# Patient Record
Sex: MALE | Race: WHITE | Employment: UNEMPLOYED | URBAN - METROPOLITAN AREA
[De-identification: names, ages, dates, MRNs, and addresses within clinical notes are randomized per-mention and may not be internally consistent; named-entity substitution may affect disease eponyms.]

---

## 2017-01-25 ENCOUNTER — APPOINTMENT (OUTPATIENT)
Dept: GENERAL RADIOLOGY | Age: 57
End: 2017-01-25
Attending: PHYSICIAN ASSISTANT
Payer: SELF-PAY

## 2017-01-25 ENCOUNTER — HOSPITAL ENCOUNTER (EMERGENCY)
Age: 57
Discharge: HOME | End: 2017-01-25
Attending: EMERGENCY MEDICINE
Payer: SELF-PAY

## 2017-01-25 VITALS
SYSTOLIC BLOOD PRESSURE: 170 MMHG | HEART RATE: 63 BPM | BODY MASS INDEX: 30.23 KG/M2 | TEMPERATURE: 98.8 F | WEIGHT: 256 LBS | HEIGHT: 77 IN | RESPIRATION RATE: 18 BRPM | DIASTOLIC BLOOD PRESSURE: 66 MMHG | OXYGEN SATURATION: 97 %

## 2017-01-25 DIAGNOSIS — R10.13 ABDOMINAL PAIN, EPIGASTRIC: ICD-10-CM

## 2017-01-25 DIAGNOSIS — K85.80 OTHER ACUTE PANCREATITIS: Primary | ICD-10-CM

## 2017-01-25 LAB
ALBUMIN SERPL BCP-MCNC: 3.6 G/DL (ref 3.5–5)
ALBUMIN/GLOB SERPL: 0.8 {RATIO} (ref 1.1–2.2)
ALP SERPL-CCNC: 62 U/L (ref 45–117)
ALT SERPL-CCNC: 18 U/L (ref 12–78)
ANION GAP BLD CALC-SCNC: 8 MMOL/L (ref 5–15)
APPEARANCE UR: CLEAR
AST SERPL W P-5'-P-CCNC: 11 U/L (ref 15–37)
ATRIAL RATE: 60 BPM
BACTERIA URNS QL MICRO: NEGATIVE /HPF
BASOPHILS # BLD AUTO: 0 K/UL (ref 0–0.1)
BASOPHILS # BLD: 0 % (ref 0–1)
BILIRUB SERPL-MCNC: 0.7 MG/DL (ref 0.2–1)
BILIRUB UR QL: NEGATIVE
BUN SERPL-MCNC: 11 MG/DL (ref 6–20)
BUN/CREAT SERPL: 11 (ref 12–20)
CALCIUM SERPL-MCNC: 9.4 MG/DL (ref 8.5–10.1)
CALCULATED P AXIS, ECG09: 42 DEGREES
CALCULATED R AXIS, ECG10: -49 DEGREES
CALCULATED T AXIS, ECG11: -6 DEGREES
CHLORIDE SERPL-SCNC: 98 MMOL/L (ref 97–108)
CO2 SERPL-SCNC: 31 MMOL/L (ref 21–32)
COLOR UR: ABNORMAL
CREAT SERPL-MCNC: 0.97 MG/DL (ref 0.7–1.3)
DIAGNOSIS, 93000: NORMAL
EOSINOPHIL # BLD: 0.1 K/UL (ref 0–0.4)
EOSINOPHIL NFR BLD: 2 % (ref 0–7)
EPITH CASTS URNS QL MICRO: ABNORMAL /LPF
ERYTHROCYTE [DISTWIDTH] IN BLOOD BY AUTOMATED COUNT: 12.8 % (ref 11.5–14.5)
GLOBULIN SER CALC-MCNC: 4.6 G/DL (ref 2–4)
GLUCOSE SERPL-MCNC: 161 MG/DL (ref 65–100)
GLUCOSE UR STRIP.AUTO-MCNC: NEGATIVE MG/DL
HCT VFR BLD AUTO: 38.7 % (ref 36.6–50.3)
HGB BLD-MCNC: 13.3 G/DL (ref 12.1–17)
HGB UR QL STRIP: NEGATIVE
HYALINE CASTS URNS QL MICRO: ABNORMAL /LPF (ref 0–5)
KETONES UR QL STRIP.AUTO: NEGATIVE MG/DL
LEUKOCYTE ESTERASE UR QL STRIP.AUTO: NEGATIVE
LIPASE SERPL-CCNC: 471 U/L (ref 73–393)
LYMPHOCYTES # BLD AUTO: 33 % (ref 12–49)
LYMPHOCYTES # BLD: 2.5 K/UL (ref 0.8–3.5)
MCH RBC QN AUTO: 29.5 PG (ref 26–34)
MCHC RBC AUTO-ENTMCNC: 34.4 G/DL (ref 30–36.5)
MCV RBC AUTO: 85.8 FL (ref 80–99)
MONOCYTES # BLD: 0.5 K/UL (ref 0–1)
MONOCYTES NFR BLD AUTO: 7 % (ref 5–13)
NEUTS SEG # BLD: 4.5 K/UL (ref 1.8–8)
NEUTS SEG NFR BLD AUTO: 58 % (ref 32–75)
NITRITE UR QL STRIP.AUTO: NEGATIVE
P-R INTERVAL, ECG05: 180 MS
PH UR STRIP: 6 [PH] (ref 5–8)
PLATELET # BLD AUTO: 256 K/UL (ref 150–400)
POTASSIUM SERPL-SCNC: 3.7 MMOL/L (ref 3.5–5.1)
PROT SERPL-MCNC: 8.2 G/DL (ref 6.4–8.2)
PROT UR STRIP-MCNC: 100 MG/DL
Q-T INTERVAL, ECG07: 424 MS
QRS DURATION, ECG06: 96 MS
QTC CALCULATION (BEZET), ECG08: 424 MS
RBC # BLD AUTO: 4.51 M/UL (ref 4.1–5.7)
RBC #/AREA URNS HPF: ABNORMAL /HPF (ref 0–5)
SODIUM SERPL-SCNC: 137 MMOL/L (ref 136–145)
SP GR UR REFRACTOMETRY: 1.01 (ref 1–1.03)
TROPONIN I SERPL-MCNC: <0.04 NG/ML
UROBILINOGEN UR QL STRIP.AUTO: 0.2 EU/DL (ref 0.2–1)
VENTRICULAR RATE, ECG03: 60 BPM
WBC # BLD AUTO: 7.7 K/UL (ref 4.1–11.1)
WBC URNS QL MICRO: ABNORMAL /HPF (ref 0–4)

## 2017-01-25 PROCEDURE — 85025 COMPLETE CBC W/AUTO DIFF WBC: CPT | Performed by: EMERGENCY MEDICINE

## 2017-01-25 PROCEDURE — 93005 ELECTROCARDIOGRAM TRACING: CPT

## 2017-01-25 PROCEDURE — 96374 THER/PROPH/DIAG INJ IV PUSH: CPT

## 2017-01-25 PROCEDURE — 96361 HYDRATE IV INFUSION ADD-ON: CPT

## 2017-01-25 PROCEDURE — 36415 COLL VENOUS BLD VENIPUNCTURE: CPT | Performed by: EMERGENCY MEDICINE

## 2017-01-25 PROCEDURE — 83690 ASSAY OF LIPASE: CPT | Performed by: EMERGENCY MEDICINE

## 2017-01-25 PROCEDURE — 99284 EMERGENCY DEPT VISIT MOD MDM: CPT

## 2017-01-25 PROCEDURE — 96375 TX/PRO/DX INJ NEW DRUG ADDON: CPT

## 2017-01-25 PROCEDURE — 84484 ASSAY OF TROPONIN QUANT: CPT | Performed by: PHYSICIAN ASSISTANT

## 2017-01-25 PROCEDURE — 74011250636 HC RX REV CODE- 250/636: Performed by: PHYSICIAN ASSISTANT

## 2017-01-25 PROCEDURE — 81001 URINALYSIS AUTO W/SCOPE: CPT | Performed by: EMERGENCY MEDICINE

## 2017-01-25 PROCEDURE — 80053 COMPREHEN METABOLIC PANEL: CPT | Performed by: EMERGENCY MEDICINE

## 2017-01-25 PROCEDURE — 74000 XR ABD (KUB): CPT

## 2017-01-25 RX ORDER — SODIUM CHLORIDE 9 MG/ML
2000 INJECTION, SOLUTION INTRAVENOUS CONTINUOUS
Status: DISCONTINUED | OUTPATIENT
Start: 2017-01-25 | End: 2017-01-25 | Stop reason: HOSPADM

## 2017-01-25 RX ORDER — LACTULOSE 10 G/15ML
10 SOLUTION ORAL; RECTAL DAILY
Qty: 480 ML | Refills: 0 | Status: SHIPPED | OUTPATIENT
Start: 2017-01-25 | End: 2017-10-03 | Stop reason: SDUPTHER

## 2017-01-25 RX ORDER — HYDROMORPHONE HYDROCHLORIDE 1 MG/ML
1 INJECTION, SOLUTION INTRAMUSCULAR; INTRAVENOUS; SUBCUTANEOUS
Status: COMPLETED | OUTPATIENT
Start: 2017-01-25 | End: 2017-01-25

## 2017-01-25 RX ADMIN — SODIUM CHLORIDE 2000 ML/HR: 900 INJECTION, SOLUTION INTRAVENOUS at 03:23

## 2017-01-25 RX ADMIN — PROMETHAZINE HYDROCHLORIDE 25 MG: 25 INJECTION INTRAMUSCULAR; INTRAVENOUS at 03:22

## 2017-01-25 RX ADMIN — HYDROMORPHONE HYDROCHLORIDE 1 MG: 1 INJECTION, SOLUTION INTRAMUSCULAR; INTRAVENOUS; SUBCUTANEOUS at 03:22

## 2017-01-25 NOTE — ED TRIAGE NOTES
TRIAGE NOTE: Pt arrives from home with complaint of abdominal pain that started two days ago. Hx pancreatitis. +urinary frequency.   Denies N/V.

## 2017-01-25 NOTE — DISCHARGE INSTRUCTIONS
We hope that we have addressed all of your medical concerns. The examination and treatment you received in the Emergency Department were for an emergent problem and were not intended as complete care. It is important that you follow up with your healthcare provider(s) for ongoing care. If your symptoms worsen or do not improve as expected, and you are unable to reach your usual health care provider(s), you should return to the Emergency Department. Today's healthcare is undergoing tremendous change, and patient satisfaction surveys are one of the many tools to assess the quality of medical care. You may receive a survey from the [employer] regarding your experience in the Emergency Department. I hope that your experience has been completely positive, particularly the medical care that I provided. As such, please participate in the survey; anything less than excellent does not meet my expectations or intentions. [facility] and [facility] participate in nationally recognized quality of care measures. If your blood pressure is greater than 120/80, as reported below, we urge that you seek medical care to address the potential of high blood pressure, commonly known as hypertension. Hypertension can be hereditary or can be caused by certain medical conditions, pain, stress, or \"white coat syndrome. \"       Please make an appointment with your health care provider(s) for follow up of your Emergency Department visit. VITALS:   Patient Vitals for the past 8 hrs:   Temp Pulse Resp BP SpO2   01/25/17 0445 - - - 170/66 97 %   01/25/17 0430 - - - 177/59 98 %   01/25/17 0415 97.9 °F (36.6 °C) (!) 58 16 196/88 98 %   01/25/17 0201 97.9 °F (36.6 °C) 87 17 (!) 191/99 97 %          Thank you for allowing us to provide you with medical care today. We realize that you have many choices for your emergency care needs.   Please choose us in the future for any continued health care needs. Regards,           April MAURO. [facility], [address] [facility].   Office: [phone]            Recent Results (from the past 24 hour(s))   CBC WITH AUTOMATED DIFF    Collection Time: 01/25/17  2:12 AM   Result Value Ref Range    WBC 7.7 4.1 - 11.1 K/uL    RBC 4.51 4.10 - 5.70 M/uL    HGB 13.3 12.1 - 17.0 g/dL    HCT 38.7 36.6 - 50.3 %    MCV 85.8 80.0 - 99.0 FL    MCH 29.5 26.0 - 34.0 PG    MCHC 34.4 30.0 - 36.5 g/dL    RDW 12.8 11.5 - 14.5 %    PLATELET 553 015 - 190 K/uL    NEUTROPHILS 58 32 - 75 %    LYMPHOCYTES 33 12 - 49 %    MONOCYTES 7 5 - 13 %    EOSINOPHILS 2 0 - 7 %    BASOPHILS 0 0 - 1 %    ABS. NEUTROPHILS 4.5 1.8 - 8.0 K/UL    ABS. LYMPHOCYTES 2.5 0.8 - 3.5 K/UL    ABS. MONOCYTES 0.5 0.0 - 1.0 K/UL    ABS. EOSINOPHILS 0.1 0.0 - 0.4 K/UL    ABS. BASOPHILS 0.0 0.0 - 0.1 K/UL   METABOLIC PANEL, COMPREHENSIVE    Collection Time: 01/25/17  2:12 AM   Result Value Ref Range    Sodium 137 136 - 145 mmol/L    Potassium 3.7 3.5 - 5.1 mmol/L    Chloride 98 97 - 108 mmol/L    CO2 31 21 - 32 mmol/L    Anion gap 8 5 - 15 mmol/L    Glucose 161 (H) 65 - 100 mg/dL    BUN 11 6 - 20 MG/DL    Creatinine 0.97 0.70 - 1.30 MG/DL    BUN/Creatinine ratio 11 (L) 12 - 20      GFR est AA >60 >60 ml/min/1.73m2    GFR est non-AA >60 >60 ml/min/1.73m2    Calcium 9.4 8.5 - 10.1 MG/DL    Bilirubin, total 0.7 0.2 - 1.0 MG/DL    ALT 18 12 - 78 U/L    AST 11 (L) 15 - 37 U/L    Alk.  phosphatase 62 45 - 117 U/L    Protein, total 8.2 6.4 - 8.2 g/dL    Albumin 3.6 3.5 - 5.0 g/dL    Globulin 4.6 (H) 2.0 - 4.0 g/dL    A-G Ratio 0.8 (L) 1.1 - 2.2     LIPASE    Collection Time: 01/25/17  2:12 AM   Result Value Ref Range    Lipase 471 (H) 73 - 393 U/L   URINALYSIS W/MICROSCOPIC    Collection Time: 01/25/17  2:12 AM   Result Value Ref Range    Color YELLOW/STRAW      Appearance CLEAR CLEAR      Specific gravity 1.012 1.003 - 1.030      pH (UA) 6.0 5.0 - 8.0      Protein 100 (A) NEG mg/dL Glucose NEGATIVE  NEG mg/dL    Ketone NEGATIVE  NEG mg/dL    Bilirubin NEGATIVE  NEG      Blood NEGATIVE  NEG      Urobilinogen 0.2 0.2 - 1.0 EU/dL    Nitrites NEGATIVE  NEG      Leukocyte Esterase NEGATIVE  NEG      WBC 0-4 0 - 4 /hpf    RBC 0-5 0 - 5 /hpf    Epithelial cells FEW FEW /lpf    Bacteria NEGATIVE  NEG /hpf    Hyaline Cast 0-2 0 - 5 /lpf   TROPONIN I    Collection Time: 01/25/17  2:12 AM   Result Value Ref Range    Troponin-I, Qt. <0.04 <0.05 ng/mL   EKG, 12 LEAD, INITIAL    Collection Time: 01/25/17  4:19 AM   Result Value Ref Range    Ventricular Rate 60 BPM    Atrial Rate 60 BPM    P-R Interval 180 ms    QRS Duration 96 ms    Q-T Interval 424 ms    QTC Calculation (Bezet) 424 ms    Calculated P Axis 42 degrees    Calculated R Axis -49 degrees    Calculated T Axis -6 degrees    Diagnosis       Normal sinus rhythm  Left anterior fascicular block  When compared with ECG of 17-FEB-2007 14:42,  Previous ECG has undetermined rhythm, needs review  QRS axis shifted left         No results found. Abdominal Pain: Care Instructions  Your Care Instructions    Abdominal pain has many possible causes. Some aren't serious and get better on their own in a few days. Others need more testing and treatment. If your pain continues or gets worse, you need to be rechecked and may need more tests to find out what is wrong. You may need surgery to correct the problem. Don't ignore new symptoms, such as fever, nausea and vomiting, urination problems, pain that gets worse, and dizziness. These may be signs of a more serious problem. Your doctor may have recommended a follow-up visit in the next 8 to 12 hours. If you are not getting better, you may need more tests or treatment. The doctor has checked you carefully, but problems can develop later. If you notice any problems or new symptoms, get medical treatment right away. Follow-up care is a key part of your treatment and safety.  Be sure to make and go to all appointments, and call your doctor if you are having problems. It's also a good idea to know your test results and keep a list of the medicines you take. How can you care for yourself at home? · Rest until you feel better. · To prevent dehydration, drink plenty of fluids, enough so that your urine is light yellow or clear like water. Choose water and other caffeine-free clear liquids until you feel better. If you have kidney, heart, or liver disease and have to limit fluids, talk with your doctor before you increase the amount of fluids you drink. · If your stomach is upset, eat mild foods, such as rice, dry toast or crackers, bananas, and applesauce. Try eating several small meals instead of two or three large ones. · Wait until 48 hours after all symptoms have gone away before you have spicy foods, alcohol, and drinks that contain caffeine. · Do not eat foods that are high in fat. · Avoid anti-inflammatory medicines such as aspirin, ibuprofen (Advil, Motrin), and naproxen (Aleve). These can cause stomach upset. Talk to your doctor if you take daily aspirin for another health problem. When should you call for help? Call 911 anytime you think you may need emergency care. For example, call if:  · You passed out (lost consciousness). · You pass maroon or very bloody stools. · You vomit blood or what looks like coffee grounds. · You have new, severe belly pain. Call your doctor now or seek immediate medical care if:  · Your pain gets worse, especially if it becomes focused in one area of your belly. · You have a new or higher fever. · Your stools are black and look like tar, or they have streaks of blood. · You have unexpected vaginal bleeding. · You have symptoms of a urinary tract infection. These may include:  ¨ Pain when you urinate. ¨ Urinating more often than usual.  ¨ Blood in your urine. · You are dizzy or lightheaded, or you feel like you may faint.   Watch closely for changes in your health, and be sure to contact your doctor if:  · You are not getting better after 1 day (24 hours). Where can you learn more? Go to http://ciara-abbe.info/. Enter Y577 in the search box to learn more about \"Abdominal Pain: Care Instructions. \"  Current as of: May 27, 2016  Content Version: 11.1  © 4703-3650 [employer] Care instructions adapted under license by [employer] (which disclaims liability or warranty for this information). If you have questions about a medical condition or this instruction, always ask your healthcare professional. [place] [address] any warranty or liability for your use of this information.

## 2017-01-25 NOTE — ED NOTES
PA reviewed discharge instructions and options with patient; patient verbalized understanding. RN reviewed discharge instructions using teachback method. Pt. Ambulated to exit without difficulty and in no signs of acute distress.

## 2017-01-25 NOTE — ED PROVIDER NOTES
HPI Comments: 57 yo  male with medical history remarkable for DM, Hep C, elevated triglycerides, opioid dependence in remission, daily methadone therapy presenting ambulatory to the ED with complaint of epigastric abdominal pain, described as \"unique pancreatitis type pain\", radiating into the back constant, positionally exacerbated, 10/10 with associated nausea without vomiting x two days. Has tried NPO diet with minimal resolution which typically helps. No ETOH. Some increased urinary frequency. No fever, headache, sore throat, cough, rhinorrhea, sneezing, SOB, chest pain, vomiting, or diarrhea. Patient is a 56 y.o. male presenting with abdominal pain. Abdominal Pain    Associated symptoms include nausea and arthralgias. Pertinent negatives include no fever, no diarrhea, no vomiting, no constipation, no dysuria, no frequency, no headaches and no chest pain. Past Medical History:   Diagnosis Date    Alcohol abuse, in remission     Constipation     Diabetes ([facility] [address]      Type II    Hepatitis C     Hypertension     Pancreatitis     Spondylitis ([facility] [address]        Past Surgical History:   Procedure Laterality Date    Hx orthopaedic       2 back surgeries    Hx appendectomy      Hx other surgical       knee  surgery         History reviewed. No pertinent family history. Social History     Social History    Marital status: SINGLE     Spouse name: N/A    Number of children: N/A    Years of education: N/A     Occupational History    Not on file. Social History Main Topics    Smoking status: Current Every Day Smoker     Packs/day: 1.00    Smokeless tobacco: Never Used    Alcohol use No    Drug use: No      Comment: Methadone    Sexual activity: Not on file     Other Topics Concern    Not on file     Social History Narrative         ALLERGIES: Sulfa (sulfonamide antibiotics)    Review of Systems   Constitutional: Negative. Negative for chills and fever.    HENT: Negative for congestion, ear pain, rhinorrhea, sore throat and voice change. Eyes: Negative. Negative for photophobia, pain and itching. Respiratory: Negative for cough, chest tightness and shortness of breath. Cardiovascular: Negative for chest pain and palpitations. Gastrointestinal: Positive for abdominal pain and nausea. Negative for abdominal distention, constipation, diarrhea and vomiting. Genitourinary: Negative for difficulty urinating, dysuria, frequency and urgency. Musculoskeletal: Positive for arthralgias and neck pain. Negative for joint swelling and neck stiffness. Skin: Positive for color change. Neurological: Negative for weakness, numbness and headaches. Psychiatric/Behavioral: Negative for confusion and decreased concentration. All other systems reviewed and are negative. Vitals:    01/25/17 0201   BP: (!) 191/99   Pulse: 87   Resp: 17   Temp: 97.9 °F (36.6 °C)   SpO2: 97%   Weight: 116.1 kg (256 lb)   Height: 6' 5\" (1.956 m)            Physical Exam   Constitutional: He is oriented to person, place, and time. He appears well-developed and well-nourished. No distress. Obese  male uncomfortable appearing   HENT:   Head: Normocephalic and atraumatic. Right Ear: External ear normal.   Left Ear: External ear normal.   Nose: Nose normal.   Mouth/Throat: Oropharynx is clear and moist. No oropharyngeal exudate. Eyes: Conjunctivae and EOM are normal. Pupils are equal, round, and reactive to light. Right eye exhibits no discharge. Left eye exhibits no discharge. Neck: Normal range of motion. Neck supple. Cardiovascular: Normal rate, regular rhythm and normal heart sounds. Pulmonary/Chest: Effort normal and breath sounds normal. He has no wheezes. He has no rales. Abdominal: Soft. Bowel sounds are normal. He exhibits no distension. There is no tenderness. There is no guarding. Obese tender in epigastrum and RUQ     Musculoskeletal: Normal range of motion. Lymphadenopathy:     He has no cervical adenopathy. Neurological: He is alert and oriented to person, place, and time. No cranial nerve deficit. Skin: Skin is warm and dry. He is not diaphoretic. Psychiatric: He has a normal mood and affect. His behavior is normal.   Nursing note and vitals reviewed. MDM  Number of Diagnoses or Management Options  Abdominal pain, epigastric:   Other acute pancreatitis:   Diagnosis management comments: 57 yo  male with hx of pancreatitis with epigastric abdominal pain described as similar. Will check labs, give IVF, analgesia and reassess. [facility]         Amount and/or Complexity of Data Reviewed  Clinical lab tests: reviewed and ordered      ED Course       Procedures      Progress note    Labs reviewed. Mildly elevated lipase. [facility]     EKG interpretation: (Preliminary)  Rhythm: sinus rhythm with left fascicular block; and regular . Rate (approx.): 60; Axis: normal; P wave: normal; QRS interval: normal [facility]    EKG seen and interpreted independently by ED attending. [facility]    Significant stool on xray. Suspect contributing to pain. Pt request refill of lactulose, was helpful in past. [facility]    Patient's results have been reviewed with them. Patient and/or family have verbally conveyed their understanding and agreement of the patient's signs, symptoms, diagnosis, treatment and prognosis and additionally agree to follow up as recommended or return to the Emergency Room should their condition change prior to follow-up. Discharge instructions have also been provided to the patient with some educational information regarding their diagnosis as well a list of reasons why they would want to return to the ER prior to their follow-up appointment should their condition change. [facility]    A/P  Abdominal pain/pancreatitis: Clear liquid diet. Lactulose.   Follow-up with gastroenterology. Return for any new or worsening.  Carmencita WADE [place], [place]

## 2017-07-12 ENCOUNTER — OFFICE VISIT (OUTPATIENT)
Dept: INTERNAL MEDICINE CLINIC | Age: 57
End: 2017-07-12

## 2017-07-12 VITALS
TEMPERATURE: 98.6 F | HEIGHT: 77 IN | RESPIRATION RATE: 19 BRPM | BODY MASS INDEX: 30.75 KG/M2 | OXYGEN SATURATION: 97 % | WEIGHT: 260.4 LBS | SYSTOLIC BLOOD PRESSURE: 150 MMHG | HEART RATE: 76 BPM | DIASTOLIC BLOOD PRESSURE: 100 MMHG

## 2017-07-12 DIAGNOSIS — E11.65 TYPE 2 DIABETES MELLITUS WITH HYPERGLYCEMIA, WITH LONG-TERM CURRENT USE OF INSULIN (HCC): Primary | ICD-10-CM

## 2017-07-12 DIAGNOSIS — K86.1 OTHER CHRONIC PANCREATITIS (HCC): ICD-10-CM

## 2017-07-12 DIAGNOSIS — G89.29 OTHER CHRONIC PAIN: ICD-10-CM

## 2017-07-12 DIAGNOSIS — Z79.4 TYPE 2 DIABETES MELLITUS WITH HYPERGLYCEMIA, WITH LONG-TERM CURRENT USE OF INSULIN (HCC): Primary | ICD-10-CM

## 2017-07-12 DIAGNOSIS — Z12.11 COLON CANCER SCREENING: ICD-10-CM

## 2017-07-12 DIAGNOSIS — I10 ESSENTIAL HYPERTENSION: ICD-10-CM

## 2017-07-12 DIAGNOSIS — F41.9 CHRONIC ANXIETY: ICD-10-CM

## 2017-07-12 DIAGNOSIS — Z86.19 HISTORY OF HEPATITIS C: ICD-10-CM

## 2017-07-12 RX ORDER — AMLODIPINE BESYLATE 10 MG/1
TABLET ORAL
COMMUNITY
Start: 2017-06-28 | End: 2017-10-17 | Stop reason: SDUPTHER

## 2017-07-12 RX ORDER — INSULIN GLARGINE 100 [IU]/ML
INJECTION, SOLUTION SUBCUTANEOUS
COMMUNITY
Start: 2017-07-11 | End: 2017-08-29 | Stop reason: ALTCHOICE

## 2017-07-12 NOTE — MR AVS SNAPSHOT
Visit Information Date & Time Provider Department Dept. Phone Encounter #  
 7/12/2017 10:30 AM [facility] [phone] [id number] Follow-up Instructions Return in about 3 months (around 10/12/2017) for Full Physical - 30 minutes appointment. Your Appointments 8/16/2017 10:45 AM  
New Patient with Verónica Guido MD  
[facility] [address]) Appt Note: NP est PCP, changing insurances/ 6/1/17bw [address] [facility] [address] [facility] [address]  
[phone]  
  
   
 [address] [facility] [address] Upcoming Health Maintenance Date Due Hepatitis C Screening 1960 Pneumococcal 19-64 Medium Risk (1 of 1 - PPSV23) 1/19/1981 DTaP/Tdap/Td series (1 - Tdap) 1/19/1983 FOBT Q 1 YEAR AGE 50-75 1/19/2012 INFLUENZA AGE 9 TO ADULT 8/1/2017 Allergies as of 7/12/2017  Review Complete On: 7/12/2017 By: Kenney Guzman MD  
  
 Severity Noted Reaction Type Reactions Sulfa (Sulfonamide Antibiotics)  11/28/2011    Unknown (comments) Makes him warner Current Immunizations  Reviewed on 11/28/2011 No immunizations on file. Not reviewed this visit You Were Diagnosed With   
  
 Codes Comments Type 2 diabetes mellitus with hyperglycemia, with long-term current use of insulin (HCC)    -  Primary ICD-10-CM: E11.65, Z79.4 ICD-9-CM: 250.00, 790.29, V58.67 Other chronic pain     ICD-10-CM: G89.29 ICD-9-CM: 338.29 Essential hypertension     ICD-10-CM: I10 
ICD-9-CM: 401.9 Other chronic pancreatitis ([facility] [address]     ICD-10-CM: K86.1 ICD-9-CM: 738.6 History of hepatitis C     ICD-10-CM: Z86.19 ICD-9-CM: V12.09 Colon cancer screening     ICD-10-CM: Z12.11 ICD-9-CM: V76.51 Chronic anxiety     ICD-10-CM: F41.9 ICD-9-CM: 300.00 Vitals BP Pulse Temp Resp Height(growth percentile) Weight(growth percentile) (!) 150/100 (BP 1 Location: Right arm, BP Patient Position: Sitting) 76 98.6 °F (37 °C) (Oral) 19 6' 5\" (1.956 m) 260 lb 6.4 oz (118.1 kg) SpO2 BMI Smoking Status 97% 30.88 kg/m2 Current Every Day Smoker Vitals History BMI and BSA Data Body Mass Index Body Surface Area  
 30.88 kg/m 2 2.53 m 2 Preferred Pharmacy Pharmacy Name Phone [facility] [address] [facility] [address] [facility] [phone] Your Updated Medication List  
  
   
This list is accurate as of: 7/12/17 11:40 AM.  Always use your most recent med list.  
  
  
  
  
 ACTOS 30 mg tablet Generic drug:  pioglitazone Take 30 mg by mouth nightly. amLODIPine 10 mg tablet Commonly known as:  NORVASC  
  
 lactulose 10 gram/15 mL solution Commonly known as:  Vertie Cough Take 15 mL by mouth daily. Indications: Constipation LANTUS SOLOSTAR 100 unit/mL (3 mL) Inpn Generic drug:  insulin glargine  
  
 lisinopril 10 mg tablet Commonly known as:  Pecola Cypher Take 20 mg by mouth daily. Reports 10 or 20mg. daily  
  
 metFORMIN 1,000 mg tablet Commonly known as:  GLUCOPHAGE Take 1,000 mg by mouth two (2) times daily (with meals). METHADONE PO Take 75 mg by mouth daily. XANAX 2 mg tablet Generic drug:  ALPRAZolam  
Take 2 mg by mouth two (2) times a day. We Performed the Following CBC WITH AUTOMATED DIFF [66335 CPT(R)] HEMOGLOBIN A1C WITH EAG [20801 CPT(R)] LIPID PANEL [71968 CPT(R)] OCCULT BLOOD, IMMUNOASSAY (FIT) L7961625 CPT(R)] REFERRAL TO PSYCHIATRY [REF91 Custom] TSH 3RD GENERATION [18943 CPT(R)] Follow-up Instructions Return in about 3 months (around 10/12/2017) for Full Physical - 30 minutes appointment. Referral Information Referral ID Referred By Referred To  
  
 [id number] Lewis Gomez, [address] [facility], [address] [place] [address], [address] [facility] Phone: [phone] Fax: [phone] Visits Status Start Date End Date 1 New Request 7/12/17 7/12/18 If your referral has a status of pending review or denied, additional information will be sent to support the outcome of this decision. Patient Instructions Chronic Pain: Care Instructions Your Care Instructions Chronic pain is pain that lasts a long time (months or even years) and may or may not have a clear cause. It is different from acute pain, which usually does have a clear causelike an injury or illnessand gets better over time. Chronic pain: 
· Lasts over time but may vary from day to day. · Does not go away despite efforts to end it. · May disrupt your sleep and lead to fatigue. · May cause depression or anxiety. · May make your muscles tense, causing more pain. · Can disrupt your work, hobbies, home life, and relationships with friends and family. Chronic pain is a very real condition. It is not just in your head. Treatment can help and usually includes several methods used together, such as medicines, physical therapy, exercise, and other treatments. Learning how to relax and changing negative thought patterns can also help you cope. Chronic pain is complex. Taking an active role in your treatment will help you better manage your pain. Tell your doctor if you have trouble dealing with your pain. You may have to try several things before you find what works best for you. Follow-up care is a key part of your treatment and safety. Be sure to make and go to all appointments, and call your doctor if you are having problems. Its also a good idea to know your test results and keep a list of the medicines you take. How can you care for yourself at home? · Pace yourself. Break up large jobs into smaller tasks. Save harder tasks for days when you have less pain, or go back and forth between hard tasks and easier ones. Take rest breaks. · Relax, and reduce stress. Relaxation techniques such as deep breathing or meditation can help. · Keep moving. Gentle, daily exercise can help reduce pain over the long run. Try low- or no-impact exercises such as walking, swimming, and stationary biking. Do stretches to stay flexible. · Try heat, cold packs, and massage. · Get enough sleep. Chronic pain can make you tired and drain your energy. Talk with your doctor if you have trouble sleeping because of pain. · Think positive. Your thoughts can affect your pain level. Do things that you enjoy to distract yourself when you have pain instead of focusing on the pain. See a movie, read a book, listen to music, or spend time with a friend. · If you think you are depressed, talk to your doctor about treatment. · Keep a daily pain diary. Record how your moods, thoughts, sleep patterns, activities, and medicine affect your pain. You may find that your pain is worse during or after certain activities or when you are feeling a certain emotion. Having a record of your pain can help you and your doctor find the best ways to treat your pain. · Take pain medicines exactly as directed. ¨ If the doctor gave you a prescription medicine for pain, take it as prescribed. ¨ If you are not taking a prescription pain medicine, ask your doctor if you can take an over-the-counter medicine. Reducing constipation caused by pain medicine · Include fruits, vegetables, beans, and whole grains in your diet each day. These foods are high in fiber. · Drink plenty of fluids, enough so that your urine is light yellow or clear like water. If you have kidney, heart, or liver disease and have to limit fluids, talk with your doctor before you increase the amount of fluids you drink. · If your doctor recommends it, get more exercise. Walking is a good choice. Bit by bit, increase the amount you walk every day. Try for at least 30 minutes on most days of the week. · Schedule time each day for a bowel movement. A daily routine may help. Take your time and do not strain when having a bowel movement. When should you call for help? Call your doctor now or seek immediate medical care if: 
· Your pain gets worse or is out of control. · You feel down or blue, or you do not enjoy things like you once did. You may be depressed, which is common in people with chronic pain. Depression can be treated. · You have vomiting or cramps for more than 2 hours. Watch closely for changes in your health, and be sure to contact your doctor if: 
· You cannot sleep because of pain. · You are very worried or anxious about your pain. · You have trouble taking your pain medicine. · You have any concerns about your pain medicine. · You have trouble with bowel movements, such as: 
¨ No bowel movement in 3 days. ¨ Blood in the anal area, in your stool, or on the toilet paper. ¨ Diarrhea for more than 24 hours. Where can you learn more? Go to http://ciara-abbe.info/. Enter N004 in the search box to learn more about \"Chronic Pain: Care Instructions. \" Current as of: October 14, 2016 Content Version: 11.3 © 8688-2384 Teacher[employer]. Care instructions adapted under license by [employer] (which disclaims liability or warranty for this information). If you have questions about a medical condition or this instruction, always ask your healthcare professional. [place] [address] any warranty or liability for your use of this information. Introducing [facility]! Elvin Sadler introduces [employer] Apps patient portal. Now you can access parts of your medical record, email your doctor's office, and request medication refills online. 1. In your internet browser, go to [email or web address] 2. Click on the First Time User? Click Here link in the Sign In box. You will see the New Member Sign Up page. 3. Enter your G-cluster Access Code exactly as it appears below. You will not need to use this code after youve completed the sign-up process. If you do not sign up before the expiration date, you must request a new code. · G-cluster Access Code: [email or web address] Expires: 10/10/2017 10:18 AM 
 
4. Enter the last four digits of your Social Security Number (xxxx) and Date of Birth (mm/dd/yyyy) as indicated and click Submit. You will be taken to the next sign-up page. 5. Create a G-cluster ID. This will be your G-cluster login ID and cannot be changed, so think of one that is secure and easy to remember. 6. Create a G-cluster password. You can change your password at any time. 7. Enter your Password Reset Question and Answer. This can be used at a later time if you forget your password. 8. Enter your e-mail address. You will receive e-mail notification when new information is available in [address] 9. Click Sign Up. You can now view and download portions of your medical record. 10. Click the Download Summary menu link to download a portable copy of your medical information. If you have questions, please visit the Frequently Asked Questions section of the G-cluster website. Remember, G-cluster is NOT to be used for urgent needs. For medical emergencies, dial 911. Now available from your iPhone and Android! Please provide this summary of care documentation to your next provider. Your primary care clinician is listed as Diane Saab. If you have any questions after today's visit, please call [phone].

## 2017-07-12 NOTE — PROGRESS NOTES
New Patient Evaluation    Beth Verde is a 56 y.o. male. He has previously seen Dr. Kwesi Trinh    They are here to establish care with the group and me as a primary care provider. The patient fractured his spine at age 15. Spent 80 days in the hospital after that. Presently on disability. Not working for the past 9 years. He has a history of hypertension-on Norvasc, Lisinopril. Pressures noramally 120/80. Long history of anxiety/depression. On Xanax--seeing [facility]. He is on Methadone--he has not seen pain management ever. History of IVDA--now sober    Sees endocrine. [facility]    Has chronic pancreatitis  CT scan in Sept. Areas of bowel thickening. Colonoscopy 6-8 years ago. Normal          Patient Active Problem List    Diagnosis Date Noted    Acute pancreatitis 11/29/2011     Current Outpatient Prescriptions   Medication Sig Dispense Refill    amLODIPine (NORVASC) 10 mg tablet       LANTUS SOLOSTAR 100 unit/mL (3 mL) inpn       METHADONE HCL (METHADONE PO) Take 75 mg by mouth daily.  ALPRAZolam (XANAX) 2 mg tablet Take 2 mg by mouth two (2) times a day.  metFORMIN (GLUCOPHAGE) 1,000 mg tablet Take 1,000 mg by mouth two (2) times daily (with meals).  lisinopril (PRINIVIL, ZESTRIL) 10 mg tablet Take 20 mg by mouth daily. Reports 10 or 20mg. daily       lactulose (CHRONULAC) 10 gram/15 mL solution Take 15 mL by mouth daily. Indications: Constipation 480 mL 0    pioglitazone (ACTOS) 30 mg tablet Take 30 mg by mouth nightly.        Allergies   Allergen Reactions    Sulfa (Sulfonamide Antibiotics) Unknown (comments)     Makes him warner     Past Medical History:   Diagnosis Date    Alcohol abuse, in remission     Constipation     Diabetes ([address]     Type II    Hepatitis C     Hypertension     Pancreatitis     Spondylitis ([address]      Past Surgical History:   Procedure Laterality Date    HX APPENDECTOMY      HX ORTHOPAEDIC      2 back surgeries    HX OTHER SURGICAL      knee  surgery     Family History   Problem Relation Age of Onset    Hypertension Mother     Parkinson's Disease Father      Social History   Substance Use Topics    Smoking status: Current Every Day Smoker     Packs/day: 1.00    Smokeless tobacco: Never Used    Alcohol use No        Health Maintenance   Topic Date Due    Hepatitis C Screening  1960    Pneumococcal 19-64 Medium Risk (1 of 1 - PPSV23) 01/19/1981    DTaP/Tdap/Td series (1 - Tdap) 01/19/1983    FOBT Q 1 YEAR AGE 50-75  01/19/2012    INFLUENZA AGE 9 TO ADULT  08/01/2017       Review of Systems   Constitutional: Negative. Respiratory: Negative. Cardiovascular: Negative. Visit Vitals    BP (!) 150/100 (BP 1 Location: Right arm, BP Patient Position: Sitting)    Pulse 76    Temp 98.6 °F (37 °C) (Oral)    Resp 19    Ht 6' 5\" (1.956 m)    Wt 260 lb 6.4 oz (118.1 kg)    SpO2 97%    BMI 30.88 kg/m2       Physical Exam   Constitutional: He is oriented to person, place, and time and well-developed, well-nourished, and in no distress. Cardiovascular: Normal rate and regular rhythm. Pulmonary/Chest: Effort normal and breath sounds normal. No respiratory distress. Neurological: He is alert and oriented to person, place, and time. ASSESSMENT/PLAN    Gaurang Agrawal was seen today for establish care and anxiety.     Diagnoses and all orders for this visit:    Type 2 diabetes mellitus with hyperglycemia, with long-term current use of insulin ([address]  -     LIPID PANEL  -     TSH 3RD GENERATION  -     HEMOGLOBIN A1C WITH EAG  -     CBC WITH AUTOMATED DIFF    Other chronic pain    Essential hypertension    Other chronic pancreatitis (HCC)    History of hepatitis C    Colon cancer screening  -     OCCULT BLOOD, IMMUNOASSAY (FIT)    Other orders  -     Cancel: HEPATITIS C AB      Follow-up Disposition:  Return in about 3 months (around 10/12/2017) for Full Physical - 30 minutes appointment.    -Discussed with the patient to continue the current plan of care. We will obtain baseline labwork and determine if any adjustments need to be done. We will also await the records of the previous PCP to ascertain further details of the patient's history. The patient agrees with and understands the plan of care. All questions have been answered.

## 2017-07-12 NOTE — PATIENT INSTRUCTIONS

## 2017-07-14 LAB
BASOPHILS # BLD AUTO: 0 X10E3/UL (ref 0–0.2)
BASOPHILS NFR BLD AUTO: 1 %
CHOLEST SERPL-MCNC: 234 MG/DL (ref 100–199)
EOSINOPHIL # BLD AUTO: 0.1 X10E3/UL (ref 0–0.4)
EOSINOPHIL NFR BLD AUTO: 1 %
ERYTHROCYTE [DISTWIDTH] IN BLOOD BY AUTOMATED COUNT: 14.3 % (ref 12.3–15.4)
EST. AVERAGE GLUCOSE BLD GHB EST-MCNC: 194 MG/DL
HBA1C MFR BLD: 8.4 % (ref 4.8–5.6)
HCT VFR BLD AUTO: 40.9 % (ref 37.5–51)
HDLC SERPL-MCNC: 40 MG/DL
HEMOCCULT STL QL IA: NORMAL
HGB BLD-MCNC: 13.4 G/DL (ref 12.6–17.7)
IMM GRANULOCYTES # BLD: 0 X10E3/UL (ref 0–0.1)
IMM GRANULOCYTES NFR BLD: 0 %
LDLC SERPL CALC-MCNC: 139 MG/DL (ref 0–99)
LYMPHOCYTES # BLD AUTO: 2.3 X10E3/UL (ref 0.7–3.1)
LYMPHOCYTES NFR BLD AUTO: 28 %
MCH RBC QN AUTO: 29.6 PG (ref 26.6–33)
MCHC RBC AUTO-ENTMCNC: 32.8 G/DL (ref 31.5–35.7)
MCV RBC AUTO: 91 FL (ref 79–97)
MONOCYTES # BLD AUTO: 0.5 X10E3/UL (ref 0.1–0.9)
MONOCYTES NFR BLD AUTO: 6 %
NEUTROPHILS # BLD AUTO: 5.4 X10E3/UL (ref 1.4–7)
NEUTROPHILS NFR BLD AUTO: 64 %
PLATELET # BLD AUTO: 313 X10E3/UL (ref 150–379)
RBC # BLD AUTO: 4.52 X10E6/UL (ref 4.14–5.8)
TRIGL SERPL-MCNC: 277 MG/DL (ref 0–149)
TSH SERPL DL<=0.005 MIU/L-ACNC: 2.34 UIU/ML (ref 0.45–4.5)
VLDLC SERPL CALC-MCNC: 55 MG/DL (ref 5–40)
WBC # BLD AUTO: 8.4 X10E3/UL (ref 3.4–10.8)

## 2017-08-24 ENCOUNTER — OFFICE VISIT (OUTPATIENT)
Dept: BEHAVIORAL/MENTAL HEALTH CLINIC | Age: 57
End: 2017-08-24

## 2017-08-24 VITALS
DIASTOLIC BLOOD PRESSURE: 89 MMHG | HEIGHT: 77 IN | WEIGHT: 260 LBS | BODY MASS INDEX: 30.7 KG/M2 | HEART RATE: 72 BPM | SYSTOLIC BLOOD PRESSURE: 177 MMHG

## 2017-08-24 DIAGNOSIS — F19.20 POLYSUBSTANCE DEPENDENCE (HCC): ICD-10-CM

## 2017-08-24 DIAGNOSIS — F19.94 SUBSTANCE INDUCED MOOD DISORDER (HCC): Primary | ICD-10-CM

## 2017-08-24 DIAGNOSIS — F13.20 BENZODIAZEPINE DEPENDENCE, CONTINUOUS (HCC): ICD-10-CM

## 2017-08-24 DIAGNOSIS — F41.9 ANXIETY: ICD-10-CM

## 2017-08-24 RX ORDER — ALPRAZOLAM 1 MG/1
1 TABLET ORAL 2 TIMES DAILY
COMMUNITY
End: 2017-12-21 | Stop reason: DRUGHIGH

## 2017-08-24 NOTE — PROGRESS NOTES
Psychiatric Initial Evaluation      Chief Complaint:  The patient is seeking a new psychiatric provider. History of Present Illness:  Marli Hannon is a 74-year-old male who is currently seeing a psychiatric provider at [facility]. The patient reports that his provider is no longer willing to prescribe Xanax and he is due to run out of his prescriptions. He reports, I was tricked into going to [facility] by a very vindictive doctor.   The patient is currently on methadone for maintenance and he has been on methadone for over twenty years. The patient was referred by his primary care physician, Dr. Peri Cabral. The patient reports that he has had anxiety since 1985. He denies depression. He states that he has had multiple medical trials with serotonergic-type medications without efficacy. He reports that some of these medications did not work and he stopped them - even after only one day on the trial of the medication. The patient reports that he has been prescribed 4 mg of Xanax. His  was reviewed and he is actually prescribed only 1 mg twice per day. When confronted with this information, the patient reported that he has been taking old prescriptions to make the dose at 4 mg. The patients  was reviewed with him in detail and it appears that he has been getting his medications refilled early. When offered alternatives to benzodiazepines, given that he is on methadone, the patient stated that nothing works except for Xanax. Past Psychiatric History:  The patient was guarded and not forthcoming with his history. The patient reports that he was seen at [facility] for approximately three months, which is consistent with his prescription monitoring report. Previous Hospitalizations: This history is unknown. Previous Suicide Attempts: This history is unknown. Current Medications:  Methadone, Xanax.       Historical Medications: The patient was not willing to discuss previous medications other than to say that he has been on a lot of them. Substance Use History:  The patient last used cocaine thirty years ago. His last IV drug use was fifteen years ago. He has been on methadone maintenance for twenty years. The patient last used alcohol five years ago. The patient smokes one-half pack of cigarettes daily. Social History:  The patient reports that he is single. He is currently on disability. Education:  The patient attended [employer] for a period of time, but he did not graduate. Family History:  The patients family history is largely unknown. Past Medical History:  The patient reports two spinal fractures. The first was at the age of [de-identified] and he had two back surgeries. The patient has hypertension, chronic pancreatitis, hypercholesterolemia and high triglycerides. He is a noninsulin-dependent diabetic and his last hemoglobin A1C done in July of 2017 was 8.4. The patient has hepatitis C and spondylitis. Allergies:  Sulfa (makes him warner). Medication List:  Methadone, Xanax, Lantus, Glucophage, Lisinopril, Actos, lactulose. Vital Signs:    Blood pressure:  177/89    Pulse:  72    Height:  65    Weight:  260 pounds  BMI:  30      Review of Systems:  The patient denies depression. The patient denies current suicidal ideation, although, he is not forthcoming about his history. The patient does report anxiety. No homicidal ideation. No hallucinations or delusions. Mental Status Exam:          General Presentation:  A casually groomed, obese male that is wearing earrings and glasses. He has tattoos. The patient initially started with a slight stutter, but this cleared up very quickly as he engaged in conversation. Gait:  Steady. Mood:  Anxious. Affect:  Mood congruent. Thought Process: Tangential.     Thought Content:  No hallucinations or delusions.   No suicidal or homicidal ideation. Cognitive Testing:  Alert and oriented X 3. Concentration is good. Short-term and long-term memory is impaired with regard to providing his history. This is likely more selective than the result of a cognitive disorder. Average intelligence. Insight is poor. Judgment is poor. Reliability is poor. Assessment:  The client, Beth Verde, is a 19-year-old male who presents with anxiety. The patient has a long history of being on methadone maintenance for opiate abuse. This likely helps with some of the chronic pain that he has from back fractures. He is insisting on being on 4 mg of Xanax as this is how he has been taking his medication, although, it is not how it has been prescribed. The patient was not forthcoming about this initially. It was only revealed during a discussion about his prescription monitoring report. The patient was advised that due to the black box warnings with methadone and benzodiazepines, this is not a combination this prescriber is comfortable prescribing and that it is actually contraindicated. The patient became angry and ultimately left the appointment when other alternatives were suggested. Diagnoses:     1. Anxiety disorder NOS                    2. Substance-induced mood disorder                 3. Polysubstance dependence                    4. Active benzodiazepine dependence      Medical:  As listed. Treatment Plan:    The patient is not willing to accept any other alternative except for benzodiazepines and he insists that he wants 4 mg every day. The patient was advised that this is not a safe practice, given that he is on methadone maintenance, and he became very angry. Other alternatives were presented to help with his anxiety, but the patient reported that he knows what works for him and he is not willing to accept any alternatives. The patient left the appointment angry, disruptively and without resolution.   The patient was invited to return if he decides to pursue management of his anxiety through means other than medications that could possibly be conflicting with his other medications which could cause him harm.              Natasha Elizabeth NP, Psychiatry  8/24/2017

## 2017-08-29 ENCOUNTER — OFFICE VISIT (OUTPATIENT)
Dept: ENDOCRINOLOGY | Age: 57
End: 2017-08-29

## 2017-08-29 VITALS
HEART RATE: 59 BPM | HEIGHT: 77 IN | WEIGHT: 262.2 LBS | OXYGEN SATURATION: 97 % | SYSTOLIC BLOOD PRESSURE: 129 MMHG | BODY MASS INDEX: 30.96 KG/M2 | RESPIRATION RATE: 16 BRPM | DIASTOLIC BLOOD PRESSURE: 75 MMHG

## 2017-08-29 DIAGNOSIS — E11.65 TYPE 2 DIABETES MELLITUS WITH HYPERGLYCEMIA, WITH LONG-TERM CURRENT USE OF INSULIN (HCC): Primary | ICD-10-CM

## 2017-08-29 DIAGNOSIS — Z79.4 TYPE 2 DIABETES MELLITUS WITH HYPERGLYCEMIA, WITH LONG-TERM CURRENT USE OF INSULIN (HCC): Primary | ICD-10-CM

## 2017-08-29 RX ORDER — LISINOPRIL 40 MG/1
TABLET ORAL
COMMUNITY
Start: 2017-08-23 | End: 2017-10-17 | Stop reason: SDUPTHER

## 2017-08-29 RX ORDER — METFORMIN HYDROCHLORIDE 500 MG/1
TABLET ORAL
COMMUNITY
Start: 2017-08-18 | End: 2017-12-13

## 2017-08-29 RX ORDER — FENOFIBRATE 160 MG/1
160 TABLET ORAL DAILY
Qty: 30 TAB | Refills: 5 | Status: SHIPPED | OUTPATIENT
Start: 2017-08-29 | End: 2018-08-03

## 2017-08-29 RX ORDER — ALPRAZOLAM 0.5 MG/1
TABLET ORAL
COMMUNITY
Start: 2017-07-18 | End: 2017-12-21 | Stop reason: DRUGHIGH

## 2017-08-29 RX ORDER — INSULIN GLARGINE 100 [IU]/ML
INJECTION, SOLUTION SUBCUTANEOUS
Qty: 10 PEN | Refills: 5 | Status: SHIPPED | OUTPATIENT
Start: 2017-08-29 | End: 2018-08-22 | Stop reason: SDUPTHER

## 2017-08-29 RX ORDER — MIRTAZAPINE 15 MG/1
TABLET, FILM COATED ORAL
COMMUNITY
Start: 2017-07-17 | End: 2018-09-04

## 2017-08-29 NOTE — PROGRESS NOTES
Endocrinology New Patient Visit    Chief Complaint: Type 2 diabetes, uncontrolled    Referring provider: Tavo Wynn MD    History of Present Illness: Helen Ferrari is a 56 y.o. male who was referred for evaluation of uncontrolled type 2 diabetes mellitus with last A1c 8.4% in July. He was diagnosed with diabetes about 9-10 years ago. He was initially treated with oral medications. He developed pancreatitis about a year after being diagnosed with diabetes. Says this is due to high triglycerides. He is prescribed pravastatin but has not started taking it yet. He has not taken fenofibrate before to his knowledge. Says he has been stressed lately due to tapering of his benzos - now on a 14d taper with gabapentin. Also takes methadone for chronic back pain. Current glycemic medication regimen is metformin 1000mg BID and Lantus 28 units daily. Home blood glucose monitoring frequency: a few times per month. Admits he does not check his blood sugars regularly now. Last check was last week, was 224 fasting in the morning. Says average is around 169. The patient has not had hypoglycemia. Reports a h/o recurrent UTIs but has not had one in quite some time. He has no known complications from diabetes. Was told he does not have retinopathy at his last eye exam. Weight is down from his max of ~300 lbs. He would like to lose more weight though. Diet at home is described as \"not always healthy\" and he does try to restrict dietary carbohydrate intake, avoids sweets but admits he does put sugar in his coffee (3-4 tsp). Eats 3-4 meals/day and does snack on fruit (apples with peanut butter) or veggies. Drinks 3-4 liters of water per day.      Review of Systems   General ROS: positive for  - sleep disturbance  Ophthalmic ROS: negative  ENT ROS: negative  Endocrine ROS: negative  Respiratory ROS: no cough, shortness of breath, or wheezing  Cardiovascular ROS: no chest pain or dyspnea on exertion  Gastrointestinal ROS: no abdominal pain, change in bowel habits, or black or bloody stools  Genito-Urinary ROS: no dysuria, trouble voiding, or hematuria  Musculoskeletal ROS: positive for - back pain  Neurological ROS: negative  Dermatological ROS: positive for - skin lesion changes     Problem List:  Patient Active Problem List   Diagnosis Code    Acute pancreatitis K85.90    Anxiety F41.9    Substance induced mood disorder ([facility] F19.94    Benzodiazepine dependence, continuous (HCC) F13.20    [facility]ub[facility] dependence [facility] F19.20       Past Medical History:  Past Medical History:   Diagnosis Date    Alcohol abuse, in remission     Constipation     Diabetes ([facility]     Type II    Hepatitis C     Hypertension     Pancreatitis     Spondylitis (HCC)        Past Surgical History:  Past Surgical History:   Procedure Laterality Date    HX APPENDECTOMY      HX ORTHOPAEDIC      2 back surgeries    HX OTHER SURGICAL      knee  surgery       Social History:  Social History     Social History    Marital status: UNKNOWN     Spouse name: N/A    Number of children: N/A    Years of education: N/A     Occupational History    Not on file. Social History Main Topics    Smoking status: Current Every Day Smoker     Packs/day: 1.00    Smokeless tobacco: Never Used    Alcohol use No    Drug use: No      Comment: Methadone    Sexual activity: No     Other Topics Concern    Not on file     Social History Narrative       Family History:  Family History   Problem Relation Age of Onset    Hypertension Mother     Parkinson's Disease Father        Medications:     Current Outpatient Prescriptions:     lisinopril (PRINIVIL, ZESTRIL) 40 mg tablet, , Disp: , Rfl:     DOCUSATE SODIUM PO, Take  by mouth., Disp: , Rfl:     amLODIPine (NORVASC) 10 mg tablet, , Disp: , Rfl:     LANTUS SOLOSTAR 100 unit/mL (3 mL) inpn, , Disp: , Rfl:     METHADONE HCL (METHADONE PO), Take 75 mg by mouth daily. , Disp: , Rfl:     metFORMIN (GLUCOPHAGE) 1,000 mg tablet, Take 1,000 mg by mouth two (2) times daily (with meals). , Disp: , Rfl:     metFORMIN (GLUCOPHAGE) 500 mg tablet, , Disp: , Rfl:     ALPRAZolam (XANAX) 0.5 mg tablet, , Disp: , Rfl:     mirtazapine (REMERON) 15 mg tablet, , Disp: , Rfl:     ALPRAZolam (XANAX) 1 mg tablet, Take 1 mg by mouth two (2) times a day., Disp: , Rfl:     lactulose (CHRONULAC) 10 gram/15 mL solution, Take 15 mL by mouth daily. Indications: Constipation, Disp: 480 mL, Rfl: 0    ALPRAZolam (XANAX) 2 mg tablet, Take 2 mg by mouth two (2) times a day., Disp: , Rfl:     lisinopril (PRINIVIL, ZESTRIL) 10 mg tablet, Take 20 mg by mouth daily. Reports 10 or 20mg. daily , Disp: , Rfl:     pioglitazone (ACTOS) 30 mg tablet, Take 30 mg by mouth nightly., Disp: , Rfl:     Allergies: Allergies   Allergen Reactions    Sulfa (Sulfonamide Antibiotics) Unknown (comments)     Makes him warner     Pt states he is not allerggic to this medication CSE 8/24/17       Physical Examination:  Blood pressure 129/75, pulse (!) 59, resp. rate 16, height 6' 5\" (1.956 m), weight 262 lb 3.2 oz (118.9 kg), SpO2 97 %. Gen: no acute distress  HEENT: mucous membranes moist  Thyroid: no enlargement or nodules noted  CAD: normal rate, regular rhythm. No murmur rubs or gallops  PULM: clear to ausculation, no wheezes, rhonchis or rales. GI: soft non tender, non distended.   EXT: no clubbing, cyanosis or edema; PT pulses 2+ BL, multiple calluses and slowly healing wounds on hands/arms  Neuro: grossly non focal, coarse tremor of outstretched hands  Psych: pleasant, good insight into medical hx  Skin: warm, dry    Clinical Data Review:  Lab Results   Component Value Date/Time    Hemoglobin A1c 8.4 07/12/2017 12:00 AM     Lab Results   Component Value Date/Time    Sodium 137 01/25/2017 02:12 AM    Potassium 3.7 01/25/2017 02:12 AM    Chloride 98 01/25/2017 02:12 AM    CO2 31 01/25/2017 02:12 AM    Anion gap 8 01/25/2017 02:12 AM    Glucose 161 01/25/2017 02:12 AM    BUN 11 01/25/2017 02:12 AM    Creatinine 0.97 01/25/2017 02:12 AM    BUN/Creatinine ratio 11 01/25/2017 02:12 AM    GFR est AA >60 01/25/2017 02:12 AM    GFR est non-AA >60 01/25/2017 02:12 AM    Calcium 9.4 01/25/2017 02:12 AM     No results found for: MCACR, MCA1, MCA2, MCA3, MCAU, MCAU2, MCALPOCT  Lab Results   Component Value Date/Time    Cholesterol, total 234 07/12/2017 12:00 AM    HDL Cholesterol 40 07/12/2017 12:00 AM    LDL, calculated 139 07/12/2017 12:00 AM    VLDL, calculated 55 07/12/2017 12:00 AM    Triglyceride 277 07/12/2017 12:00 AM    CHOL/HDL Ratio 4.5 12/01/2011 03:55 AM       Assessment and Plan:  Patient is a 56 y.o. male here for uncontrolled type 2 diabetes on metformin and basal insulin. He needs a third agent to control prandial glucose rises. Medication options are limited due to h/o chronic pancreatitis, however I believe an SGLT2i would be a good choice since this does not stimulate the pancreas and may help with weight loss. Discussed risks/benefits at length and pt is willing to try this.     Glycemic Medication Changes:  - start Invokana 150mg daily, after 1 month increase to full 300mg dose  - continue metformin 1000mg BID  - continue Lantus 28 units daily (switch to [address] when his supply runs out)    DM Health Maintenance: pertinent items updated in HM tab  A1c: update before next visit  Cv/Lipids: due to hyperTG, advised he start fenofibrate; may add statin at next visit  BP/Renal: BP at goal, on ACEi, microalbumin: update today  Vaccines: per PCP  Podiatry: no active issues, encouraged well-fitting footwear and daily inspection  Neuro: no sx of neuropathy  Ophtho: yearly eye exam recommended  Diet and exercise: discussed healthy eating and exercise recommendations, particularly reduction in dietary carbohydrates    I spent 60 minutes with the patient today and > 50% of the time was spent counseling the patient about diabetes management including medication options and dietary modification. Patient verbalized an understanding and will return to clinic in 3 months. Thank you for the opportunity to participate in this patient's care.     Fercho Hays MD  [facility]  [facility]

## 2017-08-29 NOTE — MR AVS SNAPSHOT
Visit Information Date & Time Provider Department Dept. Phone Encounter #  
 8/29/2017  2:50 PM Yazan Coleman, [address] [place] [address] Diabetes and Endocrinology  Your Appointments 10/12/2017 10:00 AM  
ROUTINE CARE with Jose E Metcalf MD  
[facility] [address]) Appt Note: 3 month f/u  
 [address] [facility] [address] [facility] [address]  
[address] [id number] [place] [address] Upcoming Health Maintenance Date Due Hepatitis C Screening 1960 Pneumococcal 19-64 Medium Risk (1 of 1 - PPSV23) 1/19/1981 DTaP/Tdap/Td series (1 - Tdap) 1/19/1983 INFLUENZA AGE 9 TO ADULT 8/1/2017 FOBT Q 1 YEAR AGE 50-75 7/12/2018 Allergies as of 8/29/2017  Review Complete On: 8/29/2017 By: Yazan Coleman MD  
  
 Severity Noted Reaction Type Reactions Sulfa (Sulfonamide Antibiotics)  11/28/2011    Unknown (comments) Makes him warner Pt states he is not allerggic to this medication CSE 8/24/17 Current Immunizations  Reviewed on 11/28/2011 No immunizations on file. Not reviewed this visit You Were Diagnosed With   
  
 Codes Comments Type 2 diabetes mellitus with hyperglycemia, with long-term current use of insulin ([facility])    -  Primary ICD-10-CM: E11.65, Z79.4 ICD-9-CM: 250.00, 790.29, V58.67 Vitals BP Pulse Resp Height(growth percentile) Weight(growth percentile) SpO2  
 129/75 (!) 59 16 6' 5\" (1.956 m) 262 lb 3.2 oz (118.9 kg) 97% BMI Smoking Status 31.09 kg/m2 Current Every Day Smoker Vitals History BMI and BSA Data Body Mass Index Body Surface Area 31.09 kg/m 2 2.54 m 2 Preferred Pharmacy Pharmacy Name Phone [facility] [address] [facility] [address] [facility] [phone] Your Updated Medication List  
  
   
This list is accurate as of: 8/29/17  3:43 PM.  Always use your most recent med list.  
  
 amLODIPine 10 mg tablet Commonly known as:  NORVASC  
  
 canagliflozin 300 mg tablet Commonly known as:  Diana Abbott Take 1 Tab by mouth Daily (before breakfast). DOCUSATE SODIUM PO Take  by mouth. fenofibrate 160 mg tablet Commonly known as:  LOFIBRA Take 1 Tab by mouth daily. insulin glargine 100 unit/mL (3 mL) Inpn Commonly known asBlease Donnelly Inject 28 units daily  
  
 lactulose 10 gram/15 mL solution Commonly known as:  Wava Lacy Take 15 mL by mouth daily. Indications: Constipation * lisinopril 40 mg tablet Commonly known as:  PRINIVIL, ZESTRIL  
  
 * lisinopril 10 mg tablet Commonly known as:  Oanh Drought Take 20 mg by mouth daily. Reports 10 or 20mg. daily * metFORMIN 500 mg tablet Commonly known as:  GLUCOPHAGE  
  
 * metFORMIN 1,000 mg tablet Commonly known as:  GLUCOPHAGE Take 1,000 mg by mouth two (2) times daily (with meals). METHADONE PO Take 75 mg by mouth daily. mirtazapine 15 mg tablet Commonly known as:  REMERON  
  
 * XANAX 1 mg tablet Generic drug:  ALPRAZolam  
Take 1 mg by mouth two (2) times a day. * ALPRAZolam 0.5 mg tablet Commonly known as:  Rosalene Marcos * XANAX 2 mg tablet Generic drug:  ALPRAZolam  
Take 2 mg by mouth two (2) times a day. * Notice: This list has 7 medication(s) that are the same as other medications prescribed for you. Read the directions carefully, and ask your doctor or other care provider to review them with you. Prescriptions Sent to Pharmacy Refills  
 canagliflozin (INVOKANA) 300 mg tablet 5 Sig: Take 1 Tab by mouth Daily (before breakfast). Class: Normal  
 Pharmacy: [address] Ph #: [phone] Route: Oral  
 fenofibrate (LOFIBRA) 160 mg tablet 5 Sig: Take 1 Tab by mouth daily.   
 Class: Normal  
 Pharmacy: [place] [address] Ph #: [phone] Route: Oral  
 insulin glargine (BASAGLAR KWIKPEN) 100 unit/mL (3 mL) inpn 5 Sig: Inject 28 units daily Class: Normal  
 Pharmacy: [address] Ph #: [phone] We Performed the Following HEMOGLOBIN A1C WITH EAG [81253 CPT(R)] MICROALBUMIN, UR, RAND W/ MICROALBUMIN/CREA RATIO [id number] CPT(R)] Patient Instructions Diabetes Instructions: 
- start Invokana: take 1/2 tablet (150mg) for one month, then increase to the full tablet 
 - make sure you are drinking plenty of water: aim for 72 ounces per day 
 - let me know if you have urinary tract infections 
- continue taking Lantus 28 units daily 
- continue metformin 1000mg twice a day Check blood sugar at least TWICE a day: every morning when you wake up and at bedtime. Keep a record of your values and bring this or your glucometer with you to your next visit. Diet instructions: 
Reduce the amount of carbohydrates in your diet. This means not just avoiding sweets, sodas, or desserts but also reducing the amount of bread, pasta, rice, potatoes, corn, and crackers that you eat. Do not have more than one serving of carbs per meal (for example: do not eat a sandwich and potato chips in the same meal). Focus on eating mostly protein (meat, poultry, fish, shellfish, eggs), healthy fats (avocados, nuts, cheese, olive or coconut oil) and non-starchy vegetables (greens, carrots, tomatoes, bell peppers, broccoli, brussels sprouts, green beans, etc). If you fill yourself up with these foods, you won't even want the carbs. Try Splenda or Stevia in your coffee instead of sugar Introducing [facility]! [address] introduces [employer] patient portal. Now you can access parts of your medical record, email your doctor's office, and request medication refills online. 1. In your internet browser, go to [email or web address] 2. Click on the First Time User? Click Here link in the Sign In box. You will see the New Member Sign Up page. 3. Enter your [employer] Access Code exactly as it appears below. You will not need to use this code after youve completed the sign-up process. If you do not sign up before the expiration date, you must request a new code. · [employer] Access Code: [email or web address] Expires: 10/10/2017 10:18 AM 
 
4. Enter the last four digits of your Social Security Number (xxxx) and Date of Birth (mm/dd/yyyy) as indicated and click Submit. You will be taken to the next sign-up page. 5. Create a [employer] ID. This will be your [employer] login ID and cannot be changed, so think of one that is secure and easy to remember. 6. Create a [employer] password. You can change your password at any time. 7. Enter your Password Reset Question and Answer. This can be used at a later time if you forget your password. 8. Enter your e-mail address. You will receive e-mail notification when new information is available in [address] 9. Click Sign Up. You can now view and download portions of your medical record. 10. Click the Download Summary menu link to download a portable copy of your medical information. If you have questions, please visit the Frequently Asked Questions section of the [employer] website. Remember, [employer] is NOT to be used for urgent needs. For medical emergencies, dial 911. Now available from your iPhone and Android! Please provide this summary of care documentation to your next provider. Your primary care clinician is listed as Viviana Franco. If you have any questions after today's visit, please call [phone].

## 2017-08-30 LAB
ALBUMIN/CREAT UR: 1012.9 MG/G CREAT (ref 0–30)
CREAT UR-MCNC: 173.2 MG/DL
MICROALBUMIN UR-MCNC: 1754.3 UG/ML

## 2017-09-27 ENCOUNTER — APPOINTMENT (OUTPATIENT)
Dept: CT IMAGING | Age: 57
End: 2017-09-27
Attending: EMERGENCY MEDICINE
Payer: COMMERCIAL

## 2017-09-27 ENCOUNTER — HOSPITAL ENCOUNTER (EMERGENCY)
Age: 57
Discharge: HOME | End: 2017-09-27
Attending: EMERGENCY MEDICINE
Payer: COMMERCIAL

## 2017-09-27 VITALS
BODY MASS INDEX: 29.31 KG/M2 | RESPIRATION RATE: 18 BRPM | HEIGHT: 77 IN | HEART RATE: 60 BPM | WEIGHT: 248.25 LBS | TEMPERATURE: 98.4 F | DIASTOLIC BLOOD PRESSURE: 85 MMHG | SYSTOLIC BLOOD PRESSURE: 155 MMHG | OXYGEN SATURATION: 92 %

## 2017-09-27 DIAGNOSIS — R80.9 PROTEINURIA, UNSPECIFIED TYPE: ICD-10-CM

## 2017-09-27 DIAGNOSIS — R10.84 ABDOMINAL PAIN, GENERALIZED: Primary | ICD-10-CM

## 2017-09-27 LAB
ALBUMIN SERPL-MCNC: 3.9 G/DL (ref 3.5–5)
ALBUMIN/GLOB SERPL: 0.8 {RATIO} (ref 1.1–2.2)
ALP SERPL-CCNC: 64 U/L (ref 45–117)
ALT SERPL-CCNC: 19 U/L (ref 12–78)
ANION GAP SERPL CALC-SCNC: 6 MMOL/L (ref 5–15)
APPEARANCE UR: CLEAR
AST SERPL-CCNC: 15 U/L (ref 15–37)
BACTERIA URNS QL MICRO: NEGATIVE /HPF
BASOPHILS # BLD: 0 K/UL (ref 0–0.1)
BASOPHILS NFR BLD: 0 % (ref 0–1)
BILIRUB SERPL-MCNC: 0.4 MG/DL (ref 0.2–1)
BILIRUB UR QL CFM: NEGATIVE
BUN SERPL-MCNC: 11 MG/DL (ref 6–20)
BUN/CREAT SERPL: 10 (ref 12–20)
CALCIUM SERPL-MCNC: 9.2 MG/DL (ref 8.5–10.1)
CHLORIDE SERPL-SCNC: 99 MMOL/L (ref 97–108)
CO2 SERPL-SCNC: 27 MMOL/L (ref 21–32)
COLOR UR: ABNORMAL
CREAT SERPL-MCNC: 1.09 MG/DL (ref 0.7–1.3)
EOSINOPHIL # BLD: 0.1 K/UL (ref 0–0.4)
EOSINOPHIL NFR BLD: 1 % (ref 0–7)
EPITH CASTS URNS QL MICRO: ABNORMAL /LPF
ERYTHROCYTE [DISTWIDTH] IN BLOOD BY AUTOMATED COUNT: 12.7 % (ref 11.5–14.5)
EST. AVERAGE GLUCOSE BLD GHB EST-MCNC: 166 MG/DL
GLOBULIN SER CALC-MCNC: 4.7 G/DL (ref 2–4)
GLUCOSE SERPL-MCNC: 141 MG/DL (ref 65–100)
GLUCOSE UR STRIP.AUTO-MCNC: NEGATIVE MG/DL
HBA1C MFR BLD: 7.4 % (ref 4.2–6.3)
HCT VFR BLD AUTO: 39.7 % (ref 36.6–50.3)
HGB BLD-MCNC: 13.6 G/DL (ref 12.1–17)
HGB UR QL STRIP: NEGATIVE
KETONES UR QL STRIP.AUTO: NEGATIVE MG/DL
LEUKOCYTE ESTERASE UR QL STRIP.AUTO: NEGATIVE
LIPASE SERPL-CCNC: 146 U/L (ref 73–393)
LYMPHOCYTES # BLD: 1.3 K/UL (ref 0.8–3.5)
LYMPHOCYTES NFR BLD: 18 % (ref 12–49)
MCH RBC QN AUTO: 29.8 PG (ref 26–34)
MCHC RBC AUTO-ENTMCNC: 34.3 G/DL (ref 30–36.5)
MCV RBC AUTO: 86.9 FL (ref 80–99)
MONOCYTES # BLD: 0.4 K/UL (ref 0–1)
MONOCYTES NFR BLD: 5 % (ref 5–13)
NEUTS SEG # BLD: 5.6 K/UL (ref 1.8–8)
NEUTS SEG NFR BLD: 76 % (ref 32–75)
NITRITE UR QL STRIP.AUTO: NEGATIVE
PH UR STRIP: 6 [PH] (ref 5–8)
PLATELET # BLD AUTO: 369 K/UL (ref 150–400)
POTASSIUM SERPL-SCNC: 4 MMOL/L (ref 3.5–5.1)
PROT SERPL-MCNC: 8.6 G/DL (ref 6.4–8.2)
PROT UR STRIP-MCNC: >300 MG/DL
RBC # BLD AUTO: 4.57 M/UL (ref 4.1–5.7)
RBC #/AREA URNS HPF: ABNORMAL /HPF (ref 0–5)
SODIUM SERPL-SCNC: 132 MMOL/L (ref 136–145)
SP GR UR REFRACTOMETRY: 1.02 (ref 1–1.03)
UR CULT HOLD, URHOLD: NORMAL
UROBILINOGEN UR QL STRIP.AUTO: 1 EU/DL (ref 0.2–1)
WBC # BLD AUTO: 7.4 K/UL (ref 4.1–11.1)
WBC URNS QL MICRO: ABNORMAL /HPF (ref 0–4)

## 2017-09-27 PROCEDURE — 36415 COLL VENOUS BLD VENIPUNCTURE: CPT | Performed by: EMERGENCY MEDICINE

## 2017-09-27 PROCEDURE — 83036 HEMOGLOBIN GLYCOSYLATED A1C: CPT | Performed by: EMERGENCY MEDICINE

## 2017-09-27 PROCEDURE — 96375 TX/PRO/DX INJ NEW DRUG ADDON: CPT

## 2017-09-27 PROCEDURE — 81001 URINALYSIS AUTO W/SCOPE: CPT | Performed by: EMERGENCY MEDICINE

## 2017-09-27 PROCEDURE — 83690 ASSAY OF LIPASE: CPT | Performed by: EMERGENCY MEDICINE

## 2017-09-27 PROCEDURE — 99284 EMERGENCY DEPT VISIT MOD MDM: CPT

## 2017-09-27 PROCEDURE — 74011636320 HC RX REV CODE- 636/320: Performed by: EMERGENCY MEDICINE

## 2017-09-27 PROCEDURE — 74177 CT ABD & PELVIS W/CONTRAST: CPT

## 2017-09-27 PROCEDURE — 74011000250 HC RX REV CODE- 250: Performed by: EMERGENCY MEDICINE

## 2017-09-27 PROCEDURE — 96361 HYDRATE IV INFUSION ADD-ON: CPT

## 2017-09-27 PROCEDURE — 96374 THER/PROPH/DIAG INJ IV PUSH: CPT

## 2017-09-27 PROCEDURE — 85025 COMPLETE CBC W/AUTO DIFF WBC: CPT | Performed by: EMERGENCY MEDICINE

## 2017-09-27 PROCEDURE — 74011250636 HC RX REV CODE- 250/636: Performed by: EMERGENCY MEDICINE

## 2017-09-27 PROCEDURE — 80053 COMPREHEN METABOLIC PANEL: CPT | Performed by: EMERGENCY MEDICINE

## 2017-09-27 PROCEDURE — 74011000258 HC RX REV CODE- 258: Performed by: EMERGENCY MEDICINE

## 2017-09-27 RX ORDER — SODIUM CHLORIDE 0.9 % (FLUSH) 0.9 %
10 SYRINGE (ML) INJECTION
Status: COMPLETED | OUTPATIENT
Start: 2017-09-27 | End: 2017-09-27

## 2017-09-27 RX ORDER — ONDANSETRON 2 MG/ML
4 INJECTION INTRAMUSCULAR; INTRAVENOUS
Status: COMPLETED | OUTPATIENT
Start: 2017-09-27 | End: 2017-09-27

## 2017-09-27 RX ADMIN — IOPAMIDOL 100 ML: 755 INJECTION, SOLUTION INTRAVENOUS at 12:43

## 2017-09-27 RX ADMIN — ONDANSETRON 4 MG: 2 INJECTION INTRAMUSCULAR; INTRAVENOUS at 10:37

## 2017-09-27 RX ADMIN — FAMOTIDINE 20 MG: 10 INJECTION, SOLUTION INTRAVENOUS at 10:37

## 2017-09-27 RX ADMIN — SODIUM CHLORIDE 100 ML: 900 INJECTION, SOLUTION INTRAVENOUS at 12:43

## 2017-09-27 RX ADMIN — SODIUM CHLORIDE 1000 ML: 900 INJECTION, SOLUTION INTRAVENOUS at 10:37

## 2017-09-27 RX ADMIN — Medication 10 ML: at 12:43

## 2017-09-27 NOTE — ED TRIAGE NOTES
Pt sent for abnormal labs, pt stated he has been sick for 2 days, upper abd pain and left flank pain, pt stated he thought he had pancreatitis, pt stated he is \"really stinking\", he could not get into the shower , pt rambling. ....... Craig Sims Also he stated he is very anxious and is tapering himself off benzo's , pt having a hard time getting into a pain management md, pt took 85mg of methadone this am .......... Craig Smis \"i have greasy stool\" , pt ate 4 raison's today

## 2017-09-27 NOTE — DISCHARGE INSTRUCTIONS
Abdominal Pain: Care Instructions  Your Care Instructions    Abdominal pain has many possible causes. Some aren't serious and get better on their own in a few days. Others need more testing and treatment. If your pain continues or gets worse, you need to be rechecked and may need more tests to find out what is wrong. You may need surgery to correct the problem. Don't ignore new symptoms, such as fever, nausea and vomiting, urination problems, pain that gets worse, and dizziness. These may be signs of a more serious problem. Your doctor may have recommended a follow-up visit in the next 8 to 12 hours. If you are not getting better, you may need more tests or treatment. The doctor has checked you carefully, but problems can develop later. If you notice any problems or new symptoms, get medical treatment right away. Follow-up care is a key part of your treatment and safety. Be sure to make and go to all appointments, and call your doctor if you are having problems. It's also a good idea to know your test results and keep a list of the medicines you take. How can you care for yourself at home? · Rest until you feel better. · To prevent dehydration, drink plenty of fluids, enough so that your urine is light yellow or clear like water. Choose water and other caffeine-free clear liquids until you feel better. If you have kidney, heart, or liver disease and have to limit fluids, talk with your doctor before you increase the amount of fluids you drink. · If your stomach is upset, eat mild foods, such as rice, dry toast or crackers, bananas, and applesauce. Try eating several small meals instead of two or three large ones. · Wait until 48 hours after all symptoms have gone away before you have spicy foods, alcohol, and drinks that contain caffeine. · Do not eat foods that are high in fat. · Avoid anti-inflammatory medicines such as aspirin, ibuprofen (Advil, Motrin), and naproxen (Aleve).  These can cause stomach upset. Talk to your doctor if you take daily aspirin for another health problem. When should you call for help? Call 911 anytime you think you may need emergency care. For example, call if:  · You passed out (lost consciousness). · You pass maroon or very bloody stools. · You vomit blood or what looks like coffee grounds. · You have new, severe belly pain. Call your doctor now or seek immediate medical care if:  · Your pain gets worse, especially if it becomes focused in one area of your belly. · You have a new or higher fever. · Your stools are black and look like tar, or they have streaks of blood. · You have unexpected vaginal bleeding. · You have symptoms of a urinary tract infection. These may include:  ¨ Pain when you urinate. ¨ Urinating more often than usual.  ¨ Blood in your urine. · You are dizzy or lightheaded, or you feel like you may faint. Watch closely for changes in your health, and be sure to contact your doctor if:  · You are not getting better after 1 day (24 hours). Where can you learn more? Go to http://ciaraFanzterabbe.info/. Enter C014 in the search box to learn more about \"Abdominal Pain: Care Instructions. \"  Current as of: March 20, 2017  Content Version: 11.3  © 6874-3901 [employer]. Care instructions adapted under license by [employer] (which disclaims liability or warranty for this information). If you have questions about a medical condition or this instruction, always ask your healthcare professional. [place] [address] any warranty or liability for your use of this information. Proteinuria: Care Instructions  Your Care Instructions  Proteinuria means that you have too much protein in your urine. This is usually caused by a kidney problem. Your body's blood passes through your kidneys. Normally, the kidneys remove waste from the blood. The waste then leaves the body in the urine.  But they don't let protein leave the body. If your kidneys are not working well, they let too much protein get in your urine. A high level of protein in your urine is a sign that something is harming your kidneys. It may be puzzling to find out that you have a problem with your kidneys, because you probably don't feel different. Your doctor may do more tests to find out what is causing the protein to get into your urine. Possible causes include an infection or a medical condition, such as diabetes or heart disease. You may need regular urine tests in the future. You may be able to reduce the protein in your urine by getting exercise, eating a healthy diet, and taking medicine. Follow-up care is a key part of your treatment and safety. Be sure to make and go to all appointments, and call your doctor if you are having problems. It's also a good idea to know your test results and keep a list of the medicines you take. How can you care for yourself at home? · Take your medicines exactly as prescribed. Call your doctor if you think you are having a problem with your medicine. You will get more details on the specific medicines your doctor prescribes. · Work with your doctor and dietitian to set up a diet that will be healthy for you. You may need to:  ¨ Eat a heart-healthy diet to keep the fat (cholesterol) in your blood under control. ¨ Eat a low-salt diet to keep your blood pressure at normal levels. ¨ Limit protein in your foods. ¨ Limit the amount of fluids you drink. · If you have diabetes, try to keep your blood sugar at normal or near-normal levels. ¨ Follow your diet. Eat a variety of foods. Spread carbohydrate throughout your meals. ¨ If your doctor recommends it, get more exercise. Walking is a good choice. Bit by bit, increase the amount you walk every day. Try for at least 30 minutes on most days of the week. ¨ Check your blood sugar as often as your doctor recommends. · Do not smoke.  Smoking raises your risk of many health problems, including kidney damage. If you need help quitting, talk to your doctor about stop-smoking programs and medicines. These can increase your chances of quitting for good. · Do not take ibuprofen, naproxen, acetaminophen (Tylenol), or similar medicines, unless your doctor tells you to. These medicines may make kidney problems worse. · Check with your doctor before you take any herbal supplements or over-the-counter medicines. When should you call for help? Call 911 anytime you think you may need emergency care. For example, call if:  · You passed out (lost consciousness). Call your doctor now or seek immediate medical care if:  · You have swelling in your hands or feet. · You are dizzy or lightheaded, or you feel like you may faint. · You have an unusual weight gain. Watch closely for changes in your health, and be sure to contact your doctor if:  · You do not get better as expected. Where can you learn more? Go to http://ciara-abbe.info/. Enter D273 in the search box to learn more about \"Proteinuria: Care Instructions. \"  Current as of: August 8, 2016  Content Version: 11.3  © 9852-4045 [employer]. Care instructions adapted under license by [employer] (which disclaims liability or warranty for this information). If you have questions about a medical condition or this instruction, always ask your healthcare professional. [address] any warranty or liability for your use of this information.

## 2017-09-27 NOTE — ED PROVIDER NOTES
Patient is a 56 y.o. male presenting with abnormal lab results, anxiety, and abdominal pain. Abnormal Lab Results   Associated symptoms include abdominal pain. Pertinent negatives include no headaches and no shortness of breath. Anxiety    Associated symptoms include abdominal pain. Pertinent negatives include no back pain, no headaches, no shortness of breath and no vomiting. Abdominal Pain    Pertinent negatives include no diarrhea, no vomiting, no dysuria, no headaches and no back pain. Pt states that he has had intermittent epigastric area pain and left flank pain for 2-3days. He states that he thinks that his pancreatitis is flaring again. He states that he recently went through a doctor supervised benzodiazepam dependence withdrawal. He has been on methadone for years and is presently dosing at 85mg daily. He has a medical history remarkable for DM, Hep C, elevated triglycerides, pancreatitis, opioid dependence in remission and daily methadone therapy. Denies fever, cold symptoms, headache, neck pain, visual changes, focal weakness, unexplained weight changes or rash. Denies any difficulty breathing, difficulty swallowing, SOB or chest pain. Reports nausea but denies any vomiting or diarrhea. Pt. Reports having coffee, 4 raisins, lisinopril, neurontin and methadone today prior to arrival without relief. He drove himself here. Old charts reviewed.     Past Medical History:   Diagnosis Date    Alcohol abuse, in remission     Constipation     Diabetes ([facility] [address]     Type II    Hepatitis C     Hypertension     Pancreatitis     Spondylitis (HCC)        Past Surgical History:   Procedure Laterality Date    HX APPENDECTOMY      HX ORTHOPAEDIC      2 back surgeries    HX OTHER SURGICAL      knee  surgery         Family History:   Problem Relation Age of Onset    Hypertension Mother     Parkinson's Disease Father        Social History     Social History    Marital status: UNKNOWN     Spouse name: N/A  Number of children: N/A    Years of education: N/A     Occupational History    Not on file. Social History Main Topics    Smoking status: Current Every Day Smoker     Packs/day: 1.00    Smokeless tobacco: Never Used    Alcohol use No    Drug use: No      Comment: Methadone    Sexual activity: No     Other Topics Concern    Not on file     Social History Narrative         ALLERGIES: Sulfa (sulfonamide antibiotics)    Review of Systems   Constitutional: Negative for activity change and appetite change. HENT: Negative for facial swelling, sore throat and trouble swallowing. Eyes: Negative. Respiratory: Negative for shortness of breath. Cardiovascular: Negative. Gastrointestinal: Positive for abdominal pain. Negative for diarrhea and vomiting. Genitourinary: Negative for dysuria. Musculoskeletal: Negative for back pain and neck pain. Skin: Negative for color change. Neurological: Negative for headaches. Psychiatric/Behavioral: Negative. Vitals:    09/27/17 1015   BP: (!) 200/91   Pulse: 88   Resp: 20   Temp: 98.5 °F (36.9 °C)   SpO2: 97%   Weight: 112.6 kg (248 lb 4 oz)   Height: 6' 5\" (1.956 m)            Physical Exam   Constitutional: He is oriented to person, place, and time. Obese white male; smoker; disabled   HENT:   Head: Normocephalic. Right Ear: External ear normal.   Left Ear: External ear normal.   Mouth/Throat: Oropharynx is clear and moist.   Teeth are in poor repair   Eyes: Pupils are equal, round, and reactive to light. Neck: Normal range of motion. Neck supple. Cardiovascular: Normal rate and regular rhythm. Pulmonary/Chest: Effort normal and breath sounds normal.   Abdominal: Soft. Bowel sounds are normal. He exhibits no distension and no mass. There is tenderness. There is no rebound and no guarding. Reports epigastric area tenderness   Musculoskeletal: Normal range of motion. Lymphadenopathy:     He has no cervical adenopathy. Neurological: He is alert and oriented to person, place, and time. Skin: Skin is warm and dry. No rash noted. Nursing note and vitals reviewed. MDM  ED Course       Procedures    Pt has been re-examined and is feeling much better. Reviewed his urine and blood work and encouraged close follow up with PCP for recheck of urine. 1:49 PM  Patient's results and plan of care have been reviewed with him. Patient has verbally conveyed his understanding and agreement of his signs, symptoms, diagnosis, treatment and prognosis and additionally agrees to follow up as recommended or return to the Emergency Room should his condition change prior to follow-up. Discharge instructions have also been provided to the patient with some educational information regarding his diagnosis as well a list of reasons why he would want to return to the ER prior to his follow-up appointment should his condition change. Steve Thomas NP  Discussed plan of care with Dr. Gabby Peres.  Steve Thomas NP

## 2017-09-29 ENCOUNTER — OFFICE VISIT (OUTPATIENT)
Dept: INTERNAL MEDICINE CLINIC | Age: 57
End: 2017-09-29

## 2017-09-29 VITALS
SYSTOLIC BLOOD PRESSURE: 142 MMHG | TEMPERATURE: 98.5 F | HEIGHT: 77 IN | BODY MASS INDEX: 29.68 KG/M2 | OXYGEN SATURATION: 95 % | DIASTOLIC BLOOD PRESSURE: 90 MMHG | RESPIRATION RATE: 19 BRPM | WEIGHT: 251.4 LBS | HEART RATE: 63 BPM

## 2017-09-29 DIAGNOSIS — J01.40 SUBACUTE PANSINUSITIS: Primary | ICD-10-CM

## 2017-09-29 DIAGNOSIS — L03.012 PARONYCHIA, LEFT: ICD-10-CM

## 2017-09-29 DIAGNOSIS — K59.09 OTHER CONSTIPATION: ICD-10-CM

## 2017-09-29 RX ORDER — AMOXICILLIN 875 MG/1
875 TABLET, FILM COATED ORAL 2 TIMES DAILY
Qty: 14 TAB | Refills: 0 | Status: SHIPPED | OUTPATIENT
Start: 2017-09-29 | End: 2017-10-06

## 2017-09-29 RX ORDER — GABAPENTIN 300 MG/1
CAPSULE ORAL
COMMUNITY
Start: 2017-08-29 | End: 2017-11-24 | Stop reason: SDUPTHER

## 2017-09-29 RX ORDER — POLYETHYLENE GLYCOL 3350 17 G/17G
17 POWDER, FOR SOLUTION ORAL DAILY
Qty: 850 G | Refills: 1 | Status: SHIPPED | OUTPATIENT
Start: 2017-09-29 | End: 2019-04-08

## 2017-09-29 NOTE — PATIENT INSTRUCTIONS
Constipation: Care Instructions  Your Care Instructions  Constipation means that you have a hard time passing stools (bowel movements). People pass stools from 3 times a day to once every 3 days. What is normal for you may be different. Constipation may occur with pain in the rectum and cramping. The pain may get worse when you try to pass stools. Sometimes there are small amounts of bright red blood on toilet paper or the surface of stools. This is because of enlarged veins near the rectum (hemorrhoids). A few changes in your diet and lifestyle may help you avoid ongoing constipation. Your doctor may also prescribe medicine to help loosen your stool. Some medicines can cause constipation. These include pain medicines and antidepressants. Tell your doctor about all the medicines you take. Your doctor may want to make a medicine change to ease your symptoms. Follow-up care is a key part of your treatment and safety. Be sure to make and go to all appointments, and call your doctor if you are having problems. It's also a good idea to know your test results and keep a list of the medicines you take. How can you care for yourself at home? · Drink plenty of fluids, enough so that your urine is light yellow or clear like water. If you have kidney, heart, or liver disease and have to limit fluids, talk with your doctor before you increase the amount of fluids you drink. · Include high-fiber foods in your diet each day. These include fruits, vegetables, beans, and whole grains. · Get at least 30 minutes of exercise on most days of the week. Walking is a good choice. You also may want to do other activities, such as running, swimming, cycling, or playing tennis or team sports. · Take a fiber supplement, such as Citrucel or Metamucil, every day. Read and follow all instructions on the label. · Schedule time each day for a bowel movement. A daily routine may help.  Take your time having your bowel movement. · Support your feet with a small step stool when you sit on the toilet. This helps flex your hips and places your pelvis in a squatting position. · Your doctor may recommend an over-the-counter laxative to relieve your constipation. Examples are Milk of Magnesia and MiraLax. Read and follow all instructions on the label. Do not use laxatives on a long-term basis. When should you call for help? Call your doctor now or seek immediate medical care if:  · You have new or worse belly pain. · You have new or worse nausea or vomiting. · You have blood in your stools. Watch closely for changes in your health, and be sure to contact your doctor if:  · Your constipation is getting worse. · You do not get better as expected. Where can you learn more? Go to http://ciara-abbe.info/. Enter 21 [phone] in the search box to learn more about \"Constipation: Care Instructions. \"  Current as of: March 20, 2017  Content Version: 11.3  © 7288-2124 Healthwise, Incorporated. Care instructions adapted under license by [employer] (which disclaims liability or warranty for this information). If you have questions about a medical condition or this instruction, always ask your healthcare professional. [place] [address] any warranty or liability for your use of this information.

## 2017-09-29 NOTE — MR AVS SNAPSHOT
Visit Information Date & Time Provider Department Dept. Phone Encounter #  
 9/29/2017 11:15 AM Samy Eid [facility] Internal Medicine [phone] [id number] Your Appointments 10/12/2017 10:00 AM  
ROUTINE CARE with Natalia Zamora MD  
[facility] Internal Medicine [facility]) Appt Note: 3 month f/u  
 [address] [facility] [address]  
[phone]  
  
   
 [address]  [address] [facility] [address] [place] [address]  
  
    
 12/13/2017 10:50 AM  
ROUTINE CARE with Denny Marie MD  
[facility] Appt Note: f/u diabetes cp0.00  
 [address] [place] [address]  
[address] [address] [place] [address] [address] [id number] Upcoming Health Maintenance Date Due Hepatitis C Screening 1960 FOOT EXAM Q1 1/19/1972 EYE EXAM RETINAL OR DILATED Q1 1/19/1972 Pneumococcal 19-64 Medium Risk (1 of 1 - PPSV23) 1/19/1981 DTaP/Tdap/Td series (1 - Tdap) 1/19/1983 INFLUENZA AGE 9 TO ADULT 8/1/2017 HEMOGLOBIN A1C Q6M 3/27/2018 LIPID PANEL Q1 7/12/2018 FOBT Q 1 YEAR AGE 50-75 7/12/2018 MICROALBUMIN Q1 8/29/2018 Allergies as of 9/29/2017  Review Complete On: 9/29/2017 By: Cody Dutta Severity Noted Reaction Type Reactions Sulfa (Sulfonamide Antibiotics)  11/28/2011    Unknown (comments) Makes him warner Pt states he is not allerggic to this medication CSE 8/24/17 Current Immunizations  Reviewed on 11/28/2011 No immunizations on file. Not reviewed this visit You Were Diagnosed With   
  
 Codes Comments Subacute pansinusitis    -  Primary ICD-10-CM: J01.40 ICD-9-CM: 461.8 Paronychia, left     ICD-10-CM: L03.012 
ICD-9-CM: 681.9 Other constipation     ICD-10-CM: K59.09 
ICD-9-CM: 564.09 Vitals BP Pulse Temp Resp Height(growth percentile) Weight(growth percentile) 142/90 (BP 1 Location: Right arm, BP Patient Position: Sitting) 63 98.5 °F (36.9 °C) (Oral) 19 6' 5\" (1.956 m) 251 lb 6.4 oz (114 kg) SpO2 BMI Smoking Status 95% 29.81 kg/m2 Current Every Day Smoker BMI and BSA Data Body Mass Index Body Surface Area  
 29.81 kg/m 2 2.49 m 2 Preferred Pharmacy Pharmacy Name Phone Jose German [address] [facility] [address] [facility] [phone] Your Updated Medication List  
  
   
This list is accurate as of: 9/29/17 12:00 PM.  Always use your most recent med list. amLODIPine 10 mg tablet Commonly known as:  NORVASC  
  
 amoxicillin 875 mg tablet Commonly known as:  AMOXIL Take 1 Tab by mouth two (2) times a day for 7 days. canagliflozin 300 mg tablet Commonly known as:  Veola Garbe Take 1 Tab by mouth Daily (before breakfast). DOCUSATE SODIUM PO Take  by mouth. fenofibrate 160 mg tablet Commonly known as:  LOFIBRA Take 1 Tab by mouth daily. gabapentin 300 mg capsule Commonly known as:  NEURONTIN  
  
 insulin glargine 100 unit/mL (3 mL) Inpn Commonly known asMilburn Chi Inject 28 units daily  
  
 lactulose 10 gram/15 mL solution Commonly known as:  Sharron Foster Take 15 mL by mouth daily. Indications: Constipation * lisinopril 40 mg tablet Commonly known as:  PRINIVIL, ZESTRIL  
  
 * lisinopril 10 mg tablet Commonly known as:  Velasquez [place] Take 20 mg by mouth daily. Reports 10 or 20mg. daily * metFORMIN 500 mg tablet Commonly known as:  GLUCOPHAGE  
  
 * metFORMIN 1,000 mg tablet Commonly known as:  GLUCOPHAGE Take 1,000 mg by mouth two (2) times daily (with meals). METHADONE PO Take 75 mg by mouth daily. mirtazapine 15 mg tablet Commonly known as:  REMERON  
  
 polyethylene glycol 17 gram/dose powder Commonly known as:  Reda Pukwana Take 17 g by mouth daily. * XANAX 1 mg tablet Generic drug:  ALPRAZolam  
Take 1 mg by mouth two (2) times a day. * ALPRAZolam 0.5 mg tablet Commonly known as:  Bailey Alcocer * XANAX 2 mg tablet Generic drug:  ALPRAZolam  
Take 2 mg by mouth two (2) times a day. * Notice: This list has 7 medication(s) that are the same as other medications prescribed for you. Read the directions carefully, and ask your doctor or other care provider to review them with you. Prescriptions Sent to Pharmacy Refills  
 amoxicillin (AMOXIL) 875 mg tablet 0 Sig: Take 1 Tab by mouth two (2) times a day for 7 days. Class: Normal  
 Pharmacy: [facility] [address] Ph #: [phone] Route: Oral  
 polyethylene glycol (MIRALAX) 17 gram/dose powder 1 Sig: Take 17 g by mouth daily. Class: Normal  
 Pharmacy: [facility] [address] Ph #: [phone] Route: Oral  
  
Patient Instructions Constipation: Care Instructions Your Care Instructions Constipation means that you have a hard time passing stools (bowel movements). People pass stools from 3 times a day to once every 3 days. What is normal for you may be different. Constipation may occur with pain in the rectum and cramping. The pain may get worse when you try to pass stools. Sometimes there are small amounts of bright red blood on toilet paper or the surface of stools. This is because of enlarged veins near the rectum (hemorrhoids). A few changes in your diet and lifestyle may help you avoid ongoing constipation. Your doctor may also prescribe medicine to help loosen your stool. Some medicines can cause constipation. These include pain medicines and antidepressants. Tell your doctor about all the medicines you take. Your doctor may want to make a medicine change to ease your symptoms. Follow-up care is a key part of your treatment and safety.  Be sure to make and go to all appointments, and call your doctor if you are having problems. It's also a good idea to know your test results and keep a list of the medicines you take. How can you care for yourself at home? · Drink plenty of fluids, enough so that your urine is light yellow or clear like water. If you have kidney, heart, or liver disease and have to limit fluids, talk with your doctor before you increase the amount of fluids you drink. · Include high-fiber foods in your diet each day. These include fruits, vegetables, beans, and whole grains. · Get at least 30 minutes of exercise on most days of the week. Walking is a good choice. You also may want to do other activities, such as running, swimming, cycling, or playing tennis or team sports. · Take a fiber supplement, such as Citrucel or Metamucil, every day. Read and follow all instructions on the label. · Schedule time each day for a bowel movement. A daily routine may help. Take your time having your bowel movement. · Support your feet with a small step stool when you sit on the toilet. This helps flex your hips and places your pelvis in a squatting position. · Your doctor may recommend an over-the-counter laxative to relieve your constipation. Examples are Milk of Magnesia and MiraLax. Read and follow all instructions on the label. Do not use laxatives on a long-term basis. When should you call for help? Call your doctor now or seek immediate medical care if: 
· You have new or worse belly pain. · You have new or worse nausea or vomiting. · You have blood in your stools. Watch closely for changes in your health, and be sure to contact your doctor if: 
· Your constipation is getting worse. · You do not get better as expected. Where can you learn more? Go to http://ciara-abbe.info/. Enter 21 [phone] in the search box to learn more about \"Constipation: Care Instructions. \" Current as of: March 20, 2017 Content Version: 11.3 © 6960-5032 HealthmyLINGO, Incorporated. Care instructions adapted under license by [employer] (which disclaims liability or warranty for this information). If you have questions about a medical condition or this instruction, always ask your healthcare professional. [address] any warranty or liability for your use of this information. Introducing [facility]! [employer] introduces Mindjet patient portal. Now you can access parts of your medical record, email your doctor's office, and request medication refills online. 1. In your internet browser, go to [email or web address] 2. Click on the First Time User? Click Here link in the Sign In box. You will see the New Member Sign Up page. 3. Enter your Mindjet Access Code exactly as it appears below. You will not need to use this code after youve completed the sign-up process. If you do not sign up before the expiration date, you must request a new code. · Mindjet Access Code: [email or web address] Expires: 10/10/2017 10:18 AM 
 
4. Enter the last four digits of your Social Security Number (xxxx) and Date of Birth (mm/dd/yyyy) as indicated and click Submit. You will be taken to the next sign-up page. 5. Create a Mindjet ID. This will be your Mindjet login ID and cannot be changed, so think of one that is secure and easy to remember. 6. Create a Mindjet password. You can change your password at any time. 7. Enter your Password Reset Question and Answer. This can be used at a later time if you forget your password. 8. Enter your e-mail address. You will receive e-mail notification when new information is available in [address] 9. Click Sign Up. You can now view and download portions of your medical record. 10. Click the Download Summary menu link to download a portable copy of your medical information.  
 
If you have questions, please visit the Frequently Asked Questions section of the [employer]. Remember, Your Survivalhart is NOT to be used for urgent needs. For medical emergencies, dial 911. Now available from your iPhone and Android! Please provide this summary of care documentation to your next provider. Your primary care clinician is listed as Bette Fowler. If you have any questions after today's visit, please call [phone].

## 2017-10-01 ENCOUNTER — HOSPITAL ENCOUNTER (EMERGENCY)
Age: 57
Discharge: HOME | End: 2017-10-01
Attending: EMERGENCY MEDICINE
Payer: COMMERCIAL

## 2017-10-01 VITALS
HEIGHT: 77 IN | HEART RATE: 77 BPM | TEMPERATURE: 97.9 F | DIASTOLIC BLOOD PRESSURE: 84 MMHG | SYSTOLIC BLOOD PRESSURE: 169 MMHG | OXYGEN SATURATION: 97 % | BODY MASS INDEX: 29.66 KG/M2 | RESPIRATION RATE: 16 BRPM | WEIGHT: 251.25 LBS

## 2017-10-01 DIAGNOSIS — R20.2 TINGLING SENSATION: Primary | ICD-10-CM

## 2017-10-01 PROCEDURE — 99284 EMERGENCY DEPT VISIT MOD MDM: CPT

## 2017-10-01 NOTE — ED TRIAGE NOTES
Pt states that he started on gabapentin 16 days ago and yesterday he noticed his hands, feet, face feel numb, sinuses feel blocked. Pt states that he is currently coming off of xanax on August 28th and started on valium for a 14 day taper and has had no benzo since that time. Pt was started on gabapentin at that time to prevent seizures.  Pt states that he was in the ED 2 days ago to rule out pancreatitis and saw his doctor on Friday for follow up and was placed on amoxicillin and miralax

## 2017-10-01 NOTE — DISCHARGE INSTRUCTIONS
Numbness and Tingling: Care Instructions  Your Care Instructions  Many things can cause numbness or tingling. Swelling may put pressure on a nerve. This could cause you to lose feeling or have a pins-and-needles sensation on part of your body. Nerves may be damaged from trauma, toxins, or diseases, such as diabetes or multiple sclerosis (MS). Sometimes, though, the cause is not clear. If there is no clear reason for your symptoms, and you are not having any other symptoms, your doctor may suggest watching and waiting for a while to see if the numbness or tingling goes away on its own. Your doctor may want you to have blood or nerve tests to find the cause of your symptoms. Follow-up care is a key part of your treatment and safety. Be sure to make and go to all appointments, and call your doctor if you are having problems. It's also a good idea to know your test results and keep a list of the medicines you take. How can you care for yourself at home? · If your doctor prescribes medicine, take it exactly as directed. Call your doctor if you think you are having a problem with your medicine. · If you have any swelling, put ice or a cold pack on the area for 10 to 20 minutes at a time. Put a thin cloth between the ice and your skin. When should you call for help? Call 911 anytime you think you may need emergency care. For example, call if:  · You have weakness, numbness, or tingling in both legs. · You lose bowel or bladder control. · You have symptoms of a stroke. These may include:  ¨ Sudden numbness, tingling, weakness, or loss of movement in your face, arm, or leg, especially on only one side of your body. ¨ Sudden vision changes. ¨ Sudden trouble speaking. ¨ Sudden confusion or trouble understanding simple statements. ¨ Sudden problems with walking or balance. ¨ A sudden, severe headache that is different from past headaches.   Watch closely for changes in your health, and be sure to contact your doctor if you have any problems, or if:  · You do not get better as expected. Where can you learn more? Go to http://ciara-abbe.info/. Enter P038 in the search box to learn more about \"Numbness and Tingling: Care Instructions. \"  Current as of: October 14, 2016  Content Version: 11.3  © 6987-6955 [employer]. Care instructions adapted under license by [employer] (which disclaims liability or warranty for this information). If you have questions about a medical condition or this instruction, always ask your healthcare professional. [place] [address] any warranty or liability for your use of this information. We hope that we have addressed all of your medical concerns. The examination and treatment you received in the Emergency Department were for an emergent problem and were not intended as complete care. It is important that you follow up with your healthcare provider(s) for ongoing care. If your symptoms worsen or do not improve as expected, and you are unable to reach your usual health care provider(s), you should return to the Emergency Department. Today's healthcare is undergoing tremendous change, and patient satisfaction surveys are one of the many tools to assess the quality of medical care. You may receive a survey from the [employer] regarding your experience in the Emergency Department. I hope that your experience has been completely positive, particularly the medical care that I provided. As such, please participate in the survey; anything less than excellent does not meet my expectations or intentions. [address] [facility] and [address] [place] [address] participate in nationally recognized quality of care measures.   If your blood pressure is greater than 120/80, as reported below, we urge that you seek medical care to address the potential of high blood pressure, commonly known as hypertension. Hypertension can be hereditary or can be caused by certain medical conditions, pain, stress, or \"white coat syndrome. \"       Please make an appointment with your health care provider(s) for follow up of your Emergency Department visit. VITALS:   Patient Vitals for the past 8 hrs:   Temp Pulse Resp BP SpO2   10/01/17 1800 - - - 170/81 96 %   10/01/17 1745 - - - 159/79 96 %   10/01/17 1647 97.9 °F (36.6 °C) 77 16 (!) 189/93 98 %          Thank you for allowing us to provide you with medical care today. We realize that you have many choices for your emergency care needs. Please choose us in the future for any continued health care needs. Liza Aguirre, [address] [facility]: [phone]            No results found for this or any previous visit (from the past 24 hour(s)). No results found.

## 2017-10-01 NOTE — ED NOTES
LESLEY Shannon gave and reviewed discharge instructions with patient. Patient verbalizes understanding of discharge instructions. Pt alert and oriented, appears in no acute distress, respirations equal and unlabored. Ambulatory upon discharge with steady gait.

## 2017-10-02 NOTE — ED PROVIDER NOTES
HPI Comments: 56 y.o. male with past medical history significant for hep C, DM, HTN, and ETOH abuse presents with complaints of bilateral arm and leg numbness and anxiety. The pt explained that he started gabapentin 2 weeks ago and \"I think I am having a bad reaction to it. \"  He explained that \"I didn't have these symptoms until my doctor started me on the gabapentin. He is requesting that he be started back on xanax. There are no other acute medical complaints at this time. PCP: MD Danny Gutierrez PA-C       Past Medical History:   Diagnosis Date    Alcohol abuse, in remission     Constipation     Diabetes ([facility] [address]     Type II    Hepatitis C     Hypertension     Pancreatitis     Spondylitis ([facility] [address]        Past Surgical History:   Procedure Laterality Date    HX APPENDECTOMY      HX ORTHOPAEDIC      2 back surgeries    HX OTHER SURGICAL      knee  surgery         Family History:   Problem Relation Age of Onset    Hypertension Mother     Parkinson's Disease Father        Social History     Social History    Marital status: UNKNOWN     Spouse name: N/A    Number of children: N/A    Years of education: N/A     Occupational History    Not on file. Social History Main Topics    Smoking status: Current Every Day Smoker     Packs/day: 1.00    Smokeless tobacco: Never Used    Alcohol use No    Drug use: No      Comment: Methadone    Sexual activity: No     Other Topics Concern    Not on file     Social History Narrative         ALLERGIES: Sulfa (sulfonamide antibiotics)    Review of Systems   Constitutional: Negative for activity change, appetite change, diaphoresis and fever. HENT: Negative for ear discharge, ear pain, facial swelling, rhinorrhea, sore throat, tinnitus, trouble swallowing and voice change. Eyes: Negative for photophobia, pain, discharge, redness and visual disturbance.    Respiratory: Negative for cough, chest tightness, shortness of breath, wheezing and stridor. Cardiovascular: Negative for chest pain and palpitations. Gastrointestinal: Negative for abdominal pain, constipation, diarrhea, nausea and vomiting. Endocrine: Negative for polydipsia and polyuria. Genitourinary: Negative for dysuria, flank pain and hematuria. Musculoskeletal: Negative for arthralgias, back pain and myalgias. Skin: Negative for color change and rash. Neurological: Negative for dizziness, syncope, speech difficulty, light-headedness and numbness. Psychiatric/Behavioral: Negative for behavioral problems. Vitals:    10/01/17 1800 10/01/17 1815 10/01/17 1830 10/01/17 1845   BP: 170/81 169/80 156/80 169/84   Pulse:       Resp:       Temp:       SpO2: 96% 98% 97% 97%   Weight:       Height:                Physical Exam   Constitutional: He is oriented to person, place, and time. He appears well-developed and well-nourished. No distress. HENT:   Head: Normocephalic and atraumatic. Eyes: Conjunctivae are normal. Pupils are equal, round, and reactive to light. Neck: Normal range of motion. Neck supple. Cardiovascular: Normal rate, regular rhythm and normal heart sounds. Pulmonary/Chest: Effort normal and breath sounds normal. No respiratory distress. He has no wheezes. Abdominal: Soft. Bowel sounds are normal. He exhibits no distension. There is no tenderness. Musculoskeletal: Normal range of motion. Neurological: He is alert and oriented to person, place, and time. Skin: Skin is warm. He is not diaphoretic. MDM  Number of Diagnoses or Management Options  Tingling sensation:   Diagnosis management comments: Pt does not appear to be having benzo withdrawal symptoms. Vitals and physical exam unremarkable. Presentation does not suggest PE, MI, PTX or any other acute life threatening conditions. Looked up pt on [place] ; long hx of benzo use. Will dc home and advise follow up with physician prescribing him the gabapentin.   Reviewed treatment plan with attending and they agree.   Kathryn Ramos PA-C    ED Course       Procedures

## 2017-10-03 ENCOUNTER — OFFICE VISIT (OUTPATIENT)
Dept: INTERNAL MEDICINE CLINIC | Age: 57
End: 2017-10-03

## 2017-10-03 VITALS
BODY MASS INDEX: 29.24 KG/M2 | HEART RATE: 76 BPM | HEIGHT: 77 IN | WEIGHT: 247.6 LBS | OXYGEN SATURATION: 97 % | RESPIRATION RATE: 16 BRPM | DIASTOLIC BLOOD PRESSURE: 90 MMHG | TEMPERATURE: 98.6 F | SYSTOLIC BLOOD PRESSURE: 154 MMHG

## 2017-10-03 DIAGNOSIS — E11.65 TYPE 2 DIABETES MELLITUS WITH HYPERGLYCEMIA, WITH LONG-TERM CURRENT USE OF INSULIN (HCC): ICD-10-CM

## 2017-10-03 DIAGNOSIS — I10 HYPERTENSION, UNSPECIFIED TYPE: Primary | ICD-10-CM

## 2017-10-03 DIAGNOSIS — K59.09 OTHER CONSTIPATION: ICD-10-CM

## 2017-10-03 DIAGNOSIS — F41.9 ANXIETY: ICD-10-CM

## 2017-10-03 DIAGNOSIS — Z79.4 TYPE 2 DIABETES MELLITUS WITH HYPERGLYCEMIA, WITH LONG-TERM CURRENT USE OF INSULIN (HCC): ICD-10-CM

## 2017-10-03 RX ORDER — PROPRANOLOL HYDROCHLORIDE 10 MG/1
10 TABLET ORAL
Qty: 60 TAB | Refills: 0 | Status: SHIPPED | OUTPATIENT
Start: 2017-10-03 | End: 2017-10-17 | Stop reason: SDUPTHER

## 2017-10-03 RX ORDER — PROPRANOLOL HYDROCHLORIDE 10 MG/1
60 TABLET ORAL
Qty: 60 TAB | Refills: 0 | Status: SHIPPED | OUTPATIENT
Start: 2017-10-03 | End: 2017-10-03 | Stop reason: CLARIF

## 2017-10-03 RX ORDER — LACTULOSE 10 G/15ML
10 SOLUTION ORAL; RECTAL
Qty: 480 ML | Refills: 0 | Status: SHIPPED | OUTPATIENT
Start: 2017-10-03 | End: 2017-10-17 | Stop reason: SDUPTHER

## 2017-10-03 NOTE — PROGRESS NOTES
HISTORY OF PRESENT ILLNESS  Greta Mariano is a 56 y.o. male. HPI  Cardiovascular Review:  The patient has diabetes, hypertension and obesity. Diet and Lifestyle: smoker, caffeine intake minimal over the past 3 days, alcohol intake none   Home BP Monitoring: labile HTN based on treatment. Pertinent ROS: taking medications as instructed, no medication side effects noted, no TIA's, no chest pain on exertion, no dyspnea on exertion, no swelling of ankles. Anxiety  Patient is seen for followup of severe anxiety complicated by polysubstance abuse and benzodiazepine dependence. He is closely followed by patient therapy and is on methadone treatment. Xanax is being weaned with use of Valium. [facility] reviewed and shows that he received 53 Valium tablets on August 29 from Dr. Rhonda Aguiar at the substance abuse treatment center. He also received gabapentin to help with his pain and wheezing. He reports that his anxiety is not well controlled he has severe symptoms as manifested by panic attacks, tremors. He has been in consultation with several mental health professionals one of which he states told him he may need to be on Xanax for the rest of his life. Currently no prescribers giving him Xanax. As the goal is to wean his benzodiazepine use. His psychiatrist to discuss possibly start him on Inderal but was concerned because he is on 2 antihypertensive agents. PHQ over the last two weeks 10/3/2017   Little interest or pleasure in doing things Not at all   Feeling down, depressed or hopeless Not at all   Total Score PHQ 2 0     Constipation  Patient complains of constipation. Stool pattern has been 1 formed stool(s) per week. Onset was 10 days ago Defecation has been difficult. Co-Morbid conditions:anxiety and methodone . Symptoms have been waxing and waning. Current Health Habits: Current OTC/RX therapy has been miralax which has been somewhat effective.       Review of Systems   Constitutional: Negative for diaphoresis, fever and weight loss. Eyes: Negative for blurred vision and pain. Respiratory: Negative for shortness of breath. Cardiovascular: Positive for palpitations. Negative for chest pain, orthopnea and leg swelling. Gastrointestinal: Positive for abdominal pain and constipation. Neurological: Negative for focal weakness and headaches. Psychiatric/Behavioral: Negative for depression and suicidal ideas. The patient is nervous/anxious and has insomnia. Patient Active Problem List    Diagnosis Date Noted    Type 2 diabetes mellitus with hyperglycemia, with long-term current use of insulin ([facility] 08/29/2017    Anxiety 08/24/2017    Substance induced mood disorder ([facility] 08/24/2017    Benzodiazepine dependence, continuous ([facility] 08/24/2017    Polysubstance dependence ([facility] 08/24/2017    Acute pancreatitis 11/29/2011       Current Outpatient Prescriptions   Medication Sig Dispense Refill    lactulose (CHRONULAC) 10 gram/15 mL solution Take 15 mL by mouth every eight (8) hours as needed. For severe constipation. Hold for loose stool  Indications: constipation 480 mL 0    propranolol (INDERAL) 10 mg tablet Take 1 Tab by mouth three (3) times daily as needed. For severe anxiety and elevated blood pressure 60 Tab 0    gabapentin (NEURONTIN) 300 mg capsule       amoxicillin (AMOXIL) 875 mg tablet Take 1 Tab by mouth two (2) times a day for 7 days. 14 Tab 0    polyethylene glycol (MIRALAX) 17 gram/dose powder Take 17 g by mouth daily. 850 g 1    lisinopril (PRINIVIL, ZESTRIL) 40 mg tablet       insulin glargine (BASAGLAR KWIKPEN) 100 unit/mL (3 mL) inpn Inject 28 units daily 10 Pen 5    DOCUSATE SODIUM PO Take  by mouth.  amLODIPine (NORVASC) 10 mg tablet       METHADONE HCL (METHADONE PO) Take 75 mg by mouth daily.  metFORMIN (GLUCOPHAGE) 1,000 mg tablet Take 1,000 mg by mouth two (2) times daily (with meals).       metFORMIN (GLUCOPHAGE) 500 mg tablet       ALPRAZolam (XANAX) 0.5 mg tablet       mirtazapine (REMERON) 15 mg tablet       canagliflozin (INVOKANA) 300 mg tablet Take 1 Tab by mouth Daily (before breakfast). 30 Tab 5    fenofibrate (LOFIBRA) 160 mg tablet Take 1 Tab by mouth daily. 30 Tab 5    ALPRAZolam (XANAX) 1 mg tablet Take 1 mg by mouth two (2) times a day.  ALPRAZolam (XANAX) 2 mg tablet Take 2 mg by mouth two (2) times a day.  lisinopril (PRINIVIL, ZESTRIL) 10 mg tablet Take 20 mg by mouth daily. Reports 10 or 20mg. daily          Allergies   Allergen Reactions    Senna Other (comments)     cramps    Sulfa (Sulfonamide Antibiotics) Unknown (comments)     Makes him warner     Pt states he is not allerggic to this medication CSE 8/24/17      Visit Vitals    /90 (BP 1 Location: Right arm, BP Patient Position: Sitting)    Pulse 76    Temp 98.6 °F (37 °C) (Oral)    Resp 16    Ht 6' 5\" (1.956 m)    Wt 247 lb 9.6 oz (112.3 kg)    SpO2 97%    BMI 29.36 kg/m2       Physical Exam   Constitutional: He is oriented to person, place, and time. No distress. HENT:   Right Ear: Tympanic membrane, external ear and ear canal normal.   Left Ear: Tympanic membrane, external ear and ear canal normal.   Nose: Mucosal edema and rhinorrhea present. Mouth/Throat: Posterior oropharyngeal erythema present. No oropharyngeal exudate. Cardiovascular: Normal rate, regular rhythm and normal heart sounds. Exam reveals no friction rub. No murmur heard. Pulmonary/Chest: Breath sounds normal. No accessory muscle usage. No tachypnea and no bradypnea. No respiratory distress. He has no decreased breath sounds. He has no wheezes. He has no rhonchi. He has no rales. Abdominal: He exhibits distension (typampanic ). He exhibits no ascites. There is generalized tenderness. There is no rigidity and no guarding. A hernia is present. Hernia confirmed positive in the ventral area (reducible ). Musculoskeletal: He exhibits no edema.    Neurological: He is alert and oriented to person, place, and time. Skin: He is not diaphoretic. Lab Results  Component Value Date/Time   WBC 7.4 09/27/2017 10:34 AM   HGB 13.6 09/27/2017 10:34 AM   HCT 39.7 09/27/2017 10:34 AM   PLATELET 598 27/80/9735 10:34 AM   MCV 86.9 09/27/2017 10:34 AM     Lab Results  Component Value Date/Time   Hemoglobin A1c 7.4 09/27/2017 10:34 AM   Hemoglobin A1c 8.4 07/12/2017 12:00 AM   Glucose 141 09/27/2017 10:34 AM   Glucose (POC) 129 05/20/2014 01:07 PM   Microalb/Creat ratio (ug/mg creat.) 1012.9 08/29/2017 04:18 PM   LDL, calculated 139 07/12/2017 12:00 AM   Creatinine 1.09 09/27/2017 10:34 AM      Lab Results  Component Value Date/Time   Cholesterol, total 234 07/12/2017 12:00 AM   HDL Cholesterol 40 07/12/2017 12:00 AM   LDL, calculated 139 07/12/2017 12:00 AM   Triglyceride 277 07/12/2017 12:00 AM   CHOL/HDL Ratio 4.5 12/01/2011 03:55 AM     Lab Results  Component Value Date/Time   ALT (SGPT) 19 09/27/2017 10:34 AM   AST (SGOT) 15 09/27/2017 10:34 AM   Alk.  phosphatase 64 09/27/2017 10:34 AM   Bilirubin, total 0.4 09/27/2017 10:34 AM   Albumin 3.9 09/27/2017 10:34 AM   Protein, total 8.6 09/27/2017 10:34 AM   PLATELET 094 46/89/8725 10:34 AM       Lab Results  Component Value Date/Time   GFR est non-AA >60 09/27/2017 10:34 AM   GFR est AA >60 09/27/2017 10:34 AM   Creatinine 1.09 09/27/2017 10:34 AM   BUN 11 09/27/2017 10:34 AM   Sodium 132 09/27/2017 10:34 AM   Potassium 4.0 09/27/2017 10:34 AM   Chloride 99 09/27/2017 10:34 AM   CO2 27 09/27/2017 10:34 AM     Lab Results  Component Value Date/Time   TSH 2.340 07/12/2017 12:00 AM      Lab Results   Component Value Date/Time    Glucose 141 09/27/2017 10:34 AM    Glucose (POC) 129 05/20/2014 01:07 PM                   Final result (Exam End: 9/27/2017 12:50 PM) Open    Study Result   EXAM:  CT ABD PELV W CONT     INDICATION: abdominal pain concern for pancreatitis , history of appendectomy in  the past.     COMPARISON: 11/28/2011     CONTRAST:  100 mL of Isovue-370.     TECHNIQUE:   Following the uneventful intravenous administration of contrast, thin axial  images were obtained through the abdomen and pelvis. Coronal and sagittal  reconstructions were generated. Oral contrast was not administered. CT dose  reduction was achieved through use of a standardized protocol tailored for this  examination and automatic exposure control for dose modulation.     FINDINGS:   LUNG BASES: Clear. INCIDENTALLY IMAGED HEART AND MEDIASTINUM: Unremarkable. LIVER: No mass or biliary dilatation. GALLBLADDER: Unremarkable. SPLEEN: No mass. PANCREAS: No mass or ductal dilatation. No peripancreatic stranding of fat to  suggest acute pancreatitis. ADRENALS: Unremarkable. KIDNEYS: No mass, calculus, or hydronephrosis. Vascular calcification  incidentally noted in the left kidney. STOMACH: Unremarkable. SMALL BOWEL: No dilatation or wall thickening. COLON: No dilatation or wall thickening. APPENDIX: Surgically absent  PERITONEUM: No ascites or pneumoperitoneum. RETROPERITONEUM: No lymphadenopathy or aortic aneurysm. REPRODUCTIVE ORGANS: Unremarkable for age. URINARY BLADDER: Nondistended with apparent wall thickening likely related to  nondistention. BONES: No destructive bone lesion. 5 mm anterolisthesis at L5-S1, status post  lumbar from L4 through S1.  ADDITIONAL COMMENTS: N/A     IMPRESSION  IMPRESSION:     1. No CT evidence for acute pancreatitis at this time. 2. Other incidental and postoperative changes as described above.        EKG: unchanged from previous tracings, no ischemic changes, QTC at goal.    ASSESSMENT and PLAN  Diagnoses and all orders for this visit:    1. Hypertension, unspecified type labile hypertension closely associated with severe anxiety and possible benzodiazepine withdrawal.  Had recent labs which included a CT abdomen which did not show any adrenal tumor  (though adrenal protocol not done).   For now we will treat symptomatically blood pressure improved significantly once he calmed down in office. Thus we will use Lopressor as needed propanolol to help with both anxiety and sympathetically mediated blood pressure. Side effects reviewed in the morning flags reviewed. He is to return in 2 weeks for follow-up of his blood pressure. -     AMB POC EKG ROUTINE W/ 12 LEADS, INTER & REP  -     propranolol (INDERAL) 10 mg tablet; Take 1 Tab by mouth three (3) times daily as needed. For severe anxiety and elevated blood pressure    2. Other constipation no signs of obstruction recent CT at goal.  Trial of lactulose which has worked in the past.  Red flags to warrant ER visit reviewed. -     lactulose (CHRONULAC) 10 gram/15 mL solution; Take 15 mL by mouth every eight (8) hours as needed. For severe constipation. Hold for loose stool  Indications: constipation    3. Anxiety. Behavior  reviewed. Advised benzodiazepine prescriptions of any are given to him from psychiatry department. Patient Education:  Reviewed concept of anxiety as biochemical imbalance of neurotransmitters and rationale for treatment. Instructed patient to contact office or 911 promptly should condition worsen or any new symptoms appear and provided on-call telephone numbers. IF THE PATIENT HAS ANY SUICIDAL OR HOMICIDAL IDEATION, CALL THE OFFICE, DISCUSS WITH A SUPPORT MEMBER OR GO TO THE ER IMMEDIATELY. Patient was agreeable with this    -     propranolol (INDERAL) 10 mg tablet; Take 1 Tab by mouth three (3) times daily as needed. For severe anxiety and elevated blood pressure    4. Type 2 diabetes mellitus with hyperglycemia, with long-term current use of insulin ([facility])  Recent A1c 7.4. Monitor for hypoglycemia while on lactulose    Follow-up Disposition:  Return in about 2 weeks (around 10/17/2017) for Follow-up Dr. Paola Be . Medication risks/benefits/costs/interactions/alternatives discussed with patient.   Shaheen Reynaga  was given an after visit summary which includes diagnoses, current medications, & vitals. he expressed understanding with the diagnosis and plan.     40 minutes of 45 minutes of care coordination was spent counseling patient on treatment plan and assessing understanding

## 2017-10-03 NOTE — PROGRESS NOTES
Chief Complaint   Patient presents with    Hypertension     1. Have you been to the ER, urgent care clinic since your last visit? Hospitalized since your last visit? Yes When: 10/01/2017 Where: ER Reason for visit: Hypertension    2. Have you seen or consulted any other health care providers outside of the [address] [place] [address] since your last visit? Include any pap smears or colon screening.  Yes When: 10/02/2017 Reason for visit: Psychiatrist

## 2017-10-03 NOTE — PATIENT INSTRUCTIONS
It was a pleasure to see you! As discussed: We started inderal for your severe anxiety symptoms associated with hypertension. Panic Attacks: Care Instructions  Your Care Instructions  During a panic attack, you may have a feeling of intense fear or terror, trouble breathing, chest pain or tightness, heartbeat changes, dizziness, sweating, and shaking. A panic attack starts suddenly and usually lasts from 5 to 20 minutes but may last even longer. You have the most anxiety about 10 minutes after the attack starts. An attack can begin with a stressful event, or it can happen without a cause. Although panic attacks can cause scary symptoms, you can learn to manage them with self-care, counseling, and medicine. Follow-up care is a key part of your treatment and safety. Be sure to make and go to all appointments, and call your doctor if you are having problems. It's also a good idea to know your test results and keep a list of the medicines you take. How can you care for yourself at home? · Take your medicine exactly as directed. Call your doctor if you think you are having a problem with your medicine. · Go to your counseling sessions and follow-up appointments. · Recognize and accept your anxiety. Then, when you are in a situation that makes you anxious, say to yourself, \"This is not an emergency. I feel uncomfortable, but I am not in danger. I can keep going even if I feel anxious. \"  · Be kind to your body:  ¨ Relieve tension with exercise or a massage. ¨ Get enough rest.  ¨ Avoid alcohol, caffeine, nicotine, and illegal drugs. They can increase your anxiety level, cause sleep problems, or trigger a panic attack. ¨ Learn and do relaxation techniques. See below for more about these techniques. · Engage your mind. Get out and do something you enjoy. Go to a funny movie, or take a walk or hike. Plan your day. Having too much or too little to do can make you anxious.   · Keep a record of your symptoms. Discuss your fears with a good friend or family member, or join a support group for people with similar problems. Talking to others sometimes relieves stress. · Get involved in social groups, or volunteer to help others. Being alone sometimes makes things seem worse than they are. · Get at least 30 minutes of exercise on most days of the week to relieve stress. Walking is a good choice. You also may want to do other activities, such as running, swimming, cycling, or playing tennis or team sports. Relaxation techniques  Do relaxation exercises for 10 to 20 minutes a day. You can play soothing, relaxing music while you do them, if you wish. · Tell others in your house that you are going to do your relaxation exercises. Ask them not to disturb you. · Find a comfortable place, away from all distractions and noise. · Lie down on your back, or sit with your back straight. · Focus on your breathing. Make it slow and steady. · Breathe in through your nose. Breathe out through either your nose or mouth. · Breathe deeply, filling up the area between your navel and your rib cage. Breathe so that your belly goes up and down. · Do not hold your breath. · Breathe like this for 5 to 10 minutes. Notice the feeling of calmness throughout your whole body. As you continue to breathe slowly and deeply, relax by doing the following for another 5 to 10 minutes:  · Tighten and relax each muscle group in your body. You can begin at your toes and work your way up to your head. · Imagine your muscle groups relaxing and becoming heavy. · Empty your mind of all thoughts. · Let yourself relax more and more deeply. · Become aware of the state of calmness that surrounds you. · When your relaxation time is over, you can bring yourself back to alertness by moving your fingers and toes and then your hands and feet and then stretching and moving your entire body.  Sometimes people fall asleep during relaxation, but they usually wake up shortly afterward. · Always give yourself time to return to full alertness before you drive a car or do anything that might cause an accident if you are not fully alert. Never play a relaxation tape while driving a car. When should you call for help? Call 911 anytime you think you may need emergency care. For example, call if:  · You feel you cannot stop from hurting yourself or someone else. Watch closely for changes in your health, and be sure to contact your doctor if:  · Your panic attacks get worse. · You have new or different anxiety. · You are not getting better as expected. Where can you learn more? Go to http://ciara-abbe.info/. Enter H601 in the search box to learn more about \"Panic Attacks: Care Instructions. \"  Current as of: July 26, 2016  Content Version: 11.3  © 7058-3512 [employer]. Care instructions adapted under license by [employer] (which disclaims liability or warranty for this information). If you have questions about a medical condition or this instruction, always ask your healthcare professional. [place] [address] any warranty or liability for your use of this information. Constipation: Care Instructions  Your Care Instructions  Constipation means that you have a hard time passing stools (bowel movements). People pass stools from 3 times a day to once every 3 days. What is normal for you may be different. Constipation may occur with pain in the rectum and cramping. The pain may get worse when you try to pass stools. Sometimes there are small amounts of bright red blood on toilet paper or the surface of stools. This is because of enlarged veins near the rectum (hemorrhoids). A few changes in your diet and lifestyle may help you avoid ongoing constipation. Your doctor may also prescribe medicine to help loosen your stool. Some medicines can cause constipation.  These include pain medicines and antidepressants. Tell your doctor about all the medicines you take. Your doctor may want to make a medicine change to ease your symptoms. Follow-up care is a key part of your treatment and safety. Be sure to make and go to all appointments, and call your doctor if you are having problems. It's also a good idea to know your test results and keep a list of the medicines you take. How can you care for yourself at home? · Drink plenty of fluids, enough so that your urine is light yellow or clear like water. If you have kidney, heart, or liver disease and have to limit fluids, talk with your doctor before you increase the amount of fluids you drink. · Include high-fiber foods in your diet each day. These include fruits, vegetables, beans, and whole grains. · Get at least 30 minutes of exercise on most days of the week. Walking is a good choice. You also may want to do other activities, such as running, swimming, cycling, or playing tennis or team sports. · Take a fiber supplement, such as Citrucel or Metamucil, every day. Read and follow all instructions on the label. · Schedule time each day for a bowel movement. A daily routine may help. Take your time having your bowel movement. · Support your feet with a small step stool when you sit on the toilet. This helps flex your hips and places your pelvis in a squatting position. · Your doctor may recommend an over-the-counter laxative to relieve your constipation. Examples are Milk of Magnesia and MiraLax. Read and follow all instructions on the label. Do not use laxatives on a long-term basis. When should you call for help? Call your doctor now or seek immediate medical care if:  · You have new or worse belly pain. · You have new or worse nausea or vomiting. · You have blood in your stools. Watch closely for changes in your health, and be sure to contact your doctor if:  · Your constipation is getting worse. · You do not get better as expected.   Where can you learn more? Go to http://ciara-abbe.info/. Enter 21  in the search box to learn more about \"Constipation: Care Instructions. \"  Current as of: March 20, 2017  Content Version: 11.3  © 0398-3806 [employer]. Care instructions adapted under license by [employer] (which disclaims liability or warranty for this information). If you have questions about a medical condition or this instruction, always ask your healthcare professional. [place] [address] any warranty or liability for your use of this information.

## 2017-10-03 NOTE — PROGRESS NOTES
Acute Care Note    Nneka Siddiqui is 56 y.o. male. he presents for evaluation of Urinary Hesitancy and Constipation    Patient presents with complaints of urinary hesitancy and constipation which he reports have been increasing in frequency since he had been weaned off of benzodiazepine by his psychiatric provider. He has been on benzodiazepine medication (Xanax) for the better portion of 20 years. This due to significant problems with anxiety and with managing his affairs. He had recently been taken off of the Xanax and had been transitioned to gabapentin. He reports that the gabapentin is not particularly good for him and does not achieve the same effects as does the Xanax. He also appears and feels as if he has having withdrawal symptoms from being without the benzodiazepine. Of note he mentions problems with being unable to initiate a urinary stream.  He also notes that he has had some trouble having a bowel movement. He denies nocturia or significant amounts of constipation. However, he is very concerned about the symptoms. While he is not wanting to go back on Xanax, he reports that these symptoms are very disturbing and uncomfortable. Prior to Admission medications    Medication Sig Start Date End Date Taking? Authorizing Provider   gabapentin (NEURONTIN) 300 mg capsule  8/29/17  Yes Historical Provider   amoxicillin (AMOXIL) 875 mg tablet Take 1 Tab by mouth two (2) times a day for 7 days. 9/29/17 10/6/17 Yes Mily Hernandez MD   polyethylene glycol [facility]) 17 gram/dose powder Take 17 g by mouth daily. 9/29/17  Yes Mily Hernandez MD   lisinopril (PRINIVIL, ZESTRIL) 40 mg tablet  8/23/17  Yes Historical Provider   insulin glargine [facility]) 100 unit/mL (3 mL) inpn Inject 28 units daily 8/29/17  Yes Luiz Mcarthur MD   DOCUSATE SODIUM PO Take  by mouth.    Yes Historical Provider   amLODIPine (NORVASC) 10 mg tablet  6/28/17  Yes Historical Provider   METHADONE HCL (METHADONE PO) Take 75 mg by mouth daily. Yes Historical Provider   metFORMIN (GLUCOPHAGE) 500 mg tablet  8/18/17   Historical Provider   ALPRAZolam [facility]) 0.5 mg tablet  7/18/17   Historical Provider   mirtazapine (REMERON) 15 mg tablet  7/17/17   Historical Provider   canagliflozin (INVOKANA) 300 mg tablet Take 1 Tab by mouth Daily (before breakfast). 8/29/17   Jason Levy MD   fenofibrate (LOFIBRA) 160 mg tablet Take 1 Tab by mouth daily. 8/29/17   Jason Levy MD   ALPRAZolam [place]) 1 mg tablet Take 1 mg by mouth two (2) times a day. Historical Provider   lactulose (CHRONULAC) 10 gram/15 mL solution Take 15 mL by mouth daily. Indications: Constipation 1/25/17 April LESLEY De Paz   ALPRAZolam [place]) 2 mg tablet Take 2 mg by mouth two (2) times a day. Historical Provider   metFORMIN (GLUCOPHAGE) 1,000 mg tablet Take 1,000 mg by mouth two (2) times daily (with meals). Historical Provider   lisinopril (PRINIVIL, ZESTRIL) 10 mg tablet Take 20 mg by mouth daily. Reports 10 or 20mg. daily     Historical Provider         Patient Active Problem List   Diagnosis Code    Acute pancreatitis K85.90    Anxiety F41.9    Substance induced mood disorder ([facility] [address] F19.94    Benzodiazepine dependence, continuous ([facility]) F13.20    Polysubstance dependence ([facility] [address] F19.20    Type 2 diabetes mellitus with hyperglycemia, with long-term current use of insulin ([facility]) E11.65, Z79.4         Review of Systems   Constitutional: Negative. Eyes: Negative. Respiratory: Negative. Cardiovascular: Negative. Gastrointestinal: Positive for constipation. Negative for abdominal pain. Genitourinary:        Difficult urination   Neurological: Negative for dizziness and headaches. Psychiatric/Behavioral: The patient is nervous/anxious.           Visit Vitals    /90 (BP 1 Location: Right arm, BP Patient Position: Sitting)    Pulse 63    Temp 98.5 °F (36.9 °C) (Oral)    Resp 19    Ht 6' 5\" (1.956 m)    Wt 251 lb 6.4 oz (114 kg)    SpO2 95%    BMI 29.81 kg/m2       Physical Exam   Constitutional: He appears well-developed and well-nourished. HENT:   Nose: Right sinus exhibits maxillary sinus tenderness and frontal sinus tenderness. Left sinus exhibits maxillary sinus tenderness and frontal sinus tenderness. Cardiovascular: Normal rate and regular rhythm. Pulmonary/Chest: Effort normal and breath sounds normal.   Skin:   Noted skin thickening on his middle finger of the left hand--also redness surrounding the nail on that hand   Psychiatric: He has a normal mood and affect. ASSESSMENT/PLAN  Diagnoses and all orders for this visit:    1. Subacute pansinusitis  -     amoxicillin (AMOXIL) 875 mg tablet; Take 1 Tab by mouth two (2) times a day for 7 days. 2. Paronychia, left    3. Other constipation  -     polyethylene glycol (MIRALAX) 17 gram/dose powder; Take 17 g by mouth daily. Advised the patient to call back or return to office if symptoms worsen/change/persist.   Discussed expected course/resolution/complications of diagnosis in detail with patient. Medication risks/benefits/costs/interactions/alternatives discussed with patient. The patient was given an after visit summary which includes diagnoses, current medications, & vitals. They expressed understanding with the diagnosis and plan.

## 2017-10-03 NOTE — MR AVS SNAPSHOT
Visit Information Date & Time Provider Department Dept. Phone Encounter #  
 10/3/2017  1:45 PM Stephanie Abdullahi MD [facility] Internal Medicine [phone] [id number] Follow-up Instructions Return in about 2 weeks (around 10/17/2017) for Follow-up Dr. Linda Mantilla . Your Appointments 12/13/2017 10:50 AM  
ROUTINE CARE with MD Domingo Penaloza Diabetes and Endocrinology [facility]) Appt Note: f/u diabetes cp0.00  
 [address] [facility] [id number]  
[address] [facility] [address]  
  
    
 12/13/2017  2:00 PM  
ROUTINE CARE with Michelle Martínez MD  
[facility] Internal Medicine [facility]) Appt Note: 3 month f/u; 3 month f/u  
 [address] [facility] [id number]  
[address] [place] [address] Upcoming Health Maintenance Date Due Hepatitis C Screening 1960 FOOT EXAM Q1 1/19/1972 EYE EXAM RETINAL OR DILATED Q1 1/19/1972 Pneumococcal 19-64 Medium Risk (1 of 1 - PPSV23) 1/19/1981 DTaP/Tdap/Td series (1 - Tdap) 1/19/1983 INFLUENZA AGE 9 TO ADULT 8/1/2017 HEMOGLOBIN A1C Q6M 3/27/2018 LIPID PANEL Q1 7/12/2018 FOBT Q 1 YEAR AGE 50-75 7/12/2018 MICROALBUMIN Q1 8/29/2018 Allergies as of 10/3/2017  Review Complete On: 10/3/2017 By: Stephanie Abdullahi MD  
  
 Severity Noted Reaction Type Reactions Senna  10/03/2017    Other (comments)  
 cramps Sulfa (Sulfonamide Antibiotics)  11/28/2011    Unknown (comments) Makes him warner Pt states he is not allerggic to this medication CSE 8/24/17 Current Immunizations  Reviewed on 11/28/2011 No immunizations on file. Not reviewed this visit You Were Diagnosed With   
  
 Codes Comments Hypertension, unspecified type    -  Primary ICD-10-CM: I10 
ICD-9-CM: 401.9  Other constipation     ICD-10-CM: K59.09 
 ICD-9-CM: 564.09 Anxiety     ICD-10-CM: F41.9 ICD-9-CM: 300.00 Type 2 diabetes mellitus with hyperglycemia, with long-term current use of insulin (HCC)     ICD-10-CM: E11.65, Z79.4 ICD-9-CM: 250.00, 790.29, V58.67 Vitals BP Pulse Temp Resp Height(growth percentile) Weight(growth percentile) 154/90 (BP 1 Location: Right arm, BP Patient Position: Sitting) 76 98.6 °F (37 °C) (Oral) 16 6' 5\" (1.956 m) 247 lb 9.6 oz (112.3 kg) SpO2 BMI Smoking Status 97% 29.36 kg/m2 Current Every Day Smoker Vitals History BMI and BSA Data Body Mass Index Body Surface Area  
 29.36 kg/m 2 2.47 m 2 Preferred Pharmacy Pharmacy Name Phone Karri Carvalho [address] [facility] [address] [facility] [phone] Your Updated Medication List  
  
   
This list is accurate as of: 10/3/17  2:42 PM.  Always use your most recent med list. amLODIPine 10 mg tablet Commonly known as:  NORVASC  
  
 amoxicillin 875 mg tablet Commonly known as:  AMOXIL Take 1 Tab by mouth two (2) times a day for 7 days. canagliflozin 300 mg tablet Commonly known as:  Luís Channel Take 1 Tab by mouth Daily (before breakfast). DOCUSATE SODIUM PO Take  by mouth. fenofibrate 160 mg tablet Commonly known as:  LOFIBRA Take 1 Tab by mouth daily. gabapentin 300 mg capsule Commonly known as:  NEURONTIN  
  
 insulin glargine 100 unit/mL (3 mL) Inpn Commonly known asWillodean Mallard Inject 28 units daily  
  
 lactulose 10 gram/15 mL solution Commonly known as:  Robyn Kitten Take 15 mL by mouth every eight (8) hours as needed. For severe constipation. Hold for loose stool  Indications: constipation * lisinopril 40 mg tablet Commonly known as:  PRINIVIL, ZESTRIL  
  
 * lisinopril 10 mg tablet Commonly known as:  Robertha Roup Take 20 mg by mouth daily. Reports 10 or 20mg. daily * metFORMIN 500 mg tablet Commonly known as:  GLUCOPHAGE  
  
 * metFORMIN 1,000 mg tablet Commonly known as:  GLUCOPHAGE Take 1,000 mg by mouth two (2) times daily (with meals). METHADONE PO Take 75 mg by mouth daily. mirtazapine 15 mg tablet Commonly known as:  REMERON  
  
 polyethylene glycol 17 gram/dose powder Commonly known as:  Michelle Cinthia Take 17 g by mouth daily. propranolol 10 mg tablet Commonly known as:  INDERAL Take 1 Tab by mouth three (3) times daily as needed. For severe anxiety and elevated blood pressure * XANAX 1 mg tablet Generic drug:  ALPRAZolam  
Take 1 mg by mouth two (2) times a day. * ALPRAZolam 0.5 mg tablet Commonly known as:  Everet Kill * XANAX 2 mg tablet Generic drug:  ALPRAZolam  
Take 2 mg by mouth two (2) times a day. * Notice: This list has 7 medication(s) that are the same as other medications prescribed for you. Read the directions carefully, and ask your doctor or other care provider to review them with you. Prescriptions Sent to Pharmacy Refills  
 lactulose (CHRONULAC) 10 gram/15 mL solution 0 Sig: Take 15 mL by mouth every eight (8) hours as needed. For severe constipation. Hold for loose stool  Indications: constipation Class: Normal  
 Pharmacy: [place] [address] Ph #: [phone] Route: Oral  
 propranolol (INDERAL) 10 mg tablet 0 Sig: Take 1 Tab by mouth three (3) times daily as needed. For severe anxiety and elevated blood pressure Class: Normal  
 Pharmacy: [place] [address] Ph #: [phone] Route: Oral  
  
We Performed the Following AMB POC EKG ROUTINE W/ 12 LEADS, INTER & REP [44080 CPT(R)] Follow-up Instructions Return in about 2 weeks (around 10/17/2017) for Follow-up Dr. Todd Medley . Patient Instructions It was a pleasure to see you! As discussed: We started inderal for your severe anxiety symptoms associated with hypertension. Panic Attacks: Care Instructions Your Care Instructions During a panic attack, you may have a feeling of intense fear or terror, trouble breathing, chest pain or tightness, heartbeat changes, dizziness, sweating, and shaking. A panic attack starts suddenly and usually lasts from 5 to 20 minutes but may last even longer. You have the most anxiety about 10 minutes after the attack starts. An attack can begin with a stressful event, or it can happen without a cause. Although panic attacks can cause scary symptoms, you can learn to manage them with self-care, counseling, and medicine. Follow-up care is a key part of your treatment and safety. Be sure to make and go to all appointments, and call your doctor if you are having problems. It's also a good idea to know your test results and keep a list of the medicines you take. How can you care for yourself at home? · Take your medicine exactly as directed. Call your doctor if you think you are having a problem with your medicine. · Go to your counseling sessions and follow-up appointments. · Recognize and accept your anxiety. Then, when you are in a situation that makes you anxious, say to yourself, \"This is not an emergency. I feel uncomfortable, but I am not in danger. I can keep going even if I feel anxious. \" · Be kind to your body: ¨ Relieve tension with exercise or a massage. ¨ Get enough rest. 
¨ Avoid alcohol, caffeine, nicotine, and illegal drugs. They can increase your anxiety level, cause sleep problems, or trigger a panic attack. ¨ Learn and do relaxation techniques. See below for more about these techniques. · Engage your mind. Get out and do something you enjoy. Go to a funny movie, or take a walk or hike. Plan your day. Having too much or too little to do can make you anxious. · Keep a record of your symptoms.  Discuss your fears with a good friend or family member, or join a support group for people with similar problems. Talking to others sometimes relieves stress. · Get involved in social groups, or volunteer to help others. Being alone sometimes makes things seem worse than they are. · Get at least 30 minutes of exercise on most days of the week to relieve stress. Walking is a good choice. You also may want to do other activities, such as running, swimming, cycling, or playing tennis or team sports. Relaxation techniques Do relaxation exercises for 10 to 20 minutes a day. You can play soothing, relaxing music while you do them, if you wish. · Tell others in your house that you are going to do your relaxation exercises. Ask them not to disturb you. · Find a comfortable place, away from all distractions and noise. · Lie down on your back, or sit with your back straight. · Focus on your breathing. Make it slow and steady. · Breathe in through your nose. Breathe out through either your nose or mouth. · Breathe deeply, filling up the area between your navel and your rib cage. Breathe so that your belly goes up and down. · Do not hold your breath. · Breathe like this for 5 to 10 minutes. Notice the feeling of calmness throughout your whole body. As you continue to breathe slowly and deeply, relax by doing the following for another 5 to 10 minutes: · Tighten and relax each muscle group in your body. You can begin at your toes and work your way up to your head. · Imagine your muscle groups relaxing and becoming heavy. · Empty your mind of all thoughts. · Let yourself relax more and more deeply. · Become aware of the state of calmness that surrounds you. · When your relaxation time is over, you can bring yourself back to alertness by moving your fingers and toes and then your hands and feet and then stretching and moving your entire body. Sometimes people fall asleep during relaxation, but they usually wake up shortly afterward. · Always give yourself time to return to full alertness before you drive a car or do anything that might cause an accident if you are not fully alert. Never play a relaxation tape while driving a car. When should you call for help? Call 911 anytime you think you may need emergency care. For example, call if: 
· You feel you cannot stop from hurting yourself or someone else. Watch closely for changes in your health, and be sure to contact your doctor if: 
· Your panic attacks get worse. · You have new or different anxiety. · You are not getting better as expected. Where can you learn more? Go to http://ciara-abbe.info/. Enter H601 in the search box to learn more about \"Panic Attacks: Care Instructions. \" Current as of: July 26, 2016 Content Version: 11.3 © 6086-3721 [employer]OR[employer]. Care instructions adapted under license by [employer]Remember (which disclaims liability or warranty for this information). If you have questions about a medical condition or this instruction, always ask your healthcare professional. [place] [address] any warranty or liability for your use of this information. Constipation: Care Instructions Your Care Instructions Constipation means that you have a hard time passing stools (bowel movements). People pass stools from 3 times a day to once every 3 days. What is normal for you may be different. Constipation may occur with pain in the rectum and cramping. The pain may get worse when you try to pass stools. Sometimes there are small amounts of bright red blood on toilet paper or the surface of stools. This is because of enlarged veins near the rectum (hemorrhoids). A few changes in your diet and lifestyle may help you avoid ongoing constipation. Your doctor may also prescribe medicine to help loosen your stool. Some medicines can cause constipation.  These include pain medicines and antidepressants. Tell your doctor about all the medicines you take. Your doctor may want to make a medicine change to ease your symptoms. Follow-up care is a key part of your treatment and safety. Be sure to make and go to all appointments, and call your doctor if you are having problems. It's also a good idea to know your test results and keep a list of the medicines you take. How can you care for yourself at home? · Drink plenty of fluids, enough so that your urine is light yellow or clear like water. If you have kidney, heart, or liver disease and have to limit fluids, talk with your doctor before you increase the amount of fluids you drink. · Include high-fiber foods in your diet each day. These include fruits, vegetables, beans, and whole grains. · Get at least 30 minutes of exercise on most days of the week. Walking is a good choice. You also may want to do other activities, such as running, swimming, cycling, or playing tennis or team sports. · Take a fiber supplement, such as Citrucel or Metamucil, every day. Read and follow all instructions on the label. · Schedule time each day for a bowel movement. A daily routine may help. Take your time having your bowel movement. · Support your feet with a small step stool when you sit on the toilet. This helps flex your hips and places your pelvis in a squatting position. · Your doctor may recommend an over-the-counter laxative to relieve your constipation. Examples are Milk of Magnesia and MiraLax. Read and follow all instructions on the label. Do not use laxatives on a long-term basis. When should you call for help? Call your doctor now or seek immediate medical care if: 
· You have new or worse belly pain. · You have new or worse nausea or vomiting. · You have blood in your stools. Watch closely for changes in your health, and be sure to contact your doctor if: 
· Your constipation is getting worse. · You do not get better as expected. Where can you learn more? Go to http://ciara-abbe.info/. Enter 21  in the search box to learn more about \"Constipation: Care Instructions. \" Current as of: March 20, 2017 Content Version: 11.3 © 0874-2850 [employer]. Care instructions adapted under license by [employer] (which disclaims liability or warranty for this information). If you have questions about a medical condition or this instruction, always ask your healthcare professional. [address] any warranty or liability for your use of this information. Introducing [facility]! Wyatt Gonsteven introduces Favery patient portal. Now you can access parts of your medical record, email your doctor's office, and request medication refills online. 1. In your internet browser, go to [email or web address] 2. Click on the First Time User? Click Here link in the Sign In box. You will see the New Member Sign Up page. 3. Enter your Favery Access Code exactly as it appears below. You will not need to use this code after youve completed the sign-up process. If you do not sign up before the expiration date, you must request a new code. · Favery Access Code: [email or web address] Expires: 10/10/2017 10:18 AM 
 
4. Enter the last four digits of your Social Security Number (xxxx) and Date of Birth (mm/dd/yyyy) as indicated and click Submit. You will be taken to the next sign-up page. 5. Create a Favery ID. This will be your Favery login ID and cannot be changed, so think of one that is secure and easy to remember. 6. Create a Favery password. You can change your password at any time. 7. Enter your Password Reset Question and Answer. This can be used at a later time if you forget your password. 8. Enter your e-mail address. You will receive e-mail notification when new information is available in [address] 9. Click Sign Up. You can now view and download portions of your medical record. 10. Click the Download Summary menu link to download a portable copy of your medical information. If you have questions, please visit the Frequently Asked Questions section of the [facility] website. Remember, [facility] is NOT to be used for urgent needs. For medical emergencies, dial 911. Now available from your iPhone and Android! Please provide this summary of care documentation to your next provider. Your primary care clinician is listed as Minna Dacosta. If you have any questions after today's visit, please call [phone].

## 2017-10-17 ENCOUNTER — OFFICE VISIT (OUTPATIENT)
Dept: INTERNAL MEDICINE CLINIC | Age: 57
End: 2017-10-17

## 2017-10-17 VITALS
BODY MASS INDEX: 29.12 KG/M2 | DIASTOLIC BLOOD PRESSURE: 81 MMHG | SYSTOLIC BLOOD PRESSURE: 130 MMHG | HEIGHT: 77 IN | HEART RATE: 64 BPM | RESPIRATION RATE: 20 BRPM | WEIGHT: 246.6 LBS | OXYGEN SATURATION: 98 % | TEMPERATURE: 98.2 F

## 2017-10-17 DIAGNOSIS — F41.9 ANXIETY: ICD-10-CM

## 2017-10-17 DIAGNOSIS — K59.09 OTHER CONSTIPATION: Primary | ICD-10-CM

## 2017-10-17 DIAGNOSIS — I10 HYPERTENSION, UNSPECIFIED TYPE: ICD-10-CM

## 2017-10-17 DIAGNOSIS — Z11.59 NEED FOR HEPATITIS C SCREENING TEST: ICD-10-CM

## 2017-10-17 RX ORDER — LISINOPRIL 40 MG/1
40 TABLET ORAL DAILY
Qty: 30 TAB | Refills: 1 | Status: SHIPPED | OUTPATIENT
Start: 2017-10-17 | End: 2017-12-21 | Stop reason: SDUPTHER

## 2017-10-17 RX ORDER — AMLODIPINE BESYLATE 10 MG/1
10 TABLET ORAL DAILY
Qty: 30 TAB | Refills: 1 | Status: SHIPPED | OUTPATIENT
Start: 2017-10-17 | End: 2017-12-21 | Stop reason: SDUPTHER

## 2017-10-17 RX ORDER — LACTULOSE 10 G/15ML
10 SOLUTION ORAL; RECTAL
Qty: 480 ML | Refills: 0 | Status: SHIPPED | OUTPATIENT
Start: 2017-10-17 | End: 2018-08-03

## 2017-10-17 RX ORDER — FACIAL-BODY WIPES
10 EACH TOPICAL DAILY
Qty: 30 SUPPOSITORY | Refills: 0 | Status: SHIPPED | OUTPATIENT
Start: 2017-10-17 | End: 2018-08-03

## 2017-10-17 RX ORDER — PROPRANOLOL HYDROCHLORIDE 10 MG/1
10 TABLET ORAL 3 TIMES DAILY
Qty: 90 TAB | Refills: 1 | Status: SHIPPED | OUTPATIENT
Start: 2017-10-17 | End: 2017-12-21 | Stop reason: SDUPTHER

## 2017-10-17 NOTE — MR AVS SNAPSHOT
Visit Information Date & Time Provider Department Dept. Phone Encounter #  
 10/17/2017  2:45 PM Samy Eid [facility] Internal Medicine [phone] Follow-up Instructions Return if symptoms worsen or fail to improve. Your Appointments 12/13/2017 10:50 AM  
ROUTINE CARE with MD Domingo Aquino Diabetes and Endocrinology [facility]) Appt Note: f/u diabetes cp0.00  
 [address] [place] [address] [facility] [id number]  
[address] [facility] [address]  
  
    
 12/13/2017  2:00 PM  
ROUTINE CARE with George Rodriguez MD  
[address] [place] [address] Internal Medicine [facility]) Appt Note: 3 month f/u; 3 month f/u  
 [address] [place] [address] [facility] [address]  
[address] [address] [place] [address] Upcoming Health Maintenance Date Due Hepatitis C Screening 1960 FOOT EXAM Q1 1/19/1972 EYE EXAM RETINAL OR DILATED Q1 1/19/1972 Pneumococcal 19-64 Medium Risk (1 of 1 - PPSV23) 1/19/1981 DTaP/Tdap/Td series (1 - Tdap) 1/19/1983 HEMOGLOBIN A1C Q6M 3/27/2018 LIPID PANEL Q1 7/12/2018 FOBT Q 1 YEAR AGE 50-75 7/12/2018 MICROALBUMIN Q1 8/29/2018 Allergies as of 10/17/2017  Review Complete On: 10/17/2017 By: George Rodriguez MD  
  
 Severity Noted Reaction Type Reactions Senna  10/03/2017    Other (comments)  
 cramps Sulfa (Sulfonamide Antibiotics)  11/28/2011    Unknown (comments) Makes him warner Pt states he is not allerggic to this medication CSE 8/24/17 Current Immunizations  Reviewed on 11/28/2011 No immunizations on file. Not reviewed this visit You Were Diagnosed With   
  
 Codes Comments Other constipation    -  Primary ICD-10-CM: K59.09 
ICD-9-CM: 564.09 Hypertension, unspecified type     ICD-10-CM: I10 
ICD-9-CM: 401.9 Anxiety     ICD-10-CM: F41.9 ICD-9-CM: 300.00 Need for hepatitis C screening test     ICD-10-CM: Z11.59 
ICD-9-CM: V73.89 Vitals BP Pulse Temp Resp Height(growth percentile) Weight(growth percentile) 130/81 (BP 1 Location: Right arm, BP Patient Position: Sitting) 64 98.2 °F (36.8 °C) (Oral) 20 6' 5\" (1.956 m) 246 lb 9.6 oz (111.9 kg) SpO2 BMI Smoking Status 98% 29.24 kg/m2 Current Every Day Smoker Vitals History BMI and BSA Data Body Mass Index Body Surface Area  
 29.24 kg/m 2 2.47 m 2 Preferred Pharmacy Pharmacy Name Phone Jose German [address] [place] [address] [address] [facility] [phone] Your Updated Medication List  
  
   
This list is accurate as of: 10/17/17  3:27 PM.  Always use your most recent med list. amLODIPine 10 mg tablet Commonly known as:  Tonia Fraction Take 1 Tab by mouth daily. bisacodyl 10 mg suppository Commonly known as:  DULCOLAX Insert 10 mg into rectum daily. canagliflozin 300 mg tablet Commonly known as:  Veola Garbe Take 1 Tab by mouth Daily (before breakfast). DOCUSATE SODIUM PO Take  by mouth. fenofibrate 160 mg tablet Commonly known as:  LOFIBRA Take 1 Tab by mouth daily. gabapentin 300 mg capsule Commonly known as:  NEURONTIN  
  
 insulin glargine 100 unit/mL (3 mL) Inpn Commonly known asMilburn Chi Inject 28 units daily  
  
 lactulose 10 gram/15 mL solution Commonly known as:  Sharron Foster Take 15 mL by mouth every eight (8) hours as needed. For severe constipation. Hold for loose stool  Indications: constipation * lisinopril 40 mg tablet Commonly known as:  Velasquez [place] Take 1 Tab by mouth daily. * lisinopril 10 mg tablet Commonly known as:  Velasquez [place] Take 20 mg by mouth daily. Reports 10 or 20mg. daily * metFORMIN 500 mg tablet Commonly known as:  GLUCOPHAGE  
  
 * metFORMIN 1,000 mg tablet Commonly known as:  GLUCOPHAGE  
 Take 1,000 mg by mouth two (2) times daily (with meals). METHADONE PO Take 75 mg by mouth daily. mirtazapine 15 mg tablet Commonly known as:  REMERON  
  
 polyethylene glycol 17 gram/dose powder Commonly known as:  Nico Chele Take 17 g by mouth daily. propranolol 10 mg tablet Commonly known as:  INDERAL Take 1 Tab by mouth three (3) times daily for 30 days. For severe anxiety and elevated blood pressure * XANAX 1 mg tablet Generic drug:  ALPRAZolam  
Take 1 mg by mouth two (2) times a day. * ALPRAZolam 0.5 mg tablet Commonly known as:  Pixie Oz * XANAX 2 mg tablet Generic drug:  ALPRAZolam  
Take 2 mg by mouth two (2) times a day. * Notice: This list has 7 medication(s) that are the same as other medications prescribed for you. Read the directions carefully, and ask your doctor or other care provider to review them with you. Prescriptions Sent to Pharmacy Refills  
 lisinopril (PRINIVIL, ZESTRIL) 40 mg tablet 1 Sig: Take 1 Tab by mouth daily. Class: Normal  
 Pharmacy: [address] Ph #: [phone] Route: Oral  
 amLODIPine (NORVASC) 10 mg tablet 1 Sig: Take 1 Tab by mouth daily. Class: Normal  
 Pharmacy: [address] Ph #: [phone] Route: Oral  
 lactulose (CHRONULAC) 10 gram/15 mL solution 0 Sig: Take 15 mL by mouth every eight (8) hours as needed. For severe constipation. Hold for loose stool  Indications: constipation Class: Normal  
 Pharmacy: [place] [address] [address] Ph #: [phone] Route: Oral  
 propranolol (INDERAL) 10 mg tablet 1 Sig: Take 1 Tab by mouth three (3) times daily for 30 days. For severe anxiety and elevated blood pressure Class: Normal  
 Pharmacy: [place] [address] [address] Ph #: [phone]  Route: Oral  
 bisacodyl (DULCOLAX) 10 mg suppository 0 Sig: Insert 10 mg into rectum daily. Class: Normal  
 Pharmacy: [address] [facility] Ph #: [phone] Route: Rectal  
  
We Performed the Following HEPATITIS C AB [57452 CPT(R)] Follow-up Instructions Return if symptoms worsen or fail to improve. Patient Instructions Constipation: Care Instructions Your Care Instructions Constipation means that you have a hard time passing stools (bowel movements). People pass stools from 3 times a day to once every 3 days. What is normal for you may be different. Constipation may occur with pain in the rectum and cramping. The pain may get worse when you try to pass stools. Sometimes there are small amounts of bright red blood on toilet paper or the surface of stools. This is because of enlarged veins near the rectum (hemorrhoids). A few changes in your diet and lifestyle may help you avoid ongoing constipation. Your doctor may also prescribe medicine to help loosen your stool. Some medicines can cause constipation. These include pain medicines and antidepressants. Tell your doctor about all the medicines you take. Your doctor may want to make a medicine change to ease your symptoms. Follow-up care is a key part of your treatment and safety. Be sure to make and go to all appointments, and call your doctor if you are having problems. It's also a good idea to know your test results and keep a list of the medicines you take. How can you care for yourself at home? · Drink plenty of fluids, enough so that your urine is light yellow or clear like water. If you have kidney, heart, or liver disease and have to limit fluids, talk with your doctor before you increase the amount of fluids you drink. · Include high-fiber foods in your diet each day. These include fruits, vegetables, beans, and whole grains. · Get at least 30 minutes of exercise on most days of the week. Walking is a good choice. You also may want to do other activities, such as running, swimming, cycling, or playing tennis or team sports. · Take a fiber supplement, such as Citrucel or Metamucil, every day. Read and follow all instructions on the label. · Schedule time each day for a bowel movement. A daily routine may help. Take your time having your bowel movement. · Support your feet with a small step stool when you sit on the toilet. This helps flex your hips and places your pelvis in a squatting position. · Your doctor may recommend an over-the-counter laxative to relieve your constipation. Examples are Milk of Magnesia and MiraLax. Read and follow all instructions on the label. Do not use laxatives on a long-term basis. When should you call for help? Call your doctor now or seek immediate medical care if: 
· You have new or worse belly pain. · You have new or worse nausea or vomiting. · You have blood in your stools. Watch closely for changes in your health, and be sure to contact your doctor if: 
· Your constipation is getting worse. · You do not get better as expected. Where can you learn more? Go to http://ciara-abbe.info/. Enter [phone] in the search box to learn more about \"Constipation: Care Instructions. \" Current as of: March 20, 2017 Content Version: 11.3 © 1394-0273 [employer]IC. Care instructions adapted under license by [employer] (which disclaims liability or warranty for this information). If you have questions about a medical condition or this instruction, always ask your healthcare professional. [place] [address] any warranty or liability for your use of this information. Introducing [facility]!    
 [employer] introduces [employer] patient portal. Now you can access parts of your medical record, email your doctor's office, and request medication refills online. 1. In your internet browser, go to [email or web address] 2. Click on the First Time User? Click Here link in the Sign In box. You will see the New Member Sign Up page. 3. Enter your Truckily Access Code exactly as it appears below. You will not need to use this code after youve completed the sign-up process. If you do not sign up before the expiration date, you must request a new code. · Truckily Access Code: [email or web address] Expires: 1/15/2018  1:16 PM 
 
4. Enter the last four digits of your Social Security Number (xxxx) and Date of Birth (mm/dd/yyyy) as indicated and click Submit. You will be taken to the next sign-up page. 5. Create a Truckily ID. This will be your Truckily login ID and cannot be changed, so think of one that is secure and easy to remember. 6. Create a Truckily password. You can change your password at any time. 7. Enter your Password Reset Question and Answer. This can be used at a later time if you forget your password. 8. Enter your e-mail address. You will receive e-mail notification when new information is available in [address] 9. Click Sign Up. You can now view and download portions of your medical record. 10. Click the Download Summary menu link to download a portable copy of your medical information. If you have questions, please visit the Frequently Asked Questions section of the Truckily website. Remember, Truckily is NOT to be used for urgent needs. For medical emergencies, dial 911. Now available from your iPhone and Android! Please provide this summary of care documentation to your next provider. Your primary care clinician is listed as Amy Rapp. If you have any questions after today's visit, please call [phone].

## 2017-10-17 NOTE — PATIENT INSTRUCTIONS
Constipation: Care Instructions  Your Care Instructions  Constipation means that you have a hard time passing stools (bowel movements). People pass stools from 3 times a day to once every 3 days. What is normal for you may be different. Constipation may occur with pain in the rectum and cramping. The pain may get worse when you try to pass stools. Sometimes there are small amounts of bright red blood on toilet paper or the surface of stools. This is because of enlarged veins near the rectum (hemorrhoids). A few changes in your diet and lifestyle may help you avoid ongoing constipation. Your doctor may also prescribe medicine to help loosen your stool. Some medicines can cause constipation. These include pain medicines and antidepressants. Tell your doctor about all the medicines you take. Your doctor may want to make a medicine change to ease your symptoms. Follow-up care is a key part of your treatment and safety. Be sure to make and go to all appointments, and call your doctor if you are having problems. It's also a good idea to know your test results and keep a list of the medicines you take. How can you care for yourself at home? · Drink plenty of fluids, enough so that your urine is light yellow or clear like water. If you have kidney, heart, or liver disease and have to limit fluids, talk with your doctor before you increase the amount of fluids you drink. · Include high-fiber foods in your diet each day. These include fruits, vegetables, beans, and whole grains. · Get at least 30 minutes of exercise on most days of the week. Walking is a good choice. You also may want to do other activities, such as running, swimming, cycling, or playing tennis or team sports. · Take a fiber supplement, such as Citrucel or Metamucil, every day. Read and follow all instructions on the label. · Schedule time each day for a bowel movement. A daily routine may help.  Take your time having your bowel movement. · Support your feet with a small step stool when you sit on the toilet. This helps flex your hips and places your pelvis in a squatting position. · Your doctor may recommend an over-the-counter laxative to relieve your constipation. Examples are Milk of Magnesia and MiraLax. Read and follow all instructions on the label. Do not use laxatives on a long-term basis. When should you call for help? Call your doctor now or seek immediate medical care if:  · You have new or worse belly pain. · You have new or worse nausea or vomiting. · You have blood in your stools. Watch closely for changes in your health, and be sure to contact your doctor if:  · Your constipation is getting worse. · You do not get better as expected. Where can you learn more? Go to http://ciara-abbe.info/. Enter [phone] in the search box to learn more about \"Constipation: Care Instructions. \"  Current as of: March 20, 2017  Content Version: 11.3  © 0123-3288 Healthwise[employer] Incorporated. Care instructions adapted under license by [employer] (which disclaims liability or warranty for this information). If you have questions about a medical condition or this instruction, always ask your healthcare professional. [place] [address] any warranty or liability for your use of this information.

## 2017-10-27 ENCOUNTER — TELEPHONE (OUTPATIENT)
Dept: INTERNAL MEDICINE CLINIC | Age: 57
End: 2017-10-27

## 2017-10-27 NOTE — TELEPHONE ENCOUNTER
Still constipated due to anxiety and requests to use Xanax 1 mg in am and 1 mg in pm to be able to relax his muscles for regular bowel movements, has 7 / 2 mg pills. says has used Dulcolax suppositories 17-20 Oct and feels cannot go until he uses one. Used to see Harshad Hollingsworth as previous pcp. He goes to [facility] and does not have a psychiatrist currently. Says [facility] did a 14 day Valium taper and states has not had any Benzo's since Sept 11.  Please advise

## 2017-10-31 NOTE — PROGRESS NOTES
Follow Up Visit    John Medel is a 56 y.o. male. he presents for GI Problem and Agitation    The patient continues to have issues with constipation. He has seen my partner about 12 days ago and was asked to take lactulose which he feels has done \"something\" but not as much as he would like. He has tried to eat more fiber and drink more water but he notes that this has not entirely improved his symptoms either. He has not tried any other constipation therapy other than miralax. Also, his agitation and blood pressure both have improved on propranolol. He is asking for this medication to be refilled. He continues to have anxiety but not as bad. He has previously weaned from benzodiazepines and will continue to abstain from their use. Patient Active Problem List   Diagnosis Code    Acute pancreatitis K85.90    Anxiety F41.9    Substance induced mood disorder ([facility] [facility] F19.94    Benzodiazepine dependence, continuous ([facility]) F13.20    Polysubstance dependence ([facility] F19.20    Type 2 diabetes mellitus with hyperglycemia, with long-term current use of insulin ([facility] E11.65, Z79.4         Prior to Admission medications    Medication Sig Start Date End Date Taking? Authorizing Provider   lisinopril (PRINIVIL, ZESTRIL) 40 mg tablet Take 1 Tab by mouth daily. 10/17/17  Yes Michelle Martínez MD   amLODIPine (NORVASC) 10 mg tablet Take 1 Tab by mouth daily. 10/17/17  Yes Michelle Martínez MD   lactulose (CHRONULAC) 10 gram/15 mL solution Take 15 mL by mouth every eight (8) hours as needed. For severe constipation. Hold for loose stool  Indications: constipation 10/17/17  Yes Michelle Martínez MD   propranolol (INDERAL) 10 mg tablet Take 1 Tab by mouth three (3) times daily for 30 days. For severe anxiety and elevated blood pressure 10/17/17 11/16/17 Yes Michelle Martínez MD   bisacodyl (DULCOLAX) 10 mg suppository Insert 10 mg into rectum daily.  10/17/17  Yes Michelle Martínez MD   polyethylene glycol (MIRALAX) 17 gram/dose powder Take 17 g by mouth daily. 9/29/17  Yes Lety Mejía MD   insulin glargine [facility]) 100 unit/mL (3 mL) inpn Inject 28 units daily 8/29/17  Yes Umang Coleman MD   DOCUSATE SODIUM PO Take  by mouth. Yes Historical Provider   METHADONE HCL (METHADONE PO) Take 75 mg by mouth daily. Yes Historical Provider   metFORMIN (GLUCOPHAGE) 1,000 mg tablet Take 1,000 mg by mouth two (2) times daily (with meals). Yes Historical Provider   gabapentin (NEURONTIN) 300 mg capsule  8/29/17   Historical Provider   metFORMIN (GLUCOPHAGE) 500 mg tablet  8/18/17   Historical Provider   ALPRAZolam Kathy Kanner) 0.5 mg tablet  7/18/17   Historical Provider   mirtazapine (REMERON) 15 mg tablet  7/17/17   Historical Provider   canagliflozin (INVOKANA) 300 mg tablet Take 1 Tab by mouth Daily (before breakfast). 8/29/17   Umang Coleman MD   fenofibrate (LOFIBRA) 160 mg tablet Take 1 Tab by mouth daily. 8/29/17   Umang Coleman MD ALPRAZolam Kathy Kanner) 1 mg tablet Take 1 mg by mouth two (2) times a day. Historical Provider   ALPRAZolam Kathy Kanner) 2 mg tablet Take 2 mg by mouth two (2) times a day. Historical Provider   lisinopril (PRINIVIL, ZESTRIL) 10 mg tablet Take 20 mg by mouth daily. Reports 10 or 20mg. daily     Historical Provider         Health Maintenance   Topic Date Due    Hepatitis C Screening  1960    FOOT EXAM Q1  01/19/1972    EYE EXAM RETINAL OR DILATED Q1  01/19/1972    Pneumococcal 19-64 Medium Risk (1 of 1 - PPSV23) 01/19/1981    DTaP/Tdap/Td series (1 - Tdap) 01/19/1983    HEMOGLOBIN A1C Q6M  03/27/2018    LIPID PANEL Q1  07/12/2018    FOBT Q 1 YEAR AGE 50-75  07/12/2018    MICROALBUMIN Q1  08/29/2018    INFLUENZA AGE 9 TO ADULT  Addressed       Review of Systems   Constitutional: Negative. Respiratory: Negative. Gastrointestinal: Positive for constipation. Psychiatric/Behavioral: The patient is nervous/anxious.             Visit Vitals    /81 (BP 1 Location: Right arm, BP Patient Position: Sitting)    Pulse 64    Temp 98.2 °F (36.8 °C) (Oral)    Resp 20    Ht 6' 5\" (1.956 m)    Wt 246 lb 9.6 oz (111.9 kg)    SpO2 98%    BMI 29.24 kg/m2       Physical Exam   Constitutional: He is oriented to person, place, and time. He appears well-developed and well-nourished. Cardiovascular: Normal rate and regular rhythm. No murmur heard. Neurological: He is alert and oriented to person, place, and time. ASSESSMENT/PLAN    Diagnoses and all orders for this visit:    1. Other constipation- Advised the continued use of lactulose as well as the addition of a dulcolax suppository. He will monitor his output. If this continues, we will consider GI consultation.   -     lactulose (CHRONULAC) 10 gram/15 mL solution; Take 15 mL by mouth every eight (8) hours as needed. For severe constipation. Hold for loose stool  Indications: constipation  -     bisacodyl (DULCOLAX) 10 mg suppository; Insert 10 mg into rectum daily. 2. Hypertension, unspecified type- Continue meds, propranolol ordered  -     lisinopril (PRINIVIL, ZESTRIL) 40 mg tablet; Take 1 Tab by mouth daily. -     amLODIPine (NORVASC) 10 mg tablet; Take 1 Tab by mouth daily. -     propranolol (INDERAL) 10 mg tablet; Take 1 Tab by mouth three (3) times daily for 30 days. For severe anxiety and elevated blood pressure    3. Anxiety- Present but stabilizing. Continue to follow up with mental health providers. 4. Need for hepatitis C screening test  -     HEPATITIS C AB        Follow-up Disposition:  Return if symptoms worsen or fail to improve.

## 2017-11-23 RX ORDER — GABAPENTIN 300 MG/1
CAPSULE ORAL
OUTPATIENT
Start: 2017-11-23

## 2017-11-24 RX ORDER — GABAPENTIN 300 MG/1
300 CAPSULE ORAL
OUTPATIENT
Start: 2017-11-24

## 2017-11-24 RX ORDER — GABAPENTIN 300 MG/1
CAPSULE ORAL
Qty: 60 CAP | Refills: 0 | Status: SHIPPED | OUTPATIENT
Start: 2017-11-24 | End: 2017-12-21 | Stop reason: SDUPTHER

## 2017-12-04 ENCOUNTER — DOCUMENTATION ONLY (OUTPATIENT)
Dept: INTERNAL MEDICINE CLINIC | Age: 57
End: 2017-12-04

## 2017-12-04 NOTE — TELEPHONE ENCOUNTER
Left message with [facility] for a return call for additional information regarding Gabapentin prescriptions

## 2017-12-04 NOTE — PROGRESS NOTES
Spoke with Janki Holliday at [facility] regarding pt's financial responsibility for any services there , states any Medicaid type of insurance , including [place] allows patients to completely use any of their services with full coverage. Pt has not been seen there in a couple of months and may , should continue care with them if needed.   forwarded

## 2017-12-13 ENCOUNTER — OFFICE VISIT (OUTPATIENT)
Dept: ENDOCRINOLOGY | Age: 57
End: 2017-12-13

## 2017-12-13 VITALS
HEIGHT: 77 IN | RESPIRATION RATE: 16 BRPM | OXYGEN SATURATION: 97 % | SYSTOLIC BLOOD PRESSURE: 169 MMHG | HEART RATE: 64 BPM | DIASTOLIC BLOOD PRESSURE: 91 MMHG

## 2017-12-13 DIAGNOSIS — Z79.4 TYPE 2 DIABETES MELLITUS WITH COMPLICATION, WITH LONG-TERM CURRENT USE OF INSULIN (HCC): Primary | ICD-10-CM

## 2017-12-13 DIAGNOSIS — E11.8 TYPE 2 DIABETES MELLITUS WITH COMPLICATION, WITH LONG-TERM CURRENT USE OF INSULIN (HCC): Primary | ICD-10-CM

## 2017-12-13 RX ORDER — INSULIN GLARGINE 100 [IU]/ML
INJECTION, SOLUTION SUBCUTANEOUS
COMMUNITY
End: 2018-08-03

## 2017-12-13 RX ORDER — PROPRANOLOL HYDROCHLORIDE 10 MG/1
TABLET ORAL
COMMUNITY
Start: 2017-11-22 | End: 2017-12-21 | Stop reason: SDUPTHER

## 2017-12-13 NOTE — MR AVS SNAPSHOT
Visit Information Date & Time Provider Department Dept. Phone Encounter #  
 12/13/2017  9:30 AM [facility] [phone] [id number] Your Appointments 12/18/2017  9:15 AM  
ROUTINE CARE with Braydon Laura MD  
[facility] Appt Note: 3 month f/u; 3 month f/u; .  
 [address] [facility] [address]  
[id number] [address] Upcoming Health Maintenance Date Due Hepatitis C Screening 1960 FOOT EXAM Q1 1/19/1972 EYE EXAM RETINAL OR DILATED Q1 1/19/1972 Pneumococcal 19-64 Medium Risk (1 of 1 - PPSV23) 1/19/1981 DTaP/Tdap/Td series (1 - Tdap) 1/19/1983 HEMOGLOBIN A1C Q6M 3/27/2018 LIPID PANEL Q1 7/12/2018 FOBT Q 1 YEAR AGE 50-75 7/12/2018 MICROALBUMIN Q1 8/29/2018 Allergies as of 12/13/2017  Review Complete On: 12/13/2017 By: Ruben Lopez Severity Noted Reaction Type Reactions Senna  10/03/2017    Other (comments)  
 cramps Sulfa (Sulfonamide Antibiotics)  11/28/2011    Unknown (comments) Makes him warner Pt states he is not allerggic to this medication CSE 8/24/17 Current Immunizations  Reviewed on 11/28/2011 No immunizations on file. Not reviewed this visit You Were Diagnosed With   
  
 Codes Comments Type 2 diabetes mellitus with complication, with long-term current use of insulin (HCC)    -  Primary ICD-10-CM: E11.8, Z79.4 ICD-9-CM: 250.90, V58.67 Vitals BP Pulse Resp Height(growth percentile) SpO2 Smoking Status (!) 169/91 64 16 6' 5\" (1.956 m) 97% Current Every Day Smoker Vitals History Preferred Pharmacy Pharmacy Name Phone Venkata Vargas [phone] [facility] [address] [facility] [phone] Your Updated Medication List  
  
   
This list is accurate as of: 12/13/17 10:00 AM.  Always use your most recent med list.  
 amLODIPine 10 mg tablet Commonly known as:  Tonia Fraction Take 1 Tab by mouth daily. bisacodyl 10 mg suppository Commonly known as:  DULCOLAX Insert 10 mg into rectum daily. canagliflozin 300 mg tablet Commonly known as:  Veola Garbe Take 1 Tab by mouth Daily (before breakfast). DOCUSATE SODIUM PO Take  by mouth. fenofibrate 160 mg tablet Commonly known as:  LOFIBRA Take 1 Tab by mouth daily. gabapentin 300 mg capsule Commonly known as:  NEURONTIN  
1 capsule by mouth every 8 hrs * insulin glargine 100 unit/mL (3 mL) Inpn Commonly known as:  LANTUS,BASAGLAR  
by SubCUTAneous route. * insulin glargine 100 unit/mL (3 mL) Inpn Commonly known asMilburn Chi Inject 28 units daily  
  
 lactulose 10 gram/15 mL solution Commonly known as:  Sharron Foster Take 15 mL by mouth every eight (8) hours as needed. For severe constipation. Hold for loose stool  Indications: constipation * lisinopril 40 mg tablet Commonly known as:  Velasquez Paradise Take 1 Tab by mouth daily. * lisinopril 10 mg tablet Commonly known as:  Velasquez Edison Take 20 mg by mouth daily. Reports 10 or 20mg. daily  
  
 metFORMIN 1,000 mg tablet Commonly known as:  GLUCOPHAGE Take 1,000 mg by mouth two (2) times daily (with meals). METHADONE PO Take 75 mg by mouth daily. mirtazapine 15 mg tablet Commonly known as:  REMERON  
  
 polyethylene glycol 17 gram/dose powder Commonly known as:  Reda Henrry Take 17 g by mouth daily. propranolol 10 mg tablet Commonly known as:  INDERAL * XANAX 1 mg tablet Generic drug:  ALPRAZolam  
Take 1 mg by mouth two (2) times a day. * ALPRAZolam 0.5 mg tablet Commonly known as:  Ashyon Kida * XANAX 2 mg tablet Generic drug:  ALPRAZolam  
Take 2 mg by mouth two (2) times a day. * Notice:   This list has 7 medication(s) that are the same as other medications prescribed for you. Read the directions carefully, and ask your doctor or other care provider to review them with you. We Performed the Following HEMOGLOBIN A1C WITH EAG [35649 CPT(R)] Patient Instructions Diabetes Instructions: 
- start Invokana: take 1/2 tablet (150mg) for one month, then increase to the full tablet 
 - make sure you are drinking plenty of water: aim for 72 ounces per day 
 - let me know if you have urinary tract infections - switch to Basaglar insulin and decrease your dose to 24 units daily 
- continue metformin 1000mg twice a day Check blood sugar at least TWICE a day: every morning when you wake up and at bedtime. Keep a record of your values and bring this or your glucometer with you to your next visit. Diet instructions: 
Reduce the amount of carbohydrates in your diet. This means not just avoiding sweets, sodas, or desserts but also reducing the amount of bread, pasta, rice, potatoes, corn, and crackers that you eat. Do not have more than one serving of carbs per meal (for example: do not eat a sandwich and potato chips in the same meal). Focus on eating mostly protein (meat, poultry, fish, shellfish, eggs), healthy fats (avocados, nuts, cheese, olive or coconut oil) and non-starchy vegetables (greens, carrots, tomatoes, bell peppers, broccoli, brussels sprouts, green beans, etc). If you fill yourself up with these foods, you won't even want the carbs. Try Splenda or Stevia in your coffee instead of sugar Introducing [facility]! Sydnee Huerta introduces [employer] patient portal. Now you can access parts of your medical record, email your doctor's office, and request medication refills online. 1. In your internet browser, go to [email or web address] 2. Click on the First Time User? Click Here link in the Sign In box. You will see the New Member Sign Up page. 3. Enter your [employer] Access Code exactly as it appears below. You will not need to use this code after youve completed the sign-up process. If you do not sign up before the expiration date, you must request a new code. · [employer] Access Code: [email or web address] Expires: 1/15/2018 12:16 PM 
 
4. Enter the last four digits of your Social Security Number (xxxx) and Date of Birth (mm/dd/yyyy) as indicated and click Submit. You will be taken to the next sign-up page. 5. Create a [employer] ID. This will be your [employer] login ID and cannot be changed, so think of one that is secure and easy to remember. 6. Create a [employer] password. You can change your password at any time. 7. Enter your Password Reset Question and Answer. This can be used at a later time if you forget your password. 8. Enter your e-mail address. You will receive e-mail notification when new information is available in [address]. 9. Click Sign Up. You can now view and download portions of your medical record. 10. Click the Download Summary menu link to download a portable copy of your medical information. If you have questions, please visit the Frequently Asked Questions section of the [employer] website. Remember, [employer] is NOT to be used for urgent needs. For medical emergencies, dial 911. Now available from your iPhone and Android! Please provide this summary of care documentation to your next provider. Your primary care clinician is listed as Rodriguez Govea. If you have any questions after today's visit, please call [phone].

## 2017-12-13 NOTE — PROGRESS NOTES
Due to a malfunction with our scale, I am unable to check pt's weight today.   Lab Results   Component Value Date/Time    Hemoglobin A1c 7.4 09/27/2017 10:34 AM    Hemoglobin A1c 8.4 07/12/2017 12:00 AM

## 2017-12-13 NOTE — PROGRESS NOTES
Endocrinology Visit    Chief Complaint: Type 2 diabetes    History of Present Illness: Nicole Rivers is a 56 y.o. male who returns for type 2 diabetes mellitus. He was diagnosed with diabetes about 10 years ago and developed pancreatitis about a year after being diagnosed with diabetes- reportedly hyperTG induced. His A1c 8.4% in July, improved to 7.4% in Sept. I saw him in initial consultation in August at which time I added Invokana to his regimen of basal insulin and metformin. In the interim, he has been very stressed due to being rapidly tapered off his Xanax. Reports he has been debilitated for the past few months. He did not start the Invokana due to the stress, also says he was hesitant to make too many medidcation changes at once (also started on inderal and gabapentin to ease withdrawal sx). Weight is stable. Current glycemic medication regimen is metformin 1000mg BID and Lantus 28 units Qam.   Home blood glucose monitoring frequency: has not checked in the past month. They were well-controlled two months ago, most values in the low 100s (below 135) but has had some in the 200s. The patient has had hypoglycemia - one blood sugar of 56. Reports a h/o recurrent UTIs but has not had one in quite some time. Says urine is darker now but denies dysuria. He has no known complications from diabetes. Was told he does not have retinopathy at his last eye exam. He was prescribed pravastatin but has not started taking it yet. I also prescribed fenofibrate but he did not start taking this either. As above, he has been stressed lately due to tapering of his benzos, having more panic attacks and doesn't feel like himself. Also takes methadone for chronic back pain. Reports BP is up today due to stress and pain despite taking his meds. Weight is down from his max of ~300 lbs. He would like to lose more weight though.  Diet at home is described as \"not always healthy\" and he does try to restrict dietary carbohydrate intake, avoids sweets but admits he does put sugar in his coffee (3-4 tsp). Eats 3-4 meals/day and does snack on fruit (apples with peanut butter) or veggies. Drinks 3-4 liters of water per day. Review of Systems as above, otherwise a 7 pt review is negative    Problem List:  Patient Active Problem List   Diagnosis Code    Acute pancreatitis K85.90    Anxiety F41.9    Substance induced mood disorder ([facility] F19.94    Benzodiazepine dependence, continuous (HCC) F13.20    Polysubstance dependence ([facility] F19.20    Type 2 diabetes mellitus with hyperglycemia, with long-term current use of insulin ([facility] E11.65, Z79.4       Past Medical History:    Past Medical History:   Diagnosis Date    Alcohol abuse, in remission     Constipation     Diabetes ([facility]     Type II    Hepatitis C     Hypertension     Pancreatitis     Spondylitis ([facility]        Past Surgical History:  Past Surgical History:   Procedure Laterality Date    HX APPENDECTOMY      HX ORTHOPAEDIC      2 back surgeries    HX OTHER SURGICAL      knee  surgery       Social History:  Social History     Social History    Marital status: UNKNOWN     Spouse name: N/A    Number of children: N/A    Years of education: N/A     Occupational History    Not on file.      Social History Main Topics    Smoking status: Current Every Day Smoker     Packs/day: 1.00    Smokeless tobacco: Never Used    Alcohol use No    Drug use: No      Comment: Methadone    Sexual activity: No     Other Topics Concern    Not on file     Social History Narrative       Family History:  Family History   Problem Relation Age of Onset    Hypertension Mother     Parkinson's Disease Father        Medications:     Current Outpatient Prescriptions:     propranolol (INDERAL) 10 mg tablet, , Disp: , Rfl:     gabapentin (NEURONTIN) 300 mg capsule, 1 capsule by mouth every 8 hrs, Disp: 60 Cap, Rfl: 0    lisinopril (PRINIVIL, ZESTRIL) 40 mg tablet, Take 1 Tab by mouth daily., Disp: 30 Tab, Rfl: 1    amLODIPine (NORVASC) 10 mg tablet, Take 1 Tab by mouth daily. , Disp: 30 Tab, Rfl: 1    lactulose (CHRONULAC) 10 gram/15 mL solution, Take 15 mL by mouth every eight (8) hours as needed. For severe constipation. Hold for loose stool  Indications: constipation, Disp: 480 mL, Rfl: 0    bisacodyl (DULCOLAX) 10 mg suppository, Insert 10 mg into rectum daily. , Disp: 30 Suppository, Rfl: 0    polyethylene glycol (MIRALAX) 17 gram/dose powder, Take 17 g by mouth daily. , Disp: 850 g, Rfl: 1    metFORMIN (GLUCOPHAGE) 500 mg tablet, , Disp: , Rfl:     ALPRAZolam (XANAX) 0.5 mg tablet, , Disp: , Rfl:     mirtazapine (REMERON) 15 mg tablet, , Disp: , Rfl:     canagliflozin (INVOKANA) 300 mg tablet, Take 1 Tab by mouth Daily (before breakfast). , Disp: 30 Tab, Rfl: 5    fenofibrate (LOFIBRA) 160 mg tablet, Take 1 Tab by mouth daily. , Disp: 30 Tab, Rfl: 5    insulin glargine (BASAGLAR KWIKPEN) 100 unit/mL (3 mL) inpn, Inject 28 units daily, Disp: 10 Pen, Rfl: 5    DOCUSATE SODIUM PO, Take  by mouth., Disp: , Rfl:     ALPRAZolam (XANAX) 1 mg tablet, Take 1 mg by mouth two (2) times a day., Disp: , Rfl:     METHADONE HCL (METHADONE PO), Take 75 mg by mouth daily. , Disp: , Rfl:     ALPRAZolam (XANAX) 2 mg tablet, Take 2 mg by mouth two (2) times a day., Disp: , Rfl:     metFORMIN (GLUCOPHAGE) 1,000 mg tablet, Take 1,000 mg by mouth two (2) times daily (with meals). , Disp: , Rfl:     lisinopril (PRINIVIL, ZESTRIL) 10 mg tablet, Take 20 mg by mouth daily. Reports 10 or 20mg. daily , Disp: , Rfl:     Allergies: Allergies   Allergen Reactions    Senna Other (comments)     cramps    Sulfa (Sulfonamide Antibiotics) Unknown (comments)     Makes him warner     Pt states he is not allerggic to this medication CSE 8/24/17       Physical Examination:  Resp. rate 16, height 6' 5\" (1.956 m).     Gen: no acute distress  HEENT: mucous membranes moist  Thyroid: no enlargement or nodules noted  CAD: normal rate, regular rhythm. No murmur rubs or gallops  PULM: clear to ausculation, no wheezes, rhonchis or rales. GI: soft non tender, non distended. EXT: no clubbing, cyanosis or edema; PT pulses 2+ BL, multiple calluses and slowly healing wounds on hands/arms  Neuro: grossly non focal, coarse tremor of outstretched hands  Psych: pleasant, good insight into medical hx  Skin: warm, dry    Clinical Data Review:    Lab Results   Component Value Date/Time    Hemoglobin A1c 7.4 09/27/2017 10:34 AM    Hemoglobin A1c 8.4 07/12/2017 12:00 AM     Lab Results   Component Value Date/Time    Sodium 132 09/27/2017 10:34 AM    Potassium 4.0 09/27/2017 10:34 AM    Chloride 99 09/27/2017 10:34 AM    CO2 27 09/27/2017 10:34 AM    Anion gap 6 09/27/2017 10:34 AM    Glucose 141 09/27/2017 10:34 AM    BUN 11 09/27/2017 10:34 AM    Creatinine 1.09 09/27/2017 10:34 AM    BUN/Creatinine ratio 10 09/27/2017 10:34 AM    GFR est AA >60 09/27/2017 10:34 AM    GFR est non-AA >60 09/27/2017 10:34 AM    Calcium 9.2 09/27/2017 10:34 AM     Lab Results   Component Value Date/Time    Microalb/Creat ratio (ug/mg creat.) 1012.9 08/29/2017 04:18 PM     Lab Results   Component Value Date/Time    Cholesterol, total 234 07/12/2017 12:00 AM    HDL Cholesterol 40 07/12/2017 12:00 AM    LDL, calculated 139 07/12/2017 12:00 AM    VLDL, calculated 55 07/12/2017 12:00 AM    Triglyceride 277 07/12/2017 12:00 AM    CHOL/HDL Ratio 4.5 12/01/2011 03:55 AM       Assessment and Plan:  Patient is a 56 y.o. male here for uncontrolled type 2 diabetes on metformin and basal insulin. He needs a third agent to control prandial glucose rises. Medication options are limited due to h/o chronic pancreatitis, however I believe an SGLT2i would be a good choice since this does not stimulate the pancreas and may help with weight loss. Discussed risks/benefits at length and pt is willing to try this. Advised ample hydration.  Check baseline UA/Ucx today since he notes a change to his urine. Due to fasting hypoglycemia, will reduce basal insulin dose slightly when he starts Invokana. Glycemic Medication Changes:  - start Invokana 150mg daily, after 1 month increase to full 300mg dose  - continue metformin 1000mg BID  - reduce Lantus 24 units daily (switch to [address] when his supply runs out)    DM Health Maintenance: pertinent items updated in HM tab  A1c: update today  Cv/Lipids: due to hyperTG, advised he start fenofibrate at least; may add statin at next visit  BP/Renal: BP not at goal, on ACEi, microalbumin UTD and elevated (Invokana should help reduce BP and microalb)  Vaccines: per PCP  Podiatry: no active issues, encouraged well-fitting footwear and daily inspection  Neuro: no sx of neuropathy  Ophtho: yearly eye exam recommended - needs to be updated  Diet and exercise: discussed healthy eating and exercise recommendations, particularly reduction in dietary carbohydrates    I spent 40 minutes with the patient today and > 50% of the time was spent counseling the patient about diabetes management including medication options and dietary modification. Patient verbalized an understanding and will return to clinic in 3 months. Thank you for the opportunity to participate in this patient's care.     Lizette Bourne MD  [facility]  [address] [place] [address]

## 2017-12-13 NOTE — PATIENT INSTRUCTIONS
Diabetes Instructions:  - start Invokana: take 1/2 tablet (150mg) for one month, then increase to the full tablet   - make sure you are drinking plenty of water: aim for 72 ounces per day   - let me know if you have urinary tract infections  - switch to Basaglar insulin and decrease your dose to 24 units daily  - continue metformin 1000mg twice a day    Check blood sugar at least TWICE a day: every morning when you wake up and at bedtime. Keep a record of your values and bring this or your glucometer with you to your next visit. Diet instructions:  Reduce the amount of carbohydrates in your diet. This means not just avoiding sweets, sodas, or desserts but also reducing the amount of bread, pasta, rice, potatoes, corn, and crackers that you eat. Do not have more than one serving of carbs per meal (for example: do not eat a sandwich and potato chips in the same meal). Focus on eating mostly protein (meat, poultry, fish, shellfish, eggs), healthy fats (avocados, nuts, cheese, olive or coconut oil) and non-starchy vegetables (greens, carrots, tomatoes, bell peppers, broccoli, brussels sprouts, green beans, etc). If you fill yourself up with these foods, you won't even want the carbs.     Try Splenda or Stevia in your coffee instead of sugar

## 2017-12-21 ENCOUNTER — OFFICE VISIT (OUTPATIENT)
Dept: INTERNAL MEDICINE CLINIC | Age: 57
End: 2017-12-21

## 2017-12-21 ENCOUNTER — TELEPHONE (OUTPATIENT)
Dept: INTERNAL MEDICINE CLINIC | Age: 57
End: 2017-12-21

## 2017-12-21 VITALS
WEIGHT: 258.2 LBS | HEIGHT: 77 IN | RESPIRATION RATE: 20 BRPM | OXYGEN SATURATION: 98 % | HEART RATE: 73 BPM | SYSTOLIC BLOOD PRESSURE: 170 MMHG | TEMPERATURE: 98.6 F | DIASTOLIC BLOOD PRESSURE: 90 MMHG | BODY MASS INDEX: 30.49 KG/M2

## 2017-12-21 DIAGNOSIS — F19.20 POLYSUBSTANCE DEPENDENCE (HCC): ICD-10-CM

## 2017-12-21 DIAGNOSIS — F41.9 ANXIETY: Primary | ICD-10-CM

## 2017-12-21 DIAGNOSIS — I10 HYPERTENSION, UNSPECIFIED TYPE: ICD-10-CM

## 2017-12-21 DIAGNOSIS — F19.94 SUBSTANCE INDUCED MOOD DISORDER (HCC): ICD-10-CM

## 2017-12-21 DIAGNOSIS — G62.9 NEUROPATHY: ICD-10-CM

## 2017-12-21 RX ORDER — AMLODIPINE BESYLATE 10 MG/1
10 TABLET ORAL DAILY
Qty: 30 TAB | Refills: 1 | Status: SHIPPED | OUTPATIENT
Start: 2017-12-21 | End: 2017-12-27 | Stop reason: SDUPTHER

## 2017-12-21 RX ORDER — ALPRAZOLAM 1 MG/1
1 TABLET ORAL DAILY
Qty: 20 TAB | Refills: 0 | Status: SHIPPED | OUTPATIENT
Start: 2017-12-21 | End: 2018-02-21 | Stop reason: SDUPTHER

## 2017-12-21 RX ORDER — GABAPENTIN 300 MG/1
300 CAPSULE ORAL 2 TIMES DAILY
Qty: 60 CAP | Refills: 0 | Status: SHIPPED | OUTPATIENT
Start: 2017-12-21 | End: 2018-01-22 | Stop reason: SDUPTHER

## 2017-12-21 RX ORDER — PROPRANOLOL HYDROCHLORIDE 10 MG/1
10 TABLET ORAL 3 TIMES DAILY
Qty: 90 TAB | Refills: 1 | Status: SHIPPED | OUTPATIENT
Start: 2017-12-21 | End: 2017-12-27 | Stop reason: SDUPTHER

## 2017-12-21 RX ORDER — METFORMIN HYDROCHLORIDE 1000 MG/1
1000 TABLET ORAL 2 TIMES DAILY WITH MEALS
Qty: 60 TAB | Refills: 3 | Status: SHIPPED | OUTPATIENT
Start: 2017-12-21 | End: 2018-05-09 | Stop reason: SDUPTHER

## 2017-12-21 RX ORDER — LISINOPRIL 40 MG/1
40 TABLET ORAL DAILY
Qty: 30 TAB | Refills: 1 | Status: SHIPPED | OUTPATIENT
Start: 2017-12-21 | End: 2018-05-25 | Stop reason: SDUPTHER

## 2017-12-21 NOTE — MR AVS SNAPSHOT
Visit Information Date & Time Provider Department Dept. Phone Encounter #  
 12/21/2017  9:00 AM José Luis Pena MD [address] [address] Internal Medicine [phone] [id number] Your Appointments 3/15/2018 11:30 AM  
ROUTINE CARE with MD Domingo Degroot Diabetes and Endocrinology [facility]) Appt Note: f/u diabetes cp0.00  
 [address] [place] [address]  
[address] [id number] [place] [id number] Upcoming Health Maintenance Date Due Hepatitis C Screening 1960 FOOT EXAM Q1 1/19/1972 EYE EXAM RETINAL OR DILATED Q1 1/19/1972 Pneumococcal 19-64 Medium Risk (1 of 1 - PPSV23) 1/19/1981 DTaP/Tdap/Td series (1 - Tdap) 1/19/1983 HEMOGLOBIN A1C Q6M 3/27/2018 LIPID PANEL Q1 7/12/2018 FOBT Q 1 YEAR AGE 50-75 7/12/2018 MICROALBUMIN Q1 8/29/2018 Allergies as of 12/21/2017  Review Complete On: 12/21/2017 By: José Luis Pena MD  
  
 Severity Noted Reaction Type Reactions Senna  10/03/2017    Other (comments)  
 cramps Sulfa (Sulfonamide Antibiotics)  11/28/2011    Unknown (comments) Makes him warner Pt states he is not allerggic to this medication CSE 8/24/17 Current Immunizations  Reviewed on 11/28/2011 No immunizations on file. Not reviewed this visit You Were Diagnosed With   
  
 Codes Comments Substance induced mood disorder ([facility] [address]    -  Primary ICD-10-CM: F19.94 ICD-9-CM: 292.84 Hypertension, unspecified type     ICD-10-CM: I10 
ICD-9-CM: 401.9 Polysubstance dependence ([facility] [address]     ICD-10-CM: Q67.62 ICD-9-CM: 304.80 Neuropathy     ICD-10-CM: G62.9 ICD-9-CM: 355.9 Anxiety     ICD-10-CM: F41.9 ICD-9-CM: 300.00 Vitals BP Pulse Temp Resp Height(growth percentile) Weight(growth percentile) 170/90 (BP 1 Location: Right arm, BP Patient Position: Sitting) 73 98.6 °F (37 °C) (Oral) 20 6' 5\" (1.956 m) 258 lb 3.2 oz (117.1 kg) SpO2 BMI Smoking Status 98% 30.62 kg/m2 Current Every Day Smoker Vitals History BMI and BSA Data Body Mass Index Body Surface Area  
 30.62 kg/m 2 2.52 m 2 Preferred Pharmacy Pharmacy Name Phone Rashida Lyman [address] [facility] [address] [facility] [phone] Your Updated Medication List  
  
   
This list is accurate as of: 12/21/17  9:44 AM.  Always use your most recent med list.  
  
  
  
  
 ALPRAZolam 1 mg tablet Commonly known as:  Papo Elizabeth Take 1 Tab by mouth daily. Max Daily Amount: 1 mg. amLODIPine 10 mg tablet Commonly known as:  Yadav Fandavid Take 1 Tab by mouth daily. bisacodyl 10 mg suppository Commonly known as:  DULCOLAX Insert 10 mg into rectum daily. canagliflozin 300 mg tablet Commonly known as:  Andrew Ro Take 1 Tab by mouth Daily (before breakfast). DOCUSATE SODIUM PO Take  by mouth. fenofibrate 160 mg tablet Commonly known as:  LOFIBRA Take 1 Tab by mouth daily. gabapentin 300 mg capsule Commonly known as:  NEURONTIN Take 1 Cap by mouth two (2) times a day. 1 capsule by mouth every 8 hrs * insulin glargine 100 unit/mL (3 mL) Inpn Commonly known as:  LANTUS,BASAGLAR  
by SubCUTAneous route. * insulin glargine 100 unit/mL (3 mL) Inpn Commonly known asJule Manges Inject 28 units daily  
  
 lactulose 10 gram/15 mL solution Commonly known as:  Ron Barrio Take 15 mL by mouth every eight (8) hours as needed. For severe constipation. Hold for loose stool  Indications: constipation  
  
 lisinopril 40 mg tablet Commonly known as:  Alice Pendleton Take 1 Tab by mouth daily. metFORMIN 1,000 mg tablet Commonly known as:  GLUCOPHAGE Take 1,000 mg by mouth two (2) times daily (with meals). METHADONE PO Take 75 mg by mouth daily. mirtazapine 15 mg tablet Commonly known as:  REMERON  
  
 polyethylene glycol 17 gram/dose powder Commonly known as:  Matthias Boer Take 17 g by mouth daily. propranolol 10 mg tablet Commonly known as:  INDERAL Take 1 Tab by mouth three (3) times daily. * Notice: This list has 2 medication(s) that are the same as other medications prescribed for you. Read the directions carefully, and ask your doctor or other care provider to review them with you. Prescriptions Printed Refills ALPRAZolam (XANAX) 1 mg tablet 0 Sig: Take 1 Tab by mouth daily. Max Daily Amount: 1 mg. Class: Print Route: Oral  
  
Prescriptions Sent to Pharmacy Refills  
 gabapentin (NEURONTIN) 300 mg capsule 0 Sig: Take 1 Cap by mouth two (2) times a day. 1 capsule by mouth every 8 hrs Class: Normal  
 Pharmacy: [address] [place] [address] Dr. Kendra Coopervd Ph #: [phone] Route: Oral  
 lisinopril (PRINIVIL, ZESTRIL) 40 mg tablet 1 Sig: Take 1 Tab by mouth daily. Class: Normal  
 Pharmacy: [address] [place] [address] W Dr. Kendra Coopervd Ph #: [phone] Route: Oral  
 amLODIPine (NORVASC) 10 mg tablet 1 Sig: Take 1 Tab by mouth daily. Class: Normal  
 Pharmacy: [address] [place] [address] W Dr. Kendra Coopervd Ph #: [phone] Route: Oral  
 propranolol (INDERAL) 10 mg tablet 1 Sig: Take 1 Tab by mouth three (3) times daily. Class: Normal  
 Pharmacy: [address] [place] [address] W Dr. Kendra Verdugo Ph #: [phone] Route: Oral  
  
Patient Instructions You need to be seen by a psychiatrist for these issues. I cannot provide any further Xanax. Introducing [facility]! Little Jauregui introduces [employer] patient portal. Now you can access parts of your medical record, email your doctor's office, and request medication refills online. 1. In your internet browser, go to [email or web address] 2. Click on the First Time User? Click Here link in the Sign In box.  You will see the New Member Sign Up page. 3. Enter your ANTERIOS Access Code exactly as it appears below. You will not need to use this code after youve completed the sign-up process. If you do not sign up before the expiration date, you must request a new code. · ANTERIOS Access Code: [email or web address] Expires: 1/15/2018 12:16 PM 
 
4. Enter the last four digits of your Social Security Number (xxxx) and Date of Birth (mm/dd/yyyy) as indicated and click Submit. You will be taken to the next sign-up page. 5. Create a Goodmail Systemst ID. This will be your ANTERIOS login ID and cannot be changed, so think of one that is secure and easy to remember. 6. Create a ANTERIOS password. You can change your password at any time. 7. Enter your Password Reset Question and Answer. This can be used at a later time if you forget your password. 8. Enter your e-mail address. You will receive e-mail notification when new information is available in [address]. 9. Click Sign Up. You can now view and download portions of your medical record. 10. Click the Download Summary menu link to download a portable copy of your medical information. If you have questions, please visit the Frequently Asked Questions section of the ANTERIOS website. Remember, ANTERIOS is NOT to be used for urgent needs. For medical emergencies, dial 911. Now available from your iPhone and Android! Please provide this summary of care documentation to your next provider. Your primary care clinician is listed as Rodriguez Govea. If you have any questions after today's visit, please call [phone].

## 2017-12-21 NOTE — PROGRESS NOTES
Follow Up Visit    Jose Garcia is a 56 y.o. male. he presents for Personal Problem    Patient comes to discuss his continued desire to not wean from benzodiazepine. We have had this discussion several times previously. The patient has also been following up with the [facility] for addiction medicine ([facility]) and has seen a psychiatrist at that facility. He has not been seen in several months. He had previously been prescribed a benzodiazepine taper. However, he felt as if he needed more medication. Since then, he has been using his cousins supply of Xanax sporadically. He reports taking 1 pill on 2 separate occasions when he felt as if he was unable to function. It has been brought to the patient's attention on several occasions previously that he is currently on methadone for opiate abuse which is an issue in regards to continued his benzodiazepine use. He is also on gabapentin which has been prescribed by psychiatry as a bridge to removing benzodiazepine from his regimen. He has again not followed up with psychiatry in order to have this adjusted. We had a long discussion regarding his use of these medications and what would be most appropriate medically. Patient Active Problem List   Diagnosis Code    Acute pancreatitis K85.90    Anxiety F41.9    Substance induced mood disorder ([facility] [address] F19.94    Benzodiazepine dependence, continuous ([facility]) F13.20    Polysubstance dependence ([facility] [address] F19.20    Type 2 diabetes mellitus with hyperglycemia, with long-term current use of insulin ([facility] [address] E11.65, Z79.4         Prior to Admission medications    Medication Sig Start Date End Date Taking? Authorizing Provider   gabapentin (NEURONTIN) 300 mg capsule Take 1 Cap by mouth two (2) times a day. 1 capsule by mouth every 8 hrs 12/21/17  Yes Frida Colon MD   lisinopril (PRINIVIL, ZESTRIL) 40 mg tablet Take 1 Tab by mouth daily.  12/21/17  Yes Frida Colon MD   amLODIPine (NORVASC) 10 mg tablet Take 1 Tab by mouth daily. 12/21/17  Yes Mirtha Gayle MD   ALPRAZolam Carey Abo) 1 mg tablet Take 1 Tab by mouth daily. Max Daily Amount: 1 mg. 12/21/17  Yes Mirtha Gayle MD   propranolol (INDERAL) 10 mg tablet Take 1 Tab by mouth three (3) times daily. 12/21/17  Yes Mirtha Gayle MD   insulin glargine (LANTUS,BASAGLAR) 100 unit/mL (3 mL) inpn by SubCUTAneous route. Yes Historical Provider   lactulose (CHRONULAC) 10 gram/15 mL solution Take 15 mL by mouth every eight (8) hours as needed. For severe constipation. Hold for loose stool  Indications: constipation 10/17/17  Yes Mirtha Gayle MD   bisacodyl (DULCOLAX) 10 mg suppository Insert 10 mg into rectum daily. 10/17/17  Yes Mirtha Gayle MD   polyethylene glycol (MIRALAX) 17 gram/dose powder Take 17 g by mouth daily. 9/29/17  Yes Mirtha Gayel MD   canagliflozin [facility]) 300 mg tablet Take 1 Tab by mouth Daily (before breakfast). 8/29/17  Yes José Miguel Leiva MD   fenofibrate (LOFIBRA) 160 mg tablet Take 1 Tab by mouth daily. 8/29/17  Yes José Miguel Leiva MD   insulin glargine [facility]) 100 unit/mL (3 mL) inpn Inject 28 units daily 8/29/17  Yes José Miguel Leiva MD   DOCUSATE SODIUM PO Take  by mouth. Yes Historical Provider   METHADONE HCL (METHADONE PO) Take 75 mg by mouth daily. Yes Historical Provider   metFORMIN (GLUCOPHAGE) 1,000 mg tablet Take 1 Tab by mouth two (2) times daily (with meals).  12/21/17   José Miguel Leiva MD   mirtazapine (REMERON) 15 mg tablet  7/17/17   Historical Provider         Health Maintenance   Topic Date Due    Hepatitis C Screening  1960    FOOT EXAM Q1  01/19/1972    EYE EXAM RETINAL OR DILATED Q1  01/19/1972    Pneumococcal 19-64 Medium Risk (1 of 1 - PPSV23) 01/19/1981    DTaP/Tdap/Td series (1 - Tdap) 01/19/1983    HEMOGLOBIN A1C Q6M  03/27/2018    LIPID PANEL Q1  07/12/2018    FOBT Q 1 YEAR AGE 50-75  07/12/2018    MICROALBUMIN Q1  08/29/2018    Influenza Age 5 to Adult  Addressed       Review of Systems   Constitutional: Negative. Respiratory: Negative. Cardiovascular: Negative. Genitourinary: Negative. Psychiatric/Behavioral: The patient is nervous/anxious. Visit Vitals    /90 (BP 1 Location: Right arm, BP Patient Position: Sitting)    Pulse 73    Temp 98.6 °F (37 °C) (Oral)    Resp 20    Ht 6' 5\" (1.956 m)    Wt 258 lb 3.2 oz (117.1 kg)    SpO2 98%    BMI 30.62 kg/m2       Physical Exam   Constitutional: No distress. Cardiovascular: Normal rate and regular rhythm. Pulmonary/Chest: Effort normal and breath sounds normal.   Psychiatric: He is agitated. ASSESSMENT/PLAN    Diagnoses and all orders for this visit:    1. Substance induced mood disorder ([facility] [address]    2. Hypertension, unspecified type  -     lisinopril (PRINIVIL, ZESTRIL) 40 mg tablet; Take 1 Tab by mouth daily. -     amLODIPine (NORVASC) 10 mg tablet; Take 1 Tab by mouth daily. 3. Polysubstance dependence ([facility] [address]    4. Neuropathy  -     gabapentin (NEURONTIN) 300 mg capsule; Take 1 Cap by mouth two (2) times a day. 1 capsule by mouth every 8 hrs    5. Anxiety  -     ALPRAZolam (XANAX) 1 mg tablet; Take 1 Tab by mouth daily. Max Daily Amount: 1 mg.  -     propranolol (INDERAL) 10 mg tablet; Take 1 Tab by mouth three (3) times daily. Long discussion with patient regarding his is a that has been used. I have discussed with him that I will need to have a discussion with his previous psychiatry provider regarding these medications. I provided him 20 capsules of alprazolam today. I informed the patient that I will be providing no alprazolam beyond this prescription. He endorsed understanding. In the interim, the patient will attempt to find psychiatric provider. He has a list provided by his insurance and he will call each of those providers for an appointment.

## 2017-12-22 NOTE — TELEPHONE ENCOUNTER
Yuki Ward with [facility] was given clarification on Gabapentin 300 mg 1 BID per drs instructions

## 2017-12-29 ENCOUNTER — DOCUMENTATION ONLY (OUTPATIENT)
Dept: INTERNAL MEDICINE CLINIC | Age: 57
End: 2017-12-29

## 2017-12-29 NOTE — PROGRESS NOTES
Left message with [facility] [phone] for Dr Eunice Matt , Psychiatrist to give a call back regarding patient

## 2018-02-21 ENCOUNTER — OFFICE VISIT (OUTPATIENT)
Dept: INTERNAL MEDICINE CLINIC | Age: 58
End: 2018-02-21

## 2018-02-21 VITALS
TEMPERATURE: 98.3 F | HEIGHT: 77 IN | HEART RATE: 76 BPM | DIASTOLIC BLOOD PRESSURE: 80 MMHG | RESPIRATION RATE: 19 BRPM | WEIGHT: 263.6 LBS | SYSTOLIC BLOOD PRESSURE: 160 MMHG | BODY MASS INDEX: 31.13 KG/M2 | OXYGEN SATURATION: 98 %

## 2018-02-21 DIAGNOSIS — F41.9 ANXIETY: ICD-10-CM

## 2018-02-21 PROBLEM — E11.21 TYPE 2 DIABETES WITH NEPHROPATHY (HCC): Status: ACTIVE | Noted: 2018-02-21

## 2018-02-21 RX ORDER — ALPRAZOLAM 1 MG/1
1 TABLET ORAL DAILY
Qty: 6 TAB | Refills: 0 | Status: SHIPPED | OUTPATIENT
Start: 2018-02-21 | End: 2018-08-03

## 2018-02-21 NOTE — PROGRESS NOTES
Follow Up Visit    Nisa Robb is a 66 y.o. male. he presents for Shoulder Pain and Medication Refill        The patient made an appointment for shoulder pain. However, he admitted to me that he only made this appointment because he thought he could get in to discuss his need for benzodiazepine medication. He tells me that he has attempted to go to the [facility] for addiction medicine however has been told that he needs to be weaned off of benzodiazepine rather rapidly. The patient does not agree with this and as such left that facility. He has been using his cousin's medication in order to stave off withdrawal.  He is asking me to order more of the medication for him and questions why I am unable to do so on a long-term basis. I informed the patient that I did not provide chronic benzodiazepine to patients and especially those who are currently receiving treatment with methadone. Patient Active Problem List   Diagnosis Code    Acute pancreatitis K85.90    Anxiety F41.9    Substance induced mood disorder ([facility] [address] F19.94    Benzodiazepine dependence, continuous ([facility]) F13.20    [facility] F19.20    Type 2 diabetes mellitus with hyperglycemia, with long-term current use of insulin ([facility]) E11.65, Z79.4    Type 2 diabetes with nephropathy ([facility] E11.21         Prior to Admission medications    Medication Sig Start Date End Date Taking? Authorizing Provider   gabapentin (NEURONTIN) 300 mg capsule TAKE ONE CAPSULE BY MOUTH TWICE A DAY 1/22/18  Yes Hodan Gomez MD   propranolol (INDERAL) 10 mg tablet TAKE ONE TABLET BY MOUTH THREE TIMES A DAY FOR SEVERE ANXIETY AND ELEVATED BLOOD PRESSURE 12/27/17  Yes Hodan Gomez MD   amLODIPine (NORVASC) 10 mg tablet TAKE ONE TABLET BY MOUTH DAILY 12/27/17  Yes Hodan Gomez MD   lisinopril (PRINIVIL, ZESTRIL) 40 mg tablet Take 1 Tab by mouth daily.  12/21/17  Yes Hodan Gomez MD   ALPRAZolam Jan Bryant) 1 mg tablet Take 1 Tab by mouth daily. Max Daily Amount: 1 mg. 12/21/17  Yes Emry Sicard, MD   metFORMIN (GLUCOPHAGE) 1,000 mg tablet Take 1 Tab by mouth two (2) times daily (with meals). 12/21/17  Yes Barbara Leigh MD   lactulose (CHRONULAC) 10 gram/15 mL solution Take 15 mL by mouth every eight (8) hours as needed. For severe constipation. Hold for loose stool  Indications: constipation 10/17/17  Yes Emry Sicard, MD   bisacodyl (DULCOLAX) 10 mg suppository Insert 10 mg into rectum daily. 10/17/17  Yes Emry Sicard, MD   insulin glargine [facility]) 100 unit/mL (3 mL) inpn Inject 28 units daily 8/29/17  Yes Barbara Leigh MD   DOCUSATE SODIUM PO Take  by mouth. Yes Historical Provider   METHADONE HCL (METHADONE PO) Take 75 mg by mouth daily. Yes Historical Provider   insulin glargine (LANTUS,BASAGLAR) 100 unit/mL (3 mL) inpn by SubCUTAneous route. Historical Provider   polyethylene glycol (MIRALAX) 17 gram/dose powder Take 17 g by mouth daily. 9/29/17   Emry Sicard, MD   mirtazapine (REMERON) 15 mg tablet  7/17/17   Historical Provider   canagliflozin (INVOKANA) 300 mg tablet Take 1 Tab by mouth Daily (before breakfast). 8/29/17   Barbara Leigh MD   fenofibrate (LOFIBRA) 160 mg tablet Take 1 Tab by mouth daily. 8/29/17   Barbara Leigh MD         Health Maintenance   Topic Date Due    Hepatitis C Screening  1960    FOOT EXAM Q1  01/19/1972    EYE EXAM RETINAL OR DILATED Q1  01/19/1972    Pneumococcal 19-64 Medium Risk (1 of 1 - PPSV23) 01/19/1981    DTaP/Tdap/Td series (1 - Tdap) 01/19/1983    HEMOGLOBIN A1C Q6M  03/27/2018    LIPID PANEL Q1  07/12/2018    FOBT Q 1 YEAR AGE 50-75  07/12/2018    MICROALBUMIN Q1  08/29/2018    Influenza Age 9 to Adult  Addressed       Review of Systems   Constitutional: Negative. Respiratory: Negative. Cardiovascular: Negative.             Visit Vitals    /80 (BP 1 Location: Right arm, BP Patient Position: Sitting)    Pulse 76    Temp 98.3 °F (36.8 °C) (Oral)    Resp 19    Ht 6' 5\" (1.956 m)    Wt 263 lb 9.6 oz (119.6 kg)    SpO2 98%    BMI 31.26 kg/m2       Physical Exam   Constitutional: No distress. Cardiovascular: Normal rate and regular rhythm. Pulmonary/Chest: Effort normal and breath sounds normal.   Psychiatric: His mood appears anxious. He is agitated. ASSESSMENT/PLAN    Diagnoses and all orders for this visit:    1. Anxiety - The patient tells me that he has an appointment with psychiatry in the coming 6 days, I will provide enough Xanax for him to take 1 tablet daily until that appointment. -     ALPRAZolam (XANAX) 1 mg tablet; Take 1 Tab by mouth daily. Max Daily Amount: 1 mg. Follow-up Disposition:  Return if symptoms worsen or fail to improve.

## 2018-03-08 ENCOUNTER — TELEPHONE (OUTPATIENT)
Dept: INTERNAL MEDICINE CLINIC | Age: 58
End: 2018-03-08

## 2018-03-08 DIAGNOSIS — Z11.59 ENCOUNTER FOR HEPATITIS C SCREENING TEST FOR LOW RISK PATIENT: ICD-10-CM

## 2018-03-08 DIAGNOSIS — Z79.4 TYPE 2 DIABETES MELLITUS WITH HYPERGLYCEMIA, WITH LONG-TERM CURRENT USE OF INSULIN (HCC): Primary | ICD-10-CM

## 2018-03-08 DIAGNOSIS — E11.65 TYPE 2 DIABETES MELLITUS WITH HYPERGLYCEMIA, WITH LONG-TERM CURRENT USE OF INSULIN (HCC): Primary | ICD-10-CM

## 2018-03-08 DIAGNOSIS — I10 HYPERTENSION, ESSENTIAL: ICD-10-CM

## 2018-03-08 NOTE — TELEPHONE ENCOUNTER
Patient informed per drs recommendations, A1c orders printed from endocrine.  And lipid and hep c screening added

## 2018-03-15 ENCOUNTER — OFFICE VISIT (OUTPATIENT)
Dept: ENDOCRINOLOGY | Age: 58
End: 2018-03-15

## 2018-03-15 VITALS
OXYGEN SATURATION: 98 % | BODY MASS INDEX: 31.53 KG/M2 | HEIGHT: 77 IN | SYSTOLIC BLOOD PRESSURE: 162 MMHG | HEART RATE: 66 BPM | RESPIRATION RATE: 16 BRPM | WEIGHT: 267 LBS | DIASTOLIC BLOOD PRESSURE: 98 MMHG

## 2018-03-15 DIAGNOSIS — I10 ESSENTIAL HYPERTENSION: ICD-10-CM

## 2018-03-15 DIAGNOSIS — Z79.4 TYPE 2 DIABETES MELLITUS WITH HYPERGLYCEMIA, WITH LONG-TERM CURRENT USE OF INSULIN (HCC): Primary | ICD-10-CM

## 2018-03-15 DIAGNOSIS — E11.65 TYPE 2 DIABETES MELLITUS WITH HYPERGLYCEMIA, WITH LONG-TERM CURRENT USE OF INSULIN (HCC): Primary | ICD-10-CM

## 2018-03-15 DIAGNOSIS — F41.9 ANXIETY: ICD-10-CM

## 2018-03-15 LAB
APPEARANCE UR: CLEAR
BACTERIA #/AREA URNS HPF: ABNORMAL /[HPF]
BILIRUB UR QL STRIP: NEGATIVE
CASTS URNS MICRO: ABNORMAL
CASTS URNS QL MICRO: PRESENT /LPF
COLOR UR: YELLOW
EPI CELLS #/AREA URNS HPF: ABNORMAL /HPF
EST. AVERAGE GLUCOSE BLD GHB EST-MCNC: 194 MG/DL
GLUCOSE POC: 210 MG/DL
GLUCOSE UR QL: ABNORMAL
HBA1C MFR BLD: 8.4 % (ref 4.8–5.6)
HGB UR QL STRIP: NEGATIVE
KETONES UR QL STRIP: NEGATIVE
LEUKOCYTE ESTERASE UR QL STRIP: NEGATIVE
MICRO URNS: ABNORMAL
MUCOUS THREADS URNS QL MICRO: PRESENT
NITRITE UR QL STRIP: NEGATIVE
PH UR STRIP: 5.5 [PH] (ref 5–7.5)
PROT UR QL STRIP: ABNORMAL
RBC #/AREA URNS HPF: ABNORMAL /HPF
SP GR UR: 1.02 (ref 1–1.03)
UROBILINOGEN UR STRIP-MCNC: 0.2 MG/DL (ref 0.2–1)
WBC #/AREA URNS HPF: ABNORMAL /HPF

## 2018-03-15 RX ORDER — METFORMIN HYDROCHLORIDE 500 MG/1
TABLET ORAL
COMMUNITY
Start: 2018-02-02 | End: 2018-05-09 | Stop reason: SDUPTHER

## 2018-03-15 NOTE — Clinical Note
Please follow-up with gastroenterology. Adhere to a liquid diet for next 48 hrs. Return for any new or worsening.  Carmencita Fontenot[place]Healthhanny [place] 
 Romero Alvarado 
 
 
 [address] [facility] [address] [address] 
[phone] Patient: Rebecca Mantilla MRN: [id number] :1967 Visit Information Date & Time Provider Department Dept. Phone Encounter #  
 3/15/2018  3:15 PM [facility] [phone] [id number] Follow-up Instructions Return in about 2 weeks (around 3/29/2018). Your Appointments 3/29/2018  2:00 PM  
[facility] with [facility] Appt Note: 2 WK RESULTS REVIEW  
 [address] [place] [address] [address] [address]  
[phone]  
  
   
 640 [facility] [address] [address] Upcoming Health Maintenance Date Due  
 EYE EXAM RETINAL OR DILATED Q1 1979 DTaP/Tdap/Td series (1 - Tdap) 1990 PAP AKA CERVICAL CYTOLOGY 1990 HEMOGLOBIN A1C Q6M 2017 Influenza Age 5 to Adult 2017 FOOT EXAM Q1 2018 MICROALBUMIN Q1 2018 LIPID PANEL Q1 2018 Allergies as of 3/15/2018  Review Complete On: 3/15/2018 By: Luz Barnett MD  
  
 Severity Noted Reaction Type Reactions Latex High 2010    Hives, Itching Other Food  07/15/2016    Rash Almonds Amoxicillin High 2013    Swelling Other reaction(s): mild rash/itching Aspirin High 2013    Not Reported This Time, Swelling Mouth swells Fentanyl High 2013   Systemic Anaphylaxis, Swelling Patches cause mouth to swell Swelling in mouth from fentanyl patch Levaquin [Levofloxacin] High 2013    Not Reported This Time, Swelling Other reaction(s): mild rash/itching Chlorhexidine Medium 2014   Topical Rash Norco [Hydrocodone-acetaminophen] Medium 2015    Nausea and Vomiting Other reaction(s): gi distress Acetaminophen  2016    Other (comments) Deale  2016    Rash, Itching Codeine  11/26/2013    Other (comments) Glycopyrrolate  07/04/2014    Swelling Pt  States  faciAL  SWELLING   POST  ROBINUL  IV Pt  States  faciAL  SWELLING   POST  ROBINUL  IV Morphine  05/31/2016    Nausea and Vomiting Pt states she is not allergic Pcn [Penicillins]  11/26/2013    Swelling Percocet [Oxycodone-acetaminophen]  01/30/2015    Nausea and Vomiting Other reaction(s): gi distress 
nausea Other reaction(s): anaphylaxis/angioedema Per pt. She is allergic Tape [Adhesive]  05/31/2016    Rash Tramadol  12/05/2013    Swelling Current Immunizations  Reviewed on 4/24/2017 Name Date Influenza Vaccine 11/7/2016, 2/24/2016, 3/23/2015, 12/1/2014, 10/9/2013, 10/20/2012 Pneumococcal Polysaccharide (PPSV-23) 3/22/2015, 10/22/2012 Pneumococcal Vaccine (Unspecified Type) 3/4/2016, 1/2/2013 Not reviewed this visit You Were Diagnosed With   
  
 Codes Comments Malignant neoplasm of upper-outer quadrant of right breast in female, estrogen receptor negative ([facility] [address]    -  Primary ICD-10-CM: C50.411, Z17.1 ICD-9-CM: 174.4, V86.1 Musculoskeletal pain     ICD-10-CM: M79.1 ICD-9-CM: 729.1 Chronic leukopenia     ICD-10-CM: D72.819 ICD-9-CM: 288.50 Vitals LMP OB Status Smoking Status 08/20/2013 Hysterectomy Never Smoker Preferred Pharmacy Pharmacy Name Phone Pramod Tate [phone] Your Updated Medication List  
  
   
This list is accurate as of 3/15/18  3:31 PM.  Always use your most recent med list.  
  
  
  
  
 ALBUTEROL IN Take  by inhalation. EPINEPHrine 0.3 mg/0.3 mL injection Commonly known as:  EPIPEN  
0.3 mL by IntraMUSCular route once as needed for up to 1 dose. ibuprofen 800 mg tablet Commonly known as:  MOTRIN Take 1 Tab by mouth every eight (8) hours as needed for Pain. Take every 8 hours with food insulin nph-regular human rec 100 unit/mL (70-30) Inpn Commonly known as:  HUMULIN 70-30 Give 25 units in the QAM and 30 units at bedtime  
  
 lisinopril-hydroCHLOROthiazide 20-12.5 mg per tablet Commonly known as:  Tingtanya Lam Take 1 Tab by mouth daily. LORazepam 1 mg tablet Commonly known as:  ATIVAN  
1 mg.  
  
 ondansetron 4 mg disintegrating tablet Commonly known as:  ZOFRAN ODT Take 1 Tab by mouth every eight (8) hours as needed for Nausea. polyethylene glycol 17 gram packet Commonly known as:  Odella Cadet Take 1 Packet by mouth daily. traZODone 300 mg tablet Commonly known as:  Stevphen Hacker Take 1 Tab by mouth nightly. Prescriptions Sent to Pharmacy Refills  
 ibuprofen (MOTRIN) 800 mg tablet 3 Sig: Take 1 Tab by mouth every eight (8) hours as needed for Pain. Take every 8 hours with food Class: Normal  
 [facility] [address] [place] [address] #: [phone] Route: Oral  
  
We Performed the Following COMPLETE CBC & AUTO DIFF WBC [01379 CPT(R)] Follow-up Instructions Return in about 2 weeks (around 3/29/2018). To-Do List   
 03/15/2018 Lab:  CBC WITH 3 PART DIFF   
  
 04/02/2018 9:00 AM  
  Appointment with [address] at [facility] ([phone]) 05/07/2018 9:00 AM  
  Appointment with [address] [place] [address] Road [address] at [facility] ([phone]) Introducing [employer]! Norberto Carballo introduces [employer] patient portal. Now you can access parts of your medical record, email your doctor's office, and request medication refills online. 1. In your internet browser, go to [email or web address]. [employer] 2. Click on the First Time User? Click Here link in the Sign In box. You will see the New Member Sign Up page. 3. Enter your [employer] Access Code exactly as it appears below.  You will not need to use this code after youve completed the sign-up process. If you do not sign up before the expiration date, you must request a new code. · [employer] Access Code: [email or web address] Expires: 6/5/2018 10:29 AM 
 
4. Enter the last four digits of your Social Security Number (xxxx) and Date of Birth (mm/dd/yyyy) as indicated and click Submit. You will be taken to the next sign-up page. 5. Create a [employer] ID. This will be your [employer] login ID and cannot be changed, so think of one that is secure and easy to remember. 6. Create a [employer] password. You can change your password at any time. 7. Enter your Password Reset Question and Answer. This can be used at a later time if you forget your password. 8. Enter your e-mail address. You will receive e-mail notification when new information is available in [address] 9. Click Sign Up. You can now view and download portions of your medical record. 10. Click the Download Summary menu link to download a portable copy of your medical information. If you have questions, please visit the Frequently Asked Questions section of the [employer] website. Remember, [employer] is NOT to be used for urgent needs. For medical emergencies, dial 911. Now available from your iPhone and Android! Please provide this summary of care documentation to your next provider. Your primary care clinician is listed as Diana Choi. If you have any questions after today's visit, please call [phone].

## 2018-03-15 NOTE — PROGRESS NOTES
Endocrinology Visit    Chief Complaint: Type 2 diabetes    History of Present Illness: Patricio Lambert is a 66 y.o. male who returns for type 2 diabetes mellitus. He was diagnosed with diabetes about 10 years ago and developed pancreatitis about a year after being diagnosed with diabetes- reportedly hyperTG induced. His A1c was 8.4% in July, improved to 7.4% in Sept, but most recently is back up to 8.4% again. I saw him in initial consultation in August at which time I added Invokana to his regimen of basal insulin and metformin. In the interim, he reports taking the medication for a few days but had nausea and constipation so stopped taking it. He thinks the GI symptoms could have been from something else though. He stopped taking the medication but did not notify our office. Current glycemic medication regimen is metformin 1000mg BID and Basaglar 28 units Qam.   Home blood glucose monitoring frequency: has not checked much in the past month. Reports most values in the low 100s (below 135) but has had some in the 200s. BG in clinic today is 210 after eating a bowl of cereal for breakfast. Lowest BG was 56 about a month ago - says he felt very bad and this improved when he ate something. Reports a h/o recurrent UTIs but has not had one in quite some time. Says urine is darker now but denies dysuria. He has no known complications from diabetes but has had numbness/tingling in his feet since starting gabapentin (prescribed for benzo w/d). Was told he does not have retinopathy at his last eye exam. He was prescribed pravastatin but has not started taking it yet. I also prescribed fenofibrate but he did not start taking this either. As above, he has been stressed lately due to tapering of his benzos, having more panic attacks and doesn't feel like himself. Also takes methadone for chronic back pain. Reports BP is up today due to stress and pain despite taking his meds.  Says he just had a panic attack in the waiting room.    Weight is down from his max of ~300 lbs. He would like to lose more weight though. Diet at home is described as \"not always healthy\" and he does try to restrict dietary carbohydrate intake, avoids sweets but admits he does put sugar in his coffee (3-4 tsp). Eats 3-4 meals/day and does snack on fruit (apples with peanut butter) or veggies. Drinks 3-4 liters of water per day. Review of Systems as above, otherwise a 7 pt review is negative    Problem List:  Patient Active Problem List   Diagnosis Code    Acute pancreatitis K85.90    Anxiety F41.9    Substance induced mood disorder ([facility] [address] F19.94    Benzodiazepine dependence, continuous ([facility]) F13.20    Polysubstance dependence ([facility] [address] F19.20    Type 2 diabetes mellitus with hyperglycemia, with long-term current use of insulin ([facility]) E11.65, Z79.4    Type 2 diabetes with nephropathy ([facility] [address] E11.21       Past Medical History:    Past Medical History:   Diagnosis Date    Alcohol abuse, in remission     Constipation     Diabetes ([facility] [address]     Type II    Hepatitis C     Hypertension     Pancreatitis     Spondylitis ([facility] [address]        Past Surgical History:  Past Surgical History:   Procedure Laterality Date    HX APPENDECTOMY      HX ORTHOPAEDIC      2 back surgeries    HX OTHER SURGICAL      knee  surgery       Social History:  Social History     Social History    Marital status: UNKNOWN     Spouse name: N/A    Number of children: N/A    Years of education: N/A     Occupational History    Not on file.      Social History Main Topics    Smoking status: Current Every Day Smoker     Packs/day: 1.00    Smokeless tobacco: Never Used    Alcohol use No    Drug use: No      Comment: Methadone    Sexual activity: No     Other Topics Concern    Not on file     Social History Narrative       Family History:  Family History   Problem Relation Age of Onset    Hypertension Mother     Parkinson's Disease Father        Medications:     Current Outpatient Prescriptions:     metFORMIN (GLUCOPHAGE) 500 mg tablet, , Disp: , Rfl:     ALPRAZolam (XANAX) 1 mg tablet, Take 1 Tab by mouth daily. Max Daily Amount: 1 mg., Disp: 6 Tab, Rfl: 0    gabapentin (NEURONTIN) 300 mg capsule, TAKE ONE CAPSULE BY MOUTH TWICE A DAY, Disp: 60 Cap, Rfl: 3    propranolol (INDERAL) 10 mg tablet, TAKE ONE TABLET BY MOUTH THREE TIMES A DAY FOR SEVERE ANXIETY AND ELEVATED BLOOD PRESSURE, Disp: 90 Tab, Rfl: 1    amLODIPine (NORVASC) 10 mg tablet, TAKE ONE TABLET BY MOUTH DAILY, Disp: 90 Tab, Rfl: 1    lisinopril (PRINIVIL, ZESTRIL) 40 mg tablet, Take 1 Tab by mouth daily. , Disp: 30 Tab, Rfl: 1    metFORMIN (GLUCOPHAGE) 1,000 mg tablet, Take 1 Tab by mouth two (2) times daily (with meals). , Disp: 60 Tab, Rfl: 3    insulin glargine (LANTUS,BASAGLAR) 100 unit/mL (3 mL) inpn, by SubCUTAneous route., Disp: , Rfl:     lactulose (CHRONULAC) 10 gram/15 mL solution, Take 15 mL by mouth every eight (8) hours as needed. For severe constipation. Hold for loose stool  Indications: constipation, Disp: 480 mL, Rfl: 0    bisacodyl (DULCOLAX) 10 mg suppository, Insert 10 mg into rectum daily. , Disp: 30 Suppository, Rfl: 0    polyethylene glycol (MIRALAX) 17 gram/dose powder, Take 17 g by mouth daily. , Disp: 850 g, Rfl: 1    mirtazapine (REMERON) 15 mg tablet, , Disp: , Rfl:     canagliflozin (INVOKANA) 300 mg tablet, Take 1 Tab by mouth Daily (before breakfast). , Disp: 30 Tab, Rfl: 5    fenofibrate (LOFIBRA) 160 mg tablet, Take 1 Tab by mouth daily. , Disp: 30 Tab, Rfl: 5    insulin glargine (BASAGLAR KWIKPEN) 100 unit/mL (3 mL) inpn, Inject 28 units daily, Disp: 10 Pen, Rfl: 5    DOCUSATE SODIUM PO, Take  by mouth., Disp: , Rfl:     METHADONE HCL (METHADONE PO), Take 75 mg by mouth daily. , Disp: , Rfl:     Allergies:   Allergies   Allergen Reactions    Senna Other (comments)     cramps    Sulfa (Sulfonamide Antibiotics) Unknown (comments)     Makes him warner     Pt states he is not allerggic to this medication CSE 8/24/17       Physical Examination:  Visit Vitals    BP (!) 162/98    Pulse 66    Resp 16    Ht 6' 5\" (1.956 m)    Wt 267 lb (121.1 kg)    SpO2 98%    BMI 31.66 kg/m2      Gen: no acute distress  HEENT: mucous membranes moist  Thyroid: no enlargement or nodules noted  CAD: normal rate, regular rhythm. No murmur rubs or gallops  PULM: clear to ausculation, no wheezes, rhonchis or rales. EXT: no clubbing, cyanosis or edema; PT pulses 2+ BL, multiple calluses and slowly healing wounds on hands/arms  Neuro: grossly non focal, coarse tremor of outstretched hands  Psych: pleasant, good insight into medical hx  Skin: warm, dry    Clinical Data Review:    Lab Results   Component Value Date/Time    Hemoglobin A1c 8.4 (H) 03/14/2018 04:10 PM    Hemoglobin A1c 7.4 (H) 09/27/2017 10:34 AM    Hemoglobin A1c 8.4 (H) 07/12/2017 12:00 AM     Lab Results   Component Value Date/Time    Sodium 132 (L) 09/27/2017 10:34 AM    Potassium 4.0 09/27/2017 10:34 AM    Chloride 99 09/27/2017 10:34 AM    CO2 27 09/27/2017 10:34 AM    Anion gap 6 09/27/2017 10:34 AM    Glucose 141 (H) 09/27/2017 10:34 AM    BUN 11 09/27/2017 10:34 AM    Creatinine 1.09 09/27/2017 10:34 AM    BUN/Creatinine ratio 10 (L) 09/27/2017 10:34 AM    GFR est AA >60 09/27/2017 10:34 AM    GFR est non-AA >60 09/27/2017 10:34 AM    Calcium 9.2 09/27/2017 10:34 AM     Lab Results   Component Value Date/Time    Microalb/Creat ratio (ug/mg creat.) 1012.9 (H) 08/29/2017 04:18 PM     Lab Results   Component Value Date/Time    Cholesterol, total 234 (H) 07/12/2017 12:00 AM    HDL Cholesterol 40 07/12/2017 12:00 AM    LDL, calculated 139 (H) 07/12/2017 12:00 AM    VLDL, calculated 55 (H) 07/12/2017 12:00 AM    Triglyceride 277 (H) 07/12/2017 12:00 AM    CHOL/HDL Ratio 4.5 12/01/2011 03:55 AM       Assessment and Plan:  Patient is a 66 y.o. male here for uncontrolled type 2 diabetes on metformin and basal insulin.  He needs a third agent to control prandial glucose rises. Medication options are limited due to h/o chronic pancreatitis, however I believe an SGLT2i would be a good choice since this does not stimulate the pancreas and may help with weight loss. He tried Invokana for a few days but this was when he was having other GI issues - reassured him that this class should not have significant GI s/e. Discussed risks/benefits at length and pt is willing to re-try this. Advised ample hydration. Due to fasting hypoglycemia, will reduce basal insulin dose slightly when he starts Invokana. He will notify me if s/e and we will try another agent in its class. Glycemic Medication Changes:  - start Invokana 150mg daily, after 1 month increase to full 300mg dose  - continue metformin 1000mg BID  - reduce Basaglar to 24 units daily  - check BG 1-2 times/day and bring meter to next visit    DM Health Maintenance: pertinent items updated in HM tab  A1c: update at next visit  Cv/Lipids: due to hyperTG, advised he start fenofibrate at least; may add statin at next visit  BP/Renal: BP not at goal (pt attributes to a recent stressor), on ACEi, microalbumin UTD and elevated (Invokana should help reduce BP and microalb) - repeat in August 2018  Vaccines: per PCP  Podiatry: no active issues, encouraged well-fitting footwear and daily inspection  Neuro: on gabapentin - update foot exam at next visit  Ophtho: yearly eye exam recommended - needs to be updated  Diet and exercise: discussed healthy eating and exercise recommendations, particularly reduction in dietary carbohydrates    I spent 25 minutes with the patient today and > 50% of the time was spent counseling the patient about diabetes management including medication options and dietary modification. Patient verbalized an understanding and will return to clinic in 3 months. Thank you for the opportunity to participate in this patient's care.     Starr Mcginnis MD  [address] [place] [address] Endocrinology  [facility]

## 2018-03-15 NOTE — PATIENT INSTRUCTIONS
Diabetes Instructions:  - start Invokana: take 1/2 tablet (150mg) for one month, then increase to the full tablet   - make sure you are drinking plenty of water: aim for 72 ounces per day   - let me know if you have side effects, we can try Henrene Fairly or Jardiance instead  - continue Basaglar 28 units daily  - continue metformin 1000mg twice a day    Check blood sugar at least TWICE a day: every morning when you wake up and at bedtime. Keep a record of your values and bring this or your glucometer with you to your next visit. Goal blood sugars: 80 to 180. Notify me if you have blood sugars less than 80. Diet instructions:  Reduce the amount of carbohydrates in your diet. This means not just avoiding sweets, sodas, or desserts but also reducing the amount of bread, pasta, rice, potatoes, corn, and crackers that you eat. Do not have more than one serving of carbs per meal (for example: do not eat a sandwich and potato chips in the same meal). Focus on eating mostly protein (meat, poultry, fish, shellfish, eggs), healthy fats (avocados, nuts, cheese, olive or coconut oil) and non-starchy vegetables (greens, carrots, tomatoes, bell peppers, broccoli, brussels sprouts, green beans, etc). If you fill yourself up with these foods, you won't even want the carbs.     Try Splenda or Stevia in your coffee instead of sugar

## 2018-03-15 NOTE — MR AVS SNAPSHOT
[address] 
[phone] Patient: Radha Bush MRN: [id number] CBU:[id number]/[id number] Visit Information Date & Time Provider Department Dept. Phone Encounter #  
 3/15/2018 11:30 AM Reji Clark MD [facility] [phone] [id number] Follow-up Instructions Return in about 3 months (around 6/15/2018). Upcoming Health Maintenance Date Due Hepatitis C Screening 1960 FOOT EXAM Q1 1/19/1972 EYE EXAM RETINAL OR DILATED Q1 1/19/1972 Pneumococcal 19-64 Medium Risk (1 of 1 - PPSV23) 1/19/1981 DTaP/Tdap/Td series (1 - Tdap) 1/19/1983 HEMOGLOBIN A1C Q6M 3/27/2018 LIPID PANEL Q1 7/12/2018 FOBT Q 1 YEAR AGE 50-75 7/12/2018 MICROALBUMIN Q1 8/29/2018 Allergies as of 3/15/2018  Review Complete On: 3/15/2018 By: Sydney Torrez Severity Noted Reaction Type Reactions Senna  10/03/2017    Other (comments)  
 cramps Sulfa (Sulfonamide Antibiotics)  11/28/2011    Unknown (comments) Makes him warner Pt states he is not allerggic to this medication CSE 8/24/17 Current Immunizations  Reviewed on 11/28/2011 No immunizations on file. Not reviewed this visit You Were Diagnosed With   
  
 Codes Comments Type 2 diabetes mellitus with hyperglycemia, with long-term current use of insulin (HCC)    -  Primary ICD-10-CM: E11.65, Z79.4 ICD-9-CM: 250.00, 790.29, V58.67 Vitals BP Pulse Resp Height(growth percentile) Weight(growth percentile) SpO2  
 (!) 162/98 66 16 6' 5\" (1.956 m) 267 lb (121.1 kg) 98% BMI Smoking Status 31.66 kg/m2 Current Every Day Smoker Vitals History BMI and BSA Data Body Mass Index Body Surface Area  
 31.66 kg/m 2 2.56 m 2 Preferred Pharmacy Pharmacy Name Phone [facility] [address] [facility] [address] [facility] [phone] Your Updated Medication List  
  
   
This list is accurate as of 3/15/18 12:12 PM.  Always use your most recent med list.  
  
  
  
  
 ALPRAZolam 1 mg tablet Commonly known as:  Jeff Dirk Take 1 Tab by mouth daily. Max Daily Amount: 1 mg. amLODIPine 10 mg tablet Commonly known as:  Jeremy Dillardast TAKE ONE TABLET BY MOUTH DAILY  
  
 bisacodyl 10 mg suppository Commonly known as:  DULCOLAX Insert 10 mg into rectum daily. canagliflozin 300 mg tablet Commonly known as:  Josephoffreece Boer Take 1 Tab by mouth Daily (before breakfast). DOCUSATE SODIUM PO Take  by mouth. fenofibrate 160 mg tablet Commonly known as:  LOFIBRA Take 1 Tab by mouth daily. gabapentin 300 mg capsule Commonly known as:  NEURONTIN  
TAKE ONE CAPSULE BY MOUTH TWICE A DAY  
  
 * insulin glargine 100 unit/mL (3 mL) Inpn Commonly known as:  LANTUS,BASAGLAR  
by SubCUTAneous route. * insulin glargine 100 unit/mL (3 mL) Inpn Commonly known as:  BASAGLAR KWIKPEN U-100 INSULIN Inject 28 units daily  
  
 lactulose 10 gram/15 mL solution Commonly known as:  Maxbass Flurry Take 15 mL by mouth every eight (8) hours as needed. For severe constipation. Hold for loose stool  Indications: constipation  
  
 lisinopril 40 mg tablet Commonly known as:  Kiron Prospect Take 1 Tab by mouth daily. * metFORMIN 1,000 mg tablet Commonly known as:  GLUCOPHAGE Take 1 Tab by mouth two (2) times daily (with meals). * metFORMIN 500 mg tablet Commonly known as:  GLUCOPHAGE METHADONE PO Take 75 mg by mouth daily. mirtazapine 15 mg tablet Commonly known as:  REMERON  
  
 polyethylene glycol 17 gram/dose powder Commonly known as:  Polina Farrier Take 17 g by mouth daily. propranolol 10 mg tablet Commonly known as:  INDERAL  
TAKE ONE TABLET BY MOUTH THREE TIMES A DAY FOR SEVERE ANXIETY AND ELEVATED BLOOD PRESSURE  
  
 * Notice:   This list has 4 medication(s) that are the same as other medications prescribed for you. Read the directions carefully, and ask your doctor or other care provider to review them with you. We Performed the Following AMB POC GLUCOSE, QUANTITATIVE, BLOOD [75146 CPT(R)] Follow-up Instructions Return in about 3 months (around 6/15/2018). Patient Instructions Diabetes Instructions: 
- start Invokana: take 1/2 tablet (150mg) for one month, then increase to the full tablet 
 - make sure you are drinking plenty of water: aim for 72 ounces per day 
 - let me know if you have side effects, we can try [place] or Jardiance instead 
- continue Basaglar 28 units daily 
- continue metformin 1000mg twice a day Check blood sugar at least TWICE a day: every morning when you wake up and at bedtime. Keep a record of your values and bring this or your glucometer with you to your next visit. Goal blood sugars: 80 to 180. Notify me if you have blood sugars less than 80. Diet instructions: 
Reduce the amount of carbohydrates in your diet. This means not just avoiding sweets, sodas, or desserts but also reducing the amount of bread, pasta, rice, potatoes, corn, and crackers that you eat. Do not have more than one serving of carbs per meal (for example: do not eat a sandwich and potato chips in the same meal). Focus on eating mostly protein (meat, poultry, fish, shellfish, eggs), healthy fats (avocados, nuts, cheese, olive or coconut oil) and non-starchy vegetables (greens, carrots, tomatoes, bell peppers, broccoli, brussels sprouts, green beans, etc). If you fill yourself up with these foods, you won't even want the carbs. Try Splenda or Stevia in your coffee instead of sugar Introducing [facility] & HEALTH SERVICES! Kriss Robles introduces Yoopay patient portal. Now you can access parts of your medical record, email your doctor's office, and request medication refills online. 1. In your internet browser, go to [email or web address] 2. Click on the First Time User? Click Here link in the Sign In box. You will see the New Member Sign Up page. 3. Enter your Handy Access Code exactly as it appears below. You will not need to use this code after youve completed the sign-up process. If you do not sign up before the expiration date, you must request a new code. · Handy Access Code: [email or web address] Expires: 5/7/2018  1:00 AM 
 
4. Enter the last four digits of your Social Security Number (xxxx) and Date of Birth (mm/dd/yyyy) as indicated and click Submit. You will be taken to the next sign-up page. 5. Create a Handy ID. This will be your Handy login ID and cannot be changed, so think of one that is secure and easy to remember. 6. Create a Handy password. You can change your password at any time. 7. Enter your Password Reset Question and Answer. This can be used at a later time if you forget your password. 8. Enter your e-mail address. You will receive e-mail notification when new information is available in [address] 9. Click Sign Up. You can now view and download portions of your medical record. 10. Click the Download Summary menu link to download a portable copy of your medical information. If you have questions, please visit the Frequently Asked Questions section of the Handy website. Remember, Handy is NOT to be used for urgent needs. For medical emergencies, dial 911. Now available from your iPhone and Android! Please provide this summary of care documentation to your next provider. Your primary care clinician is listed as Nohemi Zavala. If you have any questions after today's visit, please call [phone].

## 2018-03-15 NOTE — PROGRESS NOTES
Lab Results   Component Value Date/Time    Hemoglobin A1c 8.4 (H) 03/14/2018 04:10 PM    Hemoglobin A1c 7.4 (H) 09/27/2017 10:34 AM    Hemoglobin A1c 8.4 (H) 07/12/2017 12:00 AM

## 2018-03-16 LAB
BACTERIA UR CULT: NO GROWTH
CHOLEST SERPL-MCNC: 237 MG/DL (ref 100–199)
HCV AB S/CO SERPL IA: >11 S/CO RATIO (ref 0–0.9)
HDLC SERPL-MCNC: 40 MG/DL
LDLC SERPL CALC-MCNC: 120 MG/DL (ref 0–99)
TRIGL SERPL-MCNC: 387 MG/DL (ref 0–149)
VLDLC SERPL CALC-MCNC: 77 MG/DL (ref 5–40)

## 2018-04-25 PROBLEM — R30.0 DYSURIA: Status: ACTIVE | Noted: 2018-04-25

## 2018-05-09 RX ORDER — METFORMIN HYDROCHLORIDE 500 MG/1
TABLET ORAL
Qty: 120 TAB | Refills: 2 | Status: SHIPPED | OUTPATIENT
Start: 2018-05-09 | End: 2018-08-03

## 2018-05-16 ENCOUNTER — OFFICE VISIT (OUTPATIENT)
Dept: INTERNAL MEDICINE CLINIC | Age: 58
End: 2018-05-16

## 2018-05-16 VITALS
BODY MASS INDEX: 30.79 KG/M2 | HEART RATE: 72 BPM | DIASTOLIC BLOOD PRESSURE: 78 MMHG | TEMPERATURE: 98.9 F | OXYGEN SATURATION: 95 % | HEIGHT: 77 IN | WEIGHT: 260.8 LBS | SYSTOLIC BLOOD PRESSURE: 150 MMHG | RESPIRATION RATE: 19 BRPM

## 2018-05-16 DIAGNOSIS — M54.42 LEFT-SIDED LOW BACK PAIN WITH LEFT-SIDED SCIATICA, UNSPECIFIED CHRONICITY: ICD-10-CM

## 2018-05-16 DIAGNOSIS — L03.115 CELLULITIS OF LEG, RIGHT: Primary | ICD-10-CM

## 2018-05-16 DIAGNOSIS — L30.9 ECZEMA, UNSPECIFIED TYPE: ICD-10-CM

## 2018-05-16 RX ORDER — CLOBETASOL PROPIONATE 0.5 MG/G
OINTMENT TOPICAL 2 TIMES DAILY
Qty: 15 G | Refills: 0 | Status: SHIPPED | OUTPATIENT
Start: 2018-05-16 | End: 2018-08-03

## 2018-05-16 RX ORDER — METHYLPREDNISOLONE 4 MG/1
4 TABLET ORAL
Qty: 1 DOSE PACK | Refills: 0 | Status: SHIPPED | OUTPATIENT
Start: 2018-05-16 | End: 2018-08-03

## 2018-05-16 RX ORDER — CEPHALEXIN 500 MG/1
500 CAPSULE ORAL 2 TIMES DAILY
Qty: 14 CAP | Refills: 0 | Status: SHIPPED | OUTPATIENT
Start: 2018-05-16 | End: 2018-05-23

## 2018-05-16 NOTE — MR AVS SNAPSHOT
[address] [facility] [address] [facility] 
[phone] Patient: Juana Stephens MRN: [id number]:[id number] Visit Information Date & Time Provider Department Dept. Phone Encounter #  
 5/16/2018  9:15 AM Patricia Peck MD [facility] Internal Medicine [phone] [id number] Your Appointments 6/28/2018  1:50 PM  
ROUTINE CARE with Jessica Collado MD  
[facility] App[facility] Note: f/u from 3/15/18  
 [address] [facility] [address]  
[facility] 63 [address] [place] [id number] Upcoming Health Maintenance Date Due  
 FOOT EXAM Q1 1/19/1972 Pneumococcal 19-64 Medium Risk (1 of 1 - PPSV23) 1/19/1981 DTaP/Tdap/Td series (1 - Tdap) 1/19/1983 EYE EXAM RETINAL OR DILATED Q1 4/20/2018 FOBT Q 1 YEAR AGE 50-75 7/12/2018 Influenza Age 5 to Adult 8/1/2018 MICROALBUMIN Q1 8/29/2018 HEMOGLOBIN A1C Q6M 9/14/2018 LIPID PANEL Q1 3/14/2019 Allergies as of 5/16/2018  Review Complete On: 5/16/2018 By: Patricia Peck MD  
  
 Severity Noted Reaction Type Reactions Senna  10/03/2017    Other (comments)  
 cramps Sulfa (Sulfonamide Antibiotics)  11/28/2011    Unknown (comments) Makes him warner Pt states he is not allerggic to this medication CSE 8/24/17 Current Immunizations  Reviewed on 11/28/2011 No immunizations on file. Not reviewed this visit You Were Diagnosed With   
  
 Codes Comments Cellulitis of leg, right    -  Primary ICD-10-CM: C57.478 ICD-9-CM: 682.6 Eczema, unspecified type     ICD-10-CM: L30.9 ICD-9-CM: 692.9 Left-sided low back pain with left-sided sciatica, unspecified chronicity     ICD-10-CM: M54.42 
ICD-9-CM: 724.3 Vitals BP Pulse Temp Resp Height(growth percentile) Weight(growth percentile) 150/78 (BP 1 Location: Right arm, BP Patient Position: Sitting) 72 98.9 °F (37.2 °C) (Oral) 19 6' 5\" (1.956 m) 260 lb 12.8 oz (118.3 kg) SpO2 BMI Smoking Status 95% 30.93 kg/m2 Current Every Day Smoker Vitals History BMI and BSA Data Body Mass Index Body Surface Area 30.93 kg/m 2 2.54 m 2 Preferred Pharmacy Pharmacy Name Phone Christine Otero [address] [address] [facility] [address] [facility] [phone] Your Updated Medication List  
  
   
This list is accurate as of 5/16/18  9:50 AM.  Always use your most recent med list.  
  
  
  
  
 ALPRAZolam 1 mg tablet Commonly known as:  Don  Take 1 Tab by mouth daily. Max Daily Amount: 1 mg. amLODIPine 10 mg tablet Commonly known as:  Patricia Cramer TAKE ONE TABLET BY MOUTH DAILY  
  
 bisacodyl 10 mg suppository Commonly known as:  DULCOLAX Insert 10 mg into rectum daily. canagliflozin 300 mg tablet Commonly known as:  Sherral Vuong Take 1 Tab by mouth Daily (before breakfast). cephALEXin 500 mg capsule Commonly known as:  Lyndia Rad Take 1 Cap by mouth two (2) times a day for 7 days. clobetasol 0.05 % ointment Commonly known as:  Osmani Charlie Apply  to affected area two (2) times a day. DOCUSATE SODIUM PO Take  by mouth. fenofibrate 160 mg tablet Commonly known as:  LOFIBRA Take 1 Tab by mouth daily. gabapentin 300 mg capsule Commonly known as:  NEURONTIN  
TAKE ONE CAPSULE BY MOUTH TWICE A DAY  
  
 * insulin glargine 100 unit/mL (3 mL) Inpn Commonly known as:  LANTUS,BASAGLAR  
by SubCUTAneous route. * insulin glargine 100 unit/mL (3 mL) Inpn Commonly known as:  BASAGLAR KWIKPEN U-100 INSULIN Inject 28 units daily  
  
 lactulose 10 gram/15 mL solution Commonly known as:  [place] Take 15 mL by mouth every eight (8) hours as needed. For severe constipation. Hold for loose stool  Indications: constipation  
  
 lisinopril 40 mg tablet Commonly known as:  Dorathy Massed Take 1 Tab by mouth daily. metFORMIN 500 mg tablet Commonly known as:  GLUCOPHAGE  
TAKE TWO TABLETS BY MOUTH TWICE A DAY WITH MEALS METHADONE PO Take 75 mg by mouth daily. methylPREDNISolone 4 mg tablet Commonly known as:  Mavis Derick Take 1 Tab by mouth Specific Days and Specific Times. mirtazapine 15 mg tablet Commonly known as:  REMERON  
  
 polyethylene glycol 17 gram/dose powder Commonly known as:  Marlane Blinks Take 17 g by mouth daily. propranolol 10 mg tablet Commonly known as:  INDERAL  
TAKE ONE TABLET BY MOUTH THREE TIMES A DAY FOR SEVERE ANXIETY AND ELEVATED BLOOD PRESSURE  
  
 * Notice: This list has 2 medication(s) that are the same as other medications prescribed for you. Read the directions carefully, and ask your doctor or other care provider to review them with you. Prescriptions Sent to Pharmacy Refills  
 cephALEXin (KEFLEX) 500 mg capsule 0 Sig: Take 1 Cap by mouth two (2) times a day for 7 days. Class: Normal  
 Pharmacy: [facility] [address] [place] [address] Dr. Kendra Cooper Ph #: [phone] Route: Oral  
 clobetasol (TEMOVATE) 0.05 % ointment 0 Sig: Apply  to affected area two (2) times a day. Class: Normal  
 Pharmacy: [facility] [address] [place] [address] W Dr. Kendra Cooper Ph #: [phone] Route: Topical  
 methylPREDNISolone (MEDROL DOSEPACK) 4 mg tablet 0 Sig: Take 1 Tab by mouth Specific Days and Specific Times. Class: Normal  
 Pharmacy: [facility] [address] [place] [address] W Dr. Kendra Cooper Ph #: [phone] Route: Oral  
  
We Performed the Following REFERRAL TO ORTHOPEDICS [YYV321 Custom] To-Do List   
 05/25/2018 Imaging:  XR SPINE LUMB 2 OR 3 V Referral Information Referral ID Referred By Referred To  
  
 [id number] [address] [place] [address], [address] [facility] [address] [facility], [address] Phone: [phone] Fax: [phone] Visits Status Start Date End Date 1 New Request 5/16/18 5/16/19 If your referral has a status of pending review or denied, additional information will be sent to support the outcome of this decision. Patient Instructions Cellulitis: Care Instructions Your Care Instructions Cellulitis is a skin infection. It often occurs after a break in the skin from a scrape, cut, bite, or puncture, or after a rash. The doctor has checked you carefully, but problems can develop later. If you notice any problems or new symptoms, get medical treatment right away. Follow-up care is a key part of your treatment and safety. Be sure to make and go to all appointments, and call your doctor if you are having problems. It's also a good idea to know your test results and keep a list of the medicines you take. How can you care for yourself at home? · Take your antibiotics as directed. Do not stop taking them just because you feel better. You need to take the full course of antibiotics. · Prop up the infected area on pillows to reduce pain and swelling. Try to keep the area above the level of your heart as often as you can. · If your doctor told you how to care for your wound, follow your doctor's instructions. If you did not get instructions, follow this general advice: ¨ Wash the wound with clean water 2 times a day. Don't use hydrogen peroxide or alcohol, which can slow healing. ¨ You may cover the wound with a thin layer of petroleum jelly, such as Vaseline, and a nonstick bandage. ¨ Apply more petroleum jelly and replace the bandage as needed. · Be safe with medicines. Take pain medicines exactly as directed. ¨ If the doctor gave you a prescription medicine for pain, take it as prescribed. ¨ If you are not taking a prescription pain medicine, ask your doctor if you can take an over-the-counter medicine. To prevent cellulitis in the future · Try to prevent cuts, scrapes, or other injuries to your skin. Cellulitis most often occurs where there is a break in the skin. · If you get a scrape, cut, mild burn, or bite, wash the wound with clean water as soon as you can to help avoid infection. Don't use hydrogen peroxide or alcohol, which can slow healing. · If you have swelling in your legs (edema), support stockings and good skin care may help prevent leg sores and cellulitis. · Take care of your feet, especially if you have diabetes or other conditions that increase the risk of infection. Wear shoes and socks. Do not go barefoot. If you have athlete's foot or other skin problems on your feet, talk to your doctor about how to treat them. When should you call for help? Call your doctor now or seek immediate medical care if: 
? · You have signs that your infection is getting worse, such as: 
¨ Increased pain, swelling, warmth, or redness. ¨ Red streaks leading from the area. ¨ Pus draining from the area. ¨ A fever. ? · You get a rash. ? Watch closely for changes in your health, and be sure to contact your doctor if: 
? · You are not getting better after 1 day (24 hours). ? · You do not get better as expected. Where can you learn more? Go to http://ciara-abbe.info/. Lucho Theodore in the search box to learn more about \"Cellulitis: Care Instructions. \" Current as of: October 13, 2016 Content Version: 11.4 © 1298-5948 Healthwise, Incorporated. Care instructions adapted under license by [employer] (which disclaims liability or warranty for this information). If you have questions about a medical condition or this instruction, always ask your healthcare professional. [address] any warranty or liability for your use of this information. Introducing [facility] & HEALTH SERVICES!    
 [facility] introduces DormNoise patient portal. Now you can access parts of your medical record, email your doctor's office, and request medication refills online. 1. In your internet browser, go to [email or web address] 2. Click on the First Time User? Click Here link in the Sign In box. You will see the New Member Sign Up page. 3. Enter your Boxfish Access Code exactly as it appears below. You will not need to use this code after youve completed the sign-up process. If you do not sign up before the expiration date, you must request a new code. · Boxfish Access Code: [email or web address] Expires: 8/14/2018  9:04 AM 
 
4. Enter the last four digits of your Social Security Number (xxxx) and Date of Birth (mm/dd/yyyy) as indicated and click Submit. You will be taken to the next sign-up page. 5. Create a Boxfish ID. This will be your Boxfish login ID and cannot be changed, so think of one that is secure and easy to remember. 6. Create a Boxfish password. You can change your password at any time. 7. Enter your Password Reset Question and Answer. This can be used at a later time if you forget your password. 8. Enter your e-mail address. You will receive e-mail notification when new information is available in [address] 9. Click Sign Up. You can now view and download portions of your medical record. 10. Click the Download Summary menu link to download a portable copy of your medical information. If you have questions, please visit the Frequently Asked Questions section of the Boxfish website. Remember, Boxfish is NOT to be used for urgent needs. For medical emergencies, dial 911. Now available from your iPhone and Android! Please provide this summary of care documentation to your next provider. Your primary care clinician is listed as Alexis Clemons. If you have any questions after today's visit, please call [phone].

## 2018-05-16 NOTE — PATIENT INSTRUCTIONS
Cellulitis: Care Instructions  Your Care Instructions    Cellulitis is a skin infection. It often occurs after a break in the skin from a scrape, cut, bite, or puncture, or after a rash. The doctor has checked you carefully, but problems can develop later. If you notice any problems or new symptoms, get medical treatment right away. Follow-up care is a key part of your treatment and safety. Be sure to make and go to all appointments, and call your doctor if you are having problems. It's also a good idea to know your test results and keep a list of the medicines you take. How can you care for yourself at home? · Take your antibiotics as directed. Do not stop taking them just because you feel better. You need to take the full course of antibiotics. · Prop up the infected area on pillows to reduce pain and swelling. Try to keep the area above the level of your heart as often as you can. · If your doctor told you how to care for your wound, follow your doctor's instructions. If you did not get instructions, follow this general advice:  ¨ Wash the wound with clean water 2 times a day. Don't use hydrogen peroxide or alcohol, which can slow healing. ¨ You may cover the wound with a thin layer of petroleum jelly, such as Vaseline, and a nonstick bandage. ¨ Apply more petroleum jelly and replace the bandage as needed. · Be safe with medicines. Take pain medicines exactly as directed. ¨ If the doctor gave you a prescription medicine for pain, take it as prescribed. ¨ If you are not taking a prescription pain medicine, ask your doctor if you can take an over-the-counter medicine. To prevent cellulitis in the future  · Try to prevent cuts, scrapes, or other injuries to your skin. Cellulitis most often occurs where there is a break in the skin. · If you get a scrape, cut, mild burn, or bite, wash the wound with clean water as soon as you can to help avoid infection.  Don't use hydrogen peroxide or alcohol, which can slow healing. · If you have swelling in your legs (edema), support stockings and good skin care may help prevent leg sores and cellulitis. · Take care of your feet, especially if you have diabetes or other conditions that increase the risk of infection. Wear shoes and socks. Do not go barefoot. If you have athlete's foot or other skin problems on your feet, talk to your doctor about how to treat them. When should you call for help? Call your doctor now or seek immediate medical care if:  ? · You have signs that your infection is getting worse, such as:  ¨ Increased pain, swelling, warmth, or redness. ¨ Red streaks leading from the area. ¨ Pus draining from the area. ¨ A fever. ? · You get a rash. ? Watch closely for changes in your health, and be sure to contact your doctor if:  ? · You are not getting better after 1 day (24 hours). ? · You do not get better as expected. Where can you learn more? Go to http://ciara-abbe.info/. Alistair Perez in the search box to learn more about \"Cellulitis: Care Instructions. \"  Current as of: October 13, 2016  Content Version: 11.4  © 0865-6742 Dagne Dover. Care instructions adapted under license by GOOD (which disclaims liability or warranty for this information). If you have questions about a medical condition or this instruction, always ask your healthcare professional. [place] [address] any warranty or liability for your use of this information.

## 2018-05-24 DIAGNOSIS — F41.9 ANXIETY: ICD-10-CM

## 2018-05-24 DIAGNOSIS — G62.9 NEUROPATHY: ICD-10-CM

## 2018-05-24 DIAGNOSIS — I10 HYPERTENSION, UNSPECIFIED TYPE: ICD-10-CM

## 2018-05-24 RX ORDER — GABAPENTIN 300 MG/1
CAPSULE ORAL
Qty: 60 CAP | Refills: 2 | Status: SHIPPED | OUTPATIENT
Start: 2018-05-24 | End: 2018-08-20 | Stop reason: SDUPTHER

## 2018-05-24 RX ORDER — PROPRANOLOL HYDROCHLORIDE 10 MG/1
TABLET ORAL
Qty: 90 TAB | Refills: 0 | Status: SHIPPED | OUTPATIENT
Start: 2018-05-24 | End: 2018-07-05 | Stop reason: SDUPTHER

## 2018-05-25 ENCOUNTER — TELEPHONE (OUTPATIENT)
Dept: INTERNAL MEDICINE CLINIC | Age: 58
End: 2018-05-25

## 2018-05-30 RX ORDER — LISINOPRIL 40 MG/1
40 TABLET ORAL DAILY
Qty: 30 TAB | Refills: 1 | Status: SHIPPED | OUTPATIENT
Start: 2018-05-30 | End: 2018-11-26 | Stop reason: SDUPTHER

## 2018-06-05 ENCOUNTER — TELEPHONE (OUTPATIENT)
Dept: INTERNAL MEDICINE CLINIC | Age: 58
End: 2018-06-05

## 2018-06-05 NOTE — TELEPHONE ENCOUNTER
Patient attempted to move patient appt with Dr Chelsea Romero from July to a sooner appt. Patient reports cellulitis is 99 % better and has had viral bronchitis for a week with white sputum.

## 2018-06-06 NOTE — PROGRESS NOTES
Acute Care Note    Emiliana Moyer is 66 y.o. male. he presents for evaluation of Skin Infection and Back Pain    The patient presents for evaluation of a probable skin infection located on the left shin. Onset of symptoms was gradual, with gradually worsening since that time. Symptoms include erythema, tenderness and pain. Patient denies  fever and chills. There is not a history trauma to the area. Treatment to date has included none with no relief      Back Pain  Patient presents for presents evaluation of low back problems. The problem is longstanding. Initial inciting event: none. Symptoms are worst: at all times. Alleviating factors identifiable by patient are sitting, recumbency. Exacerbating factors identifiable by patient are standing, walking. Treatments so far initiated by patient: some OTC medications Previous lower back problems: none. Previous workup: none. Previous treatments: none. Prior to Admission medications    Medication Sig Start Date End Date Taking? Authorizing Provider   clobetasol (TEMOVATE) 0.05 % ointment Apply  to affected area two (2) times a day. 5/16/18  Yes Roselia Gomez MD   methylPREDNISolone (MEDROL DOSEPACK) 4 mg tablet Take 1 Tab by mouth Specific Days and Specific Times. 5/16/18  Yes Roselia Gomez MD   metFORMIN (GLUCOPHAGE) 500 mg tablet TAKE TWO TABLETS BY MOUTH TWICE A DAY WITH MEALS 5/9/18  Yes Neeta Perez MD   ALPRAZolam [facility]) 1 mg tablet Take 1 Tab by mouth daily. Max Daily Amount: 1 mg. 2/21/18  Yes Roselia Gomez MD   amLODIPine (NORVASC) 10 mg tablet TAKE ONE TABLET BY MOUTH DAILY 12/27/17  Yes Roselia Gomez MD   insulin glargine [facility] AUTHORITY [facility]) 100 unit/mL (3 mL) inpn Inject 28 units daily 8/29/17  Yes Neeta Perez MD   METHADONE HCL (METHADONE PO) Take 75 mg by mouth daily. Yes Historical Provider   lisinopril (PRINIVIL, ZESTRIL) 40 mg tablet Take 1 Tab by mouth daily.  5/30/18   Roselia Gomez MD   gabapentin (NEURONTIN) 300 mg capsule TAKE ONE CAPSULE BY MOUTH TWICE A DAY 5/24/18   Sinai Lopez MD   propranolol (INDERAL) 10 mg tablet TAKE ONE TABLET BY MOUTH THREE TIMES A DAY 5/24/18   Sinai Lopez MD   insulin glargine (LANTUS,BASAGLAR) 100 unit/mL (3 mL) inpn by SubCUTAneous route. Historical Provider   lactulose (CHRONULAC) 10 gram/15 mL solution Take 15 mL by mouth every eight (8) hours as needed. For severe constipation. Hold for loose stool  Indications: constipation 10/17/17   Sinai Lopez MD   bisacodyl (DULCOLAX) 10 mg suppository Insert 10 mg into rectum daily. 10/17/17   Sinai Lopez MD   polyethylene glycol (MIRALAX) 17 gram/dose powder Take 17 g by mouth daily. 9/29/17   Sinai Lopez MD   mirtazapine (REMERON) 15 mg tablet  7/17/17   Historical Provider   canagliflozin (INVOKANA) 300 mg tablet Take 1 Tab by mouth Daily (before breakfast). 8/29/17   Cliff Abreu MD   fenofibrate (LOFIBRA) 160 mg tablet Take 1 Tab by mouth daily. 8/29/17   Cliff Abreu MD   DOCUSATE SODIUM PO Take  by mouth. Historical Provider         Patient Active Problem List   Diagnosis Code    Acute pancreatitis K85.90    Anxiety F41.9    Substance induced mood disorder ([address] F19.94    Benzodiazepine dependence, continuous ([facility]) F13.20    Polysubstance dependence ([address] F19.20    Type 2 diabetes mellitus with hyperglycemia, with long-term current use of insulin ([facility]) E11.65, Z79.4    Type 2 diabetes with nephropathy ([address] E11.21    Essential hypertension I10    Dysuria R30.0         Review of Systems   Constitutional: Negative. Gastrointestinal: Negative. Musculoskeletal: Positive for back pain. Visit Vitals    /78 (BP 1 Location: Right arm, BP Patient Position: Sitting)    Pulse 72    Temp 98.9 °F (37.2 °C) (Oral)    Resp 19    Ht 6' 5\" (1.956 m)    Wt 260 lb 12.8 oz (118.3 kg)    SpO2 95%    BMI 30.93 kg/m2       Physical Exam   Constitutional: No distress.    Cardiovascular: Normal rate and regular rhythm. Pulmonary/Chest: Effort normal.   Musculoskeletal: He exhibits tenderness (mild at the lower back). Skin:        Indurated area at lower left leg           ASSESSMENT/PLAN  Diagnoses and all orders for this visit:    1. Cellulitis of leg, right  -     cephALEXin (KEFLEX) 500 mg capsule; Take 1 Cap by mouth two (2) times a day for 7 days. 2. Eczema, unspecified type  -     clobetasol (TEMOVATE) 0.05 % ointment; Apply  to affected area two (2) times a day. 3. Left-sided low back pain with left-sided sciatica, unspecified chronicity  -     methylPREDNISolone (MEDROL DOSEPACK) 4 mg tablet; Take 1 Tab by mouth Specific Days and Specific Times. -     XR SPINE LUMB 2 OR 3 V; Future  -     REFERRAL TO ORTHOPEDICS         Advised the patient to call back or return to office if symptoms worsen/change/persist.   Discussed expected course/resolution/complications of diagnosis in detail with patient. Medication risks/benefits/costs/interactions/alternatives discussed with patient. The patient was given an after visit summary which includes diagnoses, current medications, & vitals. They expressed understanding with the diagnosis and plan.

## 2018-07-05 DIAGNOSIS — F41.9 ANXIETY: ICD-10-CM

## 2018-07-06 ENCOUNTER — TELEPHONE (OUTPATIENT)
Dept: INTERNAL MEDICINE CLINIC | Age: 58
End: 2018-07-06

## 2018-07-06 RX ORDER — PROPRANOLOL HYDROCHLORIDE 10 MG/1
TABLET ORAL
Qty: 90 TAB | Refills: 0 | Status: SHIPPED | OUTPATIENT
Start: 2018-07-06 | End: 2018-08-13 | Stop reason: SDUPTHER

## 2018-07-31 ENCOUNTER — HOSPITAL ENCOUNTER (OUTPATIENT)
Dept: GENERAL RADIOLOGY | Age: 58
Discharge: HOME | End: 2018-07-31
Payer: COMMERCIAL

## 2018-07-31 ENCOUNTER — OFFICE VISIT (OUTPATIENT)
Dept: INTERNAL MEDICINE CLINIC | Age: 58
End: 2018-07-31

## 2018-07-31 VITALS
RESPIRATION RATE: 20 BRPM | OXYGEN SATURATION: 97 % | HEIGHT: 77 IN | DIASTOLIC BLOOD PRESSURE: 88 MMHG | BODY MASS INDEX: 30.58 KG/M2 | TEMPERATURE: 99.5 F | WEIGHT: 259 LBS | SYSTOLIC BLOOD PRESSURE: 150 MMHG | HEART RATE: 64 BPM

## 2018-07-31 DIAGNOSIS — R05.9 COUGH: ICD-10-CM

## 2018-07-31 DIAGNOSIS — E11.21 TYPE 2 DIABETES WITH NEPHROPATHY (HCC): ICD-10-CM

## 2018-07-31 DIAGNOSIS — R50.9 LOW GRADE FEVER: ICD-10-CM

## 2018-07-31 DIAGNOSIS — R05.9 COUGH: Primary | ICD-10-CM

## 2018-07-31 PROCEDURE — 71046 X-RAY EXAM CHEST 2 VIEWS: CPT

## 2018-07-31 RX ORDER — DOXYCYCLINE 100 MG/1
100 TABLET ORAL 2 TIMES DAILY
Qty: 14 TAB | Refills: 0 | Status: SHIPPED | OUTPATIENT
Start: 2018-07-31 | End: 2018-08-07

## 2018-07-31 RX ORDER — BENZONATATE 100 MG/1
100 CAPSULE ORAL
Qty: 30 CAP | Refills: 0 | Status: SHIPPED | OUTPATIENT
Start: 2018-07-31 | End: 2018-08-07

## 2018-07-31 RX ORDER — METHYLPREDNISOLONE 4 MG/1
4 TABLET ORAL
Qty: 1 DOSE PACK | Refills: 0 | Status: CANCELLED | OUTPATIENT
Start: 2018-07-31

## 2018-07-31 RX ORDER — ALPRAZOLAM 2 MG/1
1 TABLET ORAL
COMMUNITY
Start: 2018-07-30 | End: 2020-07-10 | Stop reason: SDUPTHER

## 2018-07-31 NOTE — ACP (ADVANCE CARE PLANNING)
Discussed Advanced Care Directives. Patient does no have paper. Asked them to bring a copy in for their medical record.

## 2018-07-31 NOTE — PROGRESS NOTES
Eri Satnton is a 66 y.o. male Chief Complaint Patient presents with  Cough  
  states for 3 months Visit Vitals  /88 (BP 1 Location: Right arm, BP Patient Position: Sitting)  Pulse 64  Temp 99.5 °F (37.5 °C) (Oral)  Resp 20  
 Ht 6' 5\" (1.956 m)  Wt 259 lb (117.5 kg)  SpO2 97%  BMI 30.71 kg/m2 1. Have you been to the ER, urgent care clinic since your last visit? Hospitalized since your last visit? No 
 
2. Have you seen or consulted any other health care providers outside of the [facility] since your last visit? Include any pap smears or colon screening.  No  
 
Tanmay Trevizo LPN

## 2018-07-31 NOTE — PATIENT INSTRUCTIONS
Chronic Cough: Care Instructions Your Care Instructions A cough is your body's response to something that bothers your throat or airways. Many things can cause a cough. You might cough because of a cold or the flu, bronchitis, or asthma. Smoking, postnasal drip, allergies, and stomach acid that backs up into your throat also can cause a cough. A cough can be short-term (acute) or long-term (chronic). A chronic cough lasts more than 3 weeks. A chronic cough is often caused by a long-term problem, such as asthma. Another cause might be a medicine, such as an ACE inhibitor. A cough is a symptom, not a disease. To treat a chronic cough, you may need to treat the problem that causes it. You can take a few steps at home to cough less and feel better. Some people cough or clear their throat out of habit for no clear reason. Follow-up care is a key part of your treatment and safety. Be sure to make and go to all appointments, and call your doctor if you are having problems. It's also a good idea to know your test results and keep a list of the medicines you take. How can you care for yourself at home? · Drink plenty of water and other fluids. This may help soothe a dry or sore throat. Honey or lemon juice in hot water or tea may ease a dry cough. · Prop up your head on pillows to help you breathe and ease a cough. · Do not smoke or allow others to smoke around you. Smoke can make a cough worse. If you need help quitting, talk to your doctor about stop-smoking programs and medicines. These can increase your chances of quitting for good. · Avoid exposure to smoke, dust, or other pollutants, or wear a face mask. Check with your doctor or pharmacist to find out which type of face mask will give you the most benefit. · Take cough medicine as directed by your doctor. · Try cough drops to soothe a dry or sore throat. Cough drops don't stop a cough.  Medicine-flavored cough drops are no better than candy-flavored drops or hard candy. Throat clearing When you have a chronic cough or a disease that may cause this type of cough, you may often feel like you want to clear your throat. This helps bring up mucus. But throat clearing does not always have a cause. Throat clearing can become a habit. The more you do it, the more you feel like you need to do it. But frequent throat clearing can be hard on your vocal cords. It's like slamming them together. To help lessen throat clearing, you can try: · Taking small sips of water. · Not clearing your throat when you feel you need to. · Swallowing hard when you want to clear your throat. You may want to ask your doctor if a medicine that thins mucus would help. When should you call for help? Call 911 anytime you think you may need emergency care. For example, call if: 
  · You have severe trouble breathing.  
 Call your doctor now or seek immediate medical care if: 
  · You cough up blood.  
  · You have new or worse trouble breathing.  
  · You have a new or higher fever.  
 Watch closely for changes in your health, and be sure to contact your doctor if: 
  · You cough more deeply or more often, especially if you notice more mucus or a change in the color of your mucus.  
  · You do not get better as expected. Where can you learn more? Go to http://ciara-abbe.info/. Enter N783 in the search box to learn more about \"Chronic Cough: Care Instructions. \" Current as of: December 6, 2017 Content Version: 11.7 © 8704-0331 [employer], [email or web address]. Care instructions adapted under license by [employer] (which disclaims liability or warranty for this information). If you have questions about a medical condition or this instruction, always ask your healthcare professional. [address] any warranty or liability for your use of this information.

## 2018-07-31 NOTE — PROGRESS NOTES
Acute Care Note Iain Byrne is 66 y.o. male. he presents for evaluation of Cough (states for 3 months) The patient reports having had cough for the past 3 months. He noted development of this after having finished his antibiotic and steroid for leg cellulitis given in the spring of this year. He has had a productive cough with some whitish sputum. Low grade temp is noted today. He denies known sick contacts. However, he does attend the methadone clinic where he reports there are often people there who are coughing and appeared quite ill. He also informs me that he will be undergoing evaluations for a back surgery. This is to Take Pl. in August.  He has a history of diabetes and has not followed up with endocrinology as had been requested. He does not have a recent hemoglobin A1c. This will be ordered today. Prior to Admission medications Medication Sig Start Date End Date Taking? Authorizing Provider ALPRAZolam (XANAX) 2 mg tablet  7/30/18  Yes Historical Provider  
propranolol (INDERAL) 10 mg tablet TAKE ONE TABLET BY MOUTH THREE TIMES A DAY 7/6/18  Yes Ines Mckeon MD  
lisinopril (PRINIVIL, ZESTRIL) 40 mg tablet Take 1 Tab by mouth daily. 5/30/18  Yes Ines Mckeon MD  
gabapentin (NEURONTIN) 300 mg capsule TAKE ONE CAPSULE BY MOUTH TWICE A DAY 5/24/18  Yes Ines Mckeon MD  
clobetasol (TEMOVATE) 0.05 % ointment Apply  to affected area two (2) times a day. 5/16/18  Yes Ines Mckeon MD  
methylPREDNISolone (MEDROL DOSEPACK) 4 mg tablet Take 1 Tab by mouth Specific Days and Specific Times. 5/16/18  Yes Ines Mckeon MD  
metFORMIN (GLUCOPHAGE) 500 mg tablet TAKE TWO TABLETS BY MOUTH TWICE A DAY WITH MEALS 5/9/18  Yes Alice Lenz MD  
ALPRAZolam [facility]) 1 mg tablet Take 1 Tab by mouth daily.  Max Daily Amount: 1 mg. 2/21/18  Yes Ines Mckeon MD  
amLODIPine (NORVASC) 10 mg tablet TAKE ONE TABLET BY MOUTH DAILY 12/27/17  Yes Ines Mckeon MD  
insulin glargine (LANTUS,BASAGLAR) 100 unit/mL (3 mL) inpn by SubCUTAneous route. Yes Historical Provider  
lactulose (CHRONULAC) 10 gram/15 mL solution Take 15 mL by mouth every eight (8) hours as needed. For severe constipation. Hold for loose stool  Indications: constipation 10/17/17  Yes Judie Power MD  
bisacodyl (DULCOLAX) 10 mg suppository Insert 10 mg into rectum daily. 10/17/17  Yes Judie Power MD  
polyethylene glycol (MIRALAX) 17 gram/dose powder Take 17 g by mouth daily. 9/29/17  Yes Judie Power MD  
insulin glargine [facility] AUTHORITY [facility]) 100 unit/mL (3 mL) inpn Inject 28 units daily 8/29/17  Yes Rufina Ambrosio MD  
DOCUSATE SODIUM PO Take  by mouth. Yes Historical Provider METHADONE HCL (METHADONE PO) Take 75 mg by mouth daily. Yes Historical Provider  
mirtazapine (REMERON) 15 mg tablet  7/17/17   Historical Provider  
canagliflozin (INVOKANA) 300 mg tablet Take 1 Tab by mouth Daily (before breakfast). 8/29/17   Rufina Ambrosio MD  
fenofibrate (LOFIBRA) 160 mg tablet Take 1 Tab by mouth daily. 8/29/17   Rufina Ambrosio MD  
 
 
 
Patient Active Problem List  
Diagnosis Code  Acute pancreatitis K85.90  Anxiety F41.9  Substance induced mood disorder ([facility]) F19.94  Benzodiazepine dependence, continuous ([facility]) F13.20  Polysubstance dependence ([facility]) F19.20  Type 2 diabetes mellitus with hyperglycemia, with long-term current use of insulin ([facility]) E11.65, Z79.4  Type 2 diabetes with nephropathy ([facility]) E11.21  
 Essential hypertension I10  Dysuria R30.0 Review of Systems Constitutional: Negative. Respiratory: Negative. Cardiovascular: Negative. Gastrointestinal: Negative. Visit Vitals  /88 (BP 1 Location: Right arm, BP Patient Position: Sitting)  Pulse 64  Temp 99.5 °F (37.5 °C) (Oral)  Resp 20  
 Ht 6' 5\" (1.956 m)  Wt 259 lb (117.5 kg)  SpO2 97%  BMI 30.71 kg/m2 Physical Exam  
Constitutional: No distress. Cardiovascular: Normal rate and regular rhythm. No murmur heard. Pulmonary/Chest: Effort normal. He has wheezes. He has rales. Present at the right upper lung zone ASSESSMENT/PLAN Diagnoses and all orders for this visit: 1. Cough -     XR CHEST PA LAT; Future 
-     benzonatate (TESSALON) 100 mg capsule; Take 1 Cap by mouth three (3) times daily as needed for Cough for up to 7 days. -     QUANTIFERON TB GOLD 
-     CBC WITH AUTOMATED DIFF 
-     METABOLIC PANEL, COMPREHENSIVE 2. Type 2 diabetes with nephropathy (HCC) 
-     HEMOGLOBIN A1C WITH EAG 3. Low grade fever 
-     doxycycline (ADOXA) 100 mg tablet; Take 1 Tab by mouth two (2) times a day for 7 days. Advised the patient to call back or return to office if symptoms worsen/change/persist.  
Discussed expected course/resolution/complications of diagnosis in detail with patient. Medication risks/benefits/costs/interactions/alternatives discussed with patient. The patient was given an after visit summary which includes diagnoses, current medications, & vitals. They expressed understanding with the diagnosis and plan.

## 2018-08-01 ENCOUNTER — TELEPHONE (OUTPATIENT)
Dept: INTERNAL MEDICINE CLINIC | Age: 58
End: 2018-08-01

## 2018-08-01 LAB
ALBUMIN SERPL-MCNC: 4.1 G/DL (ref 3.5–5.5)
ALBUMIN/GLOB SERPL: 1.2 {RATIO} (ref 1.2–2.2)
ALP SERPL-CCNC: 63 IU/L (ref 39–117)
ALT SERPL-CCNC: 10 IU/L (ref 0–44)
AST SERPL-CCNC: 10 IU/L (ref 0–40)
BASOPHILS # BLD AUTO: 0 X10E3/UL (ref 0–0.2)
BASOPHILS NFR BLD AUTO: 0 %
BILIRUB SERPL-MCNC: 0.3 MG/DL (ref 0–1.2)
BUN SERPL-MCNC: 12 MG/DL (ref 6–24)
BUN/CREAT SERPL: 15 (ref 9–20)
CALCIUM SERPL-MCNC: 9.6 MG/DL (ref 8.7–10.2)
CHLORIDE SERPL-SCNC: 94 MMOL/L (ref 96–106)
CO2 SERPL-SCNC: 27 MMOL/L (ref 20–29)
CREAT SERPL-MCNC: 0.82 MG/DL (ref 0.76–1.27)
EOSINOPHIL # BLD AUTO: 0.1 X10E3/UL (ref 0–0.4)
EOSINOPHIL NFR BLD AUTO: 2 %
ERYTHROCYTE [DISTWIDTH] IN BLOOD BY AUTOMATED COUNT: 14.1 % (ref 12.3–15.4)
EST. AVERAGE GLUCOSE BLD GHB EST-MCNC: 203 MG/DL
GLOBULIN SER CALC-MCNC: 3.3 G/DL (ref 1.5–4.5)
GLUCOSE SERPL-MCNC: 133 MG/DL (ref 65–99)
HBA1C MFR BLD: 8.7 % (ref 4.8–5.6)
HCT VFR BLD AUTO: 38.8 % (ref 37.5–51)
HGB BLD-MCNC: 13.3 G/DL (ref 13–17.7)
IMM GRANULOCYTES # BLD: 0 X10E3/UL (ref 0–0.1)
IMM GRANULOCYTES NFR BLD: 0 %
LYMPHOCYTES # BLD AUTO: 2.2 X10E3/UL (ref 0.7–3.1)
LYMPHOCYTES NFR BLD AUTO: 33 %
MCH RBC QN AUTO: 29.3 PG (ref 26.6–33)
MCHC RBC AUTO-ENTMCNC: 34.3 G/DL (ref 31.5–35.7)
MCV RBC AUTO: 86 FL (ref 79–97)
MONOCYTES # BLD AUTO: 0.4 X10E3/UL (ref 0.1–0.9)
MONOCYTES NFR BLD AUTO: 6 %
NEUTROPHILS # BLD AUTO: 3.9 X10E3/UL (ref 1.4–7)
NEUTROPHILS NFR BLD AUTO: 59 %
PLATELET # BLD AUTO: 285 X10E3/UL (ref 150–379)
POTASSIUM SERPL-SCNC: 4.6 MMOL/L (ref 3.5–5.2)
PROT SERPL-MCNC: 7.4 G/DL (ref 6–8.5)
RBC # BLD AUTO: 4.54 X10E6/UL (ref 4.14–5.8)
SODIUM SERPL-SCNC: 140 MMOL/L (ref 134–144)
WBC # BLD AUTO: 6.6 X10E3/UL (ref 3.4–10.8)

## 2018-08-01 NOTE — TELEPHONE ENCOUNTER
Patient has been informed per drs result notes and recommendations. Patient says will be picking up Abx and tessalon caps today along with some Ibuprofen for the fever. He will call if not better after completion of treatment .

## 2018-08-03 ENCOUNTER — HOSPITAL ENCOUNTER (OUTPATIENT)
Dept: PREADMISSION TESTING | Age: 58
Discharge: HOME | End: 2018-08-03
Payer: COMMERCIAL

## 2018-08-03 VITALS
SYSTOLIC BLOOD PRESSURE: 130 MMHG | WEIGHT: 257 LBS | TEMPERATURE: 98.5 F | BODY MASS INDEX: 29.73 KG/M2 | HEART RATE: 64 BPM | DIASTOLIC BLOOD PRESSURE: 85 MMHG | HEIGHT: 78 IN

## 2018-08-03 LAB
ABO + RH BLD: NORMAL
APPEARANCE UR: CLEAR
ATRIAL RATE: 58 BPM
BACTERIA URNS QL MICRO: NEGATIVE /HPF
BILIRUB UR QL CFM: NEGATIVE
BLOOD GROUP ANTIBODIES SERPL: NORMAL
CALCULATED P AXIS, ECG09: 22 DEGREES
CALCULATED R AXIS, ECG10: -47 DEGREES
CALCULATED T AXIS, ECG11: 7 DEGREES
COLOR UR: ABNORMAL
DIAGNOSIS, 93000: NORMAL
EPITH CASTS URNS QL MICRO: ABNORMAL /LPF
GLUCOSE UR STRIP.AUTO-MCNC: NEGATIVE MG/DL
HGB UR QL STRIP: NEGATIVE
INR PPP: 1 (ref 0.9–1.1)
KETONES UR QL STRIP.AUTO: ABNORMAL MG/DL
LEUKOCYTE ESTERASE UR QL STRIP.AUTO: ABNORMAL
MUCOUS THREADS URNS QL MICRO: ABNORMAL /LPF
NITRITE UR QL STRIP.AUTO: NEGATIVE
P-R INTERVAL, ECG05: 168 MS
PH UR STRIP: 5 [PH] (ref 5–8)
PROT UR STRIP-MCNC: 300 MG/DL
PROTHROMBIN TIME: 10 SEC (ref 9–11.1)
Q-T INTERVAL, ECG07: 438 MS
QRS DURATION, ECG06: 102 MS
QTC CALCULATION (BEZET), ECG08: 429 MS
RBC #/AREA URNS HPF: ABNORMAL /HPF (ref 0–5)
SP GR UR REFRACTOMETRY: >1.03 (ref 1–1.03)
SPECIMEN EXP DATE BLD: NORMAL
UA: UC IF INDICATED,UAUC: ABNORMAL
UROBILINOGEN UR QL STRIP.AUTO: 1 EU/DL (ref 0.2–1)
VENTRICULAR RATE, ECG03: 58 BPM
WBC URNS QL MICRO: ABNORMAL /HPF (ref 0–4)

## 2018-08-03 PROCEDURE — 81001 URINALYSIS AUTO W/SCOPE: CPT | Performed by: ORTHOPAEDIC SURGERY

## 2018-08-03 PROCEDURE — 85610 PROTHROMBIN TIME: CPT | Performed by: ORTHOPAEDIC SURGERY

## 2018-08-03 PROCEDURE — 93005 ELECTROCARDIOGRAM TRACING: CPT

## 2018-08-03 PROCEDURE — 86900 BLOOD TYPING SEROLOGIC ABO: CPT | Performed by: ORTHOPAEDIC SURGERY

## 2018-08-03 RX ORDER — METFORMIN HYDROCHLORIDE 1000 MG/1
1000 TABLET ORAL 2 TIMES DAILY WITH MEALS
COMMUNITY
End: 2018-08-13 | Stop reason: SDUPTHER

## 2018-08-03 NOTE — PERIOP NOTES
PREOPERATIVE INSTRUCTIONS REVIEWED WITH PATIENT. PATIENT GIVEN SIX PACK OF CHG WIPES. INSTRUCTIONS [REVIEWED/ ] ON USE OF CHG WIPES. PATIENT GIVEN SSI INFECTION FAQ SHEET, INFORMATION SHEET ON DIABETIC TREATMENT CENTER AS WELL AS HAND WASHING TIPS SHEETS. MRSA/MSSA TREATMENT INSTRUCTION SHEET GIVEN WITH AN EXPLANATION TO PATIENT THAT THEY WILL BE NOTIFIED IF TREATMENT INSTRUCTIONS NEED TO BE INITIATED. PATIENT WAS GIVEN THE OPPORTUNITY TO ASK QUESTIONS ON THE INFORMATION PROVIDED.

## 2018-08-04 LAB
BACTERIA SPEC CULT: NORMAL
BACTERIA SPEC CULT: NORMAL
SERVICE CMNT-IMP: NORMAL

## 2018-08-05 LAB
ANNOTATION COMMENT IMP: NORMAL
GAMMA INTERFERON BACKGROUND BLD IA-ACNC: 0.12 IU/ML
M TB IFN-G BLD-IMP: NEGATIVE
M TB IFN-G CD4+ BCKGRND COR BLD-ACNC: 0.2 IU/ML
M TB IFN-G CD4+ T-CELLS BLD-ACNC: 0.32 IU/ML
MITOGEN IGNF BLD-ACNC: >10 IU/ML
QUANTIFERON INCUBATION: NORMAL
SERVICE CMNT-IMP: NORMAL

## 2018-08-13 DIAGNOSIS — F41.9 ANXIETY: ICD-10-CM

## 2018-08-13 RX ORDER — METFORMIN HYDROCHLORIDE 500 MG/1
TABLET ORAL
Qty: 120 TAB | Refills: 1 | Status: SHIPPED | OUTPATIENT
Start: 2018-08-13 | End: 2018-10-23 | Stop reason: SDUPTHER

## 2018-08-13 RX ORDER — PROPRANOLOL HYDROCHLORIDE 10 MG/1
TABLET ORAL
Qty: 90 TAB | Refills: 0 | Status: SHIPPED | OUTPATIENT
Start: 2018-08-13 | End: 2018-09-28 | Stop reason: SDUPTHER

## 2018-08-15 ENCOUNTER — OFFICE VISIT (OUTPATIENT)
Dept: INTERNAL MEDICINE CLINIC | Age: 58
End: 2018-08-15

## 2018-08-15 VITALS
DIASTOLIC BLOOD PRESSURE: 78 MMHG | SYSTOLIC BLOOD PRESSURE: 120 MMHG | TEMPERATURE: 98.8 F | WEIGHT: 259.6 LBS | BODY MASS INDEX: 30.04 KG/M2 | HEART RATE: 66 BPM | OXYGEN SATURATION: 96 % | RESPIRATION RATE: 19 BRPM | HEIGHT: 78 IN

## 2018-08-15 DIAGNOSIS — Z01.818 PREOP EXAM FOR INTERNAL MEDICINE: Primary | ICD-10-CM

## 2018-08-15 DIAGNOSIS — F17.200 SMOKING ADDICTION: ICD-10-CM

## 2018-08-15 RX ORDER — IBUPROFEN 200 MG
1 TABLET ORAL EVERY 24 HOURS
Qty: 30 PATCH | Refills: 1 | Status: SHIPPED | OUTPATIENT
Start: 2018-08-15 | End: 2018-09-14

## 2018-08-15 NOTE — PROGRESS NOTES
Preoperative Asessment    Cleve Dong is 66 y.o. male (1960) who presents for preoperative evaluation. Procedure/Surgery: Lumbar thoracic fusion   Date of Procedure/Surgery: 8/29/18   Surgeon: Dr. Leisa Burr: Author Lovely   Primary Physician: Shaquille Burnham. MD    He reports feeling some ongoing back pain, awaiting his fusion procedure. He tells me that overall he has felt \"OK\". He continues to be treated for his anxiety and benzodiazepine dependence. He has been working with psychiatry to wean this. He also continues on Methadone for his history of heroin abuse. Now clean for several years. Diabetes management continues. His last A1c was 8.7 at the end of July. Continues on Metformin/Invokana. He tries to eat well. He has a history of Hep C. Treated with Qing [facility]mark for cure in 2015     Patient Active Problem List   Diagnosis Code    Acute pancreatitis K85.90    Anxiety F41.9    Substance induced mood disorder ([facility] F19.94    Benzodiazepine dependence, continuous ([facility]) F13.20    Polysubstance dependence ([facility] F19.20    Type 2 diabetes mellitus with hyperglycemia, with long-term current use of insulin ([facility]) E11.65, Z79.4    Type 2 diabetes with nephropathy ([facility] E11.21    Essential hypertension I10    Dysuria R30.0       Prior to Admission medications    Medication Sig Start Date End Date Taking? Authorizing Provider   metFORMIN (GLUCOPHAGE) 500 mg tablet TAKE TWO TABLETS BY MOUTH TWICE A DAY WITH MEALS 8/13/18  Yes Jeri Webster MD   propranolol (INDERAL) 10 mg tablet TAKE ONE TABLET BY MOUTH THREE TIMES A DAY 8/13/18  Yes Catherine Mejia MD   ALPRAZolam [facility]) 2 mg tablet 2 mg three (3) times daily. 7/30/18  Yes Historical Provider   lisinopril (PRINIVIL, ZESTRIL) 40 mg tablet Take 1 Tab by mouth daily.  5/30/18  Yes Catherine Mejia MD   gabapentin (NEURONTIN) 300 mg capsule TAKE ONE CAPSULE BY MOUTH TWICE A DAY 5/24/18  Yes Catherine Mejia MD amLODIPine (NORVASC) 10 mg tablet TAKE ONE TABLET BY MOUTH DAILY 12/27/17  Yes Kit Adler MD   polyethylene glycol (MIRALAX) 17 gram/dose powder Take 17 g by mouth daily. Patient taking differently: Take 17 g by mouth daily as needed. 9/29/17  Yes Kit Adler MD   insulin glargine [facility] AUTHORITY [facility]) 100 unit/mL (3 mL) inpn Inject 28 units daily 8/29/17  Yes Demetra Hall MD   DOCUSATE SODIUM PO Take  by mouth as needed. Yes Historical Provider   METHADONE HCL (METHADONE PO) Take 95 mg by mouth daily. Yes Historical Provider   mirtazapine (REMERON) 15 mg tablet  7/17/17   Historical Provider   canagliflozin (INVOKANA) 300 mg tablet Take 1 Tab by mouth Daily (before breakfast). 8/29/17   Demetra Hall MD       Review of Systems   Constitutional: Negative. Respiratory: Negative. Cardiovascular: Negative. Latex Allergy: none  Recent use of: No recent use of aspirin (ASA), NSAIDS or steroids  Tetanus up to date: tetanus status unknown to the patient  Anesthesia Complications: None  History of abnormal bleeding : None  History of Blood Transfusions: no  Health Care Directive or Living Will: no    Visit Vitals    /78 (BP 1 Location: Right arm, BP Patient Position: Sitting)    Pulse 66    Temp 98.8 °F (37.1 °C) (Oral)    Resp 19    Ht 6' 6\" (1.981 m)    Wt 259 lb 9.6 oz (117.8 kg)    SpO2 96%    BMI 30 kg/m2         Physical Exam   Constitutional: He is oriented to person, place, and time and well-developed, well-nourished, and in no distress. Cardiovascular: Normal rate and regular rhythm. No murmur heard. Pulmonary/Chest: Effort normal and breath sounds normal.   Neurological: He is alert and oriented to person, place, and time. EKG: Reviewed, stable.     Labs:   Lab Results  Component Value Date/Time   WBC 6.6 07/31/2018 03:33 PM   HGB 13.3 07/31/2018 03:33 PM   HCT 38.8 07/31/2018 03:33 PM   PLATELET 040 67/93/1946 03:33 PM   MCV 86 07/31/2018 03:33 PM Lab Results  Component Value Date/Time   Cholesterol, total 237 (H) 03/14/2018 04:08 PM   HDL Cholesterol 40 03/14/2018 04:08 PM   LDL, calculated 120 (H) 03/14/2018 04:08 PM   Triglyceride 387 (H) 03/14/2018 04:08 PM   CHOL/HDL Ratio 4.5 12/01/2011 03:55 AM     Lab Results  Component Value Date/Time   ALT (SGPT) 10 07/31/2018 03:33 PM   AST (SGOT) 10 07/31/2018 03:33 PM   Alk. phosphatase 63 07/31/2018 03:33 PM   Bilirubin, total 0.3 07/31/2018 03:33 PM   Albumin 4.1 07/31/2018 03:33 PM   Protein, total 7.4 07/31/2018 03:33 PM   INR 1.0 08/03/2018 12:15 PM   Prothrombin time 10.0 08/03/2018 12:15 PM   PLATELET 691 49/43/1166 03:33 PM       Lab Results  Component Value Date/Time   GFR est non-AA 99 07/31/2018 03:33 PM   GFR est  07/31/2018 03:33 PM   Creatinine 0.82 07/31/2018 03:33 PM   BUN 12 07/31/2018 03:33 PM   Sodium 140 07/31/2018 03:33 PM   Potassium 4.6 07/31/2018 03:33 PM   Chloride 94 (L) 07/31/2018 03:33 PM   CO2 27 07/31/2018 03:33 PM     Lab Results   Component Value Date/Time    Hemoglobin A1c 8.7 (H) 07/31/2018 03:33 PM          Diagnoses and all orders for this visit:    1. Preop exam for internal medicine    2. Smoking addiction- He notes that he needs to stop smoking prior to his surgery. Will order nicoderm to aid with this. -     nicotine (NICODERM CQ) 14 mg/24 hr patch; 1 Patch by TransDERmal route every twenty-four (24) hours for 30 days. After reviewing the patient's history & exam he is low risk for cardiac complications.   No contraindications to planned surgery

## 2018-08-20 DIAGNOSIS — G62.9 NEUROPATHY: ICD-10-CM

## 2018-08-20 RX ORDER — GABAPENTIN 300 MG/1
CAPSULE ORAL
Qty: 60 CAP | Refills: 0 | Status: SHIPPED | OUTPATIENT
Start: 2018-08-20 | End: 2018-09-28 | Stop reason: SDUPTHER

## 2018-08-20 NOTE — TELEPHONE ENCOUNTER
Caryl Higgins (Self) [phone]     Refill on gabapentin to [facility]. Pt says that he is having a procedure done tomorrow morning and then will be flat on his back for two days. So if rx could be sent to pharm today, then his mother can pick it up for him tomorrow on their way home from the procedure.

## 2018-08-21 ENCOUNTER — HOSPITAL ENCOUNTER (OUTPATIENT)
Dept: CT IMAGING | Age: 58
Discharge: HOME | End: 2018-08-21
Attending: ORTHOPAEDIC SURGERY
Payer: MEDICAID

## 2018-08-21 ENCOUNTER — HOSPITAL ENCOUNTER (OUTPATIENT)
Dept: GENERAL RADIOLOGY | Age: 58
Discharge: HOME | End: 2018-08-21
Attending: ORTHOPAEDIC SURGERY
Payer: MEDICAID

## 2018-08-21 VITALS
WEIGHT: 259 LBS | TEMPERATURE: 97.8 F | BODY MASS INDEX: 29.97 KG/M2 | DIASTOLIC BLOOD PRESSURE: 82 MMHG | RESPIRATION RATE: 15 BRPM | HEART RATE: 57 BPM | HEIGHT: 78 IN | SYSTOLIC BLOOD PRESSURE: 147 MMHG | OXYGEN SATURATION: 98 %

## 2018-08-21 DIAGNOSIS — M48.061 STENOSIS, SPINAL, LUMBAR: ICD-10-CM

## 2018-08-21 DIAGNOSIS — M43.16 SPONDYLOLISTHESIS OF LUMBAR REGION: ICD-10-CM

## 2018-08-21 PROCEDURE — 62304 MYELOGRAPHY LUMBAR INJECTION: CPT

## 2018-08-21 PROCEDURE — 74011250636 HC RX REV CODE- 250/636: Performed by: RADIOLOGY

## 2018-08-21 PROCEDURE — 72132 CT LUMBAR SPINE W/DYE: CPT

## 2018-08-21 PROCEDURE — 74011636320 HC RX REV CODE- 636/320

## 2018-08-21 RX ORDER — LIDOCAINE HYDROCHLORIDE 10 MG/ML
10 INJECTION INFILTRATION; PERINEURAL
Status: COMPLETED | OUTPATIENT
Start: 2018-08-21 | End: 2018-08-21

## 2018-08-21 RX ADMIN — LIDOCAINE HYDROCHLORIDE 10 ML: 10 INJECTION, SOLUTION INFILTRATION; PERINEURAL at 10:00

## 2018-08-21 RX ADMIN — IOHEXOL 20 ML: 180 INJECTION INTRAVENOUS at 10:00

## 2018-08-21 NOTE — DISCHARGE INSTRUCTIONS
Side effects of  medications used today have been reviewed. Notify us of nausea, itching, hives, dizziness, or anything else out of the ordinary. Should you experience any of these significant changes, please call [phone] between the hours of 7:30 am and 10 pm or [phone] after hours. After hours, ask the  to page the X-ray Technologist, and describe the problem to the technologist.     POST MYELOGRAM DISCHARGE INSTRUCTIONS    Myelogram:     A Myelogram involves a lumbar puncture, and instead of removing fluid, contrast will be injected into the sac surrounding the spinal column. It is done to visualize the spinal column, nerve roots, spinal canal, vertebral discs and disc space. It is usually done to diagnose back pain with unknown cause or in preparation for surgery. After the injection, a CT scan will be done, usually within two hours of the injection. Rest  today. You should spend the day in a reclining position - this can be in a recliner on the sofa or in bed. Your head should be elevated on 3-4 pillows if in bed or on the sofa. You lie down in bed at 10 pm with one pillow under your head. You should be up only to use the restroom. Call If:     You should call your Physician and/or the Radiology Nurse if you develop a headache that is not relieved by Tylenol, and worsens when you stand and eases when you lie down, you need to call. You may have developed what is referred to as a spinal headache. Our physician's will probably advise you to be on strict bed rest for 24 hours, to drink lots of fluids and caffeine. If this does not help the head pain, call again the next day. You should call if you have bleeding other than a small spot on your bandage. You should call if you have any numbness, tingling, weakness, fever, chills, urinary retention, severe itching, rash, welts, swelling, or confusion.      Follow-Up Instructions: See the doctor who ordered your procedure as he/she has instructed. If you had a Lumbar Puncture or Myelogram, your results should be available to your ordering doctor in 3-5 business days. You can remove your dressing in 24 hours and shower regularly. Do not bathe or swim for 72 hours. To Reach Us: Should you experience any significant changes, please call [phone] between the hours of 7:30 am and 10 pm or [phone] after hours.  After hours, ask the  to page the [address] Technologist, and describe the problem to the technologist.           Patient Signature:  Date: 8/21/2018  Discharging Nurse: Sally Maldonado RN

## 2018-08-21 NOTE — PROGRESS NOTES
Rcvd. In angio following myelogram.  Denies any discomfort. VSS. Discharge instructions reviewed with patient. Verbalizes understanding of same.

## 2018-08-29 ENCOUNTER — ANESTHESIA (OUTPATIENT)
Dept: SURGERY | Age: 58
DRG: 304 | End: 2018-08-29
Payer: MEDICAID

## 2018-08-29 ENCOUNTER — APPOINTMENT (OUTPATIENT)
Dept: GENERAL RADIOLOGY | Age: 58
DRG: 304 | End: 2018-08-29
Attending: ORTHOPAEDIC SURGERY
Payer: MEDICAID

## 2018-08-29 ENCOUNTER — HOSPITAL ENCOUNTER (INPATIENT)
Age: 58
LOS: 5 days | Discharge: HOME HEALTH | DRG: 304 | End: 2018-09-03
Attending: ORTHOPAEDIC SURGERY | Admitting: ORTHOPAEDIC SURGERY
Payer: MEDICAID

## 2018-08-29 ENCOUNTER — ANESTHESIA EVENT (OUTPATIENT)
Dept: SURGERY | Age: 58
DRG: 304 | End: 2018-08-29
Payer: MEDICAID

## 2018-08-29 DIAGNOSIS — M48.061 SPINAL STENOSIS OF LUMBAR REGION, UNSPECIFIED WHETHER NEUROGENIC CLAUDICATION PRESENT: Primary | ICD-10-CM

## 2018-08-29 LAB
ABO + RH BLD: NORMAL
BLOOD GROUP ANTIBODIES SERPL: NORMAL
GLUCOSE BLD STRIP.AUTO-MCNC: 124 MG/DL (ref 65–100)
SERVICE CMNT-IMP: ABNORMAL
SPECIMEN EXP DATE BLD: NORMAL

## 2018-08-29 PROCEDURE — 85018 HEMOGLOBIN: CPT

## 2018-08-29 PROCEDURE — P9045 ALBUMIN (HUMAN), 5%, 250 ML: HCPCS

## 2018-08-29 PROCEDURE — 65270000029 HC RM PRIVATE

## 2018-08-29 PROCEDURE — 72020 X-RAY EXAM OF SPINE 1 VIEW: CPT

## 2018-08-29 PROCEDURE — 77030020782 HC GWN BAIR PAWS FLX 3M -B

## 2018-08-29 PROCEDURE — 77030031139 HC SUT VCRL2 J&J -A: Performed by: ORTHOPAEDIC SURGERY

## 2018-08-29 PROCEDURE — 77030034850: Performed by: ORTHOPAEDIC SURGERY

## 2018-08-29 PROCEDURE — 77030019908 HC STETH ESOPH SIMS -A: Performed by: ANESTHESIOLOGY

## 2018-08-29 PROCEDURE — 3E0U0GB INTRODUCTION OF RECOMBINANT BONE MORPHOGENETIC PROTEIN INTO JOINTS, OPEN APPROACH: ICD-10-PCS | Performed by: ORTHOPAEDIC SURGERY

## 2018-08-29 PROCEDURE — 82962 GLUCOSE BLOOD TEST: CPT

## 2018-08-29 PROCEDURE — 77030008684 HC TU ET CUF COVD -B: Performed by: ANESTHESIOLOGY

## 2018-08-29 PROCEDURE — 77030012406 HC DRN WND PENRS BARD -A: Performed by: ORTHOPAEDIC SURGERY

## 2018-08-29 PROCEDURE — 0SG10K1 FUSION OF 2 OR MORE LUMBAR VERTEBRAL JOINTS WITH NONAUTOLOGOUS TISSUE SUBSTITUTE, POSTERIOR APPROACH, POSTERIOR COLUMN, OPEN APPROACH: ICD-10-PCS | Performed by: ORTHOPAEDIC SURGERY

## 2018-08-29 PROCEDURE — 86900 BLOOD TYPING SEROLOGIC ABO: CPT | Performed by: ORTHOPAEDIC SURGERY

## 2018-08-29 PROCEDURE — 76060000039 HC ANESTHESIA 4 TO 4.5 HR: Performed by: ORTHOPAEDIC SURGERY

## 2018-08-29 PROCEDURE — 77030018836 HC SOL IRR NACL ICUM -A: Performed by: ORTHOPAEDIC SURGERY

## 2018-08-29 PROCEDURE — 77030037728 HC GRFT BN FBR CORT 3DEMIN 30CC BACT -I: Performed by: ORTHOPAEDIC SURGERY

## 2018-08-29 PROCEDURE — 76210000017 HC OR PH I REC 1.5 TO 2 HR: Performed by: ORTHOPAEDIC SURGERY

## 2018-08-29 PROCEDURE — 77030004391 HC BUR FLUT MEDT -C: Performed by: ORTHOPAEDIC SURGERY

## 2018-08-29 PROCEDURE — 77030014007 HC SPNG HEMSTAT J&J -B: Performed by: ORTHOPAEDIC SURGERY

## 2018-08-29 PROCEDURE — 74011250636 HC RX REV CODE- 250/636: Performed by: PHYSICIAN ASSISTANT

## 2018-08-29 PROCEDURE — C1713 ANCHOR/SCREW BN/BN,TIS/BN: HCPCS | Performed by: ORTHOPAEDIC SURGERY

## 2018-08-29 PROCEDURE — 77030013079 HC BLNKT BAIR HGGR 3M -A: Performed by: ANESTHESIOLOGY

## 2018-08-29 PROCEDURE — 36415 COLL VENOUS BLD VENIPUNCTURE: CPT | Performed by: ORTHOPAEDIC SURGERY

## 2018-08-29 PROCEDURE — 77030026438 HC STYL ET INTUB CARD -A: Performed by: ANESTHESIOLOGY

## 2018-08-29 PROCEDURE — 77030029099 HC BN WAX SSPC -A: Performed by: ORTHOPAEDIC SURGERY

## 2018-08-29 PROCEDURE — 74011250636 HC RX REV CODE- 250/636

## 2018-08-29 PROCEDURE — 74011250636 HC RX REV CODE- 250/636: Performed by: ORTHOPAEDIC SURGERY

## 2018-08-29 PROCEDURE — 74011000250 HC RX REV CODE- 250

## 2018-08-29 PROCEDURE — 77030039267 HC ADH SKN EXOFIN S2SG -B: Performed by: ORTHOPAEDIC SURGERY

## 2018-08-29 PROCEDURE — 77030038600 HC TU BPLR IRR DISP STRY -B: Performed by: ORTHOPAEDIC SURGERY

## 2018-08-29 PROCEDURE — 76010000175 HC OR TIME 4 TO 4.5 HR INTENSV-TIER 1: Performed by: ORTHOPAEDIC SURGERY

## 2018-08-29 PROCEDURE — 74011250636 HC RX REV CODE- 250/636: Performed by: ANESTHESIOLOGY

## 2018-08-29 PROCEDURE — 77030012893

## 2018-08-29 PROCEDURE — 77030003195 HC GRFT RECOMB BN MEDT -J: Performed by: ORTHOPAEDIC SURGERY

## 2018-08-29 PROCEDURE — 77030038020 HC MANFLD NEPTUNE STRY -B: Performed by: ORTHOPAEDIC SURGERY

## 2018-08-29 PROCEDURE — 0SP004Z REMOVAL OF INTERNAL FIXATION DEVICE FROM LUMBAR VERTEBRAL JOINT, OPEN APPROACH: ICD-10-PCS | Performed by: ORTHOPAEDIC SURGERY

## 2018-08-29 PROCEDURE — 74011250636 HC RX REV CODE- 250/636: Performed by: NURSE ANESTHETIST, CERTIFIED REGISTERED

## 2018-08-29 PROCEDURE — 77030032490 HC SLV COMPR SCD KNE COVD -B: Performed by: ORTHOPAEDIC SURGERY

## 2018-08-29 DEVICE — 5.5MM CURVED ROD, TITANIUM ALLOY, 85MM LENGTH
Type: IMPLANTABLE DEVICE | Site: SPINE LUMBAR | Status: FUNCTIONAL
Brand: CREO

## 2018-08-29 DEVICE — GRAFT BNE 3D 30 CC CORTICAL FIBER: Type: IMPLANTABLE DEVICE | Site: SPINE LUMBAR | Status: FUNCTIONAL

## 2018-08-29 DEVICE — 5.5 LOCKING CAP, CREO
Type: IMPLANTABLE DEVICE | Site: SPINE LUMBAR | Status: FUNCTIONAL
Brand: CREO

## 2018-08-29 DEVICE — BONE GRAFT KIT 7510400 INFUSE MEDIUM
Type: IMPLANTABLE DEVICE | Site: SPINE LUMBAR | Status: FUNCTIONAL
Brand: INFUSE® BONE GRAFT

## 2018-08-29 DEVICE — CREO&REG; 5.5, 7.5 X 45MM POLYAXIAL SCREW
Type: IMPLANTABLE DEVICE | Site: SPINE LUMBAR | Status: FUNCTIONAL
Brand: CREO

## 2018-08-29 RX ORDER — ONDANSETRON 2 MG/ML
4 INJECTION INTRAMUSCULAR; INTRAVENOUS AS NEEDED
Status: DISCONTINUED | OUTPATIENT
Start: 2018-08-29 | End: 2018-08-29 | Stop reason: HOSPADM

## 2018-08-29 RX ORDER — SUCCINYLCHOLINE CHLORIDE 20 MG/ML
INJECTION INTRAMUSCULAR; INTRAVENOUS AS NEEDED
Status: DISCONTINUED | OUTPATIENT
Start: 2018-08-29 | End: 2018-08-29 | Stop reason: HOSPADM

## 2018-08-29 RX ORDER — OXYCODONE HYDROCHLORIDE 5 MG/1
10 TABLET ORAL
Status: DISCONTINUED | OUTPATIENT
Start: 2018-08-29 | End: 2018-08-31

## 2018-08-29 RX ORDER — SODIUM CHLORIDE, SODIUM LACTATE, POTASSIUM CHLORIDE, CALCIUM CHLORIDE 600; 310; 30; 20 MG/100ML; MG/100ML; MG/100ML; MG/100ML
75 INJECTION, SOLUTION INTRAVENOUS CONTINUOUS
Status: DISCONTINUED | OUTPATIENT
Start: 2018-08-29 | End: 2018-08-29 | Stop reason: HOSPADM

## 2018-08-29 RX ORDER — PROPRANOLOL HYDROCHLORIDE 20 MG/1
10 TABLET ORAL 3 TIMES DAILY
Status: DISCONTINUED | OUTPATIENT
Start: 2018-08-30 | End: 2018-09-03 | Stop reason: HOSPADM

## 2018-08-29 RX ORDER — SODIUM CHLORIDE 0.9 % (FLUSH) 0.9 %
5-10 SYRINGE (ML) INJECTION EVERY 8 HOURS
Status: DISCONTINUED | OUTPATIENT
Start: 2018-08-29 | End: 2018-08-29 | Stop reason: HOSPADM

## 2018-08-29 RX ORDER — OXYCODONE HYDROCHLORIDE 5 MG/1
5 TABLET ORAL
Status: DISCONTINUED | OUTPATIENT
Start: 2018-08-29 | End: 2018-08-31

## 2018-08-29 RX ORDER — MIDAZOLAM HYDROCHLORIDE 1 MG/ML
0.5 INJECTION, SOLUTION INTRAMUSCULAR; INTRAVENOUS
Status: DISCONTINUED | OUTPATIENT
Start: 2018-08-29 | End: 2018-08-29 | Stop reason: HOSPADM

## 2018-08-29 RX ORDER — FENTANYL CITRATE 50 UG/ML
50 INJECTION, SOLUTION INTRAMUSCULAR; INTRAVENOUS AS NEEDED
Status: DISCONTINUED | OUTPATIENT
Start: 2018-08-29 | End: 2018-08-29 | Stop reason: HOSPADM

## 2018-08-29 RX ORDER — SODIUM CHLORIDE 0.9 % (FLUSH) 0.9 %
5-10 SYRINGE (ML) INJECTION AS NEEDED
Status: DISCONTINUED | OUTPATIENT
Start: 2018-08-29 | End: 2018-09-03 | Stop reason: HOSPADM

## 2018-08-29 RX ORDER — SODIUM CHLORIDE 9 MG/ML
INJECTION, SOLUTION INTRAVENOUS
Status: DISCONTINUED | OUTPATIENT
Start: 2018-08-29 | End: 2018-08-29 | Stop reason: HOSPADM

## 2018-08-29 RX ORDER — SODIUM CHLORIDE, SODIUM LACTATE, POTASSIUM CHLORIDE, CALCIUM CHLORIDE 600; 310; 30; 20 MG/100ML; MG/100ML; MG/100ML; MG/100ML
125 INJECTION, SOLUTION INTRAVENOUS CONTINUOUS
Status: DISCONTINUED | OUTPATIENT
Start: 2018-08-29 | End: 2018-08-29 | Stop reason: HOSPADM

## 2018-08-29 RX ORDER — NALOXONE HYDROCHLORIDE 0.4 MG/ML
0.4 INJECTION, SOLUTION INTRAMUSCULAR; INTRAVENOUS; SUBCUTANEOUS AS NEEDED
Status: DISCONTINUED | OUTPATIENT
Start: 2018-08-29 | End: 2018-09-03 | Stop reason: HOSPADM

## 2018-08-29 RX ORDER — SODIUM CHLORIDE 9 MG/ML
50 INJECTION, SOLUTION INTRAVENOUS CONTINUOUS
Status: DISCONTINUED | OUTPATIENT
Start: 2018-08-29 | End: 2018-08-29 | Stop reason: HOSPADM

## 2018-08-29 RX ORDER — SODIUM CHLORIDE 0.9 % (FLUSH) 0.9 %
5-10 SYRINGE (ML) INJECTION AS NEEDED
Status: DISCONTINUED | OUTPATIENT
Start: 2018-08-29 | End: 2018-08-29 | Stop reason: HOSPADM

## 2018-08-29 RX ORDER — DIPHENHYDRAMINE HYDROCHLORIDE 50 MG/ML
12.5 INJECTION, SOLUTION INTRAMUSCULAR; INTRAVENOUS AS NEEDED
Status: DISCONTINUED | OUTPATIENT
Start: 2018-08-29 | End: 2018-08-29 | Stop reason: HOSPADM

## 2018-08-29 RX ORDER — FACIAL-BODY WIPES
10 EACH TOPICAL DAILY PRN
Status: DISCONTINUED | OUTPATIENT
Start: 2018-08-31 | End: 2018-09-03 | Stop reason: HOSPADM

## 2018-08-29 RX ORDER — SODIUM CHLORIDE 0.9 % (FLUSH) 0.9 %
5-10 SYRINGE (ML) INJECTION EVERY 8 HOURS
Status: DISCONTINUED | OUTPATIENT
Start: 2018-08-30 | End: 2018-09-03 | Stop reason: HOSPADM

## 2018-08-29 RX ORDER — DEXMEDETOMIDINE HYDROCHLORIDE 4 UG/ML
INJECTION, SOLUTION INTRAVENOUS AS NEEDED
Status: DISCONTINUED | OUTPATIENT
Start: 2018-08-29 | End: 2018-08-29 | Stop reason: HOSPADM

## 2018-08-29 RX ORDER — MAGNESIUM SULFATE 100 %
4 CRYSTALS MISCELLANEOUS AS NEEDED
Status: DISCONTINUED | OUTPATIENT
Start: 2018-08-29 | End: 2018-09-03 | Stop reason: HOSPADM

## 2018-08-29 RX ORDER — LIDOCAINE HYDROCHLORIDE 10 MG/ML
0.1 INJECTION, SOLUTION EPIDURAL; INFILTRATION; INTRACAUDAL; PERINEURAL AS NEEDED
Status: DISCONTINUED | OUTPATIENT
Start: 2018-08-29 | End: 2018-08-29 | Stop reason: HOSPADM

## 2018-08-29 RX ORDER — HYDROMORPHONE HCL/0.9% NACL/PF 0.5 MG/ML
PLASTIC BAG, INJECTION (ML) INTRAVENOUS
Status: DISCONTINUED | OUTPATIENT
Start: 2018-08-29 | End: 2018-08-30

## 2018-08-29 RX ORDER — HYDROCODONE BITARTRATE AND ACETAMINOPHEN 5; 325 MG/1; MG/1
1 TABLET ORAL AS NEEDED
Status: DISCONTINUED | OUTPATIENT
Start: 2018-08-29 | End: 2018-08-29 | Stop reason: HOSPADM

## 2018-08-29 RX ORDER — KETAMINE HYDROCHLORIDE 10 MG/ML
INJECTION, SOLUTION INTRAMUSCULAR; INTRAVENOUS AS NEEDED
Status: DISCONTINUED | OUTPATIENT
Start: 2018-08-29 | End: 2018-08-29 | Stop reason: HOSPADM

## 2018-08-29 RX ORDER — FENTANYL CITRATE 50 UG/ML
25 INJECTION, SOLUTION INTRAMUSCULAR; INTRAVENOUS
Status: DISCONTINUED | OUTPATIENT
Start: 2018-08-29 | End: 2018-08-29 | Stop reason: HOSPADM

## 2018-08-29 RX ORDER — SODIUM CHLORIDE, SODIUM LACTATE, POTASSIUM CHLORIDE, CALCIUM CHLORIDE 600; 310; 30; 20 MG/100ML; MG/100ML; MG/100ML; MG/100ML
INJECTION, SOLUTION INTRAVENOUS
Status: DISCONTINUED | OUTPATIENT
Start: 2018-08-29 | End: 2018-08-29 | Stop reason: HOSPADM

## 2018-08-29 RX ORDER — CEFAZOLIN SODIUM/WATER 2 G/20 ML
2 SYRINGE (ML) INTRAVENOUS ONCE
Status: COMPLETED | OUTPATIENT
Start: 2018-08-29 | End: 2018-08-29

## 2018-08-29 RX ORDER — HYDROMORPHONE HYDROCHLORIDE 2 MG/ML
INJECTION, SOLUTION INTRAMUSCULAR; INTRAVENOUS; SUBCUTANEOUS AS NEEDED
Status: DISCONTINUED | OUTPATIENT
Start: 2018-08-29 | End: 2018-08-29 | Stop reason: HOSPADM

## 2018-08-29 RX ORDER — PROPOFOL 10 MG/ML
INJECTION, EMULSION INTRAVENOUS AS NEEDED
Status: DISCONTINUED | OUTPATIENT
Start: 2018-08-29 | End: 2018-08-29 | Stop reason: HOSPADM

## 2018-08-29 RX ORDER — LIDOCAINE HYDROCHLORIDE 20 MG/ML
INJECTION, SOLUTION EPIDURAL; INFILTRATION; INTRACAUDAL; PERINEURAL AS NEEDED
Status: DISCONTINUED | OUTPATIENT
Start: 2018-08-29 | End: 2018-08-29 | Stop reason: HOSPADM

## 2018-08-29 RX ORDER — MIDAZOLAM HYDROCHLORIDE 1 MG/ML
1 INJECTION, SOLUTION INTRAMUSCULAR; INTRAVENOUS AS NEEDED
Status: DISCONTINUED | OUTPATIENT
Start: 2018-08-29 | End: 2018-08-29 | Stop reason: HOSPADM

## 2018-08-29 RX ORDER — METHADONE HYDROCHLORIDE 5 MG/5ML
95 SOLUTION ORAL DAILY
Status: DISCONTINUED | OUTPATIENT
Start: 2018-08-30 | End: 2018-09-03 | Stop reason: HOSPADM

## 2018-08-29 RX ORDER — GLYCOPYRROLATE 0.2 MG/ML
INJECTION INTRAMUSCULAR; INTRAVENOUS AS NEEDED
Status: DISCONTINUED | OUTPATIENT
Start: 2018-08-29 | End: 2018-08-29 | Stop reason: HOSPADM

## 2018-08-29 RX ORDER — ACETAMINOPHEN 325 MG/1
650 TABLET ORAL EVERY 6 HOURS
Status: DISCONTINUED | OUTPATIENT
Start: 2018-08-30 | End: 2018-09-03 | Stop reason: HOSPADM

## 2018-08-29 RX ORDER — FENTANYL CITRATE 50 UG/ML
INJECTION, SOLUTION INTRAMUSCULAR; INTRAVENOUS AS NEEDED
Status: DISCONTINUED | OUTPATIENT
Start: 2018-08-29 | End: 2018-08-29 | Stop reason: HOSPADM

## 2018-08-29 RX ORDER — AMLODIPINE BESYLATE 5 MG/1
10 TABLET ORAL DAILY
Status: DISCONTINUED | OUTPATIENT
Start: 2018-08-30 | End: 2018-09-03 | Stop reason: HOSPADM

## 2018-08-29 RX ORDER — DEXTROSE 50 % IN WATER (D50W) INTRAVENOUS SYRINGE
12.5-25 AS NEEDED
Status: DISCONTINUED | OUTPATIENT
Start: 2018-08-29 | End: 2018-09-03 | Stop reason: HOSPADM

## 2018-08-29 RX ORDER — PHENYLEPHRINE HCL IN 0.9% NACL 0.4MG/10ML
SYRINGE (ML) INTRAVENOUS AS NEEDED
Status: DISCONTINUED | OUTPATIENT
Start: 2018-08-29 | End: 2018-08-29 | Stop reason: HOSPADM

## 2018-08-29 RX ORDER — ONDANSETRON 2 MG/ML
INJECTION INTRAMUSCULAR; INTRAVENOUS AS NEEDED
Status: DISCONTINUED | OUTPATIENT
Start: 2018-08-29 | End: 2018-08-29 | Stop reason: HOSPADM

## 2018-08-29 RX ORDER — ROCURONIUM BROMIDE 10 MG/ML
INJECTION, SOLUTION INTRAVENOUS AS NEEDED
Status: DISCONTINUED | OUTPATIENT
Start: 2018-08-29 | End: 2018-08-29 | Stop reason: HOSPADM

## 2018-08-29 RX ORDER — ALBUMIN HUMAN 50 G/1000ML
SOLUTION INTRAVENOUS AS NEEDED
Status: DISCONTINUED | OUTPATIENT
Start: 2018-08-29 | End: 2018-08-29 | Stop reason: HOSPADM

## 2018-08-29 RX ORDER — VANCOMYCIN 2 GRAM/500 ML IN 0.9 % SODIUM CHLORIDE INTRAVENOUS
2000 ONCE
Status: COMPLETED | OUTPATIENT
Start: 2018-08-29 | End: 2018-08-29

## 2018-08-29 RX ORDER — ACETAMINOPHEN 10 MG/ML
INJECTION, SOLUTION INTRAVENOUS AS NEEDED
Status: DISCONTINUED | OUTPATIENT
Start: 2018-08-29 | End: 2018-08-29 | Stop reason: HOSPADM

## 2018-08-29 RX ORDER — DIAZEPAM 5 MG/1
5 TABLET ORAL
Status: DISCONTINUED | OUTPATIENT
Start: 2018-08-29 | End: 2018-08-30

## 2018-08-29 RX ORDER — DIPHENHYDRAMINE HYDROCHLORIDE 50 MG/ML
12.5 INJECTION, SOLUTION INTRAMUSCULAR; INTRAVENOUS
Status: ACTIVE | OUTPATIENT
Start: 2018-08-29 | End: 2018-09-02

## 2018-08-29 RX ORDER — MORPHINE SULFATE 10 MG/ML
2 INJECTION, SOLUTION INTRAMUSCULAR; INTRAVENOUS
Status: DISCONTINUED | OUTPATIENT
Start: 2018-08-29 | End: 2018-08-29 | Stop reason: HOSPADM

## 2018-08-29 RX ORDER — AMOXICILLIN 250 MG
1 CAPSULE ORAL 2 TIMES DAILY
Status: DISCONTINUED | OUTPATIENT
Start: 2018-08-30 | End: 2018-09-03 | Stop reason: HOSPADM

## 2018-08-29 RX ORDER — SODIUM CHLORIDE 9 MG/ML
25 INJECTION, SOLUTION INTRAVENOUS CONTINUOUS
Status: DISCONTINUED | OUTPATIENT
Start: 2018-08-29 | End: 2018-08-29 | Stop reason: HOSPADM

## 2018-08-29 RX ORDER — GABAPENTIN 300 MG/1
300 CAPSULE ORAL 2 TIMES DAILY
Status: DISCONTINUED | OUTPATIENT
Start: 2018-08-30 | End: 2018-09-03 | Stop reason: HOSPADM

## 2018-08-29 RX ORDER — SODIUM CHLORIDE 9 MG/ML
125 INJECTION, SOLUTION INTRAVENOUS CONTINUOUS
Status: DISCONTINUED | OUTPATIENT
Start: 2018-08-30 | End: 2018-09-01

## 2018-08-29 RX ORDER — IBUPROFEN 200 MG
1 TABLET ORAL EVERY 24 HOURS
Status: DISCONTINUED | OUTPATIENT
Start: 2018-08-30 | End: 2018-09-03 | Stop reason: HOSPADM

## 2018-08-29 RX ORDER — ONDANSETRON 2 MG/ML
4 INJECTION INTRAMUSCULAR; INTRAVENOUS
Status: DISPENSED | OUTPATIENT
Start: 2018-08-29 | End: 2018-09-02

## 2018-08-29 RX ORDER — ALPRAZOLAM 1 MG/1
2 TABLET ORAL 3 TIMES DAILY
Status: DISCONTINUED | OUTPATIENT
Start: 2018-08-30 | End: 2018-09-03 | Stop reason: HOSPADM

## 2018-08-29 RX ORDER — LIDOCAINE HYDROCHLORIDE ANHYDROUS AND DEXTROSE MONOHYDRATE .8; 5 G/100ML; G/100ML
1 INJECTION, SOLUTION INTRAVENOUS CONTINUOUS
Status: DISCONTINUED | OUTPATIENT
Start: 2018-08-29 | End: 2018-08-31

## 2018-08-29 RX ORDER — INSULIN LISPRO 100 [IU]/ML
INJECTION, SOLUTION INTRAVENOUS; SUBCUTANEOUS
Status: DISCONTINUED | OUTPATIENT
Start: 2018-08-30 | End: 2018-09-03 | Stop reason: HOSPADM

## 2018-08-29 RX ORDER — MIRTAZAPINE 15 MG/1
15 TABLET, FILM COATED ORAL
Status: DISCONTINUED | OUTPATIENT
Start: 2018-08-30 | End: 2018-09-03 | Stop reason: HOSPADM

## 2018-08-29 RX ORDER — MIDAZOLAM HYDROCHLORIDE 1 MG/ML
INJECTION, SOLUTION INTRAMUSCULAR; INTRAVENOUS AS NEEDED
Status: DISCONTINUED | OUTPATIENT
Start: 2018-08-29 | End: 2018-08-29 | Stop reason: HOSPADM

## 2018-08-29 RX ORDER — GLYCOPYRROLATE 0.2 MG/ML
INJECTION INTRAMUSCULAR; INTRAVENOUS AS NEEDED
Status: DISCONTINUED | OUTPATIENT
Start: 2018-08-29 | End: 2018-08-29

## 2018-08-29 RX ORDER — CEFAZOLIN SODIUM/WATER 2 G/20 ML
2 SYRINGE (ML) INTRAVENOUS EVERY 8 HOURS
Status: CANCELLED | OUTPATIENT
Start: 2018-08-29 | End: 2018-08-30

## 2018-08-29 RX ADMIN — PROPOFOL 200 MG: 10 INJECTION, EMULSION INTRAVENOUS at 17:49

## 2018-08-29 RX ADMIN — ONDANSETRON 4 MG: 2 INJECTION INTRAMUSCULAR; INTRAVENOUS at 21:04

## 2018-08-29 RX ADMIN — MIDAZOLAM HYDROCHLORIDE 2 MG: 1 INJECTION, SOLUTION INTRAMUSCULAR; INTRAVENOUS at 17:49

## 2018-08-29 RX ADMIN — DEXMEDETOMIDINE HYDROCHLORIDE 4 MCG: 4 INJECTION, SOLUTION INTRAVENOUS at 18:48

## 2018-08-29 RX ADMIN — HYDROMORPHONE HYDROCHLORIDE 2 MG: 2 INJECTION, SOLUTION INTRAMUSCULAR; INTRAVENOUS; SUBCUTANEOUS at 21:51

## 2018-08-29 RX ADMIN — MIDAZOLAM 0.5 MG: 1 INJECTION INTRAMUSCULAR; INTRAVENOUS at 22:40

## 2018-08-29 RX ADMIN — MIDAZOLAM 0.5 MG: 1 INJECTION INTRAMUSCULAR; INTRAVENOUS at 22:30

## 2018-08-29 RX ADMIN — KETAMINE HYDROCHLORIDE 10 MG: 10 INJECTION, SOLUTION INTRAMUSCULAR; INTRAVENOUS at 18:02

## 2018-08-29 RX ADMIN — LIDOCAINE HYDROCHLORIDE 100 MG: 20 INJECTION, SOLUTION EPIDURAL; INFILTRATION; INTRACAUDAL; PERINEURAL at 17:49

## 2018-08-29 RX ADMIN — ROCURONIUM BROMIDE 10 MG: 10 INJECTION, SOLUTION INTRAVENOUS at 18:40

## 2018-08-29 RX ADMIN — ALBUMIN HUMAN 250 ML: 50 SOLUTION INTRAVENOUS at 18:30

## 2018-08-29 RX ADMIN — HYDROMORPHONE HYDROCHLORIDE 1 MG: 2 INJECTION, SOLUTION INTRAMUSCULAR; INTRAVENOUS; SUBCUTANEOUS at 19:23

## 2018-08-29 RX ADMIN — SUCCINYLCHOLINE CHLORIDE 160 MG: 20 INJECTION INTRAMUSCULAR; INTRAVENOUS at 17:49

## 2018-08-29 RX ADMIN — MIDAZOLAM HYDROCHLORIDE 3 MG: 1 INJECTION, SOLUTION INTRAMUSCULAR; INTRAVENOUS at 17:40

## 2018-08-29 RX ADMIN — DEXMEDETOMIDINE HYDROCHLORIDE 8 MCG: 4 INJECTION, SOLUTION INTRAVENOUS at 18:09

## 2018-08-29 RX ADMIN — SODIUM CHLORIDE, SODIUM LACTATE, POTASSIUM CHLORIDE, CALCIUM CHLORIDE: 600; 310; 30; 20 INJECTION, SOLUTION INTRAVENOUS at 17:44

## 2018-08-29 RX ADMIN — VANCOMYCIN HYDROCHLORIDE 2000 MG: 10 INJECTION, POWDER, LYOPHILIZED, FOR SOLUTION INTRAVENOUS at 17:34

## 2018-08-29 RX ADMIN — SODIUM CHLORIDE: 9 INJECTION, SOLUTION INTRAVENOUS at 17:54

## 2018-08-29 RX ADMIN — MIDAZOLAM 0.5 MG: 1 INJECTION INTRAMUSCULAR; INTRAVENOUS at 22:45

## 2018-08-29 RX ADMIN — ROCURONIUM BROMIDE 40 MG: 10 INJECTION, SOLUTION INTRAVENOUS at 17:53

## 2018-08-29 RX ADMIN — HYDROMORPHONE HYDROCHLORIDE 1 MG: 2 INJECTION, SOLUTION INTRAMUSCULAR; INTRAVENOUS; SUBCUTANEOUS at 19:46

## 2018-08-29 RX ADMIN — Medication 120 MCG: at 18:46

## 2018-08-29 RX ADMIN — FENTANYL CITRATE 50 MCG: 50 INJECTION, SOLUTION INTRAMUSCULAR; INTRAVENOUS at 17:40

## 2018-08-29 RX ADMIN — DEXMEDETOMIDINE HYDROCHLORIDE 4 MCG: 4 INJECTION, SOLUTION INTRAVENOUS at 18:30

## 2018-08-29 RX ADMIN — DEXMEDETOMIDINE HYDROCHLORIDE 4 MCG: 4 INJECTION, SOLUTION INTRAVENOUS at 19:08

## 2018-08-29 RX ADMIN — LIDOCAINE HYDROCHLORIDE 1 MG/MIN: 8 INJECTION, SOLUTION INTRAVENOUS at 18:43

## 2018-08-29 RX ADMIN — SODIUM CHLORIDE, SODIUM LACTATE, POTASSIUM CHLORIDE, AND CALCIUM CHLORIDE 125 ML/HR: 600; 310; 30; 20 INJECTION, SOLUTION INTRAVENOUS at 17:28

## 2018-08-29 RX ADMIN — ROCURONIUM BROMIDE 30 MG: 10 INJECTION, SOLUTION INTRAVENOUS at 19:11

## 2018-08-29 RX ADMIN — Medication 2 G: at 17:51

## 2018-08-29 RX ADMIN — Medication: at 22:46

## 2018-08-29 RX ADMIN — FENTANYL CITRATE 100 MCG: 50 INJECTION, SOLUTION INTRAMUSCULAR; INTRAVENOUS at 18:08

## 2018-08-29 RX ADMIN — GLYCOPYRROLATE 0.2 MG: 0.2 INJECTION INTRAMUSCULAR; INTRAVENOUS at 18:46

## 2018-08-29 RX ADMIN — GLYCOPYRROLATE 0.4 MG: 0.2 INJECTION INTRAMUSCULAR; INTRAVENOUS at 18:32

## 2018-08-29 RX ADMIN — MIDAZOLAM 0.5 MG: 1 INJECTION INTRAMUSCULAR; INTRAVENOUS at 22:35

## 2018-08-29 RX ADMIN — FENTANYL CITRATE 100 MCG: 50 INJECTION, SOLUTION INTRAMUSCULAR; INTRAVENOUS at 17:49

## 2018-08-29 RX ADMIN — KETAMINE HYDROCHLORIDE 10 MG: 10 INJECTION, SOLUTION INTRAMUSCULAR; INTRAVENOUS at 19:19

## 2018-08-29 RX ADMIN — KETAMINE HYDROCHLORIDE 20 MG: 10 INJECTION, SOLUTION INTRAMUSCULAR; INTRAVENOUS at 17:54

## 2018-08-29 RX ADMIN — DEXMEDETOMIDINE HYDROCHLORIDE 10 MCG: 4 INJECTION, SOLUTION INTRAVENOUS at 21:30

## 2018-08-29 RX ADMIN — Medication 80 MCG: at 18:30

## 2018-08-29 RX ADMIN — ACETAMINOPHEN 1000 MG: 10 INJECTION, SOLUTION INTRAVENOUS at 19:46

## 2018-08-29 RX ADMIN — KETAMINE HYDROCHLORIDE 10 MG: 10 INJECTION, SOLUTION INTRAMUSCULAR; INTRAVENOUS at 18:36

## 2018-08-29 NOTE — ANESTHESIA PREPROCEDURE EVALUATION
Anesthetic History No history of anesthetic complications Review of Systems / Medical History Patient summary reviewed, nursing notes reviewed and pertinent labs reviewed Pulmonary Within defined limits COPD Neuro/Psych Within defined limits Cardiovascular Within defined limits Hypertension GI/Hepatic/Renal 
Within defined limits Hepatitis: type C Endo/Other Within defined limits Diabetes Arthritis Other Findings Physical Exam 
 
Airway Mallampati: II 
TM Distance: > 6 cm Neck ROM: normal range of motion Mouth opening: Normal 
 
 Cardiovascular Regular rate and rhythm,  S1 and S2 normal,  no murmur, click, rub, or gallop Dental 
No notable dental hx Pulmonary Breath sounds clear to auscultation Abdominal 
GI exam deferred Other Findings Anesthetic Plan ASA: 3 Anesthesia type: general 
 
 
 
 
Induction: Intravenous Anesthetic plan and risks discussed with: Patient

## 2018-08-29 NOTE — IP AVS SNAPSHOT
[address] [facility] [address] [place] [address] 
[phone] Patient: Mavis Kocher MRN: [id number] KSY:[id number] About your hospitalization You were admitted on:  August 29, 2018 You last received care in the:  [address] [place] [address] You were discharged on:  September 3, 2018 Why you were hospitalized Your primary diagnosis was:  Not on File Your diagnoses also included:  Lumbar Spinal Stenosis Follow-up Information Follow up With Details Comments Contact Info [address] [place] [address]   [address] 
1st Floor [facility] [address] 
[phone] Domitila Lamb MD Call As needed, For non-orthopedic medical concerns, Follow up after Hospitalization [address] [facility] [address] [facility] Internal Medicine [facility] 57 
[phone] Celestina Nixon MD Schedule an appointment as soon as possible for a visit in 2 weeks For post-operative follow up [facility] [address] [facility] 7 [address] 
[phone] Discharge Orders None A check soy indicates which time of day the medication should be taken. My Medications START taking these medications Instructions Each Dose to Equal  
 Morning Noon Evening Bedtime  
 cyclobenzaprine 10 mg tablet Commonly known as:  FLEXERIL Take 1 Tab by mouth three (3) times daily as needed for Muscle Spasm(s). 10 mg  
    
   
   
   
  
 naloxone 4 mg/actuation nasal spray Commonly known as:  [employer] Use 1 spray intranasally, then discard. Repeat with new spray every 2 min as needed for opioid overdose symptoms, alternating nostrils. Indications: OPIATE-INDUCED RESPIRATORY DEPRESSION  
     
   
   
   
  
 oxyCODONE IR 10 mg Tab immediate release tablet Commonly known as:  Raegan Barraza Take 1 Tab by mouth every three (3) hours as needed.  Max Daily Amount: 80 mg.  
 10 mg  
    
15 mg @ 10:00 am  
 Given 15 mg at 1:15 pm  
   
   
  
  
CHANGE how you take these medications Instructions Each Dose to Equal  
 Morning Noon Evening Bedtime  
 amLODIPine 10 mg tablet Commonly known as:  Malick Rios What changed:  Another medication with the same name was removed. Continue taking this medication, and follow the directions you see here. TAKE ONE TABLET BY MOUTH DAILY  
     
  
   
   
   
  
 insulin glargine 100 unit/mL (3 mL) Inpn Commonly known as:  BASAGLAR KWIKPEN U-100 INSULIN What changed:   
- when to take this 
- additional instructions Inject 24 units daily  
     
  
   
   
   
  
 polyethylene glycol 17 gram/dose powder Commonly known as:  Neville Sport What changed:   
- when to take this 
- reasons to take this Take 17 g by mouth daily. 17 g CONTINUE taking these medications Instructions Each Dose to Equal  
 Morning Noon Evening Bedtime ALPRAZolam 2 mg tablet Commonly known as:  XANAX  
   
 2 mg three (3) times daily. 2 mg  
    
  
   
   
   
  
 canagliflozin 300 mg tablet Commonly known as:  Wyvobrittany Beech Take 1 Tab by mouth Daily (before breakfast). 300 mg DOCUSATE SODIUM PO Take  by mouth as needed. gabapentin 300 mg capsule Commonly known as:  NEURONTIN  
   
 TAKE ONE CAPSULE BY MOUTH TWICE A DAY  
     
  
   
   
   
  
 lisinopril 40 mg tablet Commonly known as:  Donnald Code Take 1 Tab by mouth daily. 40 mg  
    
   
   
   
  
 metFORMIN 500 mg tablet Commonly known as:  GLUCOPHAGE  
   
 TAKE TWO TABLETS BY MOUTH TWICE A DAY WITH MEALS METHADONE PO Take 95 mg by mouth daily. 95 mg  
    
  
   
   
   
  
 mirtazapine 15 mg tablet Commonly known as:  REMERON  
   
      
   
   
   
  
 nicotine 14 mg/24 hr patch Commonly known as:  Linda Mona  
   
 1 Patch by TransDERmal route every twenty-four (24) hours for 30 days. 1 Patch  
    
   
   
   
  
 propranolol 10 mg tablet Commonly known as:  INDERAL  
   
 TAKE ONE TABLET BY MOUTH THREE TIMES A DAY Where to Get Your Medications Information on where to get these meds will be given to you by the nurse or doctor. ! Ask your nurse or doctor about these medications  
  cyclobenzaprine 10 mg tablet  
 naloxone 4 mg/actuation nasal spray  
 oxyCODONE IR 10 mg Tab immediate release tablet Opioid Education Prescription Opioids: What You Need to Know: 
 
  
  --------------------Check soy indicates time when medication WAS GIVEN ------------------------ After Hospital Care Plan:  Discharge Instructions Lumbar Fusion Surgery Dr. Delores Dakins Johnathon Chisum, PA-C Johnetta Oaks, PA-C Patient Name Lobo Roman) Date of procedure: 8/29/2018 Date of discharge:  9/3/2018 Procedure (Procedure(s): REVISION LUMBAR LAMINECTOMY FUSION L3-S1, POSSIBLE L2) Surgeon: Conrad Coyle MD    
 
PCP:  Ricardo Hauser MD 
 
 
Follow up appointments 
-follow up with Dr. Delores Dakins in 2 weeks. Call [phone], ext [phone] to make an appointment as soon as you get home from the hospital. 
 
[address] [place], [place] [address]y: _________________________   phone: _______________________ The agency will contact you to arrange dates/times for visits. Please call them if you do not hear from them within 24 hours after you are discharged When to call your Orthopaedic Surgeon: 
-Signs of infection-if your incision is red; continues to have drainage; drainage has a foul odor or if you have a persistent fever over 101 degrees for 24 hours 
-nausea or vomiting, severe headache -loss of bowel or bladder function, inability to urinate 
-changes in sensation in your arms or legs (numbness, tingling, loss of color) 
-increased weakness-greater than before your surgery 
-severe pain or pain not relieved by medications 
-Signs of a blood clot in your leg-calf pain, tenderness, redness, swelling of lower leg When to call your Primary Care Physician: 
-Concerns about medical conditions such as diabetes, high blood pressure, asthma, congestive heart failure 
-Call if blood sugars are elevated, persistent headache or dizziness, coughing or congestion, constipation or diarrhea, burning with urination, abnormal heart rate When to call 911and go to the nearest emergency room 
-acute onset of chest pain, shortness of breath, difficulty breathing Activity - Your only exercise should be walking. Start with short frequent walks and increase your walking distance each day. 
-Limit the amount of time you sit to 20-30 minute intervals. Sitting for prolonged periods of time will be uncomfortable for you following surgery. 
-Do NOT lift anything over 5 pounds 
-Do NOT do any straining, twisting or bending 
-When you are in bed, you may lay on your back or on either side. Do NOT lie on your stomach Brace  if ordered by surgeon  
-If you have a back brace, you should wear your brace at all times when you are out of bed. Do not wear the brace while in bed or showering. 
-Remember to always wear a cotton t-shirt underneath your brace. 
-Do not bend or twist when your brace is off Driving 
-You may not drive or return to work until instructed by your physician. However, you may ride in the car for short periods of time. Incision Care Your incision has been closed with absorbable sutures and the Dermabond Prineo skin closure system. This is a combination of a mesh and a liquid adhesive that will assist with healing. The mesh is to remain on your incision for 2 weeks.  A dry dressing (ABD and tape) will be placed over it and should be changed daily. Please make sure the person performing your dressing changes washes their hands before touching the dressing. You may take brief showers but do not run the water directly onto the wound. After your shower, remove your dressing and blot your incision dry with a clean, soft towel and replace the dry dressing. Do not allow the tape to come in contact with the mesh. Do not rub or apply any lotions or ointments to your incision site. Do not soak or scrub your wound. The mesh dressing will be removed during your two week follow-up appointment. If you experience drainage leaking from underneath the mesh or if it peels off before 2 weeks, please contact your orthopedic surgeons office. Showering 
-You may shower in approximately 4 days after your surgery.   
 
-Leave the dressing on during your shower without allowing the water to run over it. 
 
-Reminder- your brace can be removed while showering. Remember to not bend or twist while your brace is off.   
 
-Do not take a tub bath. Preventing blood clots 
-You have been given T.E.D. stockings to wear. Continue to wear these for 7 days after your discharge. Put them on in the morning and take them off at night.   
 
-They are used to increase your circulation and prevent blood clots from forming in your legs Pain management 
-take pain medication as prescribed; decrease the amount you use as your pain lessens 
-avoid alcoholic beverages while taking pain medication 
-avoid NSAIDS (Aleve, Ibuprofen, Aspirin) until your surgeon approves it 
-Please be aware that many medications contain Tylenol. We do not want you to over medicate so please read the information below as a guide. Do not take more than 4 Grams of Tylenol in a 24 hour period. (There are 1000 milligrams in one Gram) Percocet contains 325 mg of Tylenol per tablet (do not take more than 12 tablets in 24 hours) Lortab contains 500 mg of Tylenol per tablet (do not take more than 8 tablets in 24 hours) Norco contains 325 mg of Tylenol per tablet (do not take more than 12 tablets in 24 hours). Diet 
-resume usual diet; drink plenty of fluids; eat foods high in fiber 
-It is important to have regular bowel movements. Pain medications may cause constipation. You may want to take a stool softener (such as Senokot-S or Colace) to prevent constipation. If constipation occurs, take a laxative (such as Dulcolax tablets, Milk of Magnesia, or a suppository). Laxatives should only be used if the above preventable measures have failed and you still have not had a bowel movement after three days [employer] Announcement We are excited to announce that we are making your provider's discharge notes available to you in [employer]. You will see these notes when they are completed and signed by the physician that discharged you from your recent hospital stay. If you have any questions or concerns about any information you see in [employer], please call the Health Information Department where you were seen or reach out to your Primary Care Provider for more information about your plan of care. Introducing \[facility]\"" & HEALTH SERVICES! Robert Barboza introduces [employer] patient portal. Now you can access parts of your medical record, email your doctor's office, and request medication refills online. 1. In your internet browser, go to [email or web address] 2. Click on the First Time User? Click Here link in the Sign In box. You will see the New Member Sign Up page. 3. Enter your [employer] Access Code exactly as it appears below. You will not need to use this code after youve completed the sign-up process. If you do not sign up before the expiration date, you must request a new code. · [employer] Access Code: [email or web address] Expires: 11/13/2018 10:02 AM 
 
 4. Enter the last four digits of your Social Security Number (xxxx) and Date of Birth (mm/dd/yyyy) as indicated and click Submit. You will be taken to the next sign-up page. 5. Create a [employer] ID. This will be your [employer] login ID and cannot be changed, so think of one that is secure and easy to remember. 6. Create a [employer] password. You can change your password at any time. 7. Enter your Password Reset Question and Answer. This can be used at a later time if you forget your password. 8. Enter your e-mail address. You will receive e-mail notification when new information is available in [address] 9. Click Sign Up. You can now view and download portions of your medical record. 10. Click the Download Summary menu link to download a portable copy of your medical information. If you have questions, please visit the Frequently Asked Questions section of the [employer] website. Remember, [employer] is NOT to be used for urgent needs. For medical emergencies, dial 911. Now available from your iPhone and Android! Introducing Sawyer [facility] As a [facility] patient, I wanted to make you aware of our electronic visit tool called Sawyer RedjeremieNMRKT. [address] 24/7 allows you to connect within minutes with a medical provider 24 hours a day, seven days a week via a mobile device or tablet or logging into a secure website from your computer. You can access Sawyer Valdez from anywhere in the [place]. A virtual visit might be right for you when you have a simple condition and feel like you just dont want to get out of bed, or cant get away from work for an appointment, when your regular [address] provider is not available (evenings, weekends or holidays), or when youre out of town and need minor care.   Electronic visits cost only $49 and if the Sawyer Valdez provider determines a prescription is needed to treat your condition, one can be electronically transmitted to a nearby pharmacy*. Please take a moment to enroll today if you have not already done so. The enrollment process is free and takes just a few minutes. To enroll, please download the [email or web address] 24/7 inocencio to your tablet or phone, or visit [email or web address] to enroll on your computer. And, as an [address] [place] [address] patient with a [facility] account, the results of your visits will be scanned into your electronic medical record and your primary care provider will be able to view the scanned results. We urge you to continue to see your regular [email or web address] provider for your ongoing medical care. And while your primary care provider may not be the one available when you seek a FTRANS virtual visit, the peace of mind you get from getting a real diagnosis real time can be priceless. For more information on FTRANS, view our Frequently Asked Questions (FAQs) at [email or web address]. Sincerely, 
 
Amanda Wallace MD 
Chief Medical Officer [address] [place] [address] *:  certain medications cannot be prescribed via FTRANS Providers Seen During Your Hospitalization Provider Specialty Primary office phone Alcira Spencer MD Orthopedic Surgery [phone] Your Primary Care Physician (PCP) Primary Care Physician Office Phone Office Fax Enid Kidney [phone] You are allergic to the following Allergen Reactions Senna Other (comments)  
 cramps Recent Documentation Height Weight BMI Smoking Status 1.981 m 116.6 kg 29.7 kg/m2 Current Every Day Smoker Emergency Contacts Name Discharge Info Relation Home Work Mobile [place] DISCHARGE CAREGIVER [3] Parent [1] [phone] Patient Belongings The following personal items are in your possession at time of discharge: Dental Appliances: None  Visual Aid: Glasses, With patient   Hearing Aids/Status: Does not own  Home Medications: None   Jewelry: None  Clothing: At bedside    Other Valuables: Cell Phone, BEVERLY Discharge Instructions Attachments/References MEFS - OXYCODONE, RAPID RELEASE (ETH-OXYDOSE, OXY IR, ROXICODONE) - (BY MOUTH) (ENGLISH) MEFS - CYCLOBENZAPRINE (FLEXERIL, AMRIX, FEXMID, FUSEPAQ TABRADOL) - (BY MOUTH) (ENGLISH) MEFS - NALOXONE (NARCAN) - (INTO THE NOSE) (ENGLISH) Patient Handouts Oxycodone, Rapid Release (ETH-Oxydose, Oxy IR, Roxicodone) - (By mouth) Why this medicine is used:  
Treats moderate to severe pain. This medicine is a narcotic pain reliever. Contact a nurse or doctor right away if you have: 
· Fast or slow heart beat, shallow breathing, blue lips, skin or fingernails · Anxiety, restlessness, fever, sweating, muscle spasms, twitching, seeing or hearing things that are not there · Extreme weakness, shallow breathing, slow heartbeat · Severe confusion, lightheadedness, dizziness, fainting · Sweating or cold, clammy skin, seizures · Severe constipation, stomach pain, nausea, vomiting Common side effects: · Mild constipation · Sleepiness, tiredness © 2017 [address] Information is for End User's use only and may not be sold, redistributed or otherwise used for commercial purposes. Cyclobenzaprine (Flexeril, Amrix, Fexmid, FusePaq Tabradol) - (By mouth) Why this medicine is used:  
Treats pain and stiffness caused by muscle spasms. Contact a nurse or doctor right away if you have: · Anxiety, restlessness, twitching · Seeing or hearing things that are not there · Fast, pounding, or uneven heartbeat · Fever, sweating, nausea, vomiting, diarrhea Common side effects: · Dry mouth · Dizziness, drowsiness © 2017 [address] Information is for End User's use only and may not be sold, redistributed or otherwise used for commercial purposes. Naloxone (Narcan) - (Into the nose) Why this medicine is used:  
Treats opioid overdose in an emergency situation. Contact a nurse or doctor right away if you have: 
· Fast, pounding, or uneven heartbeat, trouble breathing · Seizures, tremors, feeling restless, nervous, or irritable · Diarrhea, nausea, vomiting, stomach cramps · Crying more than the usual (in babies) · Fever, runny nose, sneezing, sweating, yawning © 2017 [facility] Information is for End User's use only and may not be sold, redistributed or otherwise used for commercial purposes. Please provide this summary of care documentation to your next provider. Signatures-by signing, you are acknowledging that this After Visit Summary has been reviewed with you and you have received a copy. Patient Signature:  ____________________________________________________________ Date:  ____________________________________________________________  
  
Shadi Dunlap Provider Signature:  ____________________________________________________________ Date:  ____________________________________________________________

## 2018-08-30 LAB
ANION GAP SERPL CALC-SCNC: 11 MMOL/L (ref 5–15)
BUN SERPL-MCNC: 13 MG/DL (ref 6–20)
BUN/CREAT SERPL: 12 (ref 12–20)
CALCIUM SERPL-MCNC: 8.1 MG/DL (ref 8.5–10.1)
CHLORIDE SERPL-SCNC: 99 MMOL/L (ref 97–108)
CO2 SERPL-SCNC: 27 MMOL/L (ref 21–32)
CREAT SERPL-MCNC: 1.08 MG/DL (ref 0.7–1.3)
GLUCOSE BLD STRIP.AUTO-MCNC: 183 MG/DL (ref 65–100)
GLUCOSE BLD STRIP.AUTO-MCNC: 191 MG/DL (ref 65–100)
GLUCOSE BLD STRIP.AUTO-MCNC: 203 MG/DL (ref 65–100)
GLUCOSE BLD STRIP.AUTO-MCNC: 208 MG/DL (ref 65–100)
GLUCOSE BLD STRIP.AUTO-MCNC: 219 MG/DL (ref 65–100)
GLUCOSE SERPL-MCNC: 200 MG/DL (ref 65–100)
HGB BLD-MCNC: 10.4 G/DL (ref 12.1–17)
HGB BLD-MCNC: 11.1 G/DL (ref 12.1–17)
POTASSIUM SERPL-SCNC: 4.1 MMOL/L (ref 3.5–5.1)
SERVICE CMNT-IMP: ABNORMAL
SODIUM SERPL-SCNC: 137 MMOL/L (ref 136–145)

## 2018-08-30 PROCEDURE — 77010033678 HC OXYGEN DAILY

## 2018-08-30 PROCEDURE — 97161 PT EVAL LOW COMPLEX 20 MIN: CPT | Performed by: PHYSICAL THERAPIST

## 2018-08-30 PROCEDURE — 85018 HEMOGLOBIN: CPT | Performed by: PHYSICIAN ASSISTANT

## 2018-08-30 PROCEDURE — 74011636637 HC RX REV CODE- 636/637: Performed by: NURSE PRACTITIONER

## 2018-08-30 PROCEDURE — 74011250637 HC RX REV CODE- 250/637: Performed by: PHYSICIAN ASSISTANT

## 2018-08-30 PROCEDURE — 97165 OT EVAL LOW COMPLEX 30 MIN: CPT

## 2018-08-30 PROCEDURE — 80048 BASIC METABOLIC PNL TOTAL CA: CPT | Performed by: PHYSICIAN ASSISTANT

## 2018-08-30 PROCEDURE — 74011250636 HC RX REV CODE- 250/636: Performed by: PHYSICIAN ASSISTANT

## 2018-08-30 PROCEDURE — 94760 N-INVAS EAR/PLS OXIMETRY 1: CPT

## 2018-08-30 PROCEDURE — 36415 COLL VENOUS BLD VENIPUNCTURE: CPT | Performed by: PHYSICIAN ASSISTANT

## 2018-08-30 PROCEDURE — 65270000029 HC RM PRIVATE

## 2018-08-30 PROCEDURE — 74011250636 HC RX REV CODE- 250/636: Performed by: ORTHOPAEDIC SURGERY

## 2018-08-30 PROCEDURE — 82962 GLUCOSE BLOOD TEST: CPT

## 2018-08-30 PROCEDURE — 74011636637 HC RX REV CODE- 636/637: Performed by: PHYSICIAN ASSISTANT

## 2018-08-30 PROCEDURE — 97530 THERAPEUTIC ACTIVITIES: CPT

## 2018-08-30 PROCEDURE — 97530 THERAPEUTIC ACTIVITIES: CPT | Performed by: PHYSICAL THERAPIST

## 2018-08-30 RX ORDER — METHOCARBAMOL 500 MG/1
500 TABLET, FILM COATED ORAL
Status: DISCONTINUED | OUTPATIENT
Start: 2018-08-30 | End: 2018-09-03 | Stop reason: HOSPADM

## 2018-08-30 RX ORDER — CLONIDINE HYDROCHLORIDE 0.1 MG/1
0.1 TABLET ORAL
Status: DISCONTINUED | OUTPATIENT
Start: 2018-08-30 | End: 2018-09-03 | Stop reason: HOSPADM

## 2018-08-30 RX ORDER — CEFAZOLIN SODIUM/WATER 2 G/20 ML
2 SYRINGE (ML) INTRAVENOUS EVERY 8 HOURS
Status: COMPLETED | OUTPATIENT
Start: 2018-08-30 | End: 2018-08-30

## 2018-08-30 RX ORDER — INSULIN GLARGINE 100 [IU]/ML
28 INJECTION, SOLUTION SUBCUTANEOUS DAILY
Status: DISCONTINUED | OUTPATIENT
Start: 2018-08-30 | End: 2018-09-03 | Stop reason: HOSPADM

## 2018-08-30 RX ORDER — INSULIN GLARGINE 100 [IU]/ML
24 INJECTION, SOLUTION SUBCUTANEOUS
Status: DISCONTINUED | OUTPATIENT
Start: 2018-08-30 | End: 2018-08-30

## 2018-08-30 RX ADMIN — Medication 10 ML: at 06:04

## 2018-08-30 RX ADMIN — ALPRAZOLAM 2 MG: 1 TABLET ORAL at 16:20

## 2018-08-30 RX ADMIN — INSULIN GLARGINE 28 UNITS: 100 INJECTION, SOLUTION SUBCUTANEOUS at 12:33

## 2018-08-30 RX ADMIN — Medication 2 G: at 02:34

## 2018-08-30 RX ADMIN — ALPRAZOLAM 2 MG: 1 TABLET ORAL at 21:41

## 2018-08-30 RX ADMIN — INSULIN LISPRO 3 UNITS: 100 INJECTION, SOLUTION INTRAVENOUS; SUBCUTANEOUS at 12:32

## 2018-08-30 RX ADMIN — ACETAMINOPHEN 650 MG: 325 TABLET ORAL at 02:33

## 2018-08-30 RX ADMIN — Medication 2 G: at 08:56

## 2018-08-30 RX ADMIN — OXYCODONE HYDROCHLORIDE 10 MG: 5 TABLET ORAL at 22:37

## 2018-08-30 RX ADMIN — PROPRANOLOL HYDROCHLORIDE 10 MG: 20 TABLET ORAL at 21:41

## 2018-08-30 RX ADMIN — ALPRAZOLAM 2 MG: 1 TABLET ORAL at 01:00

## 2018-08-30 RX ADMIN — OXYCODONE HYDROCHLORIDE 10 MG: 5 TABLET ORAL at 16:20

## 2018-08-30 RX ADMIN — ALPRAZOLAM 2 MG: 1 TABLET ORAL at 07:41

## 2018-08-30 RX ADMIN — ACETAMINOPHEN 650 MG: 325 TABLET ORAL at 07:32

## 2018-08-30 RX ADMIN — METHADONE HYDROCHLORIDE 95 MG: 5 SOLUTION ORAL at 08:56

## 2018-08-30 RX ADMIN — Medication 1 TABLET: at 08:56

## 2018-08-30 RX ADMIN — PROPRANOLOL HYDROCHLORIDE 10 MG: 20 TABLET ORAL at 01:01

## 2018-08-30 RX ADMIN — INSULIN LISPRO 3 UNITS: 100 INJECTION, SOLUTION INTRAVENOUS; SUBCUTANEOUS at 07:32

## 2018-08-30 RX ADMIN — OXYCODONE HYDROCHLORIDE 10 MG: 5 TABLET ORAL at 19:43

## 2018-08-30 RX ADMIN — SODIUM CHLORIDE 125 ML/HR: 900 INJECTION, SOLUTION INTRAVENOUS at 00:59

## 2018-08-30 RX ADMIN — PROPRANOLOL HYDROCHLORIDE 10 MG: 20 TABLET ORAL at 08:56

## 2018-08-30 RX ADMIN — INSULIN LISPRO 2 UNITS: 100 INJECTION, SOLUTION INTRAVENOUS; SUBCUTANEOUS at 17:41

## 2018-08-30 RX ADMIN — ONDANSETRON 4 MG: 2 INJECTION INTRAMUSCULAR; INTRAVENOUS at 16:19

## 2018-08-30 RX ADMIN — Medication 10 ML: at 01:03

## 2018-08-30 RX ADMIN — OXYCODONE HYDROCHLORIDE 10 MG: 5 TABLET ORAL at 13:16

## 2018-08-30 RX ADMIN — GABAPENTIN 300 MG: 300 CAPSULE ORAL at 08:56

## 2018-08-30 RX ADMIN — PROPRANOLOL HYDROCHLORIDE 10 MG: 20 TABLET ORAL at 16:19

## 2018-08-30 RX ADMIN — ACETAMINOPHEN 650 MG: 325 TABLET ORAL at 19:43

## 2018-08-30 RX ADMIN — AMLODIPINE BESYLATE 10 MG: 5 TABLET ORAL at 08:55

## 2018-08-30 RX ADMIN — OXYCODONE HYDROCHLORIDE 10 MG: 5 TABLET ORAL at 10:24

## 2018-08-30 RX ADMIN — GABAPENTIN 300 MG: 300 CAPSULE ORAL at 17:41

## 2018-08-30 RX ADMIN — ACETAMINOPHEN 650 MG: 325 TABLET ORAL at 16:20

## 2018-08-30 RX ADMIN — ONDANSETRON 4 MG: 2 INJECTION INTRAMUSCULAR; INTRAVENOUS at 10:24

## 2018-08-30 NOTE — PROGRESS NOTES
ORTHOPAEDIC LUMBAR FUSION PROGRESS NOTE 
 
NAME: Clarita Taylor :  1960 MRN:  [id number] DATE:  2018 POD: 1 Day Post-Op S/P: Procedure(s): REVISION LUMBAR LAMINECTOMY FUSION L3-S1, POSSIBLE L2 
 
SUBJECTIVE:   
Patient awake and oriented -c/o pain along surgical incision, but denies leg pain/numbness. Afebrile/VSS. Denies nausea/vomiting, chest pain, headache or shortness of breath. Patient is concerned about overall pain management in that he takes 95 mg methadone per day -methadone has been continued as an inpatient in addition to pain medication. Will work with PT/OT today, OOB. D/c planning pending evaluation/recomendations by PT/OT. Recent Labs 18 
 [phone] HGB  10.4* NA  137  
K  4.1 CL  99  
CO2  27 BUN  13  
CREA  1.08  
GLU  200* Patient Vitals for the past 12 hrs: 
 BP Temp Pulse Resp SpO2  
18 0916 134/83 - 68 - 92 % 18 0803 163/78 98.8 °F (37.1 °C) 70 16 97 % 18 0734 163/80 98 °F (36.7 °C) 68 17 95 % 18 0320 175/83 98.9 °F (37.2 °C) 67 16 97 % 18 0200 171/85 98.3 °F (36.8 °C) 65 16 98 % 18 0056 (!) 172/94 - 64 16 98 % 18 2359 171/90 98.8 °F (37.1 °C) 65 14 98 % Drain: 320 ml EXAM: 
Positive strength/ROM bilat lower ext. Neuro intact Dressings clean, dry and intact PLAN: 
D/C PCA, change to PO pain medications PT/OT, OOB Advance regular diet Ice/gel packs to back for  PRN pain Pain Control D/c planning pending recommendations evaluation by PT/OT Deangelo Zambrano PA-C

## 2018-08-30 NOTE — ANESTHESIA POSTPROCEDURE EVALUATION
Post-Anesthesia Evaluation and Assessment Patient: Vidya Schmidt MRN: [id number]  SSN: [id number] YOB: 1960  Age: 66 y.o. Sex: male Cardiovascular Function/Vital Signs Visit Vitals  /67  Pulse (!) 55  Temp 36.2 °C (97.1 °F)  Resp 12  Ht 6' 6\" (1.981 m)  Wt 116.6 kg (257 lb)  SpO2 98%  BMI 29.7 kg/m2 Patient is status post general anesthesia for Procedure(s): REVISION LUMBAR LAMINECTOMY FUSION L3-S1, POSSIBLE L2. Nausea/Vomiting: None Postoperative hydration reviewed and adequate. Pain: 
Pain Scale 1: (P) Numeric (0 - 10) (08/29/18 2200) Pain Intensity 1: (P) 10 (08/29/18 2200) Managed Neurological Status:  
Neuro (WDL): (P) Exceptions to WDL (drowsy) (08/29/18 2200) Neuro LUE Motor Response: (P) Purposeful (08/29/18 2200) LLE Motor Response: (P) Purposeful (08/29/18 2200) RUE Motor Response: (P) Purposeful (08/29/18 2200) RLE Motor Response: (P) Purposeful (08/29/18 2200) At baseline Mental Status and Level of Consciousness: Arousable Pulmonary Status:  
O2 Device: (P) Nasal cannula (08/29/18 2200) Adequate oxygenation and airway patent Complications related to anesthesia: None Post-anesthesia assessment completed. No concerns Signed By: Jeffy Franklin MD   
 August 29, 2018

## 2018-08-30 NOTE — PROGRESS NOTES
Physical Therapy Patient declines to participate with PT at this time. States he is having a lot of pain at this time and is finally having better controlled pain and would prefer to wait until tmrw. Attempted to encourage but he continues to politely decline.  
Gerhard Mina, PT, DPT

## 2018-08-30 NOTE — PROGRESS NOTES
Problem: Falls - Risk of 
Goal: *Absence of Falls Document Rosalina Reyna Fall Risk and appropriate interventions in the flowsheet. Outcome: Progressing Towards Goal 
Fall Risk Interventions: 
  
 
  
 
Medication Interventions: Patient to call before getting OOB, Teach patient to arise slowly, Evaluate medications/consider consulting pharmacy

## 2018-08-30 NOTE — PROGRESS NOTES
Problem: Self Care Deficits Care Plan (Adult) Goal: *Acute Goals and Plan of Care (Insert Text) Occupational Therapy Goals Initiated 8/30/2018 1. Patient will perform lower body dressing with minimal assistance/contact guard assist using AE PRN within 7 days. 2.  Patient will perform toilet transfer with minimal assistance/contact guard assist using most appropriate DME within 7 days. 3.  Patient will grooming at the sink x3 min with supervision/set-up within 7 days. 4.  Patient will don/doff back brace at minimal assistance/contact guard assist within 7 days. 5.  Patient will verbalize/demonstrate 3/3 back precautions during ADL tasks without cues within 7 days. Occupational Therapy EVALUATION Patient: Iván Freedman (55 y.o. male) Date: 8/30/2018 Primary Diagnosis: SPINAL STENOSIS L3-4, STATUS POST POSTERIOR SPINAL FUSION L4-S1 Lumbar spinal stenosis Procedure(s) (LRB): 
REVISION LUMBAR LAMINECTOMY FUSION L3-S1, POSSIBLE L2 (N/A) 1 Day Post-Op Precautions: Back, Fall, Other (comment) (brace when up) ASSESSMENT : 
Based on the objective data described below, the patient presents with overall setup-Max A for upper body ADLs (increased assist for TLSO), Max-Total A for lower body ADLs, and Min-Mod A x2 for functional mobility s/p revision lumbar laminectomy POD #1. PTA, patient IND-Mod I, plans to stay with mother in a 1 story house. Patient received EOB with PT, educated on TLSO donning & wearing requiring Max A to don. Completing sit<>stand and transfer to the chair with Min A x2, patient reporting high pain levels and brief dizziness spells however BPs WNL. Unable to complete tailor sit in the chair, educated on hip ER stretch, will trial lower body AE next session. Anticipate pain will be a limiting factor, recommend HHOT upon d/c at this time. Patient will benefit from skilled intervention to address the above impairments. Patients rehabilitation potential is considered to be Good Factors which may influence rehabilitation potential include:  
[]             None noted []             Mental ability/status []             Medical condition []             Home/family situation and support systems []             Safety awareness []             Pain tolerance/management 
[]             Other: PLAN : 
Recommendations and Planned Interventions: 
[x]               Self Care Training                  [x]        Therapeutic Activities [x]               Functional Mobility Training    []        Cognitive Retraining 
[x]               Therapeutic Exercises           [x]        Endurance Activities [x]               Balance Training                   []        Neuromuscular Re-Education []               Visual/Perceptual Training     [x]   Home Safety Training 
[x]               Patient Education                 [x]        Family Training/Education []               Other (comment): Frequency/Duration: Patient will be followed by occupational therapy 5 times a week to address goals. Discharge Recommendations: Home Health Further Equipment Recommendations for Discharge: TBD (may benefit from lower body AE) SUBJECTIVE:  
Patient stated I feel like sh*$. 
 
OBJECTIVE DATA SUMMARY:  
HISTORY:  
Past Medical History:  
Diagnosis Date  Alcohol abuse, in remission  Chronic kidney disease PLAGUE L RENAL ARTERY  Chronic obstructive [facility] disease ([facility]  Chronic pain  Constipation  Diabetes ([facility] [address] Type II  
 GERD (gastroesophageal reflux disease)  Hepatitis C   
 Hypertension  Liver disease HEP C TOOK [employer]  Pancreatitis  Psychiatric disorder PANIC ATTACKS  Smoker  Spondylitis ([facility] [address] Past Surgical History:  
Procedure Laterality Date  HX APPENDECTOMY  HX ORTHOPAEDIC    
 2 back surgeries  HX ORTHOPAEDIC    
 KNEE SURGERY  
 HX TONSILLECTOMY Prior Level of Function/Environment/Context: IND-Mod I, living alone. Plans to stay with mother in a 1 story house Home Situation Home Environment: Private residence # Steps to Enter: 4 One/Two Story Residence: One story Living Alone: No 
Support Systems: Parent, Family member(s), Friends \ neighbors Patient Expects to be Discharged to[de-identified] Private residence Current DME Used/Available at Home: Brace/Splint Tub or Shower Type: Tub/Shower combination Hand dominance: Right EXAMINATION OF PERFORMANCE DEFICITS: 
Cognitive/Behavioral Status: 
Neurologic State: Alert Orientation Level: Oriented X4 Cognition: Appropriate for age attention/concentration; Appropriate decision making; Follows commands Perception: Appears intact Perseveration: No perseveration noted Safety/Judgement: Awareness of environment; Fall prevention Skin: Appears intact, noted hemovac & PIVs 
 
Edema: None noted in BUEs Hearing: Auditory Auditory Impairment: Hard of hearing, right side Hearing Aids/Status: Does not own Vision/Perceptual:   
Tracking: Able to track stimulus in all quadrants w/o difficulty Acuity: Impaired near vision Corrective Lenses: Reading glasses Range of Motion: [place] [address] AROM: Generally decreased, functional 
  
  
  
  
  
  
  
 
Strength: In BUE Strength: Generally decreased, functional 
  
  
  
  
 
Coordination: 
  
Fine Motor Skills-Upper: Left Intact; Right Intact Gross Motor Skills-Upper: Left Intact; Right Intact Tone & Sensation: In [id number] Balance: 
Sitting: Intact Standing: Impaired Standing - Static: Constant support;Good Standing - Dynamic : Fair Functional Mobility and Transfers for ADLs: 
Bed Mobility: 
Supine to Sit: Moderate assistance;Assist x1 Sit to Supine: Moderate assistance;Assist x2 Scooting: Supervision Transfers: 
Sit to Stand: Minimum assistance;Assist x2 Stand to Sit: Minimum assistance;Assist x2 
 Bed to Chair: Minimum assistance;Assist x2; Additional time; Adaptive equipment (RW) Toilet Transfer : Minimum assistance;Assist x2; Additional time; Adaptive equipment (inferred to [facility]) ADL Assessment: 
Feeding: Independent Oral Facial Hygiene/Grooming: Supervision (seated) Bathing: Moderate assistance (for distal BLEs & perineal bathing) Upper Body Dressing: Maximum assistance (including TLSO) Lower Body Dressing: Moderate assistance (for distal reach to BLEs and standnig balance) Toileting: Moderate assistance (for bowel hygiene) ADL Intervention and task modifications: 
  
 
  
 
  
 
  
 
Upper Body Dressing Assistance Orthotics(Brace): Maximum assistance; Compensatory technique training (TLSO) Lower Body Dressing Assistance Dressing Assistance: Total assistance(dependent) Pants With Elastic Waist: Maximum assistance; Compensatory technique training Socks: Total assistance (dependent) Leg Crossed Method Used: No (edcuated on hip ER stretch) Position Performed: Seated in chair Cues: Verbal cues provided;Visual cues provided Cognitive Retraining Safety/Judgement: Awareness of environment; Fall prevention Therapeutic Exercise: - Transfer to the chair with Min A x2 Functional Measure: 
Barthel Index: 
 
Bathin Bladder: 10 Bowels: 10 
Groomin Dressin Feeding: 10 Mobility: 10 Stairs: 0 Toilet Use: 5 Transfer (Bed to Chair and Back): 5 Total: 60 Barthel and G-code impairment scale: 
Percentage of impairment CH 
0% CI 
1-19% CJ 
20-39% CK 
40-59% CL 
60-79% CM 
80-99% CN 
100% Barthel Score 0-100 100 99-80 79-60 59-40 20-39 1-19 
 0 Barthel Score 0-20 20 17-19 13-16 9-12 5-8 1-4 0 The Barthel ADL Index: Guidelines 1. The index should be used as a record of what a patient does, not as a record of what a patient could do.  
2. The main aim is to establish degree of independence from any help, physical or verbal, however minor and for whatever reason. 3. The need for supervision renders the patient not independent. 4. A patient's performance should be established using the best available evidence. Asking the patient, friends/relatives and nurses are the usual sources, but direct observation and common sense are also important. However direct testing is not needed. 5. Usually the patient's performance over the preceding 24-48 hours is important, but occasionally longer periods will be relevant. 6. Middle categories imply that the patient supplies over 50 per cent of the effort. 7. Use of aids to be independent is allowed. Daniel Marr., Barthel, DMaggieW. (7301). Functional evaluation: the Barthel Index. [address] [facility] (14)2. Pato Perez carine JARED Case, Kirstie Abrams., Elliot Rand., [place], [address] [place] [address] (1999). Measuring the change indisability after inpatient rehabilitation; comparison of the responsiveness of the Barthel Index and Functional [place] Measure. Journal of Neurology, Neurosurgery, and Psychiatry, 66(4), 719-792. Ariana Posadas, N.EH.A, HERNANDEZ Casiano, & Ivan Cutler, M.A. (2004.) Assessment of post-stroke quality of life in cost-effectiveness studies: The usefulness of the Barthel Index and the EuroQoL-5D. [facility], 13, 115-76 G codes: In compliance with CMSs Claims Based Outcome Reporting, the following G-code set was chosen for this patient based on their primary functional limitation being treated: The outcome measure chosen to determine the severity of the functional limitation was the Barthel Index with a score of 60/100 which was correlated with the impairment scale. ? Self Care:  
  - CURRENT STATUS: CJ - 20%-39% impaired, limited or restricted  - GOAL STATUS: CI - 1%-19% impaired, limited or restricted  - D/C STATUS:  ---------------To be determined--------------- Occupational Therapy Evaluation Charge Determination History Examination Decision-Making LOW Complexity : Brief history review  LOW Complexity : 1-3 performance deficits relating to physical, cognitive , or psychosocial skils that result in activity limitations and / or participation restrictions  LOW Complexity : No comorbidities that affect functional and no verbal or physical assistance needed to complete eval tasks Based on the above components, the patient evaluation is determined to be of the following complexity level: LOW Pain: 
Pain Scale 1: Numeric (0 - 10) Pain Intensity 1: 8 Pain Location 1: Back Pain Orientation 1: Lower Pain Description 1: Aching Pain Intervention(s) 1: Medication (see MAR) Activity Tolerance:  
Fair, limited by pain Please refer to the flowsheet for vital signs taken during this treatment. After treatment:  
[x] Patient left in no apparent distress sitting up in chair 
[] Patient left in no apparent distress in bed 
[x] Call bell left within reach [x] Nursing notified 
[] Caregiver present 
[] Bed alarm activated COMMUNICATION/EDUCATION:  
The patients plan of care was discussed with: Physical Therapist and Registered Nurse. [x] Home safety education was provided and the patient/caregiver indicated understanding. [x] Patient/family have participated as able in goal setting and plan of care. [x] Patient/family agree to work toward stated goals and plan of care. [] Patient understands intent and goals of therapy, but is neutral about his/her participation. [] Patient is unable to participate in goal setting and plan of care. This patients plan of care is appropriate for delegation to [facility]. Thank you for this referral. 
Todd Mendoza OT Time Calculation: 19 mins

## 2018-08-30 NOTE — PROGRESS NOTES
Problem: Mobility Impaired (Adult and Pediatric) Goal: *Acute Goals and Plan of Care (Insert Text) Physical Therapy Goals Initiated 8/30/2018 1. Patient will move from supine to sit and sit to supine  in bed with modified independence within 4 days. 2. Patient will perform sit to stand with modified independence within 4 days. 3. Patient will ambulate with modified independence for 200 feet with the least restrictive device within 4 days. 4. Patient will ascend/descend 5 stairs with 1 handrail(s) with modified independence within 4 days. 5. Patient will verbalize and demonstrate understanding of spinal precautions (No bending, lifting greater than 5 lbs, or twisting; log-roll technique; frequent repositioning as instructed) within 4 days. physical Therapy EVALUATION Patient: Lyubov Cruz (64 y.o. male) Date: 8/30/2018 Primary Diagnosis: SPINAL STENOSIS L3-4, STATUS POST POSTERIOR SPINAL FUSION L4-S1 Lumbar spinal stenosis Procedure(s) (LRB): 
REVISION LUMBAR LAMINECTOMY FUSION L3-S1, POSSIBLE L2 (N/A) 1 Day Post-Op Precautions:   Back, Fall, Other (comment) (brace when up) ASSESSMENT : 
Based on the objective data described below, the patient presents with decreased functional mobility from baseline level of function. Prior to admit patient was independent with mobility. States he will be staying with mother at [place] in a 1 level home with approx 4 DEBBIE with rail. Patient currently needing modA x 1 for rolling and modA x 2 for supine to sit. Good sitting balance. Dons brace with maxA. Instructed in back precautions and use of brace. Sit to stand with Bharat x 2 and use of RW. Patient able to take approx 4 steps from bed to chair with RW and CGA x 2. Patient with high pain levels but no overt LOB. Patient left up in chair with needs in reach and RN aware. Patient aware of sitting restriction. Will continue to follow for mobility progression.  Anticipate pain management will be barrier to DC. Recommend HH PT at this time. Patient will benefit from skilled intervention to address the above impairments. Patients rehabilitation potential is considered to be Good Factors which may influence rehabilitation potential include:  
[]         None noted 
[]         Mental ability/status []         Medical condition 
[]         Home/family situation and support systems 
[]         Safety awareness [x]         Pain tolerance/management 
[]         Other: PLAN : 
Recommendations and Planned Interventions: 
[x]           Bed Mobility Training             [x]    Neuromuscular Re-Education 
[x]           Transfer Training                   []    Orthotic/Prosthetic Training 
[x]           Gait Training                         []    Modalities [x]           Therapeutic Exercises           []    Edema Management/Control 
[x]           Therapeutic Activities            [x]    Patient and Family Training/Education 
[]           Other (comment): Frequency/Duration: Patient will be followed by physical therapy  twice daily to address goals. Discharge Recommendations: Home Health Further Equipment Recommendations for Discharge: TBD SUBJECTIVE:  
Patient stated I'm in so much pain. I asked for my methadone but they haven't brought it yet.  OBJECTIVE DATA SUMMARY:  
HISTORY:   
Past Medical History:  
Diagnosis Date  Alcohol abuse, in remission  Chronic kidney disease PLAGUE L RENAL ARTERY  Chronic obstructive pulmonary disease ([facility] [address]  Chronic pain  Constipation  Diabetes ([facility] [address] Type II  
 GERD (gastroesophageal reflux disease)  Hepatitis C   
 Hypertension  Liver disease HEP C TOOK Shannan Priyank  Pancreatitis  Psychiatric disorder PANIC ATTACKS  Smoker  Spondylitis ([facility] [address] Past Surgical History:  
Procedure Laterality Date  HX APPENDECTOMY  HX ORTHOPAEDIC    
 2 back surgeries  HX ORTHOPAEDIC    
 KNEE SURGERY  
 HX TONSILLECTOMY Prior Level of Function/Home Situation: Independent with  Mobility at baseline Personal factors and/or comorbidities impacting plan of care:  
 
Home Situation Home Environment: Private residence # Steps to Enter: 4 One/Two Story Residence: One story Living Alone: No 
Support Systems: Parent, Family member(s), Friends \ neighbors Patient Expects to be Discharged to[de-identified] Private residence Current DME Used/Available at Home: Brace/Splint EXAMINATION/PRESENTATION/DECISION MAKING:  
Critical Behavior: 
Neurologic State: Alert Orientation Level: Oriented X4 Hearing: Auditory Auditory Impairment: Hard of hearing, right side Hearing Aids/Status: Does not own Range Of Motion: 
AROM: Generally decreased, functional 
  
  
  
  
  
  
  
Strength:   
Strength: Generally decreased, functional 
  
  
Functional Mobility: 
Bed Mobility: 
  
Supine to Sit: Moderate assistance;Assist x1 Sit to Supine: Moderate assistance;Assist x2 Scooting: Supervision Transfers: 
Sit to Stand: Minimum assistance;Assist x2 Stand to Sit: Minimum assistance;Assist x2 Balance:  
Sitting: Intact Standing: Impaired Standing - Static: Constant support;Good Standing - Dynamic : Fair Ambulation/Gait Training: 
Distance (ft): 4 Feet (ft) Assistive Device: Brace/Splint;Gait belt;Walker, rolling Ambulation - Level of Assistance: Contact guard assistance;Assist x2 Gait Description (WDL): Exceptions to [facility] Gait Abnormalities: Antalgic;Decreased step clearance Base of Support: Widened Speed/Farnaz: Pace decreased (<100 feet/min); Slow Step Length: Left shortened;Right shortened Functional Measure: 
Barthel Index: 
 
Bathin Bladder: 10 Bowels: 10 
Groomin Dressin Feeding: 10 Mobility: 10 Stairs: 0 Toilet Use: 5 Transfer (Bed to Chair and Back): 5 Total: 60 Barthel and G-code impairment scale: Percentage of impairment CH 
0% CI 
1-19% CJ 
20-39% CK 
40-59% CL 
60-79% CM 
80-99% CN 
100% Barthel Score 0-100 100 99-80 79-60 59-40 20-39 1-19 
 0 Barthel Score 0-20 20 17-19 13-16 9-12 5-8 1-4 0 The Barthel ADL Index: Guidelines 1. The index should be used as a record of what a patient does, not as a record of what a patient could do. 2. The main aim is to establish degree of independence from any help, physical or verbal, however minor and for whatever reason. 3. The need for supervision renders the patient not independent. 4. A patient's performance should be established using the best available evidence. Asking the patient, friends/relatives and nurses are the usual sources, but direct observation and common sense are also important. However direct testing is not needed. 5. Usually the patient's performance over the preceding 24-48 hours is important, but occasionally longer periods will be relevant. 6. Middle categories imply that the patient supplies over 50 per cent of the effort. 7. Use of aids to be independent is allowed. Clark Robertson., Barthel, D.W. (980). Functional evaluation: the Barthel Index. [address] [facility] (14)2. JARED Pyle, Mary Guerrero., [facility] [place], [address] [place] [address] (1999). Measuring the change indisability after inpatient rehabilitation; comparison of the responsiveness of the Barthel Index and Functional [place] Measure. Journal of Neurology, Neurosurgery, and Psychiatry, 66(4), 892-425. Kareen Nixon, N.EH.A, HERNANDEZ Casiano, & Arsh Diez, MMaggieA. (2004.) Assessment of post-stroke quality of life in cost-effectiveness studies: The usefulness of the Barthel Index and the EuroQoL-5D. [facility], 13, 448-22 G codes: In compliance with CMSs Claims Based Outcome Reporting, the following G-code set was chosen for this patient based on their primary functional limitation being treated: The outcome measure chosen to determine the severity of the functional limitation was the Barthel with a score of 60/100 which was correlated with the impairment scale. ? Mobility - Walking and Moving Around:  
  - CURRENT STATUS: CJ - 20%-39% impaired, limited or restricted  - GOAL STATUS: CI - 1%-19% impaired, limited or restricted  - D/C STATUS:  ---------------To be determined---------------  
 
 
Pain: 
Pain Scale 1: Numeric (0 - 10) Pain Intensity 1: 8 Pain Location 1: Back Pain Orientation 1: Lower Pain Description 1: Aching Pain Intervention(s) 1: Medication (see MAR) Activity Tolerance: VSS Please refer to the flowsheet for vital signs taken during this treatment. After treatment:  
[x]         Patient left in no apparent distress sitting up in chair 
[]         Patient left in no apparent distress in bed 
[x]         Call bell left within reach [x]         Nursing notified 
[x]         Caregiver present 
[]         Bed alarm activated COMMUNICATION/EDUCATION:  
The patients plan of care was discussed with: Physical Therapist, Occupational Therapist and Registered Nurse. [x]         Fall prevention education was provided and the patient/caregiver indicated understanding. [x]         Patient/family have participated as able in goal setting and plan of care. [x]         Patient/family agree to work toward stated goals and plan of care. []         Patient understands intent and goals of therapy, but is neutral about his/her participation. []         Patient is unable to participate in goal setting and plan of care. Thank you for this referral. 
Lakshmi Donaldson, PT, DPT Time Calculation: 33 mins

## 2018-08-30 NOTE — PROGRESS NOTES
TRANSFER - OUT REPORT: 
 
Verbal report given to Ruchi peres(name) on Brennon Dsouza  being transferred to [facility](unit) for routine progression of care Report consisted of patients Situation, Background, Assessment and  
Recommendations(SBAR). Time Pre op antibiotic given:1745 Anesthesia Stop time: 2202 Mathews Present on Transfer to floor:y Order for Mathews on Chart:y 
Discharge Prescriptions with Chart: 
 
Information from the following report(s) SBAR, Kardex, Procedure Summary and MAR was reviewed with the receiving nurse. Opportunity for questions and clarification was provided. Is the patient on 02? YES 
     L/Min 2 Other Is the patient on a monitor? YES Is the nurse transporting with the patient? YES Surgical Waiting Area notified of patient's transfer from PACU? YES The following personal items collected during your admission accompanied patient upon transfer:  
Dental Appliance: Dental Appliances: None Vision: Visual Aid: Glasses, With patient Hearing Aid:   
Jewelry: Jewelry: None Clothing: Clothing:  (belongings to PACU) Other Valuables: Other Valuables: Brace (sent to PACU) Valuables sent to safe:

## 2018-08-30 NOTE — PROGRESS NOTES
Bedside shift change report given to MEGHAN Chua (oncoming nurse) by Leslie Martínez RN (offgoing nurse). Report included the following information SBAR, Kardex, ED Summary, OR Summary, Intake/Output, MAR and Recent Results. Primary Nurse MEGHAN Grove performed a dual skin assessment on this patient. Impairment noted - lower back incision with dressing clean, dry, and intact; tattoos on right upper arm and upper back. Trey score is 20. 
 
1:30 AM: Patient requested that his labs be drawn after 5 AM; I agreed that we could accommodate that so he can get some rest. Also advised that we will need to complete his admission database at that time.

## 2018-08-30 NOTE — PROGRESS NOTES
Orthopedic Spine Progress Note Chrystal Townsend, AGACNP-BC Work Cell: [phone] Post Op Day: 1 Day Post-Op August 30, 2018 2:27 PM  
 
Leni Bates HPI:  
  
Leni Bates is a 66 y.o. male with PMH notable for chronic pain, DMII, CKD, COPD, hypertension, Hepatitis C, and chronic lumbar stenosis. He has been on methadone for some years now with unsuccessful attempts at weaning. He has history of a lumbar fusion by Dr. Julia Robbins in [place] [facility]. He has recently developed neurologically based complaints of pain in to his left leg. He reports constant stabbing pain exacerbated with laying flat, sitting, or standing. He had no clinical manifestations of myelopathy to speak of. Imaging revealed fairly severe central canal narrowing at the L3-4 level, and some lateral recess stenosis at L2-3. After a discussion of the risks, benefits, and alternatives, Leni Bates consented to undergo a  Procedure(s): REVISION LUMBAR LAMINECTOMY FUSION L3-S1, POSSIBLE L2 Subjective  
  
Patient with complaints of mild nausea this am. Pain reportedly poorly managed. He is back on his established methadone dose of 95 mg daily. Custom TLSO at bedside. He denies headache, dizziness, cp, cough, sob, abd pain, f/c, or dysuria. Objective:  
 
Physical Exam: 
General:  Alert and oriented. No acute distress. Respiratory:  Breathing unlabored. Abdomen:  
Extremities: Soft, non-tender, non-distended No evidence of cyanosis. Pulses palpable in both upper and lower extremities. Neurologic: 
 
Musculoskeletal:  SALCEDO, FC. No new motor deficits. Neurovascular exam within normal limits. Sensation stable. Motor: unchanged C5-T1 and L2-S1. Calves soft, nontender upon palpation and with passive stretch. Moves both upper and lower extremities. Incision: clean, dry, and intact. No significant erythema or swelling. No active drainage noted. Vital Signs:   
Patient Vitals for the past 8 hrs: 
 BP Temp Pulse Resp SpO2 18 1401 159/77 99.1 °F (37.3 °C) (!) 59 16 93 % 18 0916 134/83 - 68 - 92 % 18 0803 163/78 98.8 °F (37.1 °C) 70 16 97 % 18 0734 163/80 98 °F (36.7 °C) 68 17 95 % Temp (24hrs), Av.4 °F (36.9 °C), Min:97.1 °F (36.2 °C), Max:99.1 °F (37.3 °C) Intake/Output: 
 07 -  190 In: -  
Out: 1440 [Urine:1350; Drains:90] 
1901 -  0700 In: 4363 [I.V.:1550] Out: 1370 [Urine:800; Drains:320] Pain Control:  
Pain Assessment Pain Scale 1: Numeric (0 - 10) Pain Intensity 1: 8 Pain Location 1: Back Pain Orientation 1: Lower Pain Description 1: Aching Pain Intervention(s) 1: Medication (see MAR) LAB:   
Recent Labs 18 
 994 41 661 HGB  10.4* Lab Results Component Value Date/Time Sodium 137 2018 05:34 AM  
 Potassium 4.1 2018 05:34 AM  
 Chloride 99 2018 05:34 AM  
 CO2 27 2018 05:34 AM  
 Glucose 200 (H) 2018 05:34 AM  
 BUN 13 2018 05:34 AM  
 Creatinine 1.08 2018 05:34 AM  
 Calcium 8.1 (L) 2018 05:34 AM  
 
 
PT/OT:  
Gait:  Gait Base of Support: Widened Speed/Farnaz: Pace decreased (<100 feet/min), Slow Step Length: Left shortened, Right shortened Gait Abnormalities: Antalgic, Decreased step clearance Ambulation - Level of Assistance: Contact guard assistance, Assist x2 Distance (ft): 4 Feet (ft) Assistive Device: Brace/Splint, Gait belt, Walker, rolling Assessment/Plan  
  
1. 1 Day Post-Op Procedure(s): REVISION LUMBAR LAMINECTOMY FUSION L3-S1, POSSIBLE L2 
 -Continue PT/OT. TLSO when OOB 
 -Pain control- PRN oxycodone, scheduled APAP, PRN robaxin 
 -Voiding 
 -monitor hvac output (90 cc today) -Incentive Spirometer 
 -Tolerating diet 
 -VTE Prophylaxes - TEDS & SCDs 2. Chronic pain with history of polysubstance abuse 
 -Methadone 95 mg daily 
 -multimodal pain control as above.   
 
3.  DMII 
 -Patient takes metformin, invokana 300 mg and 28 units, Basaglar before breakfast 
 -A1c 8.7 
 -fbg between 203 and 219 postoperatively 
 -continue accuchecks, diabetic diet, SSI. start 28 units Glargine today. 4. Hypertension, chronic 
 -BP suboptimal postoperatively  
 -continue home amlodipine, lisinopril, propranolol 
 -PRN clonidine 5. Anxiety disorder, chronic 
 -2mg alprazolam TID 
 -monitor closely for sedation 6. Acute postoperative blood loss anemia -hgb 10.4 postop.  
 -anticipated postoperative blood loss anemia. Monitor drain output and recheck hgb in am 
 
Plan above discussed with Dr. Keerthi Miranda team 
 
Discharge To:  Pending PT/OT evals Signed By: Riana Bob NP

## 2018-08-30 NOTE — BRIEF OP NOTE
BRIEF OPERATIVE NOTE Date of Procedure: 8/29/2018 Preoperative Diagnosis: SPINAL STENOSIS L3-4, STATUS POST POSTERIOR SPINAL FUSION L4-S1 Postoperative Diagnosis: SPINAL STENOSIS L3-4, STATUS POST POSTERIOR SPINAL FUSION L4-S1 Procedure(s): REVISION LUMBAR LAMINECTOMY FUSION L3-S1, POSSIBLE L2 
Surgeon(s) and Role: * Armando Baer MD - Primary Physician Assistant: Ingrid Zambrano PA-C Surgical Staff: 
Circ-1: Vargas Mcarthur RN 
Circ-Relief: Cirilo Kelley RN Scrub RN-1: Susie Haas Event Time In Incision Start 1815 Incision Close Anesthesia: General  
Estimated Blood Loss: 200 ml Specimens: * No specimens in log * Findings: Spinal stenosis Complications: None Implants:  
Implant Name Type Inv. Item Serial No.  Lot No. LRB No. Used Action 3 DEMIN CORTICAL FIBERS 30CC   [id number]   N/A 1 Implanted 3 DEMIN CORTICAL FIBERS 30CC   [id number]   N/A 1 Implanted GRAFT BNE RECOMB HUM INFUS MED --  - SN/A  GRAFT BNE RECOMB HUM INFUS MED --  N/A [employer]ANE[employer] [id number] N/A 1 Implanted SCR SPNE PRE-ASSBL 7.5X45MM -- CREO 5.5MM - SN/A  SCR SPNE PRE-ASSBL 7.5X45MM -- CREO 5.5MM N/A [employer] N/A N/A 6 Implanted EPHRAIM SPNE CRV TI 5.5X55MM -- CREO - SN/A  EPHRAIM SPNE CRV TI 5.5X55MM -- CREO N/A [employer] N/A N/A 2 Implanted 1 Implanted

## 2018-08-30 NOTE — OP NOTES
[address]   OPERATIVE REPORT    Ida Cohen  MR#: [id number]  : 1960  ACCOUNT #: [de-identified]   DATE OF SERVICE: 2018    PREOPERATIVE DIAGNOSES:  1. Lumbar spinal stenosis L2-3, L3-4.  2.  Status post lumbar decompression and spinal fusion, L4-S1. POSTOPERATIVE DIAGNOSES:  1. Lumbar spinal stenosis L2-3, L3-4.  2.  Status post lumbar decompression and spinal fusion, L4-S1. PROCEDURES PERFORMED:    1. Partial removal of segmental instrumentation lumbar spine. 2.  Revision bilateral laminectomy L3-L4, partial entry of L2, bilateral decompression of the L3 and 4 nerve roots. 3.  Revision bilateral lateral fusion L2-L4 using Globus spinal instrumentation, supplemental cancellous allograft and bone morphogenic protein (Infuse). SURGEON:  Marcy Sanders MD    ASSISTANT:  Deangelo Zambrano PA-C    ANESTHESIA:  General.    COMPLICATIONS:  None    ESTIMATED BLOOD LOSS:  200 ml    SPECIMENS REMOVED:  None    IMPLANTS:    Implant Name Type Inv. Item Serial No.  Lot No. LRB No. Used Action   3 DEMIN CORTICAL FIBERS 30CC     [id number]     N/A 1 Implanted   3 DEMIN CORTICAL FIBERS 30CC     [id number]     N/A 1 Implanted   GRAFT BNE RECOMB HUM INFUS MED --  - SN/A   GRAFT BNE RECOMB HUM INFUS MED --  N/A [employer] [id number] N/A 1 Implanted   SCR SPNE PRE-ASSBL 7.5X45MM -- CREO 5.5MM - SN/A   SCR SPNE PRE-ASSBL 7.5X45MM -- CREO 5.5MM N/A [employer] N/A N/A 6 Implanted   EPHRAIM SPNE CRV TI 5.5X55MM -- CREO - SN/A   EPHRAIM SPNE CRV TI 5.5X55MM -- CREO N/A [employer] N/A N/A 2 Implanted                       1 Implanted          INDICATION:  A 16-year-old gentleman who had a surgery to include a lumbar fusion L4-S1 many, many years ago by my partner, Dr. Delia Kruse. The patient, I think, had done well to a large extent. His fusion appears to be solid.   He has gone on to develop symptoms consistent with lumbar stenosis and a myelogram CAT scan demonstrating a fairly severe central canal narrowing at the L3-4 level, particular some narrowing in the lateral recess zone L2-3 and some extension into the neural foraminal zone. Given degree of symptomatology refractory course to conservative measures, a revision decompression and stabilization procedure offered. Potential benefits and complications reviewed. PROCEDURE:  The patient was brought to the operating room in the supine position. General anesthetic administered. The patient was placed in the prone position on the operating room table and posterior spinal region was then prepped and draped in normal fashion. Preoperative antibiotics administered. Thigh high CRISTIANE hose and sequential pumps applied to the patient's lower extremities. Incision made extending from about L1 down to the sacrum. Subcutaneous tissue then divided in line with the incision down to the level of the fascia. The fascia incised in the midline and  off the dissection, continued over the spinous process and laminar surfaces proximally. Distally cut the dissection to allow the midline exposing a very large fusion mass that really overgrew the instrumentation. I exposed what I thought was probably the L4-5 and part of the sacral regions. Deep retractors placed. The instrumentation was covered in bone. I did use a 1/4 inch osteotome as well as the Midas Darnell bur to remove a large portion of this bone, eventually exposing portions of the instrumentation. I actually had to drill a circumferential hole around the proximal screw on the left side at L4. I then used a metal cutting bur to amputate the piter on the left side and was able to drill out as well the locking device from the piter to the screw head itself. I was then able to fortunately remove the screw without too much difficulty at that point.   In a similar fashion, a portion of the piter and the connector removed on the right side using the metal cutting bur.  I again then cut out the locking mechanism to the head of the screw on the right side as well backed out the screw. The holes were plugged with some Gelfoam.  I then finished the exposure exposing the transverse processes of L2-3 as well as the proximal portion of the old fusion mass. I then completed the laminectomy. This involved the revision decompression at the L3-4 level. The bone mass itself was quite thick. I had to use a Midas Darnell bur to thin it down a great deal.  I then used a series of 3 and 4 mm Kerrisons to complete a central opening of the spinal canal exposing the pedicle of L3 as well as the L3 nerve root. I then completed foraminotomy over that root bilaterally. The more difficult portion of the decompression was exposing the L4 nerve root in the lateral recess zone. The bone was quite thick in this region related to some overgrowth of the fusion mass. I eventually extended to the point that I was able to use a 3 and 4 mm Kerrisons to get down to the level of the pedicle of L4 and then followed the nerve root into the neural foramina zone. There were a great number of adhesions from the nerve root to the undersurface of the facet joint, but fortunately did not encounter any spinal fluid leaks. Related to the thickness of the bone, the decompression alone certainly took me quite a bit longer than it normally would. At that point, I felt comfortable with the nerve root decompression. I then placed drill holes into the pedicle of L2 and L3. Pedicles probed in routine fashion. Markers were placed and x-ray taken to verify position and direction. 60 mL of cancellous allograft utilized and finger packed across the transverse processes, but also the residual portion of the lamina and the facet joint, both at L2-3 and L3-4. I then placed screws each measuring 7.5 mm in diameter, 45 mm in length at all levels with good purchase.   These were then connected with a piter that was appropriately contoured, secured using the locking cap and final tightening device. The wound had been irrigated. A piece of Gelfoam used to obtain some hemostasis over the site of the laminectomy. Prior to doing so, I did palpate the pedicle to ensure no neural impingement or pedicle breakthrough. Drain placed. Fascia closed using #1 Vicryl for subcutaneous tissues and skin closed using 2-0 and 3-0 Vicryl. The patient initially awoken from the anesthetic, extubated, and taken to   recovery in stable condition good.       MD Leodan Kim / [place]  D: 08/29/2018 21:07     T: 08/29/2018 22:12  JOB #: [id number]

## 2018-08-31 ENCOUNTER — HOME HEALTH ADMISSION (OUTPATIENT)
Dept: HOME HEALTH SERVICES | Facility: HOME HEALTH | Age: 58
End: 2018-08-31
Payer: MEDICAID

## 2018-08-31 LAB
GLUCOSE BLD STRIP.AUTO-MCNC: 176 MG/DL (ref 65–100)
GLUCOSE BLD STRIP.AUTO-MCNC: 186 MG/DL (ref 65–100)
GLUCOSE BLD STRIP.AUTO-MCNC: 204 MG/DL (ref 65–100)
GLUCOSE BLD STRIP.AUTO-MCNC: 245 MG/DL (ref 65–100)
HGB BLD-MCNC: 9.9 G/DL (ref 12.1–17)
SERVICE CMNT-IMP: ABNORMAL

## 2018-08-31 PROCEDURE — 65270000029 HC RM PRIVATE

## 2018-08-31 PROCEDURE — 77010033678 HC OXYGEN DAILY

## 2018-08-31 PROCEDURE — 85018 HEMOGLOBIN: CPT | Performed by: PHYSICIAN ASSISTANT

## 2018-08-31 PROCEDURE — 82962 GLUCOSE BLOOD TEST: CPT

## 2018-08-31 PROCEDURE — 97530 THERAPEUTIC ACTIVITIES: CPT | Performed by: PHYSICAL THERAPIST

## 2018-08-31 PROCEDURE — 74011250636 HC RX REV CODE- 250/636: Performed by: PHYSICIAN ASSISTANT

## 2018-08-31 PROCEDURE — 74011000250 HC RX REV CODE- 250: Performed by: NURSE PRACTITIONER

## 2018-08-31 PROCEDURE — 74011250637 HC RX REV CODE- 250/637: Performed by: NURSE PRACTITIONER

## 2018-08-31 PROCEDURE — 97535 SELF CARE MNGMENT TRAINING: CPT

## 2018-08-31 PROCEDURE — 74011250637 HC RX REV CODE- 250/637: Performed by: PHYSICIAN ASSISTANT

## 2018-08-31 PROCEDURE — 94760 N-INVAS EAR/PLS OXIMETRY 1: CPT

## 2018-08-31 PROCEDURE — 74011636637 HC RX REV CODE- 636/637: Performed by: PHYSICIAN ASSISTANT

## 2018-08-31 PROCEDURE — 36415 COLL VENOUS BLD VENIPUNCTURE: CPT | Performed by: PHYSICIAN ASSISTANT

## 2018-08-31 PROCEDURE — 97530 THERAPEUTIC ACTIVITIES: CPT

## 2018-08-31 PROCEDURE — 97116 GAIT TRAINING THERAPY: CPT | Performed by: PHYSICAL THERAPIST

## 2018-08-31 PROCEDURE — 74011636637 HC RX REV CODE- 636/637: Performed by: NURSE PRACTITIONER

## 2018-08-31 RX ORDER — CYCLOBENZAPRINE HCL 10 MG
10 TABLET ORAL
Qty: 30 TAB | Refills: 0 | Status: SHIPPED | OUTPATIENT
Start: 2018-08-31 | End: 2019-01-09 | Stop reason: ALTCHOICE

## 2018-08-31 RX ORDER — OXYCODONE HYDROCHLORIDE 5 MG/1
15 TABLET ORAL
Status: DISCONTINUED | OUTPATIENT
Start: 2018-08-31 | End: 2018-09-03 | Stop reason: HOSPADM

## 2018-08-31 RX ORDER — CYCLOBENZAPRINE HCL 10 MG
10 TABLET ORAL
Status: DISCONTINUED | OUTPATIENT
Start: 2018-08-31 | End: 2018-09-03 | Stop reason: HOSPADM

## 2018-08-31 RX ORDER — CYCLOBENZAPRINE HCL 10 MG
10 TABLET ORAL
Qty: 30 TAB | Refills: 0 | Status: SHIPPED | OUTPATIENT
Start: 2018-08-31 | End: 2018-08-31

## 2018-08-31 RX ORDER — OXYCODONE HYDROCHLORIDE 10 MG/1
10 TABLET ORAL
Qty: 60 TAB | Refills: 0 | Status: SHIPPED | OUTPATIENT
Start: 2018-08-31 | End: 2019-04-08 | Stop reason: ALTCHOICE

## 2018-08-31 RX ORDER — OXYCODONE HYDROCHLORIDE 5 MG/1
10 TABLET ORAL
Status: DISCONTINUED | OUTPATIENT
Start: 2018-08-31 | End: 2018-09-03 | Stop reason: HOSPADM

## 2018-08-31 RX ORDER — NALOXONE HYDROCHLORIDE 4 MG/.1ML
SPRAY NASAL
Qty: 1 EACH | Refills: 0 | Status: SHIPPED | OUTPATIENT
Start: 2018-08-31 | End: 2019-04-08

## 2018-08-31 RX ORDER — NALOXONE HYDROCHLORIDE 4 MG/.1ML
SPRAY NASAL
Qty: 1 EACH | Refills: 0 | Status: SHIPPED | OUTPATIENT
Start: 2018-08-31 | End: 2018-08-31

## 2018-08-31 RX ORDER — LIDOCAINE 50 MG/G
2 PATCH TOPICAL EVERY 24 HOURS
Status: DISCONTINUED | OUTPATIENT
Start: 2018-08-31 | End: 2018-09-03 | Stop reason: HOSPADM

## 2018-08-31 RX ADMIN — INSULIN LISPRO 2 UNITS: 100 INJECTION, SOLUTION INTRAVENOUS; SUBCUTANEOUS at 06:49

## 2018-08-31 RX ADMIN — PROPRANOLOL HYDROCHLORIDE 10 MG: 20 TABLET ORAL at 09:09

## 2018-08-31 RX ADMIN — OXYCODONE HYDROCHLORIDE 15 MG: 5 TABLET ORAL at 11:11

## 2018-08-31 RX ADMIN — OXYCODONE HYDROCHLORIDE 15 MG: 5 TABLET ORAL at 17:34

## 2018-08-31 RX ADMIN — OXYCODONE HYDROCHLORIDE 15 MG: 5 TABLET ORAL at 20:35

## 2018-08-31 RX ADMIN — ACETAMINOPHEN 650 MG: 325 TABLET ORAL at 20:34

## 2018-08-31 RX ADMIN — ONDANSETRON 4 MG: 2 INJECTION INTRAMUSCULAR; INTRAVENOUS at 06:49

## 2018-08-31 RX ADMIN — OXYCODONE HYDROCHLORIDE 10 MG: 5 TABLET ORAL at 04:56

## 2018-08-31 RX ADMIN — GABAPENTIN 300 MG: 300 CAPSULE ORAL at 09:09

## 2018-08-31 RX ADMIN — GABAPENTIN 300 MG: 300 CAPSULE ORAL at 18:00

## 2018-08-31 RX ADMIN — INSULIN GLARGINE 28 UNITS: 100 INJECTION, SOLUTION SUBCUTANEOUS at 09:14

## 2018-08-31 RX ADMIN — ALPRAZOLAM 2 MG: 1 TABLET ORAL at 22:25

## 2018-08-31 RX ADMIN — Medication 1 TABLET: at 09:09

## 2018-08-31 RX ADMIN — ACETAMINOPHEN 650 MG: 325 TABLET ORAL at 01:55

## 2018-08-31 RX ADMIN — Medication 1 TABLET: at 18:00

## 2018-08-31 RX ADMIN — PROPRANOLOL HYDROCHLORIDE 10 MG: 20 TABLET ORAL at 22:26

## 2018-08-31 RX ADMIN — ACETAMINOPHEN 650 MG: 325 TABLET ORAL at 14:27

## 2018-08-31 RX ADMIN — AMLODIPINE BESYLATE 10 MG: 5 TABLET ORAL at 09:09

## 2018-08-31 RX ADMIN — OXYCODONE HYDROCHLORIDE 10 MG: 5 TABLET ORAL at 01:55

## 2018-08-31 RX ADMIN — OXYCODONE HYDROCHLORIDE 15 MG: 5 TABLET ORAL at 14:33

## 2018-08-31 RX ADMIN — ALPRAZOLAM 2 MG: 1 TABLET ORAL at 16:56

## 2018-08-31 RX ADMIN — ALPRAZOLAM 2 MG: 1 TABLET ORAL at 09:09

## 2018-08-31 RX ADMIN — OXYCODONE HYDROCHLORIDE 10 MG: 5 TABLET ORAL at 07:40

## 2018-08-31 RX ADMIN — METHOCARBAMOL 500 MG: 500 TABLET ORAL at 05:14

## 2018-08-31 RX ADMIN — INSULIN LISPRO 2 UNITS: 100 INJECTION, SOLUTION INTRAVENOUS; SUBCUTANEOUS at 22:32

## 2018-08-31 RX ADMIN — ACETAMINOPHEN 650 MG: 325 TABLET ORAL at 08:33

## 2018-08-31 RX ADMIN — PROPRANOLOL HYDROCHLORIDE 10 MG: 20 TABLET ORAL at 16:57

## 2018-08-31 RX ADMIN — INSULIN LISPRO 3 UNITS: 100 INJECTION, SOLUTION INTRAVENOUS; SUBCUTANEOUS at 12:47

## 2018-08-31 RX ADMIN — INSULIN LISPRO 2 UNITS: 100 INJECTION, SOLUTION INTRAVENOUS; SUBCUTANEOUS at 16:57

## 2018-08-31 RX ADMIN — METHADONE HYDROCHLORIDE 95 MG: 5 SOLUTION ORAL at 08:33

## 2018-08-31 RX ADMIN — Medication 10 ML: at 22:27

## 2018-08-31 NOTE — DIABETES MGMT
DTC Progress Note Recommendations/ Comments: Chart reviewed for hyperglycemia. Pt is s/p ortho surgery, POD 2. BG consistently above 180 mg/dl since admission. If appropriate, please consider increasing Lantus from 28 to 30 units daily at this time and resuming home dose of Metformin (1000 mg BID) when able. Current hospital DM medication: Lantus, 28 units Lispro correction, normal sensitivity Chart reviewed on Alan Lozano. Patient is a 66 y.o. male with Type II DM on Lantus, 24 units, Metformin, 1000 mg BID and Invokana, 300 mg once daily at home. A1c:  
Lab Results Component Value Date/Time Hemoglobin A1c 8.7 (H) 07/31/2018 03:33 PM  
 Hemoglobin A1c 8.4 (H) 03/14/2018 04:10 PM  
 
 
Recent Glucose Results:  
Lab Results Component Value Date/Time GLUCPOC 245 (H) 08/31/2018 12:23 PM  
 GLUCPOC 186 (H) 08/31/2018 05:54 AM  
 GLUCPOC 183 (H) 08/30/2018 09:01 PM  
  
 
Lab Results Component Value Date/Time Creatinine 1.08 08/30/2018 05:34 AM  
 
Estimated Creatinine Clearance: 109.6 mL/min (based on Cr of 1.08). Active Orders Diet DIET DIABETIC CONSISTENT CARB Regular PO intake: No data found. Will continue to follow as needed. Thank you, Chas Reyes [facility]

## 2018-08-31 NOTE — DISCHARGE SUMMARY
[address] [facility]   [address] [facility] [address]    DISCHARGE SUMMARY     Patient: Bethanne Favre                             Medical Record Number: [id number]                : 1960  Age: 66 y.o. Admit Date: 2018  Discharge Date:   Admission Diagnosis: SPINAL STENOSIS L3-4, STATUS POST POSTERIOR SPINAL FUSION L4-S1  Lumbar spinal stenosis  Discharge Diagnosis: SPINAL STENOSIS L3-4, STATUS POST POSTERIOR SPINAL FUSION L4-S1  Procedures: Procedure(s):  REVISION LUMBAR LAMINECTOMY FUSION L3-S1, POSSIBLE L2  Surgeon: MARY White MD  Assistants:  Ofelia Jon. STEVE Zambrano  Anesthesia: general  Complications: None     History of Present Illness:  Bethanne Favre is a 51-year-old gentleman who had a surgery to include a lumbar fusion L4-S1 many, many years ago by my partner, Dr. Jordy Richter. The patient, I think, had done well to a large extent. His fusion appears to be solid. He has gone on to develop symptoms consistent with lumbar stenosis and a myelogram CAT scan demonstrating a fairly severe central canal narrowing at the L3-4 level, particular some narrowing in the lateral recess zone L2-3 and some extension into the neural foraminal zone. Given degree of symptomatology refractory course to conservative measures, a revision decompression and stabilization procedure offered. Potential benefits and complications reviewed.     Hospital Course:  Bethanne Favre tolerated the procedure well. He was transferred to the Spinal Unit from the recovery room in stable condition. On postoperative day 1, the dressing was noted to be clean and dry, he was neurovascularly intact and afebrile, and his vital signs were stable. Pain management attempted to be managed with PCA and reordering the patients methadone, as prescribed by his pain management physician.   Hemoglobin was noted to be   Lab Results   Component Value Date/Time    HGB 9.9 (L) 2018 04:22 AM     On postoperative day 2-5,  Pravin Leroy Myah Argueta made excellent progress with physical therapy and was discharged to Home in stable condition on postoperative day 5. He was given routine postoperative instructions and advised to follow up in the office in 2 weeks following discharge home. He was given the following prescriptions for pain medications. Discharge Medications:     Current Discharge Medication List      START taking these medications    Details   oxyCODONE IR (ROXICODONE) 10 mg tab immediate release tablet Take 1 Tab by mouth every three (3) hours as needed. Max Daily Amount: 80 mg.  Qty: 60 Tab, Refills: 0    Associated Diagnoses: Spinal stenosis of lumbar region, unspecified whether neurogenic claudication present      naloxone (NARCAN) 4 mg/actuation nasal spray Use 1 spray intranasally, then discard. Repeat with new spray every 2 min as needed for opioid overdose symptoms, alternating nostrils. Indications: OPIATE-INDUCED RESPIRATORY DEPRESSION  Qty: 1 Each, Refills: 0      cyclobenzaprine (FLEXERIL) 10 mg tablet Take 1 Tab by mouth three (3) times daily as needed for Muscle Spasm(s). Qty: 30 Tab, Refills: 0         CONTINUE these medications which have NOT CHANGED    Details   insulin glargine (BASAGLAR KWIKPEN U-100 INSULIN) 100 unit/mL (3 mL) inpn Inject 24 units daily  Qty: 10 Pen, Refills: 0    Comments: Needs to r/s appointment      gabapentin (NEURONTIN) 300 mg capsule TAKE ONE CAPSULE BY MOUTH TWICE A DAY  Qty: 60 Cap, Refills: 0    Associated Diagnoses: Neuropathy      metFORMIN (GLUCOPHAGE) 500 mg tablet TAKE TWO TABLETS BY MOUTH TWICE A DAY WITH MEALS  Qty: 120 Tab, Refills: 1      propranolol (INDERAL) 10 mg tablet TAKE ONE TABLET BY MOUTH THREE TIMES A DAY  Qty: 90 Tab, Refills: 0    Associated Diagnoses: Anxiety      ALPRAZolam (XANAX) 2 mg tablet 2 mg three (3) times daily. lisinopril (PRINIVIL, ZESTRIL) 40 mg tablet Take 1 Tab by mouth daily.   Qty: 30 Tab, Refills: 1    Associated Diagnoses: Hypertension, unspecified type      amLODIPine (NORVASC) 10 mg tablet TAKE ONE TABLET BY MOUTH DAILY  Qty: 90 Tab, Refills: 1    Associated Diagnoses: Hypertension, unspecified type      DOCUSATE SODIUM PO Take  by mouth as needed. METHADONE HCL (METHADONE PO) Take 95 mg by mouth daily. nicotine (NICODERM CQ) 14 mg/24 hr patch 1 Patch by TransDERmal route every twenty-four (24) hours for 30 days. Qty: 30 Patch, Refills: 1    Associated Diagnoses: Smoking addiction      polyethylene glycol (MIRALAX) 17 gram/dose powder Take 17 g by mouth daily. Qty: 850 g, Refills: 1    Associated Diagnoses: Other constipation      mirtazapine (REMERON) 15 mg tablet       canagliflozin (INVOKANA) 300 mg tablet Take 1 Tab by mouth Daily (before breakfast).   Qty: 30 Tab, Refills: 5                 Signed by: Becca Zambrano PA-C  8/31/2018

## 2018-08-31 NOTE — PROGRESS NOTES
Problem: Self Care Deficits Care Plan (Adult) Goal: *Acute Goals and Plan of Care (Insert Text) Occupational Therapy Goals Initiated 8/30/2018 1. Patient will perform lower body dressing with minimal assistance/contact guard assist using AE PRN within 7 days. 2.  Patient will perform toilet transfer with minimal assistance/contact guard assist using most appropriate DME within 7 days. 3.  Patient will grooming at the sink x3 min with supervision/set-up within 7 days. 4.  Patient will don/doff back brace at minimal assistance/contact guard assist within 7 days. 5.  Patient will verbalize/demonstrate 3/3 back precautions during ADL tasks without cues within 7 days. Occupational Therapy TREATMENT Patient: Liang Allred (31 y.o. male) Date: 8/31/2018 Diagnosis: SPINAL STENOSIS L3-4, STATUS POST POSTERIOR SPINAL FUSION L4-S1 Lumbar spinal stenosis <principal problem not specified> Procedure(s) (LRB): 
REVISION LUMBAR LAMINECTOMY FUSION L3-S1, POSSIBLE L2 (N/A) 2 Days Post-Op Precautions: Back, Fall, Other (comment) (brace when up) Chart, occupational therapy assessment, plan of care, and goals were reviewed. ASSESSMENT: 
Patient received in supine with family present, agreeable to therapy with encouragement. Improved bed mobility with 1 assist, CGA x2 & increased time to stand with RW. Completed ambulation in room & hallway with CGA & RW, returned to room declining ambulation into bathroom d/t fatigue. Setup for grooming tasks in the chair, educated on compensatory strategies to maintain spinal precautions. Despite difficulty with pain control, patient continues to progress, anticipate d/c with HHOT. Next session: Lower body dressing with AE training & continued education Progression toward goals: 
[]       Improving appropriately and progressing toward goals 
[]       Improving slowly and progressing toward goals 
[]       Not making progress toward goals and plan of care will be adjusted PLAN: 
Patient continues to benefit from skilled intervention to address the above impairments. Continue treatment per established plan of care. Discharge Recommendations:  Home Health Further Equipment Recommendations for Discharge:  TBD (anticipate lower body AE equipment) SUBJECTIVE:  
Patient stated I don't want to say that I'm suffering more than the average person, but I am. They just don't understand.  OBJECTIVE DATA SUMMARY:  
Cognitive/Behavioral Status: 
Neurologic State: Alert Orientation Level: Oriented X4 Cognition: Appropriate for age attention/concentration; Follows commands Perception: Appears intact Perseveration: No perseveration noted Safety/Judgement: Awareness of environment; Fall prevention Functional Mobility and Transfers for ADLs: 
Bed Mobility: 
Supine to Sit: Minimum assistance;Assist x1 Scooting: Supervision Transfers: 
Sit to Stand: Contact guard assistance;Assist x2 Balance: 
Sitting: Intact Standing: Impaired Standing - Static: Constant support;Good Standing - Dynamic : Fair ADL Intervention: 
  
 
Grooming Grooming Assistance: Supervision/set up Washing Face: Supervision/set-up Brushing Teeth: Supervision/set-up; Compensatory technique training Brushing/Combing Hair: Supervision/set-up Cues: Verbal cues provided;Visual cues provided Upper Body Dressing Assistance Dressing Assistance: Maximum assistance Hospital Gown: Maximum assistance Toileting Toileting Assistance:  (declined to practice d/t faitgue post amb, encouraged BR,) Cognitive Retraining Safety/Judgement: Awareness of environment; Fall prevention Therapeutic Exercises:  
Ambulation with RW in room & hallway Pain: 
Pain Scale 1: Numeric (0 - 10) Pain Intensity 1: 9 Activity Tolerance:  
Good-Fair, limited by pain and decreased activity tolerance this session Please refer to the flowsheet for vital signs taken during this treatment. After treatment:  
[x] Patient left in no apparent distress sitting up in chair 
[] Patient left in no apparent distress in bed 
[x] Call bell left within reach [x] Nursing notified 
[] Caregiver present 
[] Bed alarm activated COMMUNICATION/COLLABORATION:  
The patients plan of care was discussed with: Physical Therapist and Registered Nurse Jl Garcia OT Time Calculation: 29 mins

## 2018-08-31 NOTE — PROGRESS NOTES
Problem: Mobility Impaired (Adult and Pediatric) Goal: *Acute Goals and Plan of Care (Insert Text) Physical Therapy Goals Initiated 8/30/2018 1. Patient will move from supine to sit and sit to supine  in bed with modified independence within 4 days. 2. Patient will perform sit to stand with modified independence within 4 days. 3. Patient will ambulate with modified independence for 200 feet with the least restrictive device within 4 days. 4. Patient will ascend/descend 5 stairs with 1 handrail(s) with modified independence within 4 days. 5. Patient will verbalize and demonstrate understanding of spinal precautions (No bending, lifting greater than 5 lbs, or twisting; log-roll technique; frequent repositioning as instructed) within 4 days. physical Therapy TREATMENT Patient: Nicole Rivers (83 y.o. male) Date: 8/31/2018 Diagnosis: SPINAL STENOSIS L3-4, STATUS POST POSTERIOR SPINAL FUSION L4-S1 Lumbar spinal stenosis <principal problem not specified> Procedure(s) (LRB): 
REVISION LUMBAR LAMINECTOMY FUSION L3-S1, POSSIBLE L2 (N/A) 2 Days Post-Op Precautions: Back, Fall, Other (comment) (brace when up) Chart, physical therapy assessment, plan of care and goals were reviewed. ASSESSMENT: 
Patient making steady progress toward goals. Patient continues to be limited by pain and requires encouragement to participate. Supine to sit with Bharat x 1 using log roll technique. Sit to stand with CGA x 2 and amb approx 110 feet with RW and CGA with slow concepción but no overt LOB. Reports some generalized weakness in B LE with increased distance ambulated but no noted buckling or instability. Positioned up in chair with needs in reach. Continue to recommend [facility] and will order RW as needed.   
Progression toward goals: 
[x]      Improving appropriately and progressing toward goals 
[]      Improving slowly and progressing toward goals 
[]      Not making progress toward goals and plan of care will be adjusted PLAN: 
Patient continues to benefit from skilled intervention to address the above impairments. Continue treatment per established plan of care. Discharge Recommendations:  Home Health Further Equipment Recommendations for Discharge:  rolling walker SUBJECTIVE:  
Patient stated My family just got here. Don't make me do it now.  The patient stated 2/3 back precautions. Reviewed all 3 with patient. OBJECTIVE DATA SUMMARY:  
Critical Behavior: 
Neurologic State: Alert Orientation Level: Oriented X4 Cognition: Appropriate decision making, Follows commands Safety/Judgement: Awareness of environment, Fall prevention Functional Mobility Training: 
Bed Mobility: Log roll with Bharat x 1 Supine to Sit: Minimum assistance;Assist x1 Scooting: Supervision Brace donned with  maximal assistance Transfers: 
Sit to Stand: Contact guard assistance;Assist x2 Stand to Sit: Contact guard assistance;Assist x2 Balance: 
Sitting: Intact Standing: Impaired Standing - Static: Constant support;Good Standing - Dynamic : Fair Ambulation/Gait Training: 
Distance (ft): 110 Feet (ft) Assistive Device: Brace/Splint;Gait belt;Walker, rolling Ambulation - Level of Assistance: Contact guard assistance Gait Abnormalities: Antalgic;Decreased step clearance;Shuffling gait Base of Support: Widened Speed/Farnaz: Pace decreased (<100 feet/min); Shuffled; Slow Step Length: Left shortened;Right shortened Pain: 
Pain Scale 1: Numeric (0 - 10) Pain Intensity 1: 9 Activity Tolerance: VSS Please refer to the flowsheet for vital signs taken during this treatment. After treatment:  
[x]  Patient left in no apparent distress sitting up in chair 
[]  Patient left in no apparent distress in bed 
[x]  Call bell left within reach [x]  Nursing notified 
[]  Caregiver present 
[]  Bed alarm activated COMMUNICATION/COLLABORATION:  
 The patients plan of care was discussed with: Physical Therapist, Occupational Therapist and Registered Nurse Daisy Chou PT, DPT Time Calculation: 24 mins

## 2018-08-31 NOTE — PROGRESS NOTES
Care Management Interventions PCP Verified by CM: Yes 
Palliative Care Criteria Met (RRAT>21 & CHF Dx)?: No 
Transition of Care Consult (CM Consult): Home Health [address] [place] [address]: Yes Mary[place]art Signup: No 
Discharge Durable Medical Equipment: No 
Physical Therapy Consult: Yes Occupational Therapy Consult: Yes Speech Therapy Consult: No 
Current Support Network: Own Home, Other (Lives with mother) Confirm Follow Up Transport: Family Plan discussed with Pt/Family/Caregiver: Yes Freedom of Choice Offered: Yes [employer] Information Provided?: No 
Discharge Location Discharge Placement: Home with home health Reason for Admission:   REVISION LUMBAR LAMINECTOMY FUSION L3-S1, POSSIBLE L2 
               
RRAT Score:     17 Do you (patient/family) have any concerns for transition/discharge? Wants to have a 'lift' on his bed at home. Cm informed him he would actually have to pay out of pocket for that. Plan for utilizing home health: [address] [place] [address] pending Likelihood of readmission?   low Transition of Care Plan:    Chart reviewed, met with patient and his uncle in the room to explain role and offer support. Patient stated that he is currently living with his mother but soon will be moving into another home (his  father's home) which is on the same street. Patient currently resides at [facility] and will be moving into [address] [place] [address] discussed home health and patient stated he was not sure if he was going to be able to go home, that he may need to go to rehab; apparently his mother had already suggested [facility]. Cm informed him we would see how he did with therapy today and go from there. For now, a referral has been sent to [address] [place] [address]; will follow. [employer] BSW, [place] 
   
 
11:00am: [address] [place] [address] has accepted patient and the attending is okay with OT not starting until the end of next week. [employer] , [place]

## 2018-08-31 NOTE — PROGRESS NOTES
Problem: Mobility Impaired (Adult and Pediatric) Goal: *Acute Goals and Plan of Care (Insert Text) Physical Therapy Goals Initiated 8/30/2018 1. Patient will move from supine to sit and sit to supine  in bed with modified independence within 4 days. 2. Patient will perform sit to stand with modified independence within 4 days. 3. Patient will ambulate with modified independence for 200 feet with the least restrictive device within 4 days. 4. Patient will ascend/descend 5 stairs with 1 handrail(s) with modified independence within 4 days. 5. Patient will verbalize and demonstrate understanding of spinal precautions (No bending, lifting greater than 5 lbs, or twisting; log-roll technique; frequent repositioning as instructed) within 4 days. physical Therapy TREATMENT Patient: John Medel (31 y.o. male) Date: 8/31/2018 Diagnosis: SPINAL STENOSIS L3-4, STATUS POST POSTERIOR SPINAL FUSION L4-S1 Lumbar spinal stenosis <principal problem not specified> Procedure(s) (LRB): 
REVISION LUMBAR LAMINECTOMY FUSION L3-S1, POSSIBLE L2 (N/A) 2 Days Post-Op Precautions: Back, Fall, Other (comment) (brace when up) Chart, physical therapy assessment, plan of care and goals were reviewed. ASSESSMENT: 
Patient making steady progress toward goals. Patient needing Bharat for rolling and for supine to sit. Sit to stand with CGA x 1 with RW. Amb approx 125 feet with RW and CGA with slow concepción and decreased step clearance. Patient moving much better this afternoon but continues to report high pain levels. Positioned up in chair at end of session with needs in reach. RW ordered for home and [facility]. Will need to address stairs tmrw in prep to [place] home. Progression toward goals: 
[x]      Improving appropriately and progressing toward goals 
[]      Improving slowly and progressing toward goals 
[]      Not making progress toward goals and plan of care will be adjusted PLAN: 
 Patient continues to benefit from skilled intervention to address the above impairments. Continue treatment per established plan of care. Discharge Recommendations:  Home Health Further Equipment Recommendations for Discharge:  rolling walker order placed 8/31 SUBJECTIVE:  
Patient stated I knew you were going to come back in here.  The patient stated 3/3 back precautions. Reviewed all 3 with patient. OBJECTIVE DATA SUMMARY:  
Critical Behavior: 
Neurologic State: Alert Orientation Level: Oriented X4 Cognition: Appropriate for age attention/concentration, Follows commands Safety/Judgement: Awareness of environment, Fall prevention Functional Mobility Training: 
Bed Mobility: 
Log Rolling: Minimum assistance Supine to Sit: Minimum assistance;Assist x1 Scooting: Supervision; Additional time Brace donned with  moderate assistance Transfers: 
Sit to Stand: Contact guard assistance; Additional time;Assist x1 Stand to Sit: Contact guard assistance; Additional time;Assist x1 Balance: 
Sitting: Intact Standing: Impaired Standing - Static: Constant support;Good Standing - Dynamic : Fair Ambulation/Gait Training: 
Distance (ft): 120 Feet (ft) Assistive Device: Brace/Splint;Gait belt;Walker, rolling Ambulation - Level of Assistance: Contact guard assistance Gait Abnormalities: Antalgic;Decreased step clearance;Shuffling gait Base of Support: Widened Speed/Farnaz: Pace decreased (<100 feet/min); Slow Step Length: Left shortened;Right shortened Pain: 
Pain Scale 1: Numeric (0 - 10) Pain Intensity 1: 9 Activity Tolerance: VSS Please refer to the flowsheet for vital signs taken during this treatment. After treatment:  
[x]  Patient left in no apparent distress sitting up in chair 
[]  Patient left in no apparent distress in bed 
[x]  Call bell left within reach [x]  Nursing notified 
[x]  Caregiver present []  Bed alarm activated COMMUNICATION/COLLABORATION:  
The patients plan of care was discussed with: Physical Therapist, Occupational Therapist and Registered Nurse Rudy Lim PT, DPT Time Calculation: 18 mins

## 2018-08-31 NOTE — PROGRESS NOTES
Orthopedic Spine Progress Note Karina Mcrae, AGACNP-BC Work Cell: [phone] Post Op Day: 2 Days Post-Op August 31, 2018 2:27 PM  
 
Eri Stanton HPI:  
  
Eri Stanton is a 66 y.o. male with PMH notable for chronic pain, DMII, CKD, COPD, hypertension, Hepatitis C, and chronic lumbar stenosis. He has been on methadone for some years now with unsuccessful attempts at weaning. He has history of a lumbar fusion by Dr. Ino Contreras [place] [facility] [facility]. He has recently developed neurologically based complaints of pain in to his left leg. He reports constant stabbing pain exacerbated with laying flat, sitting, or standing. He had no clinical manifestations of myelopathy to speak of. Imaging revealed fairly severe central canal narrowing at the L3-4 level, and some lateral recess stenosis at L2-3. After a discussion of the risks, benefits, and alternatives, Eri Stanton consented to undergo a  Procedure(s): REVISION LUMBAR LAMINECTOMY FUSION L3-S1, POSSIBLE L2 Subjective  
  
Patient c/o intractable surgical pain. States he feels like a hammer and chisel are banging away at his low back. Anh Clem He is back on his established methadone dose of 95 mg daily. Custom TLSO at bedside. He denies headache, dizziness, cp, cough, sob, abd pain, f/c, or dysuria. Objective:  
 
Physical Exam: 
General:  Alert and oriented. No acute distress. Respiratory:  Breathing unlabored. Abdomen:  
Extremities: Soft, non-tender, non-distended No evidence of cyanosis. Pulses palpable in both upper and lower extremities. Neurologic: 
 
 
Musculoskeletal:  SALCEDO, FC. No new motor deficits. Neurovascular exam within normal limits. Sensation stable. Motor: unchanged  L2-S1. Calves soft, nontender upon palpation and with passive stretch. Moves both upper and lower extremities. Incision: clean, dry, and intact. No significant erythema or swelling. No active drainage noted. Vital Signs:   
Patient Vitals for the past 8 hrs: BP Temp Pulse Resp SpO2  
18 0908 174/90 98.6 °F (37 °C) 61 16 95 % Temp (24hrs), Av.7 °F (37.1 °C), Min:98.3 °F (36.8 °C), Max:99.1 °F (37.3 °C) Intake/Output: 
 0701 -  190 In: -  
Out: 667 [USHAA:218] 1901 -  0700 In: 2279 [I.V.:1550] Out: 0211 [CBYZW:7216; IAUEEP:542] Pain Control:  
Pain Assessment Pain Scale 1: Numeric (0 - 10) Pain Intensity 1: 9 Pain Location 1: Back Pain Orientation 1: Lower Pain Description 1: Aching Pain Intervention(s) 1: Medication (see MAR) LAB:   
Recent Labs 18 
 0422 HGB  9.9* Lab Results Component Value Date/Time Sodium 137 2018 05:34 AM  
 Potassium 4.1 2018 05:34 AM  
 Chloride 99 2018 05:34 AM  
 CO2 27 2018 05:34 AM  
 Glucose 200 (H) 2018 05:34 AM  
 BUN 13 2018 05:34 AM  
 Creatinine 1.08 2018 05:34 AM  
 Calcium 8.1 (L) 2018 05:34 AM  
 
 
PT/OT:  
Gait:  Gait Base of Support: Widened Speed/Farnaz: Pace decreased (<100 feet/min), Slow Step Length: Left shortened, Right shortened Gait Abnormalities: Antalgic, Decreased step clearance Ambulation - Level of Assistance: Contact guard assistance, Assist x2 Distance (ft): 4 Feet (ft) Assistive Device: Brace/Splint, Gait belt, Walker, rolling Assessment/Plan  
  
1. 2 Day Post-Op Procedure(s): REVISION LUMBAR LAMINECTOMY FUSION L3-S1, POSSIBLE L2 
 -Continue PT/OT. TLSO when OOB 
 -Pain management has been difficult. May consider increasing oxycodone to 15 mg Q3H PRN temporarily until better pain control is achieved. Add lidocaine patches. Continue ice to back, distraction techniques, scheduled APAP, PRN flexeril. 
 -Voiding 
 -monitor hvac output, pull this afternoon if output <80cc 
 -Incentive Spirometer 
 -Tolerating diet. +flatus -VTE Prophylaxes - TEDS & SCDs 2. Chronic pain with history of polysubstance abuse 
 -Methadone 95 mg daily -multimodal pain control as above. 3.  DMII 
 -Home meds include metformin, invokana 300 mg and 28 units Basaglar before breakfast 
 -A1c 8.7 
 -fbg between 183 and 219 in last 24 hours 
 -continue accuchecks, diabetic diet, SSI.  
 -continue 28 units glargine 4. Hypertension, chronic 
 -BP suboptimal postoperatively  
 -continue home amlodipine, lisinopril, propranolol 
 -PRN clonidine 5. Anxiety disorder, chronic 
 -2mg alprazolam TID 
 -monitor closely for sedation 6. Acute postoperative blood loss anemia -hgb 9.9.  
 -anticipated postoperative blood loss anemia. Patient tolerating. Continue to monitor drain output Plan above discussed with Dr. Martin Mcneil team 
 
Discharge To:  Possible Saturday or Sunday pending progress with PT/OT Signed By: Michelle Jacobo NP

## 2018-09-01 LAB
GLUCOSE BLD STRIP.AUTO-MCNC: 176 MG/DL (ref 65–100)
GLUCOSE BLD STRIP.AUTO-MCNC: 213 MG/DL (ref 65–100)
GLUCOSE BLD STRIP.AUTO-MCNC: 241 MG/DL (ref 65–100)
GLUCOSE BLD STRIP.AUTO-MCNC: 318 MG/DL (ref 65–100)
SERVICE CMNT-IMP: ABNORMAL

## 2018-09-01 PROCEDURE — 74011636637 HC RX REV CODE- 636/637: Performed by: NURSE PRACTITIONER

## 2018-09-01 PROCEDURE — 74011636637 HC RX REV CODE- 636/637: Performed by: PHYSICIAN ASSISTANT

## 2018-09-01 PROCEDURE — 97535 SELF CARE MNGMENT TRAINING: CPT

## 2018-09-01 PROCEDURE — 65270000029 HC RM PRIVATE

## 2018-09-01 PROCEDURE — 94760 N-INVAS EAR/PLS OXIMETRY 1: CPT

## 2018-09-01 PROCEDURE — 74011250637 HC RX REV CODE- 250/637: Performed by: PHYSICIAN ASSISTANT

## 2018-09-01 PROCEDURE — 82962 GLUCOSE BLOOD TEST: CPT

## 2018-09-01 PROCEDURE — 74011000250 HC RX REV CODE- 250: Performed by: NURSE PRACTITIONER

## 2018-09-01 PROCEDURE — 74011250637 HC RX REV CODE- 250/637: Performed by: NURSE PRACTITIONER

## 2018-09-01 PROCEDURE — 97116 GAIT TRAINING THERAPY: CPT

## 2018-09-01 RX ADMIN — GABAPENTIN 300 MG: 300 CAPSULE ORAL at 17:47

## 2018-09-01 RX ADMIN — INSULIN LISPRO 7 UNITS: 100 INJECTION, SOLUTION INTRAVENOUS; SUBCUTANEOUS at 11:30

## 2018-09-01 RX ADMIN — Medication 10 ML: at 21:08

## 2018-09-01 RX ADMIN — OXYCODONE HYDROCHLORIDE 15 MG: 5 TABLET ORAL at 03:40

## 2018-09-01 RX ADMIN — ALPRAZOLAM 2 MG: 1 TABLET ORAL at 08:28

## 2018-09-01 RX ADMIN — INSULIN LISPRO 2 UNITS: 100 INJECTION, SOLUTION INTRAVENOUS; SUBCUTANEOUS at 06:40

## 2018-09-01 RX ADMIN — OXYCODONE HYDROCHLORIDE 15 MG: 5 TABLET ORAL at 21:05

## 2018-09-01 RX ADMIN — INSULIN GLARGINE 28 UNITS: 100 INJECTION, SOLUTION SUBCUTANEOUS at 08:27

## 2018-09-01 RX ADMIN — Medication 1 TABLET: at 17:47

## 2018-09-01 RX ADMIN — PROPRANOLOL HYDROCHLORIDE 10 MG: 20 TABLET ORAL at 15:29

## 2018-09-01 RX ADMIN — OXYCODONE HYDROCHLORIDE 15 MG: 5 TABLET ORAL at 14:28

## 2018-09-01 RX ADMIN — AMLODIPINE BESYLATE 10 MG: 5 TABLET ORAL at 08:33

## 2018-09-01 RX ADMIN — METHADONE HYDROCHLORIDE 95 MG: 5 SOLUTION ORAL at 08:27

## 2018-09-01 RX ADMIN — GABAPENTIN 300 MG: 300 CAPSULE ORAL at 08:28

## 2018-09-01 RX ADMIN — OXYCODONE HYDROCHLORIDE 15 MG: 5 TABLET ORAL at 00:38

## 2018-09-01 RX ADMIN — INSULIN LISPRO 3 UNITS: 100 INJECTION, SOLUTION INTRAVENOUS; SUBCUTANEOUS at 17:47

## 2018-09-01 RX ADMIN — ALPRAZOLAM 2 MG: 1 TABLET ORAL at 15:29

## 2018-09-01 RX ADMIN — ACETAMINOPHEN 650 MG: 325 TABLET ORAL at 10:54

## 2018-09-01 RX ADMIN — PROPRANOLOL HYDROCHLORIDE 10 MG: 20 TABLET ORAL at 08:28

## 2018-09-01 RX ADMIN — OXYCODONE HYDROCHLORIDE 15 MG: 5 TABLET ORAL at 10:55

## 2018-09-01 RX ADMIN — ACETAMINOPHEN 650 MG: 325 TABLET ORAL at 18:53

## 2018-09-01 RX ADMIN — Medication 10 ML: at 14:30

## 2018-09-01 RX ADMIN — ALPRAZOLAM 2 MG: 1 TABLET ORAL at 21:03

## 2018-09-01 RX ADMIN — PROPRANOLOL HYDROCHLORIDE 10 MG: 20 TABLET ORAL at 21:04

## 2018-09-01 RX ADMIN — ACETAMINOPHEN 650 MG: 325 TABLET ORAL at 03:40

## 2018-09-01 RX ADMIN — OXYCODONE HYDROCHLORIDE 15 MG: 5 TABLET ORAL at 06:41

## 2018-09-01 RX ADMIN — INSULIN LISPRO 2 UNITS: 100 INJECTION, SOLUTION INTRAVENOUS; SUBCUTANEOUS at 21:13

## 2018-09-01 RX ADMIN — OXYCODONE HYDROCHLORIDE 15 MG: 5 TABLET ORAL at 17:47

## 2018-09-01 NOTE — PROGRESS NOTES
Orthopaedics Daily Progress Note Date of Surgery:  8/29/2018 Patient: Nam Hannon YOB: 1960  Age: 66 y.o. SUBJECTIVE:  
3 Days Post-Op following REVISION LUMBAR LAMINECTOMY FUSION L3-S1, POSSIBLE L2. Patient c/o intractable surgical pain. States \"there's no way they can get rid of me today, I can't do anything for myself at home\". No CP/SOB. No N/V. The patient's mobility will be evaluated today during PT sessions. OBJECTIVE:  
 
Vital Signs:   
 
Visit Vitals  /87  Pulse (!) 58  Temp 99.2 °F (37.3 °C)  Resp 16  
 Ht 6' 6\" (1.981 m)  Wt 116.6 kg (257 lb)  SpO2 93%  BMI 29.7 kg/m2 Physical Exam: 
General: A&Ox3. The patient is cooperative, and in no acute distress. Supine in bed Respiratory: Respirations are unlabored. Surgical site(s): dressing clean, dry Musculoskeletal: Calves are soft, supple, and non-tender upon palpation. Motor 5/5. Moves upper extremities as well Neurological:  Neurovascularly intact with good dorsi and plantar flexion. Pulses symmetrical. 
 
Laboratory Values:            
Recent Results (from the past 12 hour(s)) GLUCOSE, POC Collection Time: 09/01/18  6:03 AM  
Result Value Ref Range Glucose (POC) 176 (H) 65 - 100 mg/dL Performed by Talya Jalloh PLAN:  
 
S/P REVISION LUMBAR LAMINECTOMY FUSION L3-S1, POSSIBLE L2 -Continue WBAT. -Mobilize and continue with PT/OT until discharged Hemodynamics Acute blood loss anemia as expected. Patient asymptomatic. Continue to monitor. Wound Monitor postop dressing; no postop dressing changes necessary. Reinforce PRN. Post Operative Pain Pain Control: ongoing significant pain in which is stable and controlled by current meds. DVT Prophylaxis Continue with SCD'S, Ankle Pump Exercises.    
 
Discharge Disposition Discharge plan: Home with home health pending PT clearance and pain control on PO meds only; plan for d/c home on methadone due to difficult pain control. Signed By: Zahira Back PA-C September 1, 2018 9:04 AM

## 2018-09-01 NOTE — PROGRESS NOTES
Spiritual Care Partner Volunteer visited patient in [address] on 9.1. 18. Documented by Petrona Almanza, Staff , on 9. 1.18 Please call Annette-PRARANJEET ([phone]) to page  if needed

## 2018-09-01 NOTE — PROGRESS NOTES
Cm met with patient at length to discuss his discharge plan, as orders have been written for today. Patient was extremely surprised to hear that he was being discharged, stating Marilyn informed him he would be in the hospital for 4-5 days. Patient and his mother have revolved the entire discharge plan around that 4-5 day soy. Cm later called patients' mother Christelle Shah, [phone]) to discuss further. Ms. Jeffery Mercedes was hoping patient would qualify for inpatient rehab at [address] [place] [address], and because of this, she did not make arrangements for him at home (patient lives in his mothers' home). Ms. Jeffery Mercedes has a family member who is going to stay with him (while she is at work) and will not be able to start until Tuesday. She also has to purchase a new bed for him and will work on that today. Additionally, due to the holiday, home health will not be able to come until Monday or Tuesday. She does not feel it is a safe environment for him to go home until Monday. Marquis informed her and the patient that I cannot dictate when he is going to be released, I can only suggest that he remain here until a safe discharge plan is completed. Patients' mother stated that if the doctor wanted to call her, he was welcome to. Additionally, patient goes to [facility] ([phone] option #1) for his methadone. He receives the medication in 2-week increments, called 'take homes'. They, too, were under the impression he would not be leaving until Monday or Tuesday, so they do not have his medication ready today. They will be open tomorrow from 7 am-9 am only. Marquis called the after-hours emergency line and left a message for Courtney Tineo, Charge Nurse, to call me to discuss further.  
[employer] , [place]

## 2018-09-01 NOTE — PROGRESS NOTES
Bedside and Verbal shift change report given to Matty Roberson (oncoming nurse) by [place] (offgoing nurse). Report included the following information SBAR, Kardex, Intake/Output, MAR and Recent Results. Pt with pain to back. PRN Oxycodone for pain management. Pt encouraged to turn side to side to prevent skin breakdown. Continue to monitor pt for pain.

## 2018-09-01 NOTE — PROGRESS NOTES
Problem: Mobility Impaired (Adult and Pediatric) Goal: *Acute Goals and Plan of Care (Insert Text) Physical Therapy Goals Initiated 8/30/2018 1. Patient will move from supine to sit and sit to supine  in bed with modified independence within 4 days. 2. Patient will perform sit to stand with modified independence within 4 days. 3. Patient will ambulate with modified independence for 200 feet with the least restrictive device within 4 days. 4. Patient will ascend/descend 5 stairs with 1 handrail(s) with modified independence within 4 days. 5. Patient will verbalize and demonstrate understanding of spinal precautions (No bending, lifting greater than 5 lbs, or twisting; log-roll technique; frequent repositioning as instructed) within 4 days. physical Therapy TREATMENT Patient: Clarita Taylor (75 y.o. male) Date: 9/1/2018 Diagnosis: SPINAL STENOSIS L3-4, STATUS POST POSTERIOR SPINAL FUSION L4-S1 Lumbar spinal stenosis <principal problem not specified> Procedure(s) (LRB): 
REVISION LUMBAR LAMINECTOMY FUSION L3-S1, POSSIBLE L2 (N/A) 3 Days Post-Op Precautions: Back, Fall, Other (comment) (brace when up) Chart, physical therapy assessment, plan of care and goals were reviewed. ASSESSMENT: 
Patient continues to progress toward goals. Very concerned about discharging without methadone set up, case management aware. With encouragement agreeable to participate. Supervision for bed mobility using log roll technique. Patient overall SBA for all observed transfers. Patient ambulated 150 feet with RW and SBA, heavy reliance on RW for balance and pain relief. Patient navigated 7 stairs with B ralings and SBA, step to pattern. Patient reports pain mostly with bed mobility and transfers during session. No LOB throughout mobility. He is cleared for discharge from PT standpoint, RN aware. Recommending HHPT at discharge. Patient is cleared for discharge from PT standpoint:  YES [x]     NO [] Progression toward goals: 
[x]      Improving appropriately and progressing toward goals 
[]      Improving slowly and progressing toward goals 
[]      Not making progress toward goals and plan of care will be adjusted PLAN: 
Patient continues to benefit from skilled intervention to address the above impairments. Continue treatment per established plan of care. Discharge Recommendations:  Home Health Further Equipment Recommendations for Discharge:  RW delivered SUBJECTIVE:  
Patient stated I have to talk to the , I can't leave here today.  The patient stated 3/3 back precautions. Reviewed all 3 with patient. OBJECTIVE DATA SUMMARY:  
Critical Behavior: 
Neurologic State: Alert Orientation Level: Oriented X4 Cognition: Appropriate for age attention/concentration Safety/Judgement: Good awareness of safety precautions Functional Mobility Training: 
Bed Mobility: 
Log Rolling: Supervision Supine to Sit: Stand-by assistance Sit to Supine: Stand-by assistance Brace donned with  minimal assistance/contact guard assist  
Transfers: 
Sit to Stand: Supervision Stand to Sit: Supervision Stand Pivot Transfers: Supervision Balance: 
Sitting: Intact; Without support Standing: Intact; With support Standing - Static: Good Standing - Dynamic : Good Ambulation/Gait Training: 
Distance (ft): 150 Feet (ft) Assistive Device: Gait belt;Brace/Splint; Walker, rolling Ambulation - Level of Assistance: Stand-by assistance Gait Abnormalities: Decreased step clearance; Antalgic Base of Support: Widened Speed/Farnaz: Pace decreased (<100 feet/min) Step Length: Right shortened;Left shortened Stairs: 
Number of Stairs Trained: 8 Stairs - Level of Assistance: Stand-by assistance Rail Use: Both Therapeutic Exercises:  
 
Pain: 
Pain Scale 1: Numeric (0 - 10) Pain Intensity 1: 5 Pain Location 1: Back Pain Orientation 1: Lower Pain Description 1: Aching Pain Intervention(s) 1: Relaxation technique Activity Tolerance:  
Good Please refer to the flowsheet for vital signs taken during this treatment. After treatment:  
[]  Patient left in no apparent distress sitting up in chair 
[x]  Patient left in no apparent distress in bed 
[x]  Call bell left within reach [x]  Nursing notified 
[]  Caregiver present 
[]  Bed alarm activated COMMUNICATION/COLLABORATION:  
The patients plan of care was discussed with: Registered Nurse Juana Diaz, PT Time Calculation: 25 mins

## 2018-09-01 NOTE — PROGRESS NOTES
Problem: Self Care Deficits Care Plan (Adult) Goal: *Acute Goals and Plan of Care (Insert Text) Occupational Therapy Goals Initiated 8/30/2018 1. Patient will perform lower body dressing with minimal assistance/contact guard assist using AE PRN within 7 days. 2.  Patient will perform toilet transfer with minimal assistance/contact guard assist using most appropriate DME within 7 days. 3.  Patient will grooming at the sink x3 min with supervision/set-up within 7 days. 4.  Patient will don/doff back brace at minimal assistance/contact guard assist within 7 days. 5.  Patient will verbalize/demonstrate 3/3 back precautions during ADL tasks without cues within 7 days. Occupational Therapy TREATMENT Patient: Tabby Hernández (57 y.o. male) Date: 9/1/2018 Diagnosis: SPINAL STENOSIS L3-4, STATUS POST POSTERIOR SPINAL FUSION L4-S1 Lumbar spinal stenosis <principal problem not specified> Procedure(s) (LRB): 
REVISION LUMBAR LAMINECTOMY FUSION L3-S1, POSSIBLE L2 (N/A) 3 Days Post-Op Precautions: Back, Fall, Other (comment) (brace when up) Chart, occupational therapy assessment, plan of care, and goals were reviewed. ASSESSMENT: 
Patient received supine in bed, agreeable to therapy today. Completed log roll with supervision, min A for bed mobility to sit EOB, significant pain with transitions today. Once EOB, patient able to sit unsupported for lower body dressing task/training. Educated on use of reacher/dressing stick for donning pants but patient demonstrating ability to tailor sit for donning pants with min A (only for slight RLE assist, anticipate as pain decreases will not need AE). Patient donned TLSO with mod A today, instructed on strategies  to complete independently. Patient completed sit to stand transfer with supervision and RW for support, transitioned to chair with supervision.  Able to recall 3/3 back precautions, reviewed all with patient. At this time, limited by pain and decreased endurance. Patient will benefit from [facility] upon discharge. Recommend with nursing patient to complete as able in order to maintain strength, endurance and independence: ADLs with supervision/setup, OOB to chair 3x/day and mobilizing to the bathroom for toileting with 1 assist. Thank you for your assistance. Progression toward goals: 
[x]       Improving appropriately and progressing toward goals 
[]       Improving slowly and progressing toward goals 
[]       Not making progress toward goals and plan of care will be adjusted PLAN: 
Patient continues to benefit from skilled intervention to address the above impairments. Continue treatment per established plan of care. Discharge Recommendations:  Home Health Further Equipment Recommendations for Discharge:  Reacher, instructed on where to buy SUBJECTIVE:  
Patient stated I know it's the BLT I can't do.  OBJECTIVE DATA SUMMARY:  
Cognitive/Behavioral Status: 
Neurologic State: Alert Orientation Level: Oriented X4 Safety/Judgement: Good awareness of safety precautions Functional Mobility and Transfers for ADLs: 
Bed Mobility: 
Rolling: Supervision Supine to Sit: Minimum assistance Transfers: 
Sit to Stand: Supervision (RW for support) Balance: 
Sitting: Intact; Without support Standing: Intact; With support Standing - Static: Good Standing - Dynamic : Good ADL Intervention: 
  
 
  
 
  
 
  
 
Upper Body Dressing Assistance Orthotics(Brace): Moderate assistance Lower Body Dressing Assistance Dressing Assistance: Minimum assistance (RLE) Cognitive Retraining Safety/Judgement: Good awareness of safety precautions Neuro Re-Education: 
  
  
  
Therapeutic Exercises:  
 
Pain: 
Pain Scale 1: Numeric (0 - 10) Pain Intensity 1: 9 Pain Location 1: Back Pain Orientation 1: Lower Pain Description 1: Aching Pain Intervention(s) 1: Medication (see MAR) Activity Tolerance: VSS Please refer to the flowsheet for vital signs taken during this treatment. After treatment:  
[x] Patient left in no apparent distress sitting up in chair 
[] Patient left in no apparent distress in bed 
[x] Call bell left within reach 
[] Nursing notified 
[x] Caregiver present 
[] Bed alarm activated COMMUNICATION/COLLABORATION:  
The patients plan of care was discussed with: Registered Nurse Glenn Ozuna Time Calculation: 20 mins

## 2018-09-02 ENCOUNTER — HOME CARE VISIT (OUTPATIENT)
Dept: HOME HEALTH SERVICES | Facility: HOME HEALTH | Age: 58
End: 2018-09-02

## 2018-09-02 LAB
GLUCOSE BLD STRIP.AUTO-MCNC: 187 MG/DL (ref 65–100)
GLUCOSE BLD STRIP.AUTO-MCNC: 196 MG/DL (ref 65–100)
GLUCOSE BLD STRIP.AUTO-MCNC: 197 MG/DL (ref 65–100)
GLUCOSE BLD STRIP.AUTO-MCNC: 280 MG/DL (ref 65–100)
SERVICE CMNT-IMP: ABNORMAL

## 2018-09-02 PROCEDURE — 97116 GAIT TRAINING THERAPY: CPT | Performed by: PHYSICAL THERAPIST

## 2018-09-02 PROCEDURE — 74011636637 HC RX REV CODE- 636/637: Performed by: PHYSICIAN ASSISTANT

## 2018-09-02 PROCEDURE — 65270000029 HC RM PRIVATE

## 2018-09-02 PROCEDURE — 74011000250 HC RX REV CODE- 250: Performed by: NURSE PRACTITIONER

## 2018-09-02 PROCEDURE — 74011636637 HC RX REV CODE- 636/637: Performed by: NURSE PRACTITIONER

## 2018-09-02 PROCEDURE — 74011250637 HC RX REV CODE- 250/637: Performed by: NURSE PRACTITIONER

## 2018-09-02 PROCEDURE — 74011250637 HC RX REV CODE- 250/637: Performed by: PHYSICIAN ASSISTANT

## 2018-09-02 PROCEDURE — 74011250636 HC RX REV CODE- 250/636: Performed by: PHYSICIAN ASSISTANT

## 2018-09-02 PROCEDURE — 94760 N-INVAS EAR/PLS OXIMETRY 1: CPT

## 2018-09-02 PROCEDURE — 82962 GLUCOSE BLOOD TEST: CPT

## 2018-09-02 RX ORDER — METFORMIN HYDROCHLORIDE 500 MG/1
1000 TABLET ORAL 2 TIMES DAILY WITH MEALS
Status: DISCONTINUED | OUTPATIENT
Start: 2018-09-02 | End: 2018-09-03 | Stop reason: HOSPADM

## 2018-09-02 RX ADMIN — ONDANSETRON 4 MG: 2 INJECTION INTRAMUSCULAR; INTRAVENOUS at 20:00

## 2018-09-02 RX ADMIN — Medication 10 ML: at 07:22

## 2018-09-02 RX ADMIN — METFORMIN HYDROCHLORIDE 1000 MG: 500 TABLET ORAL at 10:48

## 2018-09-02 RX ADMIN — Medication 10 ML: at 22:09

## 2018-09-02 RX ADMIN — ALPRAZOLAM 2 MG: 1 TABLET ORAL at 22:07

## 2018-09-02 RX ADMIN — PROPRANOLOL HYDROCHLORIDE 10 MG: 20 TABLET ORAL at 22:07

## 2018-09-02 RX ADMIN — PROPRANOLOL HYDROCHLORIDE 10 MG: 20 TABLET ORAL at 16:43

## 2018-09-02 RX ADMIN — Medication 1 TABLET: at 08:45

## 2018-09-02 RX ADMIN — PROPRANOLOL HYDROCHLORIDE 10 MG: 20 TABLET ORAL at 08:46

## 2018-09-02 RX ADMIN — OXYCODONE HYDROCHLORIDE 15 MG: 5 TABLET ORAL at 13:37

## 2018-09-02 RX ADMIN — METHADONE HYDROCHLORIDE 95 MG: 5 SOLUTION ORAL at 08:03

## 2018-09-02 RX ADMIN — INSULIN LISPRO 2 UNITS: 100 INJECTION, SOLUTION INTRAVENOUS; SUBCUTANEOUS at 07:21

## 2018-09-02 RX ADMIN — OXYCODONE HYDROCHLORIDE 15 MG: 5 TABLET ORAL at 16:44

## 2018-09-02 RX ADMIN — Medication 10 ML: at 20:03

## 2018-09-02 RX ADMIN — GABAPENTIN 300 MG: 300 CAPSULE ORAL at 08:45

## 2018-09-02 RX ADMIN — ACETAMINOPHEN 650 MG: 325 TABLET ORAL at 19:03

## 2018-09-02 RX ADMIN — ACETAMINOPHEN 650 MG: 325 TABLET ORAL at 12:00

## 2018-09-02 RX ADMIN — Medication 10 ML: at 15:25

## 2018-09-02 RX ADMIN — ACETAMINOPHEN 650 MG: 325 TABLET ORAL at 00:40

## 2018-09-02 RX ADMIN — INSULIN GLARGINE 28 UNITS: 100 INJECTION, SOLUTION SUBCUTANEOUS at 10:47

## 2018-09-02 RX ADMIN — OXYCODONE HYDROCHLORIDE 15 MG: 5 TABLET ORAL at 19:55

## 2018-09-02 RX ADMIN — AMLODIPINE BESYLATE 10 MG: 5 TABLET ORAL at 08:44

## 2018-09-02 RX ADMIN — INSULIN LISPRO 5 UNITS: 100 INJECTION, SOLUTION INTRAVENOUS; SUBCUTANEOUS at 11:59

## 2018-09-02 RX ADMIN — ALPRAZOLAM 2 MG: 1 TABLET ORAL at 15:25

## 2018-09-02 RX ADMIN — OXYCODONE HYDROCHLORIDE 15 MG: 5 TABLET ORAL at 04:16

## 2018-09-02 RX ADMIN — INSULIN LISPRO 2 UNITS: 100 INJECTION, SOLUTION INTRAVENOUS; SUBCUTANEOUS at 16:46

## 2018-09-02 RX ADMIN — Medication 1 TABLET: at 19:04

## 2018-09-02 RX ADMIN — OXYCODONE HYDROCHLORIDE 15 MG: 5 TABLET ORAL at 00:41

## 2018-09-02 RX ADMIN — OXYCODONE HYDROCHLORIDE 15 MG: 5 TABLET ORAL at 10:38

## 2018-09-02 RX ADMIN — OXYCODONE HYDROCHLORIDE 15 MG: 5 TABLET ORAL at 07:22

## 2018-09-02 RX ADMIN — Medication 10 ML: at 20:00

## 2018-09-02 RX ADMIN — ALPRAZOLAM 2 MG: 1 TABLET ORAL at 08:45

## 2018-09-02 RX ADMIN — ACETAMINOPHEN 650 MG: 325 TABLET ORAL at 07:22

## 2018-09-02 RX ADMIN — METFORMIN HYDROCHLORIDE 1000 MG: 500 TABLET ORAL at 16:43

## 2018-09-02 RX ADMIN — Medication 10 ML: at 15:26

## 2018-09-02 RX ADMIN — GABAPENTIN 300 MG: 300 CAPSULE ORAL at 19:03

## 2018-09-02 NOTE — PROGRESS NOTES
Problem: Mobility Impaired (Adult and Pediatric) Goal: *Acute Goals and Plan of Care (Insert Text) Physical Therapy Goals Initiated 8/30/2018 1. Patient will move from supine to sit and sit to supine  in bed with modified independence within 4 days. 2. Patient will perform sit to stand with modified independence within 4 days. 3. Patient will ambulate with modified independence for 200 feet with the least restrictive device within 4 days. 4. Patient will ascend/descend 5 stairs with 1 handrail(s) with modified independence within 4 days. 5. Patient will verbalize and demonstrate understanding of spinal precautions (No bending, lifting greater than 5 lbs, or twisting; log-roll technique; frequent repositioning as instructed) within 4 days. physical Therapy TREATMENT Patient: Daisy Dale (16 y.o. male) Date: 9/2/2018 Diagnosis: SPINAL STENOSIS L3-4, STATUS POST POSTERIOR SPINAL FUSION L4-S1 Lumbar spinal stenosis <principal problem not specified> Procedure(s) (LRB): 
REVISION LUMBAR LAMINECTOMY FUSION L3-S1, POSSIBLE L2 (N/A) 4 Days Post-Op Precautions: Back, Fall, Other (comment) (brace when up) Chart, physical therapy assessment, plan of care and goals were reviewed. ASSESSMENT: 
Patient making steady progress toward goals. Continues to be focused on being DC tmrw vs today. He is anxious regarding obtaining his methadone if he is DC today. Currently needing supervision for bed mobility and SBA for transfers. Amb approx 250 feet with RW and CGA-SBA without any overt LOB. He does rely heavily on RW. Patient cleared stairs yesterday and from a mobility standpoint is cleared to DC. Appears that social issues are the main barrier to DC at this time. Progression toward goals: 
[x]      Improving appropriately and progressing toward goals 
[]      Improving slowly and progressing toward goals 
[]      Not making progress toward goals and plan of care will be adjusted PLAN: 
 Patient continues to benefit from skilled intervention to address the above impairments. Continue treatment per established plan of care. Discharge Recommendations:  Home Health Further Equipment Recommendations for Discharge:  RW already delivered for home SUBJECTIVE:  
Patient stated I can't go today because I can't get my methadone and my room isn't ready.  The patient stated 3/3 back precautions. Reviewed all 3 with patient. OBJECTIVE DATA SUMMARY:  
Critical Behavior: 
Neurologic State: Alert Orientation Level: Oriented X4 Cognition: Appropriate for age attention/concentration Safety/Judgement: Good awareness of safety precautions Functional Mobility Training: 
Bed Mobility: 
Log Rolling: Supervision Supine to Sit: Contact guard assistance Scooting: Supervision Brace donned with  minimal assistance Transfers: 
Sit to Stand: Supervision Stand to Sit: Supervision Balance: 
Sitting: Intact Standing: Intact; With support Standing - Static: Good Standing - Dynamic : Good Ambulation/Gait Training: 
Distance (ft): 250 Feet (ft) Assistive Device: Brace/Splint;Gait belt Ambulation - Level of Assistance: Stand-by assistance Base of Support: Widened Speed/Farnaz: Pace decreased (<100 feet/min); Shuffled; Slow Step Length: Left shortened;Right shortened Stairs: 
  
Stairs - Level of Assistance: Stand-by assistance Pain: 
Pain Scale 1: Numeric (0 - 10) Pain Intensity 1: 4 Pain Location 1: Back Pain Orientation 1: Lower Pain Description 1: Aching Pain Intervention(s) 1: Medication (see MAR) Activity Tolerance: VSS Please refer to the flowsheet for vital signs taken during this treatment. After treatment:  
[x]  Patient left in no apparent distress sitting up in chair 
[]  Patient left in no apparent distress in bed 
[x]  Call bell left within reach [x]  Nursing notified 
[]  Caregiver present 
[]  Bed alarm activated COMMUNICATION/COLLABORATION:  
The patients plan of care was discussed with: Physical Therapist, Occupational Therapist and Registered Nurse Gerald Hall, PT, DPT Time Calculation: 16 mins

## 2018-09-02 NOTE — PROGRESS NOTES
Orthopaedics Daily Progress Note Date of Surgery:  8/29/2018 Patient: Dmitry Worley YOB: 1960  Age: 66 y.o. SUBJECTIVE:  
4 Days Post-Op following REVISION LUMBAR LAMINECTOMY FUSION L3-S1, POSSIBLE L2. The patient's post operative pain is better controlled today. No CP/SOB. No N/V. The patient's mobility will be evaluated today during PT sessions. Already OOB to chair this morning. States he got the pain down to 4/10 yesterday but overnight, was tossing and turning, pain increased at 6am.  Patient states he is scared to go home because there is no \"skilled care\" there other than his mom to help. He understands that he has already cleared PT / OT with recommendation of home d/c with home health. Patient concerned that there is only a 2 hour window tomorrow to obtain his methadone at the clinic due to the holiday. He is upset and states that someone \"really dropped the ball\" on the discharge planning because he was previously assured that he would get to spend 4-5 nights in the hospital.  
 
OBJECTIVE:  
 
Vital Signs:   
 
Visit Vitals  /79 (BP 1 Location: Left arm, BP Patient Position: At rest)  Pulse 61  Temp 98.9 °F (37.2 °C)  Resp 17  Ht 6' 6\" (1.981 m)  Wt 116.6 kg (257 lb)  SpO2 97%  BMI 29.7 kg/m2 Physical Exam: 
General: A&Ox3. The patient is cooperative, and in no acute distress. Moderately irritated at the idea of going home today. OOB to chair. Respiratory: Respirations are unlabored. Surgical site(s): dressing clean, dry. Musculoskeletal: Motor 5/5. Back brace in place. Neurological:  Neurovascularly intact with good dorsi and plantar flexion. Laboratory Values:            
Recent Results (from the past 12 hour(s)) GLUCOSE, POC Collection Time: 09/02/18  6:31 AM  
Result Value Ref Range Glucose (POC) 196 (H) 65 - 100 mg/dL Performed by Tim Ellsworth PLAN:  
 
 S/P REVISION LUMBAR LAMINECTOMY FUSION L3-S1, POSSIBLE L2 -Continue WBAT. -Mobilize and continue with PT/OT until discharged Hemodynamics Acute blood loss anemia as expected. Patient asymptomatic. Continue to monitor. Wound Monitor postop dressing; no postop dressing changes necessary. Reinforce PRN. Post Operative Pain Pain Control: ongoing significant pain in which is stable and controlled by current meds. DVT Prophylaxis Continue with SCD'S, Ankle Pump Exercises. Discharge Disposition Discharge plan: Home with home health today. Patient has cleared all PT and OT requirements and is medically stable for discharge. Signed By: Roberth Matute PA-C September 2, 2018 10:01 AM

## 2018-09-02 NOTE — PROGRESS NOTES
Discussed with patient he has been cleared for discharge by PT/OT and surgeon. He became extremely agitated and states he is not ready for discharge because no one can provide 24 ho care for him and he needs to move into his father's home at discharge. Discussed that this was a scheduled surgery and plans for discharge should have been established prior to surgery. At this point he states he is not leaving until tomorrow. Coverng md also discussed with him that he is medically cleared for discharge. 18 Mother (admissions director for [facility]) came to desk to discuss discharge. She was tearful saying that she was not ready to provide care for him at home and will be ready for him after his morning medications tomorrow. Later on patient and I discussed plans for discharge tomorrow and to have ride available before 10 am.  He states his ride will be here \"between 12-1\". I anticipate more barriers tomorrow.

## 2018-09-03 VITALS
RESPIRATION RATE: 16 BRPM | BODY MASS INDEX: 29.73 KG/M2 | HEIGHT: 78 IN | WEIGHT: 257 LBS | HEART RATE: 62 BPM | TEMPERATURE: 98.3 F | SYSTOLIC BLOOD PRESSURE: 164 MMHG | OXYGEN SATURATION: 95 % | DIASTOLIC BLOOD PRESSURE: 78 MMHG

## 2018-09-03 LAB
GLUCOSE BLD STRIP.AUTO-MCNC: 177 MG/DL (ref 65–100)
GLUCOSE BLD STRIP.AUTO-MCNC: 285 MG/DL (ref 65–100)
SERVICE CMNT-IMP: ABNORMAL
SERVICE CMNT-IMP: ABNORMAL

## 2018-09-03 PROCEDURE — 97535 SELF CARE MNGMENT TRAINING: CPT

## 2018-09-03 PROCEDURE — 97116 GAIT TRAINING THERAPY: CPT

## 2018-09-03 PROCEDURE — 74011250637 HC RX REV CODE- 250/637: Performed by: PHYSICIAN ASSISTANT

## 2018-09-03 PROCEDURE — 74011636637 HC RX REV CODE- 636/637: Performed by: NURSE PRACTITIONER

## 2018-09-03 PROCEDURE — 74011636637 HC RX REV CODE- 636/637: Performed by: PHYSICIAN ASSISTANT

## 2018-09-03 PROCEDURE — 74011250637 HC RX REV CODE- 250/637: Performed by: NURSE PRACTITIONER

## 2018-09-03 PROCEDURE — 82962 GLUCOSE BLOOD TEST: CPT

## 2018-09-03 RX ADMIN — OXYCODONE HYDROCHLORIDE 15 MG: 5 TABLET ORAL at 06:57

## 2018-09-03 RX ADMIN — INSULIN LISPRO 5 UNITS: 100 INJECTION, SOLUTION INTRAVENOUS; SUBCUTANEOUS at 11:49

## 2018-09-03 RX ADMIN — OXYCODONE HYDROCHLORIDE 15 MG: 5 TABLET ORAL at 13:16

## 2018-09-03 RX ADMIN — OXYCODONE HYDROCHLORIDE 15 MG: 5 TABLET ORAL at 00:28

## 2018-09-03 RX ADMIN — ACETAMINOPHEN 650 MG: 325 TABLET ORAL at 13:17

## 2018-09-03 RX ADMIN — ALPRAZOLAM 2 MG: 1 TABLET ORAL at 10:02

## 2018-09-03 RX ADMIN — PROPRANOLOL HYDROCHLORIDE 10 MG: 20 TABLET ORAL at 10:01

## 2018-09-03 RX ADMIN — OXYCODONE HYDROCHLORIDE 15 MG: 5 TABLET ORAL at 10:15

## 2018-09-03 RX ADMIN — METFORMIN HYDROCHLORIDE 1000 MG: 500 TABLET ORAL at 10:01

## 2018-09-03 RX ADMIN — Medication 1 TABLET: at 10:02

## 2018-09-03 RX ADMIN — Medication 10 ML: at 06:00

## 2018-09-03 RX ADMIN — OXYCODONE HYDROCHLORIDE 15 MG: 5 TABLET ORAL at 03:47

## 2018-09-03 RX ADMIN — INSULIN GLARGINE 28 UNITS: 100 INJECTION, SOLUTION SUBCUTANEOUS at 10:03

## 2018-09-03 RX ADMIN — ACETAMINOPHEN 650 MG: 325 TABLET ORAL at 00:28

## 2018-09-03 RX ADMIN — GABAPENTIN 300 MG: 300 CAPSULE ORAL at 10:01

## 2018-09-03 RX ADMIN — METHADONE HYDROCHLORIDE 95 MG: 5 SOLUTION ORAL at 08:23

## 2018-09-03 RX ADMIN — AMLODIPINE BESYLATE 10 MG: 5 TABLET ORAL at 10:01

## 2018-09-03 RX ADMIN — ACETAMINOPHEN 650 MG: 325 TABLET ORAL at 06:56

## 2018-09-03 RX ADMIN — INSULIN LISPRO 2 UNITS: 100 INJECTION, SOLUTION INTRAVENOUS; SUBCUTANEOUS at 06:56

## 2018-09-03 NOTE — PROGRESS NOTES
Bedside and Verbal shift change report given to Ankit Key (oncoming nurse) by Phong Enriquez RN (offgoing nurse). Report included the following information SBAR, Kardex, MAR and Recent Results.

## 2018-09-03 NOTE — PROGRESS NOTES
Dressing change prior to discharge: Lower portion prineo gauze loose; incision well approximated; steri strips applied across lower incision and over prineo gauze; instructed patient to call surgeons office in a.m. [facility] arranged with [facility] Reviewed discharge instructions with patient;  
gave dressing supplies;  
gave Rx for oxycodone, narcan and flexeril; Also given copy of MAR at his request with signature from MD on Methadone administrations (Etta Rosado)  
patient expressed understanding and denied questions. 09/03/18 1403 Wound Back Date First Assessed/Time First Assessed: 08/29/18 2110   Location: Back DRESSING STATUS Changed per order DRESSING TYPE Dry dressing; Topical skin adhesive/glue Incision site well approximated? Yes  
Drainage Amount  Small Drainage Color Serous Dressing Type Applied Adhesive wound closure strips (Steri-Strips);ABD pad Procedure Tolerated Well

## 2018-09-03 NOTE — PROGRESS NOTES
Problem: Mobility Impaired (Adult and Pediatric) Goal: *Acute Goals and Plan of Care (Insert Text) Physical Therapy Goals Initiated 8/30/2018 1. Patient will move from supine to sit and sit to supine  in bed with modified independence within 4 days. 2. Patient will perform sit to stand with modified independence within 4 days. 3. Patient will ambulate with modified independence for 200 feet with the least restrictive device within 4 days. 4. Patient will ascend/descend 5 stairs with 1 handrail(s) with modified independence within 4 days. 5. Patient will verbalize and demonstrate understanding of spinal precautions (No bending, lifting greater than 5 lbs, or twisting; log-roll technique; frequent repositioning as instructed) within 4 days. physical Therapy TREATMENT Patient: Nicole Rivers (82 y.o. male) Date: 9/3/2018 Diagnosis: SPINAL STENOSIS L3-4, STATUS POST POSTERIOR SPINAL FUSION L4-S1 Lumbar spinal stenosis <principal problem not specified> Procedure(s) (LRB): 
REVISION LUMBAR LAMINECTOMY FUSION L3-S1, POSSIBLE L2 (N/A) 5 Days Post-Op Precautions: Back, Fall, Other (comment) (brace when up) Chart, physical therapy assessment, plan of care and goals were reviewed. ASSESSMENT: 
Patient upset that he has not seen his surgeon since his surgery Wednesday but otherwise without complaints. He was able to mobilize with the walker with brace in place without loss of balance or need for hands on assistance. At times, he leans forward into the walker, but easily corrects with verbal cues. He managed stairs with railing and step to pattern. Reviewed activity restrictions and recommendations and use of brace. He is clear for D/C home from a mobility standpoint. Progression toward goals: 
[x]      Improving appropriately and progressing toward goals 
[]      Improving slowly and progressing toward goals 
[]      Not making progress toward goals and plan of care will be adjusted PLAN: 
 Patient continues to benefit from skilled intervention to address the above impairments. Continue treatment per established plan of care. Discharge Recommendations:  Home Further Equipment Recommendations for Discharge:  RW has been delivered SUBJECTIVE:  
Patient stated I haven't seen my surgeon at all since I have been here.  The patient stated 3/3 back precautions. Reviewed all 3 with patient. OBJECTIVE DATA SUMMARY:  
Critical Behavior: 
Neurologic State: Alert Orientation Level: Oriented X4 Cognition: Appropriate decision making, Follows commands Safety/Judgement: Good awareness of safety precautions Functional Mobility Training: 
Bed Mobility: 
Log Rolling: Modified independent; Additional time Supine to Sit: Modified independent; Additional time (log roll) Brace donned with  modified independence TLSO, brace was handed to him and he managed straps without assistance Transfers: 
Sit to Stand: Modified independent; Adaptive equipment; Additional time Stand to Sit: Modified independent; Adaptive equipment; Additional time Bed to Chair: Modified independent; Adaptive equipment; Additional time Balance: 
Sitting: Intact; Without support Standing: Intact; With support Standing - Static: Good Standing - Dynamic : Good Ambulation/Gait Training: 
Distance (ft): 250 Feet (ft) Assistive Device: Gait belt;Brace/Splint; Walker, rolling (glides RW along for balance, no need to lean on the RW) Ambulation - Level of Assistance: Modified independent; Adaptive equipment; Additional time Gait Abnormalities: Antalgic;Decreased step clearance (tends to lean forward, able to correct with cues) Base of Support: Widened (heels narrow with external rotation of feet) Speed/Farnaz: Pace decreased (<100 feet/min) Step Length: Left shortened;Right shortened Interventions: Safety awareness training; Tactile cues; Verbal cues; Visual/Demos Stairs: Number of Stairs Trained: 10 Stairs - Level of Assistance: Modified independent; Additional time Rail Use: Right Therapeutic Exercises:  
 
Pain: 
Pain Scale 1: Numeric (0 - 10) Pain Intensity 1: 6 Pain Location 1: Back; Incisional;Spine, lumbar 
Pain Orientation 1: Lower; Posterior Pain Description 1: Aching Pain Intervention(s) 1: Medication (see MAR) Activity Tolerance:  
Good Please refer to the flowsheet for vital signs taken during this treatment. After treatment:  
[x]  Patient left in no apparent distress sitting up in chair 
[]  Patient left in no apparent distress in bed 
[x]  Call bell left within reach [x]  Nursing notified 
[]  Caregiver present 
[]  Bed alarm activated COMMUNICATION/COLLABORATION:  
The patients plan of care was discussed with: Registered Nurse Maximo Dan, PT Time Calculation: 25 mins

## 2018-09-03 NOTE — PROGRESS NOTES
Bedside and Verbal shift change report given to Hardy Vazquez RN (oncoming nurse) by Rosalba Dolan (offgoing nurse). Report included the following information SBAR, Kardex, OR Summary, MAR and Recent Results.

## 2018-09-03 NOTE — PROGRESS NOTES
Orthopaedics Daily Progress Note Date of Surgery:  8/29/2018 Patient: Lyubov Cruz YOB: 1960  Age: 66 y.o. SUBJECTIVE:  
4 Days Post-Op following REVISION LUMBAR LAMINECTOMY FUSION L3-S1, POSSIBLE L2. The patient's post operative pain is better controlled today. No CP/SOB. No N/V. Wants to make sure he receives his methadone tomorrow OBJECTIVE:  
 
Vital Signs:   
 
Visit Vitals  /72 (BP 1 Location: Left arm, BP Patient Position: Sitting)  Pulse 61  Temp 98.6 °F (37 °C)  Resp 16  
 Ht 6' 6\" (1.981 m)  Wt 116.6 kg (257 lb)  SpO2 94%  BMI 29.7 kg/m2 Physical Exam: 
General: A&Ox3. The patient is cooperative, and in no acute distress. Moderately irritated at the idea of going home today. OOB to chair. Respiratory: Respirations are unlabored. Surgical site(s): dressing clean, dry. Musculoskeletal: Motor 5/5. Back brace in place. Neurological:  Neurovascularly intact with good dorsi and plantar flexion. Laboratory Values:            
Recent Results (from the past 12 hour(s)) GLUCOSE, POC Collection Time: 09/03/18  5:35 AM  
Result Value Ref Range Glucose (POC) 177 (H) 65 - 100 mg/dL Performed by [facility] GLUCOSE, POC Collection Time: 09/03/18 11:27 AM  
Result Value Ref Range Glucose (POC) 285 (H) 65 - 100 mg/dL Performed by [facility] PLAN:  
 
S/P REVISION LUMBAR LAMINECTOMY FUSION L3-S1, POSSIBLE L2 -Continue WBAT. -Mobilize and continue with PT/OT until discharged Hemodynamics Acute blood loss anemia as expected. Patient asymptomatic. Continue to monitor. Wound Monitor postop dressing; no postop dressing changes necessary. Reinforce PRN. Post Operative Pain Pain Control: ongoing significant pain in which is stable and controlled by current meds. DVT Prophylaxis Continue with SCD'S, Ankle Pump Exercises. Discharge Disposition Discharge plan: Home with home health today. Patient has cleared all PT and OT requirements and is medically stable for discharge.     
 
Signed By: Felicity Ríos MD 
September 3, 2018 10:01 AM

## 2018-09-03 NOTE — PROGRESS NOTES
Problem: Self Care Deficits Care Plan (Adult) Goal: *Acute Goals and Plan of Care (Insert Text) Occupational Therapy Goals Initiated 8/30/2018 1. Patient will perform lower body dressing with minimal assistance/contact guard assist using AE PRN within 7 days. 2.  Patient will perform toilet transfer with minimal assistance/contact guard assist using most appropriate DME within 7 days. 3.  Patient will grooming at the sink x3 min with supervision/set-up within 7 days. 4.  Patient will don/doff back brace at minimal assistance/contact guard assist within 7 days. 5.  Patient will verbalize/demonstrate 3/3 back precautions during ADL tasks without cues within 7 days. Occupational Therapy TREATMENT Patient: Corey Mccormicksins (89 y.o. male) Date: 9/3/2018 Diagnosis: SPINAL STENOSIS L3-4, STATUS POST POSTERIOR SPINAL FUSION L4-S1 Lumbar spinal stenosis <principal problem not specified> Procedure(s) (LRB): 
REVISION LUMBAR LAMINECTOMY FUSION L3-S1, POSSIBLE L2 (N/A) 5 Days Post-Op Precautions: Back, Fall, Other (comment) (brace when up) No bending, no lifting greater than 5 lbs, no twisting, log-roll technique, repositioning every 20-30 min except when sleeping, TLSO brace when OOB Chart, occupational therapy assessment, plan of care, and goals were reviewed. ASSESSMENT: 
Patient received in supine, agreeable to therapy, able to demo tailor sit with SPV in supine for lower body dressing, noted increased time required for RLE d/t knee pain. Patient reporting he is d/c'ing home today with home health services, educated on home safety, ADL modifications, and energy conservation with handouts provided & patient acknowledgement of understanding. Per patient report, he will have a family friend that will assist PRN. Safe to d/c home with [address] from OT standpoint. Progression toward goals: 
[x]          Improving appropriately and progressing toward goals []          Improving slowly and progressing toward goals 
[]          Not making progress toward goals and plan of care will be adjusted PLAN: 
Patient continues to benefit from skilled intervention to address the above impairments. Continue treatment per established plan of care. Discharge Recommendations:  Home Health Further Equipment Recommendations for Discharge:  None needed SUBJECTIVE:  
Patient stated I'm just talking to my daughter that I found on [email or web address].  The patient stated 3/3 back precautions. Reviewed all 3 with patient. OBJECTIVE DATA SUMMARY:  
Cognitive/Behavioral Status: 
Neurologic State: Alert Orientation Level: Oriented X4 Cognition: Appropriate for age attention/concentration, Appropriate decision making, Follows commands Safety/Judgement: Awareness of environment, Fall prevention Functional Mobility and Transfers for ADLs: 
Bed Mobility: 
Rolling: Modified independent Supine to Sit: Modified independent; Additional time (log roll) Transfers: 
Sit to Stand: Modified independent; Adaptive equipment; Additional time Bed to Chair: Modified independent; Adaptive equipment; Additional time Balance: 
Sitting: Intact; Without support Standing: Intact; With support Standing - Static: Good Standing - Dynamic : Good ADL Intervention and Instruction: 
  
 
Grooming Brushing Teeth: Compensatory technique training Lower Body Bathing Perineal  : Compensatory technique training Lower Body : Compensatory technique training Upper Body Dressing Assistance Orthotics(Brace): Compensatory technique training (able to don IND, educated on keeping w/in reach to decr BLT) Lower Body Dressing Assistance Underpants: Compensatory technique training Pants With Elastic Waist: Compensatory technique training Socks: Supervision/set-up; Compensatory technique training Leg Crossed Method Used: Yes (with increased time for RLE, in supine) Position Performed: Supine Cues: Verbal cues provided;Visual cues provided Toileting Bowel Hygiene: Compensatory technique training Cognitive Retraining Safety/Judgement: Awareness of environment; Fall prevention Bathing: Patient instructed and indicated understanding when bathing to not submerge wound in water, stand to shower or sponge bathe, cover wound with plastic and tape to ensure no water reaches bandage/wound without cues. Dressing brace: Patient instructed and demonstrated to don/doff velcro on brace using dominant side, keeping non-dominant side intact. Patient instructed and demonstrated in meantime of being able to stand with back against wall to don/doff brace, to don/doff seated using lap and bed/chair surface to support brace while manipulating. Dressing lower body: Patient instructed to don brace first and on the benefits to remain seated to don all clothing to increase independence with precautions and pain management. Toileting: Patient instructed on the benefits of using flushable wet wipes and toilet tongs if decreased reach or pain for pavel care. Also, the benefits of a reacher to aid in clothing management. Home safety: Patient instructed and indicated understanding on home modifications and safety (raise height of ADL objects, appropriate height of chair surfaces, recliner safety, change of floor surfaces, clear pathways) to increase independence and fall prevention with SPV. Standing: Patient instructed and indicated understanding to walk up to sink/counter top/surfaces, step into walker, square off while using objects, slide objects along surfaces, to increase adherence to back precautions and fall prevention. Patient instructed to increase amount of time standing in order to increase independence and tolerance with ADLs. During prolonged standing, can open cabinet door or place foot on stool to decrease spinal pressure/increase pain. Patient instructed and indicated understanding the benefits of maintaining activity tolerance, functional mobility, and independence with self care tasks during acute stay  to ensure safe return home and to baseline. Encouraged patient to increase frequency and duration OOB, not sitting longer than 30 mins without marching/walking with staff, be out of bed for all meals, perform daily ADLs (as approved by RN/MD regarding bathing etc), and performing functional mobility to/from bathroom. Patient instruction and indicated understanding on body mechanics, ergonomics and gravitational force on the spine during different body positions to plan activities in prep for return home to complete instrumental ADLs and back to work safely. Patient instructed and demonstrated while supine hip ER stretch and hold 10 seconds to increase ROM in prep for lower body ADLs daily with IND. Pain: 
Pain Scale 1: Numeric (0 - 10) Pain Intensity 1: 6 Pain Location 1: Back; Incisional;Spine, lumbar 
Pain Orientation 1: Lower; Posterior Pain Description 1: Aching Pain Intervention(s) 1: Medication (see MAR) Activity Tolerance:  
Good Please refer to the flowsheet for vital signs taken during this treatment. After treatment:  
[] Patient left in no apparent distress sitting up in chair 
[x] Patient left in no apparent distress in bed 
[x] Call bell left within reach [x] Nursing notified 
[] Caregiver present 
[] Bed alarm activated COMMUNICATION/COLLABORATION:  
The patients plan of care was discussed with: Physical Therapist and Registered Nurse Arron Smith OT Time Calculation: 25 mins

## 2018-09-03 NOTE — DISCHARGE INSTRUCTIONS
--------------------Check soy indicates time when medication WAS GIVEN ------------------------    After [address]:  Discharge Instructions Lumbar Fusion Surgery   STEVE Conte PA-C    Patient Name Bienvenido Saravia    Date of procedure: 8/29/2018      Date of discharge:  9/3/2018    Procedure (Procedure(s):  REVISION LUMBAR LAMINECTOMY FUSION L3-S1, POSSIBLE L2)    Surgeon: Ludivina López MD       PCP:  Jarett Saab MD      Follow up appointments  -follow up with Dr. Aliya Cardona in 2 weeks. Call [phone], ext [phone] to make an appointment as soon as you get home from the hospital.    [address] [place] [address] Hwy: _________________________   phone: _______________________  The agency will contact you to arrange dates/times for visits.   Please call them if you do not hear from them within 24 hours after you are discharged      When to call your Orthopaedic Surgeon:  -Signs of infection-if your incision is red; continues to have drainage; drainage has a foul odor or if you have a persistent fever over 101 degrees for 24 hours  -nausea or vomiting, severe headache  -loss of bowel or bladder function, inability to urinate  -changes in sensation in your arms or legs (numbness, tingling, loss of color)  -increased weakness-greater than before your surgery  -severe pain or pain not relieved by medications  -Signs of a blood clot in your leg-calf pain, tenderness, redness, swelling of lower leg  When to call your Primary Care Physician:  -Concerns about medical conditions such as diabetes, high blood pressure, asthma, congestive heart failure  -Call if blood sugars are elevated, persistent headache or dizziness, coughing or congestion, constipation or diarrhea, burning with urination, abnormal heart rate  When to call 911and go to the nearest emergency room  -acute onset of chest pain, shortness of breath, difficulty breathing    Activity  - Your only exercise should be walking. Start with short frequent walks and increase your walking distance each day.  -Limit the amount of time you sit to 20-30 minute intervals. Sitting for prolonged periods of time will be uncomfortable for you following surgery.  -Do NOT lift anything over 5 pounds  -Do NOT do any straining, twisting or bending  -When you are in bed, you may lay on your back or on either side. Do NOT lie on your stomach    Brace - if ordered by surgeon   -If you have a back brace, you should wear your brace at all times when you are out of bed. Do not wear the brace while in bed or showering.  -Remember to always wear a cotton t-shirt underneath your brace.  -Do not bend or twist when your brace is off      Driving  -You may not drive or return to work until instructed by your physician. However, you may ride in the car for short periods of time. Incision Care  Your incision has been closed with absorbable sutures and the Dermabond Prineo skin closure system. This is a combination of a mesh and a liquid adhesive that will assist with healing. The mesh is to remain on your incision for 2 weeks. A dry dressing (ABD and tape) will be placed over it and should be changed daily. Please make sure the person performing your dressing changes washes their hands before touching the dressing. You may take brief showers but do not run the water directly onto the wound. After your shower, remove your dressing and blot your incision dry with a clean, soft towel and replace the dry dressing. Do not allow the tape to come in contact with the mesh. Do not rub or apply any lotions or ointments to your incision site. Do not soak or scrub your wound. The mesh dressing will be removed during your two week follow-up appointment. If you experience drainage leaking from underneath the mesh or if it peels off before 2 weeks, please contact your orthopedic surgeons office.     Showering  -You may shower in approximately 4 days after your surgery.      -Leave the dressing on during your shower without allowing the water to run over it.    -Reminder- your brace can be removed while showering. Remember to not bend or twist while your brace is off.      -Do not take a tub bath. Preventing blood clots  -You have been given T.E.D. stockings to wear. Continue to wear these for 7 days after your discharge. Put them on in the morning and take them off at night.      -They are used to increase your circulation and prevent blood clots from forming in your legs    Pain management  -take pain medication as prescribed; decrease the amount you use as your pain lessens  -avoid alcoholic beverages while taking pain medication  -avoid NSAIDS (Aleve, Ibuprofen, Aspirin) until your surgeon approves it  -Please be aware that many medications contain Tylenol. We do not want you to over medicate so please read the information below as a guide. Do not take more than 4 Grams of Tylenol in a 24 hour period. (There are 1000 milligrams in one Gram)  Percocet contains 325 mg of Tylenol per tablet (do not take more than 12 tablets in 24 hours)  Lortab contains 500 mg of Tylenol per tablet (do not take more than 8 tablets in 24 hours)  Norco contains 325 mg of Tylenol per tablet (do not take more than 12 tablets in 24 hours). Diet  -resume usual diet; drink plenty of fluids; eat foods high in fiber  -It is important to have regular bowel movements. Pain medications may cause constipation. You may want to take a stool softener (such as Senokot-S or Colace) to prevent constipation. If constipation occurs, take a laxative (such as Dulcolax tablets, Milk of Magnesia, or a suppository).   Laxatives should only be used if the above preventable measures have failed and you still have not had a bowel movement after three days

## 2018-09-04 ENCOUNTER — HOME CARE VISIT (OUTPATIENT)
Dept: SCHEDULING | Facility: HOME HEALTH | Age: 58
End: 2018-09-04
Payer: MEDICAID

## 2018-09-04 VITALS
OXYGEN SATURATION: 96 % | TEMPERATURE: 99.6 F | HEART RATE: 58 BPM | SYSTOLIC BLOOD PRESSURE: 146 MMHG | RESPIRATION RATE: 16 BRPM | DIASTOLIC BLOOD PRESSURE: 78 MMHG

## 2018-09-04 PROCEDURE — 400013 HH SOC

## 2018-09-04 PROCEDURE — G0151 HHCP-SERV OF PT,EA 15 MIN: HCPCS

## 2018-09-05 ENCOUNTER — HOME CARE VISIT (OUTPATIENT)
Dept: SCHEDULING | Facility: HOME HEALTH | Age: 58
End: 2018-09-05
Payer: MEDICAID

## 2018-09-05 VITALS
DIASTOLIC BLOOD PRESSURE: 65 MMHG | OXYGEN SATURATION: 99 % | HEART RATE: 67 BPM | RESPIRATION RATE: 17 BRPM | SYSTOLIC BLOOD PRESSURE: 122 MMHG | TEMPERATURE: 98.2 F

## 2018-09-05 PROCEDURE — G0152 HHCP-SERV OF OT,EA 15 MIN: HCPCS

## 2018-09-06 ENCOUNTER — HOME CARE VISIT (OUTPATIENT)
Dept: SCHEDULING | Facility: HOME HEALTH | Age: 58
End: 2018-09-06
Payer: MEDICAID

## 2018-09-06 VITALS
SYSTOLIC BLOOD PRESSURE: 142 MMHG | DIASTOLIC BLOOD PRESSURE: 82 MMHG | OXYGEN SATURATION: 95 % | RESPIRATION RATE: 16 BRPM | TEMPERATURE: 99.4 F | HEART RATE: 62 BPM

## 2018-09-06 PROCEDURE — G0151 HHCP-SERV OF PT,EA 15 MIN: HCPCS

## 2018-09-11 ENCOUNTER — HOME CARE VISIT (OUTPATIENT)
Dept: SCHEDULING | Facility: HOME HEALTH | Age: 58
End: 2018-09-11
Payer: MEDICAID

## 2018-09-11 ENCOUNTER — HOME CARE VISIT (OUTPATIENT)
Dept: HOME HEALTH SERVICES | Facility: HOME HEALTH | Age: 58
End: 2018-09-11
Payer: MEDICAID

## 2018-09-11 VITALS
SYSTOLIC BLOOD PRESSURE: 160 MMHG | HEART RATE: 67 BPM | DIASTOLIC BLOOD PRESSURE: 84 MMHG | OXYGEN SATURATION: 92 % | TEMPERATURE: 98.2 F | RESPIRATION RATE: 16 BRPM

## 2018-09-11 PROCEDURE — G0151 HHCP-SERV OF PT,EA 15 MIN: HCPCS

## 2018-09-12 ENCOUNTER — HOME CARE VISIT (OUTPATIENT)
Dept: SCHEDULING | Facility: HOME HEALTH | Age: 58
End: 2018-09-12
Payer: MEDICAID

## 2018-09-12 VITALS
HEART RATE: 49 BPM | RESPIRATION RATE: 16 BRPM | OXYGEN SATURATION: 97 % | DIASTOLIC BLOOD PRESSURE: 82 MMHG | SYSTOLIC BLOOD PRESSURE: 146 MMHG | TEMPERATURE: 98 F

## 2018-09-12 PROCEDURE — G0151 HHCP-SERV OF PT,EA 15 MIN: HCPCS

## 2018-09-17 ENCOUNTER — HOME CARE VISIT (OUTPATIENT)
Dept: SCHEDULING | Facility: HOME HEALTH | Age: 58
End: 2018-09-17
Payer: MEDICAID

## 2018-09-17 VITALS
DIASTOLIC BLOOD PRESSURE: 80 MMHG | OXYGEN SATURATION: 96 % | HEART RATE: 72 BPM | SYSTOLIC BLOOD PRESSURE: 138 MMHG | RESPIRATION RATE: 16 BRPM | TEMPERATURE: 98.6 F

## 2018-09-17 PROCEDURE — G0151 HHCP-SERV OF PT,EA 15 MIN: HCPCS

## 2018-09-20 ENCOUNTER — HOME CARE VISIT (OUTPATIENT)
Dept: SCHEDULING | Facility: HOME HEALTH | Age: 58
End: 2018-09-20
Payer: MEDICAID

## 2018-09-20 VITALS
DIASTOLIC BLOOD PRESSURE: 82 MMHG | RESPIRATION RATE: 16 BRPM | HEART RATE: 71 BPM | TEMPERATURE: 98.4 F | OXYGEN SATURATION: 98 % | SYSTOLIC BLOOD PRESSURE: 138 MMHG

## 2018-09-20 PROCEDURE — G0151 HHCP-SERV OF PT,EA 15 MIN: HCPCS

## 2018-10-23 ENCOUNTER — TELEPHONE (OUTPATIENT)
Dept: ENDOCRINOLOGY | Age: 58
End: 2018-10-23

## 2018-10-23 RX ORDER — METFORMIN HYDROCHLORIDE 500 MG/1
TABLET ORAL
Qty: 120 TAB | Refills: 5 | Status: SHIPPED | OUTPATIENT
Start: 2018-10-23 | End: 2019-04-15 | Stop reason: SDUPTHER

## 2018-10-23 NOTE — TELEPHONE ENCOUNTER
Please let him know that Dr. Ezequiel Victor is out on maternity leave and as long as he is doing okay, he can come back and see her when she returns in January. I recommend he schedule an appointment with her for her next available and I will give him refills to last until the spring.

## 2018-10-23 NOTE — TELEPHONE ENCOUNTER
10/23/2018  11:10 AM      MrMaggie Sagastume would like a refill for metFORMIN (GLUCOPHAGE) 500 mg tablet    Also stated that he had to cancel his appointment because of back surgery, willing to see another provider until Dr. Yuriy Quiroz returns.       Thanks

## 2018-12-26 DIAGNOSIS — I10 HYPERTENSION, UNSPECIFIED TYPE: ICD-10-CM

## 2018-12-28 ENCOUNTER — OFFICE VISIT (OUTPATIENT)
Dept: FAMILY MEDICINE CLINIC | Age: 58
End: 2018-12-28

## 2018-12-28 VITALS
OXYGEN SATURATION: 95 % | HEART RATE: 65 BPM | DIASTOLIC BLOOD PRESSURE: 85 MMHG | HEIGHT: 78 IN | WEIGHT: 263 LBS | TEMPERATURE: 97.6 F | RESPIRATION RATE: 18 BRPM | SYSTOLIC BLOOD PRESSURE: 158 MMHG | BODY MASS INDEX: 30.43 KG/M2

## 2018-12-28 DIAGNOSIS — L03.116 CELLULITIS OF LEFT LOWER EXTREMITY: Primary | ICD-10-CM

## 2018-12-28 RX ORDER — AMLODIPINE BESYLATE 10 MG/1
TABLET ORAL
Qty: 30 TAB | Refills: 2 | Status: SHIPPED | OUTPATIENT
Start: 2018-12-28 | End: 2019-03-04 | Stop reason: SDUPTHER

## 2018-12-28 RX ORDER — CEPHALEXIN 500 MG/1
500 CAPSULE ORAL 4 TIMES DAILY
Qty: 40 CAP | Refills: 0 | Status: SHIPPED | OUTPATIENT
Start: 2018-12-28 | End: 2019-01-07

## 2018-12-28 NOTE — PROGRESS NOTES
Jennifer Lenz is a 66 y.o. male    Chief Complaint   Patient presents with    Rash     left leg and leg foot notice over 14 days ago, but worsten last 2 days      Left foot painful with  activity  1. Have you been to the ER, urgent care clinic since your last visit? Hospitalized since your last visit? No  M  2. Have you seen or consulted any other health care providers outside of the [address] [place] [address] since your last visit? Include any pap smears or colon screening. No      Visit Vitals  /85 (BP 1 Location: Right arm, BP Patient Position: Sitting)   Pulse 65   Temp 97.6 °F (36.4 °C) (Oral)   Resp 18   Ht 6' 6\" (1.981 m)   Wt 263 lb (119.3 kg)   SpO2 95%   BMI 30.39 kg/m²           Health Maintenance Due   Topic Date Due    FOOT EXAM Q1  01/19/1972    Pneumococcal 19-64 Medium Risk (1 of 1 - PPSV23) 01/19/1981    DTaP/Tdap/Td series (1 - Tdap) 01/19/1983    Shingrix Vaccine Age 50> (1 of 2) 01/19/2012    FOBT Q 1 YEAR AGE 50-75  07/12/2018    Influenza Age 9 to Adult  08/01/2018    MICROALBUMIN Q1  08/29/2018         Medication Reconciliation completed, changes noted.   Please  Update medication list.

## 2018-12-28 NOTE — PATIENT INSTRUCTIONS

## 2018-12-31 NOTE — PROGRESS NOTES
Subjective:      Jyoti Sosa is an 66 y.o. male who presents for evaluation of a probable skin infection located on the right lower leg and foot. Onset of symptoms was gradual, with gradually worsening since that time. Symptoms include erythema, tenderness and pain. Patient denies  drainage, fever, chills, nausea and vomiting. There is not a history trauma to the area. Treatment to date has included none with no relief. Patient Active Problem List   Diagnosis Code    Acute pancreatitis K85.90    Anxiety F41.9    Substance induced mood disorder ([facility] F19.94    Benzodiazepine dependence, continuous ([facility]) F13.20    Polysubstance dependence ([facility] F19.20    Type 2 diabetes mellitus with hyperglycemia, with long-term current use of insulin ([facility]) E11.65, Z79.4    Type 2 diabetes with nephropathy ([facility]) E11.21    Essential hypertension I10    Dysuria R30.0    Lumbar spinal stenosis M48.061     Patient Active Problem List    Diagnosis Date Noted    Lumbar spinal stenosis 08/29/2018    Dysuria 04/25/2018    Essential hypertension 03/15/2018    Type 2 diabetes with nephropathy ([facility] 02/21/2018    Type 2 diabetes mellitus with hyperglycemia, with long-term current use of insulin ([facility] 08/29/2017    Anxiety 08/24/2017    Substance induced mood disorder ([facility] 08/24/2017    Benzodiazepine dependence, continuous ([facility] 08/24/2017    Polysubstance dependence ([facility] 08/24/2017    Acute pancreatitis 11/29/2011     Current Outpatient Medications   Medication Sig Dispense Refill    amLODIPine (NORVASC) 10 mg tablet TAKE ONE TABLET BY MOUTH DAILY 30 Tab 2    cephALEXin (KEFLEX) 500 mg capsule Take 1 Cap by mouth four (4) times daily for 10 days. 40 Cap 0    lisinopril (PRINIVIL, ZESTRIL) 40 mg tablet TAKE ONE TABLET BY MOUTH DAILY 30 Tab 3    insulin glargine (BASAGLAR KWIKPEN U-100 INSULIN) 100 unit/mL (3 mL) inpn INJECT 24 UNITS UNDER THE SKIN DAILY AS DIRECTED.  Needs Appt for future refills 10 Pen 0  metFORMIN (GLUCOPHAGE) 500 mg tablet TAKE TWO TABLETS BY MOUTH TWICE A DAY WITH MEALS 120 Tab 5    gabapentin (NEURONTIN) 300 mg capsule TAKE ONE CAPSULE BY MOUTH TWICE A DAY 60 Cap 3    propranolol (INDERAL) 10 mg tablet TAKE ONE TABLET BY MOUTH THREE TIMES A DAY 90 Tab 3    docusate sodium (COLACE) 100 mg capsule Take 100 mg by mouth two (2) times daily as needed for Constipation.  polyethylene glycol (MIRALAX) 17 gram packet Take 17 g by mouth daily. take daily as needed for constipation      oxyCODONE IR (ROXICODONE) 10 mg tab immediate release tablet Take 10 mg by mouth every three (3) hours as needed (pain). Take 1 Tab by mouth every three (3) hours as needed for pain. Max Daily Amount: 80 mg      oxyCODONE IR (ROXICODONE) 10 mg tab immediate release tablet Take 1 Tab by mouth every three (3) hours as needed. Max Daily Amount: 80 mg. 60 Tab 0    naloxone (NARCAN) 4 mg/actuation nasal spray Use 1 spray intranasally, then discard. Repeat with new spray every 2 min as needed for opioid overdose symptoms, alternating nostrils. Indications: OPIATE-INDUCED RESPIRATORY DEPRESSION 1 Each 0    cyclobenzaprine (FLEXERIL) 10 mg tablet Take 1 Tab by mouth three (3) times daily as needed for Muscle Spasm(s). 30 Tab 0    ALPRAZolam (XANAX) 2 mg tablet 2 mg three (3) times daily.  polyethylene glycol (MIRALAX) 17 gram/dose powder Take 17 g by mouth daily. 850 g 1    canagliflozin (INVOKANA) 300 mg tablet Take 1 Tab by mouth Daily (before breakfast). 30 Tab 5    METHADONE HCL (METHADONE PO) Take 95 mg by mouth daily.        Allergies   Allergen Reactions    Senna Other (comments)     cramps     Past Medical History:   Diagnosis Date    Alcohol abuse, in remission     Chronic kidney disease     PLAGUE L RENAL ARTERY    Chronic obstructive pulmonary disease (HCC)     Chronic pain     Constipation     Diabetes (HCC)     Type II    GERD (gastroesophageal reflux disease)     Hepatitis C     Hypertension     Liver disease     HEP C TOOK HARVONI    Pancreatitis     Psychiatric disorder     PANIC ATTACKS    Smoker     Spondylitis ([address]      Past Surgical History:   Procedure Laterality Date    HX APPENDECTOMY      HX ORTHOPAEDIC      2 back surgeries    HX ORTHOPAEDIC      KNEE SURGERY    HX TONSILLECTOMY       Family History   Problem Relation Age of Onset    Hypertension Mother     COPD Mother     Other Father         PALSY     Social History     Tobacco Use    Smoking status: Current Every Day Smoker     Packs/day: 1.00    Smokeless tobacco: Never Used   Substance Use Topics    Alcohol use: No        Objective:     Visit Vitals  /85 (BP 1 Location: Right arm, BP Patient Position: Sitting)   Pulse 65   Temp 97.6 °F (36.4 °C) (Oral)   Resp 18   Ht 6' 6\" (1.981 m)   Wt 263 lb (119.3 kg)   SpO2 95%   BMI 30.39 kg/m²     General appearance: alert, well appearing, and in no distress. Skin exam: cellulitis, erythema and warmth noted on the right lower leg and foot. There is 2-3 mildly red patches over foot and lower right leg, he has had cellulitis in that same location in the past.    Assessment/Plan:     cellulitis as described  I do not believe this to be a high risk lesion for MRSA. Thus I am treating the patient with Keflex. Antibiotics given. Educational material distributed. He is to follow up with his pcp within 1 week for evaluation. ICD-10-CM ICD-9-CM    1. Cellulitis of left lower extremity L03.116 682.6 cephALEXin (KEFLEX) 500 mg capsule   I have also discussed elevated blood pressure and need to follow up with pcp.

## 2019-01-09 ENCOUNTER — OFFICE VISIT (OUTPATIENT)
Dept: INTERNAL MEDICINE CLINIC | Age: 59
End: 2019-01-09

## 2019-01-09 VITALS
TEMPERATURE: 98.6 F | OXYGEN SATURATION: 96 % | WEIGHT: 261.2 LBS | RESPIRATION RATE: 18 BRPM | HEART RATE: 66 BPM | DIASTOLIC BLOOD PRESSURE: 90 MMHG | HEIGHT: 78 IN | BODY MASS INDEX: 30.22 KG/M2 | SYSTOLIC BLOOD PRESSURE: 140 MMHG

## 2019-01-09 DIAGNOSIS — L03.115 CELLULITIS OF RIGHT LOWER EXTREMITY: Primary | ICD-10-CM

## 2019-01-09 RX ORDER — CEPHALEXIN 500 MG/1
500 CAPSULE ORAL 4 TIMES DAILY
Qty: 20 CAP | Refills: 0 | Status: SHIPPED | OUTPATIENT
Start: 2019-01-09 | End: 2019-01-14

## 2019-01-09 NOTE — PATIENT INSTRUCTIONS
Please use Eucerin Cream on your dry skin after a shower. Cellulitis: Care Instructions  Your Care Instructions    Cellulitis is a skin infection caused by bacteria, most often strep or staph. It often occurs after a break in the skin from a scrape, cut, bite, or puncture, or after a rash. Cellulitis may be treated without doing tests to find out what caused it. But your doctor may do tests, if needed, to look for a specific bacteria, like methicillin-resistant Staphylococcus aureus (MRSA). The doctor has checked you carefully, but problems can develop later. If you notice any problems or new symptoms, get medical treatment right away. Follow-up care is a key part of your treatment and safety. Be sure to make and go to all appointments, and call your doctor if you are having problems. It's also a good idea to know your test results and keep a list of the medicines you take. How can you care for yourself at home? · Take your antibiotics as directed. Do not stop taking them just because you feel better. You need to take the full course of antibiotics. · Prop up the infected area on pillows to reduce pain and swelling. Try to keep the area above the level of your heart as often as you can. · If your doctor told you how to care for your wound, follow your doctor's instructions. If you did not get instructions, follow this general advice:  ? Wash the wound with clean water 2 times a day. Don't use hydrogen peroxide or alcohol, which can slow healing. ? You may cover the wound with a thin layer of petroleum jelly, such as Vaseline, and a nonstick bandage. ? Apply more petroleum jelly and replace the bandage as needed. · Be safe with medicines. Take pain medicines exactly as directed. ? If the doctor gave you a prescription medicine for pain, take it as prescribed. ? If you are not taking a prescription pain medicine, ask your doctor if you can take an over-the-counter medicine.   To prevent cellulitis in the future  · Try to prevent cuts, scrapes, or other injuries to your skin. Cellulitis most often occurs where there is a break in the skin. · If you get a scrape, cut, mild burn, or bite, wash the wound with clean water as soon as you can to help avoid infection. Don't use hydrogen peroxide or alcohol, which can slow healing. · If you have swelling in your legs (edema), support stockings and good skin care may help prevent leg sores and cellulitis. · Take care of your feet, especially if you have diabetes or other conditions that increase the risk of infection. Wear shoes and socks. Do not go barefoot. If you have athlete's foot or other skin problems on your feet, talk to your doctor about how to treat them. When should you call for help? Call your doctor now or seek immediate medical care if:    · You have signs that your infection is getting worse, such as:  ? Increased pain, swelling, warmth, or redness. ? Red streaks leading from the area. ? Pus draining from the area. ? A fever.     · You get a rash.    Watch closely for changes in your health, and be sure to contact your doctor if:    · You do not get better as expected. Where can you learn more? Go to http://ciara-abbe.info/. Pippa Leija in the search box to learn more about \"Cellulitis: Care Instructions. \"  Current as of: April 18, 2018  Content Version: 11.8  © 4464-4887 [employer]. Care instructions adapted under license by [employer] (which disclaims liability or warranty for this information). If you have questions about a medical condition or this instruction, always ask your healthcare professional. [place] [address] any warranty or liability for your use of this information.

## 2019-01-09 NOTE — PROGRESS NOTES
Acute Care Note    Krzysztof Davis is 66 y.o. male. he presents for evaluation of Skin Infection    The patient presents for evaluation of a probable skin infection located on the right lower extremity. Onset of symptoms was gradual, with gradually worsening since that time. Symptoms include erythema, tenderness and pain. Patient denies  fever and chills. There is not a history trauma to the area. Treatment to date has included elevation of the area with minimal relief      Prior to Admission medications    Medication Sig Start Date End Date Taking? Authorizing Provider   insulin glargine (BASAGLAR KWIKPEN U-100 INSULIN) 100 unit/mL (3 mL) inpn INJECT 24 UNITS DAILY: No More Refills Without Clinic Visit with Dr. Miguel Metz 1/2/19  Yes Dalia Bourgeois MD   amLODIPine (NORVASC) 10 mg tablet TAKE ONE TABLET BY MOUTH DAILY 12/28/18  Yes Milady Urena MD   lisinopril (PRINIVIL, ZESTRIL) 40 mg tablet TAKE ONE TABLET BY MOUTH DAILY 11/27/18  Yes Milady Urena MD   metFORMIN (GLUCOPHAGE) 500 mg tablet TAKE TWO TABLETS BY MOUTH TWICE A DAY WITH MEALS 10/23/18  Yes Han Cartagena MD   gabapentin (NEURONTIN) 300 mg capsule TAKE ONE CAPSULE BY MOUTH TWICE A DAY 9/28/18  Yes Milady Urena MD   propranolol (INDERAL) 10 mg tablet TAKE ONE TABLET BY MOUTH THREE TIMES A DAY 9/28/18  Yes Milady Urena MD   docusate sodium (COLACE) 100 mg capsule Take 100 mg by mouth two (2) times daily as needed for Constipation. Yes Provider, Historical   ALPRAZolam (XANAX) 2 mg tablet 2 mg three (3) times daily. 7/30/18  Yes Provider, Historical   polyethylene glycol (MIRALAX) 17 gram/dose powder Take 17 g by mouth daily. 9/29/17  Yes Milady Urena MD   METHADONE HCL (METHADONE PO) Take 95 mg by mouth daily. Yes Provider, Historical   polyethylene glycol (MIRALAX) 17 gram packet Take 17 g by mouth daily.  take daily as needed for constipation    Provider, Historical   oxyCODONE IR (ROXICODONE) 10 mg tab immediate release tablet Take 10 mg by mouth every three (3) hours as needed (pain). Take 1 Tab by mouth every three (3) hours as needed for pain. Max Daily Amount: 80 mg    Provider, Historical   oxyCODONE IR (ROXICODONE) 10 mg tab immediate release tablet Take 1 Tab by mouth every three (3) hours as needed. Max Daily Amount: 80 mg. 8/31/18   Mariposa Crabtree NP   naloxone [facility]) 4 mg/actuation nasal spray Use 1 spray intranasally, then discard. Repeat with new spray every 2 min as needed for opioid overdose symptoms, alternating nostrils. Indications: OPIATE-INDUCED RESPIRATORY DEPRESSION 8/31/18   Mariposa Crabtree NP   canagliflozin [facility]) 300 mg tablet Take 1 Tab by mouth Daily (before breakfast). 8/29/17   Ralph Arreguin MD         Patient Active Problem List   Diagnosis Code    Acute pancreatitis K85.90    Anxiety F41.9    Substance induced mood disorder ([address] F19.94    Benzodiazepine dependence, continuous ([address] F13.20    Polysubstance dependence ([address] F19.20    Type 2 diabetes mellitus with hyperglycemia, with long-term current use of insulin (HCC) E11.65, Z79.4    Type 2 diabetes with nephropathy (HCC) E11.21    Essential hypertension I10    Dysuria R30.0    Lumbar spinal stenosis M48.061         Review of Systems   Constitutional: Negative. Respiratory: Negative. Cardiovascular: Negative. Skin:        Erythema and pain at the site of potential infection. Visit Vitals  /90 (BP 1 Location: Right arm, BP Patient Position: Sitting)   Pulse 66   Temp 98.6 °F (37 °C) (Oral)   Resp 18   Ht 6' 6\" (1.981 m)   Wt 261 lb 3.2 oz (118.5 kg)   SpO2 96%   BMI 30.18 kg/m²       Physical Exam   Skin: There is erythema (With tenderness at that location). ASSESSMENT/PLAN  Diagnoses and all orders for this visit:    1. Cellulitis of right lower extremity  -     cephALEXin (KEFLEX) 500 mg capsule; Take 1 Cap by mouth four (4) times daily for 5 days.          Advised the patient to call back or return to office if symptoms worsen/change/persist.   Discussed expected course/resolution/complications of diagnosis in detail with patient. Medication risks/benefits/costs/interactions/alternatives discussed with patient. The patient was given an after visit summary which includes diagnoses, current medications, & vitals. They expressed understanding with the diagnosis and plan.

## 2019-02-28 RX ORDER — INSULIN GLARGINE 100 [IU]/ML
INJECTION, SOLUTION SUBCUTANEOUS
Qty: 5 PEN | Refills: 0 | Status: CANCELLED | OUTPATIENT
Start: 2019-02-28

## 2019-02-28 NOTE — TELEPHONE ENCOUNTER
Lizette Mckeon (Self) [phone] (H)     Pt says he's [facility] out of insulin and was told when he called today that Dr. Charmayne Mull is leaving the practice and their office wouldn't even schedule me. \"  Pt requesting referral for new endocrinologist and asking if Dr. Grupo Noble will fill his insulin

## 2019-02-28 NOTE — TELEPHONE ENCOUNTER
2/28/2019  3:33 PM    Patient called in to schedule appt with Dr. Diego Guevara. He has not been seen by Dr. Diego Guevara since March last year. He stated that he had a major back surgery and is just beginning to be able to get around. He also stated that he his afraid he will run out of insulin soon. Patient would like a call back.       Thanks

## 2019-03-01 RX ORDER — INSULIN GLARGINE 100 [IU]/ML
INJECTION, SOLUTION SUBCUTANEOUS
Qty: 5 PEN | Refills: 0 | Status: SHIPPED | OUTPATIENT
Start: 2019-03-01 | End: 2019-04-08 | Stop reason: SDUPTHER

## 2019-04-08 ENCOUNTER — OFFICE VISIT (OUTPATIENT)
Dept: INTERNAL MEDICINE CLINIC | Age: 59
End: 2019-04-08

## 2019-04-08 VITALS
HEART RATE: 62 BPM | RESPIRATION RATE: 19 BRPM | DIASTOLIC BLOOD PRESSURE: 80 MMHG | HEIGHT: 78 IN | WEIGHT: 259.6 LBS | TEMPERATURE: 98.6 F | OXYGEN SATURATION: 96 % | SYSTOLIC BLOOD PRESSURE: 109 MMHG | BODY MASS INDEX: 30.04 KG/M2

## 2019-04-08 DIAGNOSIS — L73.9 FOLLICULITIS: ICD-10-CM

## 2019-04-08 DIAGNOSIS — I10 HYPERTENSION, UNSPECIFIED TYPE: Primary | ICD-10-CM

## 2019-04-08 DIAGNOSIS — F41.9 ANXIETY: ICD-10-CM

## 2019-04-08 DIAGNOSIS — E11.21 TYPE 2 DIABETES WITH NEPHROPATHY (HCC): ICD-10-CM

## 2019-04-08 DIAGNOSIS — Z12.5 SCREENING PSA (PROSTATE SPECIFIC ANTIGEN): ICD-10-CM

## 2019-04-08 DIAGNOSIS — F17.200 SMOKING ADDICTION: ICD-10-CM

## 2019-04-08 DIAGNOSIS — R05.9 COUGH: ICD-10-CM

## 2019-04-08 DIAGNOSIS — Z12.11 COLON CANCER SCREENING: ICD-10-CM

## 2019-04-08 DIAGNOSIS — Z23 ENCOUNTER FOR IMMUNIZATION: ICD-10-CM

## 2019-04-08 PROBLEM — E11.40 TYPE 2 DIABETES MELLITUS WITH DIABETIC NEUROPATHY (HCC): Status: ACTIVE | Noted: 2019-04-08

## 2019-04-08 RX ORDER — INSULIN GLARGINE 100 [IU]/ML
INJECTION, SOLUTION SUBCUTANEOUS
Qty: 5 PEN | Refills: 1 | Status: SHIPPED | OUTPATIENT
Start: 2019-04-08 | End: 2019-07-31 | Stop reason: SDUPTHER

## 2019-04-08 RX ORDER — CHLORHEXIDINE GLUCONATE 4 G/100ML
5 SOLUTION TOPICAL
Qty: 5 ML | Refills: 0 | Status: SHIPPED | OUTPATIENT
Start: 2019-04-08 | End: 2019-07-08 | Stop reason: ALTCHOICE

## 2019-04-08 NOTE — PROGRESS NOTES
Medications Discontinued During This Encounter Medication Reason  naloxone (NARCAN) 4 mg/actuation nasal spray Not A Current Medication  oxyCODONE IR (ROXICODONE) 10 mg tab immediate release tablet Therapy Completed  polyethylene glycol (MIRALAX) 17 gram/dose powder Not A Current Medication  oxyCODONE IR (ROXICODONE) 10 mg tab immediate release tablet Therapy Completed  polyethylene glycol (MIRALAX) 17 gram packet Therapy Completed Verbal order read back Dr. Deyanira elliott to remove  medication from list.

## 2019-04-08 NOTE — PROGRESS NOTES
Follow Up Visit Corey Churchill is a 64 y.o. male. he presents for Tremors; Annual Wellness Visit; and Dizziness Vertigo Patient complains of dizziness. The symptoms are chronic. He had been seen by neurology, had an MRI but did not follow up. He later went to [facility] and was seen by ENT. He was told that he was losing his hearing. He then went to a second ENT and was told that he had some hearing damage. He has not followed up with neurology. 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 He otherwise has complaints about missing his medications. He has not had refills. Asking for a refill today. He also has ongoing lower back pain. He will follow up with orthopedics. Patient Active Problem List  
Diagnosis Code  Acute pancreatitis K85.90  Anxiety F41.9  Substance induced mood disorder ([facility]) F19.94  Benzodiazepine dependence, continuous ([facility]) F13.20  Polysubstance dependence ([facility]) F19.20  Type 2 diabetes mellitus with hyperglycemia, with long-term current use of insulin ([facility]) E11.65, Z79.4  Type 2 diabetes with nephropathy ([facility]) E11.21  
 Essential hypertension I10  Dysuria R30.0  Lumbar spinal stenosis M48.061  Type 2 diabetes mellitus with diabetic neuropathy ([facility]) E11.40 Prior to Admission medications Medication Sig Start Date End Date Taking?  Authorizing Provider  
propranolol (INDERAL) 10 mg tablet TAKE ONE TABLET BY MOUTH THREE TIMES A DAY 3/29/19  Yes Guy Rebolledo MD  
lisinopril (PRINIVIL, ZESTRIL) 40 mg tablet TAKE ONE TABLET BY MOUTH DAILY 3/5/19  Yes Guy Rebolledo MD  
amLODIPine (NORVASC) 10 mg tablet TAKE ONE TABLET BY MOUTH DAILY 3/5/19  Yes Guy Rebolledo MD  
gabapentin (NEURONTIN) 300 mg capsule TAKE ONE CAPSULE BY MOUTH TWICE A DAY 3/5/19  Yes Guy Rebolledo MD  
insulin glargine (BASAGLAR KWIKPEN U-100 INSULIN) 100 unit/mL (3 mL) inpn INJECT 24 UNITS DAILY: NO REFILLS UNTIL UNTIL SEEN BY PHYSICIAN 3/1/19  Yes John Benjamin MD  
metFORMIN (GLUCOPHAGE) 500 mg tablet TAKE TWO TABLETS BY MOUTH TWICE A DAY WITH MEALS 10/23/18  Yes Emi Merida MD  
docusate sodium (COLACE) 100 mg capsule Take 100 mg by mouth two (2) times daily as needed for Constipation. Yes Provider, Historical  
ALPRAZolam (XANAX) 2 mg tablet 2 mg three (3) times daily. 7/30/18  Yes Provider, Historical  
canagliflozin (INVOKANA) 300 mg tablet Take 1 Tab by mouth Daily (before breakfast). 8/29/17  Yes John Benjamni MD  
METHADONE HCL (METHADONE PO) Take 95 mg by mouth daily. Yes Provider, Historical  
 
 
 
Health Maintenance Topic Date Due  Pneumococcal 0-64 years (1 of 1 - PPSV23) 01/19/1968  
 FOOT EXAM Q1  01/19/1972  
 DTaP/Tdap/Td series (1 - Tdap) 01/19/1983  Shingrix Vaccine Age 50> (1 of 2) 01/19/2012  FOBT Q 1 YEAR AGE 50-75  07/12/2018  MICROALBUMIN Q1  08/29/2018  HEMOGLOBIN A1C Q6M  01/31/2019  LIPID PANEL Q1  03/14/2019  
 EYE EXAM RETINAL OR DILATED  04/20/2019  Influenza Age 5 to Adult  08/01/2019  Hepatitis C Screening  Completed Review of Systems Constitutional: Negative. Respiratory: Negative. Cardiovascular: Negative. Gastrointestinal: Negative. Visit Vitals /80 (BP 1 Location: Right arm, BP Patient Position: Sitting) Pulse 62 Temp 98.6 °F (37 °C) (Oral) Resp 19 Ht 6' 6\" (1.981 m) Wt 259 lb 9.6 oz (117.8 kg) SpO2 96% BMI 30.00 kg/m² Physical Exam  
Constitutional: No distress. Cardiovascular: Normal rate and regular rhythm. Pulmonary/Chest: Effort normal and breath sounds normal.  
Musculoskeletal: Normal range of motion. ASSESSMENT/PLAN Diagnoses and all orders for this visit: 
 
1. Hypertension, unspecified type -     LIPID PANEL 
-     TSH 3RD GENERATION 2. Anxiety 3. Smoking addiction 4. Cough 5. Type 2 diabetes with nephropathy ([facility] -     MICROALBUMIN, UR, RAND W/ MICROALB/CREAT RATIO 
-     HEMOGLOBIN A1C WITH EAG 
-     REFERRAL TO OPHTHALMOLOGY 
-     insulin glargine (BASAGLAR KWIKPEN U-100 INSULIN) 100 unit/mL (3 mL) inpn; INJECT 28 UNITS DAILY: NO REFILLS UNTIL UNTIL SEEN BY PHYSICIAN  Indications: type 2 diabetes mellitus 
-     REFERRAL TO PODIATRY 6. Colon cancer screening 
-     CBC WITH AUTOMATED DIFF 
-     METABOLIC PANEL, COMPREHENSIVE 
-     OCCULT BLOOD IMMUNOASSAY,DIAGNOSTIC 7. Screening PSA (prostate specific antigen) -     PSA, DIAGNOSTIC (PROSTATE SPECIFIC AG) 8. Encounter for immunization 
-     diphtheria-pertussis, acellular,-tetanus (BOOSTRIX TDAP) 2.5-8-5 Lf-mcg-Lf/0.5mL susp susp; 0.5 mL by IntraMUSCular route once for 1 dose. 
-     varicella-zoster recombinant, PF, (SHINGRIX, PF,) 50 mcg/0.5 mL susr injection; 0.5 mL by IntraMUSCular route once for 1 dose. 9. Folliculitis -     chlorhexidine (HIBICLENS) 4 % liquid; Apply 5 mL to affected area daily as needed (folliculitis). Follow-up and Dispositions · Return in about 3 months (around 7/8/2019) for Follow up Diabetes.

## 2019-04-15 ENCOUNTER — TELEPHONE (OUTPATIENT)
Dept: INTERNAL MEDICINE CLINIC | Age: 59
End: 2019-04-15

## 2019-04-15 DIAGNOSIS — E11.65 TYPE 2 DIABETES MELLITUS WITH HYPERGLYCEMIA, WITH LONG-TERM CURRENT USE OF INSULIN (HCC): Primary | ICD-10-CM

## 2019-04-15 DIAGNOSIS — Z79.4 TYPE 2 DIABETES MELLITUS WITH HYPERGLYCEMIA, WITH LONG-TERM CURRENT USE OF INSULIN (HCC): Primary | ICD-10-CM

## 2019-04-15 RX ORDER — METFORMIN HYDROCHLORIDE 500 MG/1
TABLET ORAL
Qty: 120 TAB | Refills: 5 | Status: SHIPPED | OUTPATIENT
Start: 2019-04-15 | End: 2019-10-23 | Stop reason: SDUPTHER

## 2019-04-15 NOTE — TELEPHONE ENCOUNTER
Pt needs to talk with Dr/Nurse about the scripts he was given. Looks like one for a shingles shot that was not discussed. Last visit 4/8.   Advise - [phone]

## 2019-04-15 NOTE — TELEPHONE ENCOUNTER
Establishing with new endocrinologist. Need refill on diabetic medication. Discussed cream for skin issues on arms. Medication sent on 04/08/2019. HM due, gave script at last appointment to complete Tdap and Shingrix at [address] [place] [address] confirmed received both prescriptions. Hibiclens not covered by insurance, can purchase OTC.

## 2019-04-30 ENCOUNTER — HOSPITAL ENCOUNTER (EMERGENCY)
Age: 59
Discharge: HOME | End: 2019-04-30
Attending: STUDENT IN AN ORGANIZED HEALTH CARE EDUCATION/TRAINING PROGRAM | Admitting: STUDENT IN AN ORGANIZED HEALTH CARE EDUCATION/TRAINING PROGRAM
Payer: MEDICAID

## 2019-04-30 VITALS
TEMPERATURE: 98.1 F | DIASTOLIC BLOOD PRESSURE: 94 MMHG | HEART RATE: 70 BPM | OXYGEN SATURATION: 96 % | SYSTOLIC BLOOD PRESSURE: 178 MMHG | RESPIRATION RATE: 18 BRPM

## 2019-04-30 DIAGNOSIS — H91.91 HEARING LOSS OF RIGHT EAR, UNSPECIFIED HEARING LOSS TYPE: Primary | ICD-10-CM

## 2019-04-30 PROCEDURE — 99282 EMERGENCY DEPT VISIT SF MDM: CPT

## 2019-04-30 RX ORDER — FLUTICASONE PROPIONATE 50 MCG
2 SPRAY, SUSPENSION (ML) NASAL DAILY
Qty: 1 BOTTLE | Refills: 0 | Status: SHIPPED | OUTPATIENT
Start: 2019-04-30 | End: 2019-07-08 | Stop reason: SDUPTHER

## 2019-04-30 NOTE — ED PROVIDER NOTES
Padilla Wu is a 64 y.o. male with past medical history notable for COPD, diabetes, hypertension, panic attacks presenting with intermittent hearing loss for the pain initially right ear more affected laternating to L ear. No known trigger. Associated with occipital headache States that this particular episode occurred a 2 weeks ago and has beenwaxing and waning. Today he had severight-sided hearing loss, earlier today had an occipital headache on the right, resolved. No otorrhea. No fever. Denies vertigo at present. Intermittent vertigo with the syndrome. Has been seen by ENT and diagnosed with testicular migraine. Also was seen by another ENT specialist and thought to have Ménière's disease. Has not followed up again recently with ENT. Denies new head trauma, bloody otorrhea. Denies any neurological syndrome such as dysphasia, apraxia, focal weakness or numbness in the extremities or trunk. Has chronic left arm tremor and lower extremity weakness after a back surgery approximately 6 months ago. Kenny Steele Past Medical History:  
Diagnosis Date  Alcohol abuse, in remission  Chronic kidney disease PLAGUE L RENAL ARTERY  Chronic obstructive pulmonary disease ([address]  Chronic pain  Constipation  Diabetes ([address] Type II  
 GERD (gastroesophageal reflux disease)  Hepatitis C   
 Hypertension  Liver disease HEP C TOOK Janit Stands  Pancreatitis  Psychiatric disorder PANIC ATTACKS  Smoker  Spondylitis ([address] Past Surgical History:  
Procedure Laterality Date  HX APPENDECTOMY  HX ORTHOPAEDIC    
 2 back surgeries  HX ORTHOPAEDIC    
 KNEE SURGERY  
 HX TONSILLECTOMY Family History:  
Problem Relation Age of Onset  Hypertension Mother  COPD Mother  Other Father PALSY Social History Socioeconomic History  Marital status:  Spouse name: Not on file  Number of children: Not on file  Years of education: Not on file  Highest education level: Not on file Occupational History  Not on file Social Needs  Financial resource strain: Not on file  Food insecurity:  
  Worry: Not on file Inability: Not on file  Transportation needs:  
  Medical: Not on file Non-medical: Not on file Tobacco Use  Smoking status: Current Every Day Smoker Packs/day: 1.00  Smokeless tobacco: Never Used Substance and Sexual Activity  Alcohol use: No  
 Drug use: Yes Types: Marijuana, Cocaine, Heroin Comment: LAST USED 15YRS. AGO- TAKES METHADONE  
 Sexual activity: Never Lifestyle  Physical activity:  
  Days per week: Not on file Minutes per session: Not on file  Stress: Not on file Relationships  Social connections:  
  Talks on phone: Not on file Gets together: Not on file Attends Church service: Not on file Active member of club or organization: Not on file Attends meetings of clubs or organizations: Not on file Relationship status: Not on file  Intimate partner violence:  
  Fear of current or ex partner: Not on file Emotionally abused: Not on file Physically abused: Not on file Forced sexual activity: Not on file Other Topics Concern  Not on file Social History Narrative  Not on file ALLERGIES: Senna Review of Systems Constitutional: Negative for chills, fatigue and fever. HENT: Positive for rhinorrhea and tinnitus. Negative for ear pain, hearing loss, postnasal drip, sore throat and trouble swallowing. Eyes: Negative for photophobia, pain, discharge, redness, itching and visual disturbance. Respiratory: Negative for cough and shortness of breath. Cardiovascular: Negative for chest pain and leg swelling. Gastrointestinal: Negative for abdominal pain, nausea and vomiting. Genitourinary: Negative for dysuria. Musculoskeletal: Negative for back pain. Skin: Negative for rash. Neurological: Positive for dizziness. Negative for tremors, speech difficulty, light-headedness, numbness and headaches. Psychiatric/Behavioral: Negative for confusion. All other systems reviewed and are negative. Vitals:  
 04/30/19 1345 Pulse: 69 SpO2: 98% Physical Exam  
Constitutional: He appears well-developed. HENT:  
Head: Normocephalic and atraumatic. Bilateral TMs visible, Normal appearance No effusion Oropharynx clear and moist 
No maxillary or frontal sinus tenderness Eyes: EOM are normal.  
Neck: No neck rigidity. Cardiovascular: Intact distal pulses. Pulmonary/Chest: Effort normal.  
Abdominal: He exhibits no distension. There is no tenderness. Musculoskeletal: He exhibits no edema. Neurological: He is alert. He has normal strength. He displays no atrophy. No cranial nerve deficit or sensory deficit. He exhibits normal muscle tone. He displays a negative Romberg sign. Gait normal.  
Skin: Skin is warm. He is not diaphoretic. Psychiatric: He has a normal mood and affect. Nursing note and vitals reviewed. MDM Number of Diagnoses or Management Options Hearing loss of right ear, unspecified hearing loss type:  
Diagnosis management comments: Brennon Dsouza is a 64 y.o. male presenting with intermittent hearing loss. Differential includes Eustachian tube dysfunction, sinusitis, Ménière's disease, noise related hearing loss. Course: stable. Dispo: trial of Flonase, follow up with ENT as soon as possible please follow up with primary care for care coordination. Procedures

## 2019-04-30 NOTE — ED TRIAGE NOTES
Pt c/o left ear has decreased hearing and ringing to left ear. Pt states 4 years ago hearing in right ear decreased and c/o ringing. Pt states on Monday he had total hearing loss for 3 hours.

## 2019-05-13 ENCOUNTER — TELEPHONE (OUTPATIENT)
Dept: INTERNAL MEDICINE CLINIC | Age: 59
End: 2019-05-13

## 2019-05-13 NOTE — TELEPHONE ENCOUNTER
Martha Meyer (Self) [phone] (H)     Pt says that his  has c-dif and he wonders what he should do to be sure that he does not get it.

## 2019-05-13 NOTE — TELEPHONE ENCOUNTER
Patient states  admitted to hospital, dx with c-diff and colitis. Advised patient contact precaution. Patient will have labs completed this week. Patient was not feeling well last couple of weeks, he states went deaf and could not hear anything. Went to ER and was recommended he see ENT.

## 2019-05-28 DIAGNOSIS — I10 HYPERTENSION, UNSPECIFIED TYPE: ICD-10-CM

## 2019-05-29 RX ORDER — LISINOPRIL 40 MG/1
TABLET ORAL
Qty: 90 TAB | Refills: 2 | Status: SHIPPED | OUTPATIENT
Start: 2019-05-29 | End: 2020-02-21

## 2019-05-29 RX ORDER — AMLODIPINE BESYLATE 10 MG/1
TABLET ORAL
Qty: 90 TAB | Refills: 2 | Status: SHIPPED | OUTPATIENT
Start: 2019-05-29 | End: 2020-03-02

## 2019-06-14 LAB
ALBUMIN SERPL-MCNC: 4.3 G/DL (ref 3.5–5.5)
ALBUMIN/CREAT UR: 1276.7 MG/G CREAT (ref 0–30)
ALBUMIN/GLOB SERPL: 1.4 {RATIO} (ref 1.2–2.2)
ALP SERPL-CCNC: 69 IU/L (ref 39–117)
ALT SERPL-CCNC: 8 IU/L (ref 0–44)
AST SERPL-CCNC: 9 IU/L (ref 0–40)
BASOPHILS # BLD AUTO: 0 X10E3/UL (ref 0–0.2)
BASOPHILS NFR BLD AUTO: 1 %
BILIRUB SERPL-MCNC: 0.3 MG/DL (ref 0–1.2)
BUN SERPL-MCNC: 11 MG/DL (ref 6–24)
BUN/CREAT SERPL: 13 (ref 9–20)
CALCIUM SERPL-MCNC: 9.4 MG/DL (ref 8.7–10.2)
CHLORIDE SERPL-SCNC: 94 MMOL/L (ref 96–106)
CHOLEST SERPL-MCNC: 218 MG/DL (ref 100–199)
CO2 SERPL-SCNC: 27 MMOL/L (ref 20–29)
CREAT SERPL-MCNC: 0.87 MG/DL (ref 0.76–1.27)
CREAT UR-MCNC: 117.2 MG/DL
EOSINOPHIL # BLD AUTO: 0.1 X10E3/UL (ref 0–0.4)
EOSINOPHIL NFR BLD AUTO: 2 %
ERYTHROCYTE [DISTWIDTH] IN BLOOD BY AUTOMATED COUNT: 15.2 % (ref 12.3–15.4)
EST. AVERAGE GLUCOSE BLD GHB EST-MCNC: 223 MG/DL
GLOBULIN SER CALC-MCNC: 3 G/DL (ref 1.5–4.5)
GLUCOSE SERPL-MCNC: 193 MG/DL (ref 65–99)
HBA1C MFR BLD: 9.4 % (ref 4.8–5.6)
HCT VFR BLD AUTO: 36.1 % (ref 37.5–51)
HDLC SERPL-MCNC: 42 MG/DL
HGB BLD-MCNC: 12.4 G/DL (ref 13–17.7)
IMM GRANULOCYTES # BLD AUTO: 0 X10E3/UL (ref 0–0.1)
IMM GRANULOCYTES NFR BLD AUTO: 0 %
LDLC SERPL CALC-MCNC: 133 MG/DL (ref 0–99)
LYMPHOCYTES # BLD AUTO: 1.5 X10E3/UL (ref 0.7–3.1)
LYMPHOCYTES NFR BLD AUTO: 23 %
MCH RBC QN AUTO: 28.4 PG (ref 26.6–33)
MCHC RBC AUTO-ENTMCNC: 34.3 G/DL (ref 31.5–35.7)
MCV RBC AUTO: 83 FL (ref 79–97)
MICROALBUMIN UR-MCNC: 1496.3 UG/ML
MONOCYTES # BLD AUTO: 0.4 X10E3/UL (ref 0.1–0.9)
MONOCYTES NFR BLD AUTO: 6 %
NEUTROPHILS # BLD AUTO: 4.6 X10E3/UL (ref 1.4–7)
NEUTROPHILS NFR BLD AUTO: 68 %
PLATELET # BLD AUTO: 268 X10E3/UL (ref 150–450)
POTASSIUM SERPL-SCNC: 4.9 MMOL/L (ref 3.5–5.2)
PROT SERPL-MCNC: 7.3 G/DL (ref 6–8.5)
PSA SERPL-MCNC: 0.3 NG/ML (ref 0–4)
RBC # BLD AUTO: 4.36 X10E6/UL (ref 4.14–5.8)
SODIUM SERPL-SCNC: 136 MMOL/L (ref 134–144)
TRIGL SERPL-MCNC: 213 MG/DL (ref 0–149)
TSH SERPL DL<=0.005 MIU/L-ACNC: 1.88 UIU/ML (ref 0.45–4.5)
VLDLC SERPL CALC-MCNC: 43 MG/DL (ref 5–40)
WBC # BLD AUTO: 6.7 X10E3/UL (ref 3.4–10.8)

## 2019-06-26 ENCOUNTER — TELEPHONE (OUTPATIENT)
Dept: INTERNAL MEDICINE CLINIC | Age: 59
End: 2019-06-26

## 2019-07-08 ENCOUNTER — OFFICE VISIT (OUTPATIENT)
Dept: INTERNAL MEDICINE CLINIC | Age: 59
End: 2019-07-08

## 2019-07-08 VITALS
SYSTOLIC BLOOD PRESSURE: 140 MMHG | TEMPERATURE: 99.3 F | BODY MASS INDEX: 30 KG/M2 | HEIGHT: 78 IN | OXYGEN SATURATION: 98 % | DIASTOLIC BLOOD PRESSURE: 82 MMHG | HEART RATE: 62 BPM | RESPIRATION RATE: 20 BRPM

## 2019-07-08 DIAGNOSIS — L30.9 ECZEMA, UNSPECIFIED TYPE: ICD-10-CM

## 2019-07-08 DIAGNOSIS — Z79.4 TYPE 2 DIABETES MELLITUS WITH DIABETIC NEUROPATHY, WITH LONG-TERM CURRENT USE OF INSULIN (HCC): Primary | ICD-10-CM

## 2019-07-08 DIAGNOSIS — E11.40 TYPE 2 DIABETES MELLITUS WITH DIABETIC NEUROPATHY, WITH LONG-TERM CURRENT USE OF INSULIN (HCC): Primary | ICD-10-CM

## 2019-07-08 DIAGNOSIS — F41.9 ANXIETY: ICD-10-CM

## 2019-07-08 DIAGNOSIS — J30.9 ALLERGIC RHINITIS, UNSPECIFIED SEASONALITY, UNSPECIFIED TRIGGER: ICD-10-CM

## 2019-07-08 DIAGNOSIS — I10 ESSENTIAL HYPERTENSION: ICD-10-CM

## 2019-07-08 RX ORDER — CLOBETASOL PROPIONATE 0.5 MG/G
CREAM TOPICAL 2 TIMES DAILY
Qty: 30 G | Refills: 0 | Status: SHIPPED | OUTPATIENT
Start: 2019-07-08 | End: 2022-05-03

## 2019-07-08 RX ORDER — FLUTICASONE PROPIONATE 50 MCG
2 SPRAY, SUSPENSION (ML) NASAL DAILY
Qty: 1 BOTTLE | Refills: 3 | Status: SHIPPED | OUTPATIENT
Start: 2019-07-08 | End: 2020-11-12 | Stop reason: SDUPTHER

## 2019-07-08 NOTE — PROGRESS NOTES
Follow Up Visit    Darcie Rothman is a 64 y.o. male. he presents for GI Problem and Mass  . 1 month ago had diarrhea and then had no stool for a week. He then had a large volume stool. He later had an episode of uncontrolled stool. He has since had normal stools. He drinks 3-5 bottles of water a day. He also has dry mouth. He has Type 2 diabetes. Most recent A1c is 9.6. He has not seen endocrinology in over a year. He reports eating well. His carb intake is less per the patient. He has been trying to cut down on carbs. He remains on Basalglar and Metformin. He is not checking his sugars at home. He has no testing supplies at this time. He is decreasing his Xanax. He reports doing his taper with psychiatry. Doing well. Reviewed labs, elevated cholesterol (LDL), triglycerides    PSA was normal.          Patient Active Problem List   Diagnosis Code    Acute pancreatitis K85.90    Anxiety F41.9    Substance induced mood disorder ([facility] F19.94    Benzodiazepine dependence, continuous ([facility]) F13.20    Polysubstance dependence ([facility] F19.20    Type 2 diabetes mellitus with hyperglycemia, with long-term current use of insulin ([facility]) E11.65, Z79.4    Type 2 diabetes with nephropathy ([facility]) E11.21    Essential hypertension I10    Dysuria R30.0    Lumbar spinal stenosis M48.061    Type 2 diabetes mellitus with diabetic neuropathy ([facility] E11.40         Prior to Admission medications    Medication Sig Start Date End Date Taking? Authorizing Provider   lisinopril (PRINIVIL, ZESTRIL) 40 mg tablet TAKE ONE TABLET BY MOUTH DAILY 5/29/19  Yes Kirby Swann MD   amLODIPine (NORVASC) 10 mg tablet TAKE ONE TABLET BY MOUTH DAILY 5/29/19  Yes Kirby Swann MD   fluticasone propionate (FLONASE) 50 mcg/actuation nasal spray 2 Sprays by Both Nostrils route daily.  4/30/19  Yes Lynsey Lopez MD   metFORMIN (GLUCOPHAGE) 500 mg tablet TAKE TWO TABLETS BY MOUTH TWICE A DAY WITH MEALS 4/15/19  Yes Nicolas Phan MD   insulin glargine (BASAGLAR KWIKPEN U-100 INSULIN) 100 unit/mL (3 mL) inpn INJECT 28 UNITS DAILY: NO REFILLS UNTIL UNTIL SEEN BY PHYSICIAN  Indications: type 2 diabetes mellitus 4/8/19  Yes Nicolas Phan MD   propranolol (INDERAL) 10 mg tablet TAKE ONE TABLET BY MOUTH THREE TIMES A DAY 3/29/19  Yes Nicolas Phan MD   gabapentin (NEURONTIN) 300 mg capsule TAKE ONE CAPSULE BY MOUTH TWICE A DAY 3/5/19  Yes Nicolas Phan MD   docusate sodium (COLACE) 100 mg capsule Take 100 mg by mouth two (2) times daily as needed for Constipation. Yes Provider, Historical   ALPRAZolam (XANAX) 2 mg tablet 2 mg three (3) times daily. 7/30/18  Yes Provider, Historical   METHADONE HCL (METHADONE PO) Take 95 mg by mouth daily. Yes Provider, Historical   canagliflozin (INVOKANA) 300 mg tablet Take 1 Tab by mouth Daily (before breakfast). 8/29/17   Fifi Ferreira MD         Health Maintenance   Topic Date Due    Pneumococcal 0-64 years (1 of 1 - PPSV23) 01/19/1968    FOOT EXAM Q1  01/19/1972    DTaP/Tdap/Td series (1 - Tdap) 01/19/1983    Shingrix Vaccine Age 50> (1 of 2) 01/19/2012    FOBT Q 1 YEAR AGE 50-75  07/12/2018    EYE EXAM RETINAL OR DILATED  04/20/2019    Influenza Age 9 to Adult  08/01/2019    HEMOGLOBIN A1C Q6M  12/13/2019    MICROALBUMIN Q1  06/13/2020    LIPID PANEL Q1  06/13/2020    Hepatitis C Screening  Completed       Review of Systems   Constitutional: Negative. Respiratory: Negative. Cardiovascular: Negative. Gastrointestinal: Negative. Visit Vitals  /82 (BP 1 Location: Left arm, BP Patient Position: Sitting)   Pulse 62   Temp 99.3 °F (37.4 °C) (Oral)   Resp 20   Ht 6' 6\" (1.981 m)   SpO2 98%   BMI 30.00 kg/m²       Physical Exam   Constitutional: He appears well-developed and well-nourished. Cardiovascular: Normal rate and regular rhythm.    Pulmonary/Chest: Effort normal and breath sounds normal.         ASSESSMENT/PLAN    Diagnoses and all orders for this visit:    1. Type 2 diabetes mellitus with diabetic neuropathy, with long-term current use of insulin (HCC)  -     REFERRAL TO ENDOCRINOLOGY    2. Essential hypertension    3. Anxiety    4. Allergic rhinitis, unspecified seasonality, unspecified trigger  -     fluticasone propionate (FLONASE) 50 mcg/actuation nasal spray; 2 Sprays by Both Nostrils route daily. Follow-up and Dispositions    · Return in about 3 months (around 10/8/2019) for Follow up.

## 2019-07-09 ENCOUNTER — TELEPHONE (OUTPATIENT)
Dept: INTERNAL MEDICINE CLINIC | Age: 59
End: 2019-07-09

## 2019-07-09 DIAGNOSIS — E11.65 TYPE 2 DIABETES MELLITUS WITH HYPERGLYCEMIA, WITH LONG-TERM CURRENT USE OF INSULIN (HCC): Primary | ICD-10-CM

## 2019-07-09 DIAGNOSIS — Z79.4 TYPE 2 DIABETES MELLITUS WITH HYPERGLYCEMIA, WITH LONG-TERM CURRENT USE OF INSULIN (HCC): Primary | ICD-10-CM

## 2019-07-09 RX ORDER — PEN NEEDLE, DIABETIC 31 GX3/16"
NEEDLE, DISPOSABLE MISCELLANEOUS
Qty: 100 PEN NEEDLE | Refills: 3 | Status: SHIPPED | OUTPATIENT
Start: 2019-07-09 | End: 2020-07-17

## 2019-07-09 NOTE — TELEPHONE ENCOUNTER
Patient states Dr. Ele Mcknight must have forgotten to order his pen needles yesterday when he saw him. Please re-order -- I did not see them on his med list.  Pt only has 4 needles left.     Nabiloger Pen Pella 32G #100 -- 4mm in length    [facility], [facility]

## 2019-07-17 ENCOUNTER — HOSPITAL ENCOUNTER (EMERGENCY)
Age: 59
Discharge: HOME | End: 2019-07-17
Attending: STUDENT IN AN ORGANIZED HEALTH CARE EDUCATION/TRAINING PROGRAM
Payer: MEDICAID

## 2019-07-17 ENCOUNTER — APPOINTMENT (OUTPATIENT)
Dept: CT IMAGING | Age: 59
End: 2019-07-17
Attending: EMERGENCY MEDICINE
Payer: MEDICAID

## 2019-07-17 ENCOUNTER — APPOINTMENT (OUTPATIENT)
Dept: GENERAL RADIOLOGY | Age: 59
End: 2019-07-17
Attending: EMERGENCY MEDICINE
Payer: MEDICAID

## 2019-07-17 VITALS
DIASTOLIC BLOOD PRESSURE: 82 MMHG | SYSTOLIC BLOOD PRESSURE: 126 MMHG | RESPIRATION RATE: 16 BRPM | HEART RATE: 63 BPM | TEMPERATURE: 98.3 F | OXYGEN SATURATION: 94 %

## 2019-07-17 DIAGNOSIS — S16.1XXA STRAIN OF NECK MUSCLE, INITIAL ENCOUNTER: ICD-10-CM
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
## 2019-07-17 DIAGNOSIS — S00.83XA FOREHEAD CONTUSION, INITIAL ENCOUNTER: ICD-10-CM

## 2019-07-17 PROCEDURE — 96372 THER/PROPH/DIAG INJ SC/IM: CPT

## 2019-07-17 PROCEDURE — 72125 CT NECK SPINE W/O DYE: CPT

## 2019-07-17 PROCEDURE — 73030 X-RAY EXAM OF SHOULDER: CPT

## 2019-07-17 PROCEDURE — 99282 EMERGENCY DEPT VISIT SF MDM: CPT

## 2019-07-17 PROCEDURE — 74011250636 HC RX REV CODE- 250/636: Performed by: EMERGENCY MEDICINE

## 2019-07-17 PROCEDURE — 70450 CT HEAD/BRAIN W/O DYE: CPT

## 2019-07-17 RX ORDER — KETOROLAC TROMETHAMINE 30 MG/ML
60 INJECTION, SOLUTION INTRAMUSCULAR; INTRAVENOUS
Status: COMPLETED | OUTPATIENT
Start: 2019-07-17 | End: 2019-07-17

## 2019-07-17 RX ORDER — KETOROLAC TROMETHAMINE 30 MG/ML
60 INJECTION, SOLUTION INTRAMUSCULAR; INTRAVENOUS
Status: DISCONTINUED | OUTPATIENT
Start: 2019-07-17 | End: 2019-07-17

## 2019-07-17 RX ADMIN — KETOROLAC TROMETHAMINE 60 MG: 30 INJECTION, SOLUTION INTRAMUSCULAR at 12:06

## 2019-07-17 NOTE — ED TRIAGE NOTES
Triage Note: Patient is coming in with neck pain and right shoulder pain secondary to fall on Monday. Patient is also having back pain and has had recent back surgery.

## 2019-07-17 NOTE — ED PROVIDER NOTES
ALPHONSO Amaya is a 64 y.o. male with past medical history notable for COPD, diabetes, hypertension, chronic back pain, panic attacks presenting with intermittent headache and neck pain from a fall on Monday 7/15/19. He states that he fell asleep while sitting on the toilet and fell forward hitting his head on tile floor. He drove himself here today. He also reports right shoulder pain. Skin integrity is intact. There is no obvious bony deformity, bruising or erythema. Good neurovascular sensation. No apparent tendon or nerve injury. He has taken methadone prior to arrival with some relief.    Old charts reviewed    Past Medical History:   Diagnosis Date    Alcohol abuse, in remission     Chronic kidney disease     PLAGUE L RENAL ARTERY    Chronic obstructive pulmonary disease (HCC)     Chronic pain     Constipation     Diabetes (HCC)     Type II    GERD (gastroesophageal reflux disease)     Hepatitis C     Hypertension     Liver disease     HEP C TOOK HARVONI    Pancreatitis     Psychiatric disorder     PANIC ATTACKS    Smoker     Spondylitis (HCC)        Past Surgical History:   Procedure Laterality Date    HX APPENDECTOMY      HX ORTHOPAEDIC      2 back surgeries    HX ORTHOPAEDIC      KNEE SURGERY    HX TONSILLECTOMY           Family History:   Problem Relation Age of Onset    Hypertension Mother     COPD Mother     Other Father         PALSY       Social History     Socioeconomic History    Marital status:      Spouse name: Not on file    Number of children: Not on file    Years of education: Not on file    Highest education level: Not on file   Occupational History    Not on file   Social Needs    Financial resource strain: Not on file    Food insecurity:     Worry: Not on file     Inability: Not on file    Transportation needs:     Medical: Not on file     Non-medical: Not on file   Tobacco Use    Smoking status: Current Every Day Smoker     Packs/day: 1.00    Smokeless tobacco: Never Used   Substance and Sexual Activity    Alcohol use: No    Drug use: Yes     Types: Marijuana, Cocaine, Heroin     Comment: LAST USED 15YRS. AGO- TAKES METHADONE    Sexual activity: Never   Lifestyle    Physical activity:     Days per week: Not on file     Minutes per session: Not on file    Stress: Not on file   Relationships    Social connections:     Talks on phone: Not on file     Gets together: Not on file     Attends Bahai service: Not on file     Active member of club or organization: Not on file     Attends meetings of clubs or organizations: Not on file     Relationship status: Not on file    Intimate partner violence:     Fear of current or ex partner: Not on file     Emotionally abused: Not on file     Physically abused: Not on file     Forced sexual activity: Not on file   Other Topics Concern    Not on file   Social History Narrative    Not on file         ALLERGIES: Senna    Review of Systems   Constitutional: Negative for activity change, appetite change, fever and unexpected weight change. HENT: Negative for congestion, dental problem and trouble swallowing. Eyes: Negative for visual disturbance. Respiratory: Negative for cough and shortness of breath. Cardiovascular: Negative for chest pain, palpitations and leg swelling. Gastrointestinal: Negative for abdominal pain. Genitourinary: Negative for dysuria. Musculoskeletal: Positive for arthralgias and neck pain. Negative for gait problem. Skin: Negative for rash. Neurological: Positive for headaches. All other systems reviewed and are negative. Vitals:    07/17/19 1103   Pulse: 96   SpO2: 99%            Physical Exam   Constitutional: He is oriented to person, place, and time.    Chronically ill appearing white male; former smoker; on a pain management program   HENT:   Right Ear: External ear normal.   Left Ear: External ear normal.   Mouth/Throat: Oropharynx is clear and moist.   Neck: Normal range of motion. Neck supple. Cardiovascular: Normal rate and regular rhythm. Pulmonary/Chest: Effort normal and breath sounds normal.   Abdominal: Soft. Bowel sounds are normal.   Musculoskeletal: Normal range of motion. Lymphadenopathy:     He has no cervical adenopathy. Neurological: He is alert and oriented to person, place, and time. Skin: Skin is warm and dry. Psychiatric: He has a normal mood and affect. Nursing note and vitals reviewed. MDM       Procedures      Pt has been re-examined and is feeling slightly better. 2:19 PM  Patient's results and plan of care have been reviewed with him. Patient has verbally conveyed his understanding and agreement of his signs, symptoms, diagnosis, treatment and prognosis and additionally agrees to follow up as recommended or return to the Emergency Room should his condition change prior to follow-up. Discharge instructions have also been provided to the patient with some educational information regarding his diagnosis as well a list of reasons why he would want to return to the ER prior to his follow-up appointment should his condition change. Maxine Humphrey NP

## 2019-07-17 NOTE — ED NOTES
Patient given discharge instructions and verbalized understanding.  The patient ambulated out of ER with steady gait

## 2019-07-17 NOTE — DISCHARGE INSTRUCTIONS
Patient Education        Neck Strain: Care Instructions  Your Care Instructions    You have strained the muscles and ligaments in your neck. A sudden, awkward movement can strain the neck. This often occurs with falls or car accidents or during certain sports. Everyday activities like working on a computer or sleeping can also cause neck strain if they force you to hold your neck in an awkward position for a long time. It is common for neck pain to get worse for a day or two after an injury, but it should start to feel better after that. You may have more pain and stiffness for several days before it gets better. This is expected. It may take a few weeks or longer for it to heal completely. Good home treatment can help you get better faster and avoid future neck problems. Follow-up care is a key part of your treatment and safety. Be sure to make and go to all appointments, and call your doctor if you are having problems. It's also a good idea to know your test results and keep a list of the medicines you take. How can you care for yourself at home? · If you were given a neck brace (cervical collar) to limit neck motion, wear it as instructed for as many days as your doctor tells you to. Do not wear it longer than you were told to. Wearing a brace for too long can make neck stiffness worse and weaken the neck muscles. · You can try using heat or ice to see if it helps. ? Try using a heating pad on a low or medium setting for 15 to 20 minutes every 2 to 3 hours. Try a warm shower in place of one session with the heating pad. You can also buy single-use heat wraps that last up to 8 hours. ? You can also try an ice pack for 10 to 15 minutes every 2 to 3 hours. · Take pain medicines exactly as directed. ? If the doctor gave you a prescription medicine for pain, take it as prescribed. ? If you are not taking a prescription pain medicine, ask your doctor if you can take an over-the-counter medicine.   · Gently rub the area to relieve pain and help with blood flow. Do not massage the area if it hurts to do so. · Do not do anything that makes the pain worse. Take it easy for a couple of days. You can do your usual activities if they do not hurt your neck or put it at risk for more stress or injury. · Try sleeping on a special neck pillow. Place it under your neck, not under your head. Placing a tightly rolled-up towel under your neck while you sleep will also work. If you use a neck pillow or rolled towel, do not use your regular pillow at the same time. · To prevent future neck pain, do exercises to stretch and strengthen your neck and back. Learn how to use good posture, safe lifting techniques, and proper body mechanics. When should you call for help? Call 911 anytime you think you may need emergency care. For example, call if:    · You are unable to move an arm or a leg at all.   [facility] your doctor now or seek immediate medical care if:    · You have new or worse symptoms in your arms, legs, chest, belly, or buttocks. Symptoms may include:  ? Numbness or tingling. ? Weakness. ? Pain.     · You lose bladder or bowel control.    Watch closely for changes in your health, and be sure to contact your doctor if:    · You are not getting better as expected. Where can you learn more? Go to http://ciara-abbe.info/. Enter M253 in the search box to learn more about \"Neck Strain: Care Instructions. \"  Current as of: September 20, 2018  Content Version: 11.9  © 0068-4009 [employer]. Care instructions adapted under license by [employer] (which disclaims liability or warranty for this information). If you have questions about a medical condition or this instruction, always ask your healthcare professional. [place] [address] any warranty or liability for your use of this information.          Patient Education        Preventing Falls: Care Instructions  Your Care Instructions    Getting around your home safely can be a challenge if you have injuries or health problems that make it easy for you to fall. Loose rugs and furniture in walkways are among the dangers for many older people who have problems walking or who have poor eyesight. People who have conditions such as arthritis, osteoporosis, or dementia also have to be careful not to fall. You can make your home safer with a few simple measures. Follow-up care is a key part of your treatment and safety. Be sure to make and go to all appointments, and call your doctor if you are having problems. It's also a good idea to know your test results and keep a list of the medicines you take. How can you care for yourself at home? Taking care of yourself  · You may get dizzy if you do not drink enough water. To prevent dehydration, drink plenty of fluids, enough so that your urine is light yellow or clear like water. Choose water and other caffeine-free clear liquids. If you have kidney, heart, or liver disease and have to limit fluids, talk with your doctor before you increase the amount of fluids you drink. · Exercise regularly to improve your strength, muscle tone, and balance. Walk if you can. Swimming may be a good choice if you cannot walk easily. · Have your vision and hearing checked each year or any time you notice a change. If you have trouble seeing and hearing, you might not be able to avoid objects and could lose your balance. · Know the side effects of the medicines you take. Ask your doctor or pharmacist whether the medicines you take can affect your balance. Sleeping pills or sedatives can affect your balance. · Limit the amount of alcohol you drink. Alcohol can impair your balance and other senses. · Ask your doctor whether calluses or corns on your feet need to be removed. If you wear loose-fitting shoes because of calluses or corns, you can lose your balance and fall.   · Talk to your doctor if you have numbness in your feet. Preventing falls at home  · Remove raised doorway thresholds, throw rugs, and clutter. Repair loose carpet or raised areas in the floor. · Move furniture and electrical cords to keep them out of walking paths. · Use nonskid floor wax, and wipe up spills right away, especially on ceramic tile floors. · If you use a walker or cane, put rubber tips on it. If you use crutches, clean the bottoms of them regularly with an abrasive pad, such as steel wool. · Keep your house well lit, especially [place], and outside walkways. Use night-lights in areas such as hallways and bathrooms. Add extra light switches or use remote switches (such as switches that go on or off when you clap your hands) to make it easier to turn lights on if you have to get up during the night. · Install sturdy handrails on stairways. · Move items in your cabinets so that the things you use a lot are on the lower shelves (about waist level). · Keep a cordless phone and a flashlight with new batteries by your bed. If possible, put a phone in each of the main rooms of your house, or carry a cell phone in case you fall and cannot reach a phone. Or, you can wear a device around your neck or wrist. You push a button that sends a signal for help. · Wear low-heeled shoes that fit well and give your feet good support. Use footwear with nonskid soles. Check the heels and soles of your shoes for wear. Repair or replace worn heels or soles. · Do not wear socks without shoes on wood floors. · Walk on the grass when the sidewalks are slippery. If you live in an area that gets snow and ice in the winter, sprinkle salt on slippery steps and sidewalks. Preventing falls in the bath  · Install grab bars and nonskid mats inside and outside your shower or tub and near the toilet and sinks. · Use shower chairs and bath benches. · Use a hand-held shower head that will allow you to sit while showering.   · Get into a tub or shower by putting the weaker leg in first. Get out of a tub or shower with your strong side first.  · Repair loose toilet seats and consider installing a raised toilet seat to make getting on and off the toilet easier. · Keep your bathroom door unlocked while you are in the shower. Where can you learn more? Go to http://ciara-abbe.info/. Enter 0476 [id number] 69 [id number] in the search box to learn more about \"Preventing Falls: Care Instructions. \"  Current as of: March 15, 2018  Content Version: 11.9  © 0926-3123 [employer]. Care instructions adapted under license by [employer] (which disclaims liability or warranty for this information). If you have questions about a medical condition or this instruction, always ask your healthcare professional. [address] any warranty or liability for your use of this information. Patient Education        Head Injury: Care Instructions  Your Care Instructions    Most injuries to the head are minor. Bumps, cuts, and scrapes on the head and face usually heal well and can be treated the same as injuries to other parts of the body. Although it's rare, once in a while a more serious problem shows up after you are home. So it's good to be on the lookout for symptoms for a day or two. Follow-up care is a key part of your treatment and safety. Be sure to make and go to all appointments, and call your doctor if you are having problems. It's also a good idea to know your test results and keep a list of the medicines you take. How can you care for yourself at home? · Follow your doctor's instructions. He or she will tell you if you need someone to watch you closely for the next 24 hours or longer. · Take it easy for the next few days or more if you are not feeling well. · Ask your doctor when it's okay for you to go back to activities like driving a car, riding a bike, or operating machinery.   When should you call for help?  Call 911 anytime you think you may need emergency care. For example, call if:    · You have a seizure.     · You passed out (lost consciousness).     · You are confused or can't stay awake.    Call your doctor now or seek immediate medical care if:    · You have new or worse vomiting.     · You feel less alert.     · You have new weakness or numbness in any part of your body.    Watch closely for changes in your health, and be sure to contact your doctor if:    · You do not get better as expected.     · You have new symptoms, such as headaches, trouble concentrating, or changes in mood. Where can you learn more? Go to http://ciara-abbe.info/. Enter I978 in the search box to learn more about \"Head Injury: Care Instructions. \"  Current as of: Melissa 3, 2018  Content Version: 11.9  © 9634-1536 [employer]. Care instructions adapted under license by [employer] (which disclaims liability or warranty for this information). If you have questions about a medical condition or this instruction, always ask your healthcare professional. [address] any warranty or liability for your use of this information.

## 2019-09-06 DIAGNOSIS — E11.21 TYPE 2 DIABETES WITH NEPHROPATHY (HCC): ICD-10-CM

## 2019-09-07 DIAGNOSIS — G62.9 NEUROPATHY: ICD-10-CM

## 2019-09-09 RX ORDER — INSULIN GLARGINE 100 [IU]/ML
INJECTION, SOLUTION SUBCUTANEOUS
Refills: 0 | OUTPATIENT
Start: 2019-09-09

## 2019-09-12 RX ORDER — GABAPENTIN 300 MG/1
CAPSULE ORAL
Qty: 60 CAP | Refills: 1 | OUTPATIENT
Start: 2019-09-12 | End: 2019-10-14

## 2019-09-13 NOTE — TELEPHONE ENCOUNTER
Received VRBO from Dr Maverick Mendes to phone in Gabapentin 300 mg,  60 cap/1 refill. LM at local pharmacy.

## 2019-09-16 ENCOUNTER — TELEPHONE (OUTPATIENT)
Dept: INTERNAL MEDICINE CLINIC | Age: 59
End: 2019-09-16

## 2019-09-16 DIAGNOSIS — E11.21 TYPE 2 DIABETES WITH NEPHROPATHY (HCC): ICD-10-CM

## 2019-09-16 RX ORDER — INSULIN GLARGINE 100 [IU]/ML
32 INJECTION, SOLUTION SUBCUTANEOUS DAILY
Qty: 15 ML | Refills: 3 | Status: SHIPPED | OUTPATIENT
Start: 2019-09-16 | End: 2020-03-09

## 2019-09-16 NOTE — TELEPHONE ENCOUNTER
Last couple of months dealing with constipation. Last BM 8 days ago. Patient states abdomen tenderness. no urge to go, stomach bloated. Normal appetite.

## 2019-09-17 ENCOUNTER — TELEPHONE (OUTPATIENT)
Dept: INTERNAL MEDICINE CLINIC | Age: 59
End: 2019-09-17

## 2019-09-17 NOTE — TELEPHONE ENCOUNTER
Informed patient need to be seen by GI for constipation, can schedule with Dr Stefani Pereira for imaging. Patient scheduled 09/18/2019 @ 1145.

## 2019-09-17 NOTE — TELEPHONE ENCOUNTER
Patient is following up on his call from yesterday. It has been 9 days of constipation. Please call him today.

## 2019-09-18 ENCOUNTER — DOCUMENTATION ONLY (OUTPATIENT)
Dept: INTERNAL MEDICINE CLINIC | Age: 59
End: 2019-09-18

## 2019-09-18 ENCOUNTER — HOSPITAL ENCOUNTER (OUTPATIENT)
Dept: GENERAL RADIOLOGY | Age: 59
Discharge: HOME | End: 2019-09-18
Attending: INTERNAL MEDICINE
Payer: MEDICAID

## 2019-09-18 ENCOUNTER — OFFICE VISIT (OUTPATIENT)
Dept: INTERNAL MEDICINE CLINIC | Age: 59
End: 2019-09-18

## 2019-09-18 VITALS
OXYGEN SATURATION: 98 % | RESPIRATION RATE: 19 BRPM | BODY MASS INDEX: 30.04 KG/M2 | DIASTOLIC BLOOD PRESSURE: 80 MMHG | SYSTOLIC BLOOD PRESSURE: 120 MMHG | HEIGHT: 78 IN | TEMPERATURE: 99.1 F | HEART RATE: 74 BPM | WEIGHT: 259.6 LBS

## 2019-09-18 DIAGNOSIS — R10.84 GENERALIZED ABDOMINAL PAIN: Primary | ICD-10-CM

## 2019-09-18 DIAGNOSIS — K59.00 CONSTIPATION, UNSPECIFIED CONSTIPATION TYPE: ICD-10-CM

## 2019-09-18 DIAGNOSIS — K59.00 CONSTIPATION, UNSPECIFIED CONSTIPATION TYPE: Primary | ICD-10-CM

## 2019-09-18 DIAGNOSIS — R10.84 GENERALIZED ABDOMINAL PAIN: ICD-10-CM

## 2019-09-18 PROCEDURE — 74018 RADEX ABDOMEN 1 VIEW: CPT

## 2019-09-18 NOTE — PROGRESS NOTES
Acute Care Note    Ivet Talley is 64 y.o. male. he presents for evaluation of Constipation      Constipation  Patient complains of constipation. Stool pattern has been 1 firm, pellet like stool(s) per week. Onset was 10 days ago Defecation has been difficult. Co-Morbid conditions:endocrine disease. Symptoms have been symptoms have progressed to a point and plateaued. . Current Health Habits: Eating fiber? yes, amt he reports difficult to quantify Exercise?no Water intake? yes, amt 6-8 glasses a day. Current OTC/RX therapy has been Osmotic (Miralax) which has been somewhat effective. Prior to Admission medications    Medication Sig Start Date End Date Taking? Authorizing Provider   insulin glargine (BASAGLAR KWIKPEN U-100 INSULIN) 100 unit/mL (3 mL) inpn 32 Units by SubCUTAneous route daily. 9/16/19  Yes Mark King MD   gabapentin (NEURONTIN) 300 mg capsule TAKE ONE CAPSULE BY MOUTH TWICE A DAY 9/12/19  Yes Mark King MD   propranolol (INDERAL) 10 mg tablet TAKE ONE TABLET BY MOUTH THREE TIMES A DAY 8/13/19  Yes Mark King MD   Insulin Needles, Disposable, (PEN NEEDLE) 32 gauge x 5/32\" ndle Use to inject insulin daily 7/9/19  Yes Mark King MD   fluticasone propionate (FLONASE) 50 mcg/actuation nasal spray 2 Sprays by Both Nostrils route daily. 7/8/19  Yes Mark King MD   lisinopril (PRINIVIL, ZESTRIL) 40 mg tablet TAKE ONE TABLET BY MOUTH DAILY 5/29/19  Yes Mark King MD   amLODIPine (NORVASC) 10 mg tablet TAKE ONE TABLET BY MOUTH DAILY 5/29/19  Yes Mark King MD   metFORMIN (GLUCOPHAGE) 500 mg tablet TAKE TWO TABLETS BY MOUTH TWICE A DAY WITH MEALS 4/15/19  Yes Mark King MD   docusate sodium (COLACE) 100 mg capsule Take 100 mg by mouth two (2) times daily as needed for Constipation. Yes Provider, Historical   ALPRAZolam (XANAX) 2 mg tablet 2 mg three (3) times daily.  7/30/18  Yes Provider, Historical   METHADONE HCL (METHADONE PO) Take 95 mg by mouth daily. Yes Provider, Historical   clobetasol (TEMOVATE) 0.05 % topical cream Apply  to affected area two (2) times a day. 7/8/19   Vivek Clemens MD   canagliflozin [facility]) 300 mg tablet Take 1 Tab by mouth Daily (before breakfast). 8/29/17   Caryle Gentile, MD         Patient Active Problem List   Diagnosis Code    Acute pancreatitis K85.90    Anxiety F41.9    Substance induced mood disorder ([address] F19.94    Benzodiazepine dependence, continuous ([address] F13.20    Polysubstance dependence ([address] F19.20    Type 2 diabetes mellitus with hyperglycemia, with long-term current use of insulin ([facility]) E11.65, Z79.4    Type 2 diabetes with nephropathy ([facility]) E11.21    Essential hypertension I10    Dysuria R30.0    Lumbar spinal stenosis M48.061    Type 2 diabetes mellitus with diabetic neuropathy ([facility]) E11.40         Review of Systems   Constitutional: Negative. Negative for fever and malaise/fatigue. Eyes: Negative. Gastrointestinal: Positive for constipation. Negative for nausea and vomiting. Visit Vitals  /80 (BP 1 Location: Right arm, BP Patient Position: Sitting)   Pulse 74   Temp 99.1 °F (37.3 °C) (Oral)   Resp 19   Ht 6' 6\" (1.981 m)   Wt 259 lb 9.6 oz (117.8 kg)   SpO2 98%   BMI 30.00 kg/m²       Physical Exam   Constitutional: No distress. Neck: No tracheal deviation present. Cardiovascular: Normal rate. Pulmonary/Chest: Effort normal and breath sounds normal.   Abdominal: Soft. Bowel sounds are normal. He exhibits distension. He exhibits no mass. There is tenderness. There is no rebound and no guarding. Neurological: Coordination normal.           ASSESSMENT/PLAN  Diagnoses and all orders for this visit:    1. Generalized abdominal pain  -     XR ABD (KUB); Future    2.  Constipation, unspecified constipation type         Advised the patient to call back or return to office if symptoms worsen/change/persist.   Discussed expected course/resolution/complications of diagnosis in detail with patient. Medication risks/benefits/costs/interactions/alternatives discussed with patient. The patient was given an after visit summary which includes diagnoses, current medications, & vitals. They expressed understanding with the diagnosis and plan.

## 2019-09-19 ENCOUNTER — TELEPHONE (OUTPATIENT)
Dept: INTERNAL MEDICINE CLINIC | Age: 59
End: 2019-09-19

## 2019-09-19 NOTE — TELEPHONE ENCOUNTER
Informed patient per provider result note. Informed gastro appointment tomorrow 09/20/2019 @ 11am with Dre Sterling NP.  Pt verbalized understanding

## 2019-09-20 DIAGNOSIS — K59.09 OTHER CONSTIPATION: ICD-10-CM

## 2019-09-20 RX ORDER — POLYETHYLENE GLYCOL 3350 17 G/17G
17 POWDER, FOR SOLUTION ORAL
Qty: 507 G | Refills: 0 | Status: SHIPPED | OUTPATIENT
Start: 2019-09-20 | End: 2019-10-13 | Stop reason: SDUPTHER

## 2019-10-13 DIAGNOSIS — K59.09 OTHER CONSTIPATION: ICD-10-CM

## 2019-10-14 ENCOUNTER — APPOINTMENT (OUTPATIENT)
Dept: CT IMAGING | Age: 59
End: 2019-10-14
Attending: EMERGENCY MEDICINE
Payer: MEDICAID

## 2019-10-14 ENCOUNTER — APPOINTMENT (OUTPATIENT)
Dept: GENERAL RADIOLOGY | Age: 59
End: 2019-10-14
Attending: EMERGENCY MEDICINE
Payer: MEDICAID

## 2019-10-14 ENCOUNTER — HOSPITAL ENCOUNTER (EMERGENCY)
Age: 59
Discharge: HOME | End: 2019-10-14
Attending: EMERGENCY MEDICINE | Admitting: EMERGENCY MEDICINE
Payer: MEDICAID

## 2019-10-14 VITALS
RESPIRATION RATE: 16 BRPM | SYSTOLIC BLOOD PRESSURE: 167 MMHG | HEART RATE: 85 BPM | DIASTOLIC BLOOD PRESSURE: 78 MMHG | TEMPERATURE: 98.1 F | OXYGEN SATURATION: 99 %
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
## 2019-10-14 DIAGNOSIS — S32.009A LUMBAR TRANSVERSE PROCESS FRACTURE, CLOSED, INITIAL ENCOUNTER (HCC): ICD-10-CM

## 2019-10-14 LAB
APPEARANCE UR: CLEAR
BACTERIA URNS QL MICRO: NEGATIVE /HPF
BILIRUB UR QL CFM: NEGATIVE
COLOR UR: ABNORMAL
EPITH CASTS URNS QL MICRO: ABNORMAL /LPF
GLUCOSE UR STRIP.AUTO-MCNC: >1000 MG/DL
HGB UR QL STRIP: NEGATIVE
HYALINE CASTS URNS QL MICRO: ABNORMAL /LPF (ref 0–5)
KETONES UR QL STRIP.AUTO: ABNORMAL MG/DL
LEUKOCYTE ESTERASE UR QL STRIP.AUTO: NEGATIVE
NITRITE UR QL STRIP.AUTO: NEGATIVE
PH UR STRIP: 5.5 [PH] (ref 5–8)
PROT UR STRIP-MCNC: 300 MG/DL
RBC #/AREA URNS HPF: ABNORMAL /HPF (ref 0–5)
SP GR UR REFRACTOMETRY: 1.02 (ref 1–1.03)
UR CULT HOLD, URHOLD: NORMAL
UROBILINOGEN UR QL STRIP.AUTO: 0.2 EU/DL (ref 0.2–1)
WBC URNS QL MICRO: ABNORMAL /HPF (ref 0–4)

## 2019-10-14 PROCEDURE — 81001 URINALYSIS AUTO W/SCOPE: CPT

## 2019-10-14 PROCEDURE — 96374 THER/PROPH/DIAG INJ IV PUSH: CPT

## 2019-10-14 PROCEDURE — 74019 RADEX ABDOMEN 2 VIEWS: CPT

## 2019-10-14 PROCEDURE — 72131 CT LUMBAR SPINE W/O DYE: CPT

## 2019-10-14 PROCEDURE — 99283 EMERGENCY DEPT VISIT LOW MDM: CPT

## 2019-10-14 PROCEDURE — 74011250636 HC RX REV CODE- 250/636: Performed by: EMERGENCY MEDICINE

## 2019-10-14 RX ORDER — POLYETHYLENE GLYCOL 3350 17 G/17G
POWDER, FOR SOLUTION ORAL
Qty: 510 G | Refills: 0 | Status: SHIPPED | OUTPATIENT
Start: 2019-10-14 | End: 2022-05-03

## 2019-10-14 RX ORDER — KETOROLAC TROMETHAMINE 30 MG/ML
60 INJECTION, SOLUTION INTRAMUSCULAR; INTRAVENOUS
Status: COMPLETED | OUTPATIENT
Start: 2019-10-14 | End: 2019-10-14

## 2019-10-14 RX ORDER — GABAPENTIN 300 MG/1
300 CAPSULE ORAL 3 TIMES DAILY
Qty: 20 CAP | Refills: 0 | Status: SHIPPED | OUTPATIENT
Start: 2019-10-14 | End: 2019-10-16 | Stop reason: SDUPTHER

## 2019-10-14 RX ADMIN — KETOROLAC TROMETHAMINE 60 MG: 30 INJECTION, SOLUTION INTRAMUSCULAR at 12:30

## 2019-10-14 NOTE — ED PROVIDER NOTES
HPI   Patient is a 61-year-old white male with chief complaint of a fall earlier today outside his home while walking down some slippery steps. He landed on his lower back and buttock area. He denies having his head. He drove himself here. He has a history of chronic back pain and has had multiple lumbar area surgeries. Past medical history notable for COPD, diabetes, hypertension, chronic back pain, panic attacks, GERD, pancreatitis and liver disease. Denies any blunt trauma or injury. Denies fever, abdominal pain or urinary symptoms. Pain increases with bending, lifting and position changes.  Gait is slow but steady; reflexes are normal.  Pt had his daily methadone prior to arrival prior to arrival.  Past Medical History:   Diagnosis Date    Alcohol abuse, in remission     Chronic kidney disease     PLAGUE L RENAL ARTERY    Chronic obstructive pulmonary disease (HCC)     Chronic pain     Constipation     Diabetes (HCC)     Type II    GERD (gastroesophageal reflux disease)     Hepatitis C     Hypertension     Liver disease     HEP C TOOK HARVONI    Pancreatitis     Psychiatric disorder     PANIC ATTACKS    Smoker     Spondylitis (HCC)        Past Surgical History:   Procedure Laterality Date    HX APPENDECTOMY      HX ORTHOPAEDIC      2 back surgeries    HX ORTHOPAEDIC      KNEE SURGERY    HX TONSILLECTOMY           Family History:   Problem Relation Age of Onset    Hypertension Mother     COPD Mother     Other Father         PALSY       Social History     Socioeconomic History    Marital status:      Spouse name: Not on file    Number of children: Not on file    Years of education: Not on file    Highest education level: Not on file   Occupational History    Not on file   Social Needs    Financial resource strain: Not on file    Food insecurity:     Worry: Not on file     Inability: Not on file    Transportation needs:     Medical: Not on file     Non-medical: Not on file Tobacco Use    Smoking status: Current Every Day Smoker     Packs/day: 1.00    Smokeless tobacco: Never Used   Substance and Sexual Activity    Alcohol use: No    Drug use: Yes     Types: Marijuana, Cocaine, Heroin     Comment: LAST USED 15YRS. AGO- TAKES METHADONE    Sexual activity: Never   Lifestyle    Physical activity:     Days per week: Not on file     Minutes per session: Not on file    Stress: Not on file   Relationships    Social connections:     Talks on phone: Not on file     Gets together: Not on file     Attends [employer] service: Not on file     Active member of club or organization: Not on file     Attends meetings of clubs or organizations: Not on file     Relationship status: Not on file    Intimate partner violence:     Fear of current or ex partner: Not on file     Emotionally abused: Not on file     Physically abused: Not on file     Forced sexual activity: Not on file   Other Topics Concern    Not on file   Social History Narrative    Not on file         ALLERGIES: Senna    Review of Systems   Constitutional: Negative for activity change, appetite change, fever and unexpected weight change. HENT: Negative for congestion and trouble swallowing. Respiratory: Negative for cough and shortness of breath. Cardiovascular: Negative for chest pain, palpitations and leg swelling. Gastrointestinal: Negative for abdominal pain, diarrhea, nausea and vomiting. Genitourinary: Negative for dysuria. Musculoskeletal: Positive for back pain and myalgias. Negative for gait problem. All other systems reviewed and are negative. Vitals:    10/14/19 1054 10/14/19 1204   BP:  167/78   Pulse: 89 85   Resp:  16   Temp:  98.1 °F (36.7 °C)   SpO2: 98% 99%            Physical Exam   Constitutional:   White male; smoker   Pulmonary/Chest: Effort normal and breath sounds normal.   Abdominal: Soft. Bowel sounds are normal.   Musculoskeletal: He exhibits tenderness. He exhibits no deformity. Reports low back pain; There is no obvious deformity; there is localized bruising and a skin abrasion. Good neurovascular sensation. No apparent tendon or nerve injury. Denies any saddle anesthesia or changes in bowel or bladder habits. Gait is steady; reflexes normal     Nursing note and vitals reviewed. MDM       Procedures        Consulted MELANY Means PA-C and Dr. Jackie Damico (ortho); recommend discharge home with close Ortho follow-up. Discussed plan of care with Dr. Neelam Robb. 3:50 PM  Patient's results and plan of care have been reviewed with him. Patient has verbally conveyed his understanding and agreement of his signs, symptoms, diagnosis, treatment and prognosis and additionally agrees to follow up as recommended or return to the Emergency Room should his condition change prior to follow-up. Discharge instructions have also been provided to the patient with some educational information regarding his diagnosis as well a list of reasons why he would want to return to the ER prior to his follow-up appointment should his condition change. Sharon Alexander NP

## 2019-10-14 NOTE — ED TRIAGE NOTES
Pt presents to ED after having GLF. Pt slipped on his step this morning and landed on his left hip/buttock. PT reports a surgery on his lower back after a GLF in July. Pt states he landed on the scar site is a worried he may have hurt his surgically repaired area. Denies LOC.  Pt further states, \"I fell so hard I shit myself\"

## 2019-10-14 NOTE — LETTER
NOTIFICATION RETURN TO WORK / SCHOOL 
 
10/14/2019 2:51 PM 
 
Mr. Isabell Dyer [phone] To Whom It May Concern: 
 
Saad Skinner is currently under the care of [address] He will return to work/school on: 10/18/19 If there are questions or concerns please have the patient contact our office. Sincerely, Milly Farris NP

## 2019-10-14 NOTE — DISCHARGE INSTRUCTIONS
Patient Education        Preventing Falls: Care Instructions  Your Care Instructions    Getting around your home safely can be a challenge if you have injuries or health problems that make it easy for you to fall. Loose rugs and furniture in walkways are among the dangers for many older people who have problems walking or who have poor eyesight. People who have conditions such as arthritis, osteoporosis, or dementia also have to be careful not to fall. You can make your home safer with a few simple measures. Follow-up care is a key part of your treatment and safety. Be sure to make and go to all appointments, and call your doctor if you are having problems. It's also a good idea to know your test results and keep a list of the medicines you take. How can you care for yourself at home? Taking care of yourself  · You may get dizzy if you do not drink enough water. To prevent dehydration, drink plenty of fluids, enough so that your urine is light yellow or clear like water. Choose water and other caffeine-free clear liquids. If you have kidney, heart, or liver disease and have to limit fluids, talk with your doctor before you increase the amount of fluids you drink. · Exercise regularly to improve your strength, muscle tone, and balance. Walk if you can. Swimming may be a good choice if you cannot walk easily. · Have your vision and hearing checked each year or any time you notice a change. If you have trouble seeing and hearing, you might not be able to avoid objects and could lose your balance. · Know the side effects of the medicines you take. Ask your doctor or pharmacist whether the medicines you take can affect your balance. Sleeping pills or sedatives can affect your balance. · Limit the amount of alcohol you drink. Alcohol can impair your balance and other senses. · Ask your doctor whether calluses or corns on your feet need to be removed.  If you wear loose-fitting shoes because of calluses or corns, you can lose your balance and fall. · Talk to your doctor if you have numbness in your feet. Preventing falls at home  · Remove raised doorway thresholds, throw rugs, and clutter. Repair loose carpet or raised areas in the floor. · Move furniture and electrical cords to keep them out of walking paths. · Use nonskid floor wax, and wipe up spills right away, especially on ceramic tile floors. · If you use a walker or cane, put rubber tips on it. If you use crutches, clean the bottoms of them regularly with an abrasive pad, such as steel wool. · Keep your house well lit, especially [place], and outside walkways. Use night-lights in areas such as hallways and bathrooms. Add extra light switches or use remote switches (such as switches that go on or off when you clap your hands) to make it easier to turn lights on if you have to get up during the night. · Install sturdy handrails on stairways. · Move items in your cabinets so that the things you use a lot are on the lower shelves (about waist level). · Keep a cordless phone and a flashlight with new batteries by your bed. If possible, put a phone in each of the main rooms of your house, or carry a cell phone in case you fall and cannot reach a phone. Or, you can wear a device around your neck or wrist. You push a button that sends a signal for help. · Wear low-heeled shoes that fit well and give your feet good support. Use footwear with nonskid soles. Check the heels and soles of your shoes for wear. Repair or replace worn heels or soles. · Do not wear socks without shoes on wood floors. · Walk on the grass when the sidewalks are slippery. If you live in an area that gets snow and ice in the winter, sprinkle salt on slippery steps and sidewalks. Preventing falls in the bath  · Install grab bars and nonskid mats inside and outside your shower or tub and near the toilet and sinks. · Use shower chairs and bath benches.   · Use a hand-held shower head that will allow you to sit while showering. · Get into a tub or shower by putting the weaker leg in first. Get out of a tub or shower with your strong side first.  · Repair loose toilet seats and consider installing a raised toilet seat to make getting on and off the toilet easier. · Keep your bathroom door unlocked while you are in the shower. Where can you learn more? Go to http://ciara-abbe.info/. Enter [id number] [id number] [phone] in the search box to learn more about \"Preventing Falls: Care Instructions. \"  Current as of: March 16, 2018  Content Version: 11.8  © 0644-7767 [employer]. Care instructions adapted under license by [employer] (which disclaims liability or warranty for this information). If you have questions about a medical condition or this instruction, always ask your healthcare professional. [place] [address] any warranty or liability for your use of this information. Patient Education        Compression Fracture of the Spine: Care Instructions  Your Care Instructions    A compression fracture happens when the front part of a spinal bone breaks and collapses. A fall or other accident can cause it. A minor injury or moving the wrong way can cause a break if you have thin or brittle bones (osteoporosis). These types of breaks will heal in 8 to 10 weeks. You will need rest and pain medicines. Your doctor may recommend physical therapy. Some doctors recommend that certain people with compression fractures wear braces. Your doctor also may treat thin or brittle bones. You may need surgery if you have lasting pain or if the bone presses on the spinal cord or nerves. You heal best when you take good care of yourself. Eat a variety of healthy foods, and don't smoke. Follow-up care is a key part of your treatment and safety. Be sure to make and go to all appointments, and call your doctor if you are having problems.  It's also a good idea to know your test results and keep a list of the medicines you take. How can you care for yourself at home? · Be safe with medicines. Read and follow all instructions on the label. ? If the doctor gave you a prescription medicine for pain, take it as prescribed. ? If you are not taking a prescription pain medicine, ask your doctor if you can take an over-the-counter medicine. · Talk to your doctor about how to make your bones stronger. You may need medicines or a change in what you eat. · Be active only as directed by your doctor. When should you call for help? Call 911 anytime you think you may need emergency care. For example, call if:    · You are unable to move an arm or a leg at all.   [facility] your doctor now or seek immediate medical care if:    · You have new or worse symptoms in your arms, legs, belly, or buttocks. Symptoms may include:  ? Numbness or tingling. ? Weakness. ? Pain.     · You lose bladder or bowel control.     · You have belly pain, bloating, vomiting, or nausea.    Watch closely for changes in your health, and be sure to contact your doctor if:    · You do not get better as expected. Where can you learn more? Go to http://ciara-abbe.info/. Enter P445 in the search box to learn more about \"Compression Fracture of the Spine: Care Instructions. \"  Current as of: June 26, 2019  Content Version: 12.2  © 6326-7682 [employer]. Care instructions adapted under license by [employer] (which disclaims liability or warranty for this information). If you have questions about a medical condition or this instruction, always ask your healthcare professional. [address] any warranty or liability for your use of this information. Patient Education        Spine Fracture: Care Instructions  Your Care Instructions    A spine fracture is a break in one of the bones (vertebrae) in your spine.  The body of the vertebra, which bears the weight, can break. It has smaller bones that branch off to form a protective ring around the spinal cord. These bones, which can also break, include:  · The spinous process, which sticks out behind the vertebra. · The transverse processes, which stick out from the sides. · The pedicles, which connect the processes to the vertebral body. · The lamina, which connects the processes to each other. A spine fracture can damage the spinal cord or a nerve root. The nerve root is the part of a nerve that leads from the spinal cord to the rest of the body. Your doctor will give you specific information about your type of break. You may be sent home with a brace to help your back heal. You can help your spine heal with care at home. You heal best when you take good care of yourself. Eat a variety of healthy foods, and don't smoke. Follow-up care is a key part of your treatment and safety. Be sure to make and go to all appointments, and call your doctor if you are having problems. It's also a good idea to know your test results and keep a list of the medicines you take. How can you care for yourself at home? · Follow your doctor's instructions for bed rest and activity. If you have a brace, wear it as directed. Do not stop wearing it until your doctor tells you to stop. · Do any exercises that you are given to keep your muscles strong and reduce stiffness. · Be safe with medicines. Take pain medicines exactly as directed. ? If the doctor gave you a prescription medicine for pain, take it as prescribed. ? If you are not taking a prescription pain medicine, ask your doctor if you can take an over-the-counter medicine. · Put ice or a cold pack on the painful area for 10 to 20 minutes at a time. Try to do this every 1 to 2 hours for the next 3 days (when you are awake). Put a thin cloth between the ice and your skin or brace. · Make sure that paths in your home are clear so that you do not fall.  Also, make sure that lighting is good and that carpets are tacked down to prevent tripping. · Do not drive unless your doctor says that it is okay. If you are allowed to drive, always wear a seat belt. · Talk to your doctor about medicines or changes in your diet that can help make your bones stronger. When should you call for help? Call 911 anytime you think you may need emergency care. For example, call if:    · You cannot move an arm or a leg at all.   [facility] your doctor now or seek immediate medical care if:    · You have new or worse symptoms in your arms, legs, chest, belly, or buttocks. Symptoms may include:  ? Numbness or tingling. ? Weakness. ? Pain.     · You lose bladder or bowel control.     · You have belly pain, bloating, vomiting, or nausea.    Watch closely for changes in your health, and be sure to contact your doctor if:    · You have a fever, lose weight, or don't feel well.     · You are not getting better as expected. Where can you learn more? Go to http://ciara-abbe.info/. Enter O616 in the search box to learn more about \"Spine Fracture: Care Instructions. \"  Current as of: June 26, 2019  Content Version: 12.2  © 7664-9714 [employer]. Care instructions adapted under license by [employer] (which disclaims liability or warranty for this information). If you have questions about a medical condition or this instruction, always ask your healthcare professional. [place] [address] any warranty or liability for your use of this information.

## 2019-10-16 ENCOUNTER — TELEPHONE (OUTPATIENT)
Dept: INTERNAL MEDICINE CLINIC | Age: 59
End: 2019-10-16
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
## 2019-10-16 RX ORDER — GABAPENTIN 300 MG/1
300 CAPSULE ORAL 3 TIMES DAILY
Qty: 90 CAP | Refills: 0 | OUTPATIENT
Start: 2019-10-16 | End: 2019-10-23 | Stop reason: SDUPTHER

## 2019-10-16 NOTE — TELEPHONE ENCOUNTER
Claudia Mao (Self) [phone] (H)     Pt is requesting a call back regarding increasing his gabapentin meds to 3 a day. Pt says he was at [facility] er for a fall he had and they told him to increase this med to 3 a day instead of 2 a day.

## 2019-10-16 NOTE — TELEPHONE ENCOUNTER
Verified patient identity with two identifiers. Spoke with patient by phone. ER increased Gabapentin  Pt states he needs a 90 day rx for Gabapentin tid. He was only given an rx from ER for #20 of these pills. Will send to [facility] for approval and get back to pt.

## 2019-10-16 NOTE — TELEPHONE ENCOUNTER
Verified patient identity with two identifiers. Spoke with patient by phone. Informed per Dr. Alea Trivedi one month of Gabapentin sent in . Patient verbalized understanding.

## 2019-10-23 DIAGNOSIS — Z79.4 TYPE 2 DIABETES MELLITUS WITH HYPERGLYCEMIA, WITH LONG-TERM CURRENT USE OF INSULIN (HCC): ICD-10-CM
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
## 2019-10-23 DIAGNOSIS — E11.65 TYPE 2 DIABETES MELLITUS WITH HYPERGLYCEMIA, WITH LONG-TERM CURRENT USE OF INSULIN (HCC): ICD-10-CM

## 2019-10-28 RX ORDER — GABAPENTIN 300 MG/1
CAPSULE ORAL
Qty: 90 CAP | Refills: 1 | OUTPATIENT
Start: 2019-10-28 | End: 2020-02-05

## 2019-10-28 RX ORDER — METFORMIN HYDROCHLORIDE 500 MG/1
TABLET ORAL
Qty: 120 TAB | Refills: 3 | Status: SHIPPED | OUTPATIENT
Start: 2019-10-28 | End: 2020-02-21

## 2019-10-28 NOTE — TELEPHONE ENCOUNTER
Received VRBO from Dr Catalino Barreto to phone in gabapentin 300 mg,  90 tab/1 refill. LM at local pharmacy.

## 2019-12-19 ENCOUNTER — ANESTHESIA EVENT (OUTPATIENT)
Dept: ENDOSCOPY | Age: 59
End: 2019-12-19
Payer: MEDICAID

## 2019-12-19 ENCOUNTER — ANESTHESIA (OUTPATIENT)
Dept: ENDOSCOPY | Age: 59
End: 2019-12-19
Payer: MEDICAID

## 2019-12-19 ENCOUNTER — HOSPITAL ENCOUNTER (OUTPATIENT)
Age: 59
Setting detail: OUTPATIENT SURGERY
Discharge: HOME | End: 2019-12-19
Attending: INTERNAL MEDICINE | Admitting: INTERNAL MEDICINE
Payer: MEDICAID

## 2019-12-19 VITALS
SYSTOLIC BLOOD PRESSURE: 135 MMHG | RESPIRATION RATE: 9 BRPM | WEIGHT: 260 LBS | HEIGHT: 78 IN | DIASTOLIC BLOOD PRESSURE: 77 MMHG | OXYGEN SATURATION: 95 % | BODY MASS INDEX: 30.08 KG/M2 | TEMPERATURE: 99 F | HEART RATE: 54 BPM

## 2019-12-19 LAB
GLUCOSE BLD STRIP.AUTO-MCNC: 186 MG/DL (ref 65–100)
GLUCOSE BLD STRIP.AUTO-MCNC: 194 MG/DL (ref 65–100)
SERVICE CMNT-IMP: ABNORMAL
SERVICE CMNT-IMP: ABNORMAL

## 2019-12-19 PROCEDURE — 74011000250 HC RX REV CODE- 250: Performed by: NURSE ANESTHETIST, CERTIFIED REGISTERED

## 2019-12-19 PROCEDURE — 74011250636 HC RX REV CODE- 250/636: Performed by: INTERNAL MEDICINE

## 2019-12-19 PROCEDURE — 76040000019: Performed by: INTERNAL MEDICINE

## 2019-12-19 PROCEDURE — 82962 GLUCOSE BLOOD TEST: CPT

## 2019-12-19 PROCEDURE — 88305 TISSUE EXAM BY PATHOLOGIST: CPT

## 2019-12-19 PROCEDURE — 74011250636 HC RX REV CODE- 250/636: Performed by: NURSE ANESTHETIST, CERTIFIED REGISTERED

## 2019-12-19 PROCEDURE — 76060000031 HC ANESTHESIA FIRST 0.5 HR: Performed by: INTERNAL MEDICINE

## 2019-12-19 RX ORDER — EPINEPHRINE 0.1 MG/ML
1 INJECTION INTRACARDIAC; INTRAVENOUS
Status: DISCONTINUED | OUTPATIENT
Start: 2019-12-19 | End: 2019-12-19 | Stop reason: HOSPADM

## 2019-12-19 RX ORDER — FLUMAZENIL 0.1 MG/ML
0.2 INJECTION INTRAVENOUS
Status: DISCONTINUED | OUTPATIENT
Start: 2019-12-19 | End: 2019-12-19 | Stop reason: HOSPADM

## 2019-12-19 RX ORDER — DEXTROMETHORPHAN/PSEUDOEPHED 2.5-7.5/.8
1.2 DROPS ORAL
Status: DISCONTINUED | OUTPATIENT
Start: 2019-12-19 | End: 2019-12-19 | Stop reason: HOSPADM

## 2019-12-19 RX ORDER — NALOXONE HYDROCHLORIDE 0.4 MG/ML
0.4 INJECTION, SOLUTION INTRAMUSCULAR; INTRAVENOUS; SUBCUTANEOUS
Status: DISCONTINUED | OUTPATIENT
Start: 2019-12-19 | End: 2019-12-19 | Stop reason: HOSPADM

## 2019-12-19 RX ORDER — SODIUM CHLORIDE 0.9 % (FLUSH) 0.9 %
5-40 SYRINGE (ML) INJECTION AS NEEDED
Status: DISCONTINUED | OUTPATIENT
Start: 2019-12-19 | End: 2019-12-19 | Stop reason: HOSPADM

## 2019-12-19 RX ORDER — MIDAZOLAM HYDROCHLORIDE 1 MG/ML
.25-5 INJECTION, SOLUTION INTRAMUSCULAR; INTRAVENOUS
Status: DISCONTINUED | OUTPATIENT
Start: 2019-12-19 | End: 2019-12-19 | Stop reason: HOSPADM

## 2019-12-19 RX ORDER — SODIUM CHLORIDE 0.9 % (FLUSH) 0.9 %
5-40 SYRINGE (ML) INJECTION EVERY 8 HOURS
Status: DISCONTINUED | OUTPATIENT
Start: 2019-12-19 | End: 2019-12-19 | Stop reason: HOSPADM

## 2019-12-19 RX ORDER — ATROPINE SULFATE 0.1 MG/ML
0.5 INJECTION INTRAVENOUS
Status: DISCONTINUED | OUTPATIENT
Start: 2019-12-19 | End: 2019-12-19 | Stop reason: HOSPADM

## 2019-12-19 RX ORDER — SODIUM CHLORIDE 9 MG/ML
INJECTION, SOLUTION INTRAVENOUS
Status: DISCONTINUED | OUTPATIENT
Start: 2019-12-19 | End: 2019-12-19 | Stop reason: HOSPADM

## 2019-12-19 RX ORDER — LIDOCAINE HYDROCHLORIDE 20 MG/ML
INJECTION, SOLUTION EPIDURAL; INFILTRATION; INTRACAUDAL; PERINEURAL AS NEEDED
Status: DISCONTINUED | OUTPATIENT
Start: 2019-12-19 | End: 2019-12-19 | Stop reason: HOSPADM

## 2019-12-19 RX ORDER — PROPOFOL 10 MG/ML
INJECTION, EMULSION INTRAVENOUS AS NEEDED
Status: DISCONTINUED | OUTPATIENT
Start: 2019-12-19 | End: 2019-12-19 | Stop reason: HOSPADM

## 2019-12-19 RX ORDER — FENTANYL CITRATE 50 UG/ML
25-200 INJECTION, SOLUTION INTRAMUSCULAR; INTRAVENOUS
Status: DISCONTINUED | OUTPATIENT
Start: 2019-12-19 | End: 2019-12-19 | Stop reason: HOSPADM

## 2019-12-19 RX ORDER — SODIUM CHLORIDE 9 MG/ML
50 INJECTION, SOLUTION INTRAVENOUS CONTINUOUS
Status: DISCONTINUED | OUTPATIENT
Start: 2019-12-19 | End: 2019-12-19 | Stop reason: HOSPADM

## 2019-12-19 RX ADMIN — SODIUM CHLORIDE: 900 INJECTION, SOLUTION INTRAVENOUS at 08:01

## 2019-12-19 RX ADMIN — PROPOFOL 20 MG: 10 INJECTION, EMULSION INTRAVENOUS at 08:46

## 2019-12-19 RX ADMIN — SODIUM CHLORIDE 50 ML/HR: 900 INJECTION, SOLUTION INTRAVENOUS at 08:06

## 2019-12-19 RX ADMIN — LIDOCAINE HYDROCHLORIDE 20 MG: 20 INJECTION, SOLUTION EPIDURAL; INFILTRATION; INTRACAUDAL; PERINEURAL at 08:35

## 2019-12-19 RX ADMIN — PROPOFOL 20 MG: 10 INJECTION, EMULSION INTRAVENOUS at 08:34

## 2019-12-19 RX ADMIN — PROPOFOL 80 MG: 10 INJECTION, EMULSION INTRAVENOUS at 08:32

## 2019-12-19 RX ADMIN — LIDOCAINE HYDROCHLORIDE 20 MG: 20 INJECTION, SOLUTION EPIDURAL; INFILTRATION; INTRACAUDAL; PERINEURAL at 08:34

## 2019-12-19 RX ADMIN — PROPOFOL 20 MG: 10 INJECTION, EMULSION INTRAVENOUS at 08:44

## 2019-12-19 RX ADMIN — PROPOFOL 20 MG: 10 INJECTION, EMULSION INTRAVENOUS at 08:49

## 2019-12-19 RX ADMIN — PROPOFOL 20 MG: 10 INJECTION, EMULSION INTRAVENOUS at 08:36

## 2019-12-19 RX ADMIN — PROPOFOL 20 MG: 10 INJECTION, EMULSION INTRAVENOUS at 08:38

## 2019-12-19 RX ADMIN — PROPOFOL 20 MG: 10 INJECTION, EMULSION INTRAVENOUS at 08:41

## 2019-12-19 NOTE — PERIOP NOTES
Report from Lynda, CRNA, see anesthesia record. ABD remains soft and non-tender post procedure. Pt has no complaints at this time and tolerated the procedure well. Endoscope was pre-cleaned at bedside immediately following procedure by [facility].

## 2019-12-19 NOTE — PROCEDURES
[address]  174 [facility], [address] [place] [address]        Colonoscopy Operative Report    Freya Vicnete  [id number]  1960      Procedure Type:   Colonoscopy with polypectomy (snare cautery), polypectomy (cold biopsy)     Indications:    Constipation         Pre-operative Diagnosis: see indication above    Post-operative Diagnosis:  See findings below    :  Elie Jewell MD      Referring Provider: Paola Bailey MD      Sedation:  MAC anesthesia Propofol      Procedure Details:  After informed consent was obtained with all risks and benefits of procedure explained and preoperative exam completed, the patient was taken to the endoscopy suite and placed in the left lateral decubitus position. Upon sequential sedation as per above, a digital rectal exam was performed demonstrating internal hemorrhoids. The Olympus pediatric videocolonoscope  was inserted in the rectum and carefully advanced to the terminal ileum. The cecum was identified by the ileocecal valve and appendiceal orifice. The quality of preparation was good. The colonoscope was slowly withdrawn with careful evaluation between folds. Retroflexion in the rectum was completed . Findings:   Rectum: normal  Sigmoid: normal  Descending Colon: single 8-9 mm polyp removed with hot snare  Transverse Colon: normal  Ascending Colon: normal  Cecum: single sessile 3 mm polyp removed with cold biopsy forceps  Terminal Ileum: normal      Specimen Removed:  1. Cecal polyp, 2. Descending colon polyp    Complications: None. EBL:  None. Impression:     1. Colon polyp - removed  2. Small internal hemorrhoids    Recommendations:  1. Follow up surgical pathology  2. Repeat colonoscopy in 5 years  3. High fiber diet education  4. Office follow up in 3-4 weeks to discuss constipation management    Signed By: Elie Jewell MD     12/19/2019  9:01 AM

## 2019-12-19 NOTE — H&P
[address]  174 [facility], [address] [place] [address]      History and Physical       NAME:  Hair Smith   :   1960   MRN:   [id number]             History of Present Illness:  Patient is a 64 y.o. who is seen for constipation. PMH:  Past Medical History:   Diagnosis Date    Alcohol abuse, in remission     Chronic kidney disease     PLAGUE L RENAL ARTERY    Chronic obstructive pulmonary disease (HCC)     Chronic pain     Constipation     Diabetes (HCC)     Type II    GERD (gastroesophageal reflux disease)     Hepatitis C     Hypertension     Liver disease     HEP C TOOK HARVONI    Pancreatitis     Psychiatric disorder     PANIC ATTACKS    Smoker     Spondylitis (HCC)        PSH:  Past Surgical History:   Procedure Laterality Date    HX APPENDECTOMY      HX ORTHOPAEDIC      3 back surgeries    HX ORTHOPAEDIC      KNEE SURGERY    HX TONSILLECTOMY         Allergies: Allergies   Allergen Reactions    Senna Other (comments)     cramps       Home Medications:  Prior to Admission Medications   Prescriptions Last Dose Informant Patient Reported? Taking? ALPRAZolam (XANAX) 2 mg tablet 2019 at Unknown time  Yes Yes   Si mg three (3) times daily. Insulin Needles, Disposable, (PEN NEEDLE) 32 gauge x 5/32\" ndle 2019 at Unknown time  No Yes   Sig: Use to inject insulin daily   METHADONE HCL (METHADONE PO) 2019 at Unknown time  Yes Yes   Sig: Take 95 mg by mouth daily. amLODIPine (NORVASC) 10 mg tablet 2019 at Unknown time  No Yes   Sig: TAKE ONE TABLET BY MOUTH DAILY   clobetasol (TEMOVATE) 0.05 % topical cream 2019 at Unknown time  No Yes   Sig: Apply  to affected area two (2) times a day. docusate sodium (COLACE) 100 mg capsule 2019 at Unknown time  Yes Yes   Sig: Take 100 mg by mouth two (2) times daily as needed for Constipation.    fluticasone propionate (FLONASE) 50 mcg/actuation nasal spray 2019 at Unknown time  No Yes   Si Sprays by Both Nostrils route daily. gabapentin (NEURONTIN) 300 mg capsule 2019 at Unknown time  No Yes   Sig: TAKE ONE CAPSULE BY MOUTH THREE TIMES A DAY   insulin glargine (BASAGLAR KWIKPEN U-100 INSULIN) 100 unit/mL (3 mL) inpn 2019 at Unknown time  No Yes   Si Units by SubCUTAneous route daily.    lisinopril (PRINIVIL, ZESTRIL) 40 mg tablet 2019 at Unknown time  No Yes   Sig: TAKE ONE TABLET BY MOUTH DAILY   metFORMIN (GLUCOPHAGE) 500 mg tablet 2019  No No   Sig: TAKE TWO TABLETS BY MOUTH TWICE A DAY WITH MEALS   polyethylene glycol (MIRALAX) 17 gram/dose powder 2019 at Unknown time  No Yes   Sig: MIX 17 GRAMS IN 4 TO 8 OUNCES OF LIQUID AND DRINK ONCE DAILY AS NEEDED FOR CONSTIPATION   propranolol (INDERAL) 10 mg tablet 2019 at Unknown time  No Yes   Sig: TAKE ONE TABLET BY MOUTH THREE TIMES A DAY      Facility-Administered Medications: None       Hospital Medications:  Current Facility-Administered Medications   Medication Dose Route Frequency    0.9% sodium chloride infusion  50 mL/hr IntraVENous CONTINUOUS    sodium chloride (NS) flush 5-40 mL  5-40 mL IntraVENous Q8H    sodium chloride (NS) flush 5-40 mL  5-40 mL IntraVENous PRN    midazolam (VERSED) injection 0.25-5 mg  0.25-5 mg IntraVENous Multiple    fentaNYL citrate (PF) injection  mcg   mcg IntraVENous Multiple    naloxone (NARCAN) injection 0.4 mg  0.4 mg IntraVENous Multiple    flumazenil (ROMAZICON) 0.1 mg/mL injection 0.2 mg  0.2 mg IntraVENous Multiple    simethicone (MYLICON) 86JC/7.8UP oral drops 80 mg  1.2 mL Oral Multiple    atropine injection 0.5 mg  0.5 mg IntraVENous ONCE PRN    EPINEPHrine (ADRENALIN) 0.1 mg/mL syringe 1 mg  1 mg Endoscopically ONCE PRN     Facility-Administered Medications Ordered in Other Encounters   Medication Dose Route Frequency    0.9% sodium chloride infusion   IntraVENous CONTINUOUS       Social History:  Social History     Tobacco Use  Smoking status: Current Every Day Smoker     Packs/day: 1.00    Smokeless tobacco: Never Used   Substance Use Topics    Alcohol use: Not Currently       Family History:  Family History   Problem Relation Age of Onset    Hypertension Mother     COPD Mother     Other Father         PALSY             Review of Systems:      Constitutional: negative fever, negative chills, negative weight loss  Eyes:   negative visual changes  ENT:   negative sore throat, tongue or lip swelling  Respiratory:  negative cough, negative dyspnea  Cards:  negative for chest pain, palpitations, lower extremity edema  GI:   See HPI  :  negative for frequency, dysuria  Integument:  negative for rash and pruritus  Heme:  negative for easy bruising and gum/nose bleeding  Musculoskel: negative for myalgias,  back pain and muscle weakness  Neuro: negative for headaches, dizziness, vertigo  Psych:  negative for feelings of anxiety, depression       Objective:     Patient Vitals for the past 8 hrs:   BP Temp Pulse Resp SpO2 Height Weight   12/19/19 0755 147/82 99 °F (37.2 °C) (!) 59 12 94 % 6' 6\" (1.981 m) 117.9 kg (260 lb)     No intake/output data recorded. No intake/output data recorded. EXAM:     NEURO-a&o   HEENT-wnl   LUNGS-clear    COR-regular rate and rhythym     ABD-soft , no tenderness, no rebound, good bs     EXT-no edema     Data Review     No results for input(s): WBC, HGB, HCT, PLT, HGBEXT, HCTEXT, PLTEXT in the last 72 hours. No results for input(s): NA, K, CL, CO2, BUN, CREA, GLU, PHOS, CA in the last 72 hours. No results for input(s): SGOT, GPT, AP, TBIL, TP, ALB, GLOB, GGT, AML, LPSE in the last 72 hours. No lab exists for component: AMYP, HLPSE  No results for input(s): INR, PTP, APTT, INREXT in the last 72 hours.        Assessment:   · constipation     Patient Active Problem List   Diagnosis Code    Acute pancreatitis K85.90    Anxiety F41.9    Substance induced mood disorder ([facility] F19.94    Benzodiazepine dependence, continuous ([facility]) F13.20    Polysubstance dependence ([address] F19.20    Type 2 diabetes mellitus with hyperglycemia, with long-term current use of insulin ([facility]) E11.65, Z79.4    Type 2 diabetes with nephropathy ([address] E11.21    Essential hypertension I10    Dysuria R30.0    Lumbar spinal stenosis M48.061    Type 2 diabetes mellitus with diabetic neuropathy ([address] E11.40     Plan:   · Endoscopic procedure with MAC     Signed By: Cher Hernandez.  Agnieszka Hassan MD     12/19/2019  8:25 AM

## 2019-12-19 NOTE — ANESTHESIA POSTPROCEDURE EVALUATION
Post-Anesthesia Evaluation and Assessment    Patient: Freya Vicente MRN: [id number]  SSN: [id number]    YOB: 1960  Age: 64 y.o. Sex: male      I have evaluated the patient and they are stable and ready for discharge from the PACU. Cardiovascular Function/Vital Signs  Visit Vitals  /70   Pulse (!) 56   Temp 37.2 °C (99 °F)   Resp 9   Ht 6' 6\" (1.981 m)   Wt 117.9 kg (260 lb)   SpO2 95%   BMI 30.05 kg/m²       Patient is status post MAC anesthesia for Procedure(s):  COLONOSCOPY  ENDOSCOPIC POLYPECTOMY. Nausea/Vomiting: None    Postoperative hydration reviewed and adequate. Pain:  Pain Scale 1: Numeric (0 - 10) (12/19/19 0913)  Pain Intensity 1: 2 (12/19/19 0913)   Managed    Neurological Status: At baseline    Mental Status, Level of Consciousness: Alert and  oriented to person, place, and time    Pulmonary Status:   O2 Device: Room air (12/19/19 0923)   Adequate oxygenation and airway patent    Complications related to anesthesia: None    Post-anesthesia assessment completed. No concerns    Signed By: Tomas Oliva MD     December 19, 2019              Procedure(s):  COLONOSCOPY  ENDOSCOPIC POLYPECTOMY. MAC    <BSHSIANPOST>    Vitals Value Taken Time   /77 12/19/2019  9:28 AM   Temp     Pulse 53 12/19/2019  9:29 AM   Resp 12 12/19/2019  9:29 AM   SpO2 96 % 12/19/2019  9:29 AM   Vitals shown include unvalidated device data.

## 2019-12-19 NOTE — ANESTHESIA PREPROCEDURE EVALUATION
Relevant Problems   No relevant active problems       Anesthetic History   No history of anesthetic complications            Review of Systems / Medical History  Patient summary reviewed, nursing notes reviewed and pertinent labs reviewed    Pulmonary    COPD               Neuro/Psych         Psychiatric history     Cardiovascular    Hypertension              Exercise tolerance: >4 METS     GI/Hepatic/Renal     GERD      Liver disease     Endo/Other    Diabetes         Other Findings              Physical Exam    Airway  Mallampati: II  TM Distance: > 6 cm  Neck ROM: normal range of motion   Mouth opening: Normal     Cardiovascular    Rhythm: regular  Rate: normal         Dental    Dentition: Poor dentition     Pulmonary  Breath sounds clear to auscultation               Abdominal         Other Findings            Anesthetic Plan    ASA: 2  Anesthesia type: MAC          Induction: Intravenous  Anesthetic plan and risks discussed with: Patient

## 2019-12-19 NOTE — ROUTINE PROCESS
[facility] 1960 
[id number] Situation: 
Verbal report received from: Amelia Dickens RN Procedure: Procedure(s): 
COLONOSCOPY 
ENDOSCOPIC POLYPECTOMY Background: 
 
Preoperative diagnosis: CHRONIC IDIOPATHIC CONSTIPATION Postoperative diagnosis: Cecal polyp Desceneding Colon Polyp Internal Hemorrhoids :  Dr. Dani Lugo Assistant(s): Endoscopy Technician-1: Tristan Fleming Endoscopy RN-1: Janeth Loyd RN Specimens:  
ID Type Source Tests Collected by Time Destination 1 : Cecal polyp Preservative   Brynn Blevins MD 12/19/2019 2369 Pathology 2 : Descending Colon Polyp Preservative   Brynn Blevins MD 12/19/2019 0845 Pathology H. Pylori  no Assessment: 
Intra-procedure medications Anesthesia gave intra-procedure sedation and medications, see anesthesia flow sheet yes Intravenous fluids: NS@ [facility] Vital signs stable Abdominal assessment: round and soft Recommendation: 
Discharge patient per MD order. Family or Friend Permission to share finding with family or friend yes

## 2019-12-19 NOTE — DISCHARGE INSTRUCTIONS
[address], [address] [place] [address]    COLON DISCHARGE INSTRUCTIONS    Kenneth Washington  [id number]  1960    Discomfort:  Redness at IV site- apply warm compress to area; if redness or soreness persist- contact your physician  There may be a slight amount of blood passed from the rectum  Gaseous discomfort- walking, belching will help relieve any discomfort  You may not operate a vehicle for 12 hours  You may not engage in an occupation involving machinery or appliances for rest of today  You may not drink alcoholic beverages for at least 12 hours  Avoid making any critical decisions for at least 24 hour  DIET:  You may resume your regular diet - however -  remember your colon is empty and a heavy meal will produce gas. Avoid these foods:  vegetables, fried / greasy foods, carbonated drinks     ACTIVITY:  You may  resume your normal daily activities it is recommended that you spend the remainder of the day resting -  avoid any strenuous activity. CALL M.D. ANY SIGN OF:   Increasing pain, nausea, vomiting  Abdominal distension (swelling)  New increased bleeding (oral or rectal)  Fever (chills)  Pain in chest area  Bloody discharge from nose or mouth  Shortness of breath      Follow-up Instructions:   Call Dr. Chris Limon for any questions or problems at [address]:   Your colonoscopy showed two small polyps, which were removed. We will contact you about the pathology results and when your next colonoscopy will be due. Please follow up with LESLEY Sarah to discuss your constipation management. Telephone # [phone]      Signed By: Dario Mcgill.  José Antonio Mejia MD     12/19/2019  8:59 AM       DISCHARGE SUMMARY from Nurse    The following personal items collected during your admission are returned to you:   Dental Appliance: Dental Appliances: None  Vision: Visual Aid: Glasses(with mom)  Hearing Aid:    Jewelry:    Clothing:    Other Valuables: Valuables sent to safe:

## 2020-01-01 DIAGNOSIS — F41.9 ANXIETY: ICD-10-CM

## 2020-01-02 RX ORDER — PROPRANOLOL HYDROCHLORIDE 10 MG/1
TABLET ORAL
Qty: 90 TAB | Refills: 0 | Status: SHIPPED | OUTPATIENT
Start: 2020-01-02 | End: 2020-02-03
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
## 2020-02-03 DIAGNOSIS — F41.9 ANXIETY: ICD-10-CM

## 2020-02-03 RX ORDER — PROPRANOLOL HYDROCHLORIDE 10 MG/1
TABLET ORAL
Qty: 90 TAB | Refills: 0 | Status: SHIPPED | OUTPATIENT
Start: 2020-02-03 | End: 2020-03-02

## 2020-02-04 ENCOUNTER — TELEPHONE (OUTPATIENT)
Dept: INTERNAL MEDICINE CLINIC | Age: 60
End: 2020-02-04

## 2020-02-05 NOTE — TELEPHONE ENCOUNTER
Requested Prescriptions     Pending Prescriptions Disp Refills    gabapentin (NEURONTIN) 300 mg capsule [Pharmacy Med Name: GABAPENTIN 300 MG CAPSULE] 90 Cap 0     Sig: TAKE ONE CAPSULE BY MOUTH THREE TIMES A DAY     Signed Prescriptions Disp Refills    propranolol (INDERAL) 10 mg tablet 90 Tab 0     Sig: TAKE ONE TABLET BY MOUTH THREE TIMES A DAY     Authorizing Provider: Vanessa Alonso     Ordering User: Chelsie Blanco     Pt is out the gabapentin please refill    [address] [place], [place] - MASHA Camacho [facility]  [phone]

## 2020-02-06 RX ORDER — GABAPENTIN 300 MG/1
CAPSULE ORAL
Qty: 90 CAP | Refills: 1 | Status: SHIPPED | OUTPATIENT
Start: 2020-02-06 | End: 2020-04-10

## 2020-02-19 ENCOUNTER — OFFICE VISIT (OUTPATIENT)
Dept: INTERNAL MEDICINE CLINIC | Age: 60
End: 2020-02-19

## 2020-02-19 VITALS
RESPIRATION RATE: 16 BRPM | SYSTOLIC BLOOD PRESSURE: 142 MMHG | OXYGEN SATURATION: 97 % | WEIGHT: 268.9 LBS | TEMPERATURE: 98.4 F | HEART RATE: 60 BPM | DIASTOLIC BLOOD PRESSURE: 80 MMHG | HEIGHT: 78 IN | BODY MASS INDEX: 31.11 KG/M2

## 2020-02-19 DIAGNOSIS — Z79.4 TYPE 2 DIABETES MELLITUS WITH HYPERGLYCEMIA, WITH LONG-TERM CURRENT USE OF INSULIN (HCC): Primary | ICD-10-CM

## 2020-02-19 DIAGNOSIS — F41.9 ANXIETY: ICD-10-CM

## 2020-02-19 DIAGNOSIS — Z79.4 TYPE 2 DIABETES MELLITUS WITH DIABETIC NEUROPATHY, WITH LONG-TERM CURRENT USE OF INSULIN (HCC): ICD-10-CM

## 2020-02-19 DIAGNOSIS — E11.40 TYPE 2 DIABETES MELLITUS WITH DIABETIC NEUROPATHY, WITH LONG-TERM CURRENT USE OF INSULIN (HCC): ICD-10-CM

## 2020-02-19 DIAGNOSIS — I10 ESSENTIAL HYPERTENSION: ICD-10-CM

## 2020-02-19 DIAGNOSIS — E11.65 TYPE 2 DIABETES MELLITUS WITH HYPERGLYCEMIA, WITH LONG-TERM CURRENT USE OF INSULIN (HCC): Primary | ICD-10-CM

## 2020-02-19 NOTE — PROGRESS NOTES
Follow Up Visit    Binh Thao is a 64 y.o. male. he presents for Diabetes and Hypertension    Endocrine Review  He is seen for diabetes. Since last visit: he has not been very active since he was having problems with his constipation. Home glucose monitoring: is performed sporadically. He reports medication compliance: compliant all of the time. He reports the following medication side effects: none. Diabetic diet compliance: compliant all of the time. Further diabetic ROS: no hypoglycemia. Lab review: orders written for new lab studies as appropriate; see orders. Eye exam: . He is smoking 1/2 pack or less a day. Working on weaning the cigarettes      Cardiovascular Review  The patient has hypertension and hyperlipidemia. He reports taking medications as instructed, no medication side effects noted. Diet and Lifestyle: generally follows a low fat low cholesterol diet, generally follows a low sodium diet. Lab review: labs reviewed and discussed with patient. He has had some issues with constipation. He is taking stool softeners. Eating yogurt and fiber. Saw GI yesterday. No changes. On Miralax for now. Back pain ongoing. He will follow up with orthopedics. Had revision of spinal fusion in 2018. He had a series of falls last summer. He will return to Dr. Ami Bell. Anxiety is better.      Patient Active Problem List   Diagnosis Code    Acute pancreatitis K85.90    Anxiety F41.9    Substance induced mood disorder ([address] F19.94    Benzodiazepine dependence, continuous ([facility]) F13.20    Polysubstance dependence ([address] F19.20    Type 2 diabetes mellitus with hyperglycemia, with long-term current use of insulin ([facility]) E11.65, Z79.4    Type 2 diabetes with nephropathy ([facility]) E11.21    Essential hypertension I10    Dysuria R30.0    Lumbar spinal stenosis M48.061    Type 2 diabetes mellitus with diabetic neuropathy ([address] E11.40         Prior to Admission medications    Medication Sig Start Date End Date Taking? Authorizing Provider   gabapentin (NEURONTIN) 300 mg capsule TAKE ONE CAPSULE BY MOUTH THREE TIMES A DAY 2/6/20  Yes Keenan Berman MD   propranolol (INDERAL) 10 mg tablet TAKE ONE TABLET BY MOUTH THREE TIMES A DAY 2/3/20  Yes Keenan Berman MD   metFORMIN (GLUCOPHAGE) 500 mg tablet TAKE TWO TABLETS BY MOUTH TWICE A DAY WITH MEALS 10/28/19  Yes Keenan Berman MD   polyethylene glycol (MIRALAX) 17 gram/dose powder MIX 17 GRAMS IN 4 TO 8 OUNCES OF LIQUID AND DRINK ONCE DAILY AS NEEDED FOR CONSTIPATION 10/14/19  Yes Keenan Berman MD   insulin glargine (BASAGLAR KWIKPEN U-100 INSULIN) 100 unit/mL (3 mL) inpn 32 Units by SubCUTAneous route daily. 9/16/19  Yes Keenan Berman MD   fluticasone propionate (FLONASE) 50 mcg/actuation nasal spray 2 Sprays by Both Nostrils route daily. 7/8/19  Yes Keenan Berman MD   clobetasol (TEMOVATE) 0.05 % topical cream Apply  to affected area two (2) times a day. 7/8/19  Yes Keenan Berman MD   lisinopril (PRINIVIL, ZESTRIL) 40 mg tablet TAKE ONE TABLET BY MOUTH DAILY 5/29/19  Yes Keenan Berman MD   amLODIPine (NORVASC) 10 mg tablet TAKE ONE TABLET BY MOUTH DAILY 5/29/19  Yes Keenan Berman MD   docusate sodium (COLACE) 100 mg capsule Take 100 mg by mouth two (2) times daily as needed for Constipation. Yes Provider, Historical   ALPRAZolam (XANAX) 2 mg tablet Take 2 mg by mouth two (2) times a day. 7/30/18  Yes Provider, Historical   METHADONE HCL (METHADONE PO) Take 95 mg by mouth daily.    Yes Provider, Historical   Insulin Needles, Disposable, (PEN NEEDLE) 32 gauge x 5/32\" ndle Use to inject insulin daily 7/9/19   Keenan Berman MD         Health Maintenance   Topic Date Due    Pneumococcal 0-64 years (1 of 1 - PPSV23) 01/19/1968    Foot Exam Q1  01/19/1972    DTaP/Tdap/Td series (1 - Tdap) 01/19/1973    Shingrix Vaccine Age 50> (1 of 2) 01/19/2012    FOBT Q1Y Age 50-75  07/12/2018    Eye Exam Retinal or Dilated 04/20/2019    Influenza Age 9 to Adult  08/01/2019    A1C test (Diabetic or Prediabetic)  09/13/2019    MICROALBUMIN Q1  06/13/2020    Lipid Screen  06/13/2020    Hepatitis C Screening  Completed       Review of Systems   Constitutional: Negative. Respiratory: Negative. Cardiovascular: Negative. Gastrointestinal: Positive for constipation. Visit Vitals  /80 (BP 1 Location: Right arm, BP Patient Position: Sitting)   Pulse 60   Temp 98.4 °F (36.9 °C) (Oral)   Resp 16   Ht 6' 6\" (1.981 m)   Wt 268 lb 14.4 oz (122 kg)   SpO2 97%   BMI 31.07 kg/m²       Physical Exam  Constitutional:       Appearance: He is well-developed. Cardiovascular:      Rate and Rhythm: Normal rate and regular rhythm. Pulmonary:      Effort: Pulmonary effort is normal.      Breath sounds: Normal breath sounds. ASSESSMENT/PLAN    Diagnoses and all orders for this visit:    1. Type 2 diabetes mellitus with hyperglycemia, with long-term current use of insulin ([address]    2. Essential hypertension  -     CBC WITH AUTOMATED DIFF  -     METABOLIC PANEL, COMPREHENSIVE  -     LIPID PANEL    3. Type 2 diabetes mellitus with diabetic neuropathy, with long-term current use of insulin (HCC)  -     UA WITH REFLEX MICRO AND CULTURE  -     HEMOGLOBIN A1C WITH EAG    4. Anxiety           Follow-up and Dispositions    · Return in about 3 months (around 5/19/2020) for Full Physical - 30 minutes appointment.

## 2020-02-20 DIAGNOSIS — Z79.4 TYPE 2 DIABETES MELLITUS WITH HYPERGLYCEMIA, WITH LONG-TERM CURRENT USE OF INSULIN (HCC): ICD-10-CM

## 2020-02-20 DIAGNOSIS — E11.65 TYPE 2 DIABETES MELLITUS WITH HYPERGLYCEMIA, WITH LONG-TERM CURRENT USE OF INSULIN (HCC): ICD-10-CM

## 2020-02-20 DIAGNOSIS — I10 HYPERTENSION, UNSPECIFIED TYPE: ICD-10-CM

## 2020-02-20 LAB
ALBUMIN SERPL-MCNC: 4.6 G/DL (ref 3.8–4.9)
ALBUMIN/GLOB SERPL: 1.6 {RATIO} (ref 1.2–2.2)
ALP SERPL-CCNC: 72 IU/L (ref 39–117)
ALT SERPL-CCNC: 10 IU/L (ref 0–44)
APPEARANCE UR: CLEAR
AST SERPL-CCNC: 12 IU/L (ref 0–40)
BACTERIA #/AREA URNS HPF: ABNORMAL /[HPF]
BASOPHILS # BLD AUTO: 0.1 X10E3/UL (ref 0–0.2)
BASOPHILS NFR BLD AUTO: 1 %
BILIRUB SERPL-MCNC: 0.2 MG/DL (ref 0–1.2)
BILIRUB UR QL STRIP: NEGATIVE
BUN SERPL-MCNC: 16 MG/DL (ref 6–24)
BUN/CREAT SERPL: 16 (ref 9–20)
CALCIUM SERPL-MCNC: 9.5 MG/DL (ref 8.7–10.2)
CASTS URNS MICRO: ABNORMAL
CASTS URNS QL MICRO: PRESENT /LPF
CHLORIDE SERPL-SCNC: 93 MMOL/L (ref 96–106)
CHOLEST SERPL-MCNC: 227 MG/DL (ref 100–199)
CO2 SERPL-SCNC: 23 MMOL/L (ref 20–29)
COLOR UR: YELLOW
CREAT SERPL-MCNC: 1.02 MG/DL (ref 0.76–1.27)
EOSINOPHIL # BLD AUTO: 0.1 X10E3/UL (ref 0–0.4)
EOSINOPHIL NFR BLD AUTO: 1 %
EPI CELLS #/AREA URNS HPF: ABNORMAL /HPF (ref 0–10)
ERYTHROCYTE [DISTWIDTH] IN BLOOD BY AUTOMATED COUNT: 13.2 % (ref 11.6–15.4)
EST. AVERAGE GLUCOSE BLD GHB EST-MCNC: 240 MG/DL
GLOBULIN SER CALC-MCNC: 2.9 G/DL (ref 1.5–4.5)
GLUCOSE SERPL-MCNC: 247 MG/DL (ref 65–99)
GLUCOSE UR QL: ABNORMAL
HBA1C MFR BLD: 10 % (ref 4.8–5.6)
HCT VFR BLD AUTO: 38.2 % (ref 37.5–51)
HDLC SERPL-MCNC: 47 MG/DL
HGB BLD-MCNC: 13.1 G/DL (ref 13–17.7)
HGB UR QL STRIP: NEGATIVE
IMM GRANULOCYTES # BLD AUTO: 0 X10E3/UL (ref 0–0.1)
IMM GRANULOCYTES NFR BLD AUTO: 0 %
KETONES UR QL STRIP: NEGATIVE
LDLC SERPL CALC-MCNC: 125 MG/DL (ref 0–99)
LEUKOCYTE ESTERASE UR QL STRIP: NEGATIVE
LYMPHOCYTES # BLD AUTO: 1.9 X10E3/UL (ref 0.7–3.1)
LYMPHOCYTES NFR BLD AUTO: 28 %
MCH RBC QN AUTO: 29.2 PG (ref 26.6–33)
MCHC RBC AUTO-ENTMCNC: 34.3 G/DL (ref 31.5–35.7)
MCV RBC AUTO: 85 FL (ref 79–97)
MICRO URNS: ABNORMAL
MONOCYTES # BLD AUTO: 0.3 X10E3/UL (ref 0.1–0.9)
MONOCYTES NFR BLD AUTO: 5 %
MUCOUS THREADS URNS QL MICRO: PRESENT
NEUTROPHILS # BLD AUTO: 4.4 X10E3/UL (ref 1.4–7)
NEUTROPHILS NFR BLD AUTO: 65 %
NITRITE UR QL STRIP: NEGATIVE
PH UR STRIP: 5 [PH] (ref 5–7.5)
PLATELET # BLD AUTO: 280 X10E3/UL (ref 150–450)
POTASSIUM SERPL-SCNC: 5.3 MMOL/L (ref 3.5–5.2)
PROT SERPL-MCNC: 7.5 G/DL (ref 6–8.5)
PROT UR QL STRIP: ABNORMAL
RBC # BLD AUTO: 4.49 X10E6/UL (ref 4.14–5.8)
RBC #/AREA URNS HPF: ABNORMAL /HPF (ref 0–2)
SODIUM SERPL-SCNC: 135 MMOL/L (ref 134–144)
SP GR UR: 1.02 (ref 1–1.03)
TRIGL SERPL-MCNC: 276 MG/DL (ref 0–149)
URINALYSIS REFLEX, 377202: ABNORMAL
UROBILINOGEN UR STRIP-MCNC: 0.2 MG/DL (ref 0.2–1)
VLDLC SERPL CALC-MCNC: 55 MG/DL (ref 5–40)
WBC # BLD AUTO: 6.8 X10E3/UL (ref 3.4–10.8)
WBC #/AREA URNS HPF: ABNORMAL /HPF (ref 0–5)

## 2020-02-21 RX ORDER — METFORMIN HYDROCHLORIDE 500 MG/1
TABLET ORAL
Qty: 120 TAB | Refills: 2 | Status: SHIPPED | OUTPATIENT
Start: 2020-02-21 | End: 2020-03-24 | Stop reason: SDUPTHER

## 2020-02-21 RX ORDER — LISINOPRIL 40 MG/1
TABLET ORAL
Qty: 90 TAB | Refills: 2 | Status: SHIPPED | OUTPATIENT
Start: 2020-02-21 | End: 2020-03-23

## 2020-02-28 ENCOUNTER — TELEPHONE (OUTPATIENT)
Dept: INTERNAL MEDICINE CLINIC | Age: 60
End: 2020-02-28

## 2020-03-02 ENCOUNTER — OFFICE VISIT (OUTPATIENT)
Dept: INTERNAL MEDICINE CLINIC | Age: 60
End: 2020-03-02

## 2020-03-02 VITALS
TEMPERATURE: 100.1 F | HEIGHT: 78 IN | DIASTOLIC BLOOD PRESSURE: 80 MMHG | SYSTOLIC BLOOD PRESSURE: 130 MMHG | WEIGHT: 262.2 LBS | OXYGEN SATURATION: 96 % | BODY MASS INDEX: 30.34 KG/M2 | RESPIRATION RATE: 16 BRPM | HEART RATE: 74 BPM

## 2020-03-02 DIAGNOSIS — J10.1 INFLUENZA A: Primary | ICD-10-CM

## 2020-03-02 DIAGNOSIS — R68.89 FLU-LIKE SYMPTOMS: ICD-10-CM

## 2020-03-02 LAB
QUICKVUE INFLUENZA TEST: POSITIVE
VALID INTERNAL CONTROL?: YES

## 2020-03-02 RX ORDER — OSELTAMIVIR PHOSPHATE 75 MG/1
75 CAPSULE ORAL 2 TIMES DAILY
Qty: 10 CAP | Refills: 0 | Status: SHIPPED | OUTPATIENT
Start: 2020-03-02 | End: 2020-03-07

## 2020-03-02 NOTE — PROGRESS NOTES
Chief Complaint   Patient presents with    Cough     nonproductive    Fever     Reviewed record in preparation for visit and have obtained necessary documentation. Identified pt with two pt identifiers(name and ). Health Maintenance Due   Topic    Pneumococcal 0-64 years (1 of 1 - PPSV23)    Foot Exam Q1     DTaP/Tdap/Td series (1 - Tdap)    Shingrix Vaccine Age 50> (1 of 2)    FOBT Q1Y Age 54-65     Eye Exam Retinal or Dilated     Influenza Age 5 to Adult          Chief Complaint   Patient presents with    Cough     nonproductive    Fever        Wt Readings from Last 3 Encounters:   20 262 lb 3.2 oz (118.9 kg)   20 268 lb 14.4 oz (122 kg)   19 260 lb (117.9 kg)     Temp Readings from Last 3 Encounters:   20 100.1 °F (37.8 °C) (Oral)   20 98.4 °F (36.9 °C) (Oral)   19 99 °F (37.2 °C)     BP Readings from Last 3 Encounters:   20 130/80   20 142/80   19 135/77     Pulse Readings from Last 3 Encounters:   20 74   20 60   19 (!) 54           Learning Assessment:  :     Learning Assessment 2017   PRIMARY LEARNER Patient   HIGHEST LEVEL OF EDUCATION - PRIMARY LEARNER  2 YEARS OF COLLEGE   BARRIERS PRIMARY LEARNER NONE   PRIMARY LANGUAGE ENGLISH   LEARNER PREFERENCE PRIMARY LISTENING   ANSWERED BY patient    RELATIONSHIP SELF       Depression Screening:  :     3 most recent PHQ Screens 2020   Little interest or pleasure in doing things Not at all   Feeling down, depressed, irritable, or hopeless Not at all   Total Score PHQ 2 0       Fall Risk Assessment:  :     Fall Risk Assessment, last 12 mths 2018   Able to walk? Yes   Fall in past 12 months? Yes   Fall with injury? No   Number of falls in past 12 months 1   Fall Risk Score 1       Abuse Screening:  :     Abuse Screening Questionnaire 2018   Do you ever feel afraid of your partner?  N   Are you in a relationship with someone who physically or mentally threatens you? N   Is it safe for you to go home? Y       Coordination of Care Questionnaire:  :     1) Have you been to an emergency room, urgent care clinic since your last visit? no   Hospitalized since your last visit? no             2) Have you seen or consulted any other health care providers outside of [address] [place] [address] since your last visit? no  (Include any pap smears or colon screenings in this section.)    3) Do you have an Advance Directive on file? no    4) Are you interested in receiving information on Advance Directives? NO      Patient is accompanied by self I have received verbal consent from Mavis Kocher to discuss any/all medical information while they are present in the room. Reviewed record  In preparation for visit and have obtained necessary documentation.

## 2020-03-02 NOTE — PROGRESS NOTES
Subjective:      Nicole Rivers is a 64 y.o. male who presents today for cough. He sees Dr. Sachi Cruz. Cough: symptoms started Saturday, they include fevers, chills, dry cough, nausea. He denies SOB, wheezing. He has tried tylenol. He notes symptoms are worsening. Sick contacts: none. He has not traveled. Patient Active Problem List    Diagnosis Date Noted    Acute pancreatitis 11/29/2011     Priority: 1 - One    Type 2 diabetes mellitus with diabetic neuropathy ([facility] 04/08/2019    Lumbar spinal stenosis 08/29/2018    Dysuria 04/25/2018    Essential hypertension 03/15/2018    Type 2 diabetes with nephropathy ([facility] 02/21/2018    Type 2 diabetes mellitus with hyperglycemia, with long-term current use of insulin ([facility] 08/29/2017    Anxiety 08/24/2017    Substance induced mood disorder ([facility] 08/24/2017    Benzodiazepine dependence, continuous ([facility] 08/24/2017    Polysubstance dependence ([facility] 08/24/2017     Current Outpatient Medications   Medication Sig Dispense Refill    metFORMIN (GLUCOPHAGE) 500 mg tablet TAKE TWO TABLETS BY MOUTH TWICE A DAY WITH MEALS 120 Tab 2    lisinopril (PRINIVIL, ZESTRIL) 40 mg tablet TAKE ONE TABLET BY MOUTH DAILY 90 Tab 2    gabapentin (NEURONTIN) 300 mg capsule TAKE ONE CAPSULE BY MOUTH THREE TIMES A DAY 90 Cap 1    propranolol (INDERAL) 10 mg tablet TAKE ONE TABLET BY MOUTH THREE TIMES A DAY 90 Tab 0    polyethylene glycol (MIRALAX) 17 gram/dose powder MIX 17 GRAMS IN 4 TO 8 OUNCES OF LIQUID AND DRINK ONCE DAILY AS NEEDED FOR CONSTIPATION 510 g 0    insulin glargine (BASAGLAR KWIKPEN U-100 INSULIN) 100 unit/mL (3 mL) inpn 32 Units by SubCUTAneous route daily. 15 mL 3    Insulin Needles, Disposable, (PEN NEEDLE) 32 gauge x 5/32\" ndle Use to inject insulin daily 100 Pen Needle 3    fluticasone propionate (FLONASE) 50 mcg/actuation nasal spray 2 Sprays by Both Nostrils route daily.  1 Bottle 3    clobetasol (TEMOVATE) 0.05 % topical cream Apply  to affected area two (2) times a day. 30 g 0    amLODIPine (NORVASC) 10 mg tablet TAKE ONE TABLET BY MOUTH DAILY 90 Tab 2    docusate sodium (COLACE) 100 mg capsule Take 100 mg by mouth two (2) times daily as needed for Constipation.  ALPRAZolam (XANAX) 2 mg tablet Take 2 mg by mouth two (2) times a day.  METHADONE HCL (METHADONE PO) Take 95 mg by mouth daily. Allergies   Allergen Reactions    Senna Other (comments)     cramps     Past Medical History:   Diagnosis Date    Alcohol abuse, in remission     Chronic kidney disease     PLAGUE L RENAL ARTERY    Chronic obstructive pulmonary disease (HCC)     Chronic pain     Constipation     Diabetes (HCC)     Type II    GERD (gastroesophageal reflux disease)     Hepatitis C     Hypertension     Liver disease     HEP C TOOK HARVONI    Pancreatitis     Psychiatric disorder     PANIC ATTACKS    Smoker     Spondylitis ([address]      Past Surgical History:   Procedure Laterality Date    COLONOSCOPY N/A 12/19/2019    COLONOSCOPY performed by Amelia Donaldson MD at [facility] ENDOSCOPY    HX APPENDECTOMY      HX ORTHOPAEDIC      3 back surgeries    HX ORTHOPAEDIC      KNEE SURGERY    HX TONSILLECTOMY          Review of Systems    A comprehensive review of systems was negative except for that written in the HPI. Objective:     Visit Vitals  /80 (BP 1 Location: Left arm, BP Patient Position: Sitting)   Pulse 74   Temp 100.1 °F (37.8 °C) (Oral)   Resp 16   Ht 6' 6\" (1.981 m)   Wt 262 lb 3.2 oz (118.9 kg)   SpO2 96%   BMI 30.30 kg/m²     General appearance: alert, cooperative, no distress, appears stated age  Head: Normocephalic, without obvious abnormality, atraumatic  Eyes: negative  Ears: normal TM's and external ear canals AU  Nose: Nares normal. Septum midline. Mucosa normal. No drainage or sinus tenderness.   Throat: Lips, mucosa, and tongue normal. Teeth and gums normal  Neck: supple, symmetrical, trachea midline and no adenopathy  Lungs: inspiratory wheeze noted throughout, no rales or ronchi  Chest wall: no tenderness  Heart: regular rate and rhythm, S1, S2 normal, no murmur, click, rub or gallop  Neurologic: Grossly normal  Nursing note and vitals reviewed  Assessment/Plan:   1. Influenza A  - he has an inhaler at home, instructed him to use this every 4-6 hrs as needed for wheeze. If no improvement he will notify the office   - oseltamivir (TAMIFLU) 75 mg capsule; Take 1 Cap by mouth two (2) times a day for 5 days. Dispense: 10 Cap; Refill: 0    2. Flu-like symptoms  - flu A positive  - AMB POC RAPID INFLUENZA TEST           Follow-up and Dispositions    · Return if symptoms worsen or fail to improve. Advised him to call back or return to office if symptoms worsen/change/persist.  Discussed expected course/resolution/complications of diagnosis in detail with patient. Medication risks/benefits/costs/interactions/alternatives discussed with patient. He was given an after visit summary which includes diagnoses, current medications, & vitals. He expressed understanding with the diagnosis and plan.

## 2020-03-02 NOTE — Clinical Note
He is requesting his labs. I reviewed them with him but he would like someone to touch base with him about next steps.

## 2020-03-02 NOTE — PATIENT INSTRUCTIONS
Influenza (Flu): Care Instructions Your Care Instructions Influenza (flu) is an infection in the lungs and breathing passages. It is caused by the influenza virus. There are different strains, or types, of the flu virus from year to year. Unlike the common cold, the flu comes on suddenly and the symptoms, such as a cough, congestion, fever, chills, fatigue, aches, and pains, are more severe. These symptoms may last up to 10 days. Although the flu can make you feel very sick, it usually doesn't cause serious health problems. Home treatment is usually all you need for flu symptoms. But your doctor may prescribe antiviral medicine to prevent other health problems, such as pneumonia, from developing. Older people and those who have a long-term health condition, such as lung disease, are most at risk for having pneumonia or other health problems. Follow-up care is a key part of your treatment and safety. Be sure to make and go to all appointments, and call your doctor if you are having problems. It's also a good idea to know your test results and keep a list of the medicines you take. How can you care for yourself at home? · Get plenty of rest. 
· Drink plenty of fluids, enough so that your urine is light yellow or clear like water. If you have kidney, heart, or liver disease and have to limit fluids, talk with your doctor before you increase the amount of fluids you drink. · Take an over-the-counter pain medicine if needed, such as acetaminophen (Tylenol), ibuprofen (Advil, Motrin), or naproxen (Aleve), to relieve fever, headache, and muscle aches. Read and follow all instructions on the label. No one younger than 20 should take aspirin. It has been linked to Reye syndrome, a serious illness. · Do not smoke. Smoking can make the flu worse. If you need help quitting, talk to your doctor about stop-smoking programs and medicines. These can increase your chances of quitting for good. · Breathe moist air from a hot shower or from a sink filled with hot water to help clear a stuffy nose. · Before you use cough and cold medicines, check the label. These medicines may not be safe for young children or for people with certain health problems. · If the skin around your nose and lips becomes sore, put some petroleum jelly on the area. · To ease coughing: ? Drink fluids to soothe a scratchy throat. ? Suck on cough drops or plain hard candy. ? Take an over-the-counter cough medicine that contains dextromethorphan to help you get some sleep. Read and follow all instructions on the label. ? Raise your head at night with an extra pillow. This may help you rest if coughing keeps you awake. · Take any prescribed medicine exactly as directed. Call your doctor if you think you are having a problem with your medicine. To avoid spreading the flu · Wash your hands regularly, and keep your hands away from your face. · Stay home from school, work, and other public places until you are feeling better and your fever has been gone for at least 24 hours. The fever needs to have gone away on its own without the help of medicine. · Ask people living with you to talk to their doctors about preventing the flu. They may get antiviral medicine to keep from getting the flu from you. · To prevent the flu in the future, get a flu vaccine every fall. Encourage people living with you to get the vaccine. · Cover your mouth when you cough or sneeze. When should you call for help? Call 911 anytime you think you may need emergency care. For example, call if: 
  · You have severe trouble breathing.  
 Call your doctor now or seek immediate medical care if: 
  · You have new or worse trouble breathing.  
  · You seem to be getting much sicker.  
  · You feel very sleepy or confused.  
  · You have a new or higher fever.  
  · You get a new rash.  Watch closely for changes in your health, and be sure to contact your doctor if: 
  · You begin to get better and then get worse.  
  · You are not getting better after 1 week. Where can you learn more? Go to http://ciara-abbe.info/. Enter U448 in the search box to learn more about \"Influenza (Flu): Care Instructions. \" Current as of: June 9, 2019 Content Version: 12.2 © 8738-9908 [employer]. Care instructions adapted under license by [employer] (which disclaims liability or warranty for this information). If you have questions about a medical condition or this instruction, always ask your healthcare professional. [address] any warranty or liability for your use of this information.

## 2020-03-23 DIAGNOSIS — I10 HYPERTENSION, UNSPECIFIED TYPE: ICD-10-CM

## 2020-03-23 RX ORDER — LISINOPRIL 40 MG/1
TABLET ORAL
Qty: 90 TAB | Refills: 1 | Status: SHIPPED | OUTPATIENT
Start: 2020-03-23 | End: 2021-03-25

## 2020-03-24 DIAGNOSIS — E11.65 TYPE 2 DIABETES MELLITUS WITH HYPERGLYCEMIA, WITH LONG-TERM CURRENT USE OF INSULIN (HCC): ICD-10-CM

## 2020-03-24 DIAGNOSIS — Z79.4 TYPE 2 DIABETES MELLITUS WITH HYPERGLYCEMIA, WITH LONG-TERM CURRENT USE OF INSULIN (HCC): ICD-10-CM

## 2020-03-24 RX ORDER — METFORMIN HYDROCHLORIDE 500 MG/1
1000 TABLET ORAL 2 TIMES DAILY WITH MEALS
Qty: 360 TAB | Refills: 1 | Status: SHIPPED | OUTPATIENT
Start: 2020-03-24 | End: 2021-02-22

## 2020-03-24 NOTE — TELEPHONE ENCOUNTER
Pt wants to get a 90 day supply    Requested Prescriptions     Pending Prescriptions Disp Refills    metFORMIN (GLUCOPHAGE) 500 mg tablet 120 Tab 2     [address]  [phone]    NOV 5/19/20

## 2020-04-09 DIAGNOSIS — E11.21 TYPE 2 DIABETES WITH NEPHROPATHY (HCC): ICD-10-CM
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
## 2020-04-10 RX ORDER — INSULIN GLARGINE 100 [IU]/ML
INJECTION, SOLUTION SUBCUTANEOUS
Qty: 9 ADJUSTABLE DOSE PRE-FILLED PEN SYRINGE | Refills: 2 | Status: SHIPPED | OUTPATIENT
Start: 2020-04-10 | End: 2020-07-17

## 2020-04-10 RX ORDER — GABAPENTIN 300 MG/1
CAPSULE ORAL
Qty: 90 CAP | Refills: 0 | Status: SHIPPED | OUTPATIENT
Start: 2020-04-10 | End: 2020-05-19

## 2020-04-13 ENCOUNTER — PATIENT MESSAGE (OUTPATIENT)
Dept: INTERNAL MEDICINE CLINIC | Age: 60
End: 2020-04-13

## 2020-04-13 ENCOUNTER — E-VISIT (OUTPATIENT)
Dept: INTERNAL MEDICINE CLINIC | Age: 60
End: 2020-04-13

## 2020-04-14 ENCOUNTER — VIRTUAL VISIT (OUTPATIENT)
Dept: INTERNAL MEDICINE CLINIC | Age: 60
End: 2020-04-14

## 2020-04-14 DIAGNOSIS — B85.0 HEAD LICE: ICD-10-CM

## 2020-04-14 DIAGNOSIS — B85.1 BODY LICE INFESTATION: Primary | ICD-10-CM

## 2020-04-14 NOTE — PROGRESS NOTES
Luis Miguel Hirsch is a 64 y.o. male who was seen by synchronous (real-time) audio-video technology on 4/14/2020. He confirmed that, for purposes of billing, this is a virtual visit with his provider for which we will submit a claim for reimbursement to his insurance company. He is aware that he will be responsible for any copays, coinsurance amounts or other amounts not covered by his insurance company. Do you accept - YES    This visit was completed was completed virtually using [employer]. Me        Subjective:   Luis Miguel Hirsch was seen for Skin Problem    The patient reports 1 week of itching skin with small reddish sores at his forearms, neck chest and legs. He reports having touched a birds nest about 10 days ago. He has also noted whitish organisms in his hair and on his body. He is concerned he may have parasites. He has not attempted to treat these. Prior to Admission medications    Medication Sig Start Date End Date Taking? Authorizing Provider   gabapentin (NEURONTIN) 300 mg capsule TAKE ONE CAPSULE BY MOUTH THREE TIMES A DAY 4/10/20  Yes MD Angela Uribe U-100 Insulin 100 unit/mL (3 mL) inpn INJECT 28 UNITS UNDER THE SKIN ONCE DAILY 4/10/20  Yes Salomon Vargas MD   metFORMIN (GLUCOPHAGE) 500 mg tablet Take 2 Tabs by mouth two (2) times daily (with meals). 3/24/20  Yes Salomon Vargas MD   lisinopriL (PRINIVIL, ZESTRIL) 40 mg tablet TAKE ONE TABLET BY MOUTH DAILY 3/23/20  Yes Salomon Vargas MD   amLODIPine (NORVASC) 10 mg tablet TAKE ONE TABLET BY MOUTH DAILY 3/2/20  Yes Salomon Vargas MD   propranolol (INDERAL) 10 mg tablet Take 1 Tab by mouth three (3) times daily for 92 days. 3/2/20 6/2/20 Yes Salomon Vargas MD   Insulin Needles, Disposable, (PEN NEEDLE) 32 gauge x 5/32\" ndle Use to inject insulin daily 7/9/19  Yes Salomon Vargas MD   fluticasone propionate (FLONASE) 50 mcg/actuation nasal spray 2 Sprays by Both Nostrils route daily.  7/8/19  Yes Salomon Vargas MD   clobetasol (TEMOVATE) 0.05 % topical cream Apply  to affected area two (2) times a day. 7/8/19  Yes May Flowers MD   docusate sodium (COLACE) 100 mg capsule Take 100 mg by mouth two (2) times daily as needed for Constipation. Yes Provider, Historical   ALPRAZolam (XANAX) 2 mg tablet Take 2 mg by mouth two (2) times a day. 7/30/18  Yes Provider, Historical   METHADONE HCL (METHADONE PO) Take 95 mg by mouth daily.    Yes Provider, Historical   polyethylene glycol (MIRALAX) 17 gram/dose powder MIX 17 GRAMS IN 4 TO 8 OUNCES OF LIQUID AND DRINK ONCE DAILY AS NEEDED FOR CONSTIPATION 10/14/19   May Flowers MD       Allergies   Allergen Reactions    Senna Other (comments)     cramps       Patient Active Problem List    Diagnosis Date Noted    Acute pancreatitis 11/29/2011     Priority: 1 - One    Type 2 diabetes mellitus with diabetic neuropathy ([address] 04/08/2019    Lumbar spinal stenosis 08/29/2018    Dysuria 04/25/2018    Essential hypertension 03/15/2018    Type 2 diabetes with nephropathy ([address] 02/21/2018    Type 2 diabetes mellitus with hyperglycemia, with long-term current use of insulin ([address] 08/29/2017    Anxiety 08/24/2017    Substance induced mood disorder ([address] 08/24/2017    Benzodiazepine dependence, continuous ([address] 08/24/2017    Polysubstance dependence ([address] 08/24/2017     Past Medical History:   Diagnosis Date    Alcohol abuse, in remission     Chronic kidney disease     PLAGUE L RENAL ARTERY    Chronic obstructive pulmonary disease (HCC)     Chronic pain     Constipation     Diabetes (HCC)     Type II    GERD (gastroesophageal reflux disease)     Hepatitis C     Hypertension     Liver disease     HEP C TOOK HARVONI    Pancreatitis     Psychiatric disorder     PANIC ATTACKS    Smoker     Spondylitis ([address]      Family History   Problem Relation Age of Onset    Hypertension Mother     COPD Mother     Other Father         PALSY          ROS - per HPI      Objective: General: alert, no distress   Mental  status: pe mental status_general use: normal mood, behavior, speech, dress, motor activity, and thought processes   Eyes: EOM intact   Mouth: mucous membranes moist   Neck: no visualized mass   Resp: PULM - obs findings: normal effort and no respiratory distress   Skin: skin exam: areas of reddish excoriation noted on his forehead. Assessment & Plan:   Diagnoses and all orders for this visit:    1. Body lice infestation    2. Head lice      Discussed with the patient to begin lice removal shampoo and to wash all of his clothing as well as his bedsheets. He will treat his bed with a lice removal spray. Due to this being a TeleHealth evaluation, many elements of the physical examination are unable to be assessed. Pursuant to the emergency declaration under the [address] [place] [address] waiver authority and the [employer] Act, this Virtual  Visit was conducted, with patient's consent, to reduce the patient's risk of exposure to COVID-19 and provide continuity of care for an established patient. Services were provided through a video synchronous discussion virtually to substitute for in-person clinic visit. We discussed the expected course, resolution and complications of the diagnosis(es) in detail. Medication risks, benefits, costs, interactions, and alternatives were discussed as indicated. I advised him to contact the office if his condition worsens, changes or fails to improve as anticipated. He expressed understanding with the diagnosis(es) and plan.      Jim Pacheco MD

## 2020-04-22 ENCOUNTER — TELEPHONE (OUTPATIENT)
Dept: INTERNAL MEDICINE CLINIC | Age: 60
End: 2020-04-22

## 2020-04-22 NOTE — TELEPHONE ENCOUNTER
Informed patient per provider note. Patient can not afford [facility]. Had similar situation several years ago. Was prescribed cream for parasites. Patient on disability, barely making through the month.

## 2020-04-24 DIAGNOSIS — B85.1 BODY LICE: Primary | ICD-10-CM

## 2020-04-24 RX ORDER — PERMETHRIN 50 MG/G
CREAM TOPICAL
Qty: 60 G | Refills: 0 | Status: SHIPPED | OUTPATIENT
Start: 2020-04-24 | End: 2020-04-24

## 2020-04-24 NOTE — TELEPHONE ENCOUNTER
Patient suspects could have been bitten by deer fly. Advised per provider note. Understanding verbalized.     I am ordering permethrin cream.  If insurance doesn't cover, it is called Nix in its OTC form.     He should use this and see if it helps.

## 2020-05-27 ENCOUNTER — PATIENT MESSAGE (OUTPATIENT)
Dept: INTERNAL MEDICINE CLINIC | Age: 60
End: 2020-05-27

## 2020-05-27 DIAGNOSIS — B85.1 BODY LICE INFESTATION: Primary | ICD-10-CM

## 2020-05-27 RX ORDER — PERMETHRIN 50 MG/G
CREAM TOPICAL
Qty: 60 G | Refills: 0 | Status: SHIPPED | OUTPATIENT
Start: 2020-05-27 | End: 2020-10-08 | Stop reason: ALTCHOICE

## 2020-05-27 RX ORDER — PERMETHRIN 50 MG/G
CREAM TOPICAL
Qty: 60 G | Refills: 0 | Status: SHIPPED | OUTPATIENT
Start: 2020-05-27 | End: 2020-05-27 | Stop reason: SDUPTHER

## 2020-05-27 NOTE — TELEPHONE ENCOUNTER
Patient called back, he tried to call dermatology but closed. He is asking for a refill on medication. It is difficult for him to function and sleep at night.

## 2020-05-27 NOTE — TELEPHONE ENCOUNTER
Cassia Gutierrez (Self) [phone] (H)     Pt says that the mites dr Susanna Cruz was treating him for has returned and he is requesting a refill on permethrine 5% to be sent to the pharm for him.

## 2020-05-27 NOTE — TELEPHONE ENCOUNTER
Notified patient per provider referral to dermatology for further evaluation. He will call back with appointment date.

## 2020-05-29 ENCOUNTER — PATIENT MESSAGE (OUTPATIENT)
Dept: INTERNAL MEDICINE CLINIC | Age: 60
End: 2020-05-29

## 2020-05-29 DIAGNOSIS — R39.89 URINE DISCOLORATION: ICD-10-CM

## 2020-05-29 DIAGNOSIS — R35.0 FREQUENT URINATION: Primary | ICD-10-CM

## 2020-05-30 LAB
ALBUMIN SERPL-MCNC: 4.4 G/DL (ref 3.8–4.9)
ALBUMIN/GLOB SERPL: 1.6 {RATIO} (ref 1.2–2.2)
ALP SERPL-CCNC: 67 IU/L (ref 39–117)
ALT SERPL-CCNC: 10 IU/L (ref 0–44)
APPEARANCE UR: CLEAR
AST SERPL-CCNC: 12 IU/L (ref 0–40)
BACTERIA #/AREA URNS HPF: ABNORMAL /[HPF]
BASOPHILS # BLD AUTO: 0.1 X10E3/UL (ref 0–0.2)
BASOPHILS NFR BLD AUTO: 1 %
BILIRUB DIRECT SERPL-MCNC: 0.1 MG/DL (ref 0–0.4)
BILIRUB SERPL-MCNC: 0.3 MG/DL (ref 0–1.2)
BILIRUB UR QL STRIP: NEGATIVE
BUN SERPL-MCNC: 18 MG/DL (ref 6–24)
BUN/CREAT SERPL: 15 (ref 9–20)
CALCIUM SERPL-MCNC: 9.1 MG/DL (ref 8.7–10.2)
CASTS URNS MICRO: ABNORMAL
CASTS URNS QL MICRO: PRESENT /LPF
CHLORIDE SERPL-SCNC: 91 MMOL/L (ref 96–106)
CO2 SERPL-SCNC: 21 MMOL/L (ref 20–29)
COLOR UR: YELLOW
CREAT SERPL-MCNC: 1.19 MG/DL (ref 0.76–1.27)
EOSINOPHIL # BLD AUTO: 0.2 X10E3/UL (ref 0–0.4)
EOSINOPHIL NFR BLD AUTO: 2 %
EPI CELLS #/AREA URNS HPF: ABNORMAL /HPF (ref 0–10)
ERYTHROCYTE [DISTWIDTH] IN BLOOD BY AUTOMATED COUNT: 13.1 % (ref 11.6–15.4)
GLOBULIN SER CALC-MCNC: 2.7 G/DL (ref 1.5–4.5)
GLUCOSE SERPL-MCNC: 176 MG/DL (ref 65–99)
GLUCOSE UR QL: NEGATIVE
HCT VFR BLD AUTO: 38.8 % (ref 37.5–51)
HGB BLD-MCNC: 13 G/DL (ref 13–17.7)
HGB UR QL STRIP: NEGATIVE
IMM GRANULOCYTES # BLD AUTO: 0 X10E3/UL (ref 0–0.1)
IMM GRANULOCYTES NFR BLD AUTO: 0 %
KETONES UR QL STRIP: ABNORMAL
LEUKOCYTE ESTERASE UR QL STRIP: NEGATIVE
LYMPHOCYTES # BLD AUTO: 2.4 X10E3/UL (ref 0.7–3.1)
LYMPHOCYTES NFR BLD AUTO: 28 %
MCH RBC QN AUTO: 28.8 PG (ref 26.6–33)
MCHC RBC AUTO-ENTMCNC: 33.5 G/DL (ref 31.5–35.7)
MCV RBC AUTO: 86 FL (ref 79–97)
MICRO URNS: ABNORMAL
MONOCYTES # BLD AUTO: 0.5 X10E3/UL (ref 0.1–0.9)
MONOCYTES NFR BLD AUTO: 6 %
MUCOUS THREADS URNS QL MICRO: PRESENT
NEUTROPHILS # BLD AUTO: 5.5 X10E3/UL (ref 1.4–7)
NEUTROPHILS NFR BLD AUTO: 63 %
NITRITE UR QL STRIP: NEGATIVE
PH UR STRIP: 5 [PH] (ref 5–7.5)
PLATELET # BLD AUTO: 347 X10E3/UL (ref 150–450)
POTASSIUM SERPL-SCNC: 4.3 MMOL/L (ref 3.5–5.2)
PROT SERPL-MCNC: 7.1 G/DL (ref 6–8.5)
PROT UR QL STRIP: ABNORMAL
RBC # BLD AUTO: 4.51 X10E6/UL (ref 4.14–5.8)
RBC #/AREA URNS HPF: ABNORMAL /HPF (ref 0–2)
SODIUM SERPL-SCNC: 130 MMOL/L (ref 134–144)
SP GR UR: >=1.03 (ref 1–1.03)
UROBILINOGEN UR STRIP-MCNC: 0.2 MG/DL (ref 0.2–1)
WBC # BLD AUTO: 8.6 X10E3/UL (ref 3.4–10.8)
WBC #/AREA URNS HPF: ABNORMAL /HPF (ref 0–5)

## 2020-06-01 ENCOUNTER — VIRTUAL VISIT (OUTPATIENT)
Dept: INTERNAL MEDICINE CLINIC | Age: 60
End: 2020-06-01

## 2020-06-01 DIAGNOSIS — E86.0 DEHYDRATION: Primary | ICD-10-CM

## 2020-06-01 DIAGNOSIS — B88.9 MITE INFESTATION: ICD-10-CM

## 2020-06-01 RX ORDER — TRIAMCINOLONE ACETONIDE 1 MG/G
CREAM TOPICAL
COMMUNITY
Start: 2020-05-28

## 2020-06-01 NOTE — PROGRESS NOTES
1. Have you been to the ER, urgent care clinic since your last visit? Hospitalized since your last visit? No    2. Have you seen or consulted any other health care providers outside of the [address] [place] [address] since your last visit? Include any pap smears or colon screening.  Yes Where: Dermatologist Reason for visit: Skin Problem

## 2020-06-03 NOTE — PROGRESS NOTES
Rebecca James is a 64 y.o. male who was seen by synchronous (real-time) audio-video technology on 6/1/2020. He confirmed that, for purposes of billing, this is a virtual visit with his provider for which we will submit a claim for reimbursement to his insurance company. He is aware that he will be responsible for any copays, coinsurance amounts or other amounts not covered by his insurance company. Do you accept - YES    This visit was completed was completed virtually using [employer]        Subjective:   Rebecca James was seen for lab evaluation     The patient presents for follow up after complaining of lower back pain and dark urine. He had reported this to his dermatologist who suggested he follow up with me. He has had labs done since that time. His urine has lightened and he reports feeling better. Reviewed his labs together which note evidence of dehydration. There is no evidence of severe renal or liver dysfunction. He is presently on ivermectin for a mite infestation. He notes that this is getting better. Prior to Admission medications    Medication Sig Start Date End Date Taking? Authorizing Provider   triamcinolone acetonide (KENALOG) 0.1 % topical cream  5/28/20  Yes Provider, Historical   permethrin (ACTICIN) 5 % topical cream Apply  to affected area now for 1 dose. Apply a thin layer all over from neck to toes. Leave on for 8-14 hours, wash off by showering. 5/27/20  Yes Eda Mayer MD   gabapentin (NEURONTIN) 300 mg capsule TAKE ONE CAPSULE BY MOUTH THREE TIMES A DAY 5/19/20  Yes MD Angela Ventura U-100 Insulin 100 unit/mL (3 mL) inpn INJECT 28 UNITS UNDER THE SKIN ONCE DAILY 4/10/20  Yes Eda Mayer MD   metFORMIN (GLUCOPHAGE) 500 mg tablet Take 2 Tabs by mouth two (2) times daily (with meals).  3/24/20  Yes Eda Mayer MD   lisinopriL (PRINIVIL, ZESTRIL) 40 mg tablet TAKE ONE TABLET BY MOUTH DAILY 3/23/20  Yes Eda Mayer MD   amLODIPine (NORVASC) 10 mg tablet TAKE ONE TABLET BY MOUTH DAILY 3/2/20  Yes Maria E Perez MD   propranolol (INDERAL) 10 mg tablet Take 1 Tab by mouth three (3) times daily for 92 days. 3/2/20 6/2/20 Yes Maria E Perez MD   polyethylene glycol (MIRALAX) 17 gram/dose powder MIX 17 GRAMS IN 4 TO 8 OUNCES OF LIQUID AND DRINK ONCE DAILY AS NEEDED FOR CONSTIPATION 10/14/19  Yes Maria E Perez MD   Insulin Needles, Disposable, (PEN NEEDLE) 32 gauge x 5/32\" ndle Use to inject insulin daily 7/9/19  Yes Maria E Perez MD   fluticasone propionate (FLONASE) 50 mcg/actuation nasal spray 2 Sprays by Both Nostrils route daily. 7/8/19  Yes Maria E Perez MD   clobetasol (TEMOVATE) 0.05 % topical cream Apply  to affected area two (2) times a day. 7/8/19  Yes Maria E Perez MD   docusate sodium (COLACE) 100 mg capsule Take 100 mg by mouth two (2) times daily as needed for Constipation. Yes Provider, Historical   ALPRAZolam (XANAX) 2 mg tablet Take 1 mg by mouth three (3) times daily as needed. 7/30/18  Yes Provider, Historical   METHADONE HCL (METHADONE PO) Take 100 mg by mouth daily.    Yes Provider, Historical       Allergies   Allergen Reactions    Senna Other (comments)     cramps       Patient Active Problem List    Diagnosis Date Noted    Acute pancreatitis 11/29/2011     Priority: 1 - One    Type 2 diabetes mellitus with diabetic neuropathy ([facility] 04/08/2019    Lumbar spinal stenosis 08/29/2018    Dysuria 04/25/2018    Essential hypertension 03/15/2018    Type 2 diabetes with nephropathy ([facility] 02/21/2018    Type 2 diabetes mellitus with hyperglycemia, with long-term current use of insulin ([facility] 08/29/2017    Anxiety 08/24/2017    Substance induced mood disorder ([facility] 08/24/2017    Benzodiazepine dependence, continuous ([facility] 08/24/2017    Polysubstance dependence ([facility] 08/24/2017     Current Outpatient Medications   Medication Sig Dispense Refill    triamcinolone acetonide (KENALOG) 0.1 % topical cream  permethrin (ACTICIN) 5 % topical cream Apply  to affected area now for 1 dose. Apply a thin layer all over from neck to toes. Leave on for 8-14 hours, wash off by showering. 60 g 0    gabapentin (NEURONTIN) 300 mg capsule TAKE ONE CAPSULE BY MOUTH THREE TIMES A DAY 90 Cap 1    Basaglar KwikPen U-100 Insulin 100 unit/mL (3 mL) inpn INJECT 28 UNITS UNDER THE SKIN ONCE DAILY 9 Adjustable Dose Pre-filled Pen Syringe 2    metFORMIN (GLUCOPHAGE) 500 mg tablet Take 2 Tabs by mouth two (2) times daily (with meals). 360 Tab 1    lisinopriL (PRINIVIL, ZESTRIL) 40 mg tablet TAKE ONE TABLET BY MOUTH DAILY 90 Tab 1    amLODIPine (NORVASC) 10 mg tablet TAKE ONE TABLET BY MOUTH DAILY 90 Tab 1    polyethylene glycol (MIRALAX) 17 gram/dose powder MIX 17 GRAMS IN 4 TO 8 OUNCES OF LIQUID AND DRINK ONCE DAILY AS NEEDED FOR CONSTIPATION 510 g 0    Insulin Needles, Disposable, (PEN NEEDLE) 32 gauge x 5/32\" ndle Use to inject insulin daily 100 Pen Needle 3    fluticasone propionate (FLONASE) 50 mcg/actuation nasal spray 2 Sprays by Both Nostrils route daily. 1 Bottle 3    clobetasol (TEMOVATE) 0.05 % topical cream Apply  to affected area two (2) times a day. 30 g 0    docusate sodium (COLACE) 100 mg capsule Take 100 mg by mouth two (2) times daily as needed for Constipation.  ALPRAZolam (XANAX) 2 mg tablet Take 1 mg by mouth three (3) times daily as needed.  METHADONE HCL (METHADONE PO) Take 100 mg by mouth daily.        Allergies   Allergen Reactions    Senna Other (comments)     cramps     Past Medical History:   Diagnosis Date    Alcohol abuse, in remission     Chronic kidney disease     PLAGUE L RENAL ARTERY    Chronic obstructive pulmonary disease (HCC)     Chronic pain     Constipation     Diabetes (HCC)     Type II    GERD (gastroesophageal reflux disease)     Hepatitis C     Hypertension     Liver disease     HEP C TOOK HARVONI    Pancreatitis     Psychiatric disorder     PANIC ATTACKS    Smoker  Spondylitis ([facility]      Past Surgical History:   Procedure Laterality Date    COLONOSCOPY N/A 12/19/2019    COL[facility]OSCOPY    HX APPENDECTOMY      HX ORTHOPAEDIC      3 back surgeries    HX ORTHOPAEDIC      KNEE SURGERY    HX TONSILLECTOMY       Family History   Problem Relation Age of Onset    Hypertension Mother     COPD Mother     Other Father         PALSY     Social History     Tobacco Use    Smoking status: Current Every Day Smoker     Packs/day: 1.00    Smokeless tobacco: Never Used   Substance Use Topics    Alcohol use: Not Currently          ROS - per HPI      Objective:     General: alert, no distress   Mental  status: pe mental status_general use: normal mood, behavior, speech, dress, motor activity, and thought processes   Eyes: EOM intact, normal sclera   Mouth: mucous membranes moist   Neck: no visualized mass   Resp: PULM - obs findings: normal effort and no respiratory distress   Neuro: neuro - obs: no gross deficits   Musculoskeletal: normal ROM of neck   Skin: skin exam: no discoloration or lesions of concern on visible areas   Psychiatric: normal affect           Assessment & Plan:   Diagnoses and all orders for this visit:    1. Dehydration - Continue to work on hydration. The patient's urine was likely dark due to this. He will monitor his water intake. 2. Mite infestation - Continue Ivermectin. Follow up with derm as needed. Follow-up and Dispositions    · Return if symptoms worsen or fail to improve. Due to this being a TeleHealth evaluation, many elements of the physical examination are unable to be assessed.      Pursuant to the emergency declaration under the [facility], [address] [place] [address] and the [employer], this Virtual  Visit was conducted, with patient's consent, to reduce the patient's risk of exposure to COVID-19 and provide continuity of care for an established patient. Services were provided through a video synchronous discussion virtually to substitute for in-person clinic visit. We discussed the expected course, resolution and complications of the diagnosis(es) in detail. Medication risks, benefits, costs, interactions, and alternatives were discussed as indicated. I advised him to contact the office if his condition worsens, changes or fails to improve as anticipated. He expressed understanding with the diagnosis(es) and plan.      Mary Grace Lyles MD

## 2020-07-10 ENCOUNTER — OFFICE VISIT (OUTPATIENT)
Dept: INTERNAL MEDICINE CLINIC | Age: 60
End: 2020-07-10

## 2020-07-10 VITALS — WEIGHT: 255.2 LBS | BODY MASS INDEX: 29.53 KG/M2 | HEIGHT: 78 IN | TEMPERATURE: 97.1 F

## 2020-07-10 DIAGNOSIS — E78.2 MIXED HYPERLIPIDEMIA: ICD-10-CM

## 2020-07-10 DIAGNOSIS — F41.9 ANXIETY: ICD-10-CM

## 2020-07-10 DIAGNOSIS — I10 ESSENTIAL HYPERTENSION: ICD-10-CM

## 2020-07-10 DIAGNOSIS — Z00.00 WELL ADULT EXAM: Primary | ICD-10-CM

## 2020-07-10 DIAGNOSIS — S76.212A INGUINAL STRAIN, LEFT, INITIAL ENCOUNTER: ICD-10-CM

## 2020-07-10 DIAGNOSIS — R80.1 PERSISTENT PROTEINURIA: ICD-10-CM

## 2020-07-10 DIAGNOSIS — E11.21 TYPE 2 DIABETES WITH NEPHROPATHY (HCC): ICD-10-CM

## 2020-07-10 LAB
BILIRUB UR QL STRIP: NORMAL
GLUCOSE UR-MCNC: NORMAL MG/DL
KETONES P FAST UR STRIP-MCNC: NORMAL MG/DL
PH UR STRIP: 5.5 [PH] (ref 4.6–8)
PROT UR QL STRIP: NORMAL
SP GR UR STRIP: 1.03 (ref 1–1.03)
UA UROBILINOGEN AMB POC: NORMAL (ref 0.2–1)
URINALYSIS CLARITY POC: NORMAL
URINALYSIS COLOR POC: NORMAL
URINE BLOOD POC: NEGATIVE
URINE LEUKOCYTES POC: NEGATIVE
URINE NITRITES POC: NEGATIVE

## 2020-07-10 RX ORDER — PROPRANOLOL HYDROCHLORIDE 10 MG/1
10 TABLET ORAL 3 TIMES DAILY
COMMUNITY
Start: 2020-06-17 | End: 2020-09-17

## 2020-07-10 RX ORDER — ALPRAZOLAM 1 MG/1
1 TABLET ORAL 3 TIMES DAILY
COMMUNITY
Start: 2020-07-01

## 2020-07-10 RX ORDER — CYCLOBENZAPRINE HCL 5 MG
5 TABLET ORAL
Qty: 30 TAB | Refills: 0 | Status: SHIPPED | OUTPATIENT
Start: 2020-07-10 | End: 2020-10-08 | Stop reason: ALTCHOICE

## 2020-07-10 NOTE — PATIENT INSTRUCTIONS
Please have your labs done. I will also want you to see a nephrologist.  Please call the specialist to set up a an appointment.

## 2020-07-10 NOTE — PROGRESS NOTES
Chief Complaint   Patient presents with    Complete Physical     1. Have you been to the ER, urgent care clinic since your last visit? Hospitalized since your last visit? No    2. Have you seen or consulted any other health care providers outside of the [address] [place] [address] since your last visit? Include any pap smears or colon screening. Yes Where: [facility] Reason for visit: virtual visit    Last Saturday, feel like pulled muscle in groin area.  Pain has worsen

## 2020-07-10 NOTE — PROGRESS NOTES
Comprehensive Physical Examination    Tarah Sales is a 64 y.o. male. he presents for a comprehensive physical examination. He has multiple ongoing complaints. He reports having continual issues with itching rashes which he feels are still due to mites in his skin. He notes having been given Ivermectin by dermatology with improvement but notes that he still has some irritation. He is frustrated by the lack of total resolution. He also complains of groin pain which has been present for the past week. He denies injury or straining to lift anything heavy. He is concerned about the possibility of a kidney stone. He remains on medications for diabetes but notes that he does not check his sugars. He has had an elevated A1c at this last testing. He notes that he tries to eat well. Of note, on his last urinalysis, he had significant proteinuria. Thought due to dehydration (hyaline casts)  We discussed that we should repeat this. Patient Active Problem List    Diagnosis Date Noted    Acute pancreatitis 11/29/2011     Priority: 1 - One    Type 2 diabetes mellitus with diabetic neuropathy ([facility] 04/08/2019    Lumbar spinal stenosis 08/29/2018    Dysuria 04/25/2018    Essential hypertension 03/15/2018    Type 2 diabetes with nephropathy ([facility] 02/21/2018    Type 2 diabetes mellitus with hyperglycemia, with long-term current use of insulin ([facility] 08/29/2017    Anxiety 08/24/2017    Substance induced mood disorder ([facility] 08/24/2017    Benzodiazepine dependence, continuous ([facility] 08/24/2017    Polysubstance dependence ([facility] 08/24/2017     Current Outpatient Medications   Medication Sig Dispense Refill    propranoloL (INDERAL) 10 mg tablet Take 10 mg by mouth three (3) times daily.  ALPRAZolam (XANAX) 1 mg tablet Take 1 mg by mouth three (3) times daily.       amLODIPine (NORVASC) 10 mg tablet TAKE ONE TABLET BY MOUTH DAILY 90 Tab 1    triamcinolone acetonide (KENALOG) 0.1 % topical cream       gabapentin (NEURONTIN) 300 mg capsule TAKE ONE CAPSULE BY MOUTH THREE TIMES A DAY 90 Cap 1    Basaglar KwikPen U-100 Insulin 100 unit/mL (3 mL) inpn INJECT 28 UNITS UNDER THE SKIN ONCE DAILY 9 Adjustable Dose Pre-filled Pen Syringe 2    metFORMIN (GLUCOPHAGE) 500 mg tablet Take 2 Tabs by mouth two (2) times daily (with meals). 360 Tab 1    lisinopriL (PRINIVIL, ZESTRIL) 40 mg tablet TAKE ONE TABLET BY MOUTH DAILY 90 Tab 1    polyethylene glycol (MIRALAX) 17 gram/dose powder MIX 17 GRAMS IN 4 TO 8 OUNCES OF LIQUID AND DRINK ONCE DAILY AS NEEDED FOR CONSTIPATION 510 g 0    Insulin Needles, Disposable, (PEN NEEDLE) 32 gauge x 5/32\" ndle Use to inject insulin daily 100 Pen Needle 3    fluticasone propionate (FLONASE) 50 mcg/actuation nasal spray 2 Sprays by Both Nostrils route daily. 1 Bottle 3    docusate sodium (COLACE) 100 mg capsule Take 100 mg by mouth two (2) times daily as needed for Constipation.  METHADONE HCL (METHADONE PO) Take 100 mg by mouth daily.  permethrin (ACTICIN) 5 % topical cream Apply  to affected area now for 1 dose. Apply a thin layer all over from neck to toes. Leave on for 8-14 hours, wash off by showering. 60 g 0    clobetasol (TEMOVATE) 0.05 % topical cream Apply  to affected area two (2) times a day.  30 g 0     Allergies   Allergen Reactions    Senna Other (comments)     cramps     Past Medical History:   Diagnosis Date    Alcohol abuse, in remission     Chronic kidney disease     PLAGUE L RENAL ARTERY    Chronic obstructive pulmonary disease (HCC)     Chronic pain     Constipation     Diabetes (HCC)     Type II    GERD (gastroesophageal reflux disease)     Hepatitis C     Hypertension     Liver disease     HEP C TOOK HARVONI    Pancreatitis     Psychiatric disorder     PANIC ATTACKS    Smoker     Sp[facility]yl[facility]      Past Surgical History:   Procedure Laterality Date    COLONOSCOPY N/A 12/19/2019    [facility] by Sherrell Singleton., [facility] at [facility] ENDOSCOPY    HX APPENDECTOMY      HX ORTHOPAEDIC      3 back surgeries    HX ORTHOPAEDIC      KNEE SURGERY    HX TONSILLECTOMY       Family History   Problem Relation Age of Onset    Hypertension Mother     COPD Mother     Other Father         PALSY     Social History     Tobacco Use    Smoking status: Current Every Day Smoker     Packs/day: 1.00    Smokeless tobacco: Never Used   Substance Use Topics    Alcohol use: Not Currently        Health Maintenance   Topic Date Due    Pneumococcal 0-64 years (1 of 1 - PPSV23) 01/19/1968    Foot Exam Q1  01/19/1972    DTaP/Tdap/Td series (1 - Tdap) 01/19/1983    Shingrix Vaccine Age 50> (1 of 2) 01/19/2012    FOBT Q1Y Age 50-75  07/12/2018    Eye Exam Retinal or Dilated  04/20/2019    A1C test (Diabetic or Prediabetic)  05/19/2020    MICROALBUMIN Q1  06/13/2020    Influenza Age 9 to Adult  08/01/2020    Lipid Screen  02/19/2021    Hepatitis C Screening  Completed         Review of Systems   Constitutional: Negative. Respiratory: Negative. Cardiovascular: Negative. Genitourinary: Negative. Visit Vitals  Temp 97.1 °F (36.2 °C) (Temporal)   Ht 6' 6\" (1.981 m)   Wt 255 lb 3.2 oz (115.8 kg)   BMI 29.49 kg/m²       Physical Exam  Constitutional:       Appearance: Normal appearance. Cardiovascular:      Rate and Rhythm: Normal rate and regular rhythm. Pulmonary:      Effort: Pulmonary effort is normal.      Breath sounds: Normal breath sounds. Neurological:      Mental Status: He is alert. ASSESSMENT/PLAN  Diagnoses and all orders for this visit:    1. Well adult exam    2. Type 2 diabetes with nephropathy (HCC)  -     HEMOGLOBIN A1C WITH EAG    3. Essential hypertension  -     METABOLIC PANEL, COMPREHENSIVE  -     CBC WITH AUTOMATED DIFF    4. Anxiety    5.  Mixed hyperlipidemia  -     LIPID PANEL    6. Persistent proteinuria  -     PROTEIN ELECTROPHORESIS  -     AMB POC URINALYSIS DIP STICK AUTO W/O MICRO  - CULTURE, URINE  -     REFERRAL TO NEPHROLOGY    7. Inguinal strain, left, initial encounter  -     cyclobenzaprine (FLEXERIL) 5 mg tablet; Take 1 Tab by mouth three (3) times daily as needed for Muscle Spasm(s). Follow-up and Dispositions    · Return in about 3 months (around 10/10/2020) for Follow up.

## 2020-07-11 LAB — BACTERIA UR CULT: NORMAL

## 2020-07-13 ENCOUNTER — TELEPHONE (OUTPATIENT)
Dept: INTERNAL MEDICINE CLINIC | Age: 60
End: 2020-07-13

## 2020-07-13 NOTE — TELEPHONE ENCOUNTER
nephrology specialist [phone]     fax [phone]    Requesting last two office notes and last two labs ins information and demographics to be faxed to them before the can schedule appt for this pt

## 2020-07-15 DIAGNOSIS — E11.21 TYPE 2 DIABETES WITH NEPHROPATHY (HCC): ICD-10-CM
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
## 2020-07-15 DIAGNOSIS — Z79.4 TYPE 2 DIABETES MELLITUS WITH HYPERGLYCEMIA, WITH LONG-TERM CURRENT USE OF INSULIN (HCC): ICD-10-CM

## 2020-07-15 DIAGNOSIS — E11.65 TYPE 2 DIABETES MELLITUS WITH HYPERGLYCEMIA, WITH LONG-TERM CURRENT USE OF INSULIN (HCC): ICD-10-CM

## 2020-07-17 LAB
ALBUMIN SERPL ELPH-MCNC: 3.5 G/DL (ref 2.9–4.4)
ALBUMIN SERPL-MCNC: 4.4 G/DL (ref 3.8–4.9)
ALBUMIN/GLOB SERPL: 0.9 {RATIO} (ref 0.7–1.7)
ALBUMIN/GLOB SERPL: 1.5 {RATIO} (ref 1.2–2.2)
ALP SERPL-CCNC: 70 IU/L (ref 39–117)
ALPHA1 GLOB SERPL ELPH-MCNC: 0.3 G/DL (ref 0–0.4)
ALPHA2 GLOB SERPL ELPH-MCNC: 1.4 G/DL (ref 0.4–1)
ALT SERPL-CCNC: 8 IU/L (ref 0–44)
AST SERPL-CCNC: 11 IU/L (ref 0–40)
B-GLOBULIN SERPL ELPH-MCNC: 1 G/DL (ref 0.7–1.3)
BASOPHILS # BLD AUTO: 0.1 X10E3/UL (ref 0–0.2)
BASOPHILS NFR BLD AUTO: 1 %
BILIRUB SERPL-MCNC: 0.3 MG/DL (ref 0–1.2)
BUN SERPL-MCNC: 15 MG/DL (ref 6–24)
BUN/CREAT SERPL: 16 (ref 9–20)
CALCIUM SERPL-MCNC: 9.3 MG/DL (ref 8.7–10.2)
CHLORIDE SERPL-SCNC: 93 MMOL/L (ref 96–106)
CHOLEST SERPL-MCNC: 222 MG/DL (ref 100–199)
CO2 SERPL-SCNC: 25 MMOL/L (ref 20–29)
CREAT SERPL-MCNC: 0.95 MG/DL (ref 0.76–1.27)
EOSINOPHIL # BLD AUTO: 0.2 X10E3/UL (ref 0–0.4)
EOSINOPHIL NFR BLD AUTO: 2 %
ERYTHROCYTE [DISTWIDTH] IN BLOOD BY AUTOMATED COUNT: 13.3 % (ref 11.6–15.4)
EST. AVERAGE GLUCOSE BLD GHB EST-MCNC: 197 MG/DL
GAMMA GLOB SERPL ELPH-MCNC: 1.1 G/DL (ref 0.4–1.8)
GLOBULIN SER CALC-MCNC: 2.9 G/DL (ref 1.5–4.5)
GLOBULIN SER CALC-MCNC: 3.8 G/DL (ref 2.2–3.9)
GLUCOSE SERPL-MCNC: 205 MG/DL (ref 65–99)
HBA1C MFR BLD: 8.5 % (ref 4.8–5.6)
HCT VFR BLD AUTO: 40.7 % (ref 37.5–51)
HDLC SERPL-MCNC: 42 MG/DL
HGB BLD-MCNC: 13.5 G/DL (ref 13–17.7)
IMM GRANULOCYTES # BLD AUTO: 0 X10E3/UL (ref 0–0.1)
IMM GRANULOCYTES NFR BLD AUTO: 0 %
LDLC SERPL CALC-MCNC: 136 MG/DL (ref 0–99)
LYMPHOCYTES # BLD AUTO: 1.8 X10E3/UL (ref 0.7–3.1)
LYMPHOCYTES NFR BLD AUTO: 23 %
M PROTEIN SERPL ELPH-MCNC: ABNORMAL G/DL
MCH RBC QN AUTO: 27.6 PG (ref 26.6–33)
MCHC RBC AUTO-ENTMCNC: 33.2 G/DL (ref 31.5–35.7)
MCV RBC AUTO: 83 FL (ref 79–97)
MONOCYTES # BLD AUTO: 0.4 X10E3/UL (ref 0.1–0.9)
MONOCYTES NFR BLD AUTO: 5 %
NEUTROPHILS # BLD AUTO: 5.2 X10E3/UL (ref 1.4–7)
NEUTROPHILS NFR BLD AUTO: 69 %
PLATELET # BLD AUTO: 326 X10E3/UL (ref 150–450)
PLEASE NOTE, 011150: ABNORMAL
POTASSIUM SERPL-SCNC: 5.1 MMOL/L (ref 3.5–5.2)
PROT SERPL-MCNC: 7.3 G/DL (ref 6–8.5)
RBC # BLD AUTO: 4.89 X10E6/UL (ref 4.14–5.8)
SODIUM SERPL-SCNC: 136 MMOL/L (ref 134–144)
TRIGL SERPL-MCNC: 218 MG/DL (ref 0–149)
VLDLC SERPL CALC-MCNC: 44 MG/DL (ref 5–40)
WBC # BLD AUTO: 7.6 X10E3/UL (ref 3.4–10.8)

## 2020-07-17 RX ORDER — PEN NEEDLE, DIABETIC 31 GX3/16"
NEEDLE, DISPOSABLE MISCELLANEOUS
Qty: 30 PEN NEEDLE | Refills: 2 | Status: SHIPPED | OUTPATIENT
Start: 2020-07-17 | End: 2020-09-14 | Stop reason: SDUPTHER

## 2020-07-17 RX ORDER — GABAPENTIN 300 MG/1
CAPSULE ORAL
Qty: 90 CAP | Refills: 2 | Status: SHIPPED | OUTPATIENT
Start: 2020-07-17 | End: 2020-10-27

## 2020-07-17 RX ORDER — INSULIN GLARGINE 100 [IU]/ML
INJECTION, SOLUTION SUBCUTANEOUS
Qty: 9 ADJUSTABLE DOSE PRE-FILLED PEN SYRINGE | Refills: 1 | Status: SHIPPED | OUTPATIENT
Start: 2020-07-17 | End: 2020-09-14 | Stop reason: SDUPTHER

## 2020-08-14 ENCOUNTER — HOSPITAL ENCOUNTER (OUTPATIENT)
Dept: ULTRASOUND IMAGING | Age: 60
Discharge: HOME | End: 2020-08-14
Attending: INTERNAL MEDICINE
Payer: MEDICAID

## 2020-08-14 DIAGNOSIS — N18.2 CHRONIC KIDNEY DISEASE, STAGE II (MILD): ICD-10-CM

## 2020-08-14 PROCEDURE — 76770 US EXAM ABDO BACK WALL COMP: CPT

## 2020-09-14 DIAGNOSIS — Z79.4 TYPE 2 DIABETES MELLITUS WITH HYPERGLYCEMIA, WITH LONG-TERM CURRENT USE OF INSULIN (HCC): ICD-10-CM

## 2020-09-14 DIAGNOSIS — E11.65 TYPE 2 DIABETES MELLITUS WITH HYPERGLYCEMIA, WITH LONG-TERM CURRENT USE OF INSULIN (HCC): ICD-10-CM

## 2020-09-14 DIAGNOSIS — E11.21 TYPE 2 DIABETES WITH NEPHROPATHY (HCC): ICD-10-CM

## 2020-09-17 RX ORDER — INSULIN GLARGINE 100 [IU]/ML
INJECTION, SOLUTION SUBCUTANEOUS
Qty: 9 ADJUSTABLE DOSE PRE-FILLED PEN SYRINGE | Refills: 1 | Status: SHIPPED | OUTPATIENT
Start: 2020-09-17 | End: 2020-11-12 | Stop reason: SDUPTHER

## 2020-09-17 RX ORDER — PEN NEEDLE, DIABETIC 31 GX3/16"
NEEDLE, DISPOSABLE MISCELLANEOUS
Qty: 30 PEN NEEDLE | Refills: 2 | Status: SHIPPED | OUTPATIENT
Start: 2020-09-17 | End: 2021-03-11 | Stop reason: SDUPTHER

## 2020-10-27 ENCOUNTER — TELEPHONE (OUTPATIENT)
Dept: INTERNAL MEDICINE CLINIC | Age: 60
End: 2020-10-27

## 2020-10-27 NOTE — TELEPHONE ENCOUNTER
Patient called to check on his requests for refills of his gabapentin and propranalol -- is out of both meds.

## 2020-11-12 DIAGNOSIS — J30.9 ALLERGIC RHINITIS, UNSPECIFIED SEASONALITY, UNSPECIFIED TRIGGER: ICD-10-CM

## 2020-11-12 DIAGNOSIS — E11.21 TYPE 2 DIABETES WITH NEPHROPATHY (HCC): ICD-10-CM

## 2020-11-12 RX ORDER — INSULIN GLARGINE 100 [IU]/ML
INJECTION, SOLUTION SUBCUTANEOUS
Qty: 9 ADJUSTABLE DOSE PRE-FILLED PEN SYRINGE | Refills: 1 | Status: CANCELLED | OUTPATIENT
Start: 2020-11-12

## 2020-11-12 RX ORDER — INSULIN GLARGINE 100 [IU]/ML
32 INJECTION, SOLUTION SUBCUTANEOUS DAILY
Qty: 5 PEN | Refills: 0 | Status: SHIPPED | OUTPATIENT
Start: 2020-11-12 | End: 2020-12-14

## 2020-11-12 RX ORDER — FLUTICASONE PROPIONATE 50 MCG
2 SPRAY, SUSPENSION (ML) NASAL DAILY
Qty: 1 BOTTLE | Refills: 3 | Status: CANCELLED | OUTPATIENT
Start: 2020-11-12

## 2020-11-12 RX ORDER — FLUTICASONE PROPIONATE 50 MCG
2 SPRAY, SUSPENSION (ML) NASAL DAILY
Qty: 1 BOTTLE | Refills: 3 | Status: SHIPPED | OUTPATIENT
Start: 2020-11-12 | End: 2021-11-16

## 2020-11-12 NOTE — TELEPHONE ENCOUNTER
If possible, pt would like to go back to 5 pens with a refill (Denia Peterson)-- says that at some point, the script was changed to 3 pens and he runs out too quickly.
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
## 2020-12-18 RX ORDER — PROPRANOLOL HYDROCHLORIDE 10 MG/1
TABLET ORAL
Qty: 90 TAB | Refills: 0 | Status: SHIPPED | OUTPATIENT
Start: 2020-12-18 | End: 2021-01-26 | Stop reason: SDUPTHER

## 2020-12-18 RX ORDER — GABAPENTIN 300 MG/1
CAPSULE ORAL
Qty: 90 CAP | Refills: 0 | Status: SHIPPED | OUTPATIENT
Start: 2020-12-18 | End: 2021-01-26 | Stop reason: SDUPTHER

## 2021-01-08 ENCOUNTER — APPOINTMENT (OUTPATIENT)
Dept: CT IMAGING | Age: 61
End: 2021-01-08
Attending: EMERGENCY MEDICINE
Payer: MEDICAID

## 2021-01-08 ENCOUNTER — APPOINTMENT (OUTPATIENT)
Dept: GENERAL RADIOLOGY | Age: 61
End: 2021-01-08
Attending: EMERGENCY MEDICINE
Payer: MEDICAID

## 2021-01-08 ENCOUNTER — HOSPITAL ENCOUNTER (EMERGENCY)
Age: 61
Discharge: HOME | End: 2021-01-08
Attending: EMERGENCY MEDICINE | Admitting: EMERGENCY MEDICINE
Payer: MEDICAID

## 2021-01-08 VITALS
OXYGEN SATURATION: 94 % | HEART RATE: 59 BPM | SYSTOLIC BLOOD PRESSURE: 138 MMHG | RESPIRATION RATE: 18 BRPM | DIASTOLIC BLOOD PRESSURE: 86 MMHG | TEMPERATURE: 98.8 F

## 2021-01-08 DIAGNOSIS — F41.9 ANXIETY DISORDER, UNSPECIFIED TYPE: ICD-10-CM

## 2021-01-08 DIAGNOSIS — M25.512 PAIN OF LEFT STERNOCLAVICULAR JOINT: Primary | ICD-10-CM

## 2021-01-08 DIAGNOSIS — R07.89 ATYPICAL CHEST PAIN: ICD-10-CM

## 2021-01-08 LAB
ALBUMIN SERPL-MCNC: 3.5 G/DL (ref 3.5–5)
ALBUMIN/GLOB SERPL: 0.7 {RATIO} (ref 1.1–2.2)
ALP SERPL-CCNC: 77 U/L (ref 45–117)
ALT SERPL-CCNC: 16 U/L (ref 12–78)
ANION GAP SERPL CALC-SCNC: 4 MMOL/L (ref 5–15)
AST SERPL-CCNC: 12 U/L (ref 15–37)
ATRIAL RATE: 66 BPM
BILIRUB SERPL-MCNC: 0.4 MG/DL (ref 0.2–1)
BUN SERPL-MCNC: 14 MG/DL (ref 6–20)
BUN/CREAT SERPL: 12 (ref 12–20)
CALCIUM SERPL-MCNC: 8.9 MG/DL (ref 8.5–10.1)
CALCULATED P AXIS, ECG09: 19 DEGREES
CALCULATED R AXIS, ECG10: -52 DEGREES
CALCULATED T AXIS, ECG11: 7 DEGREES
CHLORIDE SERPL-SCNC: 100 MMOL/L (ref 97–108)
CO2 SERPL-SCNC: 29 MMOL/L (ref 21–32)
COMMENT, HOLDF: NORMAL
CREAT SERPL-MCNC: 1.21 MG/DL (ref 0.7–1.3)
DIAGNOSIS, 93000: NORMAL
GLOBULIN SER CALC-MCNC: 4.7 G/DL (ref 2–4)
GLUCOSE SERPL-MCNC: 172 MG/DL (ref 65–100)
P-R INTERVAL, ECG05: 156 MS
POTASSIUM SERPL-SCNC: 4.4 MMOL/L (ref 3.5–5.1)
PROT SERPL-MCNC: 8.2 G/DL (ref 6.4–8.2)
Q-T INTERVAL, ECG07: 406 MS
QRS DURATION, ECG06: 100 MS
QTC CALCULATION (BEZET), ECG08: 425 MS
SAMPLES BEING HELD,HOLD: NORMAL
SODIUM SERPL-SCNC: 133 MMOL/L (ref 136–145)
TROPONIN I SERPL-MCNC: <0.05 NG/ML
TROPONIN I SERPL-MCNC: <0.05 NG/ML
VENTRICULAR RATE, ECG03: 66 BPM

## 2021-01-08 PROCEDURE — 99285 EMERGENCY DEPT VISIT HI MDM: CPT

## 2021-01-08 PROCEDURE — 84484 ASSAY OF TROPONIN QUANT: CPT

## 2021-01-08 PROCEDURE — 74011250637 HC RX REV CODE- 250/637: Performed by: EMERGENCY MEDICINE

## 2021-01-08 PROCEDURE — 80053 COMPREHEN METABOLIC PANEL: CPT

## 2021-01-08 PROCEDURE — 93005 ELECTROCARDIOGRAM TRACING: CPT

## 2021-01-08 PROCEDURE — 71046 X-RAY EXAM CHEST 2 VIEWS: CPT

## 2021-01-08 PROCEDURE — 36415 COLL VENOUS BLD VENIPUNCTURE: CPT

## 2021-01-08 RX ORDER — GUAIFENESIN 100 MG/5ML
324 LIQUID (ML) ORAL
Status: COMPLETED | OUTPATIENT
Start: 2021-01-08 | End: 2021-01-08

## 2021-01-08 RX ORDER — NITROGLYCERIN 0.4 MG/1
0.4 TABLET SUBLINGUAL
Status: DISCONTINUED | OUTPATIENT
Start: 2021-01-08 | End: 2021-01-09 | Stop reason: HOSPADM

## 2021-01-08 RX ADMIN — ASPIRIN 324 MG: 81 TABLET, CHEWABLE ORAL at 19:20

## 2021-01-08 NOTE — ED NOTES
Triage Note: Patient is coming in with shortness of breath for the past couple of days. Pain to the left side of neck that radiates down to the chest. Patient has been having issues with elevated blood pressure for the past couple of weeks. Patient appears to be anxious in triage.

## 2021-01-09 NOTE — ED PROVIDER NOTES
HPI     65yo M with hx of HTN, COPD, DM, GERD here with L sided neck pain x1 wk and intermittent dyspnea and chest pressure. States he started having intermittent L anterior neck pain, focal in the middle side of his neck a week ago. It then started moving down near his sternoclavicular joint, and up under his jaw. Pain is exascerbated with movements of head/neck. Feels like sharp pains. Denies any rashes, does have a couple \"pimples\" in the area. Denies any injuries. Denies neck swelling or masses. Denies ear pain, swelling, throat pain, dental pain. He has been resting most of the week. Yesterday began having episodes of anxiety requiring him to take his xanax more frequently. He has associated substernal chest pressure and dyspnea with these episodes. Denies persistent pain or dyspnea, reports chronic mild cough, denies fevers, changes in taste or smell, acute body aches, headaches. Denies LE swelling or pain. Reports smoking but denies ETOH.      Past Medical History:   Diagnosis Date    Alcohol abuse, in remission     Chronic kidney disease     PLAGUE L RENAL ARTERY    Chronic obstructive pulmonary disease (HCC)     Chronic pain     Constipation     Diabetes (HCC)     Type II    GERD (gastroesophageal reflux disease)     Hepatitis C     Hypertension     Liver disease     HEP C TOOK HARVONI    Pancreatitis     Psychiatric disorder     PANIC ATTACKS    Smoker     Spondylitis ([facility] [address]        Past Surgical History:   Procedure Laterality Date    COLONOSCOPY N/A 12/19/2019    COLONOSCOPY performed by Ladi Peña MD at [facility] ENDOSCOPY    HX APPENDECTOMY      HX ORTHOPAEDIC      3 back surgeries    HX ORTHOPAEDIC      KNEE SURGERY    HX TONSILLECTOMY           Family History:   Problem Relation Age of Onset    Hypertension Mother     COPD Mother     Other Father         PALSY       Social History     Socioeconomic History    Marital status:      Spouse name: Not on file    Number of children: Not on file    Years of education: Not on file    Highest education level: Not on file   Occupational History    Not on file   Social Needs    Financial resource strain: Not on file    Food insecurity     Worry: Not on file     Inability: Not on file    Transportation needs     Medical: Not on file     Non-medical: Not on file   Tobacco Use    Smoking status: Current Every Day Smoker     Packs/day: 1.00    Smokeless tobacco: Never Used   Substance and Sexual Activity    Alcohol use: Not Currently    Drug use: Yes     Types: Marijuana, Cocaine, Heroin     Comment: LAST USED 15YRS. AGO- TAKES METHADONE    Sexual activity: Never   Lifestyle    Physical activity     Days per week: Not on file     Minutes per session: Not on file    Stress: Not on file   Relationships    Social connections     Talks on phone: Not on file     Gets together: Not on file     Attends Orthodoxy service: Not on file     Active member of club or organization: Not on file     Attends meetings of clubs or organizations: Not on file     Relationship status: Not on file    Intimate partner violence     Fear of current or ex partner: Not on file     Emotionally abused: Not on file     Physically abused: Not on file     Forced sexual activity: Not on file   Other Topics Concern    Not on file   Social History Narrative    Not on file         ALLERGIES: Senna    Review of Systems   Constitutional: Negative for chills and fever. HENT: Positive for voice change (hoarseness, raspy). Negative for congestion, ear pain, facial swelling, mouth sores, rhinorrhea, sinus pain, sore throat and trouble swallowing. Eyes: Negative for discharge and visual disturbance. Respiratory: Positive for shortness of breath. Negative for cough. Cardiovascular: Positive for chest pain. Negative for leg swelling. Gastrointestinal: Negative for abdominal pain, constipation and diarrhea. Endocrine: Negative for polyuria.    Genitourinary: Negative for dysuria. Musculoskeletal: Positive for neck pain. Negative for neck stiffness. Skin: Negative for rash. Allergic/Immunologic: Negative for immunocompromised state. Neurological: Negative for weakness, numbness and headaches. Vitals:    01/08/21 1753 01/08/21 1754 01/08/21 1918   BP: (!) 195/90 (!) 179/94 (!) 162/87   Pulse: 73  67   Resp: 20  14   Temp: 98 °F (36.7 °C)     SpO2: 96%  95%            Physical Exam  Constitutional:       General: He is not in acute distress. Appearance: He is well-developed. He is not ill-appearing. HENT:      Head: Normocephalic and atraumatic. Jaw: No trismus, swelling or pain on movement. Salivary Glands: Right salivary gland is not diffusely enlarged or tender. Left salivary gland is not diffusely enlarged or tender. Right Ear: Tympanic membrane, ear canal and external ear normal.      Left Ear: Tympanic membrane, ear canal and external ear normal.      Nose: Nose normal.      Mouth/Throat:      Mouth: Mucous membranes are moist. No oral lesions. Dentition: Dental caries present. No dental abscesses. Tongue: No lesions. Palate: No mass and lesions. Pharynx: Oropharynx is clear. Uvula midline. Comments: Multiple extracted teeth  Eyes:      General:         Right eye: No discharge. Left eye: No discharge. Conjunctiva/sclera: Conjunctivae normal.   Neck:      Musculoskeletal: Normal range of motion and neck supple. Normal range of motion. Pain with movement present. No erythema, crepitus, torticollis, spinous process tenderness or muscular tenderness. Thyroid: No thyromegaly or thyroid tenderness. Trachea: Phonation normal. No tracheal deviation. Comments: No palpable masses  No tender lymphadenopathy, some L sided palpable lymphnodes small and mobile  Cardiovascular:      Rate and Rhythm: Normal rate and regular rhythm. Pulses: Normal pulses.       Heart sounds: Normal heart sounds. Pulmonary:      Effort: Pulmonary effort is normal.      Breath sounds: Normal breath sounds. Abdominal:      General: There is no distension. Palpations: Abdomen is soft. Tenderness: There is no abdominal tenderness. Musculoskeletal: Normal range of motion. General: No swelling, tenderness or deformity. Skin:     General: Skin is warm and dry. Capillary Refill: Capillary refill takes less than 2 seconds. Neurological:      General: No focal deficit present. Mental Status: He is alert and oriented to person, place, and time. Motor: No weakness. Gait: Gait normal.   Psychiatric:         Behavior: Behavior normal.      Comments: Reports Anxiety          MDM     Differential includes mass, malignancy, lymphadenopathy, muscle spasm, torticollis, dental abscess, AOM, salivary gland infection; ACS, dysrhythmia, anxiety, COPD, pneumonia, pulm edema    Pt has mild hypertension and reports acute on chronic anxiety. Description of CP more c/w anxiety and panic. Description less c/w ACS, dysrhythmia, PE or other acute cardiopulmonary event. Normal cardiopulmonary exam. CMP is WNL, CBC pending. Trop neg x1, EKG     HEENT exam shows no signs of infection, no palpable masses but he is heavy smoker, reports vocal changes, has some lymphnodes enlarged compared to the other side. Will obtain CT neck to further evaluate. Pt declined any pain, anxiety or nausea medication on my initial exam.     ED EKG interpretation:  Rhythm: normal sinus rhythm; and regular . Rate (approx.): 66; Axis: left axis deviation; P wave: normal; QRS interval: normal ; ST/T wave: normal; in  Lead: ; Other findings: unchanged from previous ekg. EKG documented and interpreted by Landon Carranza MD.       Procedures      11:28 PM  I was called to pts bedside because they are having difficulty getting IV access for the CT scan and the CBC. Has had over 5 attempts. His repeat troponin was negative.  He states he is feeling much better, has had no further episodes of chest pain, no dyspnea, that he is certain the symptoms he was having earlier was a panic attack. His neck pain has also significantly improved. He only has slight pain at the [facility] joint when he touches it. He requests to be d/c home to f/u with PCP. I discussed the possibility of not diagnosing a malignancy, tumor; less likely infection given his physical exam. I stressed importance of f/u with his PCP on Monday to continue the workup, but do not think emergent imaging is necessary given his exam, hx and interval improvement. VS improved, BP normal. Discussed specific return precautions.

## 2021-01-13 ENCOUNTER — TELEPHONE (OUTPATIENT)
Dept: INTERNAL MEDICINE CLINIC | Age: 61
End: 2021-01-13

## 2021-01-13 NOTE — TELEPHONE ENCOUNTER
Kelechi Kamara" (Self) [phone] (H)     Pt says that he sent dr Rene Yuan a my chart mess on 1/9/21 and wants to be sure that he has seen this mess, because he has not gotten a response yet.

## 2021-01-14 ENCOUNTER — OFFICE VISIT (OUTPATIENT)
Dept: INTERNAL MEDICINE CLINIC | Age: 61
End: 2021-01-14
Payer: MEDICAID

## 2021-01-14 VITALS
WEIGHT: 259 LBS | OXYGEN SATURATION: 96 % | BODY MASS INDEX: 29.97 KG/M2 | HEIGHT: 78 IN | TEMPERATURE: 97.5 F | SYSTOLIC BLOOD PRESSURE: 160 MMHG | RESPIRATION RATE: 16 BRPM | HEART RATE: 91 BPM | DIASTOLIC BLOOD PRESSURE: 80 MMHG

## 2021-01-14 DIAGNOSIS — E78.5 ELEVATED LIPIDS: ICD-10-CM

## 2021-01-14 DIAGNOSIS — M54.2 NECK PAIN ON LEFT SIDE: Primary | ICD-10-CM

## 2021-01-14 PROCEDURE — 99214 OFFICE O/P EST MOD 30 MIN: CPT | Performed by: INTERNAL MEDICINE

## 2021-01-14 NOTE — PROGRESS NOTES
Follow Up Visit    Madonna Flores is a 64 y.o. male. he presents for Neck Pain (ER f/u)    The patient has been recently gone to the ED for a complaint of neck pain. He was referred there after contacting me. In the ED, he had had a CXR which was normal.  This in addition to cardiac enzymes that were normal.     He has ongoing neck pains which are present at the left anterior neck. He has some ROM difficulty but not severe. We discussed possible diagnoses. He had been ordered to have a CT scan in the ED but this was not done due to his not being able to establish an IV. Asking that we follow up with this. Patient Active Problem List   Diagnosis Code    Acute pancreatitis K85.90    Anxiety F41.9    Substance induced mood disorder ([facility] [address] F19.94    Benzodiazepine dependence, continuous ([facility]) F13.20    Polysubstance dependence ([facility] [address] F19.20    Type 2 diabetes mellitus with hyperglycemia, with long-term current use of insulin ([facility]) E11.65, Z79.4    Type 2 diabetes with nephropathy ([facility]) E11.21    Essential hypertension I10    Dysuria R30.0    Lumbar spinal stenosis M48.061    Type 2 diabetes mellitus with diabetic neuropathy ([facility] [address] E11.40         Prior to Admission medications    Medication Sig Start Date End Date Taking? Authorizing Provider   propranoloL (INDERAL) 10 mg tablet TAKE ONE TABLET BY MOUTH THREE TIMES A DAY 12/18/20  Yes Brit Vines MD   gabapentin (NEURONTIN) 300 mg capsule TAKE ONE CAPSULE BY MOUTH THREE TIMES A DAY 12/18/20  Yes MD Angela Noland U-100 Insulin 100 unit/mL (3 mL) inpn INJECT 32 UNITS UNDER THE SKIN DAILY AS DIRECTED 12/14/20  Yes Brit Vines MD   fluticasone propionate (FLONASE) 50 mcg/actuation nasal spray 2 Sprays by Both Nostrils route daily.  11/12/20  Yes Brit Vines MD   Insulin Needles, Disposable, (Damari Pen Needle) 32 gauge x 5/32\" ndle USE TO INJECT INSULIN DAILY AS DIRECTED BY PRESCRIBER 9/17/20  Yes Brit Vines MD ALPRAZolam (XANAX) 1 mg tablet Take 1 mg by mouth three (3) times daily. 7/1/20  Yes Provider, Historical   amLODIPine (NORVASC) 10 mg tablet TAKE ONE TABLET BY MOUTH DAILY 6/19/20  Yes Connor Chatterjee MD   triamcinolone acetonide (KENALOG) 0.1 % topical cream  5/28/20  Yes Provider, Historical   metFORMIN (GLUCOPHAGE) 500 mg tablet Take 2 Tabs by mouth two (2) times daily (with meals). 3/24/20  Yes Connor Chatterjee MD   lisinopriL (PRINIVIL, ZESTRIL) 40 mg tablet TAKE ONE TABLET BY MOUTH DAILY 3/23/20  Yes Connor Chatterjee MD   clobetasol (TEMOVATE) 0.05 % topical cream Apply  to affected area two (2) times a day. 7/8/19  Yes Connor Chatterjee MD   docusate sodium (COLACE) 100 mg capsule Take 100 mg by mouth two (2) times daily as needed for Constipation. Yes Provider, Historical   METHADONE HCL (METHADONE PO) Take 95 mg by mouth daily. Yes Provider, Historical   polyethylene glycol (MIRALAX) 17 gram/dose powder MIX 17 GRAMS IN 4 TO 8 OUNCES OF LIQUID AND DRINK ONCE DAILY AS NEEDED FOR CONSTIPATION 10/14/19   Connor Chatterjee MD         Health Maintenance   Topic Date Due    Pneumococcal 0-64 years (1 of 1 - PPSV23) 01/19/1968    Foot Exam Q1  01/19/1972    DTaP/Tdap/Td series (1 - Tdap) 01/19/1983    Shingrix Vaccine Age 50> (1 of 2) 01/19/2012    Eye Exam Retinal or Dilated  04/20/2019    MICROALBUMIN Q1  06/13/2020    Flu Vaccine (1) 09/01/2020    A1C test (Diabetic or Prediabetic)  07/15/2021    Lipid Screen  07/15/2021    Colorectal Cancer Screening Combo  12/19/2029    Hepatitis C Screening  Completed       Review of Systems   Constitutional: Negative. Respiratory: Negative. Cardiovascular: Negative. Genitourinary: Negative.             Visit Vitals  BP (!) 160/80 (BP 1 Location: Left arm, BP Patient Position: Sitting)   Pulse 91   Temp 97.5 °F (36.4 °C) (Temporal)   Resp 16   Ht 6' 6\" (1.981 m)   Wt 259 lb (117.5 kg)   SpO2 96%   BMI 29.93 kg/m²       Physical Exam  Constitutional:       Appearance: Normal appearance. Cardiovascular:      Rate and Rhythm: Normal rate and regular rhythm. Pulses: Normal pulses. Heart sounds: Normal heart sounds. Musculoskeletal:      Comments: Stiffness noted with ROM at the neck. No palpable mass. Neurological:      Mental Status: He is alert. ASSESSMENT/PLAN    Diagnoses and all orders for this visit:    1. Neck pain on left side  -     CT NECK SOFT TISSUE W CONT; Future    2. Elevated lipids  -     LIPID PANEL; Future           Follow-up and Dispositions    · Return if symptoms worsen or fail to improve.

## 2021-01-14 NOTE — PROGRESS NOTES
Chief Complaint   Patient presents with    Neck Pain     ER f/u     Reviewed record in preparation for visit and have obtained necessary documentation. Identified pt with two pt identifiers(name and ). Health Maintenance Due   Topic    Pneumococcal 0-64 years (1 of 1 - PPSV23)    Foot Exam Q1     DTaP/Tdap/Td series (1 - Tdap)    Shingrix Vaccine Age 50> (1 of 2)    Eye Exam Retinal or Dilated     MICROALBUMIN Q1     Flu Vaccine (1)         Chief Complaint   Patient presents with    Neck Pain     ER f/u        Wt Readings from Last 3 Encounters:   21 259 lb (117.5 kg)   07/10/20 255 lb 3.2 oz (115.8 kg)   20 262 lb 3.2 oz (118.9 kg)     Temp Readings from Last 3 Encounters:   21 97.5 °F (36.4 °C) (Temporal)   21 98.8 °F (37.1 °C)   07/10/20 97.1 °F (36.2 °C) (Temporal)     BP Readings from Last 3 Encounters:   21 (!) 160/80   21 138/86   20 130/80     Pulse Readings from Last 3 Encounters:   21 91   21 (!) 59   20 74           Learning Assessment:  :     Learning Assessment 2017   PRIMARY LEARNER Patient   HIGHEST LEVEL OF EDUCATION - PRIMARY LEARNER  2 YEARS OF COLLEGE   BARRIERS PRIMARY LEARNER NONE   PRIMARY LANGUAGE ENGLISH   LEARNER PREFERENCE PRIMARY LISTENING   ANSWERED BY patient    RELATIONSHIP SELF       Depression Screening:  :     3 most recent PHQ Screens 7/10/2020   Little interest or pleasure in doing things Not at all   Feeling down, depressed, irritable, or hopeless Not at all   Total Score PHQ 2 0       Fall Risk Assessment:  :     Fall Risk Assessment, last 12 mths 2018   Able to walk? Yes   Fall in past 12 months? Yes   Number of falls in past 12 months 1   Fall with injury? 0       Abuse Screening:  :     Abuse Screening Questionnaire 2018   Do you ever feel afraid of your partner? N   Are you in a relationship with someone who physically or mentally threatens you? N   Is it safe for you to go home?  Malaika Awan Coordination of Care Questionnaire:  :     1) Have you been to an emergency room, urgent care clinic since your last visit? no   Hospitalized since your last visit? no             2) Have you seen or consulted any other health care providers outside of [address] [place] [address] since your last visit? no  (Include any pap smears or colon screenings in this section.)    3) Do you have an Advance Directive on file? no    4) Are you interested in receiving information on Advance Directives? NO      Patient is accompanied by self I have received verbal consent from Brittnee Katz to discuss any/all medical information while they are present in the room. Reviewed record  In preparation for visit and have obtained necessary documentation.

## 2021-01-15 LAB
CHOLEST SERPL-MCNC: 211 MG/DL (ref 100–199)
HDLC SERPL-MCNC: 45 MG/DL
LDLC SERPL CALC-MCNC: 129 MG/DL (ref 0–99)
TRIGL SERPL-MCNC: 210 MG/DL (ref 0–149)
VLDLC SERPL CALC-MCNC: 37 MG/DL (ref 5–40)

## 2021-01-16 ENCOUNTER — HOSPITAL ENCOUNTER (OUTPATIENT)
Dept: CT IMAGING | Age: 61
Discharge: HOME | End: 2021-01-16
Attending: INTERNAL MEDICINE
Payer: MEDICAID

## 2021-01-16 DIAGNOSIS — M54.2 NECK PAIN ON LEFT SIDE: ICD-10-CM

## 2021-01-16 PROCEDURE — 74011000636 HC RX REV CODE- 636: Performed by: RADIOLOGY

## 2021-01-16 PROCEDURE — 70491 CT SOFT TISSUE NECK W/DYE: CPT

## 2021-01-16 RX ADMIN — IOPAMIDOL 100 ML: 612 INJECTION, SOLUTION INTRAVENOUS at 10:00

## 2021-01-21 DIAGNOSIS — M25.512 ACUTE PAIN OF LEFT SHOULDER: Primary | ICD-10-CM

## 2021-01-21 RX ORDER — CYCLOBENZAPRINE HCL 5 MG
5 TABLET ORAL
Qty: 30 TAB | Refills: 0 | Status: SHIPPED | OUTPATIENT
Start: 2021-01-21
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
## 2021-01-28 RX ORDER — PROPRANOLOL HYDROCHLORIDE 10 MG/1
10 TABLET ORAL 3 TIMES DAILY
Qty: 90 TAB | Refills: 0 | Status: SHIPPED | OUTPATIENT
Start: 2021-01-28 | End: 2021-02-26 | Stop reason: SDUPTHER

## 2021-01-28 RX ORDER — GABAPENTIN 300 MG/1
300 CAPSULE ORAL 3 TIMES DAILY
Qty: 90 CAP | Refills: 0 | Status: SHIPPED | OUTPATIENT
Start: 2021-01-28 | End: 2021-02-26 | Stop reason: SDUPTHER

## 2021-01-28 NOTE — TELEPHONE ENCOUNTER
Jamie Oliva \"Juliocesar Liao\" (Self) [phone] (H)     Pt says he would like to get this rx sent to pharm asap

## 2021-02-26 DIAGNOSIS — I10 HYPERTENSION, UNSPECIFIED TYPE: ICD-10-CM
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
## 2021-03-01 RX ORDER — GABAPENTIN 300 MG/1
300 CAPSULE ORAL 3 TIMES DAILY
Qty: 90 CAP | Refills: 1 | Status: SHIPPED | OUTPATIENT
Start: 2021-03-01 | End: 2021-05-07

## 2021-03-01 RX ORDER — PROPRANOLOL HYDROCHLORIDE 10 MG/1
10 TABLET ORAL 3 TIMES DAILY
Qty: 90 TAB | Refills: 1 | Status: SHIPPED | OUTPATIENT
Start: 2021-03-01 | End: 2021-05-03 | Stop reason: SDUPTHER

## 2021-03-01 RX ORDER — AMLODIPINE BESYLATE 10 MG/1
TABLET ORAL
Qty: 90 TAB | Refills: 1 | Status: SHIPPED | OUTPATIENT
Start: 2021-03-01 | End: 2021-09-13 | Stop reason: SDUPTHER

## 2021-03-11 DIAGNOSIS — Z79.4 TYPE 2 DIABETES MELLITUS WITH HYPERGLYCEMIA, WITH LONG-TERM CURRENT USE OF INSULIN (HCC): ICD-10-CM

## 2021-03-11 DIAGNOSIS — E11.65 TYPE 2 DIABETES MELLITUS WITH HYPERGLYCEMIA, WITH LONG-TERM CURRENT USE OF INSULIN (HCC): ICD-10-CM

## 2021-03-11 DIAGNOSIS — E11.21 TYPE 2 DIABETES WITH NEPHROPATHY (HCC): ICD-10-CM

## 2021-03-11 RX ORDER — INSULIN GLARGINE 100 [IU]/ML
INJECTION, SOLUTION SUBCUTANEOUS
Qty: 6 ADJUSTABLE DOSE PRE-FILLED PEN SYRINGE | Refills: 3 | Status: SHIPPED | OUTPATIENT
Start: 2021-03-11 | End: 2021-05-25 | Stop reason: SDUPTHER

## 2021-03-11 RX ORDER — PEN NEEDLE, DIABETIC 31 GX3/16"
NEEDLE, DISPOSABLE MISCELLANEOUS
Qty: 30 PEN NEEDLE | Refills: 3 | Status: SHIPPED | OUTPATIENT
Start: 2021-03-11 | End: 2021-05-25 | Stop reason: SDUPTHER

## 2021-03-11 NOTE — TELEPHONE ENCOUNTER
Xu Diaz \"Juliocesar Liao\" (Self) [phone] (H)     Refill on basaglar and insulin needle pens to [facility]. Pt wonders if she could get 3 pens of the basaglar with a refill of three?

## 2021-04-21 DIAGNOSIS — Z79.4 TYPE 2 DIABETES MELLITUS WITH HYPERGLYCEMIA, WITH LONG-TERM CURRENT USE OF INSULIN (HCC): ICD-10-CM

## 2021-04-21 DIAGNOSIS — E11.65 TYPE 2 DIABETES MELLITUS WITH HYPERGLYCEMIA, WITH LONG-TERM CURRENT USE OF INSULIN (HCC): ICD-10-CM

## 2021-04-21 RX ORDER — METFORMIN HYDROCHLORIDE 500 MG/1
TABLET ORAL
Qty: 120 TAB | Refills: 0 | Status: SHIPPED | OUTPATIENT
Start: 2021-04-21 | End: 2021-05-25 | Stop reason: SDUPTHER

## 2021-05-03 ENCOUNTER — TELEPHONE (OUTPATIENT)
Dept: INTERNAL MEDICINE CLINIC | Age: 61
End: 2021-05-03
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
## 2021-05-03 RX ORDER — PROPRANOLOL HYDROCHLORIDE 10 MG/1
10 TABLET ORAL 3 TIMES DAILY
Qty: 90 TAB | Refills: 1 | Status: SHIPPED
Start: 2021-05-03 | End: 2021-05-28

## 2021-05-03 RX ORDER — GABAPENTIN 300 MG/1
300 CAPSULE ORAL 3 TIMES DAILY
Qty: 90 CAP | Refills: 1 | Status: CANCELLED | OUTPATIENT
Start: 2021-05-03
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
## 2021-05-07 RX ORDER — GABAPENTIN 300 MG/1
CAPSULE ORAL
Qty: 90 CAP | Refills: 0 | Status: SHIPPED | OUTPATIENT
Start: 2021-05-07 | End: 2021-06-14

## 2021-05-07 NOTE — TELEPHONE ENCOUNTER
Pt calling regarding his gabapentin rx that has not been sent to pharm for him. He has two pills left and wonders if it could be sent to pharm today for him.

## 2021-05-25 DIAGNOSIS — E11.21 TYPE 2 DIABETES WITH NEPHROPATHY (HCC): ICD-10-CM

## 2021-05-25 DIAGNOSIS — Z79.4 TYPE 2 DIABETES MELLITUS WITH HYPERGLYCEMIA, WITH LONG-TERM CURRENT USE OF INSULIN (HCC): ICD-10-CM

## 2021-05-25 DIAGNOSIS — E11.65 TYPE 2 DIABETES MELLITUS WITH HYPERGLYCEMIA, WITH LONG-TERM CURRENT USE OF INSULIN (HCC): ICD-10-CM

## 2021-05-25 NOTE — TELEPHONE ENCOUNTER
Gissel See \"Juliocesar Liao\" (Self) [phone] (H)     Refill on basaglar, insulin needles and metformin to [facility]

## 2021-05-28 ENCOUNTER — OFFICE VISIT (OUTPATIENT)
Dept: INTERNAL MEDICINE CLINIC | Age: 61
End: 2021-05-28
Payer: MEDICAID

## 2021-05-28 VITALS
HEIGHT: 78 IN | BODY MASS INDEX: 29.94 KG/M2 | DIASTOLIC BLOOD PRESSURE: 80 MMHG | HEART RATE: 86 BPM | SYSTOLIC BLOOD PRESSURE: 180 MMHG | RESPIRATION RATE: 16 BRPM | WEIGHT: 258.8 LBS | TEMPERATURE: 98.2 F | OXYGEN SATURATION: 96 %

## 2021-05-28 DIAGNOSIS — E11.21 TYPE 2 DIABETES WITH NEPHROPATHY (HCC): ICD-10-CM

## 2021-05-28 DIAGNOSIS — I10 HYPERTENSION, UNSPECIFIED TYPE: ICD-10-CM

## 2021-05-28 DIAGNOSIS — F41.9 ANXIETY: ICD-10-CM

## 2021-05-28 DIAGNOSIS — E78.2 MIXED HYPERLIPIDEMIA: ICD-10-CM

## 2021-05-28 DIAGNOSIS — I10 ESSENTIAL HYPERTENSION: Primary | ICD-10-CM

## 2021-05-28 PROCEDURE — 99213 OFFICE O/P EST LOW 20 MIN: CPT | Performed by: INTERNAL MEDICINE

## 2021-05-28 RX ORDER — METFORMIN HYDROCHLORIDE 500 MG/1
TABLET ORAL
Qty: 120 TABLET | Refills: 0 | Status: SHIPPED | OUTPATIENT
Start: 2021-05-28 | End: 2021-07-16

## 2021-05-28 RX ORDER — PEN NEEDLE, DIABETIC 31 GX3/16"
NEEDLE, DISPOSABLE MISCELLANEOUS
Qty: 30 PEN NEEDLE | Refills: 3 | Status: SHIPPED | OUTPATIENT
Start: 2021-05-28 | End: 2021-07-23 | Stop reason: SDUPTHER

## 2021-05-28 RX ORDER — INSULIN GLARGINE 100 [IU]/ML
INJECTION, SOLUTION SUBCUTANEOUS
Qty: 6 ADJUSTABLE DOSE PRE-FILLED PEN SYRINGE | Refills: 3 | Status: SHIPPED | OUTPATIENT
Start: 2021-05-28 | End: 2021-08-06 | Stop reason: SDUPTHER

## 2021-05-28 RX ORDER — METOPROLOL SUCCINATE 25 MG/1
25 TABLET, EXTENDED RELEASE ORAL
Qty: 30 TABLET | Refills: 1 | Status: SHIPPED
Start: 2021-05-28 | End: 2021-06-28 | Stop reason: DRUGHIGH

## 2021-05-28 NOTE — PROGRESS NOTES
Follow Up Visit    Brittany Hein is a 63 y.o. male. he presents for Hypertension    Cardiovascular Review  The patient has hypertension. He reports taking medications as instructed, no medication side effects noted. Diet and Lifestyle: generally follows a low fat low cholesterol diet, generally follows a low sodium diet, sedentary, smoker he has been trying to decrease this intake. .  Lab review: labs reviewed and discussed with patient. Patient Active Problem List   Diagnosis Code    Acute pancreatitis K85.90    Anxiety F41.9    Substance induced mood disorder ([facility] [address] F19.94    Benzodiazepine dependence, continuous ([facility]) F13.20    [facility] F19.20    Type 2 diabetes mellitus with hyperglycemia, with long-term current use of insulin ([facility]) E11.65, Z79.4    Type 2 diabetes with nephropathy ([facility]) E11.21    Essential hypertension I10    Dysuria R30.0    Lumbar spinal stenosis M48.061    Type 2 diabetes mellitus with diabetic neuropathy ([facility] E11.40         Prior to Admission medications    Medication Sig Start Date End Date Taking? Authorizing Provider   gabapentin (NEURONTIN) 300 mg capsule TAKE ONE CAPSULE BY MOUTH THREE TIMES A DAY. MAX DAILY AMOUNT 3 CAPSULES 5/7/21  Yes Pancho MCDUFFIE NP   propranoloL (INDERAL) 10 mg tablet Take 1 Tab by mouth three (3) times daily.  TAKE ONE TABLET BY MOUTH THREE TIMES A DAY 5/3/21  Yes Arsenio Mederos MD   metFORMIN (GLUCOPHAGE) 500 mg tablet TAKE TWO TABLETS BY MOUTH TWICE A DAY WITH A MEAL 4/21/21  Yes Arsenio Mederos MD   lisinopriL (PRINIVIL, ZESTRIL) 40 mg tablet TAKE ONE TABLET BY MOUTH DAILY 3/25/21  Yes Arsenio Mederos MD   insulin glargine (Basaglar KwikPen U-100 Insulin) 100 unit/mL (3 mL) inpn INJECT 32 UNITS UNDER THE SKIN DAILY AS DIRECTED 3/11/21  Yes Arsenio Mederos MD   Insulin Needles, Disposable, (Damari Pen Needle) 32 gauge x 5/32\" ndle USE TO INJECT INSULIN DAILY AS DIRECTED BY PRESCRIBER 3/11/21  Yes Sophia Bingham Jeanine Serrano MD   amLODIPine (NORVASC) 10 mg tablet TAKE ONE TABLET BY MOUTH DAILY 3/1/21  Yes Hung Tejeda MD   ALPRAZolam Lieutenant Spell) 1 mg tablet Take 1 mg by mouth three (3) times daily. 7/1/20  Yes Provider, Historical   clobetasol (TEMOVATE) 0.05 % topical cream Apply  to affected area two (2) times a day. 7/8/19  Yes Hung Tejeda MD   docusate sodium (COLACE) 100 mg capsule Take 100 mg by mouth two (2) times daily as needed for Constipation. Yes Provider, Historical   METHADONE HCL (METHADONE PO) Take 95 mg by mouth daily. Yes Provider, Historical   cyclobenzaprine (FLEXERIL) 5 mg tablet Take 1 Tab by mouth three (3) times daily as needed for Muscle Spasm(s). Patient not taking: Reported on 5/28/2021 1/21/21   Hung Tejeda MD   fluticasone propionate [facility]) 50 mcg/actuation nasal spray 2 Sprays by Both Nostrils route daily. 11/12/20   Hung Tejeda MD   triamcinolone acetonide (KENALOG) 0.1 % topical cream  5/28/20   Provider, Historical   polyethylene glycol (MIRALAX) 17 gram/dose powder MIX 17 GRAMS IN 4 TO 8 OUNCES OF LIQUID AND DRINK ONCE DAILY AS NEEDED FOR CONSTIPATION  Patient not taking: Reported on 5/28/2021 10/14/19   Hung Tejeda MD         Health Maintenance   Topic Date Due    Pneumococcal 0-64 years (1 of 2 - PPSV23) Never done    Foot Exam Q1  Never done    COVID-19 Vaccine (1) Never done    DTaP/Tdap/Td series (1 - Tdap) Never done    Shingrix Vaccine Age 50> (1 of 2) Never done    Eye Exam Retinal or Dilated  04/20/2019    MICROALBUMIN Q1  06/13/2020    A1C test (Diabetic or Prediabetic)  07/15/2021    Flu Vaccine (Season Ended) 09/01/2021    Lipid Screen  01/14/2022    Colorectal Cancer Screening Combo  12/19/2029    Hepatitis C Screening  Completed       Review of Systems   Constitutional: Negative. Respiratory: Negative. Cardiovascular: Negative. Genitourinary: Negative.             Visit Vitals  BP (!) 180/80 (BP 1 Location: Left arm, BP Patient Position: Sitting, BP Cuff Size: Adult)   Pulse 86   Temp 98.2 °F (36.8 °C) (Temporal)   Resp 16   Ht 6' 6\" (1.981 m)   Wt 258 lb 12.8 oz (117.4 kg)   SpO2 96%   BMI 29.91 kg/m²       Physical Exam  Constitutional:       Appearance: He is well-developed. Cardiovascular:      Rate and Rhythm: Normal rate and regular rhythm. Pulmonary:      Effort: Pulmonary effort is normal.      Breath sounds: Normal breath sounds. ASSESSMENT/PLAN    Diagnoses and all orders for this visit:    1. Essential hypertension - Given ongoing elevated blood pressure, we will begin metoprolol. Advised the patient stop propranolol for now. -     metoprolol succinate (TOPROL-XL) 25 mg XL tablet; Take 1 Tablet by mouth nightly. 2. Type 2 diabetes with nephropathy (HCC)  -     HEMOGLOBIN A1C WITH EAG; Future    3. Hypertension, unspecified type    4. Anxiety    5. Mixed hyperlipidemia  -     LIPID PANEL; Future  -     CBC WITH AUTOMATED DIFF; Future  -     METABOLIC PANEL, COMPREHENSIVE; Future    Follow-up and Dispositions    · Return in about 1 month (around 6/28/2021) for Follow up blood pressure.

## 2021-05-28 NOTE — PROGRESS NOTES
Chief Complaint   Patient presents with    Hypertension     Reviewed record in preparation for visit and have obtained necessary documentation. Identified pt with two pt identifiers(name and ). Health Maintenance Due   Topic    Pneumococcal 0-64 years (1 of 2 - PPSV23)    Foot Exam Q1     COVID-19 Vaccine (1)    DTaP/Tdap/Td series (1 - Tdap)    Shingrix Vaccine Age 49> (1 of 2)    Eye Exam Retinal or Dilated     MICROALBUMIN Q1          Chief Complaint   Patient presents with    Hypertension        Wt Readings from Last 3 Encounters:   21 258 lb 12.8 oz (117.4 kg)   21 259 lb (117.5 kg)   07/10/20 255 lb 3.2 oz (115.8 kg)     Temp Readings from Last 3 Encounters:   21 98.2 °F (36.8 °C) (Temporal)   21 97.5 °F (36.4 °C) (Temporal)   21 98.8 °F (37.1 °C)     BP Readings from Last 3 Encounters:   21 (!) 180/80   21 (!) 160/80   21 138/86     Pulse Readings from Last 3 Encounters:   21 86   21 91   21 (!) 59           Learning Assessment:  :     Learning Assessment 2017   PRIMARY LEARNER Patient   HIGHEST LEVEL OF EDUCATION - PRIMARY LEARNER  2 YEARS OF COLLEGE   BARRIERS PRIMARY LEARNER NONE   PRIMARY LANGUAGE ENGLISH   LEARNER PREFERENCE PRIMARY LISTENING   ANSWERED BY patient    RELATIONSHIP SELF       Depression Screening:  :     3 most recent PHQ Screens 2021   Little interest or pleasure in doing things Not at all   Feeling down, depressed, irritable, or hopeless Not at all   Total Score PHQ 2 0       Fall Risk Assessment:  :     Fall Risk Assessment, last 12 mths 2018   Able to walk? Yes   Fall in past 12 months? Yes   Number of falls in past 12 months 1   Fall with injury? 0       Abuse Screening:  :     Abuse Screening Questionnaire 2018   Do you ever feel afraid of your partner? N   Are you in a relationship with someone who physically or mentally threatens you? N   Is it safe for you to go home?  Karri Mass Coordination of Care Questionnaire:  :     1) Have you been to an emergency room, urgent care clinic since your last visit? no   Hospitalized since your last visit? no             2) Have you seen or consulted any other health care providers outside of [address] [place] [address] since your last visit? yes  (Include any pap smears or colon screenings in this section.)    3) Do you have an Advance Directive on file? no    4) Are you interested in receiving information on Advance Directives? NO      Patient is accompanied by self I have received verbal consent from Chencho Desai to discuss any/all medical information while they are present in the room. Reviewed record  In preparation for visit and have obtained necessary documentation.
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
## 2021-06-14 ENCOUNTER — TELEPHONE (OUTPATIENT)
Dept: INTERNAL MEDICINE CLINIC | Age: 61
End: 2021-06-14
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
## 2021-06-14 RX ORDER — GABAPENTIN 300 MG/1
CAPSULE ORAL
Qty: 90 CAPSULE | Refills: 0 | Status: CANCELLED | OUTPATIENT
Start: 2021-06-14

## 2021-06-14 RX ORDER — GABAPENTIN 300 MG/1
CAPSULE ORAL
Qty: 90 CAPSULE | Refills: 0 | Status: SHIPPED | OUTPATIENT
Start: 2021-06-14 | End: 2021-07-23 | Stop reason: SDUPTHER

## 2021-06-28 ENCOUNTER — OFFICE VISIT (OUTPATIENT)
Dept: INTERNAL MEDICINE CLINIC | Age: 61
End: 2021-06-28
Payer: MEDICAID

## 2021-06-28 VITALS
RESPIRATION RATE: 16 BRPM | TEMPERATURE: 98.1 F | DIASTOLIC BLOOD PRESSURE: 80 MMHG | HEIGHT: 78 IN | OXYGEN SATURATION: 95 % | HEART RATE: 85 BPM | WEIGHT: 261.4 LBS | BODY MASS INDEX: 30.24 KG/M2 | SYSTOLIC BLOOD PRESSURE: 160 MMHG

## 2021-06-28 DIAGNOSIS — I10 ESSENTIAL HYPERTENSION: Primary | ICD-10-CM

## 2021-06-28 PROCEDURE — 99213 OFFICE O/P EST LOW 20 MIN: CPT | Performed by: INTERNAL MEDICINE

## 2021-06-28 RX ORDER — METOPROLOL SUCCINATE 50 MG/1
50 TABLET, EXTENDED RELEASE ORAL DAILY
Qty: 30 TABLET | Refills: 3 | Status: SHIPPED | OUTPATIENT
Start: 2021-06-28 | End: 2021-08-06 | Stop reason: SDUPTHER

## 2021-06-28 NOTE — PROGRESS NOTES
Chief Complaint   Patient presents with    Hypertension     Reviewed record in preparation for visit and have obtained necessary documentation. Identified pt with two pt identifiers(name and ). Health Maintenance Due   Topic    Pneumococcal 0-64 years (1 of 2 - PPSV23)    Foot Exam Q1     COVID-19 Vaccine (1)    DTaP/Tdap/Td series (1 - Tdap)    Shingrix Vaccine Age 49> (1 of 2)    Eye Exam Retinal or Dilated     MICROALBUMIN Q1          Chief Complaint   Patient presents with    Hypertension        Wt Readings from Last 3 Encounters:   21 261 lb 6.4 oz (118.6 kg)   21 258 lb 12.8 oz (117.4 kg)   21 259 lb (117.5 kg)     Temp Readings from Last 3 Encounters:   21 98.1 °F (36.7 °C) (Temporal)   21 98.2 °F (36.8 °C) (Temporal)   21 97.5 °F (36.4 °C) (Temporal)     BP Readings from Last 3 Encounters:   21 (!) 160/80   21 (!) 180/80   21 (!) 160/80     Pulse Readings from Last 3 Encounters:   21 85   21 86   21 91           Learning Assessment:  :     Learning Assessment 2017   PRIMARY LEARNER Patient   HIGHEST LEVEL OF EDUCATION - PRIMARY LEARNER  2 YEARS OF COLLEGE   BARRIERS PRIMARY LEARNER NONE   PRIMARY LANGUAGE ENGLISH   LEARNER PREFERENCE PRIMARY LISTENING   ANSWERED BY patient    RELATIONSHIP SELF       Depression Screening:  :     3 most recent PHQ Screens 2021   Little interest or pleasure in doing things Not at all   Feeling down, depressed, irritable, or hopeless Not at all   Total Score PHQ 2 0       Fall Risk Assessment:  :     Fall Risk Assessment, last 12 mths 2018   Able to walk? Yes   Fall in past 12 months? Yes   Number of falls in past 12 months 1   Fall with injury? 0       Abuse Screening:  :     Abuse Screening Questionnaire 2018   Do you ever feel afraid of your partner? N   Are you in a relationship with someone who physically or mentally threatens you?  N   Is it safe for you to go home? Y       Coordination of Care Questionnaire:  :     1) Have you been to an emergency room, urgent care clinic since your last visit? no   Hospitalized since your last visit? no             2) Have you seen or consulted any other health care providers outside of [address] [place] [address] since your last visit? no  (Include any pap smears or colon screenings in this section.)    3) Do you have an Advance Directive on file? no    4) Are you interested in receiving information on Advance Directives? NO      Patient is accompanied by self I have received verbal consent from Delphine Murray to discuss any/all medical information while they are present in the room. Reviewed record  In preparation for visit and have obtained necessary documentation.

## 2021-06-28 NOTE — PROGRESS NOTES
Follow Up Visit    Karla Torres is a 63 y.o. male. he presents for Hypertension    Cardiovascular Review  The patient has hypertension. He reports taking medications as instructed, no medication side effects noted, no chest pain on exertion, no dyspnea on exertion. Diet and Lifestyle: generally follows a low fat low cholesterol diet, generally follows a low sodium diet, sedentary. Blood pressure has improved but remains somewhat elevated. Patient Active Problem List   Diagnosis Code    Acute pancreatitis K85.90    Anxiety F41.9    Substance induced mood disorder ([facility] F19.94    Benzodiazepine dependence, continuous ([facility]) F13.20    Polysubstance dependence ([facility] F19.20    Type 2 diabetes mellitus with hyperglycemia, with long-term current use of insulin ([facility]) E11.65, Z79.4    Type 2 diabetes with nephropathy ([facility]) E11.21    Essential hypertension I10    Dysuria R30.0    Lumbar spinal stenosis M48.061    Type [facility] diabetes mellitus [facility] diabetic [facility] E11.40         Prior to Admission medications    Medication Sig Start Date End Date Taking? Authorizing Provider   gabapentin (NEURONTIN) 300 mg capsule TAKE ONE CAPSULE BY MOUTH THREE TIMES A DAY. MAX DAILY AMOUNT 3 CAPSULES 6/14/21  Yes Nohelia Gregorio MD   insulin glargine (Basaglar KwikPen U-100 Insulin) 100 unit/mL (3 mL) inpn INJECT 32 UNITS UNDER THE SKIN DAILY AS DIRECTED 5/28/21  Yes Sindi Tai MD   Insulin Needles, Disposable, (Damari Pen Needle) 32 gauge x 5/32\" ndle USE TO INJECT INSULIN DAILY AS DIRECTED BY PRESCRIBER 5/28/21  Yes Sindi Tai MD   metFORMIN (GLUCOPHAGE) 500 mg tablet TAKE TWO TABLETS BY MOUTH TWICE A DAY WITH A MEAL 5/28/21  Yes Sindi Tai MD   metoprolol succinate (TOPROL-XL) 25 mg XL tablet Take 1 Tablet by mouth nightly.  5/28/21  Yes Sindi Tai MD   lisinopriL (PRINIVIL, ZESTRIL) 40 mg tablet TAKE ONE TABLET BY MOUTH DAILY 3/25/21  Yes Sindi Tai MD   amLODIPine [facility]) 10 mg tablet TAKE ONE TABLET BY MOUTH DAILY 3/1/21  Yes Terrence Potter MD   fluticasone propionate (FLONASE) 50 mcg/actuation nasal spray 2 Sprays by Both Nostrils route daily. 11/12/20  Yes Terrence Potter MD   ALPRAZolam Joeline Dock) 1 mg tablet Take 1 mg by mouth three (3) times daily. 7/1/20  Yes Provider, Historical   triamcinolone acetonide (KENALOG) 0.1 % topical cream  5/28/20  Yes Provider, Historical   polyethylene glycol (MIRALAX) 17 gram/dose powder MIX 17 GRAMS IN 4 TO 8 OUNCES OF LIQUID AND DRINK ONCE DAILY AS NEEDED FOR CONSTIPATION 10/14/19  Yes Terrence Potter MD   clobetasol (TEMOVATE) 0.05 % topical cream Apply  to affected area two (2) times a day. 7/8/19  Yes Terrence Potter MD   docusate sodium (COLACE) 100 mg capsule Take 100 mg by mouth two (2) times daily as needed for Constipation. Yes Provider, Historical   METHADONE HCL (METHADONE PO) Take 95 mg by mouth daily. Yes Provider, Historical   cyclobenzaprine (FLEXERIL) 5 mg tablet Take 1 Tab by mouth three (3) times daily as needed for Muscle Spasm(s). Patient not taking: Reported on 5/28/2021 1/21/21   Terrence Potter MD         Health Maintenance   Topic Date Due    Pneumococcal 0-64 years (1 of 2 - PPSV23) Never done    Foot Exam Q1  Never done    COVID-19 Vaccine (1) Never done    DTaP/Tdap/Td series (1 - Tdap) Never done    Shingrix Vaccine Age 50> (1 of 2) Never done    Eye Exam Retinal or Dilated  04/20/2019    MICROALBUMIN Q1  06/13/2020    Flu Vaccine (Season Ended) 09/01/2021    A1C test (Diabetic or Prediabetic)  06/25/2022    Lipid Screen  06/25/2022    Colorectal Cancer Screening Combo  12/19/2029    Hepatitis C Screening  Completed       Review of Systems   Constitutional: Negative. HENT: Negative. Respiratory: Negative for cough. Cardiovascular: Negative for chest pain and palpitations. Gastrointestinal: Negative. Musculoskeletal: Negative.     All other systems reviewed and are negative. Visit Vitals  BP (!) 160/80 (BP 1 Location: Left arm, BP Patient Position: Sitting, BP Cuff Size: Adult)   Pulse 85   Temp 98.1 °F (36.7 °C) (Temporal)   Resp 16   Ht 6' 6\" (1.981 m)   Wt 261 lb 6.4 oz (118.6 kg)   SpO2 95%   BMI 30.21 kg/m²       Physical Exam  Constitutional:       Appearance: He is well-developed. Cardiovascular:      Rate and Rhythm: Normal rate and regular rhythm. Pulmonary:      Effort: Pulmonary effort is normal.      Breath sounds: Normal breath sounds. ASSESSMENT/PLAN    Diagnoses and all orders for this visit:    1. Essential hypertension - Given persistent elevation of pressure, we will increase metoprolol to 50mg. Follow up results       metoprolol succinate (TOPROL-XL) 50 mg XL tablet; Take 1 Tablet by mouth daily.

## 2021-07-23 DIAGNOSIS — Z79.4 TYPE 2 DIABETES MELLITUS WITH HYPERGLYCEMIA, WITH LONG-TERM CURRENT USE OF INSULIN (HCC): ICD-10-CM
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
## 2021-07-23 DIAGNOSIS — E11.65 TYPE 2 DIABETES MELLITUS WITH HYPERGLYCEMIA, WITH LONG-TERM CURRENT USE OF INSULIN (HCC): ICD-10-CM

## 2021-07-23 RX ORDER — PEN NEEDLE, DIABETIC 31 GX3/16"
NEEDLE, DISPOSABLE MISCELLANEOUS
Qty: 30 PEN NEEDLE | Refills: 3 | Status: SHIPPED | OUTPATIENT
Start: 2021-07-23 | End: 2022-03-15

## 2021-07-23 RX ORDER — GABAPENTIN 300 MG/1
CAPSULE ORAL
Qty: 90 CAPSULE | Refills: 0 | Status: SHIPPED | OUTPATIENT
Start: 2021-07-23 | End: 2021-08-30 | Stop reason: SDUPTHER

## 2021-07-23 NOTE — TELEPHONE ENCOUNTER
Out of medication    Requested Prescriptions     Pending Prescriptions Disp Refills    gabapentin (NEURONTIN) 300 mg capsule 90 Capsule 0    Insulin Needles, Disposable, (Damari Pen Needle) 32 gauge x 5/32\" ndle 30 Pen Needle 3     Sig: USE TO INJECT INSULIN DAILY AS DIRECTED BY PRESCRIBER       [facility] [id number]  [address] [facility] [place]  [phone]

## 2021-08-06 ENCOUNTER — TELEPHONE (OUTPATIENT)
Dept: INTERNAL MEDICINE CLINIC | Age: 61
End: 2021-08-06

## 2021-08-06 DIAGNOSIS — I10 ESSENTIAL HYPERTENSION: ICD-10-CM

## 2021-08-06 DIAGNOSIS — E11.21 TYPE 2 DIABETES WITH NEPHROPATHY (HCC): ICD-10-CM

## 2021-08-06 RX ORDER — METOPROLOL SUCCINATE 50 MG/1
50 TABLET, EXTENDED RELEASE ORAL DAILY
Qty: 30 TABLET | Refills: 3 | Status: SHIPPED | OUTPATIENT
Start: 2021-08-06 | End: 2021-12-14 | Stop reason: SDUPTHER

## 2021-08-06 RX ORDER — INSULIN GLARGINE 100 [IU]/ML
INJECTION, SOLUTION SUBCUTANEOUS
Qty: 6 ADJUSTABLE DOSE PRE-FILLED PEN SYRINGE | Refills: 3 | Status: SHIPPED | OUTPATIENT
Start: 2021-08-06 | End: 2022-03-07 | Stop reason: SDUPTHER

## 2021-08-17 ENCOUNTER — PATIENT MESSAGE (OUTPATIENT)
Dept: INTERNAL MEDICINE CLINIC | Age: 61
End: 2021-08-17

## 2021-08-17 DIAGNOSIS — E11.65 TYPE 2 DIABETES MELLITUS WITH HYPERGLYCEMIA, WITH LONG-TERM CURRENT USE OF INSULIN (HCC): ICD-10-CM

## 2021-08-17 DIAGNOSIS — Z79.4 TYPE 2 DIABETES MELLITUS WITH HYPERGLYCEMIA, WITH LONG-TERM CURRENT USE OF INSULIN (HCC): ICD-10-CM

## 2021-08-17 RX ORDER — METFORMIN HYDROCHLORIDE 500 MG/1
TABLET ORAL
Qty: 120 TABLET | Refills: 1 | Status: SHIPPED | OUTPATIENT
Start: 2021-08-17 | End: 2021-10-20
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
## 2021-08-30 RX ORDER — GABAPENTIN 300 MG/1
CAPSULE ORAL
Qty: 90 CAPSULE | Refills: 0 | Status: SHIPPED | OUTPATIENT
Start: 2021-08-30 | End: 2021-10-04 | Stop reason: SDUPTHER

## 2021-08-30 NOTE — TELEPHONE ENCOUNTER
Patient out of meds, please see [employer] message     Last office visit 6/28/21  No upcoming appointment

## 2021-09-13 DIAGNOSIS — I10 HYPERTENSION, UNSPECIFIED TYPE: ICD-10-CM

## 2021-09-13 RX ORDER — LISINOPRIL 40 MG/1
40 TABLET ORAL DAILY
Qty: 90 TABLET | Refills: 1 | Status: SHIPPED | OUTPATIENT
Start: 2021-09-13 | End: 2022-03-07 | Stop reason: SDUPTHER

## 2021-09-13 RX ORDER — AMLODIPINE BESYLATE 10 MG/1
TABLET ORAL
Qty: 90 TABLET | Refills: 1 | Status: SHIPPED | OUTPATIENT
Start: 2021-09-13 | End: 2022-03-07 | Stop reason: SDUPTHER
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
## 2021-10-05 RX ORDER — GABAPENTIN 300 MG/1
CAPSULE ORAL
Qty: 90 CAPSULE | Refills: 0 | Status: SHIPPED | OUTPATIENT
Start: 2021-10-05 | End: 2021-11-16 | Stop reason: SDUPTHER

## 2021-10-07 DIAGNOSIS — F17.200 SMOKING ADDICTION: Primary | ICD-10-CM

## 2021-10-07 RX ORDER — IBUPROFEN 200 MG
1 TABLET ORAL EVERY 24 HOURS
Qty: 30 PATCH | Refills: 3 | Status: SHIPPED | OUTPATIENT
Start: 2021-10-07 | End: 2021-11-06

## 2021-10-20 DIAGNOSIS — E11.65 TYPE 2 DIABETES MELLITUS WITH HYPERGLYCEMIA, WITH LONG-TERM CURRENT USE OF INSULIN (HCC): ICD-10-CM

## 2021-10-20 DIAGNOSIS — Z79.4 TYPE 2 DIABETES MELLITUS WITH HYPERGLYCEMIA, WITH LONG-TERM CURRENT USE OF INSULIN (HCC): ICD-10-CM

## 2021-10-20 RX ORDER — METFORMIN HYDROCHLORIDE 500 MG/1
TABLET ORAL
Qty: 120 TABLET | Refills: 1 | Status: SHIPPED | OUTPATIENT
Start: 2021-10-20 | End: 2021-12-23 | Stop reason: SDUPTHER

## 2021-10-20 NOTE — TELEPHONE ENCOUNTER
Pt will run out of prescription tomorrow    Requested Prescriptions     Pending Prescriptions Disp Refills    metFORMIN (GLUCOPHAGE) 500 mg tablet [Pharmacy Med Name: METFORMIN  MG TABLET] 120 Tablet 1     Sig: TAKE TWO TABLETS BY MOUTH TWICE A DAY WITH A MEAL         [facility] [id number] - [facility], [place] - [address]  [phone]

## 2021-11-15 DIAGNOSIS — J30.9 ALLERGIC RHINITIS, UNSPECIFIED SEASONALITY, UNSPECIFIED TRIGGER: ICD-10-CM

## 2021-11-16 ENCOUNTER — PATIENT MESSAGE (OUTPATIENT)
Dept: INTERNAL MEDICINE CLINIC | Age: 61
End: 2021-11-16
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
## 2021-11-16 RX ORDER — GABAPENTIN 300 MG/1
CAPSULE ORAL
Qty: 90 CAPSULE | Refills: 1 | Status: SHIPPED | OUTPATIENT
Start: 2021-11-16 | End: 2022-01-31 | Stop reason: SDUPTHER

## 2021-11-16 RX ORDER — FLUTICASONE PROPIONATE 50 MCG
SPRAY, SUSPENSION (ML) NASAL
Qty: 1 EACH | Refills: 3 | Status: SHIPPED
Start: 2021-11-16 | End: 2022-05-03

## 2021-11-16 NOTE — TELEPHONE ENCOUNTER
From: Betty Bryan  To: Harley Canales MD  Sent: 11/16/2021 2:09 PM EST  Subject: Prescription Question    Dr Iftikhar Moseley, did you get the refill request for Gabapentin I sent Sunday? I went in to  my beta blocker yesterday, and so the request for the Flonase wasn't sent until yesterday evening. Should I get them to do all my refill requests?    Thanks, Meagan Quezada

## 2021-12-14 ENCOUNTER — PATIENT MESSAGE (OUTPATIENT)
Dept: INTERNAL MEDICINE CLINIC | Age: 61
End: 2021-12-14

## 2021-12-14 DIAGNOSIS — I10 ESSENTIAL HYPERTENSION: ICD-10-CM

## 2021-12-14 RX ORDER — METOPROLOL SUCCINATE 50 MG/1
50 TABLET, EXTENDED RELEASE ORAL DAILY
Qty: 90 TABLET | Refills: 0 | Status: SHIPPED | OUTPATIENT
Start: 2021-12-14 | End: 2022-03-23 | Stop reason: SDUPTHER

## 2021-12-23 ENCOUNTER — PATIENT MESSAGE (OUTPATIENT)
Dept: INTERNAL MEDICINE CLINIC | Age: 61
End: 2021-12-23

## 2021-12-23 DIAGNOSIS — Z79.4 TYPE 2 DIABETES MELLITUS WITH HYPERGLYCEMIA, WITH LONG-TERM CURRENT USE OF INSULIN (HCC): ICD-10-CM

## 2021-12-23 DIAGNOSIS — E11.65 TYPE 2 DIABETES MELLITUS WITH HYPERGLYCEMIA, WITH LONG-TERM CURRENT USE OF INSULIN (HCC): ICD-10-CM

## 2021-12-23 RX ORDER — METFORMIN HYDROCHLORIDE 500 MG/1
TABLET ORAL
Qty: 120 TABLET | Refills: 1 | Status: SHIPPED | OUTPATIENT
Start: 2021-12-23 | End: 2022-01-31 | Stop reason: SDUPTHER

## 2022-01-17 ENCOUNTER — OFFICE VISIT (OUTPATIENT)
Dept: INTERNAL MEDICINE CLINIC | Age: 62
End: 2022-01-17
Payer: MEDICAID

## 2022-01-17 VITALS
BODY MASS INDEX: 30.13 KG/M2 | TEMPERATURE: 98.6 F | WEIGHT: 260.4 LBS | HEIGHT: 78 IN | DIASTOLIC BLOOD PRESSURE: 80 MMHG | HEART RATE: 59 BPM | RESPIRATION RATE: 16 BRPM | OXYGEN SATURATION: 98 % | SYSTOLIC BLOOD PRESSURE: 160 MMHG

## 2022-01-17 DIAGNOSIS — E11.65 TYPE 2 DIABETES MELLITUS WITH HYPERGLYCEMIA, WITH LONG-TERM CURRENT USE OF INSULIN (HCC): ICD-10-CM

## 2022-01-17 DIAGNOSIS — Z79.4 TYPE 2 DIABETES MELLITUS WITH HYPERGLYCEMIA, WITH LONG-TERM CURRENT USE OF INSULIN (HCC): ICD-10-CM

## 2022-01-17 DIAGNOSIS — I10 ESSENTIAL HYPERTENSION: ICD-10-CM

## 2022-01-17 DIAGNOSIS — E78.2 MIXED HYPERLIPIDEMIA: ICD-10-CM

## 2022-01-17 DIAGNOSIS — Z79.4 TYPE 2 DIABETES MELLITUS WITH HYPERGLYCEMIA, WITH LONG-TERM CURRENT USE OF INSULIN (HCC): Primary | ICD-10-CM

## 2022-01-17 DIAGNOSIS — E11.65 TYPE 2 DIABETES MELLITUS WITH HYPERGLYCEMIA, WITH LONG-TERM CURRENT USE OF INSULIN (HCC): Primary | ICD-10-CM

## 2022-01-17 LAB
ALBUMIN SERPL-MCNC: 3.5 G/DL (ref 3.5–5)
ALBUMIN/GLOB SERPL: 0.9 {RATIO} (ref 1.1–2.2)
ALP SERPL-CCNC: 75 U/L (ref 45–117)
ALT SERPL-CCNC: 15 U/L (ref 12–78)
ANION GAP SERPL CALC-SCNC: 3 MMOL/L (ref 5–15)
AST SERPL-CCNC: 11 U/L (ref 15–37)
BILIRUB SERPL-MCNC: 0.3 MG/DL (ref 0.2–1)
BUN SERPL-MCNC: 19 MG/DL (ref 6–20)
BUN/CREAT SERPL: 16 (ref 12–20)
CALCIUM SERPL-MCNC: 8.8 MG/DL (ref 8.5–10.1)
CHLORIDE SERPL-SCNC: 100 MMOL/L (ref 97–108)
CHOLEST SERPL-MCNC: 199 MG/DL
CO2 SERPL-SCNC: 29 MMOL/L (ref 21–32)
CREAT SERPL-MCNC: 1.17 MG/DL (ref 0.7–1.3)
EST. AVERAGE GLUCOSE BLD GHB EST-MCNC: 148 MG/DL
GLOBULIN SER CALC-MCNC: 3.8 G/DL (ref 2–4)
GLUCOSE SERPL-MCNC: 201 MG/DL (ref 65–100)
HBA1C MFR BLD: 6.8 % (ref 4–5.6)
HDLC SERPL-MCNC: 38 MG/DL
HDLC SERPL: 5.2 {RATIO} (ref 0–5)
LDLC SERPL CALC-MCNC: 107.2 MG/DL (ref 0–100)
POTASSIUM SERPL-SCNC: 4.4 MMOL/L (ref 3.5–5.1)
PROT SERPL-MCNC: 7.3 G/DL (ref 6.4–8.2)
SODIUM SERPL-SCNC: 132 MMOL/L (ref 136–145)
TRIGL SERPL-MCNC: 269 MG/DL (ref ?–150)
VLDLC SERPL CALC-MCNC: 53.8 MG/DL

## 2022-01-17 PROCEDURE — 99214 OFFICE O/P EST MOD 30 MIN: CPT | Performed by: INTERNAL MEDICINE

## 2022-01-17 NOTE — PROGRESS NOTES
Chief Complaint   Patient presents with    Complete Physical     Reviewed record in preparation for visit and have obtained necessary documentation. Identified pt with two pt identifiers(name and ). Health Maintenance Due   Topic    COVID-19 Vaccine (1)    Pneumococcal 0-64 years (1 of 2 - PPSV23)    Foot Exam Q1     DTaP/Tdap/Td series (1 - Tdap)    Shingrix Vaccine Age 50> (1 of 2)    Eye Exam Retinal or Dilated     MICROALBUMIN Q1     Flu Vaccine (1)         Chief Complaint   Patient presents with    Complete Physical        Wt Readings from Last 3 Encounters:   22 260 lb 6.4 oz (118.1 kg)   21 261 lb 6.4 oz (118.6 kg)   21 258 lb 12.8 oz (117.4 kg)     Temp Readings from Last 3 Encounters:   22 98.6 °F (37 °C) (Temporal)   21 98.1 °F (36.7 °C) (Temporal)   21 98.2 °F (36.8 °C) (Temporal)     BP Readings from Last 3 Encounters:   22 (!) 160/80   21 (!) 160/80   21 (!) 180/80     Pulse Readings from Last 3 Encounters:   22 (!) 59   21 85   21 86           Learning Assessment:  :     Learning Assessment 2017   PRIMARY LEARNER Patient   HIGHEST LEVEL OF EDUCATION - PRIMARY LEARNER  2 YEARS OF COLLEGE   BARRIERS PRIMARY LEARNER NONE   PRIMARY LANGUAGE ENGLISH   LEARNER PREFERENCE PRIMARY LISTENING   ANSWERED BY patient    RELATIONSHIP SELF       Depression Screening:  :     3 most recent PHQ Screens 2022   Little interest or pleasure in doing things Not at all   Feeling down, depressed, irritable, or hopeless Not at all   Total Score PHQ 2 0       Fall Risk Assessment:  :     Fall Risk Assessment, last 12 mths 2018   Able to walk? Yes   Fall in past 12 months? Yes   Number of falls in past 12 months 1   Fall with injury? 0       Abuse Screening:  :     Abuse Screening Questionnaire 2022   Do you ever feel afraid of your partner?  N N   Are you in a relationship with someone who physically or mentally threatens you? N N   Is it safe for you to go home? Y Y       Coordination of Care Questionnaire:  :     1) Have you been to an emergency room, urgent care clinic since your last visit? no   Hospitalized since your last visit? no             2) Have you seen or consulted any other health care providers outside of [address] [place] [address] since your last visit? yes  (Include any pap smears or colon screenings in this section.)    3) Do you have an Advance Directive on file? no    4) Are you interested in receiving information on Advance Directives? NO      Patient is accompanied by self I have received verbal consent from Magdaleno Jimenez to discuss any/all medical information while they are present in the room. Reviewed record  In preparation for visit and have obtained necessary documentation.

## 2022-01-17 NOTE — PROGRESS NOTES
Comprehensive Physical Examination    Phong Bales is a 63 y.o. male. he presents for a comprehensive physical examination. Endocrine Review  He is seen for diabetes. Since last visit: he has been stable on medications. Home glucose monitoring: is performed sporadically, values are usually normal.   He reports medication compliance: compliant all of the time. He reports the following medication side effects: none. Diabetic diet compliance: compliant most of the time. Further diabetic ROS: no chest pain or dyspnea, no numbness, tingling or pain in extremities, no hypoglycemia. Lab review: orders written for new lab studies as appropriate; see orders. Eye exam:  pending        Patient Active Problem List    Diagnosis Date Noted    Acute pancreatitis 11/29/2011    Type 2 diabetes mellitus with diabetic neuropathy ([facility] 04/08/2019    Lumbar spinal stenosis 08/29/2018    Dysuria 04/25/2018    Essential hypertension 03/15/2018    [facility] 02/21/2018    Type 2 diabetes mellitus with hyperglycemia, with long-term current use of insulin ([facility] 08/29/2017    Anxiety 08/24/2017    Substance induced mood disorder ([facility] 08/24/2017    Benzodiazepine dependence, continuous ([facility] 08/24/2017    Polysubstance dependence ([facility] 08/24/2017     Current Outpatient Medications   Medication Sig Dispense Refill    metFORMIN (GLUCOPHAGE) 500 mg tablet TAKE TWO TABLETS BY MOUTH TWICE A DAY WITH A MEAL 120 Tablet 1    metoprolol succinate (TOPROL-XL) 50 mg XL tablet Take 1 Tablet by mouth daily. 90 Tablet 0    gabapentin (NEURONTIN) 300 mg capsule TAKE ONE CAPSULE BY MOUTH THREE TIMES A DAY. MAX DAILY AMOUNT 3 CAPSULES 90 Capsule 1    amLODIPine (NORVASC) 10 mg tablet TAKE ONE TABLET BY MOUTH DAILY 90 Tablet 1    lisinopriL (PRINIVIL, ZESTRIL) 40 mg tablet Take 1 Tablet by mouth daily.  90 Tablet 1    insulin glargine (Basaglar KwikPen U-100 Insulin) 100 unit/mL (3 mL) inpn INJECT 32 UNITS UNDER THE SKIN DAILY AS DIRECTED 6 Adjustable Dose Pre-filled Pen Syringe 3    Insulin Needles, Disposable, (Damari Pen Needle) 32 gauge x 5/32\" ndle USE TO INJECT INSULIN DAILY AS DIRECTED BY PRESCRIBER 30 Pen Needle 3    ALPRAZolam (XANAX) 1 mg tablet Take 1 mg by mouth three (3) times daily.  triamcinolone acetonide (KENALOG) 0.1 % topical cream       METHADONE HCL (METHADONE PO) Take 95 mg by mouth daily.  fluticasone propionate (FLONASE) 50 mcg/actuation nasal spray SPRAY TWO SPRAYS IN EACH NOSTRIL ONCE DAILY 1 Each 3    cyclobenzaprine (FLEXERIL) 5 mg tablet Take 1 Tab by mouth three (3) times daily as needed for Muscle Spasm(s). (Patient not taking: Reported on 5/28/2021) 30 Tab 0    polyethylene glycol (MIRALAX) 17 gram/dose powder MIX 17 GRAMS IN 4 TO 8 OUNCES OF LIQUID AND DRINK ONCE DAILY AS NEEDED FOR CONSTIPATION (Patient not taking: Reported on 1/17/2022) 510 g 0    clobetasol (TEMOVATE) 0.05 % topical cream Apply  to affected area two (2) times a day. (Patient not taking: Reported on 1/17/2022) 30 g 0    docusate sodium (COLACE) 100 mg capsule Take 100 mg by mouth two (2) times daily as needed for Constipation.  (Patient not taking: Reported on 1/17/2022)       Allergies   Allergen Reactions    Senna Other (comments)     cramps     Past Medical History:   Diagnosis Date    Alcohol abuse, in remission     Chronic kidney disease     PLAGUE L RENAL ARTERY    Chronic obstructive pulmonary disease (HCC)     Chronic pain     Constipation     Diabetes (HCC)     Type II    GERD (gastroesophageal reflux disease)     Hepatitis C     Hypertension     Liver disease     HEP C TOOK HARVONI    Pancreatitis     Psychiatric disorder     PANIC ATTACKS    Smoker     Spondylitis ([address]      Past Surgical History:   Procedure Laterality Date    COLONOSCOPY N/A 12/19/2019    COLONOSCOPY performed by Genaro Ferguson MD at [facility] ENDOSCOPY    HX APPENDECTOMY      HX ORTHOPAEDIC      3 back surgeries    HX ORTHOPAEDIC      KNEE SURGERY    HX TONSILLECTOMY       Family History   Problem Relation Age of Onset    Hypertension Mother     COPD Mother     Other Father         PALSY     Social History     Tobacco Use    Smoking status: Current Every Day Smoker     Packs/day: 1.00    Smokeless tobacco: Never Used   Substance Use Topics    Alcohol use: Not Currently        Health Maintenance   Topic Date Due    COVID-19 Vaccine (1) Never done    Pneumococcal 0-64 years (1 of 2 - PPSV23) Never done    Foot Exam Q1  Never done    DTaP/Tdap/Td series (1 - Tdap) Never done    Shingrix Vaccine Age 50> (1 of 2) Never done    Eye Exam Retinal or Dilated  04/20/2019    MICROALBUMIN Q1  06/13/2020    Flu Vaccine (1) Never done    A1C test (Diabetic or Prediabetic)  06/25/2022    Lipid Screen  06/25/2022    Colorectal Cancer Screening Combo  12/19/2029    Hepatitis C Screening  Completed         Review of Systems   Constitutional: Negative. Cardiovascular: Negative. Genitourinary: Negative. Neurological: Negative. Visit Vitals  BP (!) 160/80 (BP 1 Location: Left arm, BP Patient Position: Sitting, BP Cuff Size: Adult)   Pulse (!) 59   Temp 98.6 °F (37 °C) (Temporal)   Resp 16   Ht 6' 6\" (1.981 m)   Wt 260 lb 6.4 oz (118.1 kg)   SpO2 98%   BMI 30.09 kg/m²       Physical Exam  Cardiovascular:      Rate and Rhythm: Normal rate and regular rhythm. Heart sounds: No murmur heard. Pulmonary:      Effort: Pulmonary effort is normal.      Breath sounds: Normal breath sounds. Abdominal:      General: Bowel sounds are normal.      Palpations: Abdomen is soft. Musculoskeletal:      Cervical back: Normal range of motion. Lymphadenopathy:      Cervical: No cervical adenopathy. ASSESSMENT/PLAN  Diagnoses and all orders for this visit:    1.  Type 2 diabetes mellitus with hyperglycemia, with long-term current use of insulin (HCC)  -     HEMOGLOBIN A1C WITH EAG; Future    2. Essential hypertension  -     METABOLIC PANEL, COMPREHENSIVE; Future    3. Mixed hyperlipidemia  -     LIPID PANEL; Future      Follow-up and Dispositions    · Return in about 3 months (around 4/17/2022) for Follow up.

## 2022-01-30 ENCOUNTER — PATIENT MESSAGE (OUTPATIENT)
Dept: INTERNAL MEDICINE CLINIC | Age: 62
End: 2022-01-30

## 2022-01-30 DIAGNOSIS — Z79.4 TYPE 2 DIABETES MELLITUS WITH HYPERGLYCEMIA, WITH LONG-TERM CURRENT USE OF INSULIN (HCC): ICD-10-CM

## 2022-01-30 DIAGNOSIS — E11.65 TYPE 2 DIABETES MELLITUS WITH HYPERGLYCEMIA, WITH LONG-TERM CURRENT USE OF INSULIN (HCC): ICD-10-CM
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
## 2022-01-31 RX ORDER — METFORMIN HYDROCHLORIDE 500 MG/1
TABLET ORAL
Qty: 120 TABLET | Refills: 1 | Status: SHIPPED | OUTPATIENT
Start: 2022-01-31 | End: 2022-05-13

## 2022-01-31 RX ORDER — GABAPENTIN 300 MG/1
CAPSULE ORAL
Qty: 90 CAPSULE | Refills: 1 | Status: SHIPPED | OUTPATIENT
Start: 2022-01-31 | End: 2022-04-12 | Stop reason: SDUPTHER

## 2022-03-07 ENCOUNTER — PATIENT MESSAGE (OUTPATIENT)
Dept: INTERNAL MEDICINE CLINIC | Age: 62
End: 2022-03-07

## 2022-03-07 DIAGNOSIS — E11.21 TYPE 2 DIABETES WITH NEPHROPATHY (HCC): ICD-10-CM

## 2022-03-07 DIAGNOSIS — I10 HYPERTENSION, UNSPECIFIED TYPE: ICD-10-CM

## 2022-03-07 RX ORDER — LISINOPRIL 40 MG/1
40 TABLET ORAL DAILY
Qty: 90 TABLET | Refills: 1 | Status: SHIPPED | OUTPATIENT
Start: 2022-03-07 | End: 2022-09-14

## 2022-03-07 RX ORDER — AMLODIPINE BESYLATE 10 MG/1
TABLET ORAL
Qty: 90 TABLET | Refills: 1 | Status: SHIPPED | OUTPATIENT
Start: 2022-03-07 | End: 2022-09-14

## 2022-03-07 RX ORDER — INSULIN GLARGINE 100 [IU]/ML
INJECTION, SOLUTION SUBCUTANEOUS
Qty: 6 ADJUSTABLE DOSE PRE-FILLED PEN SYRINGE | Refills: 3 | Status: SHIPPED | OUTPATIENT
Start: 2022-03-07 | End: 2022-08-04

## 2022-03-15 DIAGNOSIS — E11.65 TYPE 2 DIABETES MELLITUS WITH HYPERGLYCEMIA, WITH LONG-TERM CURRENT USE OF INSULIN (HCC): ICD-10-CM

## 2022-03-15 DIAGNOSIS — Z79.4 TYPE 2 DIABETES MELLITUS WITH HYPERGLYCEMIA, WITH LONG-TERM CURRENT USE OF INSULIN (HCC): ICD-10-CM

## 2022-03-15 RX ORDER — PEN NEEDLE, DIABETIC 32GX 5/32"
NEEDLE, DISPOSABLE MISCELLANEOUS
Qty: 100 PEN NEEDLE | Refills: 3 | Status: SHIPPED | OUTPATIENT
Start: 2022-03-15

## 2022-03-18 PROBLEM — M48.061 LUMBAR SPINAL STENOSIS: Status: ACTIVE | Noted: 2018-08-29

## 2022-03-18 PROBLEM — F19.94 SUBSTANCE INDUCED MOOD DISORDER (HCC): Status: ACTIVE | Noted: 2017-08-24

## 2022-03-19 PROBLEM — I10 ESSENTIAL HYPERTENSION: Status: ACTIVE | Noted: 2018-03-15

## 2022-03-19 PROBLEM — E11.40 TYPE 2 DIABETES MELLITUS WITH DIABETIC NEUROPATHY (HCC): Status: ACTIVE | Noted: 2019-04-08

## 2022-03-19 PROBLEM — R30.0 DYSURIA: Status: ACTIVE | Noted: 2018-04-25

## 2022-03-19 PROBLEM — E11.21 TYPE 2 DIABETES WITH NEPHROPATHY (HCC): Status: ACTIVE | Noted: 2018-02-21

## 2022-03-19 PROBLEM — E11.65 TYPE 2 DIABETES MELLITUS WITH HYPERGLYCEMIA, WITH LONG-TERM CURRENT USE OF INSULIN (HCC): Status: ACTIVE | Noted: 2017-08-29

## 2022-03-19 PROBLEM — Z79.4 TYPE 2 DIABETES MELLITUS WITH HYPERGLYCEMIA, WITH LONG-TERM CURRENT USE OF INSULIN (HCC): Status: ACTIVE | Noted: 2017-08-29

## 2022-03-20 PROBLEM — F19.20 POLYSUBSTANCE DEPENDENCE (HCC): Status: ACTIVE | Noted: 2017-08-24

## 2022-03-20 PROBLEM — F13.20 BENZODIAZEPINE DEPENDENCE, CONTINUOUS (HCC): Status: ACTIVE | Noted: 2017-08-24

## 2022-03-20 PROBLEM — F41.9 ANXIETY: Status: ACTIVE | Noted: 2017-08-24

## 2022-03-23 DIAGNOSIS — I10 ESSENTIAL HYPERTENSION: ICD-10-CM

## 2022-03-23 RX ORDER — METOPROLOL SUCCINATE 50 MG/1
50 TABLET, EXTENDED RELEASE ORAL DAILY
Qty: 90 TABLET | Refills: 0 | Status: SHIPPED | OUTPATIENT
Start: 2022-03-23 | End: 2022-09-14

## 2022-04-11 ENCOUNTER — PATIENT MESSAGE (OUTPATIENT)
Dept: INTERNAL MEDICINE CLINIC | Age: 62
End: 2022-04-11
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
## 2022-04-12 RX ORDER — GABAPENTIN 300 MG/1
CAPSULE ORAL
Qty: 90 CAPSULE | Refills: 1 | Status: SHIPPED | OUTPATIENT
Start: 2022-04-12 | End: 2022-06-08

## 2022-04-12 NOTE — TELEPHONE ENCOUNTER
From: Yahir Flynn  To: Deepa Ramos MD  Sent: 4/11/2022 10:24 AM EDT  Subject: Prescription Question    Dr Zee Villa, I will be out of Gabapentin in 3 days. I hope you can send a prescription over before I run out on Thursday. I also need to talk to you about my energy being so low, and if there's anything you could suggest as far as a supplement to help. Hope you had a great weekend, hope to hear from you soon.   Saintclair Hawks

## 2022-04-19 ENCOUNTER — OFFICE VISIT (OUTPATIENT)
Dept: INTERNAL MEDICINE CLINIC | Age: 62
End: 2022-04-19
Attending: INTERNAL MEDICINE
Payer: MEDICAID

## 2022-04-19 ENCOUNTER — HOSPITAL ENCOUNTER (OUTPATIENT)
Dept: CT IMAGING | Age: 62
Discharge: HOME | End: 2022-04-19
Attending: INTERNAL MEDICINE
Payer: MEDICAID

## 2022-04-19 ENCOUNTER — TELEPHONE (OUTPATIENT)
Dept: INTERNAL MEDICINE CLINIC | Age: 62
End: 2022-04-19

## 2022-04-19 VITALS
SYSTOLIC BLOOD PRESSURE: 160 MMHG | DIASTOLIC BLOOD PRESSURE: 72 MMHG | OXYGEN SATURATION: 96 % | HEART RATE: 52 BPM | HEIGHT: 78 IN | WEIGHT: 252 LBS | RESPIRATION RATE: 16 BRPM | TEMPERATURE: 98.2 F | BODY MASS INDEX: 29.16 KG/M2

## 2022-04-19 DIAGNOSIS — R10.9 ACUTE LEFT FLANK PAIN: Primary | ICD-10-CM

## 2022-04-19 DIAGNOSIS — R10.9 ACUTE LEFT FLANK PAIN: ICD-10-CM

## 2022-04-19 LAB
BILIRUB UR QL STRIP: NEGATIVE
GLUCOSE UR-MCNC: NORMAL MG/DL
KETONES P FAST UR STRIP-MCNC: NORMAL MG/DL
PH UR STRIP: 5 [PH] (ref 4.6–8)
PROT UR QL STRIP: NORMAL
SP GR UR STRIP: 1.03 (ref 1–1.03)
UA UROBILINOGEN AMB POC: NORMAL (ref 0.2–1)
URINALYSIS CLARITY POC: CLEAR
URINALYSIS COLOR POC: NORMAL
URINE BLOOD POC: NEGATIVE
URINE LEUKOCYTES POC: NEGATIVE
URINE NITRITES POC: NEGATIVE

## 2022-04-19 PROCEDURE — 99213 OFFICE O/P EST LOW 20 MIN: CPT | Performed by: INTERNAL MEDICINE

## 2022-04-19 PROCEDURE — 74176 CT ABD & PELVIS W/O CONTRAST: CPT

## 2022-04-19 PROCEDURE — 81003 URINALYSIS AUTO W/O SCOPE: CPT | Performed by: INTERNAL MEDICINE

## 2022-04-19 RX ORDER — TAMSULOSIN HYDROCHLORIDE 0.4 MG/1
0.4 CAPSULE ORAL DAILY
Qty: 7 CAPSULE | Refills: 0 | Status: SHIPPED | OUTPATIENT
Start: 2022-04-19 | End: 2022-04-26

## 2022-04-19 NOTE — TELEPHONE ENCOUNTER
Verified patient identity with two identifiers. Spoke with patient by phone. Patient c/o pain by left testicle and left side of abdomen, and back (kidney area). Pain started Friday. Pain is 4/10 at present but increases in severity at night.    Denies vomiting/diarrhea  C/o constipation & nausea   Pt does not have a urologist  No fever or cough or recent exposure to Covid

## 2022-04-19 NOTE — PROGRESS NOTES
Chief Complaint   Patient presents with    Back Pain    Groin Pain     Reviewed record in preparation for visit and have obtained necessary documentation. Identified pt with two pt identifiers(name and ). Health Maintenance Due   Topic    COVID-19 Vaccine (1)    Pneumococcal 0-64 years (1 - PCV)    Foot Exam Q1     DTaP/Tdap/Td series (1 - Tdap)    Shingrix Vaccine Age 49> (1 of 2)    Eye Exam Retinal or Dilated     MICROALBUMIN Q1          Chief Complaint   Patient presents with    Back Pain    Groin Pain        Wt Readings from Last 3 Encounters:   22 252 lb (114.3 kg)   22 260 lb 6.4 oz (118.1 kg)   21 261 lb 6.4 oz (118.6 kg)     Temp Readings from Last 3 Encounters:   22 98.2 °F (36.8 °C) (Temporal)   22 98.6 °F (37 °C) (Temporal)   21 98.1 °F (36.7 °C) (Temporal)     BP Readings from Last 3 Encounters:   22 (!) 160/72   22 (!) 160/80   21 (!) 160/80     Pulse Readings from Last 3 Encounters:   22 (!) 52   22 (!) 59   21 85           Learning Assessment:  :     Learning Assessment 2017   PRIMARY LEARNER Patient   HIGHEST LEVEL OF EDUCATION - PRIMARY LEARNER  2 YEARS OF COLLEGE   BARRIERS PRIMARY LEARNER NONE   PRIMARY LANGUAGE ENGLISH   LEARNER PREFERENCE PRIMARY LISTENING   ANSWERED BY patient    RELATIONSHIP SELF       Depression Screening:  :     3 most recent PHQ Screens 2022   Little interest or pleasure in doing things Not at all   Feeling down, depressed, irritable, or hopeless Not at all   Total Score PHQ 2 0       Fall Risk Assessment:  :     Fall Risk Assessment, last 12 mths 2018   Able to walk? Yes   Fall in past 12 months? Yes   Number of falls in past 12 months 1   Fall with injury? 0       Abuse Screening:  :     Abuse Screening Questionnaire 2022   Do you ever feel afraid of your partner?  N N N   Are you in a relationship with someone who physically or mentally threatens you? N N N   Is it safe for you to go home? Ubaldo Runner       Coordination of Care Questionnaire:  :     1) Have you been to an emergency room, urgent care clinic since your last visit? no   Hospitalized since your last visit? no             2) Have you seen or consulted any other health care providers outside of [address] [place] [address] since your last visit? yes  (Include any pap smears or colon screenings in this section.)    3) Do you have an Advance Directive on file? no    4) Are you interested in receiving information on Advance Directives? NO      Patient is accompanied by self I have received verbal consent from Patti Vila to discuss any/all medical information while they are present in the room. Reviewed record  In preparation for visit and have obtained necessary documentation.

## 2022-04-19 NOTE — PATIENT INSTRUCTIONS
Please go to [address] (the main entrance)    Be at the Main Registration at 2:15pm for your scan.   This is the registration area immediately to your left when entering the hospital.

## 2022-04-19 NOTE — PROGRESS NOTES
Acute Care Note    Kellie Kimball is 63 y.o. male. he presents for evaluation of Back Pain and Groin Pain    The patient reports having the onset of a sharp pain on the left flank about 4-5 days prior. He reports that this pain has begun to worsen in the days since its onset. It also radiates from the left flank to the testicle. He has not had any trauma to the area. He denies fevers or chills. He has had some sensation of urinary urgency since onset of the pain but has also noted hesitancy. We discussed that his symptoms could represent a possible urinary stone. Prior to Admission medications    Medication Sig Start Date End Date Taking? Authorizing Provider   gabapentin (NEURONTIN) 300 mg capsule TAKE ONE CAPSULE BY MOUTH THREE TIMES A DAY. MAX DAILY AMOUNT 3 CAPSULES 4/12/22  Yes Amrit Gomes MD   metoprolol succinate (TOPROL-XL) 50 mg XL tablet Take 1 Tablet by mouth daily. 3/23/22  Yes Amrit Gomes MD   BD Damari 2nd Gen Pen Needle 32 gauge x 5/32\" ndle USE TO INJECT INSULIN DAILY AS DIRECTED 3/15/22  Yes Amrit Gomes MD   amLODIPine (NORVASC) 10 mg tablet TAKE ONE TABLET BY MOUTH DAILY 3/7/22  Yes Amrit Gomes MD   insulin glargine (Basaglar KwikPen U-100 Insulin) 100 unit/mL (3 mL) inpn INJECT 32 UNITS UNDER THE SKIN DAILY AS DIRECTED 3/7/22  Yes Amrit Gomes MD   lisinopriL (PRINIVIL, ZESTRIL) 40 mg tablet Take 1 Tablet by mouth daily. 3/7/22  Yes Amrit Gomes MD   metFORMIN (GLUCOPHAGE) 500 mg tablet TAKE TWO TABLETS BY MOUTH TWICE A DAY WITH A MEAL 1/31/22  Yes Amrit Gomes MD   cyclobenzaprine (FLEXERIL) 5 mg tablet Take 1 Tab by mouth three (3) times daily as needed for Muscle Spasm(s). 1/21/21  Yes Amrit Gomes MD   ALPRAZolam Manju Duet) 1 mg tablet Take 1 mg by mouth three (3) times daily.  7/1/20  Yes Provider, Historical   triamcinolone acetonide (KENALOG) 0.1 % topical cream  5/28/20  Yes Provider, Historical   METHADONE HCL (METHADONE PO) Take 95 mg by mouth daily. Yes Provider, Historical   fluticasone propionate (FLONASE) 50 mcg/actuation nasal spray SPRAY TWO SPRAYS IN [facility] NOSTRIL ONCE DAILY  Patient not taking: Reported on 4/19/2022 11/16/21   Radha Camacho MD   polyethylene glycol (MIRALAX) 17 gram/dose powder MIX 17 GRAMS IN 4 TO 8 OUNCES OF LIQUID AND DRINK ONCE DAILY AS NEEDED FOR CONSTIPATION  Patient not taking: Reported on 1/17/2022 10/14/19   Radha Camacho MD   clobetasol (TEMOVATE) 0.05 % topical cream Apply  to affected area two (2) times a day. Patient not taking: Reported on 1/17/2022 7/8/19   Radha Camacho MD   docusate sodium (COLACE) 100 mg capsule Take 100 mg by mouth two (2) times daily as needed for Constipation. Patient not taking: Reported on 1/17/2022    Provider, Historical         Patient Active Problem List   Diagnosis Code    Acute pancreatitis K85.90    Anxiety F41.9    Substance induced mood disorder ([facility] F19.94    Benzodiazepine dependence, continuous ([facility]) F13.20    Polysubstance dependence ([facility] F19.20    Type 2 diabetes mellitus with hyperglycemia, with long-term current use of insulin ([facility]) E11.65, Z79.4    Type 2 diabetes with nephropathy ([facility] E11.21    Essential hypertension I10    Dysuria R30.0    Lumbar spinal stenosis M48.061    Type 2 diabetes mellitus with diabetic neuropathy ([facility]) E11.40         Review of Systems   Constitutional: Negative for chills and fever. Gastrointestinal: Negative for abdominal pain. Genitourinary: Positive for flank pain and urgency. Negative for dysuria and hematuria. Visit Vitals  BP (!) 160/72 (BP 1 Location: Left arm, BP Patient Position: Sitting, BP Cuff Size: Adult)   Pulse (!) 52   Temp 98.2 °F (36.8 °C) (Temporal)   Resp 16   Ht 6' 6\" (1.981 m)   Wt 252 lb (114.3 kg)   SpO2 96%   BMI 29.12 kg/m²       Physical Exam  Constitutional:       Appearance: Normal appearance. Cardiovascular:      Rate and Rhythm: Normal rate and regular rhythm. Pulses: Normal pulses. Heart sounds: Normal heart sounds. Abdominal:      Tenderness: There is abdominal tenderness (present at the left flank to palpation). Neurological:      Mental Status: He is alert. Results for orders placed or performed in visit on 04/19/22   AMB POC URINALYSIS DIP STICK AUTO W/O MICRO     Status: None   Result Value Ref Range Status    Color (UA POC) Brown  Final    Clarity (UA POC) Clear  Final    Glucose (UA POC) Trace Negative Final    Bilirubin (UA POC) Negative Negative Final    Ketones (UA POC) Trace Negative Final    Specific gravity (UA POC) 1.030 1.001 - 1.035 Final    Blood (UA POC) Negative Negative Final    pH (UA POC) 5.0 4.6 - 8.0 Final    Protein (UA POC) 3+ Negative Final    Urobilinogen (UA POC) 0.2 mg/dL 0.2 - 1 Final    Nitrites (UA POC) Negative Negative Final    Leukocyte esterase (UA POC) Negative Negative Final                     ASSESSMENT/PLAN  Diagnoses and all orders for this visit:    1. Acute left flank pain - I suspect the possibility of a renal stone. Advised he have a CT scan done today. Urinalysis showing  3+ protein, elevated specific gravity  -     CT ABD PELV WO CONT; Future         Advised the patient to call back or return to office if symptoms worsen/change/persist.   Discussed expected course/resolution/complications of diagnosis in detail with patient. Medication risks/benefits/costs/interactions/alternatives discussed with patient. The patient was given an after visit summary which includes diagnoses, current medications, & vitals. They expressed understanding with the diagnosis and plan.

## 2022-04-19 NOTE — PROGRESS NOTES
Please inform the patient that he has a very small (2mm) stone at his left kidney. It is not obstructing but it is likely causing his pain. Please increase water intake and take tylenol or motrin for pain. We will add flomax for 7 days as well. If symptoms persist, we will have to ask urology to see him.

## 2022-04-21 LAB
BACTERIA SPEC CULT: NORMAL
SERVICE CMNT-IMP: NORMAL

## 2022-05-13 DIAGNOSIS — Z79.4 TYPE 2 DIABETES MELLITUS WITH HYPERGLYCEMIA, WITH LONG-TERM CURRENT USE OF INSULIN (HCC): ICD-10-CM

## 2022-05-13 DIAGNOSIS — E11.65 TYPE 2 DIABETES MELLITUS WITH HYPERGLYCEMIA, WITH LONG-TERM CURRENT USE OF INSULIN (HCC): ICD-10-CM

## 2022-05-13 RX ORDER — METFORMIN HYDROCHLORIDE 500 MG/1
TABLET ORAL
Qty: 120 TABLET | Refills: 1 | Status: SHIPPED | OUTPATIENT
Start: 2022-05-13 | End: 2022-07-18

## 2022-06-20 ENCOUNTER — PATIENT MESSAGE (OUTPATIENT)
Dept: INTERNAL MEDICINE CLINIC | Age: 62
End: 2022-06-20

## 2022-06-20 NOTE — TELEPHONE ENCOUNTER
Contacted patient per Dr Miguelangel Hicks need to go to ER to be evaluated. Patient stated he will go to [facility].

## 2022-06-21 ENCOUNTER — APPOINTMENT (OUTPATIENT)
Dept: CT IMAGING | Age: 62
End: 2022-06-21
Attending: STUDENT IN AN ORGANIZED HEALTH CARE EDUCATION/TRAINING PROGRAM
Payer: MEDICAID

## 2022-06-21 ENCOUNTER — HOSPITAL ENCOUNTER (EMERGENCY)
Age: 62
Discharge: HOME | End: 2022-06-21
Attending: STUDENT IN AN ORGANIZED HEALTH CARE EDUCATION/TRAINING PROGRAM | Admitting: STUDENT IN AN ORGANIZED HEALTH CARE EDUCATION/TRAINING PROGRAM
Payer: MEDICAID

## 2022-06-21 VITALS
RESPIRATION RATE: 18 BRPM | HEART RATE: 56 BPM | HEIGHT: 78 IN | TEMPERATURE: 97.7 F | DIASTOLIC BLOOD PRESSURE: 79 MMHG | BODY MASS INDEX: 28.93 KG/M2 | WEIGHT: 250 LBS | OXYGEN SATURATION: 94 % | SYSTOLIC BLOOD PRESSURE: 162 MMHG

## 2022-06-21 DIAGNOSIS — S22.31XA CLOSED FRACTURE OF ONE RIB OF RIGHT SIDE, INITIAL ENCOUNTER: Primary | ICD-10-CM

## 2022-06-21 LAB
ALBUMIN SERPL-MCNC: 3.4 G/DL (ref 3.5–5)
ALBUMIN/GLOB SERPL: 0.7 {RATIO} (ref 1.1–2.2)
ALP SERPL-CCNC: 64 U/L (ref 45–117)
ALT SERPL-CCNC: 12 U/L (ref 12–78)
ANION GAP SERPL CALC-SCNC: 5 MMOL/L (ref 5–15)
AST SERPL-CCNC: 13 U/L (ref 15–37)
ATRIAL RATE: 53 BPM
BASOPHILS # BLD: 0.1 K/UL (ref 0–0.1)
BASOPHILS NFR BLD: 1 % (ref 0–1)
BILIRUB SERPL-MCNC: 0.4 MG/DL (ref 0.2–1)
BNP SERPL-MCNC: 256 PG/ML
BUN SERPL-MCNC: 16 MG/DL (ref 6–20)
BUN/CREAT SERPL: 15 (ref 12–20)
CALCIUM SERPL-MCNC: 9.3 MG/DL (ref 8.5–10.1)
CALCULATED P AXIS, ECG09: 36 DEGREES
CALCULATED R AXIS, ECG10: -47 DEGREES
CALCULATED T AXIS, ECG11: -13 DEGREES
CHLORIDE SERPL-SCNC: 101 MMOL/L (ref 97–108)
CK SERPL-CCNC: 137 U/L (ref 39–308)
CO2 SERPL-SCNC: 31 MMOL/L (ref 21–32)
COMMENT, HOLDF: NORMAL
CREAT SERPL-MCNC: 1.05 MG/DL (ref 0.7–1.3)
DIAGNOSIS, 93000: NORMAL
DIFFERENTIAL METHOD BLD: ABNORMAL
EOSINOPHIL # BLD: 0.2 K/UL (ref 0–0.4)
EOSINOPHIL NFR BLD: 2 % (ref 0–7)
ERYTHROCYTE [DISTWIDTH] IN BLOOD BY AUTOMATED COUNT: 13.3 % (ref 11.5–14.5)
GLOBULIN SER CALC-MCNC: 4.7 G/DL (ref 2–4)
GLUCOSE SERPL-MCNC: 158 MG/DL (ref 65–100)
HCT VFR BLD AUTO: 38.7 % (ref 36.6–50.3)
HGB BLD-MCNC: 12.6 G/DL (ref 12.1–17)
IMM GRANULOCYTES # BLD AUTO: 0 K/UL (ref 0–0.04)
IMM GRANULOCYTES NFR BLD AUTO: 0 % (ref 0–0.5)
LYMPHOCYTES # BLD: 1.4 K/UL (ref 0.8–3.5)
LYMPHOCYTES NFR BLD: 21 % (ref 12–49)
MCH RBC QN AUTO: 29 PG (ref 26–34)
MCHC RBC AUTO-ENTMCNC: 32.6 G/DL (ref 30–36.5)
MCV RBC AUTO: 89.2 FL (ref 80–99)
MONOCYTES # BLD: 0.4 K/UL (ref 0–1)
MONOCYTES NFR BLD: 6 % (ref 5–13)
NEUTS SEG # BLD: 4.7 K/UL (ref 1.8–8)
NEUTS SEG NFR BLD: 70 % (ref 32–75)
NRBC # BLD: 0 K/UL (ref 0–0.01)
NRBC BLD-RTO: 0 PER 100 WBC
P-R INTERVAL, ECG05: 162 MS
PLATELET # BLD AUTO: 292 K/UL (ref 150–400)
PMV BLD AUTO: 8.8 FL (ref 8.9–12.9)
POTASSIUM SERPL-SCNC: 4.2 MMOL/L (ref 3.5–5.1)
PROT SERPL-MCNC: 8.1 G/DL (ref 6.4–8.2)
Q-T INTERVAL, ECG07: 456 MS
QRS DURATION, ECG06: 98 MS
QTC CALCULATION (BEZET), ECG08: 427 MS
RBC # BLD AUTO: 4.34 M/UL (ref 4.1–5.7)
SAMPLES BEING HELD,HOLD: NORMAL
SODIUM SERPL-SCNC: 137 MMOL/L (ref 136–145)
TROPONIN-HIGH SENSITIVITY: 8 NG/L (ref 0–76)
VENTRICULAR RATE, ECG03: 53 BPM
WBC # BLD AUTO: 6.7 K/UL (ref 4.1–11.1)

## 2022-06-21 PROCEDURE — 71250 CT THORAX DX C-: CPT

## 2022-06-21 PROCEDURE — 99284 EMERGENCY DEPT VISIT MOD MDM: CPT

## 2022-06-21 PROCEDURE — 36415 COLL VENOUS BLD VENIPUNCTURE: CPT

## 2022-06-21 PROCEDURE — 82550 ASSAY OF CK (CPK): CPT

## 2022-06-21 PROCEDURE — 70450 CT HEAD/BRAIN W/O DYE: CPT

## 2022-06-21 PROCEDURE — 85025 COMPLETE CBC W/AUTO DIFF WBC: CPT

## 2022-06-21 PROCEDURE — 93005 ELECTROCARDIOGRAM TRACING: CPT

## 2022-06-21 PROCEDURE — 84484 ASSAY OF TROPONIN QUANT: CPT

## 2022-06-21 PROCEDURE — 72131 CT LUMBAR SPINE W/O DYE: CPT

## 2022-06-21 PROCEDURE — 77030027138 HC INCENT SPIROMETER -A

## 2022-06-21 PROCEDURE — 83880 ASSAY OF NATRIURETIC PEPTIDE: CPT

## 2022-06-21 PROCEDURE — 74011250637 HC RX REV CODE- 250/637: Performed by: STUDENT IN AN ORGANIZED HEALTH CARE EDUCATION/TRAINING PROGRAM

## 2022-06-21 PROCEDURE — 74011000250 HC RX REV CODE- 250: Performed by: STUDENT IN AN ORGANIZED HEALTH CARE EDUCATION/TRAINING PROGRAM

## 2022-06-21 PROCEDURE — 80053 COMPREHEN METABOLIC PANEL: CPT

## 2022-06-21 RX ORDER — OXYCODONE AND ACETAMINOPHEN 5; 325 MG/1; MG/1
2 TABLET ORAL
Status: DISCONTINUED | OUTPATIENT
Start: 2022-06-21 | End: 2022-06-21

## 2022-06-21 RX ORDER — IBUPROFEN 400 MG/1
800 TABLET ORAL ONCE
Status: COMPLETED | OUTPATIENT
Start: 2022-06-21 | End: 2022-06-21

## 2022-06-21 RX ORDER — ACETAMINOPHEN 325 MG/1
650 TABLET ORAL ONCE
Status: COMPLETED | OUTPATIENT
Start: 2022-06-21 | End: 2022-06-21

## 2022-06-21 RX ORDER — MELOXICAM 7.5 MG/1
TABLET ORAL
Qty: 10 TABLET | Refills: 0 | Status: SHIPPED | OUTPATIENT
Start: 2022-06-21 | End: 2022-07-01

## 2022-06-21 RX ORDER — LIDOCAINE 4 G/100G
1 PATCH TOPICAL EVERY 24 HOURS
Status: DISCONTINUED | OUTPATIENT
Start: 2022-06-21 | End: 2022-06-21 | Stop reason: HOSPADM

## 2022-06-21 RX ADMIN — IBUPROFEN 800 MG: 400 TABLET, FILM COATED ORAL at 10:19

## 2022-06-21 RX ADMIN — ACETAMINOPHEN 650 MG: 325 TABLET ORAL at 10:19

## 2022-06-21 NOTE — ED NOTES
Pt provided with IS and instructions on use. Pt verbalizes understanding. Patient (s)  given copy of dc instructions and 2 script(s). Patient (s)  verbalized understanding of instructions and script (s). Patient given a current medication reconciliation form and verbalized understanding of their medications. Patient (s) verbalized understanding of the importance of discussing medications with  his or her physician or clinic they will be following up with. Patient alert and oriented and in no acute distress. Patient discharged home ambulatory with all belongings.

## 2022-06-21 NOTE — ED PROVIDER NOTES
Rachid Latif is a 63 y.o. male with past medical history notable for  has a past medical history of Alcohol abuse, in remission, Chronic kidney disease, Chronic obstructive pulmonary disease ([address] Chronic pain, Constipation, Diabetes ([address] GERD (gastroesophageal reflux disease), Hepatitis C, Hypertension, Liver disease, Pancreatitis, Psychiatric disorder, Smoker, and presenting with bilateral leg weakness, frequent falls, and pain associated with some of these falls. He states that he has had multiple falls in the past week. He fell in the bathroom a few times and struck the right posterior ribs. He denies new focal weakness or numbness or difficulty urinating. Has not had any recent spinal procedures but he has history of extensive spinal surgery fusion and laminectomies in the past.  He states that his spinal surgeon has since retired. He is on methadone for chronic pain management. He does not use any additional pain medications. Denies any current toxic habits. He is concerned that he may have injured his ribs and is concerned that given his recent multiple falls that he may have upset hardware in his lumbar spine. He has been ambulatory but with discomfort.              Past Medical History:   Diagnosis Date    Alcohol abuse, in remission     Chronic kidney disease     PLAGUE L RENAL ARTERY    Chronic obstructive pulmonary disease (HCC)     Chronic pain     Constipation     Diabetes (HCC)     Type II    GERD (gastroesophageal reflux disease)     Hepatitis C     Hypertension     Liver disease     HEP C TOOK HARVONI    Pancreatitis     Psychiatric disorder     PANIC ATTACKS    Smoker     Spondylitis ([address]        Past Surgical History:   Procedure Laterality Date    COLONOSCOPY N/A 12/19/2019    COL[facility] ENDOSCOPY    HX APPENDECTOMY      HX ORTHOPAEDIC      3 back surgeries    HX ORTHOPAEDIC      KNEE SURGERY    HX TONSILLECTOMY Family History:   Problem Relation Age of Onset    Hypertension Mother     COPD Mother     Other Father         PALSY       Social History     Socioeconomic History    Marital status:      Spouse name: Not on file    Number of children: Not on file    Years of education: Not on file    Highest education level: Not on file   Occupational History    Not on file   Tobacco Use    Smoking status: Current Every Day Smoker     Packs/day: 1.00    Smokeless tobacco: Never Used   Substance and Sexual Activity    Alcohol use: Not Currently    Drug use: Yes     Types: Marijuana, Cocaine, Heroin     Comment: LAST USED 15YRS. AGO- TAKES METHADONE    Sexual activity: Never   Other Topics Concern    Not on file   Social History Narrative    Not on file     Social Determinants of Health     Financial Resource Strain:     Difficulty of Paying Living Expenses: Not on file   Food Insecurity:     Worried About Running Out of Food in the Last Year: Not on file    [place] of Food in the Last Year: Not on file   Transportation Needs:     Lack of Transportation (Medical): Not on file    Lack of Transportation (Non-Medical):  Not on file   Physical Activity:     Days of Exercise per Week: Not on file    Minutes of Exercise per Session: Not on file   Stress:     Feeling of Stress : Not on file   Social Connections:     Frequency of Communication with Friends and Family: Not on file    Frequency of Social Gatherings with Friends and Family: Not on file    Attends [employer] Services: Not on file    Active Member of Clubs or Organizations: Not on file    Attends Club or Organization Meetings: Not on file    Marital Status: Not on file   Intimate Partner Violence:     Fear of Current or Ex-Partner: Not on file    Emotionally Abused: Not on file    Physically Abused: Not on file    Sexually Abused: Not on file   Housing Stability:     Unable to Pay for Housing in the Last Year: Not on file    Number of Places Lived in the Last Year: Not on file    Unstable Housing in the Last Year: Not on file         ALLERGIES: House dust mite, Ragweed pollen, and Senna    Review of Systems   Constitutional: Negative for chills, fatigue and fever. HENT: Negative for ear pain, sore throat and trouble swallowing. Eyes: Negative for visual disturbance. Respiratory: Negative for cough and shortness of breath. Cardiovascular: Positive for chest pain ( posterior thoraic pain ). Gastrointestinal: Negative for abdominal pain and nausea. Genitourinary: Negative for dysuria. Musculoskeletal: Positive for arthralgias, back pain and myalgias. Skin: Negative for rash. Neurological: Negative for syncope, light-headedness and headaches. Psychiatric/Behavioral: Negative for confusion. All other systems reviewed and are negative. Vitals:    06/21/22 0747   BP: (!) 162/79   Pulse: (!) 56   Resp: 18   Temp: 97.7 °F (36.5 °C)   SpO2: 94%   Weight: 113.4 kg (250 lb)   Height: 6' 6\" (1.981 m)            Physical Exam  Constitutional:       General: He is not in acute distress. Appearance: He is not toxic-appearing. HENT:      Head: Normocephalic and atraumatic. Mouth/Throat:      Mouth: Mucous membranes are moist.   Eyes:      Extraocular Movements: Extraocular movements intact. Cardiovascular:      Rate and Rhythm: Normal rate and regular rhythm. Heart sounds: Normal heart sounds. Pulmonary:      Effort: Pulmonary effort is normal. No respiratory distress. Breath sounds: Normal breath sounds. Abdominal:      Palpations: Abdomen is soft. Tenderness: There is no abdominal tenderness. Musculoskeletal:      Cervical back: Normal range of motion. Back:       Right lower leg: No edema. Left lower leg: No edema. Skin:     Capillary Refill: Capillary refill takes less than 2 seconds. Neurological:      General: No focal deficit present.       Mental Status: He is alert and oriented to person, place, and time. Cranial Nerves: No cranial nerve deficit. Sensory: No sensory deficit. Motor: No weakness. Gait: Gait abnormal (  Steady but slow gait). Psychiatric:         Mood and Affect: Mood normal.          MDM        57-year-old gentleman presenting with rib pain after multiple falls. He states that he has not been moving around much and his legs are generally weak. Does not have any bowel or bladder symptoms. No red flag Symptoms for spinal epidural compression, infection. Not any recent procedures. He appears to have a rib fracture which explains the syndrome. We will follow-up with primary care. MEDICAL DECISION MAKIN y.o. male presents with Fall    Differential diagnosis includes but not limited to: Spinal fracture, rib fracture, internal organ injury less likely. LABORATORY TESTS:  Labs Reviewed   CBC WITH AUTOMATED DIFF - Abnormal; Notable for the following components:       Result Value    MPV 8.8 (*)     All other components within normal limits   METABOLIC PANEL, COMPREHENSIVE - Abnormal; Notable for the following components:    Glucose 158 (*)     AST (SGOT) 13 (*)     Albumin 3.4 (*)     Globulin 4.7 (*)     A-G Ratio 0.7 (*)     All other components within normal limits   NT-PRO BNP - Abnormal; Notable for the following components:    NT pro- (*)     All other components within normal limits   SAMPLES BEING HELD   CK   TROPONIN-HIGH SENSITIVITY       IMAGING RESULTS:  CT HEAD WO CONT   Final Result   No acute process or change compared to the prior exam.            CT SPINE LUMB WO CONT   Final Result   Posterior spinal fusion L2 to the sacrum. No acute osseous abnormality. CT CHEST WO CONT   Final Result   Nondisplaced right posterior 10th rib fracture. Otherwise no acute abnormality. Small right-sided pulmonary nodules.       Guidelines for Management of Incidental Pulmonary Nodules Detected on CT Images:   from the Fleischner Society 2017      LOW-RISK PATIENTS:      LESS THAN OR EQUAL TO 6 MM:  No routine follow-up needed. GREATER THAN 6-8 MM: CT at 6-12 months, if multiple at 3-6 months, then consider   CT at 18-24 months. GREATER THAN 8 MM:  Consider CT at 3 months, PET/CT, or tissue sampling. If   multiple CT at 3-6 months, then consider CT at 18-24 months. HIGH-RISK PATIENTS:      LESS THAN OR EQUAL TO 6 MM:  Optional CT at 12 months. GREATER THAN 6-8 MM: CT at 6-12 months, if multiple at 3-6 months, then CT at   18-24 months. GREATER THAN 8 MM:  Consider CT at 3 months, PET/CT, or tissue sampling. If   multiple CT at 3-6 months, then at 18-24 months. Guidelines for Management of Incidental Pulmonary Nodules Detected on CT : A   Statement from the [address] [place] [address] 2017\"  Mich Cervantes. Radiology   2017; 000:1-16                MEDICATIONS GIVEN:  Medications   lidocaine 4 % patch 1 Patch (1 Patch TransDERmal Apply Patch 6/21/22 1019)   acetaminophen (TYLENOL) tablet 650 mg (650 mg Oral Given 6/21/22 1019)   ibuprofen (MOTRIN) tablet 800 mg (800 mg Oral Given 6/21/22 1019)       PROGRESS NOTE:   10:43 AM Patient's symptoms have improved after treatment      CONSULTS:  none    IMPRESSION:  1. Closed fracture of one rib of right side, initial encounter        PLAN:  -   Discharge  Current Discharge Medication List      START taking these medications    Details   lidocaine HCL 4 % ptmd 1 Patch by Apply Externally route every twelve (12) hours for 14 days. Qty: 14 Each, Refills: 0  Start date: 6/21/2022, End date: 7/5/2022      meloxicam (MOBIC) 7.5 mg tablet Take 1 Tablet by mouth daily for 3 days, THEN 1 Tablet daily as needed for Pain for up to 7 days. Qty: 10 Tablet, Refills: 0  Start date: 6/21/2022, End date: 7/1/2022    Comments: Pharmacist, Please add the following text to the patient instructions, \"Take as needed for pain in addition to any other medications ordered for pain relief. \" Follow-up Information     Follow up With Specialties Details Why Contact Info    Vish Chinchilla MD Internal Medicine Physician Schedule an appointment as soon as possible for a visit  Call for a follow up appointment. [address]   [address] [facility]  [phone]          Return precautions given    Tigre Shankar MD      Please note that this dictation was completed with [employer], the computer voice recognition software. Quite often unanticipated grammatical, syntax, homophones, and other interpretive errors are inadvertently transcribed by the computer software. Please disregard these errors. Please excuse any errors that have escaped final proofreading.       Procedures

## 2022-06-21 NOTE — ED TRIAGE NOTES
Pt states that 7 days ago he started falling. Wednesday last week he noticed that his speech slurred. Pt states he has fallen a total of 7/8 times. Pt states both legs feel very week. Pt denies dizzyness, sob, and chest pain.

## 2022-06-23 ENCOUNTER — DOCUMENTATION ONLY (OUTPATIENT)
Dept: CASE MANAGEMENT | Age: 62
End: 2022-06-23

## 2022-06-23 ENCOUNTER — OFFICE VISIT (OUTPATIENT)
Dept: INTERNAL MEDICINE CLINIC | Age: 62
End: 2022-06-23
Payer: MEDICAID

## 2022-06-23 VITALS
BODY MASS INDEX: 29.18 KG/M2 | RESPIRATION RATE: 17 BRPM | HEART RATE: 55 BPM | HEIGHT: 78 IN | TEMPERATURE: 98.1 F | OXYGEN SATURATION: 97 % | DIASTOLIC BLOOD PRESSURE: 62 MMHG | SYSTOLIC BLOOD PRESSURE: 137 MMHG | WEIGHT: 252.2 LBS

## 2022-06-23 DIAGNOSIS — Z98.1 HISTORY OF LUMBAR FUSION: ICD-10-CM

## 2022-06-23 DIAGNOSIS — S22.31XD CLOSED FRACTURE OF ONE RIB OF RIGHT SIDE WITH ROUTINE HEALING, SUBSEQUENT ENCOUNTER: ICD-10-CM

## 2022-06-23 DIAGNOSIS — R29.898 WEAKNESS OF BOTH LEGS: Primary | ICD-10-CM

## 2022-06-23 PROCEDURE — 99213 OFFICE O/P EST LOW 20 MIN: CPT | Performed by: INTERNAL MEDICINE

## 2022-06-23 RX ORDER — DOCUSATE SODIUM 100 MG/1
CAPSULE, LIQUID FILLED ORAL
COMMUNITY

## 2022-06-23 RX ORDER — ERGOCALCIFEROL 1.25 MG/1
CAPSULE ORAL
COMMUNITY

## 2022-06-23 RX ORDER — PEN NEEDLE, DIABETIC 31 GX3/16"
NEEDLE, DISPOSABLE MISCELLANEOUS
COMMUNITY

## 2022-06-23 NOTE — PROGRESS NOTES
SSED/CM consult received and appreciated. Call placed to patient and introduced to role of CM. HIPAA identifier verified. Noted PCP appointment today. Patient successfully able to attend states plan is for OP PT once ribs heal better. Informed CM of history of back surgeries. No transitional care needs verbalized at this time support received from family (2 Uncles reside in the home and mother lives 6 houses away).

## 2022-06-23 NOTE — PROGRESS NOTES
Follow Up Visit    Chato Omalley is a 63 y.o. male. he presents for ED Follow-up    The patient is following up after an ED visit for a rib fracture. This has improved since that time. He fell at home causing the fracture. He has been using tylenol and feels somewhat better. Patient Active Problem List   Diagnosis Code    Acute pancreatitis K85.90    Anxiety F41.9    Substance induced mood disorder ([facility] F19.94    Benzodiazepine dependence, continuous ([facility]) F13.20    [facility] dependence [facility] F19.20    Type 2 diabetes mellitus with hyperglycemia, with long-term current use of insulin ([facility]) E11.65, Z79.4    Type 2 diabetes with nephropathy ([facility]) E11.21    Essential hypertension I10    Dysuria R30.0    Lumbar spinal stenosis M48.061    Type 2 diabetes mellitus with diabetic neuropathy ([facility] E11.40         Prior to Admission medications    Medication Sig Start Date End Date Taking? Authorizing Provider   methadone (DOLOPHINE) methadone   pt takes 100mg daily   Yes Provider, Historical   Insulin Needles, Disposable, (Damari Pen Needle) 32 gauge x 5/32\" ndle BD Ultra-Fine Damari Pen Needle 32 gauge x 5/32\"   Yes Provider, Historical   docusate sodium (Colace) 100 mg capsule Colace 100 mg capsule   Take 1 capsule twice a day by oral route as needed. Yes Provider, Historical   ergocalciferol (ERGOCALCIFEROL) 1,250 mcg (50,000 unit) capsule ergocalciferol (vitamin D2) 1,250 mcg (50,000 unit) capsule   Yes Provider, Historical   lidocaine HCL 4 % ptmd 1 Patch by Apply Externally route every twelve (12) hours for 14 days. 6/21/22 7/5/22 Yes Gumaro Rainey MD   meloxicam (MOBIC) 7.5 mg tablet Take 1 Tablet by mouth daily for 3 days, THEN 1 Tablet daily as needed for Pain for up to 7 days. 6/21/22 7/1/22 Yes Gumaro Rainey MD   gabapentin (NEURONTIN) 300 mg capsule TAKE ONE CAPSULE BY MOUTH THREE TIMES A DAY.  *MAXIMUM DAILY AMOUNT IS 3 CAPSULES 6/8/22  Yes Bubba Ruelas MD   metFORMIN (GLUCOPHAGE) 500 mg tablet TAKE TWO TABLETS BY MOUTH TWICE A DAY WITH A MEAL 5/13/22  Yes Nicolasa Granados MD   metoprolol succinate (TOPROL-XL) 50 mg XL tablet Take 1 Tablet by mouth daily. 3/23/22  Yes Nicolasa Granados MD   BD Damari 2nd Gen Pen Needle 32 gauge x 5/32\" ndle USE TO INJECT INSULIN DAILY AS DIRECTED 3/15/22  Yes Nicolasa Granados MD   amLODIPine (NORVASC) 10 mg tablet TAKE ONE TABLET BY MOUTH DAILY 3/7/22  Yes Nicolasa Granados MD   insulin glargine (Basaglar KwikPen U-100 Insulin) 100 unit/mL (3 mL) inpn INJECT 32 UNITS UNDER THE SKIN DAILY AS DIRECTED 3/7/22  Yes Nicolasa Granados MD   lisinopriL (PRINIVIL, ZESTRIL) 40 mg tablet Take 1 Tablet by mouth daily. 3/7/22  Yes Nicolasa Granados MD   ALPRAZolam Alfreida Bottoms) 1 mg tablet Take 1 mg by mouth three (3) times daily. 7/1/20  Yes Provider, Historical   triamcinolone acetonide (KENALOG) 0.1 % topical cream  5/28/20  Yes Provider, Historical   METHADONE HCL (METHADONE PO) Take 95 mg by mouth daily. Yes Provider, Historical   cyclobenzaprine (FLEXERIL) 5 mg tablet Take 1 Tab by mouth three (3) times daily as needed for Muscle Spasm(s).   Patient not taking: Reported on 6/23/2022 1/21/21   Nicolasa Granados MD         Health Maintenance   Topic Date Due    COVID-19 Vaccine (1) Never done    Pneumococcal 0-64 years (1 - PCV) Never done    Foot Exam Q1  Never done    DTaP/Tdap/Td series (1 - Tdap) Never done    Shingrix Vaccine Age 50> (1 of 2) Never done    Eye Exam Retinal or Dilated  04/20/2019    MICROALBUMIN Q1  06/13/2020    Flu Vaccine (Season Ended) 09/01/2022    A1C test (Diabetic or Prediabetic)  01/17/2023    Lipid Screen  01/17/2023    Depression Screen  04/19/2023    Colorectal Cancer Screening Combo  12/19/2029    Hepatitis C Screening  Completed       Review of Systems   Musculoskeletal:        Chest wall pain due to fracture of ribs           Visit Vitals  /62 (BP 1 Location: Left arm, BP Patient Position: Sitting, BP Cuff Size: Adult)   Pulse (!) 55   Temp 98.1 °F (36.7 °C) (Temporal)   Resp 17   Ht 6' 6\" (1.981 m)   Wt 252 lb 3.2 oz (114.4 kg)   SpO2 97%   BMI 29.14 kg/m²       Physical Exam  Musculoskeletal:         General: Tenderness (at the lateral chest wall) present. ASSESSMENT/PLAN    Diagnoses and all orders for this visit:    1. Weakness of both legs  -     REFERRAL TO PHYSICAL THERAPY    2. History of lumbar fusion    3.  Closed fracture of one rib of right side with routine healing, subsequent encounter

## 2022-07-15 DIAGNOSIS — E11.65 TYPE 2 DIABETES MELLITUS WITH HYPERGLYCEMIA, WITH LONG-TERM CURRENT USE OF INSULIN (HCC): ICD-10-CM

## 2022-07-15 DIAGNOSIS — Z79.4 TYPE 2 DIABETES MELLITUS WITH HYPERGLYCEMIA, WITH LONG-TERM CURRENT USE OF INSULIN (HCC): ICD-10-CM

## 2022-07-18 RX ORDER — METFORMIN HYDROCHLORIDE 500 MG/1
TABLET ORAL
Qty: 120 TABLET | Refills: 1 | Status: SHIPPED | OUTPATIENT
Start: 2022-07-18 | End: 2022-09-14

## 2022-08-02 DIAGNOSIS — E11.21 TYPE 2 DIABETES WITH NEPHROPATHY (HCC): ICD-10-CM

## 2022-08-04 RX ORDER — INSULIN GLARGINE 100 [IU]/ML
INJECTION, SOLUTION SUBCUTANEOUS
Qty: 12 ML | Refills: 1 | Status: SHIPPED | OUTPATIENT
Start: 2022-08-04 | End: 2022-10-20 | Stop reason: SDUPTHER

## 2022-09-14 DIAGNOSIS — Z79.4 TYPE 2 DIABETES MELLITUS WITH HYPERGLYCEMIA, WITH LONG-TERM CURRENT USE OF INSULIN (HCC): ICD-10-CM

## 2022-09-14 DIAGNOSIS — I10 HYPERTENSION, UNSPECIFIED TYPE: ICD-10-CM

## 2022-09-14 DIAGNOSIS — I10 ESSENTIAL HYPERTENSION: ICD-10-CM

## 2022-09-14 DIAGNOSIS — E11.65 TYPE 2 DIABETES MELLITUS WITH HYPERGLYCEMIA, WITH LONG-TERM CURRENT USE OF INSULIN (HCC): ICD-10-CM

## 2022-09-14 RX ORDER — METOPROLOL SUCCINATE 50 MG/1
TABLET, EXTENDED RELEASE ORAL
Qty: 90 TABLET | Refills: 0 | Status: SHIPPED | OUTPATIENT
Start: 2022-09-14

## 2022-09-14 RX ORDER — AMLODIPINE BESYLATE 10 MG/1
TABLET ORAL
Qty: 90 TABLET | Refills: 1 | Status: SHIPPED | OUTPATIENT
Start: 2022-09-14

## 2022-09-14 RX ORDER — METFORMIN HYDROCHLORIDE 500 MG/1
TABLET ORAL
Qty: 120 TABLET | Refills: 1 | Status: SHIPPED | OUTPATIENT
Start: 2022-09-14

## 2022-09-14 RX ORDER — LISINOPRIL 40 MG/1
TABLET ORAL
Qty: 90 TABLET | Refills: 1 | Status: SHIPPED | OUTPATIENT
Start: 2022-09-14

## 2022-10-20 DIAGNOSIS — E11.21 TYPE 2 DIABETES WITH NEPHROPATHY (HCC): ICD-10-CM

## 2022-10-20 RX ORDER — INSULIN GLARGINE 100 [IU]/ML
INJECTION, SOLUTION SUBCUTANEOUS
Qty: 12 ML | Refills: 1 | Status: SHIPPED | OUTPATIENT
Start: 2022-10-20

## 2022-10-20 NOTE — TELEPHONE ENCOUNTER
Requested Prescriptions     Pending Prescriptions Disp Refills    insulin glargine (Basaglar KwikPen U-100 Insulin) 100 unit/mL (3 mL) inpn 12 mL 1     Sig: INJECT 32 UNITS UNDER THE SKIN DAILY AS DIRECTED BY PHYSICIAN     Order request by pharmacy also, patient will run out on Sat.

## 2022-11-29 DIAGNOSIS — Z79.4 TYPE 2 DIABETES MELLITUS WITH HYPERGLYCEMIA, WITH LONG-TERM CURRENT USE OF INSULIN (HCC): ICD-10-CM

## 2022-11-29 DIAGNOSIS — E11.65 TYPE 2 DIABETES MELLITUS WITH HYPERGLYCEMIA, WITH LONG-TERM CURRENT USE OF INSULIN (HCC): ICD-10-CM

## 2022-12-02 ENCOUNTER — TELEPHONE (OUTPATIENT)
Dept: INTERNAL MEDICINE CLINIC | Age: 62
End: 2022-12-02

## 2022-12-02 RX ORDER — METFORMIN HYDROCHLORIDE 500 MG/1
TABLET ORAL
Qty: 120 TABLET | Refills: 1 | Status: SHIPPED | OUTPATIENT
Start: 2022-12-02

## 2022-12-02 NOTE — TELEPHONE ENCOUNTER
Reason for call:  Spoke with pt, waiting on refill for med   metFORMIN (GLUCOPHAGE) 500 mg tablet    Pt only have pills for one day     Is this a new problem: yes     Date of last appointment:  6/23/2022     Can we respond via [employer]: no    Best call back number: Ashok Dallas    [phone]
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
## 2022-12-20 DIAGNOSIS — I10 ESSENTIAL HYPERTENSION: ICD-10-CM

## 2022-12-20 RX ORDER — GABAPENTIN 300 MG/1
CAPSULE ORAL
Qty: 90 CAPSULE | Refills: 3 | Status: CANCELLED | OUTPATIENT
Start: 2022-12-20

## 2022-12-20 RX ORDER — METOPROLOL SUCCINATE 50 MG/1
50 TABLET, EXTENDED RELEASE ORAL DAILY
Qty: 90 TABLET | Refills: 0 | Status: CANCELLED | OUTPATIENT
Start: 2022-12-20

## 2022-12-20 NOTE — TELEPHONE ENCOUNTER
Requested Prescriptions     Pending Prescriptions Disp Refills    metoprolol succinate (TOPROL-XL) 50 mg XL tablet 90 Tablet 0     Sig: Take 1 Tablet by mouth daily.     gabapentin (NEURONTIN) 300 mg capsule 90 Capsule 3     Sig: TAKE ONE CAPSULE BY MOUTH THREE TIMES A DAY *MAX DAILY AMOUNT IS 3 CAPSULES

## 2022-12-21 ENCOUNTER — TELEPHONE (OUTPATIENT)
Dept: INTERNAL MEDICINE CLINIC | Age: 62
End: 2022-12-21

## 2022-12-21 DIAGNOSIS — I10 ESSENTIAL HYPERTENSION: ICD-10-CM

## 2022-12-21 DIAGNOSIS — E11.21 TYPE 2 DIABETES WITH NEPHROPATHY (HCC): ICD-10-CM

## 2022-12-21 RX ORDER — METOPROLOL SUCCINATE 50 MG/1
50 TABLET, EXTENDED RELEASE ORAL DAILY
Qty: 90 TABLET | Refills: 0 | Status: SHIPPED | OUTPATIENT
Start: 2022-12-21

## 2022-12-21 RX ORDER — INSULIN GLARGINE 100 [IU]/ML
INJECTION, SOLUTION SUBCUTANEOUS
Qty: 12 ML | Refills: 1 | Status: SHIPPED | OUTPATIENT
Start: 2022-12-21

## 2022-12-21 NOTE — TELEPHONE ENCOUNTER
Reason for call:  Spoke with pt, he need a refiill on Metoprolol, gabapentin and basaglar. Pt do not have any more med after today.     Figueroa Flores [id number] - [address] [facility]  [phone]    Is this a new problem: yes     Date of last appointment:  6/23/2022     Can we respond via [employer]: no    Best call back number: Sabina Ramsey  [phone]
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
## 2022-12-22 RX ORDER — GABAPENTIN 300 MG/1
CAPSULE ORAL
Qty: 90 CAPSULE | Refills: 3 | OUTPATIENT
Start: 2022-12-22

## 2022-12-22 NOTE — TELEPHONE ENCOUNTER
Requested Prescriptions     Pending Prescriptions Disp Refills    gabapentin (NEURONTIN) 300 mg capsule 90 Capsule 3     Sig: TAKE ONE CAPSULE BY MOUTH THREE TIMES A DAY *MAX DAILY AMOUNT IS 3 CAPSULES     [facility] [id number] - [address] [place] [address] Dr. Gardner [facility]  [phone] No

## 2022-12-23 NOTE — ED NOTES
RN attempted IV access with lab draw without success, pt initial IV is no longer patent and was removed 23-Dec-2022 01:55

## 2023-01-18 ENCOUNTER — OFFICE VISIT (OUTPATIENT)
Dept: INTERNAL MEDICINE CLINIC | Age: 63
End: 2023-01-18
Payer: MEDICAID

## 2023-01-18 VITALS
WEIGHT: 256.2 LBS | RESPIRATION RATE: 16 BRPM | TEMPERATURE: 98.2 F | OXYGEN SATURATION: 94 % | BODY MASS INDEX: 29.64 KG/M2 | SYSTOLIC BLOOD PRESSURE: 160 MMHG | DIASTOLIC BLOOD PRESSURE: 70 MMHG | HEIGHT: 78 IN | HEART RATE: 57 BPM

## 2023-01-18 DIAGNOSIS — R53.83 FATIGUE, UNSPECIFIED TYPE: ICD-10-CM

## 2023-01-18 DIAGNOSIS — L21.9 SEBORRHEIC DERMATITIS: ICD-10-CM

## 2023-01-18 DIAGNOSIS — Z12.5 PROSTATE CANCER SCREENING: ICD-10-CM

## 2023-01-18 DIAGNOSIS — I10 ESSENTIAL HYPERTENSION: ICD-10-CM

## 2023-01-18 DIAGNOSIS — E78.2 MIXED HYPERLIPIDEMIA: ICD-10-CM

## 2023-01-18 DIAGNOSIS — F41.9 ANXIETY: ICD-10-CM

## 2023-01-18 DIAGNOSIS — R79.89 LOW VITAMIN D LEVEL: ICD-10-CM

## 2023-01-18 DIAGNOSIS — E11.21 TYPE 2 DIABETES WITH NEPHROPATHY (HCC): Primary | ICD-10-CM

## 2023-01-18 DIAGNOSIS — H53.9 VISION CHANGES: ICD-10-CM

## 2023-01-18 PROCEDURE — 99214 OFFICE O/P EST MOD 30 MIN: CPT | Performed by: INTERNAL MEDICINE

## 2023-01-18 RX ORDER — KETOCONAZOLE 20 MG/ML
1 SHAMPOO, SUSPENSION TOPICAL AS NEEDED
Qty: 120 ML | Refills: 1 | Status: SHIPPED | OUTPATIENT
Start: 2023-01-18

## 2023-01-18 RX ORDER — KETOCONAZOLE 20 MG/G
CREAM TOPICAL DAILY
Qty: 60 G | Refills: 0 | Status: SHIPPED | OUTPATIENT
Start: 2023-01-18

## 2023-01-18 NOTE — PROGRESS NOTES
Follow Up Visit    Jeannine Polanco is a 63 y.o. male. he presents for follow up diabetes, hypertension. Endocrine Review  He is seen for diabetes. Since last visit: he has been continuing his medications. Home glucose monitoring: is not performed. He reports medication compliance: compliant all of the time. He reports the following medication side effects: none. Diabetic diet compliance: noncompliant some of the time. Further diabetic ROS: no chest pain or dyspnea, no numbness, tingling or pain in extremities, no hypoglycemia. Lab review: orders written for new lab studies as appropriate; see orders. Eye exam: has been done. Cardiovascular Review  The patient has hypertension and hyperlipidemia. He reports taking medications as instructed, no medication side effects noted, no dyspnea on exertion, no palpitations. Diet and Lifestyle: not attempting to follow a low sodium diet, sedentary, smoker he is not ready to quit . Lab review: orders written for new lab studies as appropriate; see orders. He has a dry flaky rash on his face. This is associated with some redness. We discussed that it appears to be seborrheic dermatitis. Patient Active Problem List   Diagnosis Code    Acute pancreatitis K85.90    Anxiety F41.9    Substance induced mood disorder ([facility]) F19.94    Benzodiazepine dependence, continuous ([facility]) F13.20    Polysubstance dependence ([facility]) F19.20    Type 2 diabetes mellitus with hyperglycemia, with long-term current use of insulin ([facility]) E11.65, Z79.4    Type 2 diabetes with nephropathy ([facility]) E11.21    Essential hypertension I10    Dysuria R30.0    Lumbar spinal stenosis M48.061    Type 2 diabetes mellitus with diabetic neuropathy ([facility] E11.40         Prior to Admission medications    Medication Sig Start Date End Date Taking?  Authorizing Provider   gabapentin (NEURONTIN) 300 mg capsule TAKE ONE CAPSULE BY MOUTH THREE TIMES A DAY 12/22/22  Yes Stacy Sandoval MD   metoprolol succinate (TOPROL-XL) 50 mg XL tablet TAKE ONE TABLET BY MOUTH DAILY 12/22/22  Yes Ko Clemons MD   insulin glargine (Basaglar KwikPen U-100 Insulin) 100 unit/mL (3 mL) inpn INJECT 32 UNITS UNDER THE SKIN DAILY AS DIRECTED BY PHYSICIAN 12/21/22  Yes Ko Clemons MD   metFORMIN (GLUCOPHAGE) 500 mg tablet TAKE TWO TABLETS BY MOUTH TWICE A DAY WITH MEALS 12/2/22  Yes Ko Clemons MD   amLODIPine (NORVASC) 10 mg tablet TAKE ONE TABLET BY MOUTH DAILY 9/14/22  Yes Ko Clemons MD   lisinopriL (PRINIVIL, ZESTRIL) 40 mg tablet TAKE ONE TABLET BY MOUTH DAILY 9/14/22  Yes Ko Clemons MD   Insulin Needles, Disposable, 32 gauge x 5/32\" ndle BD Ultra-Fine Damari Pen Needle 32 gauge x 5/32\"   Yes Provider, Historical   BD Damari 2nd Gen Pen Needle 32 gauge x 5/32\" ndle USE TO INJECT INSULIN DAILY AS DIRECTED 3/15/22  Yes Ko Clemons MD   ALPRAZolam Bennett Brody) 1 mg tablet Take 1 mg by mouth three (3) times daily. 7/1/20  Yes Provider, Historical   METHADONE HCL (METHADONE PO) Take 95 mg by mouth daily. Yes Provider, Historical   metoprolol succinate (TOPROL-XL) 50 mg XL tablet Take 1 Tablet by mouth daily. 12/21/22 1/18/23  Ko Clemons MD   methadone (DOLOPHINE) methadone   pt takes 100mg daily  Patient not taking: Reported on 1/18/2023    Provider, Historical   docusate sodium (COLACE) 100 mg capsule Colace 100 mg capsule   Take 1 capsule twice a day by oral route as needed. Provider, Historical   ergocalciferol (ERGOCALCIFEROL) 1,250 mcg (50,000 unit) capsule ergocalciferol (vitamin D2) 1,250 mcg (50,000 unit) capsule    Provider, Historical   cyclobenzaprine (FLEXERIL) 5 mg tablet Take 1 Tab by mouth three (3) times daily as needed for Muscle Spasm(s).   Patient not taking: No sig reported 1/21/21   Ko Clemons MD   triamcinolone acetonide (KENALOG) 0.1 % topical cream  5/28/20   Provider, Historical         Health Maintenance   Topic Date Due    COVID-19 Vaccine (1) Never done Pneumococcal 0-64 years (1 - PCV) Never done    Foot Exam Q1  Never done    DTaP/Tdap/Td series (1 - Tdap) Never done    Shingles Vaccine (1 of 2) Never done    Diabetic Alb to Cr ratio (uACR) test  06/13/2020    Flu Vaccine (1) Never done    A1C test (Diabetic or Prediabetic)  01/17/2023    Lipid Screen  01/17/2023    GFR test (Diabetes, CKD 3-4, OR last GFR 15-59)  06/21/2023    Depression Screen  06/23/2023    Eye Exam Retinal or Dilated  08/16/2024    Colorectal Cancer Screening Combo  12/19/2029    Hepatitis C Screening  Completed       ROS  As per HPI      Visit Vitals  BP (!) 160/70   Pulse (!) 57   Temp 98.2 °F (36.8 °C) (Temporal)   Resp 16   Ht 6' 6\" (1.981 m)   Wt 256 lb 3.2 oz (116.2 kg)   SpO2 94%   BMI 29.61 kg/m²       Physical Exam  Constitutional:       Appearance: Normal appearance. Cardiovascular:      Rate and Rhythm: Normal rate and regular rhythm. Pulses: Normal pulses. Heart sounds: Normal heart sounds. Pulmonary:      Effort: Pulmonary effort is normal.      Breath sounds: Normal breath sounds. Neurological:      Mental Status: He is alert. ASSESSMENT/PLAN    Diagnoses and all orders for this visit:    1. Type 2 diabetes with nephropathy (HCC)  -     HEMOGLOBIN A1C WITH EAG; Future    2. Essential hypertension    3. Mixed hyperlipidemia  -     LIPID PANEL; Future  -     METABOLIC PANEL, COMPREHENSIVE; Future  -     CBC WITH AUTOMATED DIFF; Future    4. Anxiety    5. Seborrheic dermatitis  -     REFERRAL TO DERMATOLOGY  -     ketoconazole (NIZORAL) 2 % shampoo; Apply 1 mL to affected area as needed for Itching.  -     ketoconazole (NIZORAL) 2 % topical cream; Apply  to affected area daily. 6. Vision changes  -     REFERRAL TO OPHTHALMOLOGY    7. Prostate cancer screening  -     PSA SCREENING (SCREENING); Future    8. Fatigue, unspecified type  -     TESTOSTERONE, FREE & TOTAL; Future    9.  Low vitamin D level  -     VITAMIN D, 25 HYDROXY; Future        Follow-up and Dispositions    Return in about 6 months (around 7/18/2023).

## 2023-01-18 NOTE — PROGRESS NOTES
Chief Complaint   Patient presents with    Complete Physical     Reviewed record in preparation for visit and have obtained necessary documentation. Identified pt with two pt identifiers(name and ). Health Maintenance Due   Topic    COVID-19 Vaccine (1)    Pneumococcal 0-64 years (1 - PCV)    Foot Exam Q1     DTaP/Tdap/Td series (1 - Tdap)    Shingles Vaccine (1 of 2)    Diabetic Alb to Cr ratio (uACR) test     Flu Vaccine (1)    A1C test (Diabetic or Prediabetic)     Lipid Screen          Chief Complaint   Patient presents with    Complete Physical        Wt Readings from Last 3 Encounters:   23 256 lb 3.2 oz (116.2 kg)   22 252 lb 3.2 oz (114.4 kg)   22 250 lb (113.4 kg)     Temp Readings from Last 3 Encounters:   23 98.2 °F (36.8 °C) (Temporal)   22 98.1 °F (36.7 °C) (Temporal)   22 97.7 °F (36.5 °C)     BP Readings from Last 3 Encounters:   23 (!) 160/70   22 137/62   22 (!) 162/79     Pulse Readings from Last 3 Encounters:   23 (!) 57   22 (!) 55   22 (!) 56           Learning Assessment:  :     Learning Assessment 2017   PRIMARY LEARNER Patient   HIGHEST LEVEL OF EDUCATION - PRIMARY LEARNER  2 YEARS OF COLLEGE   BARRIERS PRIMARY LEARNER NONE   PRIMARY LANGUAGE ENGLISH   LEARNER PREFERENCE PRIMARY LISTENING   ANSWERED BY patient    RELATIONSHIP SELF       Depression Screening:  :     3 most recent PHQ Screens 2022   Little interest or pleasure in doing things Not at all   Feeling down, depressed, irritable, or hopeless Not at all   Total Score PHQ 2 0       Fall Risk Assessment:  :     Fall Risk Assessment, last 12 mths 2018   Able to walk? Yes   Fall in past 12 months? Yes   Number of falls in past 12 months 1   Fall with injury? 0       Abuse Screening:  :     Abuse Screening Questionnaire 2022   Do you ever feel afraid of your partner?  N N N   Are you in a relationship with someone who physically or mentally threatens you? N N N   Is it safe for you to go home? [facility]       Coordination of Care Questionnaire:  :     1) Have you been to an emergency room, urgent care clinic since your last visit? no   Hospitalized since your last visit? no             2) Have you seen or consulted any other health care providers outside of [address] [place] [address] since your last visit? yes (Include any pap smears or colon screenings in this section.)    3) Do you have an Advance Directive on file? no    4) Are you interested in receiving information on Advance Directives? NO      Patient is accompanied by self I have received verbal consent from Marisel Aguilar to discuss any/all medical information while they are present in the room. Reviewed record  In preparation for visit and have obtained necessary documentation.

## 2023-02-04 DIAGNOSIS — Z79.4 TYPE 2 DIABETES MELLITUS WITH HYPERGLYCEMIA, WITH LONG-TERM CURRENT USE OF INSULIN (HCC): ICD-10-CM

## 2023-02-04 DIAGNOSIS — E11.65 TYPE 2 DIABETES MELLITUS WITH HYPERGLYCEMIA, WITH LONG-TERM CURRENT USE OF INSULIN (HCC): ICD-10-CM

## 2023-02-07 RX ORDER — METFORMIN HYDROCHLORIDE 500 MG/1
TABLET ORAL
Qty: 120 TABLET | Refills: 1 | Status: SHIPPED | OUTPATIENT
Start: 2023-02-07

## 2023-03-05 DIAGNOSIS — E11.21 TYPE 2 DIABETES WITH NEPHROPATHY (HCC): ICD-10-CM

## 2023-03-06 RX ORDER — INSULIN GLARGINE 100 [IU]/ML
INJECTION, SOLUTION SUBCUTANEOUS
Qty: 12 ML | Refills: 1 | Status: SHIPPED | OUTPATIENT
Start: 2023-03-06

## 2023-03-23 DIAGNOSIS — I10 HYPERTENSION, UNSPECIFIED TYPE: ICD-10-CM

## 2023-03-23 NOTE — TELEPHONE ENCOUNTER
Requested Prescriptions     Pending Prescriptions Disp Refills    lisinopriL (PRINIVIL, ZESTRIL) 40 mg tablet 90 Tablet 1     Sig: Take 1 Tablet by mouth daily. amLODIPine (NORVASC) 10 mg tablet 90 Tablet 1     Sig: Take 1 Tablet by mouth daily.      Katrina Chopra [id number] - [facility] [address]  [phone]

## 2023-03-24 RX ORDER — LISINOPRIL 40 MG/1
40 TABLET ORAL DAILY
Qty: 90 TABLET | Refills: 1 | Status: SHIPPED | OUTPATIENT
Start: 2023-03-24

## 2023-03-24 RX ORDER — AMLODIPINE BESYLATE 10 MG/1
10 TABLET ORAL DAILY
Qty: 90 TABLET | Refills: 1 | Status: SHIPPED | OUTPATIENT
Start: 2023-03-24

## 2023-04-05 ENCOUNTER — TELEPHONE (OUTPATIENT)
Dept: INTERNAL MEDICINE CLINIC | Age: 63
End: 2023-04-05

## 2023-05-11 RX ORDER — INSULIN GLARGINE 100 [IU]/ML
INJECTION, SOLUTION SUBCUTANEOUS
Qty: 12 ML | Refills: 2 | Status: SHIPPED | OUTPATIENT
Start: 2023-05-11

## 2023-06-20 DIAGNOSIS — G62.9 NEUROPATHY: Primary | ICD-10-CM

## 2023-06-23 RX ORDER — GABAPENTIN 300 MG/1
CAPSULE ORAL
Qty: 90 CAPSULE | Refills: 1 | Status: SHIPPED | OUTPATIENT
Start: 2023-06-23 | End: 2023-07-23

## 2023-07-06 ENCOUNTER — OFFICE VISIT (OUTPATIENT)
Age: 63
End: 2023-07-06
Payer: COMMERCIAL

## 2023-07-06 VITALS
DIASTOLIC BLOOD PRESSURE: 77 MMHG | BODY MASS INDEX: 28.69 KG/M2 | HEIGHT: 78 IN | OXYGEN SATURATION: 96 % | RESPIRATION RATE: 16 BRPM | HEART RATE: 53 BPM | WEIGHT: 248 LBS | TEMPERATURE: 98.2 F | SYSTOLIC BLOOD PRESSURE: 138 MMHG

## 2023-07-06 DIAGNOSIS — E11.21 TYPE 2 DIABETES MELLITUS WITH DIABETIC NEPHROPATHY, WITH LONG-TERM CURRENT USE OF INSULIN (HCC): Primary | ICD-10-CM

## 2023-07-06 DIAGNOSIS — I10 ESSENTIAL HYPERTENSION: ICD-10-CM

## 2023-07-06 DIAGNOSIS — Z79.4 TYPE 2 DIABETES MELLITUS WITH DIABETIC NEPHROPATHY, WITH LONG-TERM CURRENT USE OF INSULIN (HCC): Primary | ICD-10-CM

## 2023-07-06 DIAGNOSIS — F41.9 ANXIETY: ICD-10-CM

## 2023-07-06 PROCEDURE — 99214 OFFICE O/P EST MOD 30 MIN: CPT | Performed by: INTERNAL MEDICINE

## 2023-07-06 PROCEDURE — 3078F DIAST BP <80 MM HG: CPT | Performed by: INTERNAL MEDICINE

## 2023-07-06 PROCEDURE — 3074F SYST BP LT 130 MM HG: CPT | Performed by: INTERNAL MEDICINE

## 2023-07-06 PROCEDURE — 3044F HG A1C LEVEL LT 7.0%: CPT | Performed by: INTERNAL MEDICINE

## 2023-07-06 RX ORDER — METHADONE HYDROCHLORIDE 10 MG/ML
95 CONCENTRATE ORAL DAILY
COMMUNITY

## 2023-07-06 RX ORDER — METHADONE HYDROCHLORIDE 10 MG/ML
INJECTION, SOLUTION INTRAMUSCULAR; INTRAVENOUS; SUBCUTANEOUS
COMMUNITY

## 2023-07-06 RX ORDER — METOPROLOL SUCCINATE 50 MG/1
50 TABLET, EXTENDED RELEASE ORAL DAILY
Qty: 90 TABLET | Refills: 1 | Status: SHIPPED | OUTPATIENT
Start: 2023-07-06

## 2023-07-07 DIAGNOSIS — I10 ESSENTIAL HYPERTENSION: ICD-10-CM

## 2023-07-07 DIAGNOSIS — E11.21 TYPE 2 DIABETES MELLITUS WITH DIABETIC NEPHROPATHY, WITH LONG-TERM CURRENT USE OF INSULIN (HCC): ICD-10-CM

## 2023-07-07 DIAGNOSIS — Z79.4 TYPE 2 DIABETES MELLITUS WITH DIABETIC NEPHROPATHY, WITH LONG-TERM CURRENT USE OF INSULIN (HCC): ICD-10-CM

## 2023-07-07 LAB
ALBUMIN SERPL-MCNC: 3.7 G/DL (ref 3.5–5)
ALBUMIN/GLOB SERPL: 0.9 (ref 1.1–2.2)
ALP SERPL-CCNC: 72 U/L (ref 45–117)
ALT SERPL-CCNC: 12 U/L (ref 12–78)
ANION GAP SERPL CALC-SCNC: 7 MMOL/L (ref 5–15)
AST SERPL-CCNC: 9 U/L (ref 15–37)
BASOPHILS # BLD: 0.1 K/UL (ref 0–0.1)
BASOPHILS NFR BLD: 1 % (ref 0–1)
BILIRUB SERPL-MCNC: 0.5 MG/DL (ref 0.2–1)
BUN SERPL-MCNC: 14 MG/DL (ref 6–20)
BUN/CREAT SERPL: 14 (ref 12–20)
CALCIUM SERPL-MCNC: 9.2 MG/DL (ref 8.5–10.1)
CHLORIDE SERPL-SCNC: 97 MMOL/L (ref 97–108)
CO2 SERPL-SCNC: 28 MMOL/L (ref 21–32)
CREAT SERPL-MCNC: 1 MG/DL (ref 0.7–1.3)
DIFFERENTIAL METHOD BLD: NORMAL
EOSINOPHIL # BLD: 0.2 K/UL (ref 0–0.4)
EOSINOPHIL NFR BLD: 2 % (ref 0–7)
ERYTHROCYTE [DISTWIDTH] IN BLOOD BY AUTOMATED COUNT: 13.1 % (ref 11.5–14.5)
EST. AVERAGE GLUCOSE BLD GHB EST-MCNC: 126 MG/DL
GLOBULIN SER CALC-MCNC: 4.1 G/DL (ref 2–4)
GLUCOSE SERPL-MCNC: 105 MG/DL (ref 65–100)
HBA1C MFR BLD: 6 % (ref 4–5.6)
HCT VFR BLD AUTO: 38.4 % (ref 36.6–50.3)
HGB BLD-MCNC: 12.3 G/DL (ref 12.1–17)
IMM GRANULOCYTES # BLD AUTO: 0 K/UL (ref 0–0.04)
IMM GRANULOCYTES NFR BLD AUTO: 0 % (ref 0–0.5)
LYMPHOCYTES # BLD: 1.6 K/UL (ref 0.8–3.5)
LYMPHOCYTES NFR BLD: 24 % (ref 12–49)
MCH RBC QN AUTO: 28.1 PG (ref 26–34)
MCHC RBC AUTO-ENTMCNC: 32 G/DL (ref 30–36.5)
MCV RBC AUTO: 87.9 FL (ref 80–99)
MONOCYTES # BLD: 0.3 K/UL (ref 0–1)
MONOCYTES NFR BLD: 5 % (ref 5–13)
NEUTS SEG # BLD: 4.5 K/UL (ref 1.8–8)
NEUTS SEG NFR BLD: 68 % (ref 32–75)
NRBC # BLD: 0 K/UL (ref 0–0.01)
NRBC BLD-RTO: 0 PER 100 WBC
PLATELET # BLD AUTO: 329 K/UL (ref 150–400)
PMV BLD AUTO: 9.4 FL (ref 8.9–12.9)
POTASSIUM SERPL-SCNC: 4.1 MMOL/L (ref 3.5–5.1)
PROT SERPL-MCNC: 7.8 G/DL (ref 6.4–8.2)
RBC # BLD AUTO: 4.37 M/UL (ref 4.1–5.7)
SODIUM SERPL-SCNC: 132 MMOL/L (ref 136–145)
WBC # BLD AUTO: 6.5 K/UL (ref 4.1–11.1)

## 2023-07-23 ASSESSMENT — ENCOUNTER SYMPTOMS
RESPIRATORY NEGATIVE: 1
GASTROINTESTINAL NEGATIVE: 1

## 2023-07-24 RX ORDER — PEN NEEDLE, DIABETIC 32GX 5/32"
NEEDLE, DISPOSABLE MISCELLANEOUS
Qty: 100 EACH | Refills: 1 | Status: SHIPPED | OUTPATIENT
Start: 2023-07-24

## 2023-08-21 ENCOUNTER — TELEPHONE (OUTPATIENT)
Age: 63
End: 2023-08-21

## 2023-08-21 RX ORDER — AMLODIPINE BESYLATE 10 MG/1
10 TABLET ORAL DAILY
Qty: 90 TABLET | Refills: 1 | Status: SHIPPED | OUTPATIENT
Start: 2023-08-21

## 2023-08-21 NOTE — TELEPHONE ENCOUNTER
Medication Refill Request    Ma August is requesting a refill of the following medication(s):   amLODIPine (NORVASC) 10 MG tablet                Please send refill to:     [facility] [id number] [place] [address]  Phone: [phone] Fax: [phone]

## 2023-08-22 RX ORDER — LISINOPRIL 40 MG/1
TABLET ORAL
Qty: 90 TABLET | Refills: 1 | Status: SHIPPED | OUTPATIENT
Start: 2023-08-22

## 2023-08-29 DIAGNOSIS — G62.9 NEUROPATHY: ICD-10-CM

## 2023-08-31 ENCOUNTER — TELEPHONE (OUTPATIENT)
Age: 63
End: 2023-08-31

## 2023-08-31 RX ORDER — GABAPENTIN 300 MG/1
CAPSULE ORAL
Qty: 90 CAPSULE | Refills: 1 | Status: SHIPPED | OUTPATIENT
Start: 2023-08-31 | End: 2023-09-30

## 2023-08-31 NOTE — TELEPHONE ENCOUNTER
Patient has a pending refill request for Gabapentin. He wanted to let Dr. Zaid Mcgovern know that he will be out of this medication by tomorrow.     Kristi bigger - [phone]

## 2023-09-05 NOTE — PROGRESS NOTES
Pt. Is here for follow up DM, Htn. Pt. Encouraged to quit smoking. denies pain/discomfort (Rating = 0)

## 2023-09-12 ENCOUNTER — TELEPHONE (OUTPATIENT)
Age: 63
End: 2023-09-12

## 2023-09-12 RX ORDER — INSULIN GLARGINE 100 [IU]/ML
INJECTION, SOLUTION SUBCUTANEOUS
Qty: 9 ML | Refills: 1 | Status: SHIPPED | OUTPATIENT
Start: 2023-09-12 | End: 2023-11-08 | Stop reason: SDUPTHER

## 2023-09-12 SDOH — ECONOMIC STABILITY: FOOD INSECURITY: WITHIN THE PAST 12 MONTHS, YOU WORRIED THAT YOUR FOOD WOULD RUN OUT BEFORE YOU GOT MONEY TO BUY MORE.: SOMETIMES TRUE

## 2023-09-12 SDOH — ECONOMIC STABILITY: TRANSPORTATION INSECURITY
IN THE PAST 12 MONTHS, HAS LACK OF TRANSPORTATION KEPT YOU FROM MEETINGS, WORK, OR FROM GETTING THINGS NEEDED FOR DAILY LIVING?: NO

## 2023-09-12 SDOH — ECONOMIC STABILITY: INCOME INSECURITY: HOW HARD IS IT FOR YOU TO PAY FOR THE VERY BASICS LIKE FOOD, HOUSING, MEDICAL CARE, AND HEATING?: SOMEWHAT HARD

## 2023-09-12 SDOH — ECONOMIC STABILITY: HOUSING INSECURITY
IN THE LAST 12 MONTHS, WAS THERE A TIME WHEN YOU DID NOT HAVE A STEADY PLACE TO SLEEP OR SLEPT IN A SHELTER (INCLUDING NOW)?: NO

## 2023-09-12 SDOH — ECONOMIC STABILITY: FOOD INSECURITY: WITHIN THE PAST 12 MONTHS, THE FOOD YOU BOUGHT JUST DIDN'T LAST AND YOU DIDN'T HAVE MONEY TO GET MORE.: PATIENT DECLINED

## 2023-09-12 NOTE — TELEPHONE ENCOUNTER
Call sent to me to triage. Pt had call to cancel his appt today with Dr Andrei Longoria. He \"feels too weak to come in.\" He states he has been experiencing weakness in both legs for past 2 months intermittently. It last for about day then gets better. Denies any other symptoms at this time. Advised pt he should keep appt and do virtual visit with MD or go to the ER for further evaluation. Pt declined both recommendations. Discussed in length how important it was to be evaluated. He still declined.  Will forward to MD.

## 2023-09-14 ENCOUNTER — OFFICE VISIT (OUTPATIENT)
Age: 63
End: 2023-09-14
Payer: MEDICARE

## 2023-09-14 VITALS
DIASTOLIC BLOOD PRESSURE: 70 MMHG | HEIGHT: 78 IN | WEIGHT: 242 LBS | SYSTOLIC BLOOD PRESSURE: 110 MMHG | OXYGEN SATURATION: 96 % | BODY MASS INDEX: 28 KG/M2 | RESPIRATION RATE: 16 BRPM | HEART RATE: 50 BPM | TEMPERATURE: 98.2 F

## 2023-09-14 DIAGNOSIS — R29.6 RECURRENT FALLS: ICD-10-CM

## 2023-09-14 DIAGNOSIS — E11.21 TYPE 2 DIABETES WITH NEPHROPATHY (HCC): ICD-10-CM

## 2023-09-14 DIAGNOSIS — R29.6 RECURRENT FALLS: Primary | ICD-10-CM

## 2023-09-14 LAB
ALBUMIN SERPL-MCNC: 3.8 G/DL (ref 3.5–5)
ALBUMIN/GLOB SERPL: 0.9 (ref 1.1–2.2)
ALP SERPL-CCNC: 88 U/L (ref 45–117)
ALT SERPL-CCNC: 16 U/L (ref 12–78)
ANION GAP SERPL CALC-SCNC: 7 MMOL/L (ref 5–15)
AST SERPL-CCNC: 10 U/L (ref 15–37)
BASOPHILS # BLD: 0.1 K/UL (ref 0–0.1)
BASOPHILS NFR BLD: 1 % (ref 0–1)
BILIRUB SERPL-MCNC: 0.5 MG/DL (ref 0.2–1)
BUN SERPL-MCNC: 12 MG/DL (ref 6–20)
BUN/CREAT SERPL: 10 (ref 12–20)
CALCIUM SERPL-MCNC: 9 MG/DL (ref 8.5–10.1)
CHLORIDE SERPL-SCNC: 100 MMOL/L (ref 97–108)
CO2 SERPL-SCNC: 29 MMOL/L (ref 21–32)
CREAT SERPL-MCNC: 1.16 MG/DL (ref 0.7–1.3)
DIFFERENTIAL METHOD BLD: NORMAL
EOSINOPHIL # BLD: 0.1 K/UL (ref 0–0.4)
EOSINOPHIL NFR BLD: 2 % (ref 0–7)
ERYTHROCYTE [DISTWIDTH] IN BLOOD BY AUTOMATED COUNT: 13.2 % (ref 11.5–14.5)
EST. AVERAGE GLUCOSE BLD GHB EST-MCNC: 131 MG/DL
GLOBULIN SER CALC-MCNC: 4.2 G/DL (ref 2–4)
GLUCOSE SERPL-MCNC: 97 MG/DL (ref 65–100)
HBA1C MFR BLD: 6.2 % (ref 4–5.6)
HCT VFR BLD AUTO: 39 % (ref 36.6–50.3)
HGB BLD-MCNC: 12.4 G/DL (ref 12.1–17)
IMM GRANULOCYTES # BLD AUTO: 0 K/UL (ref 0–0.04)
IMM GRANULOCYTES NFR BLD AUTO: 0 % (ref 0–0.5)
LYMPHOCYTES # BLD: 1.6 K/UL (ref 0.8–3.5)
LYMPHOCYTES NFR BLD: 24 % (ref 12–49)
MCH RBC QN AUTO: 28.2 PG (ref 26–34)
MCHC RBC AUTO-ENTMCNC: 31.8 G/DL (ref 30–36.5)
MCV RBC AUTO: 88.6 FL (ref 80–99)
MONOCYTES # BLD: 0.3 K/UL (ref 0–1)
MONOCYTES NFR BLD: 5 % (ref 5–13)
NEUTS SEG # BLD: 4.4 K/UL (ref 1.8–8)
NEUTS SEG NFR BLD: 68 % (ref 32–75)
NRBC # BLD: 0 K/UL (ref 0–0.01)
NRBC BLD-RTO: 0 PER 100 WBC
PLATELET # BLD AUTO: 300 K/UL (ref 150–400)
PMV BLD AUTO: 9.8 FL (ref 8.9–12.9)
POTASSIUM SERPL-SCNC: 5 MMOL/L (ref 3.5–5.1)
PROT SERPL-MCNC: 8 G/DL (ref 6.4–8.2)
RBC # BLD AUTO: 4.4 M/UL (ref 4.1–5.7)
SODIUM SERPL-SCNC: 136 MMOL/L (ref 136–145)
WBC # BLD AUTO: 6.4 K/UL (ref 4.1–11.1)

## 2023-09-14 PROCEDURE — 99214 OFFICE O/P EST MOD 30 MIN: CPT | Performed by: INTERNAL MEDICINE

## 2023-09-14 PROCEDURE — 3074F SYST BP LT 130 MM HG: CPT | Performed by: INTERNAL MEDICINE

## 2023-09-14 PROCEDURE — 3044F HG A1C LEVEL LT 7.0%: CPT | Performed by: INTERNAL MEDICINE

## 2023-09-14 PROCEDURE — 3078F DIAST BP <80 MM HG: CPT | Performed by: INTERNAL MEDICINE

## 2023-09-14 ASSESSMENT — PATIENT HEALTH QUESTIONNAIRE - PHQ9
2. FEELING DOWN, DEPRESSED OR HOPELESS: 0
SUM OF ALL RESPONSES TO PHQ QUESTIONS 1-9: 0
SUM OF ALL RESPONSES TO PHQ9 QUESTIONS 1 & 2: 0
SUM OF ALL RESPONSES TO PHQ QUESTIONS 1-9: 0
1. LITTLE INTEREST OR PLEASURE IN DOING THINGS: 0

## 2023-09-15 DIAGNOSIS — R29.6 RECURRENT FALLS: ICD-10-CM

## 2023-09-15 LAB
APPEARANCE UR: CLEAR
BACTERIA URNS QL MICRO: NEGATIVE /HPF
BILIRUB UR QL: NEGATIVE
COLOR UR: ABNORMAL
EPITH CASTS URNS QL MICRO: ABNORMAL /LPF
GLUCOSE UR STRIP.AUTO-MCNC: NEGATIVE MG/DL
HGB UR QL STRIP: NEGATIVE
HYALINE CASTS URNS QL MICRO: ABNORMAL /LPF (ref 0–5)
KETONES UR QL STRIP.AUTO: NEGATIVE MG/DL
LEUKOCYTE ESTERASE UR QL STRIP.AUTO: NEGATIVE
NITRITE UR QL STRIP.AUTO: NEGATIVE
PH UR STRIP: 5.5 (ref 5–8)
PROT UR STRIP-MCNC: 100 MG/DL
RBC #/AREA URNS HPF: ABNORMAL /HPF (ref 0–5)
SP GR UR REFRACTOMETRY: 1.02 (ref 1–1.03)
URINE CULTURE IF INDICATED: ABNORMAL
UROBILINOGEN UR QL STRIP.AUTO: 0.2 EU/DL (ref 0.2–1)
WBC URNS QL MICRO: ABNORMAL /HPF (ref 0–4)

## 2023-10-02 NOTE — PROGRESS NOTES
Acute Care Note    Harvey Orellana is 66 y.o. male. he presents for evaluation of Fall (Several weeks ago. Legs gave out. Bilateral shoulder pain.)    The patient reports falling recently. He has had a significant overall weakness. He notes that this has seemed to be progressive over the past several weeks. He has no back pain. He does have some mild leg and foot sensation deficits likely due to neuropathy which have been ongoing and due to his diabetes. He does also note some ongoing fatigue. We discussed that he has been stable on methadone for some time due to a history of drug addiction. He has also been on Xanax which has been a prn medication for some time. He has a physician managing his methadone at [facility] and we discussed that this has not been recently adjusted. Prior to Admission medications    Medication Sig Start Date End Date Taking? Authorizing Provider   BASAGLAR KWIKPEN 100 UNIT/ML injection pen INJECT 32 UNITS UNDER THE SKIN DAILY AS DIRECTED BY PRESCRIBER 9/12/23  Yes Joanna Gore MD   gabapentin (NEURONTIN) 300 MG capsule TAKE ONE CAPSULE BY MOUTH THREE TIMES A DAY 8/31/23 9/30/23 Yes Joanna Gore MD   metFORMIN (GLUCOPHAGE) 500 MG tablet TAKE ONE TABLET BY MOUTH TWICE A DAY WITH A MEAL  Patient taking differently: Take 2 tablets by mouth 2 times daily (with meals) Patient 8/22/23  Yes Joanna Gore MD   lisinopril (PRINIVIL;ZESTRIL) 40 MG tablet TAKE ONE TABLET BY MOUTH DAILY 8/22/23  Yes Joanna Gore MD   amLODIPine (NORVASC) 10 MG tablet Take 1 tablet by mouth daily 8/21/23  Yes Joanna Gore MD   Insulin Pen Needle (BD PEN NEEDLE PANTERA 2ND GEN) 32G X 4 MM MISC USE TO INJECT INSULIN DAILY AS DIRECTED 7/24/23  Yes Joanna Gore MD   methadone (DOLOPHINE) 10 MG/ML solution Take 9.5 mLs by mouth daily.    Yes Historical Provider, MD   metoprolol succinate (TOPROL XL) 50 MG extended release tablet Take 1 tablet by mouth daily 7/6/23  Yes Dominique Stacy

## 2023-10-13 ENCOUNTER — TELEPHONE (OUTPATIENT)
Age: 63
End: 2023-10-13

## 2023-10-13 NOTE — TELEPHONE ENCOUNTER
Reason for call: TC from [address] [facility] re pt's Metformin script from 8/22. [address] [facility] states script is for pt to take 1 tablet twice a day but pt is telling them he has always taken 2 tablets twice a day. Pharmacy requesting clarification on directions.       Is this a new problem: Yes    Date of last appointment:  9/14/2023     Can we respond via [employer]: No    Best call back number: [facility]/[phone]

## 2023-11-01 ENCOUNTER — TELEPHONE (OUTPATIENT)
Age: 63
End: 2023-11-01

## 2023-11-01 DIAGNOSIS — G62.9 NEUROPATHY: ICD-10-CM

## 2023-11-01 RX ORDER — GABAPENTIN 300 MG/1
300 CAPSULE ORAL 3 TIMES DAILY
Qty: 90 CAPSULE | Refills: 2 | Status: SHIPPED | OUTPATIENT
Start: 2023-11-01 | End: 2024-01-30

## 2023-11-01 NOTE — TELEPHONE ENCOUNTER
Chart reviewed. Briefly discussed neuropathy but no other documentation regarding medication. Has been on medication so will refill, 3 months provided. PDMP reviewed on 11/1/2023  3:14 PM.     Last refilled on: 9/29/23 - 300mg, #90.   Previously filled on 8/31    Last visit with PCP: 9/14/2023    Future Appointments   Date Time Provider [address] [place] [address]   3/25/2024 10:00 AM DO ZHENG Nelson AMB

## 2023-11-01 NOTE — TELEPHONE ENCOUNTER
Patient requesting a refill of the following medication ([facility] patient and is scheduled to establish with Dr. Rosangela Oliveros in March. PATIENT WILL BE OUT OF THIS MED TOMORROW -- THOUGHT HE HAD A REFILL LEFT. ASKED IF IT COULD PLEASE BE SENT TODAY.       gabapentin (NEURONTIN) 300 MG capsule      [address] [facility] #[id number]

## 2023-11-02 DIAGNOSIS — G62.9 NEUROPATHY: ICD-10-CM

## 2023-11-08 RX ORDER — INSULIN GLARGINE 100 [IU]/ML
INJECTION, SOLUTION SUBCUTANEOUS
Qty: 9 ML | Refills: 3 | Status: SHIPPED | OUTPATIENT
Start: 2023-11-08

## 2023-11-08 NOTE — TELEPHONE ENCOUNTER
This is a patient of Dr Nathalia Estrada, who previously worked in this practice. I have been asked to provide a medication refill as bridge until the patient can establish with a new primary care doctor. I have reviewed the most recent progress notes and labs. Will continue his insulin.  He is scheduled to establish with Dr Esthela Nicolas 3/2024

## 2023-12-07 ENCOUNTER — TELEMEDICINE (OUTPATIENT)
Age: 63
End: 2023-12-07
Payer: MEDICARE

## 2023-12-07 DIAGNOSIS — G89.29 CHRONIC PAIN OF BOTH SHOULDERS: ICD-10-CM

## 2023-12-07 DIAGNOSIS — M25.511 CHRONIC PAIN OF BOTH SHOULDERS: ICD-10-CM

## 2023-12-07 DIAGNOSIS — M25.512 CHRONIC PAIN OF BOTH SHOULDERS: ICD-10-CM

## 2023-12-07 DIAGNOSIS — J20.9 ACUTE BRONCHITIS, UNSPECIFIED ORGANISM: Primary | ICD-10-CM

## 2023-12-07 PROCEDURE — 99213 OFFICE O/P EST LOW 20 MIN: CPT | Performed by: NURSE PRACTITIONER

## 2023-12-07 RX ORDER — AZITHROMYCIN 250 MG/1
250 TABLET, FILM COATED ORAL SEE ADMIN INSTRUCTIONS
Qty: 6 TABLET | Refills: 0 | Status: SHIPPED | OUTPATIENT
Start: 2023-12-07 | End: 2023-12-12

## 2023-12-07 RX ORDER — ALBUTEROL SULFATE 90 UG/1
2 AEROSOL, METERED RESPIRATORY (INHALATION) 4 TIMES DAILY PRN
Qty: 18 G | Refills: 0 | Status: SHIPPED | OUTPATIENT
Start: 2023-12-07

## 2023-12-07 RX ORDER — FLUTICASONE PROPIONATE 50 MCG
2 SPRAY, SUSPENSION (ML) NASAL DAILY
Qty: 16 G | Refills: 2 | Status: SHIPPED | OUTPATIENT
Start: 2023-12-07

## 2023-12-07 RX ORDER — GUAIFENESIN 600 MG/1
600 TABLET, EXTENDED RELEASE ORAL 2 TIMES DAILY
Qty: 30 TABLET | Refills: 0 | Status: SHIPPED | OUTPATIENT
Start: 2023-12-07 | End: 2023-12-22

## 2023-12-07 NOTE — PROGRESS NOTES
methadone (DOLOPHINE) 10 MG/ML solution Take 9.5 mLs by mouth daily. metoprolol succinate (TOPROL XL) 50 MG extended release tablet Take 1 tablet by mouth daily 90 tablet 1    ALPRAZolam (XANAX) 1 MG tablet Take 1 tablet by mouth 3 times daily. Patient take 2.5 pills daily. docusate (COLACE, DULCOLAX) 100 MG CAPS Colace 100 mg capsule   Take 1 capsule twice a day by oral route as needed. ketoconazole (NIZORAL) 2 % shampoo Apply 1 mL topically as needed      ketoconazole (NIZORAL) 2 % cream Apply topically daily      triamcinolone (KENALOG) 0.1 % cream ceived the following from [facility]: Outside name: triamcinolone acetonide (KENALOG) 0.1 % topical cream       No current facility-administered medications for this visit.      Allergies   Allergen Reactions    Dust Mite Extract Other (See Comments)    Mixed Ragweed Other (See Comments)    Senna Other (See Comments)     cramps     Past Medical History:   Diagnosis Date    Alcohol abuse, in remission     Chronic kidney disease     PLAGUE L RENAL ARTERY    Chronic obstructive pulmonary disease (HCC)     Chronic pain     Constipation     Diabetes ([address])     Type II    GERD (gastroesophageal reflux disease)     Hepatitis C     Hypertension     Liver disease     HEP C TOOK HARVONI    Pancreatitis     Psychiatric disorder     PANIC ATTACKS    Smoker     Spondylitis ([address])      Past Surgical History:   Procedure Laterality Date    APPENDECTOMY      COLONOSCOPY N/A 12/19/2019    COLONOSCOPY performed by Fredrick Ontiveros MD at [address] [place] [address],[address] ENDOSCOPY    ORTHOPEDIC SURGERY      3 back surgeries    [address] [place] [address]         Review of Systems   - per HPI    Objective:   No data recorded   General: alert, cooperative, and no distress   Mental  status: normal mood, behavior, speech, dress, motor activity, and thought processes, able to follow commands   Eyes: EOM intact, normal sclera   Mouth: mucous membranes moist   Neck:

## 2024-01-10 ENCOUNTER — OFFICE VISIT (OUTPATIENT)
Age: 64
End: 2024-01-10
Payer: MEDICARE

## 2024-01-10 VITALS
HEIGHT: 78 IN | WEIGHT: 232.6 LBS | BODY MASS INDEX: 26.91 KG/M2 | TEMPERATURE: 98.4 F | DIASTOLIC BLOOD PRESSURE: 80 MMHG | RESPIRATION RATE: 16 BRPM | OXYGEN SATURATION: 92 % | SYSTOLIC BLOOD PRESSURE: 149 MMHG | HEART RATE: 58 BPM

## 2024-01-10 DIAGNOSIS — J20.9 ACUTE BRONCHITIS, UNSPECIFIED ORGANISM: Primary | ICD-10-CM

## 2024-01-10 PROCEDURE — 99213 OFFICE O/P EST LOW 20 MIN: CPT | Performed by: STUDENT IN AN ORGANIZED HEALTH CARE EDUCATION/TRAINING PROGRAM

## 2024-01-10 PROCEDURE — 3077F SYST BP >= 140 MM HG: CPT | Performed by: STUDENT IN AN ORGANIZED HEALTH CARE EDUCATION/TRAINING PROGRAM

## 2024-01-10 PROCEDURE — 3079F DIAST BP 80-89 MM HG: CPT | Performed by: STUDENT IN AN ORGANIZED HEALTH CARE EDUCATION/TRAINING PROGRAM

## 2024-01-10 RX ORDER — PREDNISONE 5 MG/1
TABLET ORAL
Qty: 1 EACH | Refills: 0 | Status: SHIPPED | OUTPATIENT
Start: 2024-01-10

## 2024-01-10 SDOH — ECONOMIC STABILITY: FOOD INSECURITY: WITHIN THE PAST 12 MONTHS, YOU WORRIED THAT YOUR FOOD WOULD RUN OUT BEFORE YOU GOT MONEY TO BUY MORE.: SOMETIMES TRUE

## 2024-01-10 SDOH — ECONOMIC STABILITY: FOOD INSECURITY: WITHIN THE PAST 12 MONTHS, THE FOOD YOU BOUGHT JUST DIDN'T LAST AND YOU DIDN'T HAVE MONEY TO GET MORE.: SOMETIMES TRUE

## 2024-01-10 SDOH — ECONOMIC STABILITY: INCOME INSECURITY: HOW HARD IS IT FOR YOU TO PAY FOR THE VERY BASICS LIKE FOOD, HOUSING, MEDICAL CARE, AND HEATING?: NOT VERY HARD

## 2024-01-10 ASSESSMENT — ENCOUNTER SYMPTOMS
COUGH: 1
HEMOPTYSIS: 0

## 2024-01-10 ASSESSMENT — PATIENT HEALTH QUESTIONNAIRE - PHQ9
SUM OF ALL RESPONSES TO PHQ QUESTIONS 1-9: 0
1. LITTLE INTEREST OR PLEASURE IN DOING THINGS: 0
2. FEELING DOWN, DEPRESSED OR HOPELESS: 0
SUM OF ALL RESPONSES TO PHQ9 QUESTIONS 1 & 2: 0

## 2024-01-10 NOTE — PROGRESS NOTES
Alcon Zamora (: 1960) is a 63 y.o. male patient here for evaluation of the following chief complaint(s):  Cough (X 3 weeks, )       Subjective:   Cough  This is a recurrent problem. The current episode started 1 to 4 weeks ago (about three weeks). The problem has been unchanged (got somewhat better after azithromycin but then got worse and has not improved at all). The cough is Productive of sputum. Pertinent negatives include no chills, fever or hemoptysis. He has tried a beta-agonist inhaler for the symptoms. The treatment provided no relief. There is no history of asthma.           Review of Systems   Constitutional:  Negative for chills and fever.   Respiratory:  Positive for cough. Negative for hemoptysis.          PMHx:  Patient Active Problem List   Diagnosis    Lumbar spinal stenosis    Substance induced mood disorder ([facility])    Acute pancreatitis    Essential hypertension    Type 2 diabetes with nephropathy ([facility])    Type 2 diabetes mellitus with diabetic neuropathy ([facility])    Dysuria    Type 2 diabetes mellitus with hyperglycemia, with long-term current use of insulin ([facility])    Benzodiazepine dependence, continuous ([facility])    Anxiety    Polysubstance dependence ([facility])       Prior to Admission medications    Medication Sig Start Date End Date Taking? Authorizing Provider   albuterol sulfate HFA (VENTOLIN HFA) 108 (90 Base) MCG/ACT inhaler Inhale 2 puffs into the lungs 4 times daily as needed for Wheezing 23  Yes Nasir Marin APRN - NP   fluticasone (FLONASE) 50 MCG/ACT nasal spray 2 sprays by Each Nostril route daily 23  Yes Nasir Marin APRN - NP   insulin glargine (BASAGLAR KWIKPEN) 100 UNIT/ML injection pen INJECT 32 UNITS UNDER THE SKIN DAILY AS DIRECTED BY PRESCRIBER 23  Yes Valentine Ruiz MD   gabapentin (NEURONTIN) 300 MG capsule Take 1 capsule by mouth in the morning, at noon, and at bedtime for 90 days. Max Daily Amount: 900 mg 23 Yes Jose Luis

## 2024-01-16 DIAGNOSIS — I10 ESSENTIAL HYPERTENSION: ICD-10-CM

## 2024-01-16 RX ORDER — METOPROLOL SUCCINATE 50 MG/1
50 TABLET, EXTENDED RELEASE ORAL DAILY
Qty: 90 TABLET | Refills: 0 | Status: SHIPPED | OUTPATIENT
Start: 2024-01-16

## 2024-01-16 NOTE — TELEPHONE ENCOUNTER
Medication Refill Request    Alcon Zamora is requesting a refill of the following medication(s):     Metoprolol Succinate (Toprol XL) 50 MG extended release tablet    Please send refill to:     [facility] [id number] - [place], [place] - [address] - P [phone] - F [phone]  [address]  [place] [address]  Phone: [phone] Fax: [phone]

## 2024-01-17 NOTE — TELEPHONE ENCOUNTER
Future Appointments   Date Time Provider Department Center   3/25/2024 10:00 AM Markus Li DO [facility]

## 2024-01-31 DIAGNOSIS — G62.9 NEUROPATHY: ICD-10-CM

## 2024-01-31 RX ORDER — GABAPENTIN 300 MG/1
300 CAPSULE ORAL 3 TIMES DAILY
Qty: 90 CAPSULE | Refills: 0 | Status: SHIPPED | OUTPATIENT
Start: 2024-01-31 | End: 2024-04-30

## 2024-01-31 NOTE — TELEPHONE ENCOUNTER
Medication Refill Request    Alcon Zamora is requesting a refill of the following medication(s):   gabapentin (NEURONTIN) 300 MG capsule                 Please send refill to:     [facility] [id number] - [place], [place] - [address] - P [phone] - F [phone]  [address]  [place] [address]  Phone: [phone] Fax: [phone]         Pt stays he has three tablets left.     Pt has a re est care appt with Dr. Li on 03/25/2024.

## 2024-01-31 NOTE — TELEPHONE ENCOUNTER
Orders Placed This Encounter   Medications    gabapentin (NEURONTIN) 300 MG capsule     Sig: Take 1 capsule by mouth in the morning, at noon, and at bedtime for 90 days. Max Daily Amount: 900 mg     Dispense:  90 capsule     Refill:  0        reviewed 1/31/2024

## 2024-02-07 ENCOUNTER — APPOINTMENT (OUTPATIENT)
Facility: HOSPITAL | Age: 64
End: 2024-02-07
Payer: COMMERCIAL

## 2024-02-07 ENCOUNTER — HOSPITAL ENCOUNTER (EMERGENCY)
Facility: HOSPITAL | Age: 64
Discharge: HOME | End: 2024-02-07
Attending: EMERGENCY MEDICINE
Payer: COMMERCIAL

## 2024-02-07 VITALS
HEART RATE: 55 BPM | BODY MASS INDEX: 25.71 KG/M2 | DIASTOLIC BLOOD PRESSURE: 103 MMHG | RESPIRATION RATE: 18 BRPM | SYSTOLIC BLOOD PRESSURE: 151 MMHG | WEIGHT: 222.44 LBS | OXYGEN SATURATION: 96 % | TEMPERATURE: 97.9 F

## 2024-02-07 DIAGNOSIS — R29.6 RECURRENT FALLS: Primary | ICD-10-CM

## 2024-02-07 LAB
ALBUMIN SERPL-MCNC: 3 G/DL (ref 3.5–5)
ALBUMIN/GLOB SERPL: 0.7 (ref 1.1–2.2)
ALP SERPL-CCNC: 96 U/L (ref 45–117)
ALT SERPL-CCNC: 10 U/L (ref 12–78)
ANION GAP SERPL CALC-SCNC: 6 MMOL/L (ref 5–15)
AST SERPL-CCNC: 10 U/L (ref 15–37)
BASOPHILS # BLD: 0 K/UL (ref 0–0.1)
BASOPHILS NFR BLD: 1 % (ref 0–1)
BILIRUB SERPL-MCNC: 0.4 MG/DL (ref 0.2–1)
BUN SERPL-MCNC: 14 MG/DL (ref 6–20)
BUN/CREAT SERPL: 11 (ref 12–20)
CALCIUM SERPL-MCNC: 8.4 MG/DL (ref 8.5–10.1)
CHLORIDE SERPL-SCNC: 104 MMOL/L (ref 97–108)
CO2 SERPL-SCNC: 28 MMOL/L (ref 21–32)
COMMENT:: NORMAL
CREAT SERPL-MCNC: 1.23 MG/DL (ref 0.7–1.3)
DIFFERENTIAL METHOD BLD: ABNORMAL
EOSINOPHIL # BLD: 0.1 K/UL (ref 0–0.4)
EOSINOPHIL NFR BLD: 2 % (ref 0–7)
ERYTHROCYTE [DISTWIDTH] IN BLOOD BY AUTOMATED COUNT: 12.9 % (ref 11.5–14.5)
GLOBULIN SER CALC-MCNC: 4.2 G/DL (ref 2–4)
GLUCOSE SERPL-MCNC: 162 MG/DL (ref 65–100)
HCT VFR BLD AUTO: 37.5 % (ref 36.6–50.3)
HGB BLD-MCNC: 12.2 G/DL (ref 12.1–17)
IMM GRANULOCYTES # BLD AUTO: 0 K/UL (ref 0–0.04)
IMM GRANULOCYTES NFR BLD AUTO: 0 % (ref 0–0.5)
LYMPHOCYTES # BLD: 1.1 K/UL (ref 0.8–3.5)
LYMPHOCYTES NFR BLD: 18 % (ref 12–49)
MCH RBC QN AUTO: 30.3 PG (ref 26–34)
MCHC RBC AUTO-ENTMCNC: 32.5 G/DL (ref 30–36.5)
MCV RBC AUTO: 93.3 FL (ref 80–99)
MONOCYTES # BLD: 0.2 K/UL (ref 0–1)
MONOCYTES NFR BLD: 4 % (ref 5–13)
NEUTS SEG # BLD: 4.3 K/UL (ref 1.8–8)
NEUTS SEG NFR BLD: 75 % (ref 32–75)
NRBC # BLD: 0 K/UL (ref 0–0.01)
NRBC BLD-RTO: 0 PER 100 WBC
PLATELET # BLD AUTO: 293 K/UL (ref 150–400)
PMV BLD AUTO: 8.9 FL (ref 8.9–12.9)
POTASSIUM SERPL-SCNC: 4.3 MMOL/L (ref 3.5–5.1)
PROT SERPL-MCNC: 7.2 G/DL (ref 6.4–8.2)
RBC # BLD AUTO: 4.02 M/UL (ref 4.1–5.7)
SODIUM SERPL-SCNC: 138 MMOL/L (ref 136–145)
SPECIMEN HOLD: NORMAL
WBC # BLD AUTO: 5.8 K/UL (ref 4.1–11.1)

## 2024-02-07 PROCEDURE — 72131 CT LUMBAR SPINE W/O DYE: CPT

## 2024-02-07 PROCEDURE — 70450 CT HEAD/BRAIN W/O DYE: CPT

## 2024-02-07 PROCEDURE — 85025 COMPLETE CBC W/AUTO DIFF WBC: CPT

## 2024-02-07 PROCEDURE — 99284 EMERGENCY DEPT VISIT MOD MDM: CPT

## 2024-02-07 PROCEDURE — 80053 COMPREHEN METABOLIC PANEL: CPT

## 2024-02-07 PROCEDURE — 93005 ELECTROCARDIOGRAM TRACING: CPT | Performed by: NURSE PRACTITIONER

## 2024-02-07 PROCEDURE — 36415 COLL VENOUS BLD VENIPUNCTURE: CPT

## 2024-02-07 ASSESSMENT — ENCOUNTER SYMPTOMS
SHORTNESS OF BREATH: 0
BOWEL INCONTINENCE: 0
NAUSEA: 0
BACK PAIN: 0
DIARRHEA: 0
CONSTIPATION: 0
COLOR CHANGE: 0
VISUAL CHANGE: 0
ABDOMINAL PAIN: 0
VOMITING: 0

## 2024-02-07 NOTE — ED TRIAGE NOTES
Pt ambulatory to ED w/ c/o increased falls over the last 6mo and slurred speech x3-4mo. Pt states he is here \"because my mom wants me to get a CT scan of my brain\". +dizziness w/ ambulation \"for months\". Denies chest pain, SOB.

## 2024-02-07 NOTE — ED NOTES
Pt reports he cannot urinate at this time, left pt with urinal and instructed him to call when he was able to provide a sample.

## 2024-02-07 NOTE — ED PROVIDER NOTES
NINA Henriquez NP    Patient states that he has had increased falls, hit his back hard, states that his speech has been slurred x 3-4 months ago and increased falls x 6  months. States that he is here b/c his mother wants a CT Head done.   NINA Demarco NP  1:39 PM   [AF]   1355 EKG shows a sinus bradycardia with a rate of 52 no ST or T wave abnormalities to suggest ischemia or infarct.  T wave inversion in lead III, aVF.  Underlying left anterior fascicular block.  Normal intervals, normal axis. [DA]      ED Course User Index  [AF] Shabnam Henriquez, NINA Lopez NP  [DA] Kristian Spears MD             CONSULTS:  None    PROCEDURES:  Unless otherwise noted below, none     Procedures        FINAL IMPRESSION      1. Recurrent falls          DISPOSITION/PLAN   DISPOSITION Decision To Discharge 02/07/2024 04:42:57 PM      PATIENT REFERRED TO:  your Primary Care Physician    Schedule an appointment as soon as possible for a visit       [facility] EMERGENCY DEP  [address] [address]  [phone]    As needed    Amauri Mahmood MD  [address] [address]  [phone]    Schedule an appointment as soon as possible for a visit         DISCHARGE MEDICATIONS:  New Prescriptions    No medications on file     Controlled Substances Monitoring:          No data to display                (Please note that portions of this note were completed with a voice recognition program.  Efforts were made to edit the dictations but occasionally words are mis-transcribed.)    Serjio Mas MD (electronically signed)  Attending Emergency Physician           Serjio Mas MD  02/07/24 9868

## 2024-02-09 LAB
EKG ATRIAL RATE: 52 BPM
EKG DIAGNOSIS: NORMAL
EKG P AXIS: 8 DEGREES
EKG P-R INTERVAL: 154 MS
EKG Q-T INTERVAL: 438 MS
EKG QRS DURATION: 120 MS
EKG QTC CALCULATION (BAZETT): 407 MS
EKG R AXIS: -58 DEGREES
EKG T AXIS: -33 DEGREES
EKG VENTRICULAR RATE: 52 BPM

## 2024-02-09 PROCEDURE — 93010 ELECTROCARDIOGRAM REPORT: CPT | Performed by: SPECIALIST

## 2024-02-11 ENCOUNTER — APPOINTMENT (OUTPATIENT)
Facility: HOSPITAL | Age: 64
DRG: 420 | End: 2024-02-11
Payer: COMMERCIAL

## 2024-02-11 ENCOUNTER — HOSPITAL ENCOUNTER (INPATIENT)
Facility: HOSPITAL | Age: 64
LOS: 1 days | Discharge: HOME | DRG: 420 | End: 2024-02-13
Attending: EMERGENCY MEDICINE | Admitting: FAMILY MEDICINE
Payer: COMMERCIAL

## 2024-02-11 DIAGNOSIS — R29.6 FREQUENT FALLS: ICD-10-CM

## 2024-02-11 DIAGNOSIS — E11.21 TYPE 2 DIABETES WITH NEPHROPATHY (HCC): ICD-10-CM

## 2024-02-11 DIAGNOSIS — E16.2 HYPOGLYCEMIA: Primary | ICD-10-CM

## 2024-02-11 LAB
ALBUMIN SERPL-MCNC: 2.9 G/DL (ref 3.5–5)
ALBUMIN/GLOB SERPL: 0.7 (ref 1.1–2.2)
ALP SERPL-CCNC: 89 U/L (ref 45–117)
ALT SERPL-CCNC: 9 U/L (ref 12–78)
ANION GAP SERPL CALC-SCNC: 3 MMOL/L (ref 5–15)
APPEARANCE UR: CLEAR
AST SERPL-CCNC: 15 U/L (ref 15–37)
BACTERIA URNS QL MICRO: NEGATIVE /HPF
BASOPHILS # BLD: 0 K/UL (ref 0–0.1)
BASOPHILS NFR BLD: 1 % (ref 0–1)
BILIRUB SERPL-MCNC: 0.4 MG/DL (ref 0.2–1)
BILIRUB UR QL: NEGATIVE
BUN SERPL-MCNC: 12 MG/DL (ref 6–20)
BUN/CREAT SERPL: 13 (ref 12–20)
CALCIUM SERPL-MCNC: 8.9 MG/DL (ref 8.5–10.1)
CHLORIDE SERPL-SCNC: 104 MMOL/L (ref 97–108)
CO2 SERPL-SCNC: 31 MMOL/L (ref 21–32)
COLOR UR: ABNORMAL
COMMENT:: NORMAL
CREAT SERPL-MCNC: 0.89 MG/DL (ref 0.7–1.3)
DIFFERENTIAL METHOD BLD: ABNORMAL
EKG ATRIAL RATE: 51 BPM
EKG DIAGNOSIS: NORMAL
EKG P AXIS: 73 DEGREES
EKG P-R INTERVAL: 184 MS
EKG Q-T INTERVAL: 476 MS
EKG QRS DURATION: 122 MS
EKG QTC CALCULATION (BAZETT): 438 MS
EKG R AXIS: -62 DEGREES
EKG T AXIS: -37 DEGREES
EKG VENTRICULAR RATE: 51 BPM
EOSINOPHIL # BLD: 0.1 K/UL (ref 0–0.4)
EOSINOPHIL NFR BLD: 1 % (ref 0–7)
EPITH CASTS URNS QL MICRO: ABNORMAL /LPF
ERYTHROCYTE [DISTWIDTH] IN BLOOD BY AUTOMATED COUNT: 12.7 % (ref 11.5–14.5)
EST. AVERAGE GLUCOSE BLD GHB EST-MCNC: 94 MG/DL
GLOBULIN SER CALC-MCNC: 3.9 G/DL (ref 2–4)
GLUCOSE BLD STRIP.AUTO-MCNC: 104 MG/DL (ref 65–117)
GLUCOSE BLD STRIP.AUTO-MCNC: 116 MG/DL (ref 65–117)
GLUCOSE BLD STRIP.AUTO-MCNC: 201 MG/DL (ref 65–117)
GLUCOSE BLD STRIP.AUTO-MCNC: 28 MG/DL (ref 65–117)
GLUCOSE BLD STRIP.AUTO-MCNC: 33 MG/DL (ref 65–117)
GLUCOSE BLD STRIP.AUTO-MCNC: 63 MG/DL (ref 65–117)
GLUCOSE BLD STRIP.AUTO-MCNC: 68 MG/DL (ref 65–117)
GLUCOSE BLD STRIP.AUTO-MCNC: 79 MG/DL (ref 65–117)
GLUCOSE BLD STRIP.AUTO-MCNC: 86 MG/DL (ref 65–117)
GLUCOSE BLD STRIP.AUTO-MCNC: 86 MG/DL (ref 65–117)
GLUCOSE BLD STRIP.AUTO-MCNC: 88 MG/DL (ref 65–117)
GLUCOSE BLD STRIP.AUTO-MCNC: 88 MG/DL (ref 65–117)
GLUCOSE SERPL-MCNC: 27 MG/DL (ref 65–100)
GLUCOSE UR STRIP.AUTO-MCNC: NEGATIVE MG/DL
HBA1C MFR BLD: 4.9 % (ref 4–5.6)
HCT VFR BLD AUTO: 38 % (ref 36.6–50.3)
HGB BLD-MCNC: 12.3 G/DL (ref 12.1–17)
HGB UR QL STRIP: NEGATIVE
HYALINE CASTS URNS QL MICRO: ABNORMAL /LPF (ref 0–5)
IMM GRANULOCYTES # BLD AUTO: 0 K/UL (ref 0–0.04)
IMM GRANULOCYTES NFR BLD AUTO: 0 % (ref 0–0.5)
INR PPP: 1 (ref 0.9–1.1)
KETONES UR QL STRIP.AUTO: NEGATIVE MG/DL
LEUKOCYTE ESTERASE UR QL STRIP.AUTO: NEGATIVE
LYMPHOCYTES # BLD: 1.2 K/UL (ref 0.8–3.5)
LYMPHOCYTES NFR BLD: 19 % (ref 12–49)
MCH RBC QN AUTO: 29.6 PG (ref 26–34)
MCHC RBC AUTO-ENTMCNC: 32.4 G/DL (ref 30–36.5)
MCV RBC AUTO: 91.6 FL (ref 80–99)
MONOCYTES # BLD: 0.3 K/UL (ref 0–1)
MONOCYTES NFR BLD: 5 % (ref 5–13)
NEUTS SEG # BLD: 4.6 K/UL (ref 1.8–8)
NEUTS SEG NFR BLD: 74 % (ref 32–75)
NITRITE UR QL STRIP.AUTO: NEGATIVE
NRBC # BLD: 0 K/UL (ref 0–0.01)
NRBC BLD-RTO: 0 PER 100 WBC
PH UR STRIP: 5.5 (ref 5–8)
PLATELET # BLD AUTO: 332 K/UL (ref 150–400)
PMV BLD AUTO: 8.8 FL (ref 8.9–12.9)
POTASSIUM SERPL-SCNC: 4 MMOL/L (ref 3.5–5.1)
PROT SERPL-MCNC: 6.8 G/DL (ref 6.4–8.2)
PROT UR STRIP-MCNC: 100 MG/DL
PROTHROMBIN TIME: 10.8 SEC (ref 9–11.1)
RBC # BLD AUTO: 4.15 M/UL (ref 4.1–5.7)
RBC #/AREA URNS HPF: ABNORMAL /HPF (ref 0–5)
SERVICE CMNT-IMP: ABNORMAL
SERVICE CMNT-IMP: NORMAL
SODIUM SERPL-SCNC: 138 MMOL/L (ref 136–145)
SP GR UR REFRACTOMETRY: 1.02 (ref 1–1.03)
SPECIMEN HOLD: NORMAL
TROPONIN I SERPL HS-MCNC: 10 NG/L (ref 0–76)
URINE CULTURE IF INDICATED: ABNORMAL
UROBILINOGEN UR QL STRIP.AUTO: 0.2 EU/DL (ref 0.2–1)
WBC # BLD AUTO: 6.2 K/UL (ref 4.1–11.1)
WBC URNS QL MICRO: ABNORMAL /HPF (ref 0–4)

## 2024-02-11 PROCEDURE — 6360000002 HC RX W HCPCS: Performed by: FAMILY MEDICINE

## 2024-02-11 PROCEDURE — 70450 CT HEAD/BRAIN W/O DYE: CPT

## 2024-02-11 PROCEDURE — 2580000003 HC RX 258: Performed by: EMERGENCY MEDICINE

## 2024-02-11 PROCEDURE — 96372 THER/PROPH/DIAG INJ SC/IM: CPT

## 2024-02-11 PROCEDURE — 36415 COLL VENOUS BLD VENIPUNCTURE: CPT

## 2024-02-11 PROCEDURE — 84484 ASSAY OF TROPONIN QUANT: CPT

## 2024-02-11 PROCEDURE — 82962 GLUCOSE BLOOD TEST: CPT

## 2024-02-11 PROCEDURE — 6370000000 HC RX 637 (ALT 250 FOR IP): Performed by: FAMILY MEDICINE

## 2024-02-11 PROCEDURE — 96376 TX/PRO/DX INJ SAME DRUG ADON: CPT

## 2024-02-11 PROCEDURE — 2580000003 HC RX 258: Performed by: FAMILY MEDICINE

## 2024-02-11 PROCEDURE — 85610 PROTHROMBIN TIME: CPT

## 2024-02-11 PROCEDURE — 96374 THER/PROPH/DIAG INJ IV PUSH: CPT

## 2024-02-11 PROCEDURE — 80053 COMPREHEN METABOLIC PANEL: CPT

## 2024-02-11 PROCEDURE — 71045 X-RAY EXAM CHEST 1 VIEW: CPT

## 2024-02-11 PROCEDURE — 93005 ELECTROCARDIOGRAM TRACING: CPT | Performed by: EMERGENCY MEDICINE

## 2024-02-11 PROCEDURE — 83036 HEMOGLOBIN GLYCOSYLATED A1C: CPT

## 2024-02-11 PROCEDURE — 93010 ELECTROCARDIOGRAM REPORT: CPT | Performed by: INTERNAL MEDICINE

## 2024-02-11 PROCEDURE — 99285 EMERGENCY DEPT VISIT HI MDM: CPT

## 2024-02-11 PROCEDURE — G0378 HOSPITAL OBSERVATION PER HR: HCPCS

## 2024-02-11 PROCEDURE — 85025 COMPLETE CBC W/AUTO DIFF WBC: CPT

## 2024-02-11 PROCEDURE — 81001 URINALYSIS AUTO W/SCOPE: CPT

## 2024-02-11 PROCEDURE — 6370000000 HC RX 637 (ALT 250 FOR IP): Performed by: EMERGENCY MEDICINE

## 2024-02-11 RX ORDER — METHADONE HYDROCHLORIDE 10 MG/1
20 TABLET ORAL
Status: DISCONTINUED | OUTPATIENT
Start: 2024-02-11 | End: 2024-02-11

## 2024-02-11 RX ORDER — ONDANSETRON 4 MG/1
4 TABLET, ORALLY DISINTEGRATING ORAL EVERY 8 HOURS PRN
Status: DISCONTINUED | OUTPATIENT
Start: 2024-02-11 | End: 2024-02-13 | Stop reason: HOSPADM

## 2024-02-11 RX ORDER — SODIUM CHLORIDE 0.9 % (FLUSH) 0.9 %
5-40 SYRINGE (ML) INJECTION EVERY 12 HOURS SCHEDULED
Status: DISCONTINUED | OUTPATIENT
Start: 2024-02-11 | End: 2024-02-13 | Stop reason: HOSPADM

## 2024-02-11 RX ORDER — DEXTROSE MONOHYDRATE 25 G/50ML
25 INJECTION, SOLUTION INTRAVENOUS ONCE
Status: DISCONTINUED | OUTPATIENT
Start: 2024-02-11 | End: 2024-02-13 | Stop reason: HOSPADM

## 2024-02-11 RX ORDER — ACETAMINOPHEN 650 MG/1
650 SUPPOSITORY RECTAL EVERY 6 HOURS PRN
Status: DISCONTINUED | OUTPATIENT
Start: 2024-02-11 | End: 2024-02-13 | Stop reason: HOSPADM

## 2024-02-11 RX ORDER — POLYETHYLENE GLYCOL 3350 17 G/17G
17 POWDER, FOR SOLUTION ORAL DAILY PRN
Status: DISCONTINUED | OUTPATIENT
Start: 2024-02-11 | End: 2024-02-13 | Stop reason: HOSPADM

## 2024-02-11 RX ORDER — DEXTROSE MONOHYDRATE 100 MG/ML
INJECTION, SOLUTION INTRAVENOUS CONTINUOUS PRN
Status: DISCONTINUED | OUTPATIENT
Start: 2024-02-11 | End: 2024-02-13 | Stop reason: HOSPADM

## 2024-02-11 RX ORDER — METOPROLOL SUCCINATE 50 MG/1
50 TABLET, EXTENDED RELEASE ORAL DAILY
Status: DISCONTINUED | OUTPATIENT
Start: 2024-02-11 | End: 2024-02-13 | Stop reason: HOSPADM

## 2024-02-11 RX ORDER — LISINOPRIL 20 MG/1
40 TABLET ORAL DAILY
Status: DISCONTINUED | OUTPATIENT
Start: 2024-02-11 | End: 2024-02-13 | Stop reason: HOSPADM

## 2024-02-11 RX ORDER — ENOXAPARIN SODIUM 100 MG/ML
30 INJECTION SUBCUTANEOUS 2 TIMES DAILY
Status: DISCONTINUED | OUTPATIENT
Start: 2024-02-11 | End: 2024-02-13 | Stop reason: HOSPADM

## 2024-02-11 RX ORDER — SODIUM CHLORIDE 0.9 % (FLUSH) 0.9 %
5-40 SYRINGE (ML) INJECTION PRN
Status: DISCONTINUED | OUTPATIENT
Start: 2024-02-11 | End: 2024-02-13 | Stop reason: HOSPADM

## 2024-02-11 RX ORDER — POTASSIUM CHLORIDE 750 MG/1
40 TABLET, FILM COATED, EXTENDED RELEASE ORAL PRN
Status: DISCONTINUED | OUTPATIENT
Start: 2024-02-11 | End: 2024-02-13 | Stop reason: HOSPADM

## 2024-02-11 RX ORDER — ALBUTEROL SULFATE 2.5 MG/3ML
2.5 SOLUTION RESPIRATORY (INHALATION) EVERY 4 HOURS PRN
Status: DISCONTINUED | OUTPATIENT
Start: 2024-02-11 | End: 2024-02-13 | Stop reason: HOSPADM

## 2024-02-11 RX ORDER — DEXTROSE MONOHYDRATE 100 MG/ML
INJECTION, SOLUTION INTRAVENOUS CONTINUOUS
Status: DISCONTINUED | OUTPATIENT
Start: 2024-02-11 | End: 2024-02-13 | Stop reason: HOSPADM

## 2024-02-11 RX ORDER — ACETAMINOPHEN 325 MG/1
650 TABLET ORAL EVERY 6 HOURS PRN
Status: DISCONTINUED | OUTPATIENT
Start: 2024-02-11 | End: 2024-02-13 | Stop reason: HOSPADM

## 2024-02-11 RX ORDER — METHADONE HYDROCHLORIDE 10 MG/1
30 TABLET ORAL ONCE
Status: COMPLETED | OUTPATIENT
Start: 2024-02-11 | End: 2024-02-11

## 2024-02-11 RX ORDER — METHADONE HYDROCHLORIDE 10 MG/1
95 TABLET ORAL DAILY
Status: DISCONTINUED | OUTPATIENT
Start: 2024-02-12 | End: 2024-02-13 | Stop reason: HOSPADM

## 2024-02-11 RX ORDER — POTASSIUM CHLORIDE 7.45 MG/ML
10 INJECTION INTRAVENOUS PRN
Status: DISCONTINUED | OUTPATIENT
Start: 2024-02-11 | End: 2024-02-13 | Stop reason: HOSPADM

## 2024-02-11 RX ORDER — ALPRAZOLAM 1 MG/1
1 TABLET ORAL 3 TIMES DAILY
Status: DISCONTINUED | OUTPATIENT
Start: 2024-02-11 | End: 2024-02-13 | Stop reason: HOSPADM

## 2024-02-11 RX ORDER — FLUTICASONE PROPIONATE 50 MCG
2 SPRAY, SUSPENSION (ML) NASAL DAILY
Status: DISCONTINUED | OUTPATIENT
Start: 2024-02-11 | End: 2024-02-13 | Stop reason: HOSPADM

## 2024-02-11 RX ORDER — GABAPENTIN 300 MG/1
300 CAPSULE ORAL 3 TIMES DAILY
Status: DISCONTINUED | OUTPATIENT
Start: 2024-02-11 | End: 2024-02-13 | Stop reason: HOSPADM

## 2024-02-11 RX ORDER — INSULIN LISPRO 100 [IU]/ML
0-4 INJECTION, SOLUTION INTRAVENOUS; SUBCUTANEOUS
Status: DISCONTINUED | OUTPATIENT
Start: 2024-02-11 | End: 2024-02-13 | Stop reason: HOSPADM

## 2024-02-11 RX ORDER — HYDRALAZINE HYDROCHLORIDE 25 MG/1
25 TABLET, FILM COATED ORAL EVERY 8 HOURS PRN
Status: DISCONTINUED | OUTPATIENT
Start: 2024-02-11 | End: 2024-02-13 | Stop reason: HOSPADM

## 2024-02-11 RX ORDER — INSULIN LISPRO 100 [IU]/ML
0-4 INJECTION, SOLUTION INTRAVENOUS; SUBCUTANEOUS NIGHTLY
Status: DISCONTINUED | OUTPATIENT
Start: 2024-02-11 | End: 2024-02-13 | Stop reason: HOSPADM

## 2024-02-11 RX ORDER — SODIUM CHLORIDE 9 MG/ML
INJECTION, SOLUTION INTRAVENOUS PRN
Status: DISCONTINUED | OUTPATIENT
Start: 2024-02-11 | End: 2024-02-13 | Stop reason: HOSPADM

## 2024-02-11 RX ORDER — ONDANSETRON 2 MG/ML
4 INJECTION INTRAMUSCULAR; INTRAVENOUS EVERY 6 HOURS PRN
Status: DISCONTINUED | OUTPATIENT
Start: 2024-02-11 | End: 2024-02-13 | Stop reason: HOSPADM

## 2024-02-11 RX ORDER — MAGNESIUM SULFATE IN WATER 40 MG/ML
2000 INJECTION, SOLUTION INTRAVENOUS PRN
Status: DISCONTINUED | OUTPATIENT
Start: 2024-02-11 | End: 2024-02-13 | Stop reason: HOSPADM

## 2024-02-11 RX ADMIN — ACETAMINOPHEN 650 MG: 325 TABLET ORAL at 22:24

## 2024-02-11 RX ADMIN — DEXTROSE MONOHYDRATE 250 ML: 100 INJECTION, SOLUTION INTRAVENOUS at 15:00

## 2024-02-11 RX ADMIN — METHADONE HYDROCHLORIDE 30 MG: 10 TABLET ORAL at 19:14

## 2024-02-11 RX ADMIN — METOPROLOL SUCCINATE 50 MG: 50 TABLET, EXTENDED RELEASE ORAL at 20:16

## 2024-02-11 RX ADMIN — METOPROLOL TARTRATE 25 MG: 25 TABLET, FILM COATED ORAL at 18:56

## 2024-02-11 RX ADMIN — HYDRALAZINE HYDROCHLORIDE 25 MG: 25 TABLET ORAL at 20:16

## 2024-02-11 RX ADMIN — LISINOPRIL 40 MG: 20 TABLET ORAL at 20:08

## 2024-02-11 RX ADMIN — GABAPENTIN 300 MG: 300 CAPSULE ORAL at 20:08

## 2024-02-11 RX ADMIN — DEXTROSE MONOHYDRATE: 100 INJECTION, SOLUTION INTRAVENOUS at 20:09

## 2024-02-11 RX ADMIN — ALPRAZOLAM 1 MG: 1 TABLET ORAL at 20:08

## 2024-02-11 RX ADMIN — ENOXAPARIN SODIUM 30 MG: 100 INJECTION SUBCUTANEOUS at 20:08

## 2024-02-11 NOTE — ED PROVIDER NOTES
[facility] EMERGENCY DEP  EMERGENCY DEPARTMENT ENCOUNTER      Pt Name: Alcon Zamora  MRN: [id number]  Birthdate 1960  Date of evaluation: 2/11/2024  Provider: Calli Stovall MD    CHIEF COMPLAINT       Chief Complaint   Patient presents with    Altered Mental Status     ED visit d/t AMS - found by house keeper at home roughly at 1400 in bed, altered non verbal not following commands - initially called a CODE stroke VAN (+) due to sxs - shortly after first primary call, updated EMS call reported finger sticks in 30s - maintained code stroke until canceled by Sia on arrival - found to be large loose brown stool soiled in pants and shirt;; no IV access on arrival;;          HISTORY OF PRESENT ILLNESS    HPI    Alcon Zamora is a 64 y.o. male with PMH significant for insulin-dependent diabetes, chronic low back pain, COPD, chronic kidney disease, hypertension who presents to the emergency department from home after family found with altered mental status and garbled speech last seen in [facility] of health at 2 PM yesterday, code stroke activated in the field.  Patient found to be in route  hypoglycemic.  Patient with blood sugar of 33 on arrival, code stroke deactivated.  Patient improved dramatically with IV dextrose on arrival to emergency department.  Patient reports he has not had anything to eat all day.  Patient reports he took his insulin as regularly scheduled but woke up late today at 1230 and did not eat anything.  Patient reports he has had intermittent diarrhea over the past few days.  Denies any extra insulin dose.  No other complaints.  Denies any recent illness, fever, chills, nausea, vomiting, chest pain, shortness of breath.  Nursing Notes were reviewed.    REVIEW OF SYSTEMS       Review of Systems   Constitutional:  Negative for fever.   Respiratory:  Negative for cough and shortness of breath.    Cardiovascular:  Negative for chest pain.   Gastrointestinal:  Negative for abdominal pain.

## 2024-02-12 PROBLEM — R29.6 FREQUENT FALLS: Status: ACTIVE | Noted: 2024-02-12

## 2024-02-12 LAB
ANION GAP SERPL CALC-SCNC: 2 MMOL/L (ref 5–15)
BUN SERPL-MCNC: 10 MG/DL (ref 6–20)
BUN/CREAT SERPL: 11 (ref 12–20)
CALCIUM SERPL-MCNC: 8.4 MG/DL (ref 8.5–10.1)
CHLORIDE SERPL-SCNC: 103 MMOL/L (ref 97–108)
CO2 SERPL-SCNC: 30 MMOL/L (ref 21–32)
CREAT SERPL-MCNC: 0.94 MG/DL (ref 0.7–1.3)
GLUCOSE BLD STRIP.AUTO-MCNC: 114 MG/DL (ref 65–117)
GLUCOSE BLD STRIP.AUTO-MCNC: 160 MG/DL (ref 65–117)
GLUCOSE BLD STRIP.AUTO-MCNC: 215 MG/DL (ref 65–117)
GLUCOSE BLD STRIP.AUTO-MCNC: 231 MG/DL (ref 65–117)
GLUCOSE SERPL-MCNC: 121 MG/DL (ref 65–100)
POTASSIUM SERPL-SCNC: 4.2 MMOL/L (ref 3.5–5.1)
SERVICE CMNT-IMP: ABNORMAL
SERVICE CMNT-IMP: NORMAL
SODIUM SERPL-SCNC: 135 MMOL/L (ref 136–145)

## 2024-02-12 PROCEDURE — 6360000002 HC RX W HCPCS: Performed by: NURSE PRACTITIONER

## 2024-02-12 PROCEDURE — 1100000003 HC PRIVATE W/ TELEMETRY

## 2024-02-12 PROCEDURE — 80048 BASIC METABOLIC PNL TOTAL CA: CPT

## 2024-02-12 PROCEDURE — 6370000000 HC RX 637 (ALT 250 FOR IP): Performed by: FAMILY MEDICINE

## 2024-02-12 PROCEDURE — 6360000002 HC RX W HCPCS: Performed by: FAMILY MEDICINE

## 2024-02-12 PROCEDURE — 6370000000 HC RX 637 (ALT 250 FOR IP): Performed by: NURSE PRACTITIONER

## 2024-02-12 PROCEDURE — G0378 HOSPITAL OBSERVATION PER HR: HCPCS

## 2024-02-12 PROCEDURE — 99222 1ST HOSP IP/OBS MODERATE 55: CPT | Performed by: CLINICAL NURSE SPECIALIST

## 2024-02-12 PROCEDURE — 36415 COLL VENOUS BLD VENIPUNCTURE: CPT

## 2024-02-12 PROCEDURE — 96374 THER/PROPH/DIAG INJ IV PUSH: CPT

## 2024-02-12 PROCEDURE — 82962 GLUCOSE BLOOD TEST: CPT

## 2024-02-12 PROCEDURE — 96372 THER/PROPH/DIAG INJ SC/IM: CPT

## 2024-02-12 PROCEDURE — 2580000003 HC RX 258: Performed by: FAMILY MEDICINE

## 2024-02-12 RX ORDER — INSULIN LISPRO 100 [IU]/ML
0-4 INJECTION, SOLUTION INTRAVENOUS; SUBCUTANEOUS NIGHTLY
Status: DISCONTINUED | OUTPATIENT
Start: 2024-02-12 | End: 2024-02-13

## 2024-02-12 RX ORDER — BLOOD-GLUCOSE SENSOR
EACH MISCELLANEOUS
Qty: 4 EACH | Refills: 1 | Status: SHIPPED | OUTPATIENT
Start: 2024-02-12 | End: 2024-02-13

## 2024-02-12 RX ORDER — AMLODIPINE BESYLATE 5 MG/1
10 TABLET ORAL DAILY
Status: DISCONTINUED | OUTPATIENT
Start: 2024-02-12 | End: 2024-02-13 | Stop reason: HOSPADM

## 2024-02-12 RX ORDER — INSULIN LISPRO 100 [IU]/ML
0-4 INJECTION, SOLUTION INTRAVENOUS; SUBCUTANEOUS
Status: DISCONTINUED | OUTPATIENT
Start: 2024-02-12 | End: 2024-02-13

## 2024-02-12 RX ORDER — HYDRALAZINE HYDROCHLORIDE 20 MG/ML
10 INJECTION INTRAMUSCULAR; INTRAVENOUS EVERY 6 HOURS PRN
Status: DISCONTINUED | OUTPATIENT
Start: 2024-02-12 | End: 2024-02-13 | Stop reason: HOSPADM

## 2024-02-12 RX ADMIN — GABAPENTIN 300 MG: 300 CAPSULE ORAL at 09:04

## 2024-02-12 RX ADMIN — DEXTROSE MONOHYDRATE: 100 INJECTION, SOLUTION INTRAVENOUS at 06:37

## 2024-02-12 RX ADMIN — ALPRAZOLAM 1 MG: 1 TABLET ORAL at 21:23

## 2024-02-12 RX ADMIN — Medication 10 ML: at 21:29

## 2024-02-12 RX ADMIN — HYDRALAZINE HYDROCHLORIDE 10 MG: 20 INJECTION, SOLUTION INTRAMUSCULAR; INTRAVENOUS at 10:32

## 2024-02-12 RX ADMIN — HYDRALAZINE HYDROCHLORIDE 25 MG: 25 TABLET ORAL at 04:02

## 2024-02-12 RX ADMIN — DEXTROSE MONOHYDRATE: 100 INJECTION, SOLUTION INTRAVENOUS at 17:00

## 2024-02-12 RX ADMIN — ENOXAPARIN SODIUM 30 MG: 100 INJECTION SUBCUTANEOUS at 21:28

## 2024-02-12 RX ADMIN — HYDRALAZINE HYDROCHLORIDE 10 MG: 20 INJECTION, SOLUTION INTRAMUSCULAR; INTRAVENOUS at 17:03

## 2024-02-12 RX ADMIN — ALPRAZOLAM 1 MG: 1 TABLET ORAL at 15:09

## 2024-02-12 RX ADMIN — METOPROLOL SUCCINATE 50 MG: 50 TABLET, EXTENDED RELEASE ORAL at 07:43

## 2024-02-12 RX ADMIN — DEXTROSE MONOHYDRATE: 100 INJECTION, SOLUTION INTRAVENOUS at 11:45

## 2024-02-12 RX ADMIN — GABAPENTIN 300 MG: 300 CAPSULE ORAL at 21:23

## 2024-02-12 RX ADMIN — GABAPENTIN 300 MG: 300 CAPSULE ORAL at 15:09

## 2024-02-12 RX ADMIN — Medication 10 ML: at 09:02

## 2024-02-12 RX ADMIN — ALPRAZOLAM 1 MG: 1 TABLET ORAL at 09:04

## 2024-02-12 RX ADMIN — LISINOPRIL 40 MG: 20 TABLET ORAL at 07:43

## 2024-02-12 RX ADMIN — METHADONE HYDROCHLORIDE 95 MG: 10 TABLET ORAL at 10:23

## 2024-02-12 RX ADMIN — ENOXAPARIN SODIUM 30 MG: 100 INJECTION SUBCUTANEOUS at 09:04

## 2024-02-12 RX ADMIN — AMLODIPINE BESYLATE 10 MG: 5 TABLET ORAL at 09:15

## 2024-02-12 NOTE — ED NOTES
ED TO INPATIENT SBAR HANDOFF    Patient Name: Alcon Zamora   Preferred Name: Alcon  : 1960  64 y.o.   Family/Caregiver Present: no   Code Status Order: Full Code  PO Status: regular diet  Telemetry Order: Yes  C-SSRS: Risk of Suicide: No Risk  Sitter no   Restraints:     Sepsis Risk Score Sepsis Risk Score: 0.71    Situation  Chief Complaint   Patient presents with    Altered Mental Status     ED visit d/t AMS - found by house keeper at home roughly at 1400 in bed, altered non verbal not following commands - initially called a CODE stroke VAN (+) due to sxs - shortly after first primary call, updated EMS call reported finger sticks in 30s - maintained code stroke until canceled by Atri on arrival - found to be large loose brown stool soiled in pants and shirt;; no IV access on arrival;;      Brief Description of Patient's Condition: Patient was found down in home by . BG was in the 20s. Once D10 was given and normal BG obtained, patient returned to neuro baseline (a/o x 4). Patient placed on D10 drip @ 50ml/hr in order to maintain his BG.   Mental Status: oriented and alert  Arrived from:Home  Imaging:   XR CHEST PORTABLE   Final Result   No acute cardiac pulmonary process.      CT HEAD WO CONTRAST   Final Result   No acute intracranial abnormality.              Abnormal labs:   Abnormal Labs Reviewed   CBC WITH AUTO DIFFERENTIAL - Abnormal; Notable for the following components:       Result Value    MPV 8.8 (*)     All other components within normal limits   COMPREHENSIVE METABOLIC PANEL - Abnormal; Notable for the following components:    Anion Gap 3 (*)     Glucose 27 (*)     ALT 9 (*)     Albumin 2.9 (*)     Albumin/Globulin Ratio 0.7 (*)     All other components within normal limits   URINALYSIS WITH REFLEX TO CULTURE - Abnormal; Notable for the following components:    Protein,  (*)     All other components within normal limits   POCT GLUCOSE - Abnormal; Notable for the following

## 2024-02-12 NOTE — PROGRESS NOTES
Hospitalist Progress Note  NINA Gonzalez NP  Answering service: [phone] OR [phone] from in house phone        Date of Service:  2024  NAME:  Alcon Zamora  :  1960  MRN:  [id number]      Admission Summary:   Alcon Zamora is a 64 y.o. male PMH DM, COPD, Hep C s/p Harvonii, DM2 on insulin past five years who presents with AMS after found down by  with slurred speech and not making sense. Stroke alert called then canceled when discovered his blood sugar was in the 30s. Here in ED eval reveals CMP with glucose of 27, he had amp D50 and maintained low to normal here since with a few lows </= 60. He tells me he was revently treated for bronchitis with z pack and prednisone and completed treatment last week. Since then loose stool 1-2 daily for past couple days. Denies nausea/vomiting. More tired and less appetite reported in past 24 hours. No fevers, chills, sweats, chest pain or shortness of breath. States he was able to eat the ravioli for dinner here. Patient's uncles live with him, so he is not home alone.   ROS otherwise negative. He has not had recent changes to DM regimen of basaglar 32 units and metformin 1000mg bid and confirms no changes had been made to cover him while he was on steroids.        Interval history / Subjective:   I saw the patient this morning on rounds.  Tells me he is not feeling great however his diarrhea has resolved.  Denies fever, chills, urinary symptoms     Assessment & Plan:      Hypoglycemia  No recent changes to his medication regimen, he tells me he took his medications and ate normally on 2/10,  did not eat however did not take his medications either.  On that day he woke up feeling hypoglycemic.  Blood sugar 110s however remains on D10 infusion  Holding hypoglycemics  Reaching out to the diabetes specialist for evaluation, may benefit from diabetes

## 2024-02-12 NOTE — CARE COORDINATION
Care Management Initial Assessment       RUR: Calculating   Readmission? No  1st IM letter given? No  1st  letter given: No    CM met with patient at bedside to introduce self and explain role. Patient states he is independent with ADLs prior to admission. States he lives with his Uncle in a 2 level house with 5 steps to enter. Patient owns a RW. Confirms family will transport him home at time of discharge. Patient confirmed his PCP is at [facility]. Patient is currently in the hospital receiving an IV infusion of dextrose to maintain blood sugar to a safe level per notes. CM will continue to follow as needed.        02/12/24 1430   Service Assessment   Patient Orientation Alert and Oriented;Person;Place;Situation;Self   Cognition Alert   History Provided By Patient   Primary Caregiver Self   Support Systems Family Members   PCP Verified by CM Yes  ([facility])   Last Visit to PCP Within last 6 months   Prior Functional Level Independent in ADLs/IADLs   Can patient return to prior living arrangement Yes   Ability to make needs known: Good   Family able to assist with home care needs: Yes   Financial Resources Medicaid   Social/Functional History   Lives With Family  (Uncle)   Type of Home House   Home Layout Two level   Home Access Stairs to enter with rails   Entrance Stairs - Number of Steps 5   Home Equipment Walker, rolling   Discharge Planning   Type of Residence House   Living Arrangements   (Uncle)   DME Ordered? No     Yocasta Grewal RN/CRM

## 2024-02-12 NOTE — H&P
Glucose 33 (*)     All other components within normal limits   POCT GLUCOSE - Abnormal; Notable for the following components:    POC Glucose 28 (*)     All other components within normal limits   POCT GLUCOSE - Abnormal; Notable for the following components:    POC Glucose 201 (*)     All other components within normal limits   POCT GLUCOSE - Abnormal; Notable for the following components:    POC Glucose 63 (*)     All other components within normal limits       [unfilled]    IMAGING:   XR CHEST PORTABLE   Final Result   No acute cardiac pulmonary process.      CT HEAD WO CONTRAST   Final Result   No acute intracranial abnormality.                 ECG/ECHO:        Notes reviewed from all clinical/nonclinical/nursing services involved in patient's clinical care. Care coordination discussions were held with appropriate clinical/nonclinical/ nursing providers based on care coordination needs.     Assessment:   Given the patient's current clinical presentation, there is a high level of concern for decompensation if discharged from the emergency department. Complex decision making was performed, which includes reviewing the patient's available past medical records, laboratory results, and imaging studies.    Principal Problem:    Hypoglycemia  Resolved Problems:    * No resolved hospital problems. *      Plan:     Admit to obs to ensure no recurrent drops    #Hypoglycemia without recent changes to regimen  - Hold long acting and metformin  - Metformin can contribute to diarrhea, but not a recent med addition  - D10 low rate with q 3 hour accuchecks, when over 200 x two will dc  - Low stress SSI  - Consider cortisol although seems unlikely, recent pred course short  - Could be related to dec po and diarrhea  - Check urine and rule out UTI (along with c diff) as he is tender in lower abdomen    # Diarrhea  - Check c diff with recent abx  - Not really high quantity to suspect infection otherwise    # HTN, not controlled

## 2024-02-13 VITALS
DIASTOLIC BLOOD PRESSURE: 88 MMHG | RESPIRATION RATE: 16 BRPM | TEMPERATURE: 98.2 F | WEIGHT: 231.48 LBS | HEIGHT: 78 IN | OXYGEN SATURATION: 97 % | SYSTOLIC BLOOD PRESSURE: 166 MMHG | HEART RATE: 58 BPM | BODY MASS INDEX: 26.78 KG/M2

## 2024-02-13 LAB
ANION GAP SERPL CALC-SCNC: 4 MMOL/L (ref 5–15)
BUN SERPL-MCNC: 13 MG/DL (ref 6–20)
BUN/CREAT SERPL: 10 (ref 12–20)
CALCIUM SERPL-MCNC: 9 MG/DL (ref 8.5–10.1)
CHLORIDE SERPL-SCNC: 103 MMOL/L (ref 97–108)
CO2 SERPL-SCNC: 27 MMOL/L (ref 21–32)
CREAT SERPL-MCNC: 1.28 MG/DL (ref 0.7–1.3)
GLUCOSE BLD STRIP.AUTO-MCNC: 188 MG/DL (ref 65–117)
GLUCOSE BLD STRIP.AUTO-MCNC: 252 MG/DL (ref 65–117)
GLUCOSE SERPL-MCNC: 148 MG/DL (ref 65–100)
POTASSIUM SERPL-SCNC: 3.9 MMOL/L (ref 3.5–5.1)
SERVICE CMNT-IMP: ABNORMAL
SODIUM SERPL-SCNC: 134 MMOL/L (ref 136–145)

## 2024-02-13 PROCEDURE — 2580000003 HC RX 258: Performed by: FAMILY MEDICINE

## 2024-02-13 PROCEDURE — 36415 COLL VENOUS BLD VENIPUNCTURE: CPT

## 2024-02-13 PROCEDURE — 82962 GLUCOSE BLOOD TEST: CPT

## 2024-02-13 PROCEDURE — 6370000000 HC RX 637 (ALT 250 FOR IP): Performed by: NURSE PRACTITIONER

## 2024-02-13 PROCEDURE — 80048 BASIC METABOLIC PNL TOTAL CA: CPT

## 2024-02-13 PROCEDURE — 6370000000 HC RX 637 (ALT 250 FOR IP): Performed by: FAMILY MEDICINE

## 2024-02-13 PROCEDURE — 6360000002 HC RX W HCPCS: Performed by: FAMILY MEDICINE

## 2024-02-13 PROCEDURE — 99232 SBSQ HOSP IP/OBS MODERATE 35: CPT | Performed by: CLINICAL NURSE SPECIALIST

## 2024-02-13 RX ORDER — ACYCLOVIR 400 MG/1
TABLET ORAL
Qty: 3 EACH | Refills: 3 | Status: ON HOLD | OUTPATIENT
Start: 2024-02-13

## 2024-02-13 RX ORDER — AMLODIPINE BESYLATE 10 MG/1
10 TABLET ORAL DAILY
Qty: 30 TABLET | Refills: 3 | Status: ON HOLD | OUTPATIENT
Start: 2024-02-14

## 2024-02-13 RX ORDER — ACYCLOVIR 400 MG/1
TABLET ORAL
Qty: 1 EACH | Refills: 0 | Status: ON HOLD | OUTPATIENT
Start: 2024-02-13

## 2024-02-13 RX ADMIN — GABAPENTIN 300 MG: 300 CAPSULE ORAL at 14:34

## 2024-02-13 RX ADMIN — Medication 10 ML: at 09:09

## 2024-02-13 RX ADMIN — ALPRAZOLAM 1 MG: 1 TABLET ORAL at 09:01

## 2024-02-13 RX ADMIN — GABAPENTIN 300 MG: 300 CAPSULE ORAL at 09:01

## 2024-02-13 RX ADMIN — ENOXAPARIN SODIUM 30 MG: 100 INJECTION SUBCUTANEOUS at 09:01

## 2024-02-13 RX ADMIN — LISINOPRIL 40 MG: 20 TABLET ORAL at 09:01

## 2024-02-13 RX ADMIN — METHADONE HYDROCHLORIDE 95 MG: 10 TABLET ORAL at 09:02

## 2024-02-13 RX ADMIN — AMLODIPINE BESYLATE 10 MG: 5 TABLET ORAL at 09:01

## 2024-02-13 RX ADMIN — FLUTICASONE PROPIONATE 2 SPRAY: 50 SPRAY, METERED NASAL at 09:01

## 2024-02-13 RX ADMIN — ALPRAZOLAM 1 MG: 1 TABLET ORAL at 14:34

## 2024-02-13 NOTE — PLAN OF CARE
Problem: Discharge Planning  Goal: Discharge to home or other facility with appropriate resources  2/13/2024 1302 by Ankit Andrews RN  Outcome: Progressing  2/13/2024 0115 by Nandini James, RN  Outcome: Progressing     Problem: Chronic Conditions and Co-morbidities  Goal: Patient's chronic conditions and co-morbidity symptoms are monitored and maintained or improved  2/13/2024 1302 by Ankit Andrews RN  Outcome: Progressing  2/13/2024 0115 by Nandini James, RN  Outcome: Progressing     Problem: Safety - Adult  Goal: Free from fall injury  2/13/2024 1302 by Ankit Andrews RN  Outcome: Progressing  2/13/2024 0115 by Nandini James, RN  Outcome: Progressing

## 2024-02-13 NOTE — DISCHARGE INSTRUCTIONS
Discharge Instructions       PATIENT ID: Alcon Zamora  MRN: [id number]   YOB: 1960    DATE OF ADMISSION: 2/11/2024   DATE OF DISCHARGE: 2/13/2024    PRIMARY CARE PROVIDER: No primary care provider on file.     ATTENDING PHYSICIAN: Shyam Shabazz MD   DISCHARGING PROVIDER: NINA Gonzalez NP    To contact this individual call [phone] and ask the  to page.   If unavailable ask to be transferred the Adult Hospitalist Department.    DISCHARGE DIAGNOSES hypoglycemia    CONSULTATIONS: IP CONSULT TO DIABETES MANAGEMENT    PROCEDURES/SURGERIES: * No surgery found *    PENDING TEST RESULTS:   At the time of discharge the following test results are still pending:     FOLLOW UP APPOINTMENTS:   Follow up with PCP in 1 week               ADDITIONAL CARE RECOMMENDATIONS: check blood pressure and pulse every day before medications. Hold metoprolol if pulse is less than 59     DIET: diabetic diet        ACTIVITY: activity as tolerated     WOUND CARE: na     EQUIPMENT needed: na      DISCHARGE MEDICATIONS:   See Medication Reconciliation Form    It is important that you take the medication exactly as they are prescribed.   Keep your medication in the bottles provided by the pharmacist and keep a list of the medication names, dosages, and times to be taken in your wallet.   Do not take other medications without consulting your doctor.       NOTIFY YOUR PHYSICIAN FOR ANY OF THE FOLLOWING:   Fever over 101 degrees for 24 hours.   Chest pain, shortness of breath, fever, chills, nausea, vomiting, diarrhea, change in mentation, falling, weakness, bleeding. Severe pain or pain not relieved by medications.  Or, any other signs or symptoms that you may have questions about.      DISPOSITION:   x Home With:   OT  PT  HH  RN       SNF/Inpatient Rehab/LTAC    Independent/assisted living    Hospice    Other:           Signed:   NINA Gonzalez NP  2/13/2024  2:04 PM      DIABETES DISCHARGE

## 2024-02-13 NOTE — DISCHARGE SUMMARY
Discharge Summary       PATIENT ID: Alcon Zamora  MRN: [id number]   YOB: 1960    DATE OF ADMISSION: 2/11/2024  3:05 PM    DATE OF DISCHARGE: 2/13/2024   PRIMARY CARE PROVIDER: No primary care provider on file.     ATTENDING PHYSICIAN: JOSE LUIS Shabazz  DISCHARGING PROVIDER: NINA Gonzalez NP    To contact this individual call [phone] and ask the  to page.  If unavailable ask to be transferred the Adult Hospitalist Department.    CONSULTATIONS: IP CONSULT TO DIABETES MANAGEMENT    PROCEDURES/SURGERIES: * No surgery found *     ADMITTING DIAGNOSES & HOSPITAL COURSE:   Alcon Zamora is a 64 y.o. male PMH DM, COPD, Hep C s/p Harvonii, DM2 on insulin past five years who presents with AMS after found down by  with slurred speech and not making sense. Stroke alert called then canceled when discovered his blood sugar was in the 30s. Here in ED eval reveals CMP with glucose of 27, he had amp D50 and maintained low to normal here since with a few lows </= 60. He tells me he was revently treated for bronchitis with z pack and prednisone and completed treatment last week. Since then loose stool 1-2 daily for past couple days. Denies nausea/vomiting. More tired and less appetite reported in past 24 hours. No fevers, chills, sweats, chest pain or shortness of breath. States he was able to eat the ravioli for dinner here. Patient's uncles live with him, so he is not home alone.   ROS otherwise negative. He has not had recent changes to DM regimen of basaglar 32 units and metformin 1000mg bid and confirms no changes had been made to cover him while he was on steroids.         DISCHARGE DIAGNOSES / PLAN:      Hypoglycemia  No recent changes to his medication regimen, he tells me he took his medications and ate normally on 2/10, 2/11 did not eat however did not take his medications either.  On that day he woke up feeling hypoglycemic.  Blood sugar 148 this morning however remains on D10

## 2024-02-13 NOTE — CONSULTS
[facility]ALTH  DIABETES MANAGEMENT CONSULT    Consulted by Provider for advanced nursing evaluation and care for inpatient blood glucose management.    Evaluation and Action Plan   Alcon Zamora is a 64 y.o. male admitted with severe hypoglycemia on 2/11/24.  BG trends now improved with IV D10 in fusion.  Patient is consuming adequate PO diet currently.  After questioning his PTA diabetes practices he reports not monitoring his BG levels for unknown reasons.  He has recently lost 30 lbs in past 3 mo, intentionally per his report. He was consuming \"too many sweets\".  Concerning his A1Cs. It has immensely improved since 2020 from 10% to now less than 5%.  He does endorse falls in past few months with injury to his ribs- states \"legs just give out on him\". He is anxious to get back home.     2/13: BG trends noted.  Dextrose IVF discontinued at 1230 today. Fasting / pre lunch .  Patient consumed <50% of his lunch today.  He appears older than stated age and not taking care of himself. It is apparent he does not require insulin therapy at this time given current A1C.  Would be a good candidate for CGM to monitor BG- Patient placed dexcom G7 sensor onto himself without difficulty.  A  was provided to him as his cell phone is not combatible with the Dexcom G7 ursula. Script for subsequent sensors sent to OP pharmacy -awaiting insurance auth- PA completed .  His PCP has retired and he was seen in early January by Markus Li DO at [facility].     Management Rationale Action Plan   Medication   Corrective insulin Using LOW  sensitivity based on BG trends [facility]       Diabetes Discharge Plan   Medication  DISCONTINUE basaglar  RESUME and ADJUST  Metformin dose 500mg BID   NEW SCRIPT for Dexcom 7-sent to [facility] OP pharmacy   Referral  [x]        Outpatient diabetes education-would benefit   Additional orders  PCP follow up scheduled for 3/25/24          Initial 
health impacting diabetes self-management practices    Struggling with anxiety and/or depression and Concerned that you need to know more about how to stay healthy with diabetes    Overall evaluation:    [x] Achieving A1c BELOW target with drug therapy & lacking self-care practices    Subjective   ”I lost weight over last 3 mo.”     Objective   Physical exam  General Normal weight male in no acute distress/ill-appearing. Conversant and cooperative  Neuro  Alert, oriented   Vital Signs   Vitals:    02/12/24 1500   BP: (!) 180/80   Pulse: 52   Resp: 16   Temp: 98.6 °F (37 °C)   SpO2: 93%     Skin  Warm and dry.   Heart   Regular rate and rhythm. No murmurs, rubs or gallops  Lungs  Clear to auscultation without rales or rhonchi  Extremities No foot wounds    Diabetic foot exam: malformation in feet assoc. With T2D    Left Foot     Visual Exam:normal   Pulse DP: Present   Filament test: reduced sensation     Right Foot   Visual Exam: normal   Pulse DP: Present   Filament test: reduced sensation       Laboratory  Recent Labs     02/11/24  1513   WBC 6.2   HGB 12.3   HCT 38.0   MCV 91.6        Recent Labs     02/11/24  1513 02/12/24  0422    135*   K 4.0 4.2    103   CO2 31 30   BUN 12 10   CREATININE 0.89 0.94     Lab Results   Component Value Date    ALT 9 (L) 02/11/2024    AST 15 02/11/2024    ALKPHOS 89 02/11/2024    BILITOT 0.4 02/11/2024     No results found for: \"TSH\", \"TSHREFLEX\", \"TSHFT4\", \"TSHELE\", \"DZH4NPS\", \"TSHHS\"  Lab Results   Component Value Date    LABA1C 4.9 02/11/2024    LABA1C 6.2 (H) 09/14/2023    LABA1C 6.0 (H) 07/07/2023       Factors impacting BG management  Factor Dose Comments   Nutrition:  Standard meals     60 grams/meal      Other:   Kidney function  Liver function     WNL/   WNL      Blood glucose pattern    Significant diabetes-related events over the past 24-72 hours  Hypoglycemia in T2D secondary to insulin and recent weight loss  2/12: D10 IV infusion, BG trends

## 2024-02-13 NOTE — PLAN OF CARE
Problem: Discharge Planning  Goal: Discharge to home or other facility with appropriate resources  2/13/2024 0115 by Nandini James RN  Outcome: Progressing  2/12/2024 1554 by Ankit Andrews RN  Outcome: Progressing  Flowsheets (Taken 2/12/2024 0849)  Discharge to home or other facility with appropriate resources: Identify barriers to discharge with patient and caregiver     Problem: Chronic Conditions and Co-morbidities  Goal: Patient's chronic conditions and co-morbidity symptoms are monitored and maintained or improved  2/13/2024 0115 by Nandini James RN  Outcome: Progressing  2/12/2024 1554 by Ankit Andrews RN  Outcome: Progressing  Flowsheets (Taken 2/12/2024 0849)  Care Plan - Patient's Chronic Conditions and Co-Morbidity Symptoms are Monitored and Maintained or Improved: Monitor and assess patient's chronic conditions and comorbid symptoms for stability, deterioration, or improvement     Problem: Safety - Adult  Goal: Free from fall injury  2/13/2024 0115 by Nandini James RN  Outcome: Progressing  2/12/2024 1554 by Ankit Andrews RN  Outcome: Progressing  Flowsheets (Taken 2/12/2024 0849)  Free From Fall Injury: Instruct family/caregiver on patient safety

## 2024-02-15 ENCOUNTER — TELEPHONE (OUTPATIENT)
Facility: HOSPITAL | Age: 64
End: 2024-02-15

## 2024-02-15 NOTE — TELEPHONE ENCOUNTER
Call received from Mother expressed concerns about patient falling after discharge and the need for rehab ([facility]). This writer acknowledged will need to communicate w/patient regarding needs/ resources. Patient will need to meet criteria for LOC requested (outcome pending).      left for patient [phone] requesting call back.     Call placed to [facility] tran w/ Emmanuel Crum discussed the need to have hospital f/u appointment and Home PT . Patient is scheduled for appointment in March w/ Dr. Li . Informed of available appointment tomorrow w/ Dr. Blevins

## 2024-02-16 ENCOUNTER — HOSPITAL ENCOUNTER (INPATIENT)
Facility: HOSPITAL | Age: 64
LOS: 16 days | Discharge: NURSING, REHAB OR LONG-TERM CARE | DRG: 045 | End: 2024-03-06
Attending: STUDENT IN AN ORGANIZED HEALTH CARE EDUCATION/TRAINING PROGRAM | Admitting: FAMILY MEDICINE
Payer: COMMERCIAL

## 2024-02-16 DIAGNOSIS — I63.9 IMPENDING CEREBROVASCULAR ACCIDENT (HCC): ICD-10-CM

## 2024-02-16 DIAGNOSIS — I63.9 ACUTE CEREBROVASCULAR ACCIDENT (CVA) (HCC): Primary | ICD-10-CM

## 2024-02-16 DIAGNOSIS — R29.6 FREQUENT FALLS: ICD-10-CM

## 2024-02-16 DIAGNOSIS — M48.061 SPINAL STENOSIS OF LUMBAR REGION WITHOUT NEUROGENIC CLAUDICATION: ICD-10-CM

## 2024-02-16 DIAGNOSIS — R26.9 GAIT ABNORMALITY: ICD-10-CM

## 2024-02-16 PROBLEM — R53.1 WEAKNESS: Status: ACTIVE | Noted: 2024-02-16

## 2024-02-16 LAB
ALBUMIN SERPL-MCNC: 3.1 G/DL (ref 3.5–5)
ALBUMIN/GLOB SERPL: 0.7 (ref 1.1–2.2)
ALP SERPL-CCNC: 94 U/L (ref 45–117)
ALT SERPL-CCNC: 13 U/L (ref 12–78)
ANION GAP SERPL CALC-SCNC: 3 MMOL/L (ref 5–15)
AST SERPL-CCNC: 16 U/L (ref 15–37)
BASOPHILS # BLD: 0 K/UL (ref 0–0.1)
BASOPHILS NFR BLD: 1 % (ref 0–1)
BILIRUB SERPL-MCNC: 0.6 MG/DL (ref 0.2–1)
BUN SERPL-MCNC: 9 MG/DL (ref 6–20)
BUN/CREAT SERPL: 9 (ref 12–20)
CALCIUM SERPL-MCNC: 9.3 MG/DL (ref 8.5–10.1)
CHLORIDE SERPL-SCNC: 100 MMOL/L (ref 97–108)
CO2 SERPL-SCNC: 29 MMOL/L (ref 21–32)
COMMENT:: NORMAL
CREAT SERPL-MCNC: 1.01 MG/DL (ref 0.7–1.3)
DIFFERENTIAL METHOD BLD: ABNORMAL
EKG ATRIAL RATE: 80 BPM
EKG DIAGNOSIS: NORMAL
EKG P AXIS: 56 DEGREES
EKG P-R INTERVAL: 160 MS
EKG Q-T INTERVAL: 410 MS
EKG QRS DURATION: 116 MS
EKG QTC CALCULATION (BAZETT): 472 MS
EKG R AXIS: -59 DEGREES
EKG T AXIS: 61 DEGREES
EKG VENTRICULAR RATE: 80 BPM
EOSINOPHIL # BLD: 0.1 K/UL (ref 0–0.4)
EOSINOPHIL NFR BLD: 2 % (ref 0–7)
ERYTHROCYTE [DISTWIDTH] IN BLOOD BY AUTOMATED COUNT: 13 % (ref 11.5–14.5)
ETHANOL SERPL-MCNC: <10 MG/DL (ref 0–0.08)
GLOBULIN SER CALC-MCNC: 4.3 G/DL (ref 2–4)
GLUCOSE BLD STRIP.AUTO-MCNC: 179 MG/DL (ref 65–117)
GLUCOSE SERPL-MCNC: 177 MG/DL (ref 65–100)
HCT VFR BLD AUTO: 35.1 % (ref 36.6–50.3)
HGB BLD-MCNC: 12 G/DL (ref 12.1–17)
IMM GRANULOCYTES # BLD AUTO: 0 K/UL (ref 0–0.04)
IMM GRANULOCYTES NFR BLD AUTO: 0 % (ref 0–0.5)
LYMPHOCYTES # BLD: 0.8 K/UL (ref 0.8–3.5)
LYMPHOCYTES NFR BLD: 17 % (ref 12–49)
MCH RBC QN AUTO: 30.9 PG (ref 26–34)
MCHC RBC AUTO-ENTMCNC: 34.2 G/DL (ref 30–36.5)
MCV RBC AUTO: 90.5 FL (ref 80–99)
MONOCYTES # BLD: 0.4 K/UL (ref 0–1)
MONOCYTES NFR BLD: 8 % (ref 5–13)
NEUTS SEG # BLD: 3.6 K/UL (ref 1.8–8)
NEUTS SEG NFR BLD: 72 % (ref 32–75)
NRBC # BLD: 0 K/UL (ref 0–0.01)
NRBC BLD-RTO: 0 PER 100 WBC
PLATELET # BLD AUTO: 306 K/UL (ref 150–400)
PMV BLD AUTO: 8.9 FL (ref 8.9–12.9)
POTASSIUM SERPL-SCNC: 3.6 MMOL/L (ref 3.5–5.1)
PROT SERPL-MCNC: 7.4 G/DL (ref 6.4–8.2)
RBC # BLD AUTO: 3.88 M/UL (ref 4.1–5.7)
SERVICE CMNT-IMP: ABNORMAL
SODIUM SERPL-SCNC: 132 MMOL/L (ref 136–145)
SPECIMEN HOLD: NORMAL
WBC # BLD AUTO: 5 K/UL (ref 4.1–11.1)

## 2024-02-16 PROCEDURE — G0378 HOSPITAL OBSERVATION PER HR: HCPCS

## 2024-02-16 PROCEDURE — 36415 COLL VENOUS BLD VENIPUNCTURE: CPT

## 2024-02-16 PROCEDURE — 80053 COMPREHEN METABOLIC PANEL: CPT

## 2024-02-16 PROCEDURE — 97530 THERAPEUTIC ACTIVITIES: CPT

## 2024-02-16 PROCEDURE — 97116 GAIT TRAINING THERAPY: CPT

## 2024-02-16 PROCEDURE — 83036 HEMOGLOBIN GLYCOSYLATED A1C: CPT

## 2024-02-16 PROCEDURE — 96372 THER/PROPH/DIAG INJ SC/IM: CPT

## 2024-02-16 PROCEDURE — 2580000003 HC RX 258: Performed by: PHYSICIAN ASSISTANT

## 2024-02-16 PROCEDURE — 93005 ELECTROCARDIOGRAM TRACING: CPT | Performed by: EMERGENCY MEDICINE

## 2024-02-16 PROCEDURE — 82962 GLUCOSE BLOOD TEST: CPT

## 2024-02-16 PROCEDURE — 97166 OT EVAL MOD COMPLEX 45 MIN: CPT

## 2024-02-16 PROCEDURE — 82077 ASSAY SPEC XCP UR&BREATH IA: CPT

## 2024-02-16 PROCEDURE — 97165 OT EVAL LOW COMPLEX 30 MIN: CPT

## 2024-02-16 PROCEDURE — 6360000002 HC RX W HCPCS: Performed by: PHYSICIAN ASSISTANT

## 2024-02-16 PROCEDURE — 99285 EMERGENCY DEPT VISIT HI MDM: CPT

## 2024-02-16 PROCEDURE — 97162 PT EVAL MOD COMPLEX 30 MIN: CPT

## 2024-02-16 PROCEDURE — 85025 COMPLETE CBC W/AUTO DIFF WBC: CPT

## 2024-02-16 PROCEDURE — 97535 SELF CARE MNGMENT TRAINING: CPT

## 2024-02-16 RX ORDER — ONDANSETRON 2 MG/ML
4 INJECTION INTRAMUSCULAR; INTRAVENOUS EVERY 6 HOURS PRN
Status: DISCONTINUED | OUTPATIENT
Start: 2024-02-16 | End: 2024-03-06 | Stop reason: HOSPADM

## 2024-02-16 RX ORDER — SODIUM CHLORIDE 0.9 % (FLUSH) 0.9 %
5-40 SYRINGE (ML) INJECTION EVERY 12 HOURS SCHEDULED
Status: DISCONTINUED | OUTPATIENT
Start: 2024-02-16 | End: 2024-03-03 | Stop reason: SDUPTHER

## 2024-02-16 RX ORDER — MAGNESIUM SULFATE IN WATER 40 MG/ML
2000 INJECTION, SOLUTION INTRAVENOUS PRN
Status: DISCONTINUED | OUTPATIENT
Start: 2024-02-16 | End: 2024-03-06 | Stop reason: HOSPADM

## 2024-02-16 RX ORDER — POTASSIUM CHLORIDE 7.45 MG/ML
10 INJECTION INTRAVENOUS PRN
Status: DISCONTINUED | OUTPATIENT
Start: 2024-02-16 | End: 2024-03-06 | Stop reason: HOSPADM

## 2024-02-16 RX ORDER — POLYETHYLENE GLYCOL 3350 17 G/17G
17 POWDER, FOR SOLUTION ORAL DAILY PRN
Status: DISCONTINUED | OUTPATIENT
Start: 2024-02-16 | End: 2024-03-06 | Stop reason: HOSPADM

## 2024-02-16 RX ORDER — SODIUM CHLORIDE 9 MG/ML
INJECTION, SOLUTION INTRAVENOUS PRN
Status: DISCONTINUED | OUTPATIENT
Start: 2024-02-16 | End: 2024-03-06 | Stop reason: HOSPADM

## 2024-02-16 RX ORDER — LISINOPRIL 20 MG/1
40 TABLET ORAL DAILY
Status: DISCONTINUED | OUTPATIENT
Start: 2024-02-17 | End: 2024-02-17

## 2024-02-16 RX ORDER — ACETAMINOPHEN 325 MG/1
650 TABLET ORAL EVERY 6 HOURS PRN
Status: DISCONTINUED | OUTPATIENT
Start: 2024-02-16 | End: 2024-02-23

## 2024-02-16 RX ORDER — ONDANSETRON 4 MG/1
4 TABLET, ORALLY DISINTEGRATING ORAL EVERY 8 HOURS PRN
Status: DISCONTINUED | OUTPATIENT
Start: 2024-02-16 | End: 2024-03-06 | Stop reason: HOSPADM

## 2024-02-16 RX ORDER — METOPROLOL SUCCINATE 50 MG/1
50 TABLET, EXTENDED RELEASE ORAL DAILY
Status: DISCONTINUED | OUTPATIENT
Start: 2024-02-17 | End: 2024-02-21

## 2024-02-16 RX ORDER — POTASSIUM CHLORIDE 750 MG/1
40 TABLET, FILM COATED, EXTENDED RELEASE ORAL PRN
Status: DISCONTINUED | OUTPATIENT
Start: 2024-02-16 | End: 2024-03-06 | Stop reason: HOSPADM

## 2024-02-16 RX ORDER — ACETAMINOPHEN 650 MG/1
650 SUPPOSITORY RECTAL EVERY 6 HOURS PRN
Status: DISCONTINUED | OUTPATIENT
Start: 2024-02-16 | End: 2024-02-23

## 2024-02-16 RX ORDER — ENOXAPARIN SODIUM 100 MG/ML
30 INJECTION SUBCUTANEOUS 2 TIMES DAILY
Status: DISCONTINUED | OUTPATIENT
Start: 2024-02-16 | End: 2024-02-23

## 2024-02-16 RX ORDER — AMLODIPINE BESYLATE 5 MG/1
10 TABLET ORAL DAILY
Status: DISCONTINUED | OUTPATIENT
Start: 2024-02-17 | End: 2024-02-26

## 2024-02-16 RX ORDER — SODIUM CHLORIDE 0.9 % (FLUSH) 0.9 %
5-40 SYRINGE (ML) INJECTION PRN
Status: DISCONTINUED | OUTPATIENT
Start: 2024-02-16 | End: 2024-03-06 | Stop reason: HOSPADM

## 2024-02-16 RX ADMIN — ENOXAPARIN SODIUM 30 MG: 100 INJECTION SUBCUTANEOUS at 20:35

## 2024-02-16 ASSESSMENT — PAIN - FUNCTIONAL ASSESSMENT: PAIN_FUNCTIONAL_ASSESSMENT: NONE - DENIES PAIN

## 2024-02-16 ASSESSMENT — PAIN DESCRIPTION - DESCRIPTORS: DESCRIPTORS: ACHING;SHARP

## 2024-02-16 ASSESSMENT — PAIN SCALES - GENERAL: PAINLEVEL_OUTOF10: 4

## 2024-02-16 ASSESSMENT — PAIN DESCRIPTION - ORIENTATION: ORIENTATION: LEFT

## 2024-02-16 ASSESSMENT — PAIN DESCRIPTION - LOCATION: LOCATION: SHOULDER

## 2024-02-16 NOTE — ED TRIAGE NOTES
Pt arrives via EMS from home with complaints of weakness x 3 days. Patient estimates that he has had 8 falls due to weakness. Pt recently admitted for low blood sugar. Per EMS,

## 2024-02-16 NOTE — CARE COORDINATION
DMITRIY - Call received from [facility] Liaison requesting consult for Psychiatrist /  . Updates provided to Hospitalist Team and CM ADM.

## 2024-02-16 NOTE — H&P
TBD  EMERGENCY CONTACT/SURROGATE DECISION MAKER: Brenda Llamas (Parent)  [phone] (Mobile)     CRITICAL CARE WAS PERFORMED FOR THIS ENCOUNTER: NO      Signed By: David M Milligram, PA-C     February 16, 2024         Please note that this dictation may have been completed with Dragon, the computer voice recognition software.  Quite often unanticipated grammatical, syntax, homophones, and other interpretive errors are inadvertently transcribed by the computer software.  Please disregard these errors.  Please excuse any errors that have escaped final proofreading.

## 2024-02-16 NOTE — CARE COORDINATION
CM consult received and appreciated . EMR reviewed. Call received on 2/15/24 from patient's Mother Rena Llamas (emergency contact/support) [phone] expressed concern of recent hospitalization 2/11-2/13 history of DM and back surgeries has had series of falls since home . Requested assistance w/ PT referral and rehab.     Patient is followed at  [facility]. Noted March PCP appointment. Today appointment obtained for today w/ Dr. Blevins 1400. Mother able to cancel appointment for today. CM provided updates to Emmanuel Crum RN    Referral placed to [facility] rehab and contact made w/ Amara Horn informing of ER encounter today.    Met w/patient and introduced to role of CM. Mr. Zamora is A/O X4 noted delayed response to some questions.     Home is 1 story and 6STE. RW used for ambulation. Patient last drove 2 weeks ago and had been living independently.  Patient lives w/ his 2 elderly uncles .     CM will follow for DMITRIY needs .     Insurance is [employer] medicaid and will require pre-authorization .     7810 Call placed to Amara French/ Demetrice informed of requested PT/OT evaluations . Informed will initiate pre-authorization request once evaluations are available.   Case discussed w/ Dr. Spears and  Hospitalist.

## 2024-02-16 NOTE — ED NOTES
TRANSFER - OUT REPORT:    Verbal report given to LISA Reyes on Alcon Zamora  being transferred to [facility] for routine progression of patient care       Report consisted of patient's Situation, Background, Assessment and   Recommendations(SBAR).     Information from the following report(s) Nurse Handoff Report, ED Encounter Summary, ED SBAR, Adult Overview, and MAR was reviewed with the receiving nurse.    Kiana Fall Assessment:    Presents to emergency department  because of falls (Syncope, seizure, or loss of consciousness): Yes  Age > 70: No  Altered Mental Status, Intoxication with alcohol or substance confusion (Disorientation, impaired judgment, poor safety awaremess, or inability to follow instructions): No  Impaired Mobility: Ambulates or transfers with assistive devices or assistance; Unable to ambulate or transer.: Yes  Nursing Judgement: Yes          Lines:       Opportunity for questions and clarification was provided.      Patient transported with:  Tech

## 2024-02-16 NOTE — ED PROVIDER NOTES
[facility] EMERGENCY DEP  EMERGENCY DEPARTMENT ENCOUNTER      Pt Name: Alcon Zamora  MRN: [id number]  Birthdate 1960  Date of evaluation: 2/16/2024  Provider: Kristian Spears MD    CHIEF COMPLAINT       Chief Complaint   Patient presents with    Extremity Weakness         HISTORY OF PRESENT ILLNESS   (Location/Symptom, Timing/Onset, Context/Setting, Quality, Duration, Modifying Factors, Severity)  Note limiting factors.   Patient is a 64-year-old male presenting to the emergency department for progressive weakness.  Patient has had multiple falls recently approximately 8.  Patient lives at home with his uncle family is concerned due to the frequent falls as well.  Patient is extremely debilitated appearing slow to respond.            Review of External Medical Records:     Nursing Notes were reviewed.    REVIEW OF SYSTEMS    (2-9 systems for level 4, 10 or more for level 5)     Review of Systems    Except as noted above the remainder of the review of systems was reviewed and negative.       PAST MEDICAL HISTORY     Past Medical History:   Diagnosis Date    Alcohol abuse, in remission     Anxiety     Chronic kidney disease     PLAGUE L RENAL ARTERY    Chronic obstructive pulmonary disease (HCC)     Chronic pain     Constipation     Diabetes (HCC)     Type II    Erectile dysfunction 2015    GERD (gastroesophageal reflux disease)     Hepatitis C     Hypertension     Liver disease     HEP C TOOK HARVONI    Pancreatitis     Psychiatric disorder     PANIC ATTACKS    Smoker     Spondylitis (HCC)     Type 2 diabetes mellitus without complication (HCC) 2005         SURGICAL HISTORY       Past Surgical History:   Procedure Laterality Date    APPENDECTOMY      COLONOSCOPY N/A 12/19/2019    COLONOSCOPY performed by Lazarus De Leon MD at [facility] ENDOSCOPY    FRACTURE SURGERY  '[id number]-'[id number]-'[id number]    Back surgery    ORTHOPEDIC SURGERY      3 back surgeries    ORTHOPEDIC SURGERY      KNEE SURGERY    TONSILLECTOMY

## 2024-02-16 NOTE — TELEPHONE ENCOUNTER
Call received back from patient informed of PCP office appointment on 2/16/24. Asked patient If would like referral sent to [facility] for review.  is in agreement. Referral sent via [facility].

## 2024-02-17 ENCOUNTER — APPOINTMENT (OUTPATIENT)
Facility: HOSPITAL | Age: 64
DRG: 045 | End: 2024-02-17
Payer: COMMERCIAL

## 2024-02-17 PROCEDURE — 6370000000 HC RX 637 (ALT 250 FOR IP): Performed by: NURSE PRACTITIONER

## 2024-02-17 PROCEDURE — 99223 1ST HOSP IP/OBS HIGH 75: CPT | Performed by: NURSE PRACTITIONER

## 2024-02-17 PROCEDURE — 2580000003 HC RX 258: Performed by: PHYSICIAN ASSISTANT

## 2024-02-17 PROCEDURE — 6360000002 HC RX W HCPCS: Performed by: PHYSICIAN ASSISTANT

## 2024-02-17 PROCEDURE — G0378 HOSPITAL OBSERVATION PER HR: HCPCS

## 2024-02-17 PROCEDURE — 6370000000 HC RX 637 (ALT 250 FOR IP): Performed by: FAMILY MEDICINE

## 2024-02-17 PROCEDURE — 71045 X-RAY EXAM CHEST 1 VIEW: CPT

## 2024-02-17 PROCEDURE — 96372 THER/PROPH/DIAG INJ SC/IM: CPT

## 2024-02-17 PROCEDURE — 2580000003 HC RX 258: Performed by: FAMILY MEDICINE

## 2024-02-17 PROCEDURE — 6370000000 HC RX 637 (ALT 250 FOR IP): Performed by: PHYSICIAN ASSISTANT

## 2024-02-17 RX ORDER — LISINOPRIL 20 MG/1
40 TABLET ORAL DAILY
Status: DISCONTINUED | OUTPATIENT
Start: 2024-02-17 | End: 2024-03-06 | Stop reason: HOSPADM

## 2024-02-17 RX ORDER — MECLIZINE HCL 12.5 MG/1
12.5 TABLET ORAL 3 TIMES DAILY PRN
Status: DISCONTINUED | OUTPATIENT
Start: 2024-02-17 | End: 2024-03-06 | Stop reason: HOSPADM

## 2024-02-17 RX ORDER — SODIUM CHLORIDE 9 MG/ML
INJECTION, SOLUTION INTRAVENOUS CONTINUOUS
Status: DISCONTINUED | OUTPATIENT
Start: 2024-02-17 | End: 2024-02-22

## 2024-02-17 RX ORDER — AMLODIPINE BESYLATE 5 MG/1
10 TABLET ORAL
Status: COMPLETED | OUTPATIENT
Start: 2024-02-17 | End: 2024-02-17

## 2024-02-17 RX ADMIN — AMLODIPINE BESYLATE 10 MG: 5 TABLET ORAL at 05:04

## 2024-02-17 RX ADMIN — METHADONE HYDROCHLORIDE 95 MG: 10 TABLET ORAL at 14:15

## 2024-02-17 RX ADMIN — LISINOPRIL 40 MG: 20 TABLET ORAL at 06:58

## 2024-02-17 RX ADMIN — ACETAMINOPHEN 650 MG: 325 TABLET ORAL at 09:29

## 2024-02-17 RX ADMIN — ENOXAPARIN SODIUM 30 MG: 100 INJECTION SUBCUTANEOUS at 21:00

## 2024-02-17 RX ADMIN — ONDANSETRON 4 MG: 4 TABLET, ORALLY DISINTEGRATING ORAL at 09:30

## 2024-02-17 RX ADMIN — SODIUM CHLORIDE: 9 INJECTION, SOLUTION INTRAVENOUS at 22:17

## 2024-02-17 RX ADMIN — METOPROLOL SUCCINATE 50 MG: 50 TABLET, EXTENDED RELEASE ORAL at 05:48

## 2024-02-17 RX ADMIN — ENOXAPARIN SODIUM 30 MG: 100 INJECTION SUBCUTANEOUS at 09:28

## 2024-02-17 RX ADMIN — SODIUM CHLORIDE, PRESERVATIVE FREE 10 ML: 5 INJECTION INTRAVENOUS at 22:16

## 2024-02-17 ASSESSMENT — PAIN SCALES - GENERAL
PAINLEVEL_OUTOF10: 5
PAINLEVEL_OUTOF10: 2
PAINLEVEL_OUTOF10: 2
PAINLEVEL_OUTOF10: 8

## 2024-02-17 ASSESSMENT — PAIN DESCRIPTION - DESCRIPTORS
DESCRIPTORS: DISCOMFORT
DESCRIPTORS: DISCOMFORT

## 2024-02-17 NOTE — CARE COORDINATION
Medicare Outpatient Observation Notice (MOON)/ [place] Outpatient Observation Notice (VOON) provided to Alcon Zamora with verbal explanation of the notice. Time allotted for questions regarding the notice.  Alcon Zamora provided a completed copy of the MOON/VOON notice.  Copy placed on bedside chart.

## 2024-02-18 ENCOUNTER — APPOINTMENT (OUTPATIENT)
Facility: HOSPITAL | Age: 64
DRG: 045 | End: 2024-02-18
Payer: COMMERCIAL

## 2024-02-18 LAB
ANION GAP SERPL CALC-SCNC: 4 MMOL/L (ref 5–15)
BUN SERPL-MCNC: 11 MG/DL (ref 6–20)
BUN/CREAT SERPL: 12 (ref 12–20)
CALCIUM SERPL-MCNC: 8.3 MG/DL (ref 8.5–10.1)
CHLORIDE SERPL-SCNC: 100 MMOL/L (ref 97–108)
CHOLEST SERPL-MCNC: 161 MG/DL
CO2 SERPL-SCNC: 29 MMOL/L (ref 21–32)
CREAT SERPL-MCNC: 0.94 MG/DL (ref 0.7–1.3)
EST. AVERAGE GLUCOSE BLD GHB EST-MCNC: 91 MG/DL
GLUCOSE SERPL-MCNC: 164 MG/DL (ref 65–100)
HBA1C MFR BLD: 4.8 % (ref 4–5.6)
HDLC SERPL-MCNC: 37 MG/DL
HDLC SERPL: 4.4 (ref 0–5)
LDLC SERPL CALC-MCNC: 89.4 MG/DL (ref 0–100)
POTASSIUM SERPL-SCNC: 3.7 MMOL/L (ref 3.5–5.1)
SODIUM SERPL-SCNC: 133 MMOL/L (ref 136–145)
TRIGL SERPL-MCNC: 173 MG/DL
VLDLC SERPL CALC-MCNC: 34.6 MG/DL

## 2024-02-18 PROCEDURE — 2580000003 HC RX 258: Performed by: PHYSICIAN ASSISTANT

## 2024-02-18 PROCEDURE — 99233 SBSQ HOSP IP/OBS HIGH 50: CPT | Performed by: STUDENT IN AN ORGANIZED HEALTH CARE EDUCATION/TRAINING PROGRAM

## 2024-02-18 PROCEDURE — 6360000002 HC RX W HCPCS: Performed by: NURSE PRACTITIONER

## 2024-02-18 PROCEDURE — 6360000004 HC RX CONTRAST MEDICATION: Performed by: RADIOLOGY

## 2024-02-18 PROCEDURE — 6360000002 HC RX W HCPCS: Performed by: PHYSICIAN ASSISTANT

## 2024-02-18 PROCEDURE — 36415 COLL VENOUS BLD VENIPUNCTURE: CPT

## 2024-02-18 PROCEDURE — 6370000000 HC RX 637 (ALT 250 FOR IP): Performed by: STUDENT IN AN ORGANIZED HEALTH CARE EDUCATION/TRAINING PROGRAM

## 2024-02-18 PROCEDURE — G0378 HOSPITAL OBSERVATION PER HR: HCPCS

## 2024-02-18 PROCEDURE — 6370000000 HC RX 637 (ALT 250 FOR IP): Performed by: PHYSICIAN ASSISTANT

## 2024-02-18 PROCEDURE — 80061 LIPID PANEL: CPT

## 2024-02-18 PROCEDURE — 96372 THER/PROPH/DIAG INJ SC/IM: CPT

## 2024-02-18 PROCEDURE — 70496 CT ANGIOGRAPHY HEAD: CPT

## 2024-02-18 PROCEDURE — 80048 BASIC METABOLIC PNL TOTAL CA: CPT

## 2024-02-18 PROCEDURE — 6370000000 HC RX 637 (ALT 250 FOR IP): Performed by: FAMILY MEDICINE

## 2024-02-18 PROCEDURE — 70551 MRI BRAIN STEM W/O DYE: CPT

## 2024-02-18 RX ORDER — HYDRALAZINE HYDROCHLORIDE 20 MG/ML
10 INJECTION INTRAMUSCULAR; INTRAVENOUS EVERY 6 HOURS PRN
Status: DISCONTINUED | OUTPATIENT
Start: 2024-02-18 | End: 2024-02-27

## 2024-02-18 RX ORDER — ATORVASTATIN CALCIUM 40 MG/1
80 TABLET, FILM COATED ORAL NIGHTLY
Status: DISCONTINUED | OUTPATIENT
Start: 2024-02-18 | End: 2024-03-06 | Stop reason: HOSPADM

## 2024-02-18 RX ORDER — ASPIRIN 81 MG/1
81 TABLET, CHEWABLE ORAL DAILY
Status: DISCONTINUED | OUTPATIENT
Start: 2024-02-18 | End: 2024-03-06 | Stop reason: HOSPADM

## 2024-02-18 RX ADMIN — AMLODIPINE BESYLATE 10 MG: 5 TABLET ORAL at 10:58

## 2024-02-18 RX ADMIN — ACETAMINOPHEN 650 MG: 325 TABLET ORAL at 10:59

## 2024-02-18 RX ADMIN — METHADONE HYDROCHLORIDE 95 MG: 10 TABLET ORAL at 11:11

## 2024-02-18 RX ADMIN — MECLIZINE 12.5 MG: 12.5 TABLET ORAL at 10:59

## 2024-02-18 RX ADMIN — METOPROLOL SUCCINATE 50 MG: 50 TABLET, EXTENDED RELEASE ORAL at 06:11

## 2024-02-18 RX ADMIN — IOPAMIDOL 100 ML: 755 INJECTION, SOLUTION INTRAVENOUS at 12:46

## 2024-02-18 RX ADMIN — HYDRALAZINE HYDROCHLORIDE 10 MG: 20 INJECTION INTRAMUSCULAR; INTRAVENOUS at 21:18

## 2024-02-18 RX ADMIN — ONDANSETRON 4 MG: 4 TABLET, ORALLY DISINTEGRATING ORAL at 10:59

## 2024-02-18 RX ADMIN — ENOXAPARIN SODIUM 30 MG: 100 INJECTION SUBCUTANEOUS at 20:38

## 2024-02-18 RX ADMIN — SODIUM CHLORIDE, PRESERVATIVE FREE 10 ML: 5 INJECTION INTRAVENOUS at 20:38

## 2024-02-18 RX ADMIN — ASPIRIN 81 MG CHEWABLE TABLET 81 MG: 81 TABLET CHEWABLE at 13:02

## 2024-02-18 RX ADMIN — ENOXAPARIN SODIUM 30 MG: 100 INJECTION SUBCUTANEOUS at 10:58

## 2024-02-18 RX ADMIN — LISINOPRIL 40 MG: 20 TABLET ORAL at 10:58

## 2024-02-18 RX ADMIN — ATORVASTATIN CALCIUM 80 MG: 40 TABLET, FILM COATED ORAL at 20:37

## 2024-02-18 RX ADMIN — METHADONE HYDROCHLORIDE 95 MG: 10 TABLET ORAL at 11:06

## 2024-02-18 RX ADMIN — SODIUM CHLORIDE, PRESERVATIVE FREE 10 ML: 5 INJECTION INTRAVENOUS at 09:00

## 2024-02-18 ASSESSMENT — PAIN SCALES - GENERAL
PAINLEVEL_OUTOF10: 2
PAINLEVEL_OUTOF10: 7
PAINLEVEL_OUTOF10: 8

## 2024-02-18 ASSESSMENT — PAIN DESCRIPTION - DESCRIPTORS
DESCRIPTORS: DISCOMFORT
DESCRIPTORS: DISCOMFORT

## 2024-02-19 ENCOUNTER — APPOINTMENT (OUTPATIENT)
Facility: HOSPITAL | Age: 64
DRG: 045 | End: 2024-02-19
Attending: STUDENT IN AN ORGANIZED HEALTH CARE EDUCATION/TRAINING PROGRAM
Payer: COMMERCIAL

## 2024-02-19 PROBLEM — I63.9 ACUTE CEREBROVASCULAR ACCIDENT (CVA) (HCC): Status: ACTIVE | Noted: 2024-02-19

## 2024-02-19 LAB
ECHO AO ROOT DIAM: 3.9 CM
ECHO AO ROOT INDEX: 1.63 CM/M2
ECHO AV AREA PEAK VELOCITY: 3.4 CM2
ECHO AV AREA/BSA PEAK VELOCITY: 1.4 CM2/M2
ECHO AV PEAK GRADIENT: 10 MMHG
ECHO AV PEAK VELOCITY: 1.6 M/S
ECHO AV VELOCITY RATIO: 0.75
ECHO BSA: 2.4 M2
ECHO EST RA PRESSURE: 3 MMHG
ECHO LA DIAMETER INDEX: 1.46 CM/M2
ECHO LA DIAMETER: 3.5 CM
ECHO LA TO AORTIC ROOT RATIO: 0.9
ECHO LA VOL A-L A2C: 125 ML (ref 18–58)
ECHO LA VOL A-L A4C: 106 ML (ref 18–58)
ECHO LA VOL MOD A2C: 121 ML (ref 18–58)
ECHO LA VOL MOD A4C: 96 ML (ref 18–58)
ECHO LA VOLUME AREA LENGTH: 116 ML
ECHO LA VOLUME INDEX A-L A2C: 52 ML/M2 (ref 16–34)
ECHO LA VOLUME INDEX A-L A4C: 44 ML/M2 (ref 16–34)
ECHO LA VOLUME INDEX AREA LENGTH: 48 ML/M2 (ref 16–34)
ECHO LA VOLUME INDEX MOD A2C: 50 ML/M2 (ref 16–34)
ECHO LA VOLUME INDEX MOD A4C: 40 ML/M2 (ref 16–34)
ECHO LV E' LATERAL VELOCITY: 10 CM/S
ECHO LV E' SEPTAL VELOCITY: 5 CM/S
ECHO LV FRACTIONAL SHORTENING: 40 % (ref 28–44)
ECHO LV INTERNAL DIMENSION DIASTOLE INDEX: 2.21 CM/M2
ECHO LV INTERNAL DIMENSION DIASTOLIC: 5.3 CM (ref 4.2–5.9)
ECHO LV INTERNAL DIMENSION SYSTOLIC INDEX: 1.33 CM/M2
ECHO LV INTERNAL DIMENSION SYSTOLIC: 3.2 CM
ECHO LV IVSD: 1 CM (ref 0.6–1)
ECHO LV MASS 2D: 213.9 G (ref 88–224)
ECHO LV MASS INDEX 2D: 89.1 G/M2 (ref 49–115)
ECHO LV POSTERIOR WALL DIASTOLIC: 1.1 CM (ref 0.6–1)
ECHO LV RELATIVE WALL THICKNESS RATIO: 0.42
ECHO LVOT AREA: 4.5 CM2
ECHO LVOT DIAM: 2.4 CM
ECHO LVOT PEAK GRADIENT: 6 MMHG
ECHO LVOT PEAK VELOCITY: 1.2 M/S
ECHO MV A VELOCITY: 0.81 M/S
ECHO MV AREA PHT: 1.4 CM2
ECHO MV E DECELERATION TIME (DT): 544.3 MS
ECHO MV E VELOCITY: 0.53 M/S
ECHO MV E/A RATIO: 0.65
ECHO MV E/E' LATERAL: 5.3
ECHO MV E/E' RATIO (AVERAGED): 7.95
ECHO MV PRESSURE HALF TIME (PHT): 157.9 MS
ECHO PV MAX VELOCITY: 0.7 M/S
ECHO PV PEAK GRADIENT: 2 MMHG
ECHO RV FREE WALL PEAK S': 14 CM/S
ECHO RV INTERNAL DIMENSION: 3.6 CM
ECHO RV TAPSE: 2 CM (ref 1.7–?)

## 2024-02-19 PROCEDURE — 6360000002 HC RX W HCPCS: Performed by: NURSE PRACTITIONER

## 2024-02-19 PROCEDURE — 96372 THER/PROPH/DIAG INJ SC/IM: CPT

## 2024-02-19 PROCEDURE — 6370000000 HC RX 637 (ALT 250 FOR IP): Performed by: PHYSICIAN ASSISTANT

## 2024-02-19 PROCEDURE — 93306 TTE W/DOPPLER COMPLETE: CPT | Performed by: SPECIALIST

## 2024-02-19 PROCEDURE — 97530 THERAPEUTIC ACTIVITIES: CPT

## 2024-02-19 PROCEDURE — 97116 GAIT TRAINING THERAPY: CPT

## 2024-02-19 PROCEDURE — G0378 HOSPITAL OBSERVATION PER HR: HCPCS

## 2024-02-19 PROCEDURE — 6370000000 HC RX 637 (ALT 250 FOR IP): Performed by: STUDENT IN AN ORGANIZED HEALTH CARE EDUCATION/TRAINING PROGRAM

## 2024-02-19 PROCEDURE — 99232 SBSQ HOSP IP/OBS MODERATE 35: CPT | Performed by: NURSE PRACTITIONER

## 2024-02-19 PROCEDURE — 6370000000 HC RX 637 (ALT 250 FOR IP): Performed by: FAMILY MEDICINE

## 2024-02-19 PROCEDURE — 1100000000 HC RM PRIVATE

## 2024-02-19 PROCEDURE — 2580000003 HC RX 258: Performed by: PHYSICIAN ASSISTANT

## 2024-02-19 PROCEDURE — 6360000002 HC RX W HCPCS: Performed by: PHYSICIAN ASSISTANT

## 2024-02-19 PROCEDURE — 93306 TTE W/DOPPLER COMPLETE: CPT

## 2024-02-19 RX ADMIN — ENOXAPARIN SODIUM 30 MG: 100 INJECTION SUBCUTANEOUS at 10:01

## 2024-02-19 RX ADMIN — METHADONE HYDROCHLORIDE 95 MG: 10 TABLET ORAL at 13:51

## 2024-02-19 RX ADMIN — AMLODIPINE BESYLATE 10 MG: 5 TABLET ORAL at 10:02

## 2024-02-19 RX ADMIN — LISINOPRIL 40 MG: 20 TABLET ORAL at 10:02

## 2024-02-19 RX ADMIN — HYDRALAZINE HYDROCHLORIDE 10 MG: 20 INJECTION INTRAMUSCULAR; INTRAVENOUS at 20:30

## 2024-02-19 RX ADMIN — SODIUM CHLORIDE, PRESERVATIVE FREE 10 ML: 5 INJECTION INTRAVENOUS at 20:14

## 2024-02-19 RX ADMIN — ENOXAPARIN SODIUM 30 MG: 100 INJECTION SUBCUTANEOUS at 20:15

## 2024-02-19 RX ADMIN — SODIUM CHLORIDE, PRESERVATIVE FREE 10 ML: 5 INJECTION INTRAVENOUS at 20:19

## 2024-02-19 RX ADMIN — ASPIRIN 81 MG CHEWABLE TABLET 81 MG: 81 TABLET CHEWABLE at 10:02

## 2024-02-19 RX ADMIN — METOPROLOL SUCCINATE 50 MG: 50 TABLET, EXTENDED RELEASE ORAL at 05:41

## 2024-02-19 RX ADMIN — ATORVASTATIN CALCIUM 80 MG: 40 TABLET, FILM COATED ORAL at 20:14

## 2024-02-19 ASSESSMENT — PAIN SCALES - GENERAL: PAINLEVEL_OUTOF10: 4

## 2024-02-19 ASSESSMENT — PAIN DESCRIPTION - LOCATION: LOCATION: SHOULDER

## 2024-02-19 ASSESSMENT — PAIN DESCRIPTION - ORIENTATION: ORIENTATION: LEFT

## 2024-02-19 NOTE — CARE COORDINATION
Care Management Initial Assessment       RUR:  17%  Readmission? Yes - 2/12  1st IM letter given? No  1st  letter given: No     02/19/24 1520   Service Assessment   Patient Orientation Alert and Oriented   Cognition Alert   History Provided By Patient   Primary Caregiver Self   Support Systems Family Members   PCP Verified by CM Yes   Last Visit to PCP Within last 6 months   Prior Functional Level Independent in ADLs/IADLs   Can patient return to prior living arrangement Yes   Ability to make needs known: Good   Family able to assist with home care needs: No   Would you like for me to discuss the discharge plan with any other family members/significant others, and if so, who? Yes  (mother, Rena Llamas)   Financial Resources Medicaid   Social/Functional History   Lives With Other (comment)  (2 elderly uncles)   Type of Home House   Home Layout Multi-level   Home Access Stairs to enter with rails   Entrance Stairs - Number of Steps 6   Entrance Stairs - Rails Both   Bathroom Shower/Tub Tub/Shower unit   Bathroom Equipment Shower chair   Home Equipment Walker, rolling;Cane   Receives Help From Family   ADL Assistance Needs assistance   Toileting Needs assistance   Ambulation Assistance Independent   Transfer Assistance Independent   Active  No   Discharge Planning   Type of Residence House   Living Arrangements Family Members   DME Ordered? No   Patient expects to be discharged to: Acute rehab     Patient is waiting authorization for admission to [facility]. Prior to admission, he is requiring assistance with ADL needs. Cm will continue to follow and discuss with the family about the discharge plan.    EVA AvilaJAMARI  Care Management Department  Ph:[phone]

## 2024-02-19 NOTE — CARE COORDINATION
Transition of Care Plan:    RUR: 17%  Prior Level of Functioning: independent with recent falls   Disposition: IPR  If SNF or IPR: Date FOC offered: 2/16/24 from home, then patient was admitted on 2/16.   Date FOC received: 2/16/24  Accepting facility: [facility]   Date authorization started with reference number: [id number]   Date authorization received and expires: auth is still pending.   Follow up appointments: pcp/specialist   DME needed: n/a  Transportation at discharge: stretcher  IM/IMM Medicare/ letter given: n/a patient has Medicaid   Caregiver Contact: Mother Rena Llamas [phone]    Discharge Caregiver contacted prior to discharge? yes  Care Conference needed? no  Barriers to discharge: insurance auth, PMR consult.       Darian spoke with Justina from [facility], [phone]. She stated that the referral came to [facility] from ER CM when patient was at home. Patient then admitted to the hospital on 2/16. Authorization started on 2/16, cm noted of  PMR consult and placed referral to Dr. Piña through perfect serve message.       EVA AvilaUC [facility]  Care Management Department  Ph:[phone]

## 2024-02-19 NOTE — CARE COORDINATION
02/19/24 1522   Readmission Assessment   Number of Days since last admission? 1-7 days   Previous Disposition Home with Home Health   Who is being Interviewed Patient   What was the patient's/caregiver's perception as to why they think they needed to return back to the hospital? Not enough help at home   Did you visit your Primary Care Physician after you left the hospital, before you returned this time? No   Why weren't you able to visit your PCP? Other (Comment)  (return to hospital before appointment)   Did you see a specialist, such as Cardiac, Pulmonary, Orthopedic Physician, etc. after you left the hospital? No   Who advised the patient to return to the hospital? Self-referral   Does the patient report anything that got in the way of taking their medications? No   In our efforts to provide the best possible care to you and others like you, can you think of anything that we could have done to help you after you left the hospital the first time, so that you might not have needed to return so soon? Arrange for more help when leaving the hospital       EVA AvilaSharp [facility]  Care Management Department  Ph:[phone]

## 2024-02-20 ENCOUNTER — APPOINTMENT (OUTPATIENT)
Facility: HOSPITAL | Age: 64
DRG: 045 | End: 2024-02-20
Attending: STUDENT IN AN ORGANIZED HEALTH CARE EDUCATION/TRAINING PROGRAM
Payer: COMMERCIAL

## 2024-02-20 ENCOUNTER — APPOINTMENT (OUTPATIENT)
Facility: HOSPITAL | Age: 64
DRG: 045 | End: 2024-02-20
Payer: COMMERCIAL

## 2024-02-20 LAB — ECHO BSA: 2.4 M2

## 2024-02-20 PROCEDURE — 6370000000 HC RX 637 (ALT 250 FOR IP): Performed by: FAMILY MEDICINE

## 2024-02-20 PROCEDURE — 97530 THERAPEUTIC ACTIVITIES: CPT

## 2024-02-20 PROCEDURE — 1100000000 HC RM PRIVATE

## 2024-02-20 PROCEDURE — 93970 EXTREMITY STUDY: CPT

## 2024-02-20 PROCEDURE — 97535 SELF CARE MNGMENT TRAINING: CPT

## 2024-02-20 PROCEDURE — 2580000003 HC RX 258: Performed by: FAMILY MEDICINE

## 2024-02-20 PROCEDURE — 6360000002 HC RX W HCPCS: Performed by: NURSE PRACTITIONER

## 2024-02-20 PROCEDURE — 6370000000 HC RX 637 (ALT 250 FOR IP): Performed by: PHYSICIAN ASSISTANT

## 2024-02-20 PROCEDURE — 2580000003 HC RX 258: Performed by: PHYSICIAN ASSISTANT

## 2024-02-20 PROCEDURE — 97116 GAIT TRAINING THERAPY: CPT

## 2024-02-20 PROCEDURE — 93308 TTE F-UP OR LMTD: CPT | Performed by: SPECIALIST

## 2024-02-20 PROCEDURE — 6370000000 HC RX 637 (ALT 250 FOR IP): Performed by: STUDENT IN AN ORGANIZED HEALTH CARE EDUCATION/TRAINING PROGRAM

## 2024-02-20 PROCEDURE — 6360000002 HC RX W HCPCS: Performed by: PHYSICIAN ASSISTANT

## 2024-02-20 PROCEDURE — 93308 TTE F-UP OR LMTD: CPT

## 2024-02-20 RX ADMIN — SODIUM CHLORIDE, PRESERVATIVE FREE 10 ML: 5 INJECTION INTRAVENOUS at 09:03

## 2024-02-20 RX ADMIN — LISINOPRIL 40 MG: 20 TABLET ORAL at 09:06

## 2024-02-20 RX ADMIN — ASPIRIN 81 MG CHEWABLE TABLET 81 MG: 81 TABLET CHEWABLE at 09:05

## 2024-02-20 RX ADMIN — HYDRALAZINE HYDROCHLORIDE 10 MG: 20 INJECTION INTRAMUSCULAR; INTRAVENOUS at 03:10

## 2024-02-20 RX ADMIN — METOPROLOL SUCCINATE 50 MG: 50 TABLET, EXTENDED RELEASE ORAL at 05:41

## 2024-02-20 RX ADMIN — ENOXAPARIN SODIUM 30 MG: 100 INJECTION SUBCUTANEOUS at 21:01

## 2024-02-20 RX ADMIN — ONDANSETRON HYDROCHLORIDE 4 MG: 2 INJECTION, SOLUTION INTRAMUSCULAR; INTRAVENOUS at 18:48

## 2024-02-20 RX ADMIN — AMLODIPINE BESYLATE 10 MG: 5 TABLET ORAL at 09:06

## 2024-02-20 RX ADMIN — ATORVASTATIN CALCIUM 80 MG: 40 TABLET, FILM COATED ORAL at 21:01

## 2024-02-20 RX ADMIN — ENOXAPARIN SODIUM 30 MG: 100 INJECTION SUBCUTANEOUS at 09:06

## 2024-02-20 RX ADMIN — METHADONE HYDROCHLORIDE 95 MG: 10 TABLET ORAL at 09:04

## 2024-02-20 RX ADMIN — SODIUM CHLORIDE: 9 INJECTION, SOLUTION INTRAVENOUS at 16:51

## 2024-02-20 ASSESSMENT — PAIN DESCRIPTION - DESCRIPTORS
DESCRIPTORS: ACHING

## 2024-02-20 ASSESSMENT — PAIN SCALES - GENERAL
PAINLEVEL_OUTOF10: 6

## 2024-02-20 ASSESSMENT — PAIN DESCRIPTION - LOCATION
LOCATION: OTHER (COMMENT)

## 2024-02-20 ASSESSMENT — PAIN DESCRIPTION - ORIENTATION
ORIENTATION: RIGHT

## 2024-02-20 NOTE — CARE COORDINATION
Transition of Care Plan:     RUR: 17%  Prior Level of Functioning: independent with recent falls   Disposition: IPR  If SNF or IPR: Date FOC offered: 2/16/24 from home, then patient was admitted on 2/16.   Date FOC received: 2/16/24  Accepting facility: [facility]   Date authorization started with reference number: [id number]   Date authorization received and expires: auth is still pending.   Follow up appointments: pcp/specialist   DME needed: n/a  Transportation at discharge: stretcher  IM/IMM Medicare/ letter given: n/a patient has Medicaid   Caregiver Contact: Mother Rena Llamas [phone]     Discharge Caregiver contacted prior to discharge? yes  Care Conference needed? no  Barriers to discharge: P2P to be completed by Dr. Piña          CM notified by Justina at [facility] that auth was denied, and p2p is requested to be completed by 6 days.  provided p2p information to Dr. Piña at [phone] Option 2, then option 3. Reference# [id number]. Dr. Piña plans to complete this evening or tomorrow morning. Patient mother, Rena Llamas aware of this plan [phone].     [facility]ilaSc[facility]  Care Management Department  Ph:[phone]

## 2024-02-21 ENCOUNTER — APPOINTMENT (OUTPATIENT)
Facility: HOSPITAL | Age: 64
DRG: 045 | End: 2024-02-21
Attending: INTERNAL MEDICINE
Payer: COMMERCIAL

## 2024-02-21 ENCOUNTER — APPOINTMENT (OUTPATIENT)
Facility: HOSPITAL | Age: 64
DRG: 045 | End: 2024-02-21
Payer: COMMERCIAL

## 2024-02-21 ENCOUNTER — TELEPHONE (OUTPATIENT)
Age: 64
End: 2024-02-21

## 2024-02-21 ENCOUNTER — ANESTHESIA (OUTPATIENT)
Facility: HOSPITAL | Age: 64
End: 2024-02-21
Payer: COMMERCIAL

## 2024-02-21 ENCOUNTER — ANESTHESIA EVENT (OUTPATIENT)
Facility: HOSPITAL | Age: 64
End: 2024-02-21
Payer: COMMERCIAL

## 2024-02-21 LAB
ANION GAP SERPL CALC-SCNC: 6 MMOL/L (ref 5–15)
BASOPHILS # BLD: 0 K/UL (ref 0–0.1)
BASOPHILS NFR BLD: 0 % (ref 0–1)
BUN SERPL-MCNC: 9 MG/DL (ref 6–20)
BUN/CREAT SERPL: 10 (ref 12–20)
CALCIUM SERPL-MCNC: 9.5 MG/DL (ref 8.5–10.1)
CHLORIDE SERPL-SCNC: 100 MMOL/L (ref 97–108)
CO2 SERPL-SCNC: 26 MMOL/L (ref 21–32)
CREAT SERPL-MCNC: 0.89 MG/DL (ref 0.7–1.3)
DIFFERENTIAL METHOD BLD: ABNORMAL
ECHO AO ASC DIAM: 3.7 CM
ECHO AO ASCENDING AORTA INDEX: 1.54 CM/M2
ECHO AO ROOT DIAM: 3.4 CM
ECHO AO ROOT INDEX: 1.42 CM/M2
ECHO BSA: 2.4 M2
ECHO TV REGURGITANT MAX VELOCITY: 1.1 M/S
ECHO TV REGURGITANT PEAK GRADIENT: 5 MMHG
EOSINOPHIL # BLD: 0 K/UL (ref 0–0.4)
EOSINOPHIL NFR BLD: 0 % (ref 0–7)
ERYTHROCYTE [DISTWIDTH] IN BLOOD BY AUTOMATED COUNT: 12.4 % (ref 11.5–14.5)
GLUCOSE BLD STRIP.AUTO-MCNC: 199 MG/DL (ref 65–117)
GLUCOSE BLD STRIP.AUTO-MCNC: 204 MG/DL (ref 65–117)
GLUCOSE SERPL-MCNC: 197 MG/DL (ref 65–100)
HCT VFR BLD AUTO: 37.4 % (ref 36.6–50.3)
HGB BLD-MCNC: 12.6 G/DL (ref 12.1–17)
IMM GRANULOCYTES # BLD AUTO: 0 K/UL (ref 0–0.04)
IMM GRANULOCYTES NFR BLD AUTO: 0 % (ref 0–0.5)
LYMPHOCYTES # BLD: 0.4 K/UL (ref 0.8–3.5)
LYMPHOCYTES NFR BLD: 7 % (ref 12–49)
MCH RBC QN AUTO: 30.1 PG (ref 26–34)
MCHC RBC AUTO-ENTMCNC: 33.7 G/DL (ref 30–36.5)
MCV RBC AUTO: 89.3 FL (ref 80–99)
MONOCYTES # BLD: 0.4 K/UL (ref 0–1)
MONOCYTES NFR BLD: 8 % (ref 5–13)
NEUTS SEG # BLD: 4.8 K/UL (ref 1.8–8)
NEUTS SEG NFR BLD: 85 % (ref 32–75)
NRBC # BLD: 0 K/UL (ref 0–0.01)
NRBC BLD-RTO: 0 PER 100 WBC
PLATELET # BLD AUTO: 334 K/UL (ref 150–400)
PMV BLD AUTO: 8.9 FL (ref 8.9–12.9)
POTASSIUM SERPL-SCNC: 3.4 MMOL/L (ref 3.5–5.1)
RBC # BLD AUTO: 4.19 M/UL (ref 4.1–5.7)
RBC MORPH BLD: ABNORMAL
SERVICE CMNT-IMP: ABNORMAL
SERVICE CMNT-IMP: ABNORMAL
SODIUM SERPL-SCNC: 132 MMOL/L (ref 136–145)
WBC # BLD AUTO: 5.6 K/UL (ref 4.1–11.1)

## 2024-02-21 PROCEDURE — APPNB45 APP NON BILLABLE 31-45 MINUTES

## 2024-02-21 PROCEDURE — B24BZZ4 ULTRASONOGRAPHY OF HEART WITH AORTA, TRANSESOPHAGEAL: ICD-10-PCS | Performed by: INTERNAL MEDICINE

## 2024-02-21 PROCEDURE — 71045 X-RAY EXAM CHEST 1 VIEW: CPT

## 2024-02-21 PROCEDURE — 1100000000 HC RM PRIVATE

## 2024-02-21 PROCEDURE — 93325 DOPPLER ECHO COLOR FLOW MAPG: CPT

## 2024-02-21 PROCEDURE — 3700000000 HC ANESTHESIA ATTENDED CARE

## 2024-02-21 PROCEDURE — 6360000002 HC RX W HCPCS: Performed by: PHYSICIAN ASSISTANT

## 2024-02-21 PROCEDURE — 85025 COMPLETE CBC W/AUTO DIFF WBC: CPT

## 2024-02-21 PROCEDURE — 74018 RADEX ABDOMEN 1 VIEW: CPT

## 2024-02-21 PROCEDURE — 82962 GLUCOSE BLOOD TEST: CPT

## 2024-02-21 PROCEDURE — 3700000001 HC ADD 15 MINUTES (ANESTHESIA)

## 2024-02-21 PROCEDURE — 80048 BASIC METABOLIC PNL TOTAL CA: CPT

## 2024-02-21 PROCEDURE — 2500000003 HC RX 250 WO HCPCS: Performed by: NURSE ANESTHETIST, CERTIFIED REGISTERED

## 2024-02-21 PROCEDURE — 97535 SELF CARE MNGMENT TRAINING: CPT | Performed by: OCCUPATIONAL THERAPIST

## 2024-02-21 PROCEDURE — 97116 GAIT TRAINING THERAPY: CPT

## 2024-02-21 PROCEDURE — 97530 THERAPEUTIC ACTIVITIES: CPT

## 2024-02-21 PROCEDURE — 97530 THERAPEUTIC ACTIVITIES: CPT | Performed by: OCCUPATIONAL THERAPIST

## 2024-02-21 PROCEDURE — 2700000000 HC OXYGEN THERAPY PER DAY

## 2024-02-21 PROCEDURE — 6370000000 HC RX 637 (ALT 250 FOR IP): Performed by: FAMILY MEDICINE

## 2024-02-21 PROCEDURE — 6370000000 HC RX 637 (ALT 250 FOR IP): Performed by: STUDENT IN AN ORGANIZED HEALTH CARE EDUCATION/TRAINING PROGRAM

## 2024-02-21 PROCEDURE — 2580000003 HC RX 258: Performed by: PHYSICIAN ASSISTANT

## 2024-02-21 PROCEDURE — 94760 N-INVAS EAR/PLS OXIMETRY 1: CPT

## 2024-02-21 PROCEDURE — 6360000002 HC RX W HCPCS: Performed by: NURSE ANESTHETIST, CERTIFIED REGISTERED

## 2024-02-21 PROCEDURE — 2580000003 HC RX 258: Performed by: FAMILY MEDICINE

## 2024-02-21 PROCEDURE — 6370000000 HC RX 637 (ALT 250 FOR IP): Performed by: INTERNAL MEDICINE

## 2024-02-21 PROCEDURE — 6360000002 HC RX W HCPCS: Performed by: NURSE PRACTITIONER

## 2024-02-21 PROCEDURE — 36415 COLL VENOUS BLD VENIPUNCTURE: CPT

## 2024-02-21 PROCEDURE — 6370000000 HC RX 637 (ALT 250 FOR IP): Performed by: PHYSICIAN ASSISTANT

## 2024-02-21 RX ORDER — POTASSIUM CHLORIDE 750 MG/1
20 TABLET, FILM COATED, EXTENDED RELEASE ORAL ONCE
Status: COMPLETED | OUTPATIENT
Start: 2024-02-21 | End: 2024-02-21

## 2024-02-21 RX ORDER — CARVEDILOL 12.5 MG/1
25 TABLET ORAL 2 TIMES DAILY WITH MEALS
Status: DISCONTINUED | OUTPATIENT
Start: 2024-02-21 | End: 2024-03-02

## 2024-02-21 RX ORDER — ONDANSETRON 2 MG/ML
4 INJECTION INTRAMUSCULAR; INTRAVENOUS EVERY 6 HOURS PRN
Status: DISCONTINUED | OUTPATIENT
Start: 2024-02-21 | End: 2024-02-23

## 2024-02-21 RX ADMIN — ATORVASTATIN CALCIUM 80 MG: 40 TABLET, FILM COATED ORAL at 20:48

## 2024-02-21 RX ADMIN — ASPIRIN 81 MG CHEWABLE TABLET 81 MG: 81 TABLET CHEWABLE at 09:56

## 2024-02-21 RX ADMIN — ONDANSETRON HYDROCHLORIDE 4 MG: 2 INJECTION, SOLUTION INTRAMUSCULAR; INTRAVENOUS at 01:33

## 2024-02-21 RX ADMIN — HYDRALAZINE HYDROCHLORIDE 10 MG: 20 INJECTION INTRAMUSCULAR; INTRAVENOUS at 02:35

## 2024-02-21 RX ADMIN — SODIUM CHLORIDE: 9 INJECTION, SOLUTION INTRAVENOUS at 21:16

## 2024-02-21 RX ADMIN — PROPOFOL 30 MG: 10 INJECTION, EMULSION INTRAVENOUS at 13:51

## 2024-02-21 RX ADMIN — ONDANSETRON 4 MG: 4 TABLET, ORALLY DISINTEGRATING ORAL at 20:49

## 2024-02-21 RX ADMIN — AMLODIPINE BESYLATE 10 MG: 5 TABLET ORAL at 09:56

## 2024-02-21 RX ADMIN — PROPOFOL 40 MG: 10 INJECTION, EMULSION INTRAVENOUS at 14:00

## 2024-02-21 RX ADMIN — CARVEDILOL 25 MG: 12.5 TABLET, FILM COATED ORAL at 16:53

## 2024-02-21 RX ADMIN — LIDOCAINE HYDROCHLORIDE 50 MG: 20 INJECTION, SOLUTION INFILTRATION; PERINEURAL at 13:48

## 2024-02-21 RX ADMIN — PROPOFOL 50 MG: 10 INJECTION, EMULSION INTRAVENOUS at 14:05

## 2024-02-21 RX ADMIN — ENOXAPARIN SODIUM 30 MG: 100 INJECTION SUBCUTANEOUS at 09:56

## 2024-02-21 RX ADMIN — SODIUM CHLORIDE, PRESERVATIVE FREE 10 ML: 5 INJECTION INTRAVENOUS at 09:57

## 2024-02-21 RX ADMIN — POTASSIUM CHLORIDE 20 MEQ: 750 TABLET, FILM COATED, EXTENDED RELEASE ORAL at 09:56

## 2024-02-21 RX ADMIN — SODIUM CHLORIDE: 9 INJECTION, SOLUTION INTRAVENOUS at 07:02

## 2024-02-21 RX ADMIN — SODIUM CHLORIDE, PRESERVATIVE FREE 10 ML: 5 INJECTION INTRAVENOUS at 20:49

## 2024-02-21 RX ADMIN — METHADONE HYDROCHLORIDE 95 MG: 10 TABLET ORAL at 09:56

## 2024-02-21 RX ADMIN — ENOXAPARIN SODIUM 30 MG: 100 INJECTION SUBCUTANEOUS at 20:48

## 2024-02-21 RX ADMIN — PROPOFOL 30 MG: 10 INJECTION, EMULSION INTRAVENOUS at 13:55

## 2024-02-21 RX ADMIN — LISINOPRIL 40 MG: 20 TABLET ORAL at 09:56

## 2024-02-21 RX ADMIN — METOPROLOL SUCCINATE 50 MG: 50 TABLET, EXTENDED RELEASE ORAL at 07:02

## 2024-02-21 RX ADMIN — ONDANSETRON HYDROCHLORIDE 4 MG: 2 INJECTION, SOLUTION INTRAMUSCULAR; INTRAVENOUS at 13:00

## 2024-02-21 RX ADMIN — PROPOFOL 100 MG: 10 INJECTION, EMULSION INTRAVENOUS at 13:48

## 2024-02-21 NOTE — ANESTHESIA PRE PROCEDURE
Department of Anesthesiology  Preprocedure Note       Name:  Alcon Zamora   Age:  64 y.o.  :  1960                                          MRN:  [id number]         Date:  2024      Surgeon: * No surgeons listed *    Procedure: * No procedures listed *    Medications prior to admission:   Prior to Admission medications    Medication Sig Start Date End Date Taking? Authorizing Provider   Continuous Blood Gluc Sensor (DEXCOM G7 SENSOR) MISC Utilize to monitor blood sugar 24   Alyse Rich APRN - CNS   Continuous Blood Gluc  (DEXCOM G7 ) TERESA Utilize to obtain your blood sugar readings. 24   Alyse Rich APRN - CNS   metFORMIN (GLUCOPHAGE) 500 MG tablet Take 1 tablet by mouth 2 times daily (with meals) 24   Enoch Jones APRN - NP   amLODIPine (NORVASC) 10 MG tablet Take 1 tablet by mouth daily 24   Enoch Jones APRN - NP   gabapentin (NEURONTIN) 300 MG capsule Take 1 capsule by mouth in the morning, at noon, and at bedtime for 90 days. Max Daily Amount: 900 mg 24  Izzy Martínez MD   metoprolol succinate (TOPROL XL) 50 MG extended release tablet Take 1 tablet by mouth daily 24   Bhaskar Amaya MD   albuterol sulfate HFA (VENTOLIN HFA) 108 (90 Base) MCG/ACT inhaler Inhale 2 puffs into the lungs 4 times daily as needed for Wheezing 23   Nasir Marin APRN - NP   fluticasone (FLONASE) 50 MCG/ACT nasal spray 2 sprays by Each Nostril route daily 23   Nasir Marin APRN - NP   lisinopril (PRINIVIL;ZESTRIL) 40 MG tablet TAKE ONE TABLET BY MOUTH DAILY 23   Donaldo Escalera Jr, MD   methadone (DOLOPHINE) 10 MG/ML solution Take 9.5 mLs by mouth daily.    Provider, MD Ruchi   ALPRAZolam (XANAX) 1 MG tablet Take 1 tablet by mouth 3 times daily. Patient take 2.5 pills daily. 20   Automatic Reconciliation, Ar   docusate (COLACE, DULCOLAX) 100 MG CAPS Colace 100 mg capsule   Take 1 capsule twice a day

## 2024-02-21 NOTE — CARE COORDINATION
Transition of Care Plan:    Awaiting decision following a P2P     RUR: 17%  Prior Level of Functioning: independent with recent falls   Disposition: IPR  If SNF or IPR: Date FOC offered: 2/16/24 from home, then patient was admitted on 2/16.   Date FOC received: 2/16/24  Accepting facility: [facility]   Date authorization started with reference number: [id number]   Date authorization received and expires: auth is still pending.   Follow up appointments: pcp/specialist   DME needed: n/a  Transportation at discharge: stretcher  /[facility] Medicare/ letter given: n/a patient has Medicaid   Caregiver Contact: Mother Rena Llamas [phone]     Discharge Caregiver contacted prior to discharge? yes  Care Conference needed? no  Barriers to discharge: P2P to be completed by Dr. Wang Hernandez MSA, RN, CM

## 2024-02-22 ENCOUNTER — APPOINTMENT (OUTPATIENT)
Facility: HOSPITAL | Age: 64
DRG: 045 | End: 2024-02-22
Payer: COMMERCIAL

## 2024-02-22 PROBLEM — R56.9 SEIZURES (HCC): Status: ACTIVE | Noted: 2024-02-22

## 2024-02-22 LAB
ALBUMIN SERPL-MCNC: 2.6 G/DL (ref 3.5–5)
ALBUMIN SERPL-MCNC: 2.7 G/DL (ref 3.5–5)
ALBUMIN/GLOB SERPL: 0.6 (ref 1.1–2.2)
ALBUMIN/GLOB SERPL: 0.8 (ref 1.1–2.2)
ALP SERPL-CCNC: 62 U/L (ref 45–117)
ALP SERPL-CCNC: 73 U/L (ref 45–117)
ALT SERPL-CCNC: 14 U/L (ref 12–78)
ALT SERPL-CCNC: 15 U/L (ref 12–78)
ANION GAP BLD CALC-SCNC: 11 (ref 10–20)
ANION GAP SERPL CALC-SCNC: 5 MMOL/L (ref 5–15)
ANION GAP SERPL CALC-SCNC: 6 MMOL/L (ref 5–15)
ANION GAP SERPL CALC-SCNC: 7 MMOL/L (ref 5–15)
APTT PPP: 39.2 SEC (ref 22.1–31)
ARTERIAL PATENCY WRIST A: ABNORMAL
AST SERPL-CCNC: 22 U/L (ref 15–37)
AST SERPL-CCNC: 38 U/L (ref 15–37)
BASE EXCESS BLD CALC-SCNC: 0.8 MMOL/L
BASE EXCESS BLD CALC-SCNC: 1.1 MMOL/L
BASOPHILS # BLD: 0 K/UL (ref 0–0.1)
BASOPHILS # BLD: 0 K/UL (ref 0–0.1)
BASOPHILS NFR BLD: 0 % (ref 0–1)
BASOPHILS NFR BLD: 0 % (ref 0–1)
BDY SITE: ABNORMAL
BILIRUB SERPL-MCNC: 0.6 MG/DL (ref 0.2–1)
BILIRUB SERPL-MCNC: 0.6 MG/DL (ref 0.2–1)
BUN SERPL-MCNC: 16 MG/DL (ref 6–20)
BUN SERPL-MCNC: 16 MG/DL (ref 6–20)
BUN SERPL-MCNC: 20 MG/DL (ref 6–20)
BUN/CREAT SERPL: 17 (ref 12–20)
BUN/CREAT SERPL: 18 (ref 12–20)
BUN/CREAT SERPL: 20 (ref 12–20)
CA-I BLD-MCNC: 1.15 MMOL/L (ref 1.12–1.32)
CALCIUM SERPL-MCNC: 8.2 MG/DL (ref 8.5–10.1)
CALCIUM SERPL-MCNC: 8.6 MG/DL (ref 8.5–10.1)
CALCIUM SERPL-MCNC: 8.8 MG/DL (ref 8.5–10.1)
CHLORIDE BLD-SCNC: 99 MMOL/L (ref 100–108)
CHLORIDE SERPL-SCNC: 100 MMOL/L (ref 97–108)
CHLORIDE SERPL-SCNC: 101 MMOL/L (ref 97–108)
CHLORIDE SERPL-SCNC: 102 MMOL/L (ref 97–108)
CO2 BLD-SCNC: 25 MMOL/L (ref 19–24)
CO2 SERPL-SCNC: 24 MMOL/L (ref 21–32)
CO2 SERPL-SCNC: 26 MMOL/L (ref 21–32)
CO2 SERPL-SCNC: 28 MMOL/L (ref 21–32)
CREAT SERPL-MCNC: 0.88 MG/DL (ref 0.7–1.3)
CREAT SERPL-MCNC: 0.92 MG/DL (ref 0.7–1.3)
CREAT SERPL-MCNC: 1.01 MG/DL (ref 0.7–1.3)
CREAT UR-MCNC: 1.1 MG/DL (ref 0.6–1.3)
CRP SERPL-MCNC: 9.32 MG/DL (ref 0–0.3)
DIFFERENTIAL METHOD BLD: ABNORMAL
DIFFERENTIAL METHOD BLD: ABNORMAL
ECHO BSA: 2.4 M2
EOSINOPHIL # BLD: 0 K/UL (ref 0–0.4)
EOSINOPHIL # BLD: 0 K/UL (ref 0–0.4)
EOSINOPHIL NFR BLD: 0 % (ref 0–7)
EOSINOPHIL NFR BLD: 0 % (ref 0–7)
ERYTHROCYTE [DISTWIDTH] IN BLOOD BY AUTOMATED COUNT: 12.8 % (ref 11.5–14.5)
ERYTHROCYTE [DISTWIDTH] IN BLOOD BY AUTOMATED COUNT: 12.9 % (ref 11.5–14.5)
EST. AVERAGE GLUCOSE BLD GHB EST-MCNC: 105 MG/DL
FERRITIN SERPL-MCNC: 34 NG/ML (ref 26–388)
GAS FLOW.O2 O2 DELIVERY SYS: ABNORMAL
GAS FLOW.O2 SETTING OXYMISER: 14 BPM
GLOBULIN SER CALC-MCNC: 3.2 G/DL (ref 2–4)
GLOBULIN SER CALC-MCNC: 4.2 G/DL (ref 2–4)
GLUCOSE BLD STRIP.AUTO-MCNC: 159 MG/DL (ref 74–106)
GLUCOSE BLD STRIP.AUTO-MCNC: 188 MG/DL (ref 65–117)
GLUCOSE BLD STRIP.AUTO-MCNC: 233 MG/DL (ref 65–117)
GLUCOSE SERPL-MCNC: 130 MG/DL (ref 65–100)
GLUCOSE SERPL-MCNC: 160 MG/DL (ref 65–100)
GLUCOSE SERPL-MCNC: 284 MG/DL (ref 65–100)
HBA1C MFR BLD: 5.3 % (ref 4–5.6)
HCO3 BLD-SCNC: 25 MMOL/L (ref 22–26)
HCO3 BLDA-SCNC: 25 MMOL/L
HCT VFR BLD AUTO: 29.3 % (ref 36.6–50.3)
HCT VFR BLD AUTO: 35.3 % (ref 36.6–50.3)
HGB BLD-MCNC: 10 G/DL (ref 12.1–17)
HGB BLD-MCNC: 11.4 G/DL (ref 12.1–17)
IMM GRANULOCYTES # BLD AUTO: 0 K/UL (ref 0–0.04)
IMM GRANULOCYTES # BLD AUTO: 0.1 K/UL (ref 0–0.04)
IMM GRANULOCYTES NFR BLD AUTO: 0 % (ref 0–0.5)
IMM GRANULOCYTES NFR BLD AUTO: 1 % (ref 0–0.5)
INR PPP: 1.3 (ref 0.9–1.1)
IPAP/PIP/HIGH PEEP: 22
LACTATE BLD-SCNC: 0.53 MMOL/L (ref 0.4–2)
LACTATE SERPL-SCNC: 0.6 MMOL/L (ref 0.4–2)
LACTATE SERPL-SCNC: 1.5 MMOL/L (ref 0.4–2)
LYMPHOCYTES # BLD: 0.4 K/UL (ref 0.8–3.5)
LYMPHOCYTES # BLD: 0.5 K/UL (ref 0.8–3.5)
LYMPHOCYTES NFR BLD: 6 % (ref 12–49)
LYMPHOCYTES NFR BLD: 7 % (ref 12–49)
MAGNESIUM SERPL-MCNC: 2 MG/DL (ref 1.6–2.4)
MCH RBC QN AUTO: 30.2 PG (ref 26–34)
MCH RBC QN AUTO: 30.5 PG (ref 26–34)
MCHC RBC AUTO-ENTMCNC: 32.3 G/DL (ref 30–36.5)
MCHC RBC AUTO-ENTMCNC: 34.1 G/DL (ref 30–36.5)
MCV RBC AUTO: 89.3 FL (ref 80–99)
MCV RBC AUTO: 93.4 FL (ref 80–99)
MONOCYTES # BLD: 0.3 K/UL (ref 0–1)
MONOCYTES # BLD: 0.6 K/UL (ref 0–1)
MONOCYTES NFR BLD: 4 % (ref 5–13)
MONOCYTES NFR BLD: 8 % (ref 5–13)
NEUTS SEG # BLD: 5.7 K/UL (ref 1.8–8)
NEUTS SEG # BLD: 6 K/UL (ref 1.8–8)
NEUTS SEG NFR BLD: 85 % (ref 32–75)
NEUTS SEG NFR BLD: 89 % (ref 32–75)
NRBC # BLD: 0 K/UL (ref 0–0.01)
NRBC # BLD: 0 K/UL (ref 0–0.01)
NRBC BLD-RTO: 0 PER 100 WBC
NRBC BLD-RTO: 0 PER 100 WBC
O2/TOTAL GAS SETTING VFR VENT: 100 %
PCO2 BLD: 36.4 MMHG (ref 35–45)
PCO2 BLD: 39.4 MMHG (ref 35–45)
PEEP RESPIRATORY: 5 CMH2O
PH BLD: 7.42 (ref 7.35–7.45)
PH BLD: 7.45 (ref 7.35–7.45)
PLATELET # BLD AUTO: 254 K/UL (ref 150–400)
PLATELET # BLD AUTO: 255 K/UL (ref 150–400)
PMV BLD AUTO: 9 FL (ref 8.9–12.9)
PMV BLD AUTO: 9.5 FL (ref 8.9–12.9)
PO2 BLD: 269 MMHG (ref 80–100)
PO2 BLD: 59 MMHG (ref 80–100)
POTASSIUM BLD-SCNC: 3.2 MMOL/L (ref 3.5–5.5)
POTASSIUM SERPL-SCNC: 3.4 MMOL/L (ref 3.5–5.1)
POTASSIUM SERPL-SCNC: 3.6 MMOL/L (ref 3.5–5.1)
POTASSIUM SERPL-SCNC: 3.9 MMOL/L (ref 3.5–5.1)
PROCALCITONIN SERPL-MCNC: 0.27 NG/ML
PROT SERPL-MCNC: 5.8 G/DL (ref 6.4–8.2)
PROT SERPL-MCNC: 6.9 G/DL (ref 6.4–8.2)
PROTHROMBIN TIME: 13 SEC (ref 9–11.1)
RBC # BLD AUTO: 3.28 M/UL (ref 4.1–5.7)
RBC # BLD AUTO: 3.78 M/UL (ref 4.1–5.7)
RBC MORPH BLD: ABNORMAL
RBC MORPH BLD: ABNORMAL
SAO2 % BLD: 91 %
SAO2 % BLD: 99.9 % (ref 92–97)
SARS-COV-2 RDRP RESP QL NAA+PROBE: DETECTED
SERVICE CMNT-IMP: ABNORMAL
SERVICE CMNT-IMP: ABNORMAL
SODIUM BLD-SCNC: 135 MMOL/L (ref 136–145)
SODIUM SERPL-SCNC: 132 MMOL/L (ref 136–145)
SODIUM SERPL-SCNC: 132 MMOL/L (ref 136–145)
SODIUM SERPL-SCNC: 135 MMOL/L (ref 136–145)
SOURCE: ABNORMAL
SPECIMEN SITE: ABNORMAL
SPECIMEN TYPE: ABNORMAL
THERAPEUTIC RANGE: ABNORMAL SECS (ref 58–77)
TROPONIN I SERPL HS-MCNC: 24 NG/L (ref 0–76)
VENTILATION MODE VENT: ABNORMAL
VT SETTING VENT: 580 ML
WBC # BLD AUTO: 6.5 K/UL (ref 4.1–11.1)
WBC # BLD AUTO: 7.1 K/UL (ref 4.1–11.1)

## 2024-02-22 PROCEDURE — 85610 PROTHROMBIN TIME: CPT

## 2024-02-22 PROCEDURE — 80053 COMPREHEN METABOLIC PANEL: CPT

## 2024-02-22 PROCEDURE — 83036 HEMOGLOBIN GLYCOSYLATED A1C: CPT

## 2024-02-22 PROCEDURE — 95822 EEG COMA OR SLEEP ONLY: CPT | Performed by: PSYCHIATRY & NEUROLOGY

## 2024-02-22 PROCEDURE — 85730 THROMBOPLASTIN TIME PARTIAL: CPT

## 2024-02-22 PROCEDURE — 71045 X-RAY EXAM CHEST 1 VIEW: CPT

## 2024-02-22 PROCEDURE — 2580000003 HC RX 258: Performed by: STUDENT IN AN ORGANIZED HEALTH CARE EDUCATION/TRAINING PROGRAM

## 2024-02-22 PROCEDURE — 82962 GLUCOSE BLOOD TEST: CPT

## 2024-02-22 PROCEDURE — 87635 SARS-COV-2 COVID-19 AMP PRB: CPT

## 2024-02-22 PROCEDURE — 2580000003 HC RX 258: Performed by: PHYSICIAN ASSISTANT

## 2024-02-22 PROCEDURE — 82330 ASSAY OF CALCIUM: CPT

## 2024-02-22 PROCEDURE — 36415 COLL VENOUS BLD VENIPUNCTURE: CPT

## 2024-02-22 PROCEDURE — 6360000002 HC RX W HCPCS: Performed by: STUDENT IN AN ORGANIZED HEALTH CARE EDUCATION/TRAINING PROGRAM

## 2024-02-22 PROCEDURE — 36600 WITHDRAWAL OF ARTERIAL BLOOD: CPT

## 2024-02-22 PROCEDURE — 93005 ELECTROCARDIOGRAM TRACING: CPT | Performed by: NURSE PRACTITIONER

## 2024-02-22 PROCEDURE — 5A1945Z RESPIRATORY VENTILATION, 24-96 CONSECUTIVE HOURS: ICD-10-PCS | Performed by: FAMILY MEDICINE

## 2024-02-22 PROCEDURE — 6370000000 HC RX 637 (ALT 250 FOR IP): Performed by: INTERNAL MEDICINE

## 2024-02-22 PROCEDURE — 86480 TB TEST CELL IMMUN MEASURE: CPT

## 2024-02-22 PROCEDURE — 83605 ASSAY OF LACTIC ACID: CPT

## 2024-02-22 PROCEDURE — 82947 ASSAY GLUCOSE BLOOD QUANT: CPT

## 2024-02-22 PROCEDURE — 2000000000 HC ICU R&B

## 2024-02-22 PROCEDURE — 84132 ASSAY OF SERUM POTASSIUM: CPT

## 2024-02-22 PROCEDURE — 84484 ASSAY OF TROPONIN QUANT: CPT

## 2024-02-22 PROCEDURE — 82803 BLOOD GASES ANY COMBINATION: CPT

## 2024-02-22 PROCEDURE — 84443 ASSAY THYROID STIM HORMONE: CPT

## 2024-02-22 PROCEDURE — 94640 AIRWAY INHALATION TREATMENT: CPT

## 2024-02-22 PROCEDURE — 82306 VITAMIN D 25 HYDROXY: CPT

## 2024-02-22 PROCEDURE — 95822 EEG COMA OR SLEEP ONLY: CPT

## 2024-02-22 PROCEDURE — 70450 CT HEAD/BRAIN W/O DYE: CPT

## 2024-02-22 PROCEDURE — 6360000002 HC RX W HCPCS: Performed by: PHYSICIAN ASSISTANT

## 2024-02-22 PROCEDURE — APPNB60 APP NON BILLABLE TIME 46-60 MINS: Performed by: NURSE PRACTITIONER

## 2024-02-22 PROCEDURE — 2580000003 HC RX 258: Performed by: NURSE PRACTITIONER

## 2024-02-22 PROCEDURE — 83735 ASSAY OF MAGNESIUM: CPT

## 2024-02-22 PROCEDURE — 84145 PROCALCITONIN (PCT): CPT

## 2024-02-22 PROCEDURE — 85025 COMPLETE CBC W/AUTO DIFF WBC: CPT

## 2024-02-22 PROCEDURE — 84295 ASSAY OF SERUM SODIUM: CPT

## 2024-02-22 PROCEDURE — 2500000003 HC RX 250 WO HCPCS: Performed by: NURSE PRACTITIONER

## 2024-02-22 PROCEDURE — 74018 RADEX ABDOMEN 1 VIEW: CPT

## 2024-02-22 PROCEDURE — 92610 EVALUATE SWALLOWING FUNCTION: CPT

## 2024-02-22 PROCEDURE — 93005 ELECTROCARDIOGRAM TRACING: CPT | Performed by: STUDENT IN AN ORGANIZED HEALTH CARE EDUCATION/TRAINING PROGRAM

## 2024-02-22 PROCEDURE — 6370000000 HC RX 637 (ALT 250 FOR IP): Performed by: STUDENT IN AN ORGANIZED HEALTH CARE EDUCATION/TRAINING PROGRAM

## 2024-02-22 PROCEDURE — 6370000000 HC RX 637 (ALT 250 FOR IP): Performed by: PHYSICIAN ASSISTANT

## 2024-02-22 PROCEDURE — 6370000000 HC RX 637 (ALT 250 FOR IP): Performed by: FAMILY MEDICINE

## 2024-02-22 PROCEDURE — 82728 ASSAY OF FERRITIN: CPT

## 2024-02-22 PROCEDURE — 86140 C-REACTIVE PROTEIN: CPT

## 2024-02-22 PROCEDURE — 6360000002 HC RX W HCPCS: Performed by: NURSE PRACTITIONER

## 2024-02-22 PROCEDURE — 94760 N-INVAS EAR/PLS OXIMETRY 1: CPT

## 2024-02-22 PROCEDURE — 6360000002 HC RX W HCPCS

## 2024-02-22 PROCEDURE — A4216 STERILE WATER/SALINE, 10 ML: HCPCS | Performed by: NURSE PRACTITIONER

## 2024-02-22 PROCEDURE — 2700000000 HC OXYGEN THERAPY PER DAY

## 2024-02-22 RX ORDER — PROPOFOL 10 MG/ML
5-50 INJECTION, EMULSION INTRAVENOUS CONTINUOUS
Status: DISCONTINUED | OUTPATIENT
Start: 2024-02-22 | End: 2024-02-24

## 2024-02-22 RX ORDER — SODIUM CHLORIDE 9 MG/ML
INJECTION, SOLUTION INTRAVENOUS CONTINUOUS
Status: DISPENSED | OUTPATIENT
Start: 2024-02-22 | End: 2024-02-24

## 2024-02-22 RX ORDER — SODIUM CHLORIDE 0.9 % (FLUSH) 0.9 %
5-40 SYRINGE (ML) INJECTION EVERY 12 HOURS SCHEDULED
Status: DISCONTINUED | OUTPATIENT
Start: 2024-02-22 | End: 2024-03-06 | Stop reason: HOSPADM

## 2024-02-22 RX ORDER — PROPOFOL 10 MG/ML
1 INJECTION, EMULSION INTRAVENOUS ONCE
Status: DISCONTINUED | OUTPATIENT
Start: 2024-02-22 | End: 2024-02-23

## 2024-02-22 RX ORDER — ACETAMINOPHEN 650 MG/1
650 SUPPOSITORY RECTAL EVERY 6 HOURS PRN
Status: DISCONTINUED | OUTPATIENT
Start: 2024-02-22 | End: 2024-03-06 | Stop reason: HOSPADM

## 2024-02-22 RX ORDER — IPRATROPIUM BROMIDE AND ALBUTEROL SULFATE 2.5; .5 MG/3ML; MG/3ML
1 SOLUTION RESPIRATORY (INHALATION)
Status: DISCONTINUED | OUTPATIENT
Start: 2024-02-22 | End: 2024-02-22

## 2024-02-22 RX ORDER — IPRATROPIUM BROMIDE AND ALBUTEROL SULFATE 2.5; .5 MG/3ML; MG/3ML
1 SOLUTION RESPIRATORY (INHALATION)
Status: DISCONTINUED | OUTPATIENT
Start: 2024-02-22 | End: 2024-02-22 | Stop reason: SDUPTHER

## 2024-02-22 RX ORDER — ACETAMINOPHEN 325 MG/1
650 TABLET ORAL EVERY 6 HOURS PRN
Status: DISCONTINUED | OUTPATIENT
Start: 2024-02-22 | End: 2024-03-06 | Stop reason: HOSPADM

## 2024-02-22 RX ORDER — DEXTROSE MONOHYDRATE 100 MG/ML
INJECTION, SOLUTION INTRAVENOUS CONTINUOUS PRN
Status: DISCONTINUED | OUTPATIENT
Start: 2024-02-22 | End: 2024-03-06 | Stop reason: HOSPADM

## 2024-02-22 RX ORDER — INSULIN LISPRO 100 [IU]/ML
0-8 INJECTION, SOLUTION INTRAVENOUS; SUBCUTANEOUS EVERY 4 HOURS
Status: DISCONTINUED | OUTPATIENT
Start: 2024-02-22 | End: 2024-02-22

## 2024-02-22 RX ORDER — LEVETIRACETAM 500 MG/5ML
1000 INJECTION, SOLUTION, CONCENTRATE INTRAVENOUS EVERY 12 HOURS SCHEDULED
Status: DISCONTINUED | OUTPATIENT
Start: 2024-02-22 | End: 2024-03-05

## 2024-02-22 RX ORDER — IPRATROPIUM BROMIDE AND ALBUTEROL SULFATE 2.5; .5 MG/3ML; MG/3ML
1 SOLUTION RESPIRATORY (INHALATION)
Status: DISCONTINUED | OUTPATIENT
Start: 2024-02-23 | End: 2024-02-23

## 2024-02-22 RX ORDER — SODIUM CHLORIDE 0.9 % (FLUSH) 0.9 %
5-40 SYRINGE (ML) INJECTION PRN
Status: DISCONTINUED | OUTPATIENT
Start: 2024-02-22 | End: 2024-03-06 | Stop reason: HOSPADM

## 2024-02-22 RX ORDER — BISACODYL 10 MG
10 SUPPOSITORY, RECTAL RECTAL DAILY PRN
Status: DISCONTINUED | OUTPATIENT
Start: 2024-02-22 | End: 2024-03-06 | Stop reason: HOSPADM

## 2024-02-22 RX ORDER — SODIUM CHLORIDE 9 MG/ML
INJECTION, SOLUTION INTRAVENOUS PRN
Status: DISCONTINUED | OUTPATIENT
Start: 2024-02-22 | End: 2024-03-06 | Stop reason: HOSPADM

## 2024-02-22 RX ORDER — MIDAZOLAM HYDROCHLORIDE 1 MG/ML
INJECTION INTRAMUSCULAR; INTRAVENOUS
Status: DISCONTINUED
Start: 2024-02-22 | End: 2024-02-22 | Stop reason: WASHOUT

## 2024-02-22 RX ORDER — MIDAZOLAM HYDROCHLORIDE 2 MG/2ML
2 INJECTION, SOLUTION INTRAMUSCULAR; INTRAVENOUS EVERY 4 HOURS PRN
Status: DISCONTINUED | OUTPATIENT
Start: 2024-02-22 | End: 2024-02-27

## 2024-02-22 RX ORDER — LEVETIRACETAM 500 MG/5ML
2000 INJECTION, SOLUTION, CONCENTRATE INTRAVENOUS
Status: COMPLETED | OUTPATIENT
Start: 2024-02-22 | End: 2024-02-22

## 2024-02-22 RX ORDER — IPRATROPIUM BROMIDE AND ALBUTEROL SULFATE 2.5; .5 MG/3ML; MG/3ML
1 SOLUTION RESPIRATORY (INHALATION) EVERY 4 HOURS PRN
Status: DISCONTINUED | OUTPATIENT
Start: 2024-02-22 | End: 2024-02-22

## 2024-02-22 RX ORDER — INSULIN LISPRO 100 [IU]/ML
0-8 INJECTION, SOLUTION INTRAVENOUS; SUBCUTANEOUS EVERY 6 HOURS
Status: DISCONTINUED | OUTPATIENT
Start: 2024-02-23 | End: 2024-03-06 | Stop reason: HOSPADM

## 2024-02-22 RX ADMIN — AMLODIPINE BESYLATE 10 MG: 5 TABLET ORAL at 09:17

## 2024-02-22 RX ADMIN — SODIUM CHLORIDE, PRESERVATIVE FREE 10 ML: 5 INJECTION INTRAVENOUS at 20:36

## 2024-02-22 RX ADMIN — ACETAMINOPHEN 650 MG: 325 TABLET ORAL at 09:16

## 2024-02-22 RX ADMIN — MIDAZOLAM 2 MG: 1 INJECTION INTRAMUSCULAR; INTRAVENOUS at 12:32

## 2024-02-22 RX ADMIN — WATER 40 MG: 1 INJECTION INTRAMUSCULAR; INTRAVENOUS; SUBCUTANEOUS at 11:17

## 2024-02-22 RX ADMIN — METHADONE HYDROCHLORIDE 95 MG: 10 TABLET ORAL at 09:17

## 2024-02-22 RX ADMIN — LEVETIRACETAM 2000 MG: 100 INJECTION, SOLUTION INTRAVENOUS at 13:20

## 2024-02-22 RX ADMIN — IPRATROPIUM BROMIDE AND ALBUTEROL SULFATE 1 DOSE: .5; 3 SOLUTION RESPIRATORY (INHALATION) at 21:06

## 2024-02-22 RX ADMIN — CARVEDILOL 25 MG: 12.5 TABLET, FILM COATED ORAL at 09:17

## 2024-02-22 RX ADMIN — FAMOTIDINE 20 MG: 10 INJECTION, SOLUTION INTRAVENOUS at 20:35

## 2024-02-22 RX ADMIN — SODIUM CHLORIDE, PRESERVATIVE FREE 10 ML: 5 INJECTION INTRAVENOUS at 09:18

## 2024-02-22 RX ADMIN — PROPOFOL 20 MCG/KG/MIN: 10 INJECTION, EMULSION INTRAVENOUS at 13:42

## 2024-02-22 RX ADMIN — PROPOFOL 30 MCG/KG/MIN: 10 INJECTION, EMULSION INTRAVENOUS at 21:42

## 2024-02-22 RX ADMIN — ASPIRIN 81 MG CHEWABLE TABLET 81 MG: 81 TABLET CHEWABLE at 09:17

## 2024-02-22 RX ADMIN — LISINOPRIL 40 MG: 20 TABLET ORAL at 09:18

## 2024-02-22 RX ADMIN — HYDRALAZINE HYDROCHLORIDE 10 MG: 20 INJECTION INTRAMUSCULAR; INTRAVENOUS at 09:17

## 2024-02-22 RX ADMIN — SODIUM CHLORIDE: 9 INJECTION, SOLUTION INTRAVENOUS at 18:13

## 2024-02-22 RX ADMIN — LEVETIRACETAM 1000 MG: 100 INJECTION, SOLUTION INTRAVENOUS at 20:35

## 2024-02-22 RX ADMIN — MIDAZOLAM HYDROCHLORIDE 2 MG: 2 INJECTION, SOLUTION INTRAMUSCULAR; INTRAVENOUS at 12:32

## 2024-02-22 RX ADMIN — PROPOFOL 20 MCG/KG/MIN: 10 INJECTION, EMULSION INTRAVENOUS at 13:18

## 2024-02-22 RX ADMIN — ENOXAPARIN SODIUM 30 MG: 100 INJECTION SUBCUTANEOUS at 20:35

## 2024-02-22 RX ADMIN — ENOXAPARIN SODIUM 30 MG: 100 INJECTION SUBCUTANEOUS at 09:17

## 2024-02-22 ASSESSMENT — PAIN SCALES - GENERAL
PAINLEVEL_OUTOF10: 0

## 2024-02-22 ASSESSMENT — PULMONARY FUNCTION TESTS
PIF_VALUE: 23
PIF_VALUE: 24
PIF_VALUE: 25

## 2024-02-22 NOTE — CARE COORDINATION
Transition of Care Plan:     Awaiting decision following a P2P     RUR: 17%  Prior Level of Functioning: independent with recent falls   Disposition: IPR  If SNF or IPR: Date FOC offered: 2/16/24 from home, then patient was admitted on 2/16.   Date FOC received: 2/16/24  Accepting facility: [facility]   Date authorization started with reference number: [id number]   Date authorization received and expires: auth is still pending.   Follow up appointments: pcp/specialist   DME needed: n/a  Transportation at discharge: stretcher  /[facility] Medicare/ letter given: n/a patient has Medicaid   Caregiver Contact: Mother Rena Llamas [phone]     Discharge Caregiver contacted prior to discharge? yes  Care Conference needed? no  Barriers to discharge: P2P to be completed by Dr. Piña       Rapid response called this morning, patient is on 6L oxygen. P2P for [facility] is approved.      HOLLY Avila  Care Management Department  Ph:[phone]

## 2024-02-22 NOTE — SIGNIFICANT EVENT
Rapid Response  Rapid response room 602b called overhead at 1212. RRT responding.    RRT called after family witnessed seizure activity. Upon assessment pt is postictal, unresponsive to sternal rub. Eyes deviated upward. Snoring respirations - O2 98% on 6LNC. Code stroke called.     Code Stroke  Code Stroke room 602b called overhead at 1214. RRT responding.    Symptoms: AMS. LKW 0745. . /76. PRN hydralazine already administered.     YAKOV Ham at bedside.    Pt transported to CT. After dry CT completed, pt having tonic-clonic seizure. ~30sec, resolved without meds. Pt remains unresponsive, eyes deviated upward with snoring respirations. O2 sats 50% post seizure, code blue called for intubation.     Code Blue  Code Blue room CT 2 (bed 602b) called overhead at 1232. RRT responding.    1236: O2 Sats 50%. Pt being bagged   1242: 20 etomidate, 100 candido, 2 of versed for seizure   1243: 7.5ETT placed, 24 @ lip. /87, HR NSR 71   1245: Pt COVID +.     DI 47 at the time Code Blue was called.    Post-ROSC Targeted Temperature Management  Will this patient be under TTM? No, seizure, will reevaluate after  Provider making the decision: Dr. Gomez/Dr. WYLIE  Present during code:  Dr. WYLIE, intensivist  This RN, RRT  Nat, CCU RN  Wen, ICU RN  Natividad, primary RN  Bonita/Huma, RT  , pharmacist  Ryanne, nursing supervisor    Checked in with primary RN prior to leaving. Opportunity for questions and concerns provided.    DI 47 at the time Code Stroke was called.     Checked in with primary RN prior to leaving. Opportunity for questions and concerns provided.    DI 47 at the time Rapid Response was called.    Sepsis Screening  Are two or more SIRS criteria present? No    Is the patient's history suggestive of a new infection? No    Communication with provider: Nova    Was a Code Sepsis called at this encounter? No

## 2024-02-22 NOTE — ANESTHESIA POSTPROCEDURE EVALUATION
Department of Anesthesiology  Postprocedure Note    Patient: Alcon Zamora  MRN: [id number]  YOB: 1960  Date of evaluation: 2/22/2024    Procedure Summary       Date: 02/21/24 Room / Location: [facility] NON-INVASIVE CARDIOLOGY    Anesthesia Start: 1334 Anesthesia Stop: 1415    Procedure: JOSEMANUEL (PRN CONTRAST/BUBBLE/3D) Diagnosis: Impending cerebrovascular accident (HCC)    Scheduled Providers: Oz Mckeon MD; Miguel Penaloza MD Responsible Provider: Herbert Holcomb MD    Anesthesia Type: general, MAC ASA Status: 3            Anesthesia Type: No value filed.    Maren Phase I: Maren Score: 10    Maren Phase II:      Anesthesia Post Evaluation    Patient location during evaluation: PACU  Patient participation: complete - patient participated  Level of consciousness: awake and alert  Airway patency: patent  Nausea & Vomiting: no nausea and no vomiting  Cardiovascular status: hemodynamically stable  Respiratory status: acceptable  Hydration status: stable        No notable events documented.

## 2024-02-22 NOTE — PROCEDURES
PROCEDURE: ROUTINE INPATIENT EEG  NAME:   Alcon Zamora  ACCOUNT NUMBER : [id number]  MRN:   [id number]  DATE OF SERVICE: 2/22/2024     HISTORY/INDICATION: Patient is a 64-year-old male admitted with stroke, with AMS, hypoxia, COVID-positive, with witnessed seizures, now intubated and sedated.  EEG ordered to assess for subclinical seizure activity.    MEDICATIONS:   Current Facility-Administered Medications   Medication Dose Route Frequency Provider Last Rate Last Admin    methylPREDNISolone sodium succ (SOLU-MEDROL) 40 mg in sterile water 1 mL injection  40 mg IntraVENous 3 times per day Ravi Choudhury MD   40 mg at 02/22/24 1117    ipratropium 0.5 mg-albuterol 2.5 mg (DUONEB) nebulizer solution 1 Dose  1 Dose Inhalation Q4H RT Ravi Choudhury MD        levETIRAcetam (KEPPRA) injection 1,000 mg  1,000 mg IntraVENous 2 times per day Ravi Choudhury MD        midazolam PF (VERSED) injection 2 mg  2 mg IntraVENous Q4H PRN Ravi Choudhury MD   2 mg at 02/22/24 1232    propofol infusion  5-50 mcg/kg/min IntraVENous Continuous Ravi Choudhury MD 12.6 mL/hr at 02/22/24 1342 20 mcg/kg/min at 02/22/24 1342    And    propofol infusion 105 mg  1 mg/kg IntraVENous Once Ravi Choudhury MD        ondansetron (ZOFRAN) injection 4 mg  4 mg IntraVENous Q6H PRN Oz Mckeon MD        [Held by provider] carvedilol (COREG) tablet 25 mg  25 mg Oral BID  Oz Mckeon MD   25 mg at 02/22/24 0917    [Held by provider] methadone (DOLOPHINE) tablet 95 mg  95 mg Oral Daily Bennie Meredith MD   95 mg at 02/22/24 0917    aspirin chewable tablet 81 mg  81 mg Oral Daily Terry Chaves MD   81 mg at 02/22/24 0917    atorvastatin (LIPITOR) tablet 80 mg  80 mg Oral Nightly Terry Chaves MD   80 mg at 02/21/24 2048    hydrALAZINE (APRESOLINE) injection 10 mg  10 mg IntraVENous Q6H PRN Di Holland APRN - NP   10 mg at 02/22/24 0917    lisinopril (PRINIVIL;ZESTRIL) tablet 40 mg  40 mg Oral Daily Bennie Meredith MD   40 mg at

## 2024-02-22 NOTE — SIGNIFICANT EVENT
Rapid Response  Rapid response room 602b called overhead at 0956. RRT responding.    Pt admitted with acute CVA, has been vomiting past few nights. Pt normally on RA, now acutely on 6L sats 92% with possible aspiration. STAT CXR, ABG ordered. Per CMU pt has been lino down to 30's - EKG ordered. Labs obtained. Pt not short of breath, BP stable. COVID test and CT Head ordered     Arterial Blood Gas result:  pO2 59.3; pCO2 39.4; pH 7.415;  HCO3 25.3, %O2 Sat 90.7  POC Lactic/Cr 0.53/1.07  Glucose 159    Checked in with primary RN prior to leaving. Opportunity for questions and concerns provided.    DI 48 at the time Rapid Response was called.    Sepsis Screening  Are two or more SIRS criteria present? No    Is the patient's history suggestive of a new infection? No    Communication with provider: Macey    Was a Code Sepsis called at this encounter? No

## 2024-02-23 ENCOUNTER — APPOINTMENT (OUTPATIENT)
Facility: HOSPITAL | Age: 64
DRG: 045 | End: 2024-02-23
Payer: COMMERCIAL

## 2024-02-23 ENCOUNTER — TELEPHONE (OUTPATIENT)
Facility: HOSPITAL | Age: 64
End: 2024-02-23

## 2024-02-23 PROBLEM — U07.1 COVID-19: Status: ACTIVE | Noted: 2024-02-23

## 2024-02-23 LAB
25(OH)D3 SERPL-MCNC: 10.6 NG/ML (ref 30–100)
ALBUMIN SERPL-MCNC: 2.5 G/DL (ref 3.5–5)
ALBUMIN/GLOB SERPL: 0.7 (ref 1.1–2.2)
ALP SERPL-CCNC: 63 U/L (ref 45–117)
ALT SERPL-CCNC: 14 U/L (ref 12–78)
ANION GAP SERPL CALC-SCNC: 6 MMOL/L (ref 5–15)
ARTERIAL PATENCY WRIST A: POSITIVE
AST SERPL-CCNC: 16 U/L (ref 15–37)
BASE DEFICIT BLD-SCNC: 0.1 MMOL/L
BASOPHILS # BLD: 0 K/UL (ref 0–0.1)
BASOPHILS NFR BLD: 0 % (ref 0–1)
BDY SITE: ABNORMAL
BILIRUB SERPL-MCNC: 0.5 MG/DL (ref 0.2–1)
BUN SERPL-MCNC: 26 MG/DL (ref 6–20)
BUN/CREAT SERPL: 24 (ref 12–20)
CALCIUM SERPL-MCNC: 8.2 MG/DL (ref 8.5–10.1)
CHLORIDE SERPL-SCNC: 101 MMOL/L (ref 97–108)
CO2 SERPL-SCNC: 26 MMOL/L (ref 21–32)
CREAT SERPL-MCNC: 1.07 MG/DL (ref 0.7–1.3)
DIFFERENTIAL METHOD BLD: ABNORMAL
EKG ATRIAL RATE: 62 BPM
EKG DIAGNOSIS: NORMAL
EKG P AXIS: 72 DEGREES
EKG P-R INTERVAL: 176 MS
EKG Q-T INTERVAL: 430 MS
EKG QRS DURATION: 130 MS
EKG QTC CALCULATION (BAZETT): 436 MS
EKG R AXIS: -50 DEGREES
EKG T AXIS: 29 DEGREES
EKG VENTRICULAR RATE: 62 BPM
EOSINOPHIL # BLD: 0 K/UL (ref 0–0.4)
EOSINOPHIL NFR BLD: 0 % (ref 0–7)
ERYTHROCYTE [DISTWIDTH] IN BLOOD BY AUTOMATED COUNT: 12.9 % (ref 11.5–14.5)
GAS FLOW.O2 O2 DELIVERY SYS: ABNORMAL
GAS FLOW.O2 SETTING OXYMISER: 12 BPM
GLOBULIN SER CALC-MCNC: 3.7 G/DL (ref 2–4)
GLUCOSE BLD STRIP.AUTO-MCNC: 112 MG/DL (ref 65–117)
GLUCOSE BLD STRIP.AUTO-MCNC: 165 MG/DL (ref 65–117)
GLUCOSE BLD STRIP.AUTO-MCNC: 171 MG/DL (ref 65–117)
GLUCOSE BLD STRIP.AUTO-MCNC: 171 MG/DL (ref 65–117)
GLUCOSE BLD STRIP.AUTO-MCNC: 184 MG/DL (ref 65–117)
GLUCOSE SERPL-MCNC: 142 MG/DL (ref 65–100)
HCO3 BLD-SCNC: 24.4 MMOL/L (ref 22–26)
HCT VFR BLD AUTO: 29.8 % (ref 36.6–50.3)
HGB BLD-MCNC: 10.1 G/DL (ref 12.1–17)
IMM GRANULOCYTES # BLD AUTO: 0 K/UL (ref 0–0.04)
IMM GRANULOCYTES NFR BLD AUTO: 0 % (ref 0–0.5)
IPAP/PIP/HIGH PEEP: 24
LYMPHOCYTES # BLD: 0.7 K/UL (ref 0.8–3.5)
LYMPHOCYTES NFR BLD: 10 % (ref 12–49)
MAGNESIUM SERPL-MCNC: 1.9 MG/DL (ref 1.6–2.4)
MCH RBC QN AUTO: 30.3 PG (ref 26–34)
MCHC RBC AUTO-ENTMCNC: 33.9 G/DL (ref 30–36.5)
MCV RBC AUTO: 89.5 FL (ref 80–99)
MONOCYTES # BLD: 0.4 K/UL (ref 0–1)
MONOCYTES NFR BLD: 6 % (ref 5–13)
NEUTS SEG # BLD: 6.3 K/UL (ref 1.8–8)
NEUTS SEG NFR BLD: 84 % (ref 32–75)
NRBC # BLD: 0 K/UL (ref 0–0.01)
NRBC BLD-RTO: 0 PER 100 WBC
O2/TOTAL GAS SETTING VFR VENT: 50 %
PAW @ MEAN EXP FLOW ON VENT: 9 CMH2O
PCO2 BLD: 37.9 MMHG (ref 35–45)
PEEP RESPIRATORY: 5 CMH2O
PH BLD: 7.42 (ref 7.35–7.45)
PHOSPHATE SERPL-MCNC: 3.1 MG/DL (ref 2.6–4.7)
PLATELET # BLD AUTO: 250 K/UL (ref 150–400)
PMV BLD AUTO: 9.1 FL (ref 8.9–12.9)
PO2 BLD: 94 MMHG (ref 80–100)
POTASSIUM SERPL-SCNC: 3.1 MMOL/L (ref 3.5–5.1)
PROT SERPL-MCNC: 6.2 G/DL (ref 6.4–8.2)
RBC # BLD AUTO: 3.33 M/UL (ref 4.1–5.7)
RBC MORPH BLD: ABNORMAL
SAO2 % BLD: 97.4 % (ref 92–97)
SERVICE CMNT-IMP: ABNORMAL
SERVICE CMNT-IMP: NORMAL
SODIUM SERPL-SCNC: 133 MMOL/L (ref 136–145)
SPECIMEN TYPE: ABNORMAL
VENTILATION MODE VENT: ABNORMAL
VT SETTING VENT: 580 ML
WBC # BLD AUTO: 7.4 K/UL (ref 4.1–11.1)

## 2024-02-23 PROCEDURE — 2500000003 HC RX 250 WO HCPCS: Performed by: NURSE PRACTITIONER

## 2024-02-23 PROCEDURE — 6360000002 HC RX W HCPCS: Performed by: NURSE PRACTITIONER

## 2024-02-23 PROCEDURE — 74018 RADEX ABDOMEN 1 VIEW: CPT

## 2024-02-23 PROCEDURE — 36415 COLL VENOUS BLD VENIPUNCTURE: CPT

## 2024-02-23 PROCEDURE — 2580000003 HC RX 258: Performed by: PHYSICIAN ASSISTANT

## 2024-02-23 PROCEDURE — 2580000003 HC RX 258: Performed by: NURSE PRACTITIONER

## 2024-02-23 PROCEDURE — 6370000000 HC RX 637 (ALT 250 FOR IP): Performed by: NURSE PRACTITIONER

## 2024-02-23 PROCEDURE — 83735 ASSAY OF MAGNESIUM: CPT

## 2024-02-23 PROCEDURE — 6360000002 HC RX W HCPCS: Performed by: STUDENT IN AN ORGANIZED HEALTH CARE EDUCATION/TRAINING PROGRAM

## 2024-02-23 PROCEDURE — 6370000000 HC RX 637 (ALT 250 FOR IP): Performed by: FAMILY MEDICINE

## 2024-02-23 PROCEDURE — 99233 SBSQ HOSP IP/OBS HIGH 50: CPT | Performed by: PSYCHIATRY & NEUROLOGY

## 2024-02-23 PROCEDURE — 6370000000 HC RX 637 (ALT 250 FOR IP): Performed by: STUDENT IN AN ORGANIZED HEALTH CARE EDUCATION/TRAINING PROGRAM

## 2024-02-23 PROCEDURE — 36600 WITHDRAWAL OF ARTERIAL BLOOD: CPT

## 2024-02-23 PROCEDURE — 97530 THERAPEUTIC ACTIVITIES: CPT

## 2024-02-23 PROCEDURE — 97535 SELF CARE MNGMENT TRAINING: CPT

## 2024-02-23 PROCEDURE — 84100 ASSAY OF PHOSPHORUS: CPT

## 2024-02-23 PROCEDURE — 82803 BLOOD GASES ANY COMBINATION: CPT

## 2024-02-23 PROCEDURE — 97164 PT RE-EVAL EST PLAN CARE: CPT

## 2024-02-23 PROCEDURE — 2700000000 HC OXYGEN THERAPY PER DAY

## 2024-02-23 PROCEDURE — 93010 ELECTROCARDIOGRAM REPORT: CPT | Performed by: SPECIALIST

## 2024-02-23 PROCEDURE — 6360000002 HC RX W HCPCS: Performed by: PHYSICIAN ASSISTANT

## 2024-02-23 PROCEDURE — 85025 COMPLETE CBC W/AUTO DIFF WBC: CPT

## 2024-02-23 PROCEDURE — A4216 STERILE WATER/SALINE, 10 ML: HCPCS | Performed by: NURSE PRACTITIONER

## 2024-02-23 PROCEDURE — 94002 VENT MGMT INPAT INIT DAY: CPT

## 2024-02-23 PROCEDURE — 82962 GLUCOSE BLOOD TEST: CPT

## 2024-02-23 PROCEDURE — APPNB45 APP NON BILLABLE 31-45 MINUTES

## 2024-02-23 PROCEDURE — 80053 COMPREHEN METABOLIC PANEL: CPT

## 2024-02-23 PROCEDURE — 97165 OT EVAL LOW COMPLEX 30 MIN: CPT

## 2024-02-23 PROCEDURE — 71045 X-RAY EXAM CHEST 1 VIEW: CPT

## 2024-02-23 PROCEDURE — 2000000000 HC ICU R&B

## 2024-02-23 RX ORDER — FENTANYL CITRATE 50 UG/ML
50 INJECTION, SOLUTION INTRAMUSCULAR; INTRAVENOUS
Status: DISCONTINUED | OUTPATIENT
Start: 2024-02-23 | End: 2024-02-27

## 2024-02-23 RX ORDER — CASTOR OIL AND BALSAM, PERU 788; 87 MG/G; MG/G
OINTMENT TOPICAL EVERY 12 HOURS
Status: DISCONTINUED | OUTPATIENT
Start: 2024-02-23 | End: 2024-03-06 | Stop reason: HOSPADM

## 2024-02-23 RX ORDER — FENTANYL CITRATE 50 UG/ML
50 INJECTION, SOLUTION INTRAMUSCULAR; INTRAVENOUS ONCE
Status: COMPLETED | OUTPATIENT
Start: 2024-02-23 | End: 2024-02-23

## 2024-02-23 RX ORDER — MAGNESIUM SULFATE 1 G/100ML
1000 INJECTION INTRAVENOUS ONCE
Status: COMPLETED | OUTPATIENT
Start: 2024-02-23 | End: 2024-02-23

## 2024-02-23 RX ORDER — FENTANYL CITRATE-0.9 % NACL/PF 10 MCG/ML
25-200 PLASTIC BAG, INJECTION (ML) INTRAVENOUS CONTINUOUS
Status: DISCONTINUED | OUTPATIENT
Start: 2024-02-23 | End: 2024-02-24

## 2024-02-23 RX ORDER — VITAMIN B COMPLEX
6000 TABLET ORAL DAILY
Status: COMPLETED | OUTPATIENT
Start: 2024-02-23 | End: 2024-02-29

## 2024-02-23 RX ORDER — 0.9 % SODIUM CHLORIDE 0.9 %
250 INTRAVENOUS SOLUTION INTRAVENOUS ONCE
Status: COMPLETED | OUTPATIENT
Start: 2024-02-23 | End: 2024-02-23

## 2024-02-23 RX ORDER — CHLORHEXIDINE GLUCONATE ORAL RINSE 1.2 MG/ML
15 SOLUTION DENTAL 2 TIMES DAILY
Status: DISCONTINUED | OUTPATIENT
Start: 2024-02-23 | End: 2024-02-27 | Stop reason: ALTCHOICE

## 2024-02-23 RX ORDER — METHADONE HYDROCHLORIDE 5 MG/5ML
95 SOLUTION ORAL DAILY
Status: DISCONTINUED | OUTPATIENT
Start: 2024-02-23 | End: 2024-03-06

## 2024-02-23 RX ORDER — POLYETHYLENE GLYCOL 3350 17 G/17G
17 POWDER, FOR SOLUTION ORAL 2 TIMES DAILY
Status: DISCONTINUED | OUTPATIENT
Start: 2024-02-23 | End: 2024-03-06 | Stop reason: HOSPADM

## 2024-02-23 RX ORDER — LANOLIN ALCOHOL/MO/W.PET/CERES
400 CREAM (GRAM) TOPICAL ONCE
Status: COMPLETED | OUTPATIENT
Start: 2024-02-23 | End: 2024-02-23

## 2024-02-23 RX ORDER — VITAMIN B COMPLEX
2000 TABLET ORAL DAILY
Status: DISCONTINUED | OUTPATIENT
Start: 2024-03-01 | End: 2024-03-06 | Stop reason: HOSPADM

## 2024-02-23 RX ORDER — ENOXAPARIN SODIUM 100 MG/ML
40 INJECTION SUBCUTANEOUS DAILY
Status: DISCONTINUED | OUTPATIENT
Start: 2024-02-24 | End: 2024-03-06 | Stop reason: HOSPADM

## 2024-02-23 RX ORDER — IPRATROPIUM BROMIDE AND ALBUTEROL SULFATE 2.5; .5 MG/3ML; MG/3ML
1 SOLUTION RESPIRATORY (INHALATION) EVERY 4 HOURS PRN
Status: DISCONTINUED | OUTPATIENT
Start: 2024-02-23 | End: 2024-03-06 | Stop reason: HOSPADM

## 2024-02-23 RX ADMIN — POTASSIUM BICARBONATE 40 MEQ: 782 TABLET, EFFERVESCENT ORAL at 05:03

## 2024-02-23 RX ADMIN — SODIUM CHLORIDE: 9 INJECTION, SOLUTION INTRAVENOUS at 05:07

## 2024-02-23 RX ADMIN — POLYETHYLENE GLYCOL 3350 17 G: 17 POWDER, FOR SOLUTION ORAL at 21:04

## 2024-02-23 RX ADMIN — SODIUM CHLORIDE 250 ML: 9 INJECTION, SOLUTION INTRAVENOUS at 04:35

## 2024-02-23 RX ADMIN — PROPOFOL 15 MCG/KG/MIN: 10 INJECTION, EMULSION INTRAVENOUS at 05:04

## 2024-02-23 RX ADMIN — Medication 50 MCG/HR: at 02:35

## 2024-02-23 RX ADMIN — MAGNESIUM OXIDE TAB 400 MG (241.3 MG ELEMENTAL MG) 400 MG: 400 (241.3 MG) TAB at 05:03

## 2024-02-23 RX ADMIN — FENTANYL CITRATE 50 MCG: 50 INJECTION INTRAMUSCULAR; INTRAVENOUS at 04:35

## 2024-02-23 RX ADMIN — WATER 40 MG: 1 INJECTION INTRAMUSCULAR; INTRAVENOUS; SUBCUTANEOUS at 11:55

## 2024-02-23 RX ADMIN — POTASSIUM BICARBONATE 40 MEQ: 782 TABLET, EFFERVESCENT ORAL at 11:50

## 2024-02-23 RX ADMIN — CHLORHEXIDINE GLUCONATE 15 ML: 1.2 RINSE ORAL at 21:06

## 2024-02-23 RX ADMIN — ENOXAPARIN SODIUM 30 MG: 100 INJECTION SUBCUTANEOUS at 08:35

## 2024-02-23 RX ADMIN — POLYETHYLENE GLYCOL 3350 17 G: 17 POWDER, FOR SOLUTION ORAL at 11:50

## 2024-02-23 RX ADMIN — WATER 40 MG: 1 INJECTION INTRAMUSCULAR; INTRAVENOUS; SUBCUTANEOUS at 23:23

## 2024-02-23 RX ADMIN — SODIUM CHLORIDE, PRESERVATIVE FREE 10 ML: 5 INJECTION INTRAVENOUS at 08:37

## 2024-02-23 RX ADMIN — HYDRALAZINE HYDROCHLORIDE 10 MG: 20 INJECTION INTRAMUSCULAR; INTRAVENOUS at 18:16

## 2024-02-23 RX ADMIN — LEVETIRACETAM 1000 MG: 100 INJECTION, SOLUTION INTRAVENOUS at 08:34

## 2024-02-23 RX ADMIN — LISINOPRIL 40 MG: 20 TABLET ORAL at 08:32

## 2024-02-23 RX ADMIN — Medication: at 21:06

## 2024-02-23 RX ADMIN — MAGNESIUM SULFATE HEPTAHYDRATE 1000 MG: 1 INJECTION, SOLUTION INTRAVENOUS at 11:51

## 2024-02-23 RX ADMIN — SODIUM CHLORIDE, PRESERVATIVE FREE 10 ML: 5 INJECTION INTRAVENOUS at 21:05

## 2024-02-23 RX ADMIN — BARICITINIB 4 MG: 2 TABLET, FILM COATED ORAL at 00:08

## 2024-02-23 RX ADMIN — FAMOTIDINE 20 MG: 10 INJECTION, SOLUTION INTRAVENOUS at 08:32

## 2024-02-23 RX ADMIN — ATORVASTATIN CALCIUM 80 MG: 40 TABLET, FILM COATED ORAL at 21:03

## 2024-02-23 RX ADMIN — SODIUM CHLORIDE, PRESERVATIVE FREE 10 ML: 5 INJECTION INTRAVENOUS at 08:36

## 2024-02-23 RX ADMIN — Medication: at 11:51

## 2024-02-23 RX ADMIN — FENTANYL CITRATE 50 MCG: 50 INJECTION INTRAMUSCULAR; INTRAVENOUS at 07:08

## 2024-02-23 RX ADMIN — ASPIRIN 81 MG CHEWABLE TABLET 81 MG: 81 TABLET CHEWABLE at 08:37

## 2024-02-23 RX ADMIN — FENTANYL CITRATE 50 MCG: 50 INJECTION INTRAMUSCULAR; INTRAVENOUS at 02:35

## 2024-02-23 RX ADMIN — BARICITINIB 4 MG: 2 TABLET, FILM COATED ORAL at 09:13

## 2024-02-23 RX ADMIN — ATORVASTATIN CALCIUM 80 MG: 40 TABLET, FILM COATED ORAL at 00:09

## 2024-02-23 RX ADMIN — FAMOTIDINE 20 MG: 10 INJECTION, SOLUTION INTRAVENOUS at 21:04

## 2024-02-23 RX ADMIN — Medication 95 MG: at 12:56

## 2024-02-23 RX ADMIN — SODIUM CHLORIDE: 9 INJECTION, SOLUTION INTRAVENOUS at 16:25

## 2024-02-23 RX ADMIN — FENTANYL CITRATE 50 MCG: 50 INJECTION INTRAMUSCULAR; INTRAVENOUS at 01:48

## 2024-02-23 RX ADMIN — WATER 40 MG: 1 INJECTION INTRAMUSCULAR; INTRAVENOUS; SUBCUTANEOUS at 00:03

## 2024-02-23 RX ADMIN — Medication 6000 UNITS: at 11:55

## 2024-02-23 RX ADMIN — LEVETIRACETAM 1000 MG: 100 INJECTION, SOLUTION INTRAVENOUS at 21:04

## 2024-02-23 ASSESSMENT — PAIN SCALES - GENERAL
PAINLEVEL_OUTOF10: 1
PAINLEVEL_OUTOF10: 0

## 2024-02-23 ASSESSMENT — PULMONARY FUNCTION TESTS
PIF_VALUE: 13
PIF_VALUE: 11

## 2024-02-23 NOTE — TELEPHONE ENCOUNTER
Pt needs a hospital follow up appointment  Provider: Any  Location: [facility]  In person or virtual: In person  When: 6-8 weeks   Diagnosis/reason for follow up:  stroke and seizure  Additional information:

## 2024-02-24 LAB
ALBUMIN SERPL-MCNC: 2.5 G/DL (ref 3.5–5)
ALBUMIN/GLOB SERPL: 0.8 (ref 1.1–2.2)
ALP SERPL-CCNC: 63 U/L (ref 45–117)
ALT SERPL-CCNC: 34 U/L (ref 12–78)
ANION GAP SERPL CALC-SCNC: 6 MMOL/L (ref 5–15)
AST SERPL-CCNC: 44 U/L (ref 15–37)
BASOPHILS # BLD: 0 K/UL (ref 0–0.1)
BASOPHILS NFR BLD: 0 % (ref 0–1)
BILIRUB SERPL-MCNC: 0.6 MG/DL (ref 0.2–1)
BUN SERPL-MCNC: 17 MG/DL (ref 6–20)
BUN/CREAT SERPL: 24 (ref 12–20)
CALCIUM SERPL-MCNC: 8 MG/DL (ref 8.5–10.1)
CHLORIDE SERPL-SCNC: 102 MMOL/L (ref 97–108)
CO2 SERPL-SCNC: 29 MMOL/L (ref 21–32)
CREAT SERPL-MCNC: 0.7 MG/DL (ref 0.7–1.3)
DIFFERENTIAL METHOD BLD: ABNORMAL
EOSINOPHIL # BLD: 0 K/UL (ref 0–0.4)
EOSINOPHIL NFR BLD: 0 % (ref 0–7)
ERYTHROCYTE [DISTWIDTH] IN BLOOD BY AUTOMATED COUNT: 12.7 % (ref 11.5–14.5)
GLOBULIN SER CALC-MCNC: 3.3 G/DL (ref 2–4)
GLUCOSE BLD STRIP.AUTO-MCNC: 145 MG/DL (ref 65–117)
GLUCOSE BLD STRIP.AUTO-MCNC: 167 MG/DL (ref 65–117)
GLUCOSE BLD STRIP.AUTO-MCNC: 169 MG/DL (ref 65–117)
GLUCOSE BLD STRIP.AUTO-MCNC: 197 MG/DL (ref 65–117)
GLUCOSE SERPL-MCNC: 165 MG/DL (ref 65–100)
HCT VFR BLD AUTO: 29.9 % (ref 36.6–50.3)
HGB BLD-MCNC: 9.9 G/DL (ref 12.1–17)
IMM GRANULOCYTES # BLD AUTO: 0.1 K/UL (ref 0–0.04)
IMM GRANULOCYTES NFR BLD AUTO: 1 % (ref 0–0.5)
LYMPHOCYTES # BLD: 0.4 K/UL (ref 0.8–3.5)
LYMPHOCYTES NFR BLD: 8 % (ref 12–49)
MCH RBC QN AUTO: 29.8 PG (ref 26–34)
MCHC RBC AUTO-ENTMCNC: 33.1 G/DL (ref 30–36.5)
MCV RBC AUTO: 90.1 FL (ref 80–99)
MONOCYTES # BLD: 0.2 K/UL (ref 0–1)
MONOCYTES NFR BLD: 4 % (ref 5–13)
NEUTS SEG # BLD: 4.9 K/UL (ref 1.8–8)
NEUTS SEG NFR BLD: 87 % (ref 32–75)
NRBC # BLD: 0 K/UL (ref 0–0.01)
NRBC BLD-RTO: 0 PER 100 WBC
PLATELET # BLD AUTO: 263 K/UL (ref 150–400)
PMV BLD AUTO: 9.2 FL (ref 8.9–12.9)
POTASSIUM SERPL-SCNC: 4 MMOL/L (ref 3.5–5.1)
PROT SERPL-MCNC: 5.8 G/DL (ref 6.4–8.2)
RBC # BLD AUTO: 3.32 M/UL (ref 4.1–5.7)
RBC MORPH BLD: ABNORMAL
SERVICE CMNT-IMP: ABNORMAL
SODIUM SERPL-SCNC: 137 MMOL/L (ref 136–145)
TSH SERPL DL<=0.05 MIU/L-ACNC: 1.07 UIU/ML (ref 0.45–4.5)
WBC # BLD AUTO: 5.6 K/UL (ref 4.1–11.1)

## 2024-02-24 PROCEDURE — 2500000003 HC RX 250 WO HCPCS: Performed by: NURSE PRACTITIONER

## 2024-02-24 PROCEDURE — 82962 GLUCOSE BLOOD TEST: CPT

## 2024-02-24 PROCEDURE — 6360000002 HC RX W HCPCS: Performed by: STUDENT IN AN ORGANIZED HEALTH CARE EDUCATION/TRAINING PROGRAM

## 2024-02-24 PROCEDURE — 2580000003 HC RX 258: Performed by: PHYSICIAN ASSISTANT

## 2024-02-24 PROCEDURE — 36415 COLL VENOUS BLD VENIPUNCTURE: CPT

## 2024-02-24 PROCEDURE — 2580000003 HC RX 258: Performed by: NURSE PRACTITIONER

## 2024-02-24 PROCEDURE — 85025 COMPLETE CBC W/AUTO DIFF WBC: CPT

## 2024-02-24 PROCEDURE — 6360000002 HC RX W HCPCS: Performed by: NURSE PRACTITIONER

## 2024-02-24 PROCEDURE — 80053 COMPREHEN METABOLIC PANEL: CPT

## 2024-02-24 PROCEDURE — 6370000000 HC RX 637 (ALT 250 FOR IP): Performed by: NURSE PRACTITIONER

## 2024-02-24 PROCEDURE — 2000000000 HC ICU R&B

## 2024-02-24 PROCEDURE — 6370000000 HC RX 637 (ALT 250 FOR IP): Performed by: STUDENT IN AN ORGANIZED HEALTH CARE EDUCATION/TRAINING PROGRAM

## 2024-02-24 PROCEDURE — 6370000000 HC RX 637 (ALT 250 FOR IP): Performed by: FAMILY MEDICINE

## 2024-02-24 PROCEDURE — A4216 STERILE WATER/SALINE, 10 ML: HCPCS | Performed by: NURSE PRACTITIONER

## 2024-02-24 RX ORDER — HYDRALAZINE HYDROCHLORIDE 25 MG/1
25 TABLET, FILM COATED ORAL EVERY 8 HOURS SCHEDULED
Status: DISCONTINUED | OUTPATIENT
Start: 2024-02-24 | End: 2024-02-27

## 2024-02-24 RX ADMIN — SODIUM CHLORIDE, PRESERVATIVE FREE 10 ML: 5 INJECTION INTRAVENOUS at 21:13

## 2024-02-24 RX ADMIN — LEVETIRACETAM 1000 MG: 100 INJECTION, SOLUTION INTRAVENOUS at 21:12

## 2024-02-24 RX ADMIN — LEVETIRACETAM 1000 MG: 100 INJECTION, SOLUTION INTRAVENOUS at 08:37

## 2024-02-24 RX ADMIN — LISINOPRIL 40 MG: 20 TABLET ORAL at 08:25

## 2024-02-24 RX ADMIN — SODIUM CHLORIDE, PRESERVATIVE FREE 10 ML: 5 INJECTION INTRAVENOUS at 21:14

## 2024-02-24 RX ADMIN — HYDRALAZINE HYDROCHLORIDE 10 MG: 20 INJECTION INTRAMUSCULAR; INTRAVENOUS at 18:56

## 2024-02-24 RX ADMIN — Medication 95 MG: at 08:23

## 2024-02-24 RX ADMIN — HYDRALAZINE HYDROCHLORIDE 10 MG: 20 INJECTION INTRAMUSCULAR; INTRAVENOUS at 07:49

## 2024-02-24 RX ADMIN — ASPIRIN 81 MG CHEWABLE TABLET 81 MG: 81 TABLET CHEWABLE at 08:25

## 2024-02-24 RX ADMIN — CHLORHEXIDINE GLUCONATE 15 ML: 1.2 RINSE ORAL at 21:23

## 2024-02-24 RX ADMIN — HYDRALAZINE HYDROCHLORIDE 10 MG: 20 INJECTION INTRAMUSCULAR; INTRAVENOUS at 13:08

## 2024-02-24 RX ADMIN — SODIUM CHLORIDE, PRESERVATIVE FREE 10 ML: 5 INJECTION INTRAVENOUS at 08:24

## 2024-02-24 RX ADMIN — HYDRALAZINE HYDROCHLORIDE 25 MG: 25 TABLET ORAL at 21:13

## 2024-02-24 RX ADMIN — WATER 40 MG: 1 INJECTION INTRAMUSCULAR; INTRAVENOUS; SUBCUTANEOUS at 10:50

## 2024-02-24 RX ADMIN — CHLORHEXIDINE GLUCONATE 15 ML: 1.2 RINSE ORAL at 08:21

## 2024-02-24 RX ADMIN — Medication: at 21:31

## 2024-02-24 RX ADMIN — FAMOTIDINE 20 MG: 10 INJECTION, SOLUTION INTRAVENOUS at 21:12

## 2024-02-24 RX ADMIN — POLYETHYLENE GLYCOL 3350 17 G: 17 POWDER, FOR SOLUTION ORAL at 08:26

## 2024-02-24 RX ADMIN — FAMOTIDINE 20 MG: 10 INJECTION, SOLUTION INTRAVENOUS at 08:26

## 2024-02-24 RX ADMIN — SODIUM CHLORIDE: 9 INJECTION, SOLUTION INTRAVENOUS at 17:01

## 2024-02-24 RX ADMIN — SODIUM CHLORIDE: 9 INJECTION, SOLUTION INTRAVENOUS at 04:28

## 2024-02-24 RX ADMIN — HYDRALAZINE HYDROCHLORIDE 25 MG: 25 TABLET ORAL at 10:52

## 2024-02-24 RX ADMIN — POLYETHYLENE GLYCOL 3350 17 G: 17 POWDER, FOR SOLUTION ORAL at 21:11

## 2024-02-24 RX ADMIN — Medication: at 10:50

## 2024-02-24 RX ADMIN — WATER 40 MG: 1 INJECTION INTRAMUSCULAR; INTRAVENOUS; SUBCUTANEOUS at 23:04

## 2024-02-24 RX ADMIN — Medication 6000 UNITS: at 08:25

## 2024-02-24 RX ADMIN — ENOXAPARIN SODIUM 40 MG: 100 INJECTION SUBCUTANEOUS at 08:26

## 2024-02-24 RX ADMIN — ATORVASTATIN CALCIUM 80 MG: 40 TABLET, FILM COATED ORAL at 21:13

## 2024-02-24 ASSESSMENT — PAIN SCALES - GENERAL
PAINLEVEL_OUTOF10: 0

## 2024-02-25 ENCOUNTER — APPOINTMENT (OUTPATIENT)
Facility: HOSPITAL | Age: 64
DRG: 045 | End: 2024-02-25
Payer: COMMERCIAL

## 2024-02-25 LAB
ANION GAP SERPL CALC-SCNC: 5 MMOL/L (ref 5–15)
BASOPHILS # BLD: 0 K/UL (ref 0–0.1)
BASOPHILS NFR BLD: 0 % (ref 0–1)
BUN SERPL-MCNC: 19 MG/DL (ref 6–20)
BUN/CREAT SERPL: 26 (ref 12–20)
CALCIUM SERPL-MCNC: 8.2 MG/DL (ref 8.5–10.1)
CHLORIDE SERPL-SCNC: 98 MMOL/L (ref 97–108)
CO2 SERPL-SCNC: 30 MMOL/L (ref 21–32)
CREAT SERPL-MCNC: 0.74 MG/DL (ref 0.7–1.3)
DIFFERENTIAL METHOD BLD: ABNORMAL
EKG ATRIAL RATE: 43 BPM
EKG DIAGNOSIS: NORMAL
EKG P AXIS: -9 DEGREES
EKG P-R INTERVAL: 176 MS
EKG Q-T INTERVAL: 584 MS
EKG QRS DURATION: 128 MS
EKG QTC CALCULATION (BAZETT): 493 MS
EKG R AXIS: -53 DEGREES
EKG T AXIS: -52 DEGREES
EKG VENTRICULAR RATE: 43 BPM
EOSINOPHIL # BLD: 0 K/UL (ref 0–0.4)
EOSINOPHIL NFR BLD: 0 % (ref 0–7)
ERYTHROCYTE [DISTWIDTH] IN BLOOD BY AUTOMATED COUNT: 12.3 % (ref 11.5–14.5)
GLUCOSE BLD STRIP.AUTO-MCNC: 245 MG/DL (ref 65–117)
GLUCOSE BLD STRIP.AUTO-MCNC: 273 MG/DL (ref 65–117)
GLUCOSE BLD STRIP.AUTO-MCNC: 286 MG/DL (ref 65–117)
GLUCOSE SERPL-MCNC: 288 MG/DL (ref 65–100)
HCT VFR BLD AUTO: 33 % (ref 36.6–50.3)
HGB BLD-MCNC: 11.1 G/DL (ref 12.1–17)
IMM GRANULOCYTES # BLD AUTO: 0.1 K/UL (ref 0–0.04)
IMM GRANULOCYTES NFR BLD AUTO: 1 % (ref 0–0.5)
LYMPHOCYTES # BLD: 0.4 K/UL (ref 0.8–3.5)
LYMPHOCYTES NFR BLD: 7 % (ref 12–49)
MCH RBC QN AUTO: 29.8 PG (ref 26–34)
MCHC RBC AUTO-ENTMCNC: 33.6 G/DL (ref 30–36.5)
MCV RBC AUTO: 88.7 FL (ref 80–99)
MONOCYTES # BLD: 0.2 K/UL (ref 0–1)
MONOCYTES NFR BLD: 4 % (ref 5–13)
NEUTS SEG # BLD: 5.5 K/UL (ref 1.8–8)
NEUTS SEG NFR BLD: 88 % (ref 32–75)
NRBC # BLD: 0 K/UL (ref 0–0.01)
NRBC BLD-RTO: 0 PER 100 WBC
PLATELET # BLD AUTO: 302 K/UL (ref 150–400)
PMV BLD AUTO: 9.2 FL (ref 8.9–12.9)
POTASSIUM SERPL-SCNC: 4.1 MMOL/L (ref 3.5–5.1)
RBC # BLD AUTO: 3.72 M/UL (ref 4.1–5.7)
RBC MORPH BLD: ABNORMAL
SERVICE CMNT-IMP: ABNORMAL
SODIUM SERPL-SCNC: 133 MMOL/L (ref 136–145)
WBC # BLD AUTO: 6.2 K/UL (ref 4.1–11.1)

## 2024-02-25 PROCEDURE — 74018 RADEX ABDOMEN 1 VIEW: CPT

## 2024-02-25 PROCEDURE — 2580000003 HC RX 258: Performed by: PHYSICIAN ASSISTANT

## 2024-02-25 PROCEDURE — 6370000000 HC RX 637 (ALT 250 FOR IP): Performed by: NURSE PRACTITIONER

## 2024-02-25 PROCEDURE — 92610 EVALUATE SWALLOWING FUNCTION: CPT

## 2024-02-25 PROCEDURE — 2580000003 HC RX 258: Performed by: NURSE PRACTITIONER

## 2024-02-25 PROCEDURE — 36415 COLL VENOUS BLD VENIPUNCTURE: CPT

## 2024-02-25 PROCEDURE — 85025 COMPLETE CBC W/AUTO DIFF WBC: CPT

## 2024-02-25 PROCEDURE — 82962 GLUCOSE BLOOD TEST: CPT

## 2024-02-25 PROCEDURE — A4216 STERILE WATER/SALINE, 10 ML: HCPCS | Performed by: NURSE PRACTITIONER

## 2024-02-25 PROCEDURE — 6370000000 HC RX 637 (ALT 250 FOR IP): Performed by: FAMILY MEDICINE

## 2024-02-25 PROCEDURE — 6360000002 HC RX W HCPCS: Performed by: STUDENT IN AN ORGANIZED HEALTH CARE EDUCATION/TRAINING PROGRAM

## 2024-02-25 PROCEDURE — 80048 BASIC METABOLIC PNL TOTAL CA: CPT

## 2024-02-25 PROCEDURE — 6360000002 HC RX W HCPCS: Performed by: NURSE PRACTITIONER

## 2024-02-25 PROCEDURE — 2700000000 HC OXYGEN THERAPY PER DAY

## 2024-02-25 PROCEDURE — 2060000000 HC ICU INTERMEDIATE R&B

## 2024-02-25 PROCEDURE — 6370000000 HC RX 637 (ALT 250 FOR IP): Performed by: STUDENT IN AN ORGANIZED HEALTH CARE EDUCATION/TRAINING PROGRAM

## 2024-02-25 PROCEDURE — 2500000003 HC RX 250 WO HCPCS: Performed by: NURSE PRACTITIONER

## 2024-02-25 PROCEDURE — 93010 ELECTROCARDIOGRAM REPORT: CPT | Performed by: SPECIALIST

## 2024-02-25 RX ADMIN — SODIUM CHLORIDE, PRESERVATIVE FREE 20 ML: 5 INJECTION INTRAVENOUS at 22:02

## 2024-02-25 RX ADMIN — Medication: at 11:08

## 2024-02-25 RX ADMIN — CHLORHEXIDINE GLUCONATE 15 ML: 1.2 RINSE ORAL at 08:29

## 2024-02-25 RX ADMIN — HYDRALAZINE HYDROCHLORIDE 25 MG: 25 TABLET ORAL at 05:54

## 2024-02-25 RX ADMIN — Medication 95 MG: at 08:28

## 2024-02-25 RX ADMIN — INSULIN LISPRO 4 UNITS: 100 INJECTION, SOLUTION INTRAVENOUS; SUBCUTANEOUS at 05:30

## 2024-02-25 RX ADMIN — HYDRALAZINE HYDROCHLORIDE 10 MG: 20 INJECTION INTRAMUSCULAR; INTRAVENOUS at 02:27

## 2024-02-25 RX ADMIN — SODIUM CHLORIDE, PRESERVATIVE FREE 10 ML: 5 INJECTION INTRAVENOUS at 08:31

## 2024-02-25 RX ADMIN — CHLORHEXIDINE GLUCONATE 15 ML: 1.2 RINSE ORAL at 22:01

## 2024-02-25 RX ADMIN — ATORVASTATIN CALCIUM 80 MG: 40 TABLET, FILM COATED ORAL at 22:01

## 2024-02-25 RX ADMIN — ASPIRIN 81 MG CHEWABLE TABLET 81 MG: 81 TABLET CHEWABLE at 08:30

## 2024-02-25 RX ADMIN — Medication 6000 UNITS: at 08:28

## 2024-02-25 RX ADMIN — POLYETHYLENE GLYCOL 3350 17 G: 17 POWDER, FOR SOLUTION ORAL at 08:28

## 2024-02-25 RX ADMIN — HYDRALAZINE HYDROCHLORIDE 25 MG: 25 TABLET ORAL at 22:01

## 2024-02-25 RX ADMIN — FAMOTIDINE 20 MG: 10 INJECTION, SOLUTION INTRAVENOUS at 20:53

## 2024-02-25 RX ADMIN — LEVETIRACETAM 1000 MG: 100 INJECTION, SOLUTION INTRAVENOUS at 20:53

## 2024-02-25 RX ADMIN — HYDRALAZINE HYDROCHLORIDE 25 MG: 25 TABLET ORAL at 14:27

## 2024-02-25 RX ADMIN — HYDRALAZINE HYDROCHLORIDE 10 MG: 20 INJECTION INTRAMUSCULAR; INTRAVENOUS at 18:12

## 2024-02-25 RX ADMIN — INSULIN LISPRO 4 UNITS: 100 INJECTION, SOLUTION INTRAVENOUS; SUBCUTANEOUS at 18:13

## 2024-02-25 RX ADMIN — ENOXAPARIN SODIUM 40 MG: 100 INJECTION SUBCUTANEOUS at 08:28

## 2024-02-25 RX ADMIN — SODIUM CHLORIDE, PRESERVATIVE FREE 10 ML: 5 INJECTION INTRAVENOUS at 08:30

## 2024-02-25 RX ADMIN — FAMOTIDINE 20 MG: 10 INJECTION, SOLUTION INTRAVENOUS at 08:29

## 2024-02-25 RX ADMIN — WATER 40 MG: 1 INJECTION INTRAMUSCULAR; INTRAVENOUS; SUBCUTANEOUS at 11:08

## 2024-02-25 RX ADMIN — INSULIN LISPRO 2 UNITS: 100 INJECTION, SOLUTION INTRAVENOUS; SUBCUTANEOUS at 11:56

## 2024-02-25 RX ADMIN — LEVETIRACETAM 1000 MG: 100 INJECTION, SOLUTION INTRAVENOUS at 08:30

## 2024-02-25 RX ADMIN — LISINOPRIL 40 MG: 20 TABLET ORAL at 08:28

## 2024-02-25 ASSESSMENT — PAIN SCALES - GENERAL
PAINLEVEL_OUTOF10: 0

## 2024-02-26 LAB
ALBUMIN SERPL-MCNC: 2.7 G/DL (ref 3.5–5)
ALBUMIN/GLOB SERPL: 0.7 (ref 1.1–2.2)
ALP SERPL-CCNC: 70 U/L (ref 45–117)
ALT SERPL-CCNC: 38 U/L (ref 12–78)
ANION GAP SERPL CALC-SCNC: 7 MMOL/L (ref 5–15)
AST SERPL-CCNC: 23 U/L (ref 15–37)
BASOPHILS # BLD: 0 K/UL (ref 0–0.1)
BASOPHILS NFR BLD: 0 % (ref 0–1)
BILIRUB SERPL-MCNC: 0.5 MG/DL (ref 0.2–1)
BUN SERPL-MCNC: 20 MG/DL (ref 6–20)
BUN/CREAT SERPL: 24 (ref 12–20)
CALCIUM SERPL-MCNC: 8.5 MG/DL (ref 8.5–10.1)
CHLORIDE SERPL-SCNC: 96 MMOL/L (ref 97–108)
CO2 SERPL-SCNC: 28 MMOL/L (ref 21–32)
CREAT SERPL-MCNC: 0.82 MG/DL (ref 0.7–1.3)
DIFFERENTIAL METHOD BLD: ABNORMAL
EOSINOPHIL # BLD: 0 K/UL (ref 0–0.4)
EOSINOPHIL NFR BLD: 0 % (ref 0–7)
ERYTHROCYTE [DISTWIDTH] IN BLOOD BY AUTOMATED COUNT: 12.3 % (ref 11.5–14.5)
GLOBULIN SER CALC-MCNC: 4 G/DL (ref 2–4)
GLUCOSE BLD STRIP.AUTO-MCNC: 144 MG/DL (ref 65–117)
GLUCOSE BLD STRIP.AUTO-MCNC: 269 MG/DL (ref 65–117)
GLUCOSE BLD STRIP.AUTO-MCNC: 297 MG/DL (ref 65–117)
GLUCOSE BLD STRIP.AUTO-MCNC: 352 MG/DL (ref 65–117)
GLUCOSE BLD STRIP.AUTO-MCNC: 364 MG/DL (ref 65–117)
GLUCOSE SERPL-MCNC: 348 MG/DL (ref 65–100)
HCT VFR BLD AUTO: 34.3 % (ref 36.6–50.3)
HGB BLD-MCNC: 11.8 G/DL (ref 12.1–17)
IMM GRANULOCYTES # BLD AUTO: 0.1 K/UL (ref 0–0.04)
IMM GRANULOCYTES NFR BLD AUTO: 1 % (ref 0–0.5)
LYMPHOCYTES # BLD: 0.4 K/UL (ref 0.8–3.5)
LYMPHOCYTES NFR BLD: 6 % (ref 12–49)
MCH RBC QN AUTO: 30.3 PG (ref 26–34)
MCHC RBC AUTO-ENTMCNC: 34.4 G/DL (ref 30–36.5)
MCV RBC AUTO: 87.9 FL (ref 80–99)
MONOCYTES # BLD: 0.2 K/UL (ref 0–1)
MONOCYTES NFR BLD: 3 % (ref 5–13)
NEUTS SEG # BLD: 6.3 K/UL (ref 1.8–8)
NEUTS SEG NFR BLD: 90 % (ref 32–75)
NRBC # BLD: 0 K/UL (ref 0–0.01)
NRBC BLD-RTO: 0 PER 100 WBC
PLATELET # BLD AUTO: 318 K/UL (ref 150–400)
PMV BLD AUTO: 9 FL (ref 8.9–12.9)
POTASSIUM SERPL-SCNC: 4.2 MMOL/L (ref 3.5–5.1)
PROT SERPL-MCNC: 6.7 G/DL (ref 6.4–8.2)
RBC # BLD AUTO: 3.9 M/UL (ref 4.1–5.7)
RBC MORPH BLD: ABNORMAL
SERVICE CMNT-IMP: ABNORMAL
SODIUM SERPL-SCNC: 131 MMOL/L (ref 136–145)
WBC # BLD AUTO: 7 K/UL (ref 4.1–11.1)

## 2024-02-26 PROCEDURE — 80053 COMPREHEN METABOLIC PANEL: CPT

## 2024-02-26 PROCEDURE — 97530 THERAPEUTIC ACTIVITIES: CPT

## 2024-02-26 PROCEDURE — 6370000000 HC RX 637 (ALT 250 FOR IP): Performed by: NURSE PRACTITIONER

## 2024-02-26 PROCEDURE — 92612 ENDOSCOPY SWALLOW (FEES) VID: CPT

## 2024-02-26 PROCEDURE — 2580000003 HC RX 258: Performed by: NURSE PRACTITIONER

## 2024-02-26 PROCEDURE — 6370000000 HC RX 637 (ALT 250 FOR IP): Performed by: FAMILY MEDICINE

## 2024-02-26 PROCEDURE — 6370000000 HC RX 637 (ALT 250 FOR IP): Performed by: STUDENT IN AN ORGANIZED HEALTH CARE EDUCATION/TRAINING PROGRAM

## 2024-02-26 PROCEDURE — 2580000003 HC RX 258: Performed by: PHYSICIAN ASSISTANT

## 2024-02-26 PROCEDURE — 6360000002 HC RX W HCPCS: Performed by: STUDENT IN AN ORGANIZED HEALTH CARE EDUCATION/TRAINING PROGRAM

## 2024-02-26 PROCEDURE — 2700000000 HC OXYGEN THERAPY PER DAY

## 2024-02-26 PROCEDURE — 6360000002 HC RX W HCPCS: Performed by: INTERNAL MEDICINE

## 2024-02-26 PROCEDURE — 85025 COMPLETE CBC W/AUTO DIFF WBC: CPT

## 2024-02-26 PROCEDURE — 6360000002 HC RX W HCPCS: Performed by: NURSE PRACTITIONER

## 2024-02-26 PROCEDURE — 2500000003 HC RX 250 WO HCPCS: Performed by: NURSE PRACTITIONER

## 2024-02-26 PROCEDURE — 82962 GLUCOSE BLOOD TEST: CPT

## 2024-02-26 PROCEDURE — A4216 STERILE WATER/SALINE, 10 ML: HCPCS | Performed by: NURSE PRACTITIONER

## 2024-02-26 PROCEDURE — 6370000000 HC RX 637 (ALT 250 FOR IP): Performed by: INTERNAL MEDICINE

## 2024-02-26 PROCEDURE — 2060000000 HC ICU INTERMEDIATE R&B

## 2024-02-26 PROCEDURE — 94760 N-INVAS EAR/PLS OXIMETRY 1: CPT

## 2024-02-26 PROCEDURE — 2580000003 HC RX 258: Performed by: INTERNAL MEDICINE

## 2024-02-26 PROCEDURE — 36415 COLL VENOUS BLD VENIPUNCTURE: CPT

## 2024-02-26 PROCEDURE — 97535 SELF CARE MNGMENT TRAINING: CPT

## 2024-02-26 RX ORDER — FAMOTIDINE 20 MG/1
20 TABLET, FILM COATED ORAL 2 TIMES DAILY
Status: DISCONTINUED | OUTPATIENT
Start: 2024-02-26 | End: 2024-03-02

## 2024-02-26 RX ORDER — AMLODIPINE BESYLATE 5 MG/1
10 TABLET ORAL DAILY
Status: DISCONTINUED | OUTPATIENT
Start: 2024-02-26 | End: 2024-03-06 | Stop reason: HOSPADM

## 2024-02-26 RX ADMIN — INSULIN LISPRO 4 UNITS: 100 INJECTION, SOLUTION INTRAVENOUS; SUBCUTANEOUS at 11:38

## 2024-02-26 RX ADMIN — HYDRALAZINE HYDROCHLORIDE 25 MG: 25 TABLET ORAL at 05:45

## 2024-02-26 RX ADMIN — WATER 40 MG: 1 INJECTION INTRAMUSCULAR; INTRAVENOUS; SUBCUTANEOUS at 00:15

## 2024-02-26 RX ADMIN — FAMOTIDINE 20 MG: 10 INJECTION, SOLUTION INTRAVENOUS at 08:48

## 2024-02-26 RX ADMIN — HYDRALAZINE HYDROCHLORIDE 10 MG: 20 INJECTION INTRAMUSCULAR; INTRAVENOUS at 11:26

## 2024-02-26 RX ADMIN — WATER 40 MG: 1 INJECTION INTRAMUSCULAR; INTRAVENOUS; SUBCUTANEOUS at 11:26

## 2024-02-26 RX ADMIN — ASPIRIN 81 MG CHEWABLE TABLET 81 MG: 81 TABLET CHEWABLE at 08:49

## 2024-02-26 RX ADMIN — LEVETIRACETAM 1000 MG: 100 INJECTION, SOLUTION INTRAVENOUS at 21:24

## 2024-02-26 RX ADMIN — Medication: at 00:14

## 2024-02-26 RX ADMIN — ENOXAPARIN SODIUM 40 MG: 100 INJECTION SUBCUTANEOUS at 08:48

## 2024-02-26 RX ADMIN — POLYETHYLENE GLYCOL 3350 17 G: 17 POWDER, FOR SOLUTION ORAL at 21:25

## 2024-02-26 RX ADMIN — Medication 95 MG: at 08:49

## 2024-02-26 RX ADMIN — INSULIN LISPRO 4 UNITS: 100 INJECTION, SOLUTION INTRAVENOUS; SUBCUTANEOUS at 18:00

## 2024-02-26 RX ADMIN — POLYETHYLENE GLYCOL 3350 17 G: 17 POWDER, FOR SOLUTION ORAL at 08:49

## 2024-02-26 RX ADMIN — Medication 6000 UNITS: at 08:48

## 2024-02-26 RX ADMIN — FAMOTIDINE 20 MG: 20 TABLET ORAL at 21:25

## 2024-02-26 RX ADMIN — ATORVASTATIN CALCIUM 80 MG: 40 TABLET, FILM COATED ORAL at 21:25

## 2024-02-26 RX ADMIN — Medication: at 08:49

## 2024-02-26 RX ADMIN — Medication: at 21:24

## 2024-02-26 RX ADMIN — SODIUM CHLORIDE, PRESERVATIVE FREE 10 ML: 5 INJECTION INTRAVENOUS at 21:25

## 2024-02-26 RX ADMIN — HYDRALAZINE HYDROCHLORIDE 25 MG: 25 TABLET ORAL at 21:25

## 2024-02-26 RX ADMIN — AMLODIPINE BESYLATE 10 MG: 5 TABLET ORAL at 16:01

## 2024-02-26 RX ADMIN — INSULIN LISPRO 8 UNITS: 100 INJECTION, SOLUTION INTRAVENOUS; SUBCUTANEOUS at 06:09

## 2024-02-26 RX ADMIN — LEVETIRACETAM 1000 MG: 100 INJECTION, SOLUTION INTRAVENOUS at 08:48

## 2024-02-26 RX ADMIN — HYDRALAZINE HYDROCHLORIDE 25 MG: 25 TABLET ORAL at 13:28

## 2024-02-26 RX ADMIN — HYDRALAZINE HYDROCHLORIDE 10 MG: 20 INJECTION INTRAMUSCULAR; INTRAVENOUS at 03:04

## 2024-02-26 RX ADMIN — LISINOPRIL 40 MG: 20 TABLET ORAL at 08:49

## 2024-02-26 ASSESSMENT — PAIN SCALES - GENERAL
PAINLEVEL_OUTOF10: 0

## 2024-02-26 NOTE — CARE COORDINATION
Transition of Care Plan:     RUR: 20% High   Prior Level of Functioning: Independent  Disposition: IPR   IPR: Date FOC offered: 2/16/24  Date FOC received: 2/16/24  Accepting facility: [facility]   Date authorization started with reference number: [id number]  Date authorization received and expires:   Follow up appointments: PCP, Neurologist , cardiologist   DME needed: TBD  Transportation at discharge: BLS  IM/IMM Medicare/ letter given: NA  Is patient a  and connected with VA? No  Caregiver Contact: Mother Rena Llamas [phone]   Discharge Caregiver contacted prior to discharge? Yes  Care Conference needed? No  Barriers to discharge:    Medical Stability  TTE showed probable PFO, per cardiology note will defer closing and treat medically   2/22 Covid test positive   Neuro: A&Ox 2  Per notes patient has failed swallow test.   [facility] rehab has accepted.  Maria Del Rosario Christensen RN,Care Management

## 2024-02-27 LAB
ALBUMIN SERPL-MCNC: 2.6 G/DL (ref 3.5–5)
ALBUMIN/GLOB SERPL: 0.7 (ref 1.1–2.2)
ALP SERPL-CCNC: 73 U/L (ref 45–117)
ALT SERPL-CCNC: 38 U/L (ref 12–78)
ANION GAP SERPL CALC-SCNC: 7 MMOL/L (ref 5–15)
AST SERPL-CCNC: 24 U/L (ref 15–37)
BASOPHILS # BLD: 0 K/UL (ref 0–0.1)
BASOPHILS NFR BLD: 0 % (ref 0–1)
BILIRUB SERPL-MCNC: 0.4 MG/DL (ref 0.2–1)
BUN SERPL-MCNC: 23 MG/DL (ref 6–20)
BUN/CREAT SERPL: 25 (ref 12–20)
CALCIUM SERPL-MCNC: 9.1 MG/DL (ref 8.5–10.1)
CHLORIDE SERPL-SCNC: 96 MMOL/L (ref 97–108)
CO2 SERPL-SCNC: 27 MMOL/L (ref 21–32)
CREAT SERPL-MCNC: 0.92 MG/DL (ref 0.7–1.3)
DIFFERENTIAL METHOD BLD: ABNORMAL
EOSINOPHIL # BLD: 0 K/UL (ref 0–0.4)
EOSINOPHIL NFR BLD: 0 % (ref 0–7)
ERYTHROCYTE [DISTWIDTH] IN BLOOD BY AUTOMATED COUNT: 12.4 % (ref 11.5–14.5)
GLOBULIN SER CALC-MCNC: 3.9 G/DL (ref 2–4)
GLUCOSE BLD STRIP.AUTO-MCNC: 198 MG/DL (ref 65–117)
GLUCOSE BLD STRIP.AUTO-MCNC: 238 MG/DL (ref 65–117)
GLUCOSE BLD STRIP.AUTO-MCNC: 260 MG/DL (ref 65–117)
GLUCOSE BLD STRIP.AUTO-MCNC: 350 MG/DL (ref 65–117)
GLUCOSE SERPL-MCNC: 325 MG/DL (ref 65–100)
HCT VFR BLD AUTO: 33.8 % (ref 36.6–50.3)
HGB BLD-MCNC: 11.5 G/DL (ref 12.1–17)
IMM GRANULOCYTES # BLD AUTO: 0.1 K/UL (ref 0–0.04)
IMM GRANULOCYTES NFR BLD AUTO: 1 % (ref 0–0.5)
LYMPHOCYTES # BLD: 0.4 K/UL (ref 0.8–3.5)
LYMPHOCYTES NFR BLD: 6 % (ref 12–49)
M TB IFN-G BLD-IMP: NEGATIVE
M TB IFN-G CD4+ T-CELLS BLD-ACNC: 0.04 IU/ML
M TBIFN-G CD4+ CD8+T-CELLS BLD-ACNC: 0.05 IU/ML
MCH RBC QN AUTO: 30.3 PG (ref 26–34)
MCHC RBC AUTO-ENTMCNC: 34 G/DL (ref 30–36.5)
MCV RBC AUTO: 88.9 FL (ref 80–99)
MONOCYTES # BLD: 0.3 K/UL (ref 0–1)
MONOCYTES NFR BLD: 4 % (ref 5–13)
NEUTS SEG # BLD: 6.6 K/UL (ref 1.8–8)
NEUTS SEG NFR BLD: 89 % (ref 32–75)
NRBC # BLD: 0 K/UL (ref 0–0.01)
NRBC BLD-RTO: 0 PER 100 WBC
PLATELET # BLD AUTO: 280 K/UL (ref 150–400)
PMV BLD AUTO: 9.4 FL (ref 8.9–12.9)
POTASSIUM SERPL-SCNC: 4.9 MMOL/L (ref 3.5–5.1)
PROT SERPL-MCNC: 6.5 G/DL (ref 6.4–8.2)
QUANTIFERON CRITERIA: NORMAL
QUANTIFERON MITOGEN VALUE: 3.53 IU/ML
QUANTIFERON NIL VALUE: 0.05 IU/ML
RBC # BLD AUTO: 3.8 M/UL (ref 4.1–5.7)
RBC MORPH BLD: ABNORMAL
SERVICE CMNT-IMP: ABNORMAL
SODIUM SERPL-SCNC: 130 MMOL/L (ref 136–145)
WBC # BLD AUTO: 7.4 K/UL (ref 4.1–11.1)

## 2024-02-27 PROCEDURE — 2060000000 HC ICU INTERMEDIATE R&B

## 2024-02-27 PROCEDURE — 82962 GLUCOSE BLOOD TEST: CPT

## 2024-02-27 PROCEDURE — 2580000003 HC RX 258: Performed by: INTERNAL MEDICINE

## 2024-02-27 PROCEDURE — 97530 THERAPEUTIC ACTIVITIES: CPT

## 2024-02-27 PROCEDURE — 97535 SELF CARE MNGMENT TRAINING: CPT

## 2024-02-27 PROCEDURE — 6360000002 HC RX W HCPCS: Performed by: NURSE PRACTITIONER

## 2024-02-27 PROCEDURE — 6360000002 HC RX W HCPCS: Performed by: INTERNAL MEDICINE

## 2024-02-27 PROCEDURE — 6360000002 HC RX W HCPCS: Performed by: STUDENT IN AN ORGANIZED HEALTH CARE EDUCATION/TRAINING PROGRAM

## 2024-02-27 PROCEDURE — 80053 COMPREHEN METABOLIC PANEL: CPT

## 2024-02-27 PROCEDURE — 6370000000 HC RX 637 (ALT 250 FOR IP): Performed by: NURSE PRACTITIONER

## 2024-02-27 PROCEDURE — 6370000000 HC RX 637 (ALT 250 FOR IP): Performed by: FAMILY MEDICINE

## 2024-02-27 PROCEDURE — 36415 COLL VENOUS BLD VENIPUNCTURE: CPT

## 2024-02-27 PROCEDURE — 85025 COMPLETE CBC W/AUTO DIFF WBC: CPT

## 2024-02-27 PROCEDURE — 6370000000 HC RX 637 (ALT 250 FOR IP): Performed by: INTERNAL MEDICINE

## 2024-02-27 PROCEDURE — 97116 GAIT TRAINING THERAPY: CPT

## 2024-02-27 PROCEDURE — 2580000003 HC RX 258: Performed by: PHYSICIAN ASSISTANT

## 2024-02-27 PROCEDURE — 94760 N-INVAS EAR/PLS OXIMETRY 1: CPT

## 2024-02-27 PROCEDURE — 2700000000 HC OXYGEN THERAPY PER DAY

## 2024-02-27 PROCEDURE — 2580000003 HC RX 258: Performed by: NURSE PRACTITIONER

## 2024-02-27 PROCEDURE — 6370000000 HC RX 637 (ALT 250 FOR IP): Performed by: STUDENT IN AN ORGANIZED HEALTH CARE EDUCATION/TRAINING PROGRAM

## 2024-02-27 RX ORDER — HYDRALAZINE HYDROCHLORIDE 20 MG/ML
10 INJECTION INTRAMUSCULAR; INTRAVENOUS EVERY 6 HOURS PRN
Status: DISCONTINUED | OUTPATIENT
Start: 2024-02-27 | End: 2024-03-06 | Stop reason: HOSPADM

## 2024-02-27 RX ORDER — HYDRALAZINE HYDROCHLORIDE 50 MG/1
50 TABLET, FILM COATED ORAL EVERY 8 HOURS SCHEDULED
Status: DISCONTINUED | OUTPATIENT
Start: 2024-02-27 | End: 2024-02-28

## 2024-02-27 RX ADMIN — ASPIRIN 81 MG CHEWABLE TABLET 81 MG: 81 TABLET CHEWABLE at 10:14

## 2024-02-27 RX ADMIN — LEVETIRACETAM 1000 MG: 100 INJECTION, SOLUTION INTRAVENOUS at 10:15

## 2024-02-27 RX ADMIN — LISINOPRIL 40 MG: 20 TABLET ORAL at 10:16

## 2024-02-27 RX ADMIN — WATER 40 MG: 1 INJECTION INTRAMUSCULAR; INTRAVENOUS; SUBCUTANEOUS at 00:19

## 2024-02-27 RX ADMIN — FAMOTIDINE 20 MG: 20 TABLET ORAL at 22:17

## 2024-02-27 RX ADMIN — HYDRALAZINE HYDROCHLORIDE 50 MG: 50 TABLET, FILM COATED ORAL at 14:22

## 2024-02-27 RX ADMIN — WATER 40 MG: 1 INJECTION INTRAMUSCULAR; INTRAVENOUS; SUBCUTANEOUS at 10:32

## 2024-02-27 RX ADMIN — ATORVASTATIN CALCIUM 80 MG: 40 TABLET, FILM COATED ORAL at 22:17

## 2024-02-27 RX ADMIN — Medication: at 22:44

## 2024-02-27 RX ADMIN — POLYETHYLENE GLYCOL 3350 17 G: 17 POWDER, FOR SOLUTION ORAL at 22:18

## 2024-02-27 RX ADMIN — INSULIN LISPRO 2 UNITS: 100 INJECTION, SOLUTION INTRAVENOUS; SUBCUTANEOUS at 17:57

## 2024-02-27 RX ADMIN — INSULIN LISPRO 4 UNITS: 100 INJECTION, SOLUTION INTRAVENOUS; SUBCUTANEOUS at 14:23

## 2024-02-27 RX ADMIN — SODIUM CHLORIDE, PRESERVATIVE FREE 10 ML: 5 INJECTION INTRAVENOUS at 10:57

## 2024-02-27 RX ADMIN — SODIUM CHLORIDE, PRESERVATIVE FREE 10 ML: 5 INJECTION INTRAVENOUS at 21:22

## 2024-02-27 RX ADMIN — INSULIN LISPRO 8 UNITS: 100 INJECTION, SOLUTION INTRAVENOUS; SUBCUTANEOUS at 06:36

## 2024-02-27 RX ADMIN — HYDRALAZINE HYDROCHLORIDE 50 MG: 50 TABLET, FILM COATED ORAL at 22:17

## 2024-02-27 RX ADMIN — Medication 6000 UNITS: at 10:15

## 2024-02-27 RX ADMIN — ENOXAPARIN SODIUM 40 MG: 100 INJECTION SUBCUTANEOUS at 10:21

## 2024-02-27 RX ADMIN — WATER 40 MG: 1 INJECTION INTRAMUSCULAR; INTRAVENOUS; SUBCUTANEOUS at 22:16

## 2024-02-27 RX ADMIN — Medication 95 MG: at 10:13

## 2024-02-27 RX ADMIN — HYDRALAZINE HYDROCHLORIDE 25 MG: 25 TABLET ORAL at 06:15

## 2024-02-27 RX ADMIN — LEVETIRACETAM 1000 MG: 100 INJECTION, SOLUTION INTRAVENOUS at 21:22

## 2024-02-27 RX ADMIN — FAMOTIDINE 20 MG: 20 TABLET ORAL at 10:16

## 2024-02-27 RX ADMIN — AMLODIPINE BESYLATE 10 MG: 5 TABLET ORAL at 10:15

## 2024-02-27 RX ADMIN — POLYETHYLENE GLYCOL 3350 17 G: 17 POWDER, FOR SOLUTION ORAL at 10:14

## 2024-02-27 RX ADMIN — Medication: at 10:23

## 2024-02-27 RX ADMIN — SODIUM CHLORIDE, PRESERVATIVE FREE 10 ML: 5 INJECTION INTRAVENOUS at 10:17

## 2024-02-27 ASSESSMENT — PAIN SCALES - GENERAL: PAINLEVEL_OUTOF10: 0

## 2024-02-28 ENCOUNTER — APPOINTMENT (OUTPATIENT)
Facility: HOSPITAL | Age: 64
DRG: 045 | End: 2024-02-28
Payer: COMMERCIAL

## 2024-02-28 LAB
ALBUMIN SERPL-MCNC: 2.6 G/DL (ref 3.5–5)
ALBUMIN/GLOB SERPL: 0.9 (ref 1.1–2.2)
ALP SERPL-CCNC: 66 U/L (ref 45–117)
ALT SERPL-CCNC: 40 U/L (ref 12–78)
ANION GAP SERPL CALC-SCNC: 8 MMOL/L (ref 5–15)
AST SERPL-CCNC: 32 U/L (ref 15–37)
BASOPHILS # BLD: 0 K/UL (ref 0–0.1)
BASOPHILS NFR BLD: 0 % (ref 0–1)
BILIRUB SERPL-MCNC: 0.4 MG/DL (ref 0.2–1)
BUN SERPL-MCNC: 28 MG/DL (ref 6–20)
BUN/CREAT SERPL: 36 (ref 12–20)
CALCIUM SERPL-MCNC: 8.1 MG/DL (ref 8.5–10.1)
CHLORIDE SERPL-SCNC: 99 MMOL/L (ref 97–108)
CO2 SERPL-SCNC: 28 MMOL/L (ref 21–32)
CREAT SERPL-MCNC: 0.78 MG/DL (ref 0.7–1.3)
DIFFERENTIAL METHOD BLD: ABNORMAL
EOSINOPHIL # BLD: 0 K/UL (ref 0–0.4)
EOSINOPHIL NFR BLD: 0 % (ref 0–7)
ERYTHROCYTE [DISTWIDTH] IN BLOOD BY AUTOMATED COUNT: 12.6 % (ref 11.5–14.5)
GLOBULIN SER CALC-MCNC: 3 G/DL (ref 2–4)
GLUCOSE BLD STRIP.AUTO-MCNC: 180 MG/DL (ref 65–117)
GLUCOSE BLD STRIP.AUTO-MCNC: 254 MG/DL (ref 65–117)
GLUCOSE BLD STRIP.AUTO-MCNC: 296 MG/DL (ref 65–117)
GLUCOSE BLD STRIP.AUTO-MCNC: 331 MG/DL (ref 65–117)
GLUCOSE BLD STRIP.AUTO-MCNC: 349 MG/DL (ref 65–117)
GLUCOSE SERPL-MCNC: 316 MG/DL (ref 65–100)
HCT VFR BLD AUTO: 33.1 % (ref 36.6–50.3)
HGB BLD-MCNC: 10.9 G/DL (ref 12.1–17)
IMM GRANULOCYTES # BLD AUTO: 0.1 K/UL (ref 0–0.04)
IMM GRANULOCYTES NFR BLD AUTO: 1 % (ref 0–0.5)
LYMPHOCYTES # BLD: 0.5 K/UL (ref 0.8–3.5)
LYMPHOCYTES NFR BLD: 6 % (ref 12–49)
MCH RBC QN AUTO: 29.8 PG (ref 26–34)
MCHC RBC AUTO-ENTMCNC: 32.9 G/DL (ref 30–36.5)
MCV RBC AUTO: 90.4 FL (ref 80–99)
MONOCYTES # BLD: 0.3 K/UL (ref 0–1)
MONOCYTES NFR BLD: 4 % (ref 5–13)
NEUTS SEG # BLD: 6.8 K/UL (ref 1.8–8)
NEUTS SEG NFR BLD: 89 % (ref 32–75)
NRBC # BLD: 0 K/UL (ref 0–0.01)
NRBC BLD-RTO: 0 PER 100 WBC
PLATELET # BLD AUTO: 291 K/UL (ref 150–400)
PMV BLD AUTO: 9.8 FL (ref 8.9–12.9)
POTASSIUM SERPL-SCNC: 4.7 MMOL/L (ref 3.5–5.1)
PROT SERPL-MCNC: 5.6 G/DL (ref 6.4–8.2)
RBC # BLD AUTO: 3.66 M/UL (ref 4.1–5.7)
RBC MORPH BLD: ABNORMAL
SERVICE CMNT-IMP: ABNORMAL
SODIUM SERPL-SCNC: 135 MMOL/L (ref 136–145)
WBC # BLD AUTO: 7.7 K/UL (ref 4.1–11.1)

## 2024-02-28 PROCEDURE — 6370000000 HC RX 637 (ALT 250 FOR IP): Performed by: INTERNAL MEDICINE

## 2024-02-28 PROCEDURE — 2580000003 HC RX 258: Performed by: NURSE PRACTITIONER

## 2024-02-28 PROCEDURE — 97116 GAIT TRAINING THERAPY: CPT

## 2024-02-28 PROCEDURE — 6360000002 HC RX W HCPCS: Performed by: INTERNAL MEDICINE

## 2024-02-28 PROCEDURE — 80053 COMPREHEN METABOLIC PANEL: CPT

## 2024-02-28 PROCEDURE — 2580000003 HC RX 258: Performed by: INTERNAL MEDICINE

## 2024-02-28 PROCEDURE — 2580000003 HC RX 258: Performed by: PHYSICIAN ASSISTANT

## 2024-02-28 PROCEDURE — 2060000000 HC ICU INTERMEDIATE R&B

## 2024-02-28 PROCEDURE — 97535 SELF CARE MNGMENT TRAINING: CPT

## 2024-02-28 PROCEDURE — 82962 GLUCOSE BLOOD TEST: CPT

## 2024-02-28 PROCEDURE — 6370000000 HC RX 637 (ALT 250 FOR IP): Performed by: STUDENT IN AN ORGANIZED HEALTH CARE EDUCATION/TRAINING PROGRAM

## 2024-02-28 PROCEDURE — 6360000002 HC RX W HCPCS: Performed by: NURSE PRACTITIONER

## 2024-02-28 PROCEDURE — 97530 THERAPEUTIC ACTIVITIES: CPT

## 2024-02-28 PROCEDURE — 85025 COMPLETE CBC W/AUTO DIFF WBC: CPT

## 2024-02-28 PROCEDURE — 6370000000 HC RX 637 (ALT 250 FOR IP): Performed by: FAMILY MEDICINE

## 2024-02-28 PROCEDURE — 92526 ORAL FUNCTION THERAPY: CPT

## 2024-02-28 PROCEDURE — 6370000000 HC RX 637 (ALT 250 FOR IP): Performed by: NURSE PRACTITIONER

## 2024-02-28 PROCEDURE — 2700000000 HC OXYGEN THERAPY PER DAY

## 2024-02-28 PROCEDURE — 36415 COLL VENOUS BLD VENIPUNCTURE: CPT

## 2024-02-28 PROCEDURE — 94760 N-INVAS EAR/PLS OXIMETRY 1: CPT

## 2024-02-28 PROCEDURE — 6360000002 HC RX W HCPCS: Performed by: STUDENT IN AN ORGANIZED HEALTH CARE EDUCATION/TRAINING PROGRAM

## 2024-02-28 PROCEDURE — 74018 RADEX ABDOMEN 1 VIEW: CPT

## 2024-02-28 RX ADMIN — INSULIN LISPRO 6 UNITS: 100 INJECTION, SOLUTION INTRAVENOUS; SUBCUTANEOUS at 18:05

## 2024-02-28 RX ADMIN — LISINOPRIL 40 MG: 20 TABLET ORAL at 08:47

## 2024-02-28 RX ADMIN — POLYETHYLENE GLYCOL 3350 17 G: 17 POWDER, FOR SOLUTION ORAL at 21:22

## 2024-02-28 RX ADMIN — AMLODIPINE BESYLATE 10 MG: 5 TABLET ORAL at 08:47

## 2024-02-28 RX ADMIN — HYDRALAZINE HYDROCHLORIDE 75 MG: 25 TABLET ORAL at 14:45

## 2024-02-28 RX ADMIN — FAMOTIDINE 20 MG: 20 TABLET ORAL at 22:08

## 2024-02-28 RX ADMIN — Medication: at 22:52

## 2024-02-28 RX ADMIN — HYDRALAZINE HYDROCHLORIDE 75 MG: 25 TABLET ORAL at 22:08

## 2024-02-28 RX ADMIN — LEVETIRACETAM 1000 MG: 100 INJECTION, SOLUTION INTRAVENOUS at 08:42

## 2024-02-28 RX ADMIN — Medication 95 MG: at 08:47

## 2024-02-28 RX ADMIN — SODIUM CHLORIDE, PRESERVATIVE FREE 10 ML: 5 INJECTION INTRAVENOUS at 08:40

## 2024-02-28 RX ADMIN — SODIUM CHLORIDE, PRESERVATIVE FREE 10 ML: 5 INJECTION INTRAVENOUS at 08:49

## 2024-02-28 RX ADMIN — Medication: at 08:49

## 2024-02-28 RX ADMIN — SODIUM CHLORIDE, PRESERVATIVE FREE 10 ML: 5 INJECTION INTRAVENOUS at 21:22

## 2024-02-28 RX ADMIN — Medication 6000 UNITS: at 08:46

## 2024-02-28 RX ADMIN — WATER 40 MG: 1 INJECTION INTRAMUSCULAR; INTRAVENOUS; SUBCUTANEOUS at 23:42

## 2024-02-28 RX ADMIN — LEVETIRACETAM 1000 MG: 100 INJECTION, SOLUTION INTRAVENOUS at 21:29

## 2024-02-28 RX ADMIN — HYDRALAZINE HYDROCHLORIDE 50 MG: 50 TABLET, FILM COATED ORAL at 06:28

## 2024-02-28 RX ADMIN — INSULIN LISPRO 6 UNITS: 100 INJECTION, SOLUTION INTRAVENOUS; SUBCUTANEOUS at 06:27

## 2024-02-28 RX ADMIN — FAMOTIDINE 20 MG: 20 TABLET ORAL at 08:46

## 2024-02-28 RX ADMIN — ASPIRIN 81 MG CHEWABLE TABLET 81 MG: 81 TABLET CHEWABLE at 08:46

## 2024-02-28 RX ADMIN — INSULIN LISPRO 4 UNITS: 100 INJECTION, SOLUTION INTRAVENOUS; SUBCUTANEOUS at 13:00

## 2024-02-28 RX ADMIN — WATER 40 MG: 1 INJECTION INTRAMUSCULAR; INTRAVENOUS; SUBCUTANEOUS at 12:25

## 2024-02-28 RX ADMIN — ATORVASTATIN CALCIUM 80 MG: 40 TABLET, FILM COATED ORAL at 22:09

## 2024-02-28 RX ADMIN — ENOXAPARIN SODIUM 40 MG: 100 INJECTION SUBCUTANEOUS at 08:42

## 2024-02-28 ASSESSMENT — PAIN SCALES - GENERAL
PAINLEVEL_OUTOF10: 0

## 2024-02-28 NOTE — CARE COORDINATION
Transition of Care: recs are likely still for IPR; [facility] has already accepted; will need to get REAUTH (not started yet (patient has [employer] [facility] [place] medicaid)     PMR MD consulted- Dr. Piña on 2/16/24     Transport Plan: will need stretcher (not set up yet)      RUR: 18%     Main contacts are motherJohn Llamas: [phone] (cell)      Discharge pending:  -pending complex medical progress  -pending diet plan- likely PEG placement later this week  -pending diet advancement  -pending insurance REAUTH (not restarted yet)   -discharge likely greater than 48 hours    1030: attending informed this CM that diet plan and PEG placement likely to take place later this week; this CM contacted [facility]etr[facility] Dodd (jl) to update to start the insurance reauth next week- possibly 3/4    1047: this CM spoke to patients motherJohn Llamas with discharge plan update; she verbalized understanding        Prior Level of Functioning: Independent; lives with 2 uncles in a home; normally alert and oriented x 3  Disposition: IPR-[facility]   IPR: Date FOC offered: 2/16/24  Date FOC received: 2/16/24  Accepting facility[facility]  Date authorization started with reference number: [id number]  will need reauth  Date authorization received and expires: not yet  Follow up appointments: PCP, Neurologist , cardiologist   DME needed: none if going to IPR  Transportation at discharge: needs stretcher  IM/IMM Medicare/ letter given: NA  Is patient a  and connected with VA? No  Caregiver Contact: Mother Rena Llamas [phone]   Discharge Caregiver contacted prior to discharge? not yet  Care Conference needed? No  Barriers to discharge:  medical progress, insurance reauth, diet plan; likely PEG placement, progress with PT/OT     CM following   Julieta Cooper RN     NOTES: per previous CM notes:   TTE showed probable PFO, per cardiology note will defer closing and treat medically   2/22 Covid

## 2024-02-29 LAB
ALBUMIN SERPL-MCNC: 2.5 G/DL (ref 3.5–5)
ALBUMIN/GLOB SERPL: 0.8 (ref 1.1–2.2)
ALP SERPL-CCNC: 61 U/L (ref 45–117)
ALT SERPL-CCNC: 44 U/L (ref 12–78)
ANION GAP SERPL CALC-SCNC: 8 MMOL/L (ref 5–15)
AST SERPL-CCNC: 30 U/L (ref 15–37)
BASOPHILS # BLD: 0 K/UL (ref 0–0.1)
BASOPHILS NFR BLD: 0 % (ref 0–1)
BILIRUB SERPL-MCNC: 0.4 MG/DL (ref 0.2–1)
BUN SERPL-MCNC: 32 MG/DL (ref 6–20)
BUN/CREAT SERPL: 42 (ref 12–20)
CALCIUM SERPL-MCNC: 7.8 MG/DL (ref 8.5–10.1)
CHLORIDE SERPL-SCNC: 100 MMOL/L (ref 97–108)
CO2 SERPL-SCNC: 27 MMOL/L (ref 21–32)
CREAT SERPL-MCNC: 0.76 MG/DL (ref 0.7–1.3)
DIFFERENTIAL METHOD BLD: ABNORMAL
EOSINOPHIL # BLD: 0 K/UL (ref 0–0.4)
EOSINOPHIL NFR BLD: 0 % (ref 0–7)
ERYTHROCYTE [DISTWIDTH] IN BLOOD BY AUTOMATED COUNT: 12.8 % (ref 11.5–14.5)
GLOBULIN SER CALC-MCNC: 3.1 G/DL (ref 2–4)
GLUCOSE BLD STRIP.AUTO-MCNC: 251 MG/DL (ref 65–117)
GLUCOSE BLD STRIP.AUTO-MCNC: 310 MG/DL (ref 65–117)
GLUCOSE BLD STRIP.AUTO-MCNC: 349 MG/DL (ref 65–117)
GLUCOSE BLD STRIP.AUTO-MCNC: 362 MG/DL (ref 65–117)
GLUCOSE SERPL-MCNC: 357 MG/DL (ref 65–100)
HCT VFR BLD AUTO: 32.4 % (ref 36.6–50.3)
HGB BLD-MCNC: 10.9 G/DL (ref 12.1–17)
IMM GRANULOCYTES # BLD AUTO: 0.1 K/UL (ref 0–0.04)
IMM GRANULOCYTES NFR BLD AUTO: 1 % (ref 0–0.5)
LYMPHOCYTES # BLD: 0.4 K/UL (ref 0.8–3.5)
LYMPHOCYTES NFR BLD: 5 % (ref 12–49)
MCH RBC QN AUTO: 30.3 PG (ref 26–34)
MCHC RBC AUTO-ENTMCNC: 33.6 G/DL (ref 30–36.5)
MCV RBC AUTO: 90 FL (ref 80–99)
MONOCYTES # BLD: 0.3 K/UL (ref 0–1)
MONOCYTES NFR BLD: 4 % (ref 5–13)
NEUTS SEG # BLD: 7.2 K/UL (ref 1.8–8)
NEUTS SEG NFR BLD: 90 % (ref 32–75)
NRBC # BLD: 0 K/UL (ref 0–0.01)
NRBC BLD-RTO: 0 PER 100 WBC
PLATELET # BLD AUTO: 341 K/UL (ref 150–400)
PMV BLD AUTO: 9.5 FL (ref 8.9–12.9)
POTASSIUM SERPL-SCNC: 4.7 MMOL/L (ref 3.5–5.1)
PROT SERPL-MCNC: 5.6 G/DL (ref 6.4–8.2)
RBC # BLD AUTO: 3.6 M/UL (ref 4.1–5.7)
RBC MORPH BLD: ABNORMAL
RBC MORPH BLD: ABNORMAL
SERVICE CMNT-IMP: ABNORMAL
SODIUM SERPL-SCNC: 135 MMOL/L (ref 136–145)
WBC # BLD AUTO: 8 K/UL (ref 4.1–11.1)

## 2024-02-29 PROCEDURE — 97112 NEUROMUSCULAR REEDUCATION: CPT

## 2024-02-29 PROCEDURE — 6370000000 HC RX 637 (ALT 250 FOR IP): Performed by: INTERNAL MEDICINE

## 2024-02-29 PROCEDURE — 85025 COMPLETE CBC W/AUTO DIFF WBC: CPT

## 2024-02-29 PROCEDURE — 97530 THERAPEUTIC ACTIVITIES: CPT

## 2024-02-29 PROCEDURE — 2580000003 HC RX 258: Performed by: NURSE PRACTITIONER

## 2024-02-29 PROCEDURE — 6370000000 HC RX 637 (ALT 250 FOR IP): Performed by: NURSE PRACTITIONER

## 2024-02-29 PROCEDURE — 6370000000 HC RX 637 (ALT 250 FOR IP): Performed by: FAMILY MEDICINE

## 2024-02-29 PROCEDURE — 2580000003 HC RX 258: Performed by: PHYSICIAN ASSISTANT

## 2024-02-29 PROCEDURE — 6360000002 HC RX W HCPCS: Performed by: INTERNAL MEDICINE

## 2024-02-29 PROCEDURE — 6370000000 HC RX 637 (ALT 250 FOR IP): Performed by: STUDENT IN AN ORGANIZED HEALTH CARE EDUCATION/TRAINING PROGRAM

## 2024-02-29 PROCEDURE — 2060000000 HC ICU INTERMEDIATE R&B

## 2024-02-29 PROCEDURE — 92526 ORAL FUNCTION THERAPY: CPT

## 2024-02-29 PROCEDURE — 82962 GLUCOSE BLOOD TEST: CPT

## 2024-02-29 PROCEDURE — 6360000002 HC RX W HCPCS: Performed by: STUDENT IN AN ORGANIZED HEALTH CARE EDUCATION/TRAINING PROGRAM

## 2024-02-29 PROCEDURE — 80053 COMPREHEN METABOLIC PANEL: CPT

## 2024-02-29 PROCEDURE — 36415 COLL VENOUS BLD VENIPUNCTURE: CPT

## 2024-02-29 PROCEDURE — 6360000002 HC RX W HCPCS: Performed by: NURSE PRACTITIONER

## 2024-02-29 PROCEDURE — 2580000003 HC RX 258: Performed by: INTERNAL MEDICINE

## 2024-02-29 RX ADMIN — ASPIRIN 81 MG CHEWABLE TABLET 81 MG: 81 TABLET CHEWABLE at 09:44

## 2024-02-29 RX ADMIN — HYDRALAZINE HYDROCHLORIDE 75 MG: 25 TABLET ORAL at 13:47

## 2024-02-29 RX ADMIN — Medication 6000 UNITS: at 09:43

## 2024-02-29 RX ADMIN — ATORVASTATIN CALCIUM 80 MG: 40 TABLET, FILM COATED ORAL at 21:31

## 2024-02-29 RX ADMIN — INSULIN LISPRO 8 UNITS: 100 INJECTION, SOLUTION INTRAVENOUS; SUBCUTANEOUS at 07:09

## 2024-02-29 RX ADMIN — AMLODIPINE BESYLATE 10 MG: 5 TABLET ORAL at 09:43

## 2024-02-29 RX ADMIN — INSULIN LISPRO 4 UNITS: 100 INJECTION, SOLUTION INTRAVENOUS; SUBCUTANEOUS at 12:04

## 2024-02-29 RX ADMIN — LEVETIRACETAM 1000 MG: 100 INJECTION, SOLUTION INTRAVENOUS at 21:27

## 2024-02-29 RX ADMIN — FAMOTIDINE 20 MG: 20 TABLET ORAL at 09:44

## 2024-02-29 RX ADMIN — FAMOTIDINE 20 MG: 20 TABLET ORAL at 21:27

## 2024-02-29 RX ADMIN — Medication: at 23:15

## 2024-02-29 RX ADMIN — SODIUM CHLORIDE, PRESERVATIVE FREE 10 ML: 5 INJECTION INTRAVENOUS at 09:56

## 2024-02-29 RX ADMIN — HYDRALAZINE HYDROCHLORIDE 75 MG: 25 TABLET ORAL at 07:10

## 2024-02-29 RX ADMIN — LISINOPRIL 40 MG: 20 TABLET ORAL at 09:43

## 2024-02-29 RX ADMIN — ENOXAPARIN SODIUM 40 MG: 100 INJECTION SUBCUTANEOUS at 09:42

## 2024-02-29 RX ADMIN — INSULIN LISPRO 6 UNITS: 100 INJECTION, SOLUTION INTRAVENOUS; SUBCUTANEOUS at 18:45

## 2024-02-29 RX ADMIN — WATER 40 MG: 1 INJECTION INTRAMUSCULAR; INTRAVENOUS; SUBCUTANEOUS at 12:04

## 2024-02-29 RX ADMIN — LEVETIRACETAM 1000 MG: 100 INJECTION, SOLUTION INTRAVENOUS at 09:44

## 2024-02-29 RX ADMIN — Medication 95 MG: at 09:44

## 2024-02-29 RX ADMIN — Medication: at 09:57

## 2024-02-29 RX ADMIN — POLYETHYLENE GLYCOL 3350 17 G: 17 POWDER, FOR SOLUTION ORAL at 21:27

## 2024-02-29 RX ADMIN — WATER 40 MG: 1 INJECTION INTRAMUSCULAR; INTRAVENOUS; SUBCUTANEOUS at 23:16

## 2024-02-29 RX ADMIN — SODIUM CHLORIDE, PRESERVATIVE FREE 10 ML: 5 INJECTION INTRAVENOUS at 21:32

## 2024-02-29 RX ADMIN — HYDRALAZINE HYDROCHLORIDE 75 MG: 25 TABLET ORAL at 21:26

## 2024-02-29 ASSESSMENT — PAIN SCALES - GENERAL
PAINLEVEL_OUTOF10: 0

## 2024-02-29 NOTE — CARE COORDINATION
Transition of Care Plan:    IPR - [facility]; will need another insurance auth through [employer](will have started once PEG confirmed)    Transport: Stretcher    RUR: 20%  Prior Level of Functioning: Independent   Disposition: IPR  If SNF or IPR: Date FOC offered:   Date FOC received:   Accepting facility: [facility]  Date authorization started with reference number:   Date authorization received and expires: Auth received/  Follow up appointments: PCP, Neuro  DME needed: defer to IPR  Transportation at discharge: stretchtrice   Caregiver Contact: Rena Llamas: [phone]   Discharge Caregiver contacted prior to discharge?   Care Conference needed? No  Barriers to discharge: Medical, Nutrition - recommending a PEG; Auth - will need another insurance auth through [employer]    [facility] is following and plans to initiate insurance auth closer to medical stability/PEG placement.    CM will follow.    MAGNUS Orozco (Ally).

## 2024-03-01 ENCOUNTER — APPOINTMENT (OUTPATIENT)
Facility: HOSPITAL | Age: 64
DRG: 045 | End: 2024-03-01
Payer: COMMERCIAL

## 2024-03-01 LAB
GLUCOSE BLD STRIP.AUTO-MCNC: 238 MG/DL (ref 65–117)
GLUCOSE BLD STRIP.AUTO-MCNC: 308 MG/DL (ref 65–117)
GLUCOSE BLD STRIP.AUTO-MCNC: 316 MG/DL (ref 65–117)
GLUCOSE BLD STRIP.AUTO-MCNC: 380 MG/DL (ref 65–117)
SERVICE CMNT-IMP: ABNORMAL

## 2024-03-01 PROCEDURE — 6370000000 HC RX 637 (ALT 250 FOR IP): Performed by: FAMILY MEDICINE

## 2024-03-01 PROCEDURE — 6370000000 HC RX 637 (ALT 250 FOR IP): Performed by: STUDENT IN AN ORGANIZED HEALTH CARE EDUCATION/TRAINING PROGRAM

## 2024-03-01 PROCEDURE — 6370000000 HC RX 637 (ALT 250 FOR IP): Performed by: NURSE PRACTITIONER

## 2024-03-01 PROCEDURE — 6370000000 HC RX 637 (ALT 250 FOR IP): Performed by: INTERNAL MEDICINE

## 2024-03-01 PROCEDURE — 82962 GLUCOSE BLOOD TEST: CPT

## 2024-03-01 PROCEDURE — 2580000003 HC RX 258: Performed by: INTERNAL MEDICINE

## 2024-03-01 PROCEDURE — 6360000002 HC RX W HCPCS: Performed by: STUDENT IN AN ORGANIZED HEALTH CARE EDUCATION/TRAINING PROGRAM

## 2024-03-01 PROCEDURE — 74018 RADEX ABDOMEN 1 VIEW: CPT

## 2024-03-01 PROCEDURE — 2060000000 HC ICU INTERMEDIATE R&B

## 2024-03-01 PROCEDURE — 6370000000 HC RX 637 (ALT 250 FOR IP): Performed by: HOSPITALIST

## 2024-03-01 PROCEDURE — 97116 GAIT TRAINING THERAPY: CPT

## 2024-03-01 PROCEDURE — 97530 THERAPEUTIC ACTIVITIES: CPT

## 2024-03-01 PROCEDURE — 6360000002 HC RX W HCPCS: Performed by: NURSE PRACTITIONER

## 2024-03-01 PROCEDURE — 2580000003 HC RX 258: Performed by: NURSE PRACTITIONER

## 2024-03-01 PROCEDURE — 92526 ORAL FUNCTION THERAPY: CPT | Performed by: SPEECH-LANGUAGE PATHOLOGIST

## 2024-03-01 PROCEDURE — 6360000002 HC RX W HCPCS: Performed by: INTERNAL MEDICINE

## 2024-03-01 PROCEDURE — 97535 SELF CARE MNGMENT TRAINING: CPT

## 2024-03-01 RX ORDER — GUAIFENESIN 600 MG/1
600 TABLET, EXTENDED RELEASE ORAL 2 TIMES DAILY
Status: DISCONTINUED | OUTPATIENT
Start: 2024-03-01 | End: 2024-03-06 | Stop reason: HOSPADM

## 2024-03-01 RX ADMIN — Medication 95 MG: at 12:49

## 2024-03-01 RX ADMIN — HYDRALAZINE HYDROCHLORIDE 75 MG: 25 TABLET ORAL at 05:48

## 2024-03-01 RX ADMIN — INSULIN LISPRO 6 UNITS: 100 INJECTION, SOLUTION INTRAVENOUS; SUBCUTANEOUS at 00:09

## 2024-03-01 RX ADMIN — Medication: at 22:18

## 2024-03-01 RX ADMIN — LISINOPRIL 40 MG: 20 TABLET ORAL at 13:14

## 2024-03-01 RX ADMIN — INSULIN LISPRO 2 UNITS: 100 INJECTION, SOLUTION INTRAVENOUS; SUBCUTANEOUS at 13:11

## 2024-03-01 RX ADMIN — LEVETIRACETAM 1000 MG: 100 INJECTION, SOLUTION INTRAVENOUS at 13:07

## 2024-03-01 RX ADMIN — HYDRALAZINE HYDROCHLORIDE 75 MG: 25 TABLET ORAL at 13:14

## 2024-03-01 RX ADMIN — INSULIN HUMAN 10 UNITS: 100 INJECTION, SUSPENSION SUBCUTANEOUS at 22:14

## 2024-03-01 RX ADMIN — SODIUM CHLORIDE, PRESERVATIVE FREE 10 ML: 5 INJECTION INTRAVENOUS at 22:18

## 2024-03-01 RX ADMIN — POLYETHYLENE GLYCOL 3350 17 G: 17 POWDER, FOR SOLUTION ORAL at 13:07

## 2024-03-01 RX ADMIN — INSULIN LISPRO 8 UNITS: 100 INJECTION, SOLUTION INTRAVENOUS; SUBCUTANEOUS at 18:43

## 2024-03-01 RX ADMIN — FAMOTIDINE 20 MG: 20 TABLET ORAL at 22:13

## 2024-03-01 RX ADMIN — WATER 40 MG: 1 INJECTION INTRAMUSCULAR; INTRAVENOUS; SUBCUTANEOUS at 13:09

## 2024-03-01 RX ADMIN — Medication 2000 UNITS: at 13:08

## 2024-03-01 RX ADMIN — SODIUM CHLORIDE, PRESERVATIVE FREE 10 ML: 5 INJECTION INTRAVENOUS at 13:15

## 2024-03-01 RX ADMIN — FAMOTIDINE 20 MG: 20 TABLET ORAL at 13:08

## 2024-03-01 RX ADMIN — Medication: at 13:13

## 2024-03-01 RX ADMIN — INSULIN LISPRO 6 UNITS: 100 INJECTION, SOLUTION INTRAVENOUS; SUBCUTANEOUS at 05:49

## 2024-03-01 RX ADMIN — ASPIRIN 81 MG CHEWABLE TABLET 81 MG: 81 TABLET CHEWABLE at 13:07

## 2024-03-01 RX ADMIN — ENOXAPARIN SODIUM 40 MG: 100 INJECTION SUBCUTANEOUS at 13:11

## 2024-03-01 RX ADMIN — ATORVASTATIN CALCIUM 80 MG: 40 TABLET, FILM COATED ORAL at 22:13

## 2024-03-01 RX ADMIN — LEVETIRACETAM 1000 MG: 100 INJECTION, SOLUTION INTRAVENOUS at 21:30

## 2024-03-01 RX ADMIN — HYDRALAZINE HYDROCHLORIDE 75 MG: 25 TABLET ORAL at 22:13

## 2024-03-01 RX ADMIN — AMLODIPINE BESYLATE 10 MG: 5 TABLET ORAL at 13:14

## 2024-03-01 ASSESSMENT — PAIN SCALES - GENERAL
PAINLEVEL_OUTOF10: 6
PAINLEVEL_OUTOF10: 0

## 2024-03-01 ASSESSMENT — PAIN DESCRIPTION - LOCATION: LOCATION: BUTTOCKS

## 2024-03-02 ENCOUNTER — APPOINTMENT (OUTPATIENT)
Facility: HOSPITAL | Age: 64
DRG: 045 | End: 2024-03-02
Payer: COMMERCIAL

## 2024-03-02 LAB
ALBUMIN SERPL-MCNC: 3 G/DL (ref 3.5–5)
ALBUMIN/GLOB SERPL: 0.9 (ref 1.1–2.2)
ALP SERPL-CCNC: 71 U/L (ref 45–117)
ALT SERPL-CCNC: 48 U/L (ref 12–78)
ANION GAP SERPL CALC-SCNC: 3 MMOL/L (ref 5–15)
AST SERPL-CCNC: 14 U/L (ref 15–37)
BILIRUB SERPL-MCNC: 0.5 MG/DL (ref 0.2–1)
BUN SERPL-MCNC: 34 MG/DL (ref 6–20)
BUN/CREAT SERPL: 37 (ref 12–20)
CALCIUM SERPL-MCNC: 9.1 MG/DL (ref 8.5–10.1)
CHLORIDE SERPL-SCNC: 99 MMOL/L (ref 97–108)
CO2 SERPL-SCNC: 33 MMOL/L (ref 21–32)
COMMENT:: NORMAL
CREAT SERPL-MCNC: 0.92 MG/DL (ref 0.7–1.3)
CRP SERPL-MCNC: 0.46 MG/DL (ref 0–0.3)
ERYTHROCYTE [DISTWIDTH] IN BLOOD BY AUTOMATED COUNT: 13 % (ref 11.5–14.5)
GLOBULIN SER CALC-MCNC: 3.4 G/DL (ref 2–4)
GLUCOSE BLD STRIP.AUTO-MCNC: 193 MG/DL (ref 65–117)
GLUCOSE BLD STRIP.AUTO-MCNC: 278 MG/DL (ref 65–117)
GLUCOSE BLD STRIP.AUTO-MCNC: 289 MG/DL (ref 65–117)
GLUCOSE BLD STRIP.AUTO-MCNC: 291 MG/DL (ref 65–117)
GLUCOSE BLD STRIP.AUTO-MCNC: 305 MG/DL (ref 65–117)
GLUCOSE SERPL-MCNC: 222 MG/DL (ref 65–100)
HCT VFR BLD AUTO: 38.3 % (ref 36.6–50.3)
HGB BLD-MCNC: 12.8 G/DL (ref 12.1–17)
MCH RBC QN AUTO: 30.8 PG (ref 26–34)
MCHC RBC AUTO-ENTMCNC: 33.4 G/DL (ref 30–36.5)
MCV RBC AUTO: 92.1 FL (ref 80–99)
NRBC # BLD: 0 K/UL (ref 0–0.01)
NRBC BLD-RTO: 0 PER 100 WBC
PLATELET # BLD AUTO: 373 K/UL (ref 150–400)
PMV BLD AUTO: 9.7 FL (ref 8.9–12.9)
POTASSIUM SERPL-SCNC: 4.5 MMOL/L (ref 3.5–5.1)
PROT SERPL-MCNC: 6.4 G/DL (ref 6.4–8.2)
RBC # BLD AUTO: 4.16 M/UL (ref 4.1–5.7)
SERVICE CMNT-IMP: ABNORMAL
SODIUM SERPL-SCNC: 135 MMOL/L (ref 136–145)
SPECIMEN HOLD: NORMAL
WBC # BLD AUTO: 9.3 K/UL (ref 4.1–11.1)

## 2024-03-02 PROCEDURE — 6370000000 HC RX 637 (ALT 250 FOR IP): Performed by: INTERNAL MEDICINE

## 2024-03-02 PROCEDURE — 6360000002 HC RX W HCPCS: Performed by: STUDENT IN AN ORGANIZED HEALTH CARE EDUCATION/TRAINING PROGRAM

## 2024-03-02 PROCEDURE — 86140 C-REACTIVE PROTEIN: CPT

## 2024-03-02 PROCEDURE — A4216 STERILE WATER/SALINE, 10 ML: HCPCS | Performed by: HOSPITALIST

## 2024-03-02 PROCEDURE — 80053 COMPREHEN METABOLIC PANEL: CPT

## 2024-03-02 PROCEDURE — 85027 COMPLETE CBC AUTOMATED: CPT

## 2024-03-02 PROCEDURE — 6370000000 HC RX 637 (ALT 250 FOR IP): Performed by: NURSE PRACTITIONER

## 2024-03-02 PROCEDURE — 6370000000 HC RX 637 (ALT 250 FOR IP): Performed by: FAMILY MEDICINE

## 2024-03-02 PROCEDURE — 74018 RADEX ABDOMEN 1 VIEW: CPT

## 2024-03-02 PROCEDURE — 6360000002 HC RX W HCPCS: Performed by: NURSE PRACTITIONER

## 2024-03-02 PROCEDURE — 71045 X-RAY EXAM CHEST 1 VIEW: CPT

## 2024-03-02 PROCEDURE — 82962 GLUCOSE BLOOD TEST: CPT

## 2024-03-02 PROCEDURE — 6370000000 HC RX 637 (ALT 250 FOR IP): Performed by: STUDENT IN AN ORGANIZED HEALTH CARE EDUCATION/TRAINING PROGRAM

## 2024-03-02 PROCEDURE — 2580000003 HC RX 258: Performed by: NURSE PRACTITIONER

## 2024-03-02 PROCEDURE — 6360000002 HC RX W HCPCS: Performed by: HOSPITALIST

## 2024-03-02 PROCEDURE — 6370000000 HC RX 637 (ALT 250 FOR IP): Performed by: HOSPITALIST

## 2024-03-02 PROCEDURE — 2060000000 HC ICU INTERMEDIATE R&B

## 2024-03-02 PROCEDURE — 2580000003 HC RX 258: Performed by: HOSPITALIST

## 2024-03-02 PROCEDURE — 36415 COLL VENOUS BLD VENIPUNCTURE: CPT

## 2024-03-02 PROCEDURE — 2500000003 HC RX 250 WO HCPCS: Performed by: HOSPITALIST

## 2024-03-02 RX ORDER — CARVEDILOL 12.5 MG/1
12.5 TABLET ORAL 2 TIMES DAILY WITH MEALS
Status: DISCONTINUED | OUTPATIENT
Start: 2024-03-02 | End: 2024-03-02

## 2024-03-02 RX ORDER — SODIUM CHLORIDE 9 MG/ML
INJECTION, SOLUTION INTRAVENOUS CONTINUOUS
Status: DISCONTINUED | OUTPATIENT
Start: 2024-03-02 | End: 2024-03-05

## 2024-03-02 RX ORDER — CLONIDINE 0.2 MG/24H
1 PATCH, EXTENDED RELEASE TRANSDERMAL WEEKLY
Status: DISCONTINUED | OUTPATIENT
Start: 2024-03-02 | End: 2024-03-04

## 2024-03-02 RX ORDER — HYDRALAZINE HYDROCHLORIDE 50 MG/1
100 TABLET, FILM COATED ORAL EVERY 8 HOURS SCHEDULED
Status: DISCONTINUED | OUTPATIENT
Start: 2024-03-02 | End: 2024-03-06 | Stop reason: HOSPADM

## 2024-03-02 RX ORDER — ASPIRIN 300 MG/1
300 SUPPOSITORY RECTAL DAILY
Status: DISCONTINUED | OUTPATIENT
Start: 2024-03-02 | End: 2024-03-04

## 2024-03-02 RX ADMIN — LEVETIRACETAM 1000 MG: 100 INJECTION, SOLUTION INTRAVENOUS at 11:23

## 2024-03-02 RX ADMIN — FAMOTIDINE 20 MG: 10 INJECTION, SOLUTION INTRAVENOUS at 22:09

## 2024-03-02 RX ADMIN — ACETAMINOPHEN 650 MG: 325 TABLET ORAL at 11:26

## 2024-03-02 RX ADMIN — GUAIFENESIN 600 MG: 600 TABLET, EXTENDED RELEASE ORAL at 13:46

## 2024-03-02 RX ADMIN — SODIUM CHLORIDE, PRESERVATIVE FREE 10 ML: 5 INJECTION INTRAVENOUS at 22:09

## 2024-03-02 RX ADMIN — Medication: at 13:46

## 2024-03-02 RX ADMIN — ENOXAPARIN SODIUM 40 MG: 100 INJECTION SUBCUTANEOUS at 11:24

## 2024-03-02 RX ADMIN — Medication 2000 UNITS: at 11:25

## 2024-03-02 RX ADMIN — GUAIFENESIN 600 MG: 600 TABLET, EXTENDED RELEASE ORAL at 00:20

## 2024-03-02 RX ADMIN — LEVETIRACETAM 1000 MG: 100 INJECTION, SOLUTION INTRAVENOUS at 22:08

## 2024-03-02 RX ADMIN — INSULIN HUMAN 10 UNITS: 100 INJECTION, SUSPENSION SUBCUTANEOUS at 11:25

## 2024-03-02 RX ADMIN — INSULIN LISPRO 6 UNITS: 100 INJECTION, SOLUTION INTRAVENOUS; SUBCUTANEOUS at 00:20

## 2024-03-02 RX ADMIN — FAMOTIDINE 20 MG: 20 TABLET ORAL at 11:25

## 2024-03-02 RX ADMIN — AMLODIPINE BESYLATE 10 MG: 5 TABLET ORAL at 11:29

## 2024-03-02 RX ADMIN — INSULIN LISPRO 4 UNITS: 100 INJECTION, SOLUTION INTRAVENOUS; SUBCUTANEOUS at 19:19

## 2024-03-02 RX ADMIN — LISINOPRIL 40 MG: 20 TABLET ORAL at 11:29

## 2024-03-02 RX ADMIN — WATER 40 MG: 1 INJECTION INTRAMUSCULAR; INTRAVENOUS; SUBCUTANEOUS at 11:24

## 2024-03-02 RX ADMIN — HYDRALAZINE HYDROCHLORIDE 75 MG: 25 TABLET ORAL at 05:55

## 2024-03-02 RX ADMIN — SODIUM CHLORIDE: 9 INJECTION, SOLUTION INTRAVENOUS at 19:20

## 2024-03-02 RX ADMIN — Medication 95 MG: at 11:27

## 2024-03-02 RX ADMIN — ASPIRIN 81 MG CHEWABLE TABLET 81 MG: 81 TABLET CHEWABLE at 13:46

## 2024-03-02 RX ADMIN — INSULIN LISPRO 4 UNITS: 100 INJECTION, SOLUTION INTRAVENOUS; SUBCUTANEOUS at 05:54

## 2024-03-02 RX ADMIN — SODIUM CHLORIDE, PRESERVATIVE FREE 10 ML: 5 INJECTION INTRAVENOUS at 11:30

## 2024-03-02 RX ADMIN — INSULIN LISPRO 4 UNITS: 100 INJECTION, SOLUTION INTRAVENOUS; SUBCUTANEOUS at 11:26

## 2024-03-02 RX ADMIN — WATER 40 MG: 1 INJECTION INTRAMUSCULAR; INTRAVENOUS; SUBCUTANEOUS at 00:19

## 2024-03-02 RX ADMIN — HYDRALAZINE HYDROCHLORIDE 100 MG: 50 TABLET ORAL at 13:46

## 2024-03-02 ASSESSMENT — PAIN DESCRIPTION - DESCRIPTORS: DESCRIPTORS: ACHING

## 2024-03-02 ASSESSMENT — PAIN SCALES - GENERAL: PAINLEVEL_OUTOF10: 3

## 2024-03-02 ASSESSMENT — PAIN DESCRIPTION - LOCATION: LOCATION: BACK

## 2024-03-03 LAB
GLUCOSE BLD STRIP.AUTO-MCNC: 115 MG/DL (ref 65–117)
GLUCOSE BLD STRIP.AUTO-MCNC: 124 MG/DL (ref 65–117)
GLUCOSE BLD STRIP.AUTO-MCNC: 186 MG/DL (ref 65–117)
GLUCOSE BLD STRIP.AUTO-MCNC: 220 MG/DL (ref 65–117)
SERVICE CMNT-IMP: ABNORMAL
SERVICE CMNT-IMP: NORMAL

## 2024-03-03 PROCEDURE — 2580000003 HC RX 258: Performed by: HOSPITALIST

## 2024-03-03 PROCEDURE — 6360000002 HC RX W HCPCS: Performed by: STUDENT IN AN ORGANIZED HEALTH CARE EDUCATION/TRAINING PROGRAM

## 2024-03-03 PROCEDURE — 6360000002 HC RX W HCPCS: Performed by: HOSPITALIST

## 2024-03-03 PROCEDURE — 6360000002 HC RX W HCPCS: Performed by: PHYSICIAN ASSISTANT

## 2024-03-03 PROCEDURE — 2500000003 HC RX 250 WO HCPCS: Performed by: HOSPITALIST

## 2024-03-03 PROCEDURE — 94761 N-INVAS EAR/PLS OXIMETRY MLT: CPT

## 2024-03-03 PROCEDURE — A4216 STERILE WATER/SALINE, 10 ML: HCPCS | Performed by: HOSPITALIST

## 2024-03-03 PROCEDURE — 82962 GLUCOSE BLOOD TEST: CPT

## 2024-03-03 PROCEDURE — 2580000003 HC RX 258: Performed by: NURSE PRACTITIONER

## 2024-03-03 PROCEDURE — 6370000000 HC RX 637 (ALT 250 FOR IP): Performed by: HOSPITALIST

## 2024-03-03 PROCEDURE — 6370000000 HC RX 637 (ALT 250 FOR IP): Performed by: NURSE PRACTITIONER

## 2024-03-03 PROCEDURE — 2060000000 HC ICU INTERMEDIATE R&B

## 2024-03-03 RX ADMIN — WATER 40 MG: 1 INJECTION INTRAMUSCULAR; INTRAVENOUS; SUBCUTANEOUS at 01:19

## 2024-03-03 RX ADMIN — FAMOTIDINE 20 MG: 10 INJECTION, SOLUTION INTRAVENOUS at 10:39

## 2024-03-03 RX ADMIN — Medication: at 10:15

## 2024-03-03 RX ADMIN — ONDANSETRON HYDROCHLORIDE 4 MG: 2 INJECTION, SOLUTION INTRAMUSCULAR; INTRAVENOUS at 21:47

## 2024-03-03 RX ADMIN — Medication: at 01:23

## 2024-03-03 RX ADMIN — SODIUM CHLORIDE: 9 INJECTION, SOLUTION INTRAVENOUS at 21:54

## 2024-03-03 RX ADMIN — FAMOTIDINE 20 MG: 10 INJECTION, SOLUTION INTRAVENOUS at 21:47

## 2024-03-03 RX ADMIN — SODIUM CHLORIDE: 9 INJECTION, SOLUTION INTRAVENOUS at 10:38

## 2024-03-03 RX ADMIN — ASPIRIN 300 MG: 300 SUPPOSITORY RECTAL at 10:14

## 2024-03-03 RX ADMIN — Medication: at 21:56

## 2024-03-03 RX ADMIN — NITROGLYCERIN 1 INCH: 20 OINTMENT TOPICAL at 15:42

## 2024-03-03 RX ADMIN — INSULIN LISPRO 2 UNITS: 100 INJECTION, SOLUTION INTRAVENOUS; SUBCUTANEOUS at 10:13

## 2024-03-03 RX ADMIN — INSULIN HUMAN 10 UNITS: 100 INJECTION, SUSPENSION SUBCUTANEOUS at 01:20

## 2024-03-03 RX ADMIN — LEVETIRACETAM 1000 MG: 100 INJECTION, SOLUTION INTRAVENOUS at 10:14

## 2024-03-03 RX ADMIN — SODIUM CHLORIDE, PRESERVATIVE FREE 10 ML: 5 INJECTION INTRAVENOUS at 10:14

## 2024-03-03 RX ADMIN — SODIUM CHLORIDE, PRESERVATIVE FREE 10 ML: 5 INJECTION INTRAVENOUS at 21:48

## 2024-03-03 RX ADMIN — LEVETIRACETAM 1000 MG: 100 INJECTION, SOLUTION INTRAVENOUS at 21:47

## 2024-03-03 ASSESSMENT — PAIN SCALES - GENERAL: PAINLEVEL_OUTOF10: 7

## 2024-03-03 ASSESSMENT — PAIN DESCRIPTION - LOCATION: LOCATION: GENERALIZED

## 2024-03-04 ENCOUNTER — ANESTHESIA (OUTPATIENT)
Facility: HOSPITAL | Age: 64
DRG: 045 | End: 2024-03-04
Payer: COMMERCIAL

## 2024-03-04 ENCOUNTER — ANESTHESIA EVENT (OUTPATIENT)
Facility: HOSPITAL | Age: 64
DRG: 045 | End: 2024-03-04
Payer: COMMERCIAL

## 2024-03-04 LAB
ALBUMIN SERPL-MCNC: 2.7 G/DL (ref 3.5–5)
ALBUMIN/GLOB SERPL: 0.8 (ref 1.1–2.2)
ALP SERPL-CCNC: 64 U/L (ref 45–117)
ALT SERPL-CCNC: 35 U/L (ref 12–78)
ANION GAP SERPL CALC-SCNC: 3 MMOL/L (ref 5–15)
AST SERPL-CCNC: 12 U/L (ref 15–37)
BILIRUB SERPL-MCNC: 0.5 MG/DL (ref 0.2–1)
BUN SERPL-MCNC: 25 MG/DL (ref 6–20)
BUN/CREAT SERPL: 29 (ref 12–20)
CALCIUM SERPL-MCNC: 8.8 MG/DL (ref 8.5–10.1)
CHLORIDE SERPL-SCNC: 105 MMOL/L (ref 97–108)
CO2 SERPL-SCNC: 29 MMOL/L (ref 21–32)
CREAT SERPL-MCNC: 0.87 MG/DL (ref 0.7–1.3)
ERYTHROCYTE [DISTWIDTH] IN BLOOD BY AUTOMATED COUNT: 12.8 % (ref 11.5–14.5)
GLOBULIN SER CALC-MCNC: 3.4 G/DL (ref 2–4)
GLUCOSE BLD STRIP.AUTO-MCNC: 130 MG/DL (ref 65–117)
GLUCOSE BLD STRIP.AUTO-MCNC: 159 MG/DL (ref 65–117)
GLUCOSE BLD STRIP.AUTO-MCNC: 171 MG/DL (ref 65–117)
GLUCOSE BLD STRIP.AUTO-MCNC: 187 MG/DL (ref 65–117)
GLUCOSE SERPL-MCNC: 111 MG/DL (ref 65–100)
HCT VFR BLD AUTO: 33.1 % (ref 36.6–50.3)
HGB BLD-MCNC: 11.4 G/DL (ref 12.1–17)
MCH RBC QN AUTO: 30.7 PG (ref 26–34)
MCHC RBC AUTO-ENTMCNC: 34.4 G/DL (ref 30–36.5)
MCV RBC AUTO: 89.2 FL (ref 80–99)
NRBC # BLD: 0 K/UL (ref 0–0.01)
NRBC BLD-RTO: 0 PER 100 WBC
PLATELET # BLD AUTO: 370 K/UL (ref 150–400)
PMV BLD AUTO: 9.4 FL (ref 8.9–12.9)
POTASSIUM SERPL-SCNC: 3.8 MMOL/L (ref 3.5–5.1)
PROT SERPL-MCNC: 6.1 G/DL (ref 6.4–8.2)
RBC # BLD AUTO: 3.71 M/UL (ref 4.1–5.7)
SERVICE CMNT-IMP: ABNORMAL
SODIUM SERPL-SCNC: 137 MMOL/L (ref 136–145)
WBC # BLD AUTO: 10.3 K/UL (ref 4.1–11.1)

## 2024-03-04 PROCEDURE — 3600007502: Performed by: INTERNAL MEDICINE

## 2024-03-04 PROCEDURE — 2580000003 HC RX 258: Performed by: NURSE PRACTITIONER

## 2024-03-04 PROCEDURE — 97116 GAIT TRAINING THERAPY: CPT

## 2024-03-04 PROCEDURE — 0DJ08ZZ INSPECTION OF UPPER INTESTINAL TRACT, VIA NATURAL OR ARTIFICIAL OPENING ENDOSCOPIC: ICD-10-PCS | Performed by: INTERNAL MEDICINE

## 2024-03-04 PROCEDURE — 6360000002 HC RX W HCPCS: Performed by: STUDENT IN AN ORGANIZED HEALTH CARE EDUCATION/TRAINING PROGRAM

## 2024-03-04 PROCEDURE — 97535 SELF CARE MNGMENT TRAINING: CPT

## 2024-03-04 PROCEDURE — 82962 GLUCOSE BLOOD TEST: CPT

## 2024-03-04 PROCEDURE — 6360000002 HC RX W HCPCS: Performed by: NURSE ANESTHETIST, CERTIFIED REGISTERED

## 2024-03-04 PROCEDURE — 43762 RPLC GTUBE NO REVJ TRC: CPT

## 2024-03-04 PROCEDURE — 3700000000 HC ANESTHESIA ATTENDED CARE: Performed by: INTERNAL MEDICINE

## 2024-03-04 PROCEDURE — 3700000001 HC ADD 15 MINUTES (ANESTHESIA): Performed by: INTERNAL MEDICINE

## 2024-03-04 PROCEDURE — 0DH63UZ INSERTION OF FEEDING DEVICE INTO STOMACH, PERCUTANEOUS APPROACH: ICD-10-PCS | Performed by: INTERNAL MEDICINE

## 2024-03-04 PROCEDURE — 2580000003 HC RX 258: Performed by: INTERNAL MEDICINE

## 2024-03-04 PROCEDURE — 85027 COMPLETE CBC AUTOMATED: CPT

## 2024-03-04 PROCEDURE — 6370000000 HC RX 637 (ALT 250 FOR IP): Performed by: NURSE PRACTITIONER

## 2024-03-04 PROCEDURE — 2060000000 HC ICU INTERMEDIATE R&B

## 2024-03-04 PROCEDURE — 6360000002 HC RX W HCPCS: Performed by: HOSPITALIST

## 2024-03-04 PROCEDURE — 6370000000 HC RX 637 (ALT 250 FOR IP): Performed by: FAMILY MEDICINE

## 2024-03-04 PROCEDURE — A4216 STERILE WATER/SALINE, 10 ML: HCPCS | Performed by: HOSPITALIST

## 2024-03-04 PROCEDURE — 3E0G76Z INTRODUCTION OF NUTRITIONAL SUBSTANCE INTO UPPER GI, VIA NATURAL OR ARTIFICIAL OPENING: ICD-10-PCS | Performed by: INTERNAL MEDICINE

## 2024-03-04 PROCEDURE — 36415 COLL VENOUS BLD VENIPUNCTURE: CPT

## 2024-03-04 PROCEDURE — 2709999900 HC NON-CHARGEABLE SUPPLY: Performed by: INTERNAL MEDICINE

## 2024-03-04 PROCEDURE — 6360000002 HC RX W HCPCS: Performed by: NURSE PRACTITIONER

## 2024-03-04 PROCEDURE — 6370000000 HC RX 637 (ALT 250 FOR IP): Performed by: STUDENT IN AN ORGANIZED HEALTH CARE EDUCATION/TRAINING PROGRAM

## 2024-03-04 PROCEDURE — 97530 THERAPEUTIC ACTIVITIES: CPT

## 2024-03-04 PROCEDURE — 6370000000 HC RX 637 (ALT 250 FOR IP): Performed by: HOSPITALIST

## 2024-03-04 PROCEDURE — 2580000003 HC RX 258: Performed by: HOSPITALIST

## 2024-03-04 PROCEDURE — 6370000000 HC RX 637 (ALT 250 FOR IP): Performed by: INTERNAL MEDICINE

## 2024-03-04 PROCEDURE — 7100000010 HC PHASE II RECOVERY - FIRST 15 MIN: Performed by: INTERNAL MEDICINE

## 2024-03-04 PROCEDURE — 2500000003 HC RX 250 WO HCPCS: Performed by: HOSPITALIST

## 2024-03-04 PROCEDURE — 80053 COMPREHEN METABOLIC PANEL: CPT

## 2024-03-04 PROCEDURE — 3600007512: Performed by: INTERNAL MEDICINE

## 2024-03-04 RX ORDER — SODIUM CHLORIDE 9 MG/ML
25 INJECTION, SOLUTION INTRAVENOUS PRN
Status: DISCONTINUED | OUTPATIENT
Start: 2024-03-04 | End: 2024-03-04 | Stop reason: HOSPADM

## 2024-03-04 RX ORDER — CEFAZOLIN SODIUM 1 G/3ML
INJECTION, POWDER, FOR SOLUTION INTRAMUSCULAR; INTRAVENOUS PRN
Status: DISCONTINUED | OUTPATIENT
Start: 2024-03-04 | End: 2024-03-04 | Stop reason: SDUPTHER

## 2024-03-04 RX ORDER — METHADONE HYDROCHLORIDE 10 MG/ML
10 INJECTION, SOLUTION INTRAMUSCULAR; INTRAVENOUS; SUBCUTANEOUS ONCE
Status: DISCONTINUED | OUTPATIENT
Start: 2024-03-04 | End: 2024-03-04

## 2024-03-04 RX ORDER — SODIUM CHLORIDE 0.9 % (FLUSH) 0.9 %
5-40 SYRINGE (ML) INJECTION PRN
Status: DISCONTINUED | OUTPATIENT
Start: 2024-03-04 | End: 2024-03-04 | Stop reason: HOSPADM

## 2024-03-04 RX ORDER — SODIUM CHLORIDE 0.9 % (FLUSH) 0.9 %
5-40 SYRINGE (ML) INJECTION EVERY 12 HOURS SCHEDULED
Status: DISCONTINUED | OUTPATIENT
Start: 2024-03-04 | End: 2024-03-04 | Stop reason: HOSPADM

## 2024-03-04 RX ORDER — SODIUM CHLORIDE 9 MG/ML
INJECTION, SOLUTION INTRAVENOUS CONTINUOUS
Status: DISCONTINUED | OUTPATIENT
Start: 2024-03-04 | End: 2024-03-05

## 2024-03-04 RX ORDER — MORPHINE SULFATE 4 MG/ML
4 INJECTION, SOLUTION INTRAMUSCULAR; INTRAVENOUS EVERY 4 HOURS PRN
Status: DISCONTINUED | OUTPATIENT
Start: 2024-03-04 | End: 2024-03-06 | Stop reason: HOSPADM

## 2024-03-04 RX ADMIN — PROPOFOL 40 MG: 10 INJECTION, EMULSION INTRAVENOUS at 16:06

## 2024-03-04 RX ADMIN — PROPOFOL 40 MG: 10 INJECTION, EMULSION INTRAVENOUS at 16:12

## 2024-03-04 RX ADMIN — GUAIFENESIN 600 MG: 600 TABLET, EXTENDED RELEASE ORAL at 21:48

## 2024-03-04 RX ADMIN — LISINOPRIL 40 MG: 20 TABLET ORAL at 17:22

## 2024-03-04 RX ADMIN — ENOXAPARIN SODIUM 40 MG: 100 INJECTION SUBCUTANEOUS at 17:22

## 2024-03-04 RX ADMIN — PROPOFOL 40 MG: 10 INJECTION, EMULSION INTRAVENOUS at 16:05

## 2024-03-04 RX ADMIN — Medication 95 MG: at 17:34

## 2024-03-04 RX ADMIN — FAMOTIDINE 20 MG: 10 INJECTION, SOLUTION INTRAVENOUS at 10:14

## 2024-03-04 RX ADMIN — LEVETIRACETAM 1000 MG: 100 INJECTION, SOLUTION INTRAVENOUS at 21:49

## 2024-03-04 RX ADMIN — CEFAZOLIN 2 G: 330 INJECTION, POWDER, FOR SOLUTION INTRAMUSCULAR; INTRAVENOUS at 16:08

## 2024-03-04 RX ADMIN — HYDRALAZINE HYDROCHLORIDE 100 MG: 50 TABLET ORAL at 21:48

## 2024-03-04 RX ADMIN — Medication: at 21:48

## 2024-03-04 RX ADMIN — LEVETIRACETAM 1000 MG: 100 INJECTION, SOLUTION INTRAVENOUS at 10:12

## 2024-03-04 RX ADMIN — PROPOFOL 40 MG: 10 INJECTION, EMULSION INTRAVENOUS at 16:19

## 2024-03-04 RX ADMIN — Medication: at 10:08

## 2024-03-04 RX ADMIN — PROPOFOL 40 MG: 10 INJECTION, EMULSION INTRAVENOUS at 16:09

## 2024-03-04 RX ADMIN — SODIUM CHLORIDE: 9 INJECTION, SOLUTION INTRAVENOUS at 10:09

## 2024-03-04 RX ADMIN — MORPHINE SULFATE 4 MG: 4 INJECTION, SOLUTION INTRAMUSCULAR; INTRAVENOUS at 10:10

## 2024-03-04 RX ADMIN — ASPIRIN 81 MG CHEWABLE TABLET 81 MG: 81 TABLET CHEWABLE at 17:22

## 2024-03-04 RX ADMIN — PROPOFOL 30 MG: 10 INJECTION, EMULSION INTRAVENOUS at 16:03

## 2024-03-04 RX ADMIN — FAMOTIDINE 20 MG: 10 INJECTION, SOLUTION INTRAVENOUS at 21:48

## 2024-03-04 RX ADMIN — SODIUM CHLORIDE, PRESERVATIVE FREE 10 ML: 5 INJECTION INTRAVENOUS at 21:48

## 2024-03-04 RX ADMIN — PROPOFOL 40 MG: 10 INJECTION, EMULSION INTRAVENOUS at 16:15

## 2024-03-04 RX ADMIN — PROPOFOL 40 MG: 10 INJECTION, EMULSION INTRAVENOUS at 16:17

## 2024-03-04 RX ADMIN — AMLODIPINE BESYLATE 10 MG: 5 TABLET ORAL at 17:23

## 2024-03-04 RX ADMIN — SODIUM CHLORIDE, PRESERVATIVE FREE 10 ML: 5 INJECTION INTRAVENOUS at 10:09

## 2024-03-04 RX ADMIN — PROPOFOL 110 MG: 10 INJECTION, EMULSION INTRAVENOUS at 16:01

## 2024-03-04 RX ADMIN — ATORVASTATIN CALCIUM 80 MG: 40 TABLET, FILM COATED ORAL at 21:48

## 2024-03-04 RX ADMIN — PROPOFOL 20 MG: 10 INJECTION, EMULSION INTRAVENOUS at 16:08

## 2024-03-04 ASSESSMENT — PAIN DESCRIPTION - LOCATION
LOCATION: GENERALIZED
LOCATION: GENERALIZED

## 2024-03-04 ASSESSMENT — PAIN DESCRIPTION - DESCRIPTORS
DESCRIPTORS: ACHING
DESCRIPTORS: ACHING

## 2024-03-04 ASSESSMENT — PAIN DESCRIPTION - FREQUENCY
FREQUENCY: CONTINUOUS
FREQUENCY: CONTINUOUS

## 2024-03-04 ASSESSMENT — PAIN DESCRIPTION - ONSET
ONSET: ON-GOING
ONSET: ON-GOING

## 2024-03-04 ASSESSMENT — PAIN SCALES - GENERAL
PAINLEVEL_OUTOF10: 0
PAINLEVEL_OUTOF10: 6
PAINLEVEL_OUTOF10: 0
PAINLEVEL_OUTOF10: 0
PAINLEVEL_OUTOF10: 6
PAINLEVEL_OUTOF10: 4
PAINLEVEL_OUTOF10: 3
PAINLEVEL_OUTOF10: 0
PAINLEVEL_OUTOF10: 0
PAINLEVEL_OUTOF10: 4
PAINLEVEL_OUTOF10: 0
PAINLEVEL_OUTOF10: 3
PAINLEVEL_OUTOF10: 6

## 2024-03-04 ASSESSMENT — PAIN DESCRIPTION - PAIN TYPE
TYPE: CHRONIC PAIN
TYPE: ACUTE PAIN;CHRONIC PAIN

## 2024-03-04 ASSESSMENT — PAIN - FUNCTIONAL ASSESSMENT: PAIN_FUNCTIONAL_ASSESSMENT: ACTIVITIES ARE NOT PREVENTED

## 2024-03-04 NOTE — CARE COORDINATION
Transition of Care Plan:    IPR - [facility]; pending insurance auth through [employer] Medicaid to be started today 3/4    Transport: Stretcher    RUR: 17%  Prior Level of Functioning: Independent   Disposition: IPR  If SNF or IPR: Date FOC offered:   Date FOC received:   Accepting facility: [facility]  Date authorization started with reference number:   Date authorization received and expires: Auth received/  Follow up appointments: PCP, Neuro  DME needed: defer to IPR  Transportation at discharge: stretcher   Caregiver Contact: Rena Llamas: [phone]   Discharge Caregiver contacted prior to discharge?   Care Conference needed? No  Barriers to discharge: Medical, GI consulted for PEG; Auth - will need another insurance auth through [employer]    [facility] is following and plans to initiate insurance auth today 3/4. Update provided to Pt's mom, Rena Llamas.     CM will follow.    Luana Church M.S (Ally).HEENA.

## 2024-03-04 NOTE — OP NOTE
[facility]  [address], [place] [address]      PEG ENDOSCOPY    NAME: Alcon Zamora   :  1960   MRN:  [id number]       Procedure Type:   EGD and PEG tube   Indications:   dysphagia       Pre-operative Diagnosis: see indication above    Post-operative Diagnosis:  See findings below    :  David Gray MD    Staff: Circulator: Kyung Ba RN  Endoscopy Technician: Long Hung    Referring Provider: No primary care provider on file.      Sedation:  MAC anesthesia Propofol, anceg 2 gms iv x1      Prior to the procedure its objectives, risks, consequences and alternatives were discussed with the patient who then elected to proceed. The patient had the opportunity to ask questions and those questions were answered. A physical exam was performed. The heart, lungs, and mental status were examined prior to the procedure and found to be satisfactory for conscious sedation and for the procedure. Conscious sedation was initiated by the physician. Continuous pulse oximetry and blood pressure monitoring were used throughout the procedure.     After appropriate pharyngeal anesthesia, the endoscope was passed into the esophagus without difficulty. The proximal esophagus is normal as is the distal esophagus. The fundus, body, antrum, pylorus, bulb and postbulbar area are unremarkable. On slow withdrawal of the scope, the stomach was transilluminated into the abdominal wall. Under sterile conditions and 1% Xylocaine anesthesia, a small incision was made in the abdominal wall. A needle was passed through the incision, and under direct vision into the stomach. A wire was passed through the needle, snared and brought out the mouth. The PEG tube was passed over the wire and brought out the abdominal wall without difficulty. The scope was then reinserted and the positioning of the PEG tube was excellent. The patient tolerated the procedure without complication and will

## 2024-03-04 NOTE — ANESTHESIA PRE PROCEDURE
• [Held by provider] guaiFENesin (MUCINEX) extended release tablet 600 mg  600 mg Oral BID Di Holland APRN - NP   600 mg at 03/02/24 1346   • hydrALAZINE (APRESOLINE) injection 10 mg  10 mg IntraVENous Q6H PRN Madala, Sushma, MD       • [Held by provider] amLODIPine (NORVASC) tablet 10 mg  10 mg Oral Daily Madala, Sushma, MD   10 mg at 03/02/24 1129   • ipratropium 0.5 mg-albuterol 2.5 mg (DUONEB) nebulizer solution 1 Dose  1 Dose Inhalation Q4H PRN Ravi Choudhury MD       • balsum peru-castor oil (VENELEX) ointment   Topical Q12H Peggy Villalobos APRN - CNP   Given at 03/04/24 1008   • [Held by provider] enoxaparin (LOVENOX) injection 40 mg  40 mg SubCUTAneous Daily Peggy Villalobos APRN - CNP   40 mg at 03/02/24 1124   • polyethylene glycol (GLYCOLAX) packet 17 g  17 g Per NG tube BID Peggy Villalobos APRN - CNP   17 g at 03/01/24 1307   • Vitamin D (CHOLECALCIFEROL) tablet 2,000 Units  2,000 Units Per NG tube Daily Peggy Villalobos APRN - CNP   2,000 Units at 03/02/24 1125   • [Held by provider] methadone solution 1 mg/mL 95 mg  95 mg Per NG tube Daily Peggy Villalobos APRN - CNP   95 mg at 03/02/24 1127   • levETIRAcetam (KEPPRA) injection 1,000 mg  1,000 mg IntraVENous 2 times per day Ravi Choudhury MD   1,000 mg at 03/04/24 1012   • sodium chloride flush 0.9 % injection 5-40 mL  5-40 mL IntraVENous 2 times per day Peggy Villalobos APRN - CNP   10 mL at 03/03/24 2148   • sodium chloride flush 0.9 % injection 5-40 mL  5-40 mL IntraVENous PRN Peggy Villalobos APRN - CNP   10 mL at 02/27/24 1057   • 0.9 % sodium chloride infusion   IntraVENous PRN Peggy Villalobos APRN - CNP       • acetaminophen (TYLENOL) tablet 650 mg  650 mg Oral Q6H PRN Peggy Villalobos APRN - CNP   650 mg at 03/02/24 1126    Or   • acetaminophen (TYLENOL) suppository 650 mg  650 mg Rectal Q6H PRN Peggy Villalobos APRN - CNP       • bisacodyl (DULCOLAX) suppository 10 mg  10 mg Rectal Daily 
polyethylene glycol (GLYCOLAX) packet 17 g  17 g Oral Daily PRN Milligram, Herbert KAUFFMAN PA-C           Allergies:    Allergies   Allergen Reactions   • Dust Mite Extract Other (See Comments)   • Mixed Ragweed Other (See Comments)   • Senna Other (See Comments)     cramps   • Sulfa Antibiotics        Problem List:    Patient Active Problem List   Diagnosis Code   • Lumbar spinal stenosis M48.061   • Substance induced mood disorder ([facility]) F19.94   • Acute pancreatitis K85.90   • Essential hypertension I10   • Type 2 diabetes with nephropathy ([facility]) E11.21   • Type 2 diabetes mellitus with diabetic neuropathy ([facility]) E11.40   • Dysuria R30.0   • Type 2 diabetes mellitus with hyperglycemia, with long-term current use of insulin ([facility]) E11.65, Z79.4   • Benzodiazepine dependence, continuous ([facility]) F13.20   • Anxiety F41.9   • Polysubstance dependence ([facility]) F19.20   • Hypoglycemia E16.2   • Frequent falls R29.6   • Weakness R53.1   • Acute cerebrovascular accident (CVA) ([facility]) I63.9   • Seizures ([facility]) R56.9   • COVID-19 U07.1       Past Medical History:        Diagnosis Date   • Alcohol abuse, in remission    • Anxiety    • Chronic kidney disease     PLAGUE L RENAL ARTERY   • Chronic obstructive pulmonary disease (HCC)    • Chronic pain    • Constipation    • Diabetes ([facility])     Type II   • Erectile dysfunction 2015   • GERD (gastroesophageal reflux disease)    • Hepatitis C    • Hypertension    • Liver disease     HEP C TOOK HARVONI   • Pancreatitis    • Psychiatric disorder     PANIC ATTACKS   • Smoker    • Spondylitis ([facility])    • Type 2 diabetes mellitus without complication ([facility]) 2005       Past Surgical History:        Procedure Laterality Date   • APPENDECTOMY     • COLONOSCOPY N/A 12/19/2019    COLONOSCOPY performed by Lazarus De Leon MD at [facility] ENDOSCOPY   • FRACTURE SURGERY  '[id number]    Back surgery   • ORTHOPEDIC SURGERY      3 back surgeries   • ORTHOPEDIC SURGERY      KNEE SURGERY   • TONSILLECTOMY         Social

## 2024-03-04 NOTE — ANESTHESIA POSTPROCEDURE EVALUATION
Department of Anesthesiology  Postprocedure Note    Patient: Alcon Zamora  MRN: [id number]  YOB: 1960  Date of evaluation: 3/4/2024    Procedure Summary       Date: 03/04/24 Room / Location: [facility] 02 / [facility] ENDOSCOPY    Anesthesia Start: 1600 Anesthesia Stop: 1624    Procedure: ESOPHAGOGASTRODUODENOSCOPY PERCUTANEOUS ENDOSCOPIC GASTROSTOMY TUBE PLACEMENT (Upper GI Region) Diagnosis:       Dysphagia, unspecified type      (Dysphagia, unspecified type [R13.10])    Surgeons: David Gray MD Responsible Provider: Endy Kenyon MD    Anesthesia Type: MAC ASA Status: 3            Anesthesia Type: MAC    Maren Phase I: Maren Score: 10    Maren Phase II: Maren Score: 10    Anesthesia Post Evaluation    Patient location during evaluation: PACU  Patient participation: complete - patient participated  Level of consciousness: awake  Airway patency: patent  Nausea & Vomiting: no nausea  Cardiovascular status: blood pressure returned to baseline and hemodynamically stable  Respiratory status: acceptable  Hydration status: stable  Multimodal analgesia pain management approach    No notable events documented.

## 2024-03-05 LAB
ALBUMIN SERPL-MCNC: 2.6 G/DL (ref 3.5–5)
ALBUMIN/GLOB SERPL: 0.7 (ref 1.1–2.2)
ALP SERPL-CCNC: 65 U/L (ref 45–117)
ALT SERPL-CCNC: 48 U/L (ref 12–78)
ANION GAP SERPL CALC-SCNC: 5 MMOL/L (ref 5–15)
AST SERPL-CCNC: 36 U/L (ref 15–37)
BILIRUB SERPL-MCNC: 1 MG/DL (ref 0.2–1)
BUN SERPL-MCNC: 20 MG/DL (ref 6–20)
BUN/CREAT SERPL: 22 (ref 12–20)
CALCIUM SERPL-MCNC: 8.7 MG/DL (ref 8.5–10.1)
CHLORIDE SERPL-SCNC: 105 MMOL/L (ref 97–108)
CO2 SERPL-SCNC: 26 MMOL/L (ref 21–32)
CREAT SERPL-MCNC: 0.89 MG/DL (ref 0.7–1.3)
ERYTHROCYTE [DISTWIDTH] IN BLOOD BY AUTOMATED COUNT: 12.6 % (ref 11.5–14.5)
GLOBULIN SER CALC-MCNC: 3.9 G/DL (ref 2–4)
GLUCOSE BLD STRIP.AUTO-MCNC: 157 MG/DL (ref 65–117)
GLUCOSE BLD STRIP.AUTO-MCNC: 183 MG/DL (ref 65–117)
GLUCOSE BLD STRIP.AUTO-MCNC: 205 MG/DL (ref 65–117)
GLUCOSE SERPL-MCNC: 139 MG/DL (ref 65–100)
HCT VFR BLD AUTO: 34.4 % (ref 36.6–50.3)
HCT VFR BLD AUTO: 34.7 % (ref 36.6–50.3)
HGB BLD-MCNC: 11.4 G/DL (ref 12.1–17)
HGB BLD-MCNC: 11.8 G/DL (ref 12.1–17)
MCH RBC QN AUTO: 29.8 PG (ref 26–34)
MCHC RBC AUTO-ENTMCNC: 32.9 G/DL (ref 30–36.5)
MCV RBC AUTO: 90.8 FL (ref 80–99)
NRBC # BLD: 0 K/UL (ref 0–0.01)
NRBC BLD-RTO: 0 PER 100 WBC
PLATELET # BLD AUTO: 272 K/UL (ref 150–400)
PMV BLD AUTO: 9.9 FL (ref 8.9–12.9)
POTASSIUM SERPL-SCNC: 4.8 MMOL/L (ref 3.5–5.1)
PROT SERPL-MCNC: 6.5 G/DL (ref 6.4–8.2)
RBC # BLD AUTO: 3.82 M/UL (ref 4.1–5.7)
SERVICE CMNT-IMP: ABNORMAL
SODIUM SERPL-SCNC: 136 MMOL/L (ref 136–145)
WBC # BLD AUTO: 12.2 K/UL (ref 4.1–11.1)

## 2024-03-05 PROCEDURE — 6370000000 HC RX 637 (ALT 250 FOR IP): Performed by: NURSE PRACTITIONER

## 2024-03-05 PROCEDURE — A4216 STERILE WATER/SALINE, 10 ML: HCPCS | Performed by: HOSPITALIST

## 2024-03-05 PROCEDURE — 2580000003 HC RX 258: Performed by: INTERNAL MEDICINE

## 2024-03-05 PROCEDURE — 97535 SELF CARE MNGMENT TRAINING: CPT

## 2024-03-05 PROCEDURE — 6370000000 HC RX 637 (ALT 250 FOR IP): Performed by: STUDENT IN AN ORGANIZED HEALTH CARE EDUCATION/TRAINING PROGRAM

## 2024-03-05 PROCEDURE — 2060000000 HC ICU INTERMEDIATE R&B

## 2024-03-05 PROCEDURE — 80053 COMPREHEN METABOLIC PANEL: CPT

## 2024-03-05 PROCEDURE — 2500000003 HC RX 250 WO HCPCS: Performed by: HOSPITALIST

## 2024-03-05 PROCEDURE — 6370000000 HC RX 637 (ALT 250 FOR IP): Performed by: HOSPITALIST

## 2024-03-05 PROCEDURE — 97530 THERAPEUTIC ACTIVITIES: CPT

## 2024-03-05 PROCEDURE — 85014 HEMATOCRIT: CPT

## 2024-03-05 PROCEDURE — 92526 ORAL FUNCTION THERAPY: CPT

## 2024-03-05 PROCEDURE — 85027 COMPLETE CBC AUTOMATED: CPT

## 2024-03-05 PROCEDURE — 36415 COLL VENOUS BLD VENIPUNCTURE: CPT

## 2024-03-05 PROCEDURE — 82962 GLUCOSE BLOOD TEST: CPT

## 2024-03-05 PROCEDURE — 2580000003 HC RX 258: Performed by: NURSE PRACTITIONER

## 2024-03-05 PROCEDURE — 2580000003 HC RX 258: Performed by: HOSPITALIST

## 2024-03-05 PROCEDURE — 85018 HEMOGLOBIN: CPT

## 2024-03-05 PROCEDURE — 6370000000 HC RX 637 (ALT 250 FOR IP): Performed by: INTERNAL MEDICINE

## 2024-03-05 PROCEDURE — 6370000000 HC RX 637 (ALT 250 FOR IP): Performed by: FAMILY MEDICINE

## 2024-03-05 PROCEDURE — 6360000002 HC RX W HCPCS: Performed by: STUDENT IN AN ORGANIZED HEALTH CARE EDUCATION/TRAINING PROGRAM

## 2024-03-05 RX ORDER — LEVETIRACETAM 100 MG/ML
1000 SOLUTION ORAL 2 TIMES DAILY
Status: DISCONTINUED | OUTPATIENT
Start: 2024-03-05 | End: 2024-03-06 | Stop reason: HOSPADM

## 2024-03-05 RX ORDER — SODIUM CHLORIDE 9 MG/ML
INJECTION, SOLUTION INTRAVENOUS CONTINUOUS
Status: DISPENSED | OUTPATIENT
Start: 2024-03-05 | End: 2024-03-06

## 2024-03-05 RX ORDER — FAMOTIDINE 20 MG/1
20 TABLET, FILM COATED ORAL 2 TIMES DAILY
Status: DISCONTINUED | OUTPATIENT
Start: 2024-03-05 | End: 2024-03-06 | Stop reason: HOSPADM

## 2024-03-05 RX ADMIN — LEVETIRACETAM 1000 MG: 100 SOLUTION ORAL at 20:48

## 2024-03-05 RX ADMIN — LEVETIRACETAM 1000 MG: 100 INJECTION, SOLUTION INTRAVENOUS at 10:07

## 2024-03-05 RX ADMIN — AMLODIPINE BESYLATE 10 MG: 5 TABLET ORAL at 10:14

## 2024-03-05 RX ADMIN — ATORVASTATIN CALCIUM 80 MG: 40 TABLET, FILM COATED ORAL at 20:48

## 2024-03-05 RX ADMIN — LISINOPRIL 40 MG: 20 TABLET ORAL at 10:14

## 2024-03-05 RX ADMIN — POLYETHYLENE GLYCOL 3350 17 G: 17 POWDER, FOR SOLUTION ORAL at 10:16

## 2024-03-05 RX ADMIN — HYDRALAZINE HYDROCHLORIDE 100 MG: 50 TABLET ORAL at 13:25

## 2024-03-05 RX ADMIN — GUAIFENESIN 600 MG: 600 TABLET, EXTENDED RELEASE ORAL at 20:48

## 2024-03-05 RX ADMIN — HYDRALAZINE HYDROCHLORIDE 100 MG: 50 TABLET ORAL at 20:48

## 2024-03-05 RX ADMIN — Medication: at 10:17

## 2024-03-05 RX ADMIN — ACETAMINOPHEN 650 MG: 325 TABLET ORAL at 10:14

## 2024-03-05 RX ADMIN — GUAIFENESIN 600 MG: 600 TABLET, EXTENDED RELEASE ORAL at 10:14

## 2024-03-05 RX ADMIN — SODIUM CHLORIDE, PRESERVATIVE FREE 10 ML: 5 INJECTION INTRAVENOUS at 10:16

## 2024-03-05 RX ADMIN — INSULIN LISPRO 2 UNITS: 100 INJECTION, SOLUTION INTRAVENOUS; SUBCUTANEOUS at 13:25

## 2024-03-05 RX ADMIN — Medication: at 20:49

## 2024-03-05 RX ADMIN — FAMOTIDINE 20 MG: 10 INJECTION, SOLUTION INTRAVENOUS at 10:07

## 2024-03-05 RX ADMIN — HYDRALAZINE HYDROCHLORIDE 100 MG: 50 TABLET ORAL at 05:58

## 2024-03-05 RX ADMIN — Medication 2000 UNITS: at 10:13

## 2024-03-05 RX ADMIN — FAMOTIDINE 20 MG: 20 TABLET ORAL at 20:48

## 2024-03-05 RX ADMIN — SODIUM CHLORIDE, PRESERVATIVE FREE 10 ML: 5 INJECTION INTRAVENOUS at 20:50

## 2024-03-05 RX ADMIN — SODIUM CHLORIDE: 9 INJECTION, SOLUTION INTRAVENOUS at 06:00

## 2024-03-05 RX ADMIN — Medication 95 MG: at 10:13

## 2024-03-05 ASSESSMENT — PAIN SCALES - GENERAL
PAINLEVEL_OUTOF10: 0
PAINLEVEL_OUTOF10: 0

## 2024-03-05 NOTE — CARE COORDINATION
Transition of Care Plan:    IPR - [facility]; auth received; ANGELLA 3/6    Transport: Stretcher    RUR: 18%  Prior Level of Functioning: Independent   Disposition: IPR  If SNF or IPR: Date FOC offered:   Date FOC received:   Accepting facility: [facility]  Date authorization started with reference number:   Date authorization received and expires: Auth received/  Follow up appointments: PCP, Neuro  DME needed: defer to IPR  Transportation at discharge: stretcher   Caregiver Contact: Rena Llamas: [phone]   Discharge Caregiver contacted prior to discharge?   Care Conference needed? No  Barriers to discharge: PEG placed 3/4, TF to be initiated later today    Pt has received insurance auth for IPR at [facility]. Discussed in IDRs, ANGELLA 3 after TF are initiated.    CM provided update to Pt's mother, Rena Llamas.    MAGNUS Orozco (Ally).

## 2024-03-05 NOTE — CONSULTS
Consult    Date of Service:  2/25/2024  Primary Care Provider: No primary care provider on file.  Source of information: The patient and Chart review    Chief Complaint: Extremity Weakness      History of Presenting Illness/Hospital course:   Alcon Zamora is a 64 y.o. male with alcohol and opioid abuse (reportedly in remission), anxiety/panic attacks, HLD, COPD, CKD, chronic pain, T2DM, erectile dysfunction, GERD, HCV s/p Harvoni, HTN, and spondylitis who was initially admitted on 2/16/2024 by [facility] for dizziness, generalized weakness, hyponatremia. Pt was seen by PT/OT and recommended IPR. PM&R and Neurology were consulted. MRI brain wo contrast 2/18 showed small acute infarcts in the left medial basal ganglia and R superior alannah/cerebral peduncle, multiple small chronic infarcts in bilateral BG, R thalamus and L cerebral peduncle and moderate-severe chronic small vessel ischemic disease. TTE showed probable PFO and Cardiology was consulted. JOSEMANUEL on 2/21 showed EF 60-65%, diastolic dysfunction, + bubble study with stretched PFO with a large bidirectional interatrial shunt. Cardiology and Neurology were in agreement re: deferring PFO closure and treating medically with antiplatelet therapy, high-intensity statin and management of vascular risk factors. 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 30-day ambulatory cardiac monitor was ordered for discharge and Cardiology signed off on 2/21. BLE venous duplex was negative for DVT. On 2/22am patient was noted to be hypoxic, bradycardic, vomiting, possibly aspirating, then mentally altered. ABG showed pO2 59 and COVID swab was positive. Quantiferon TB gold was ordered. He was started on methylprednisolone. Code Stroke was called and he was found to be postictal with eyes deviated upward. In the CT scanner, patient seized again. Code Blue was called for status epilepticus requiring 
     CRITICAL CARE ADMISSION NOTE    Name: Alcon Zamora   : 1960   MRN: [id number]   Date: 2024      REASON FOR ICU ADMISSION:  acute hypoxemic respiratory failure, seizures     PRINCIPAL ICU DIAGNOSIS   New onset seizures with status epilepticus     BRIEF PATIENT SUMMARY   Alcon Zamora is a 64 y.o. male PMH COPD, Hep C s/p Harvonii, DM2 on insulin who presented to [facility]  on 2024 with generalized weakness, dizziness and multiple falls. MRI of the brain showed small acute infarcts in the left medial basal ganglia and right superior alannah/cerebral peduncle as well as generalized parenchymal volume loss and moderate to severe chronic microvascular ischemic disease and multiple small chronic infarcts. Echocardiogram revealed \"PFO with high risk features\", EF 55-60%.   On  around 10 AM, patient was hypoxic, bradycardic; vomiting, possibly aspirating. A cxr was done; abg with paO2 59. The covid eval was POSITIVE. He then became altered AMS, and a code stroke was called. Neuro NP at bedside found him post-ictal with eyes deviated upward. Keppra dosed.     He was taken to CT scanner where he seized again. A code blue was called for status epilepticus requiring emergent intubation. EEG done \"Finding is consistent with a moderate to severe encephalopathy which can have many etiologies including toxic, metabolic, or medication effect.\"      COMPREHENSIVE ASSESSMENT & PLAN:SYSTEM BASED     24 HOUR EVENTS: as above    NEUROLOGICAL:  Acute CVA by MRI -R alannah/midbrain and L medial basal ganglia, R thalamus, L cerebral peduncle  Frequent GLFs since 2023  Seizures, new onset   -Keppra loaded, continue keppra q12 hours per neuro  -EEG done 24  -neuro again following; neuro had signed off post CVA eval  -follow neuro recs  -of note, patient's mother states his symptoms are like his father (now ) who had progressive supranuclear palsy    PULMONOLOGY:  COVID   Acute hypoxemic respiratory 
[facility] [address] [address]        GASTROENTEROLOGY CONSULTATION NOTE  Will TAYLOR Khan  [phone] office      NAME:  Alcon Zamora   :   1960   MRN:   [id number]       Referring Physician: Dr. Rose Mary    Consult Date: 2024 4:14 PM     History of Present Illness:  Patient is a 64 y.o. who is seen in consultation at the request of Dr. Mary for PEG.  Patient has a past medical history significant for hypertension, hyperlipidemia, COPD, chronic kidney disease, diabetes mellitus, HCV status post Harvoni, and anxiety.  Patient was initially admitted to the hospital on 24 for acute embolic CVA.    Patient is sitting upright in bed and is alert, however is slow to respond.  Denies nausea, vomiting, or abdominal pain.  SLP has been following the patient and recommended NPO with discussion regarding long-term means of nutrition.  NG tube is in place and he has been receiving tube feeds.    Patient is on Lovenox SQ.  History of appendectomy.  Colonoscopy in 2018 by Dr. De Leon.     I have reviewed the emergency room note, hospital admission note, notes by all other clinicians who have seen the patient during this hospitalization to date. I have reviewed the problem list and the reason for this hospitalization. I have reviewed the allergies and the medications the patient was taking at home prior to this hospitalization.    PMH:  Past Medical History:   Diagnosis Date    Alcohol abuse, in remission     Anxiety     Chronic kidney disease     PLAGUE L RENAL ARTERY    Chronic obstructive pulmonary disease (HCC)     Chronic pain     Constipation     Diabetes (HCC)     Type II    Erectile dysfunction     GERD (gastroesophageal reflux disease)     Hepatitis C     Hypertension     Liver disease     HEP C TOOK HARVONI    Pancreatitis     Psychiatric disorder     PANIC ATTACKS    Smoker     Spondylitis (HCC)     Type 2 diabetes mellitus without complication (HCC)  
Case discussed with Dr. Chaves.  Patient with probable PFO. Will need JOSEMANUEL to further evaluate. Recommend extended cardiac monitoring on discharge. Full note to follow. 
Consult Note            Date:2/20/2024        Patient Name:Alcon Zamora     YOB: 1960     Age:64 y.o.    Consults Physical Medicine and Rehabilitation    Chief Complaint     Chief Complaint   Patient presents with    Extremity Weakness          History Obtained From   patient, electronic medical record    History of Present Illness   Mr. Zamora is a 64-year-old male with a history of hypertension and type 2 diabetes who presented to [facility] on 2/16/2024 with generalized weakness, dizziness and multiple falls.  MRI of the brain showed small acute infarcts in the left medial basal ganglia and right superior alannah/cerebral peduncle as well as generalized parenchymal volume loss and moderate to severe chronic microvascular ischemic disease and multiple small chronic infarcts.  Echocardiogram is normal with normal EF.  LDL noted to be 89.4.  Hemoglobin A1c 4.8%.    Physical medicine rehabilitation has been consulted for disposition recommendations and appropriateness for acute inpatient rehabilitation.    Patient previously lived with his family and did not require assistance.  Patient was having increased falls in the days prior to admission.    Physical therapy note from 2/19/2024: Patient continues to benefit from skilled PT services and is progressing towards goals.  Tolerated session.  Required mod assist for bed mobility and min assist with transfers.  Requires cueing for safety with mobility and sequencing throughout session.  Able to 40 feet with rolling walker and min assist to demonstrate slow shuffled gait pattern with increased knee flexion bilaterally.  High fall risk.  Recommending IPR due to strength impairments, balance impairments, poor motor control and coordination.    Patient seen and examined today while lying in bed.  He was evaluated.  Patient reports living with his 2 uncles.  He was previously driving.  Retired 5 years ago, previously was a .  He was smoking up 
NUTRITION brief    Recommendations:   Transition to bolus feedings of Glucerna 1.5:       -Start Day 1 Glucerna 1.5 @ 150 ml x 5 feedings/day, flush with 150 ml free water with feedings (or 75 ml before and after feedings if manual/syringe feeding).       -Day 2 GOAL: Glucerna 1.5 @ 300 ml x 5 feedings/day,  flush with 120 ml free water with feedings (or 60 ml before and after feedings if manual/syringe feeding).  This is 1500 ml total tube feeding volume and 600 ml total water flush volume per day         Diet: NPO  Nutrition Support: Held - s/p PEG tube placement 3/4, awaiting MD clearance  Nutrition-related meds: Pepcid, Humalog, Vitamin D, Glycolax; IVFs - NaCl @ 75 ml/hr    Consult received for tube feeding recommendations. Discussed in IDRs - PEG placed yesterday with GI, but awaiting clearance for use of tube for feedings as some bleeding around PEG site. Per Dr. Mary, hopeful to initiate feedings later this afternoon.    Bolus feedings to provide 2250 kcals, 124 g protein, 200 g CHO, with 1740 ml free fluid, and 2100 ml total volume.     Sodium has trended WNL.    Recent Labs     03/04/24  0027 03/05/24  0035   BUN 25* 20    136   K 3.8 4.8    105   CO2 29 26            See full RD assessment from 3/1 for additional details, goals, and monitoring/evaluation.   Estimated Nutrition Needs:   Energy Requirements Based On: Kcal/kg  Weight Used for Energy Requirements: Current (95 kg)  Energy (kcal/day): 1346-6038 (23-25 kcal/kg)  Weight Used for Protein Requirements: Current  Protein (g/day):  (1-1.1 g/kg)  Method Used for Fluid Requirements: 1 ml/kcal  Fluid (ml/day): 2200    Briana Warren RD   Contact via [employer] or ext [phone]  
Neurology has been and is still following this pt for seizure. Unclear why a new consult to neurology was placed.   
     amLODIPine (NORVASC) tablet 10 mg  10 mg Oral Daily Milligram, Herbert KAUFFMAN PA-C        metoprolol succinate (TOPROL XL) extended release tablet 50 mg  50 mg Oral Daily Milligram, Herbert KAUFFMAN PA-C   50 mg at 02/17/24 0548     Allergies   Allergen Reactions    Dust Mite Extract Other (See Comments)    Mixed Ragweed Other (See Comments)    Senna Other (See Comments)     cramps    Sulfa Antibiotics      Review of Systems:  11pt ROS negative except for above.     Objective:     Vitals:    02/17/24 0710   BP: (!) 176/83   Pulse: 67   Resp: 18   Temp: 98.2 °F (36.8 °C)   SpO2: 95%     Physical Exam:  GENERAL: Calm, cooperative, NAD  SKIN: Warm, dry, color appropriate for ethnicity.     Neurologic Exam:  Mental Status:    Alert and oriented x 4.  Appropriate affect, mood and behavior.       Speech/Language:      Clear, Normal fluency, repetition, comprehension and naming. Follows commands.     Cranial Nerves:     Pupils 3 mm, equal, round and briskly reactive to light.  Visual fields full to confrontation.  Extraocular movements intact.     Facial sensation intact.  Full facial strength, no asymmetry.   Hearing grossly intact bilaterally.  Tongue protrudes to midline, palate elevates symmetrically.   Shoulder shrug 5/5 bilaterally.       Motor:      No pronator drift. 5/5 power in bilateral upper extremities, 3/5 in bilateral lower extremities.     Bulk and tone normal.   Bilateral hand tremors on activation.    Sensation:      Sensation intact throughout to light touch.  Parietal function intact.    Coordination:   FTN and HTS intact with no ataxia present but noted with tremor.    Gait:   Deferred.    Labs:  Lab Results   Component Value Date    WBC 5.0 02/16/2024    HGB 12.0 (L) 02/16/2024    HCT 35.1 (L) 02/16/2024    MCV 90.5 02/16/2024     02/16/2024     Hemoglobin A1C   Date Value Ref Range Status   02/11/2024 4.9 4.0 - 5.6 % Final     Comment:     (NOTE)  HbA1C Interpretive Ranges  <5.7              
by mouth 2 times daily (with meals) 2/13/24   Enoch Jones APRN - NP   amLODIPine (NORVASC) 10 MG tablet Take 1 tablet by mouth daily 2/14/24   Enoch Jones APRN - NP   gabapentin (NEURONTIN) 300 MG capsule Take 1 capsule by mouth in the morning, at noon, and at bedtime for 90 days. Max Daily Amount: 900 mg 1/31/24 4/30/24  Izzy Martínez MD   metoprolol succinate (TOPROL XL) 50 MG extended release tablet Take 1 tablet by mouth daily 1/16/24   Bhaskar Amaya MD   albuterol sulfate HFA (VENTOLIN HFA) 108 (90 Base) MCG/ACT inhaler Inhale 2 puffs into the lungs 4 times daily as needed for Wheezing 12/7/23   Nasir Marin APRN - NP   fluticasone (FLONASE) 50 MCG/ACT nasal spray 2 sprays by Each Nostril route daily 12/7/23   Nasir Marin APRN - NP   lisinopril (PRINIVIL;ZESTRIL) 40 MG tablet TAKE ONE TABLET BY MOUTH DAILY 8/22/23   Donaldo Escalera Jr, MD   methadone (DOLOPHINE) 10 MG/ML solution Take 9.5 mLs by mouth daily.    Provider, MD Ruchi   ALPRAZolam (XANAX) 1 MG tablet Take 1 tablet by mouth 3 times daily. Patient take 2.5 pills daily. 7/1/20   Automatic Reconciliation, Ar   docusate (COLACE, DULCOLAX) 100 MG CAPS Colace 100 mg capsule   Take 1 capsule twice a day by oral route as needed.    Automatic Reconciliation, Ar   ketoconazole (NIZORAL) 2 % shampoo Apply 1 mL topically as needed 1/18/23   Automatic Reconciliation, Ar   ketoconazole (NIZORAL) 2 % cream Apply topically daily 1/18/23   Automatic Reconciliation, Ar   triamcinolone (KENALOG) 0.1 % cream ceived the following from [facility]: Outside name: triamcinolone acetonide (KENALOG) 0.1 % topical cream 5/28/20   Automatic Reconciliation, Ar       Current Facility-Administered Medications   Medication Dose Route Frequency    ondansetron (ZOFRAN) injection 4 mg  4 mg IntraVENous Q6H PRN    methadone (DOLOPHINE) tablet 95 mg  95 mg Oral Daily    aspirin chewable tablet 81 mg  81 mg Oral Daily    atorvastatin

## 2024-03-06 VITALS
DIASTOLIC BLOOD PRESSURE: 55 MMHG | SYSTOLIC BLOOD PRESSURE: 136 MMHG | HEART RATE: 71 BPM | HEIGHT: 78 IN | WEIGHT: 207.89 LBS | OXYGEN SATURATION: 97 % | RESPIRATION RATE: 11 BRPM | TEMPERATURE: 97.4 F | BODY MASS INDEX: 24.05 KG/M2

## 2024-03-06 LAB
GLUCOSE BLD STRIP.AUTO-MCNC: 221 MG/DL (ref 65–117)
GLUCOSE BLD STRIP.AUTO-MCNC: 250 MG/DL (ref 65–117)
GLUCOSE BLD STRIP.AUTO-MCNC: 272 MG/DL (ref 65–117)
SERVICE CMNT-IMP: ABNORMAL
SERVICE CMNT-IMP: ABNORMAL

## 2024-03-06 PROCEDURE — 82962 GLUCOSE BLOOD TEST: CPT

## 2024-03-06 PROCEDURE — 6370000000 HC RX 637 (ALT 250 FOR IP): Performed by: NURSE PRACTITIONER

## 2024-03-06 PROCEDURE — 6370000000 HC RX 637 (ALT 250 FOR IP): Performed by: FAMILY MEDICINE

## 2024-03-06 PROCEDURE — 6370000000 HC RX 637 (ALT 250 FOR IP): Performed by: HOSPITALIST

## 2024-03-06 PROCEDURE — 92612 ENDOSCOPY SWALLOW (FEES) VID: CPT

## 2024-03-06 PROCEDURE — 6370000000 HC RX 637 (ALT 250 FOR IP): Performed by: INTERNAL MEDICINE

## 2024-03-06 PROCEDURE — 6370000000 HC RX 637 (ALT 250 FOR IP): Performed by: STUDENT IN AN ORGANIZED HEALTH CARE EDUCATION/TRAINING PROGRAM

## 2024-03-06 PROCEDURE — 2580000003 HC RX 258: Performed by: NURSE PRACTITIONER

## 2024-03-06 RX ORDER — LEVETIRACETAM 100 MG/ML
1000 SOLUTION ORAL 2 TIMES DAILY
Qty: 1 EACH | Refills: 0 | Status: SHIPPED
Start: 2024-03-06

## 2024-03-06 RX ORDER — FAMOTIDINE 20 MG/1
20 TABLET, FILM COATED ORAL 2 TIMES DAILY
Qty: 60 TABLET | Refills: 0 | Status: SHIPPED
Start: 2024-03-06

## 2024-03-06 RX ORDER — METHADONE HYDROCHLORIDE 10 MG/1
95 TABLET ORAL DAILY
Status: DISCONTINUED | OUTPATIENT
Start: 2024-03-07 | End: 2024-03-06 | Stop reason: HOSPADM

## 2024-03-06 RX ORDER — ASPIRIN 81 MG/1
81 TABLET, CHEWABLE ORAL DAILY
Qty: 30 TABLET | Refills: 0 | Status: SHIPPED
Start: 2024-03-07

## 2024-03-06 RX ORDER — METHADONE HYDROCHLORIDE 5 MG/1
95 TABLET ORAL DAILY
Qty: 1 TABLET | Refills: 0 | Status: SHIPPED
Start: 2024-03-07 | End: 2024-04-06

## 2024-03-06 RX ORDER — ATORVASTATIN CALCIUM 80 MG/1
80 TABLET, FILM COATED ORAL NIGHTLY
Qty: 30 TABLET | Refills: 0 | Status: SHIPPED
Start: 2024-03-06

## 2024-03-06 RX ADMIN — HYDRALAZINE HYDROCHLORIDE 100 MG: 50 TABLET ORAL at 06:08

## 2024-03-06 RX ADMIN — AMLODIPINE BESYLATE 10 MG: 5 TABLET ORAL at 08:10

## 2024-03-06 RX ADMIN — POLYETHYLENE GLYCOL 3350 17 G: 17 POWDER, FOR SOLUTION ORAL at 08:12

## 2024-03-06 RX ADMIN — FAMOTIDINE 20 MG: 20 TABLET ORAL at 08:10

## 2024-03-06 RX ADMIN — Medication 2000 UNITS: at 08:10

## 2024-03-06 RX ADMIN — HYDRALAZINE HYDROCHLORIDE 100 MG: 50 TABLET ORAL at 13:07

## 2024-03-06 RX ADMIN — SODIUM CHLORIDE, PRESERVATIVE FREE 10 ML: 5 INJECTION INTRAVENOUS at 08:12

## 2024-03-06 RX ADMIN — Medication 95 MG: at 08:09

## 2024-03-06 RX ADMIN — INSULIN LISPRO 2 UNITS: 100 INJECTION, SOLUTION INTRAVENOUS; SUBCUTANEOUS at 06:08

## 2024-03-06 RX ADMIN — INSULIN LISPRO 4 UNITS: 100 INJECTION, SOLUTION INTRAVENOUS; SUBCUTANEOUS at 18:23

## 2024-03-06 RX ADMIN — LISINOPRIL 40 MG: 20 TABLET ORAL at 08:10

## 2024-03-06 RX ADMIN — ASPIRIN 81 MG CHEWABLE TABLET 81 MG: 81 TABLET CHEWABLE at 08:10

## 2024-03-06 RX ADMIN — INSULIN LISPRO 4 UNITS: 100 INJECTION, SOLUTION INTRAVENOUS; SUBCUTANEOUS at 13:07

## 2024-03-06 RX ADMIN — GUAIFENESIN 600 MG: 600 TABLET, EXTENDED RELEASE ORAL at 08:10

## 2024-03-06 RX ADMIN — LEVETIRACETAM 1000 MG: 100 SOLUTION ORAL at 08:09

## 2024-03-06 RX ADMIN — Medication: at 10:49

## 2024-03-06 ASSESSMENT — PAIN SCALES - GENERAL: PAINLEVEL_OUTOF10: 0

## 2024-03-06 NOTE — PLAN OF CARE
Problem: Pain  Goal: Verbalizes/displays adequate comfort level or baseline comfort level  Outcome: Progressing     Problem: Safety - Adult  Goal: Free from fall injury  Outcome: Progressing     Problem: Skin/Tissue Integrity  Goal: Absence of new skin breakdown  Description: 1.  Monitor for areas of redness and/or skin breakdown  2.  Assess vascular access sites hourly  3.  Every 4-6 hours minimum:  Change oxygen saturation probe site  4.  Every 4-6 hours:  If on nasal continuous positive airway pressure, respiratory therapy assess nares and determine need for appliance change or resting period.  Outcome: Progressing     Problem: Chronic Conditions and Co-morbidities  Goal: Patient's chronic conditions and co-morbidity symptoms are monitored and maintained or improved  Outcome: Progressing     
  Problem: Occupational Therapy - Adult  Goal: By Discharge: Performs self-care activities at highest level of function for planned discharge setting.  See evaluation for individualized goals.  Description: FUNCTIONAL STATUS PRIOR TO ADMISSION: Pt's mother reports pt lives in a multi-level home with two elderly uncles. Pt was IND with ADLs and IADLs prior to admission, however, mother reports that pt has not been able to take care of himself recently and has had 9 falls in the past 3 days. Pt owns a RW and SPC.     HOME SUPPORT: Patient lived with elderly uncles.    Occupational Therapy Goals:  Initiated 2/16/2024  1.  Patient will perform grooming task seated with Moderate Assist within 7 day(s).  2.  Patient will perform upper body dressing seated with Moderate Assist within 7 day(s).  3.  Patient will perform lower body bathing seated with Maximal Assist within 7 day(s).  4.  Patient will perform toilet transfers with Minimal Assist  within 7 day(s).  5.  Patient will perform all aspects of toileting with Maximal Assist within 7 day(s).  6.  Patient will participate in upper extremity therapeutic exercise/activities with Stand by Assist for 5 minutes within 7 day(s).    7.  Patient will utilize energy conservation techniques during functional activities with verbal cues within 7 day(s).    Outcome: Progressing    OCCUPATIONAL THERAPY TREATMENT  Patient: Alcon Zamora (64 y.o. male)  Date: 2/21/2024  Primary Diagnosis: Gait abnormality [R26.9]  Weakness [R53.1]  Frequent falls [R29.6]  Acute cerebrovascular accident (CVA) ([facility]) [I63.9]       Precautions: Fall Risk                Chart, occupational therapy assessment, plan of care, and goals were reviewed.    ASSESSMENT  Patient continues to benefit from skilled OT services and is slowly progressing towards goals. Patient with significant cognitive impairments, need for increased processing time, decreased protective responses with loss of balance seated and 
  Problem: Occupational Therapy - Adult  Goal: By Discharge: Performs self-care activities at highest level of function for planned discharge setting.  See evaluation for individualized goals.  Description: FUNCTIONAL STATUS PRIOR TO ADMISSION: Pt's mother reports pt lives in a multi-level home with two elderly uncles. Pt was IND with ADLs and IADLs prior to admission, however, mother reports that pt has not been able to take care of himself recently and has had 9 falls in the past 3 days. Pt owns a RW and SPC.     HOME SUPPORT: Patient lived with elderly uncles.    Occupational Therapy Goals:  Initiated 2/16/2024  1.  Patient will perform grooming task seated with Moderate Assist within 7 day(s).  2.  Patient will perform upper body dressing seated with Moderate Assist within 7 day(s).  3.  Patient will perform lower body bathing seated with Maximal Assist within 7 day(s).  4.  Patient will perform toilet transfers with Minimal Assist  within 7 day(s).  5.  Patient will perform all aspects of toileting with Maximal Assist within 7 day(s).  6.  Patient will participate in upper extremity therapeutic exercise/activities with Stand by Assist for 5 minutes within 7 day(s).    7.  Patient will utilize energy conservation techniques during functional activities with verbal cues within 7 day(s).    Re-Evaluation 2/23/2024  1.  Patient will perform grooming task standing with Spv Assist within 7 day(s).  2.  Patient will perform upper body dressing seated with Moderate Assist within 7 day(s).  3.  Patient will perform lower body bathing seated with ModA within 7 day(s).  4.  Patient will perform toilet transfers with Minimal Assist within 7 day(s).  5.  Patient will perform all aspects of toileting with Licha Assist within 7 day(s).  6.  Patient will participate in upper extremity therapeutic exercise/activities with Stand by Assist for 10 minutes within 7 day(s).    7.  Patient will utilize energy conservation techniques 
  Problem: Occupational Therapy - Adult  Goal: By Discharge: Performs self-care activities at highest level of function for planned discharge setting.  See evaluation for individualized goals.  Description: FUNCTIONAL STATUS PRIOR TO ADMISSION: Pt's mother reports pt lives in a multi-level home with two elderly uncles. Pt was IND with ADLs and IADLs prior to admission, however, mother reports that pt has not been able to take care of himself recently and has had 9 falls in the past 3 days. Pt owns a RW and SPC.     HOME SUPPORT: Patient lived with elderly uncles.    Occupational Therapy Goals:  Initiated 2/16/2024  1.  Patient will perform grooming task seated with Moderate Assist within 7 day(s).  2.  Patient will perform upper body dressing seated with Moderate Assist within 7 day(s).  3.  Patient will perform lower body bathing seated with Maximal Assist within 7 day(s).  4.  Patient will perform toilet transfers with Minimal Assist  within 7 day(s).  5.  Patient will perform all aspects of toileting with Maximal Assist within 7 day(s).  6.  Patient will participate in upper extremity therapeutic exercise/activities with Stand by Assist for 5 minutes within 7 day(s).    7.  Patient will utilize energy conservation techniques during functional activities with verbal cues within 7 day(s).    Re-Evaluation 2/23/2024  Weekly Re-Assessment 3/1:  1.  Patient will perform grooming task standing with Spv Assist within 7 day(s). CONTINUE  2.  Patient will perform upper body dressing seated with Moderate Assist within 7 day(s). MET, Upgrade to Licha  3.  Patient will perform lower body bathing seated with ModA within 7 day(s). CONTINUE  4.  Patient will perform toilet transfers with Minimal Assist within 7 day(s). CONTINUE  5.  Patient will perform all aspects of toileting with Licha Assist within 7 day(s). CONTINUE  6.  Patient will participate in upper extremity therapeutic exercise/activities with Stand by Assist for 10 
  Problem: Occupational Therapy - Adult  Goal: By Discharge: Performs self-care activities at highest level of function for planned discharge setting.  See evaluation for individualized goals.  Description: FUNCTIONAL STATUS PRIOR TO ADMISSION: Pt's mother reports pt lives in a multi-level home with two elderly uncles. Pt was IND with ADLs and IADLs prior to admission, however, mother reports that pt has not been able to take care of himself recently and has had 9 falls in the past 3 days. Pt owns a RW and SPC.     HOME SUPPORT: Patient lived with elderly uncles.    Occupational Therapy Goals:  Initiated 2/16/2024  1.  Patient will perform grooming task seated with Moderate Assist within 7 day(s).  2.  Patient will perform upper body dressing seated with Moderate Assist within 7 day(s).  3.  Patient will perform lower body bathing seated with Maximal Assist within 7 day(s).  4.  Patient will perform toilet transfers with Minimal Assist  within 7 day(s).  5.  Patient will perform all aspects of toileting with Maximal Assist within 7 day(s).  6.  Patient will participate in upper extremity therapeutic exercise/activities with Stand by Assist for 5 minutes within 7 day(s).    7.  Patient will utilize energy conservation techniques during functional activities with verbal cues within 7 day(s).    Re-Evaluation 2/23/2024  Weekly Re-Assessment 3/1:  1.  Patient will perform grooming task standing with Spv Assist within 7 day(s). CONTINUE  2.  Patient will perform upper body dressing seated with Moderate Assist within 7 day(s). MET, Upgrade to Licha  3.  Patient will perform lower body bathing seated with ModA within 7 day(s). CONTINUE  4.  Patient will perform toilet transfers with Minimal Assist within 7 day(s). CONTINUE  5.  Patient will perform all aspects of toileting with Licha Assist within 7 day(s). CONTINUE  6.  Patient will participate in upper extremity therapeutic exercise/activities with Stand by Assist for 10 
  Problem: Pain  Goal: Verbalizes/displays adequate comfort level or baseline comfort level  2/28/2024 1036 by Angella Patton, RN  Outcome: Progressing  Flowsheets (Taken 2/28/2024 0948)  Verbalizes/displays adequate comfort level or baseline comfort level:   Encourage patient to monitor pain and request assistance   Assess pain using appropriate pain scale   Implement non-pharmacological measures as appropriate and evaluate response   Administer analgesics based on type and severity of pain and evaluate response   Consider cultural and social influences on pain and pain management   Notify Licensed Independent Practitioner if interventions unsuccessful or patient reports new pain  2/28/2024 0102 by Miladys Myles RN  Outcome: Progressing     Problem: Safety - Adult  Goal: Free from fall injury  2/28/2024 1036 by Angella Patton, RN  Outcome: Progressing  2/28/2024 0102 by Miladys Myles RN  Outcome: Progressing  Flowsheets (Taken 2/27/2024 2000)  Free From Fall Injury:   Instruct family/caregiver on patient safety   Based on caregiver fall risk screen, instruct family/caregiver to ask for assistance with transferring infant if caregiver noted to have fall risk factors     Problem: Skin/Tissue Integrity  Goal: Absence of new skin breakdown  Description: 1.  Monitor for areas of redness and/or skin breakdown  2.  Assess vascular access sites hourly  3.  Every 4-6 hours minimum:  Change oxygen saturation probe site  4.  Every 4-6 hours:  If on nasal continuous positive airway pressure, respiratory therapy assess nares and determine need for appliance change or resting period.  2/28/2024 1036 by Angella Patton, RN  Outcome: Progressing  2/28/2024 0102 by Miladys Myles RN  Outcome: Progressing     Problem: Chronic Conditions and Co-morbidities  Goal: Patient's chronic conditions and co-morbidity symptoms are monitored and maintained or improved  2/28/2024 1036 by Angella Patton, RN  Outcome: 
  Problem: Pain  Goal: Verbalizes/displays adequate comfort level or baseline comfort level  3/4/2024 1139 by Lidia Mcdaniel RN  Outcome: Progressing  Flowsheets (Taken 3/4/2024 1009)  Verbalizes/displays adequate comfort level or baseline comfort level:   Encourage patient to monitor pain and request assistance   Assess pain using appropriate pain scale   Administer analgesics based on type and severity of pain and evaluate response   Implement non-pharmacological measures as appropriate and evaluate response   Consider cultural and social influences on pain and pain management   Notify Licensed Independent Practitioner if interventions unsuccessful or patient reports new pain  3/3/2024 2201 by Ceci Hernandez, RN  Outcome: Progressing  Flowsheets (Taken 3/3/2024 2100)  Verbalizes/displays adequate comfort level or baseline comfort level:   Implement non-pharmacological measures as appropriate and evaluate response   Administer analgesics based on type and severity of pain and evaluate response   Assess pain using appropriate pain scale   Encourage patient to monitor pain and request assistance     Problem: Safety - Adult  Goal: Free from fall injury  3/4/2024 1139 by Lidia Mcdaniel RN  Outcome: Progressing  3/3/2024 2201 by Ceci Hernandez, RN  Outcome: Progressing  Flowsheets (Taken 3/3/2024 2145)  Free From Fall Injury: Instruct family/caregiver on patient safety     Problem: Skin/Tissue Integrity  Goal: Absence of new skin breakdown  Description: 1.  Monitor for areas of redness and/or skin breakdown  2.  Assess vascular access sites hourly  3.  Every 4-6 hours minimum:  Change oxygen saturation probe site  4.  Every 4-6 hours:  If on nasal continuous positive airway pressure, respiratory therapy assess nares and determine need for appliance change or resting period.  3/4/2024 1139 by Lidia Mcdaniel RN  Outcome: Progressing  3/3/2024 2201 by Ceci Hernandez, RN  Outcome: Progressing   
  Problem: Pain  Goal: Verbalizes/displays adequate comfort level or baseline comfort level  3/6/2024 1041 by Gela Bills RN  Outcome: Adequate for Discharge  3/6/2024 1041 by Gela Bills RN  Outcome: Progressing     Problem: Safety - Adult  Goal: Free from fall injury  3/6/2024 1041 by Gela Bills RN  Outcome: Adequate for Discharge  3/6/2024 1041 by Gela Bills RN  Outcome: Progressing  Flowsheets (Taken 3/5/2024 2048 by Ceci Hernandez, RN)  Free From Fall Injury: Instruct family/caregiver on patient safety     Problem: Skin/Tissue Integrity  Goal: Absence of new skin breakdown  Description: 1.  Monitor for areas of redness and/or skin breakdown  2.  Assess vascular access sites hourly  3.  Every 4-6 hours minimum:  Change oxygen saturation probe site  4.  Every 4-6 hours:  If on nasal continuous positive airway pressure, respiratory therapy assess nares and determine need for appliance change or resting period.  3/6/2024 1041 by Gela Bills RN  Outcome: Adequate for Discharge  3/6/2024 1041 by Gela Bills RN  Outcome: Progressing     Problem: Chronic Conditions and Co-morbidities  Goal: Patient's chronic conditions and co-morbidity symptoms are monitored and maintained or improved  3/6/2024 1041 by Gela Bills RN  Outcome: Adequate for Discharge  3/6/2024 1041 by Gela Bills RN  Outcome: Progressing  Flowsheets  Taken 3/6/2024 0800 by Gela Bills RN  Care Plan - Patient's Chronic Conditions and Co-Morbidity Symptoms are Monitored and Maintained or Improved: Monitor and assess patient's chronic conditions and comorbid symptoms for stability, deterioration, or improvement  Taken 3/5/2024 2048 by Ceci Hernandez, RN  Care Plan - Patient's Chronic Conditions and Co-Morbidity Symptoms are Monitored and Maintained or Improved:   Monitor and assess patient's chronic conditions and comorbid symptoms for stability, deterioration, or improvement   Collaborate with 
  Problem: Pain  Goal: Verbalizes/displays adequate comfort level or baseline comfort level  Outcome: Progressing     Problem: Safety - Adult  Goal: Free from fall injury  Outcome: Progressing     Problem: Skin/Tissue Integrity  Goal: Absence of new skin breakdown  Description: 1.  Monitor for areas of redness and/or skin breakdown  2.  Assess vascular access sites hourly  3.  Every 4-6 hours minimum:  Change oxygen saturation probe site  4.  Every 4-6 hours:  If on nasal continuous positive airway pressure, respiratory therapy assess nares and determine need for appliance change or resting period.  Outcome: Progressing     Problem: Chronic Conditions and Co-morbidities  Goal: Patient's chronic conditions and co-morbidity symptoms are monitored and maintained or improved  Outcome: Progressing     Problem: SLP Adult - Impaired Swallowing  Goal: By Discharge: Advance to least restrictive diet without signs or symptoms of aspiration for planned discharge setting.  See evaluation for individualized goals.  Description: Speech Therapy Goals  Initiated 2/25/24; re-eval 2/29/2024     1. Patient will tolerate baseline diet without adverse effects within 7 days. Continued 2/29/2024   2. Added 2/29/2024 patient will participate in therapeutic PO trials utilizing effortful swallow with SLP  3/5/2024 1132 by Ceci May, SLP  Outcome: Progressing     Problem: Discharge Planning  Goal: Discharge to home or other facility with appropriate resources  Outcome: Progressing     Problem: Confusion  Goal: Confusion, delirium, dementia, or psychosis is improved or at baseline  Description: INTERVENTIONS:  1. Assess for possible contributors to thought disturbance, including medications, impaired vision or hearing, underlying metabolic abnormalities, dehydration, psychiatric diagnoses, and notify attending LIP  2. [place] high risk fall precautions, as indicated  3. Provide frequent short contacts to provide reality reorientation, 
  Problem: Physical Therapy - Adult  Goal: By Discharge: Performs mobility at highest level of function for planned discharge setting.  See evaluation for individualized goals.  Description: FUNCTIONAL STATUS PRIOR TO ADMISSION: Patient was independent and active without use of DME per pt. Pt mother present, states that in the past few months he has fallen more, lost weight, and demoing decreased independence in caring for self. Mother reports 9 falls in the past few days, patient appears to deny number of falls.     HOME SUPPORT PRIOR TO ADMISSION: The patient lived with family but did not require assistance.    Physical Therapy Goals  Initiated 2/16/2024  1.  Patient will move from supine to sit and sit to supine, scoot up and down, and roll side to side in bed with supervision/set-up within 7 day(s).    2.  Patient will perform sit to stand with contact guard assist within 7 day(s).  3.  Patient will transfer from bed to chair and chair to bed with contact guard assist using the least restrictive device within 7 day(s).  4.  Patient will ambulate with contact guard assist for 150 feet with the least restrictive device within 7 day(s).   5.  Patient will ascend/descend 6 stairs with 1 handrail(s) with minimal assistance within 7 day(s).    Outcome: Progressing   PHYSICAL THERAPY TREATMENT    Patient: Alcon Zamora (64 y.o. male)  Date: 2/19/2024  Diagnosis: Gait abnormality [R26.9]  Weakness [R53.1]  Frequent falls [R29.6]  Acute cerebrovascular accident (CVA) ([facility]) [I63.9] Weakness      Precautions: Fall Risk                      ASSESSMENT:  Patient continues to benefit from skilled PT services and is progressing towards goals. Alcon tolerated today's session well. He required up to Mod A for bed mobility and Min A for transfers. He requires cueing for safety with mobility and sequencing throughout session. He ambulated 40 feet with RW & Min A and demonstrated slow, shuffled gait pattern with increased knee 
  Problem: Physical Therapy - Adult  Goal: By Discharge: Performs mobility at highest level of function for planned discharge setting.  See evaluation for individualized goals.  Description: FUNCTIONAL STATUS PRIOR TO ADMISSION: Patient was independent and active without use of DME per pt. Pt mother present, states that in the past few months he has fallen more, lost weight, and demoing decreased independence in caring for self. Mother reports 9 falls in the past few days, patient appears to deny number of falls.     HOME SUPPORT PRIOR TO ADMISSION: The patient lived with family but did not require assistance.    Physical Therapy Goals  Initiated 2/16/2024  1.  Patient will move from supine to sit and sit to supine, scoot up and down, and roll side to side in bed with supervision/set-up within 7 day(s).    2.  Patient will perform sit to stand with contact guard assist within 7 day(s).  3.  Patient will transfer from bed to chair and chair to bed with contact guard assist using the least restrictive device within 7 day(s).  4.  Patient will ambulate with contact guard assist for 150 feet with the least restrictive device within 7 day(s).   5.  Patient will ascend/descend 6 stairs with 1 handrail(s) with minimal assistance within 7 day(s).    Outcome: Progressing   PHYSICAL THERAPY TREATMENT    Patient: Alcon Zamora (64 y.o. male)  Date: 2/20/2024  Diagnosis: Gait abnormality [R26.9]  Weakness [R53.1]  Frequent falls [R29.6]  Acute cerebrovascular accident (CVA) ([facility]) [I63.9] Weakness      Precautions: Fall Risk                      ASSESSMENT:  Patient continues to benefit from skilled PT services and is progressing towards goals. Alcon continues to be limited due to decreased strength, activity tolerance, motor planning, sequencing, and increased processing time. He tolerated today's session well. He required up to Mod A for bed mobility and Min A for transfers. He continues to require multimodal cueing for 
  Problem: Physical Therapy - Adult  Goal: By Discharge: Performs mobility at highest level of function for planned discharge setting.  See evaluation for individualized goals.  Description: FUNCTIONAL STATUS PRIOR TO ADMISSION: Patient was independent and active without use of DME per pt. Pt mother present, states that in the past few months he has fallen more, lost weight, and demoing decreased independence in caring for self. Mother reports 9 falls in the past few days, patient appears to deny number of falls.     HOME SUPPORT PRIOR TO ADMISSION: The patient lived with family but did not require assistance.    Physical Therapy Goals  Initiated 2/16/2024  1.  Patient will move from supine to sit and sit to supine, scoot up and down, and roll side to side in bed with supervision/set-up within 7 day(s).    2.  Patient will perform sit to stand with contact guard assist within 7 day(s).  3.  Patient will transfer from bed to chair and chair to bed with contact guard assist using the least restrictive device within 7 day(s).  4.  Patient will [id number] with contact guard assist for 150 feet with the least restrictive device within 7 day(s).   5.  Patient will ascend/descend 6 stairs with 1 handrail(s) with minimal assistance within 7 day(s).    Outcome: Progressing     PHYSICAL THERAPY EVALUATION    Patient: Alcon Zamora (64 y.o. male)  Date: 2/16/2024  Primary Diagnosis: No admission diagnoses are documented for this encounter.       Precautions: Restrictions/Precautions: Fall Risk                      ASSESSMENT :   DEFICITS/IMPAIRMENTS:   The patient is limited by decreased functional mobility, independence in ADLs, high-level IADLs, ROM, strength, sensation, body mechanics, activity tolerance, endurance, safety awareness, cognition, command following, attention/concentration, coordination, balance, proprioception, vision/visual deficit, posture, fine-motor control, orthostatic hypotension, increased pain levels 
  Problem: Physical Therapy - Adult  Goal: By Discharge: Performs mobility at highest level of function for planned discharge setting.  See evaluation for individualized goals.  Description: FUNCTIONAL STATUS PRIOR TO ADMISSION: Patient was independent and active without use of DME per pt. Pt mother present, states that in the past few months he has fallen more, lost weight, and demoing decreased independence in caring for self. Mother reports 9 falls in the past few days, patient appears to deny number of falls.     HOME SUPPORT PRIOR TO ADMISSION: The patient lived with family but did not require assistance.    Physical Therapy Goals  Re-eval completed 2/23 s/p code blue, intubation and transfer to ICU; goals appropriate to carryover x7 days.    Initiated 2/16/2024  1.  Patient will move from supine to sit and sit to supine, scoot up and down, and roll side to side in bed with supervision/set-up within 7 day(s).    2.  Patient will perform sit to stand with contact guard assist within 7 day(s).  3.  Patient will transfer from bed to chair and chair to bed with contact guard assist using the least restrictive device within 7 day(s).  4.  Patient will ambulate with contact guard assist for 150 feet with the least restrictive device within 7 day(s).   5.  Patient will ascend/descend 6 stairs with 1 handrail(s) with minimal assistance within 7 day(s).    Outcome: Progressing      PHYSICAL THERAPY TREATMENT    Patient: Alcon Zamora (64 y.o. male)  Date: 2/26/2024  Diagnosis: Gait abnormality [R26.9]  Weakness [R53.1]  Frequent falls [R29.6]  Acute cerebrovascular accident (CVA) ([facility]) [I63.9] Weakness      Precautions: Fall Risk                      ASSESSMENT:  Patient continues to benefit from skilled PT services and is slowly progressing towards goals however remains most limited by decreased command command following, intermittent word retrieval deficits, global weakness (L>R), confusion, impaired coordination, 
  Problem: Physical Therapy - Adult  Goal: By Discharge: Performs mobility at highest level of function for planned discharge setting.  See evaluation for individualized goals.  Description: FUNCTIONAL STATUS PRIOR TO ADMISSION: Patient was independent and active without use of DME per pt. Pt mother present, states that in the past few months he has fallen more, lost weight, and demoing decreased independence in caring for self. Mother reports 9 falls in the past few days, patient appears to deny number of falls.     HOME SUPPORT PRIOR TO ADMISSION: The patient lived with family but did not require assistance.    Physical Therapy Goals  Re-eval completed 2/23 s/p code blue, intubation and transfer to ICU; goals appropriate to carryover x7 days.    Initiated 2/16/2024  1.  Patient will move from supine to sit and sit to supine, scoot up and down, and roll side to side in bed with supervision/set-up within 7 day(s).    2.  Patient will perform sit to stand with contact guard assist within 7 day(s).  3.  Patient will transfer from bed to chair and chair to bed with contact guard assist using the least restrictive device within 7 day(s).  4.  Patient will ambulate with contact guard assist for 150 feet with the least restrictive device within 7 day(s).   5.  Patient will ascend/descend 6 stairs with 1 handrail(s) with minimal assistance within 7 day(s).    Outcome: Progressing     PHYSICAL THERAPY TREATMENT    Patient: Alcon Zamora (64 y.o. male)  Date: 2/27/2024  Diagnosis: Gait abnormality [R26.9]  Weakness [R53.1]  Frequent falls [R29.6]  Acute cerebrovascular accident (CVA) ([facility]) [I63.9] Weakness      Precautions: Fall Risk                      ASSESSMENT:  Patient continues to benefit from skilled PT services and is progressing towards goals however remains most limited by decreased command command following, intermittent word retrieval deficits, global weakness (L>R), confusion, impaired coordination, and 
  Problem: Physical Therapy - Adult  Goal: By Discharge: Performs mobility at highest level of function for planned discharge setting.  See evaluation for individualized goals.  Description: FUNCTIONAL STATUS PRIOR TO ADMISSION: Patient was independent and active without use of DME per pt. Pt mother present, states that in the past few months he has fallen more, lost weight, and demoing decreased independence in caring for self. Mother reports 9 falls in the past few days, patient appears to deny number of falls.     HOME SUPPORT PRIOR TO ADMISSION: The patient lived with family but did not require assistance.    Physical Therapy Goals  Re-eval completed 2/23 s/p code blue, intubation and transfer to ICU; goals appropriate to carryover x7 days.    Initiated 2/16/2024  1.  Patient will move from supine to sit and sit to supine, scoot up and down, and roll side to side in bed with supervision/set-up within 7 day(s).    2.  Patient will perform sit to stand with contact guard assist within 7 day(s).  3.  Patient will transfer from bed to chair and chair to bed with contact guard assist using the least restrictive device within 7 day(s).  4.  Patient will ambulate with contact guard assist for 150 feet with the least restrictive device within 7 day(s).   5.  Patient will ascend/descend 6 stairs with 1 handrail(s) with minimal assistance within 7 day(s).    Outcome: Progressing   PHYSICAL THERAPY TREATMENT    Patient: Alcon Zamora (64 y.o. male)  Date: 2/28/2024  Diagnosis: Gait abnormality [R26.9]  Weakness [R53.1]  Frequent falls [R29.6]  Acute cerebrovascular accident (CVA) ([facility]) [I63.9] Weakness      Precautions: Fall Risk                      ASSESSMENT:  Patient continues to benefit from skilled PT services and is slowly progressing towards goals however remains most limited by decreased command command following,increased processing time, intermittent word retrieval deficits, global weakness (L>R), confusion, 
  Problem: Physical Therapy - Adult  Goal: By Discharge: Performs mobility at highest level of function for planned discharge setting.  See evaluation for individualized goals.  Description: FUNCTIONAL STATUS PRIOR TO ADMISSION: Patient was independent and active without use of DME per pt. Pt mother present, states that in the past few months he has fallen more, lost weight, and demoing decreased independence in caring for self. Mother reports 9 falls in the past few days, patient appears to deny number of falls.     HOME SUPPORT PRIOR TO ADMISSION: The patient lived with family but did not require assistance.    Physical Therapy Goals  Re-eval completed 2/23 s/p code blue, intubation and transfer to ICU; goals appropriate to carryover x7 days.    Initiated 2/16/2024  1.  Patient will move from supine to sit and sit to supine, scoot up and down, and roll side to side in bed with supervision/set-up within 7 day(s).    2.  Patient will perform sit to stand with contact guard assist within 7 day(s).  3.  Patient will transfer from bed to chair and chair to bed with contact guard assist using the least restrictive device within 7 day(s).  4.  Patient will ambulate with contact guard assist for 150 feet with the least restrictive device within 7 day(s).   5.  Patient will ascend/descend 6 stairs with 1 handrail(s) with minimal assistance within 7 day(s).    Outcome: Progressing   PHYSICAL THERAPY TREATMENT    Patient: Alcon Zamora (64 y.o. male)  Date: 2/29/2024  Diagnosis: Gait abnormality [R26.9]  Weakness [R53.1]  Frequent falls [R29.6]  Acute cerebrovascular accident (CVA) ([facility]) [I63.9] Weakness      Precautions: Fall Risk    Droplet +                  ASSESSMENT:  Patient continues to benefit from skilled PT services and is progressing towards goals. Pt tolerated session well, but continues to be limited by decreased strength, decreased functional mobility, impaired balance and gait, impaired cognition, delayed 
Bedside and Verbal shift change report given to Kamilah RN (oncoming nurse) by Brandy RN (offgoing nurse). Report included the following information Nurse Handoff Report.                Problem: Pain  Goal: Verbalizes/displays adequate comfort level or baseline comfort level  Outcome: Progressing     Problem: Safety - Adult  Goal: Free from fall injury  Outcome: Progressing     Problem: Skin/Tissue Integrity  Goal: Absence of new skin breakdown  Description: 1.  Monitor for areas of redness and/or skin breakdown  2.  Assess vascular access sites hourly  3.  Every 4-6 hours minimum:  Change oxygen saturation probe site  4.  Every 4-6 hours:  If on nasal continuous positive airway pressure, respiratory therapy assess nares and determine need for appliance change or resting period.  Outcome: Progressing     
Patient on seizure precautions is stable, no reports of pain or signs of acute distress upon assessment. Medications tolerated well crushed through abdominal enterostomy tube. Dressing remains mostly clean, dry and intact with old bloody drainage. Abdominal binder remains off, no new bloody drainage to report. Continuous tube feed increased to goal per orders with no nausea or vomiting reported. Skin breakdown prevention and incontinence care provided, no complaints at this time. Safety measures in place, monitoring ongoing.  
Patient on seizure precautions is stable, no signs of acute distress upon assessment. Patient verbalized concern over perceived methadone withdrawal symptoms due to missed dose today. Oral medications and anticoagulation held per provider's orders. Antiemetic administered for reported nausea with positive effect. External urinary catheter in use overnight for ease of void while on continuous intravenous fluids. Patient verbalized awareness of plan to receive an enterostomy tube insertion assessment in AM and is agreeable. Medications tolerated well, no further complaints at this time. Safety measures in place, monitoring ongoing.  
Patient on seizure precautions stable upon assessment. No reports of pan or signs of acute distress. Medications tolerated well crushed through patient's newly inserted abdominal PEG tube. Skin care and betina care given. Condom catheter in use for ease of void remains clean, dry, and intact. No complaints at this time. Safety measures in place, monitoring ongoing.  0045  Rapid response called after patient found by phlebotomist with blood saturated gown and abdominal binder. Vitals and labs taken were stable, no reports of pain or loss of consciousness. Enterostomy tube site found to be source of bleeding. Quick clot, abdominal dressing and tape applied to enterostomy incision opening. Once active bleeding ceased, hygiene care was given and a new abdominal binder was applied. Continuing to monitor patient's abdomen incision and hemodynamic stability.  0415  Upon rounding patient found to have saturated through abdominal dressing again. Covering provider contacted.  0440  Consulting GI provider team contacted, awaiting response. Quick clot replaced and abdominal binder left off. Bleeding appears to have stopped/slowed for time being. Repeat H&H collected.  
Speech LAnguage Pathology TREATMENT    Patient: Alcon Zamora (64 y.o. male)  Date: 2024  Primary Diagnosis: Gait abnormality [R26.9]  Weakness [R53.1]  Frequent falls [R29.6]  Acute cerebrovascular accident (CVA) ([facility]) [I63.9]       Precautions: aspiration, Fall Risk                  ASSESSMENT :  Patient seen upright in bed for SLP treatment.  Observed with ice chip and puree trials.  Patient very slow to respond, but did state name and .  Did not attempt any other orientation questions secondary to patient's extremely slow response time.  Presented patient with ice chips and completed 4 trials with strong cough noted in 2/4 trials.  Suspect patient with delayed pharyngeal swallow initiation.  With puree presentations, patient triggered swallow in 2/3 instances but no swallow trigger noted with 3rd presentation.  Patient appears unaware that he did not swallow.  P.O. trials for swallowing discontinued after lack of swallow initiation.  Will continue to follow.      Patient will benefit from skilled intervention to address the above impairments.     PLAN :  Recommendations and Planned Interventions:  Diet: NPO  --meds via NG tube  --strict oral care 2-3x/daily with suction toothbrush      Acute SLP Services: Yes, SLP will continue to follow per plan of care.    Discharge Recommendations: Yes, recommend SLP treatment at next level of care     SUBJECTIVE:   Patient stated, “.” for his birthday    OBJECTIVE:     Past Medical History:   Diagnosis Date    Alcohol abuse, in remission     Anxiety     Chronic kidney disease     PLAGUE L RENAL ARTERY    Chronic obstructive pulmonary disease (HCC)     Chronic pain     Constipation     Diabetes (HCC)     Type II    Erectile dysfunction     GERD (gastroesophageal reflux disease)     Hepatitis C     Hypertension     Liver disease     HEP C TOOK HARVONI    Pancreatitis     Psychiatric disorder     PANIC ATTACKS    Smoker     Spondylitis (HCC)     Type 2 diabetes 
Speech LAnguage Pathology TREATMENT    Patient: Alcon Zamora (64 y.o. male)  Date: 3/1/2024  Primary Diagnosis: Gait abnormality [R26.9]  Weakness [R53.1]  Frequent falls [R29.6]  Acute cerebrovascular accident (CVA) ([facility]) [I63.9]       Precautions:   Fall Risk                  ASSESSMENT :  Based on the objective data described below, the patient presents with significant s/s of aspiration with single ice chips. Patient demonstrates intermittent oral manipulation of ice chips with otherwise suspected oral holding. Patient appears to swallow at times, otherwise with no indication of having swallowed. Able to maintain O2 sats throughout. Suspect he will need long term alternative nutrition.     Patient will benefit from skilled intervention to address the above impairments.     PLAN :  Recommendations and Planned Interventions:  Diet: NPO  Ice chips after oral care for swallow rehab and oral integrity     Acute SLP Services: Yes, SLP will continue to follow per plan of care.    Discharge Recommendations: Yes, recommend SLP treatment at next level of care     SUBJECTIVE:   Patient stated, “I'm ok.”    OBJECTIVE:     Past Medical History:   Diagnosis Date    Alcohol abuse, in remission     Anxiety     Chronic kidney disease     PLAGUE L RENAL ARTERY    Chronic obstructive pulmonary disease (HCC)     Chronic pain     Constipation     Diabetes (HCC)     Type II    Erectile dysfunction 2015    GERD (gastroesophageal reflux disease)     Hepatitis C     Hypertension     Liver disease     HEP C TOOK HARVONI    Pancreatitis     Psychiatric disorder     PANIC ATTACKS    Smoker     Spondylitis (HCC)     Type 2 diabetes mellitus without complication (HCC) 2005     Past Surgical History:   Procedure Laterality Date    APPENDECTOMY      COLONOSCOPY N/A 12/19/2019    COLONOSCOPY performed by Lazarus De Leon MD at [facility] ENDOSCOPY    FRACTURE SURGERY  '[id number]    Back surgery    ORTHOPEDIC SURGERY      3 back surgeries    
Speech Language Pathology  Flexible Endoscopic Evaluation of Swallowing-FEES  Patient: Alcon Zamora (64 y.o. male)  Date: 2/26/2024  Primary Diagnosis: Gait abnormality [R26.9]  Weakness [R53.1]  Frequent falls [R29.6]  Acute cerebrovascular accident (CVA) ([facility]) [I63.9]       Precautions:   Fall Risk                  ASSESSMENT :  Patient presents with moderate oral dysphagia subjectively and severe pharyngeal dysphagia. Oral dysphagia characterized by overall weakness, difficulty sucking from straw, and prolonged oral transit with all consistencies. Pharyngeal dysphagia characterized by delayed swallow initiation with pooling in the piriform sinuses particularly noted with moderately-thick liquids, weak pharyngeal squeeze, and overall weakness with notable premature spillage on pt's left side of the pharynx (also with piriform sinus pooling primarily on left).  This results in penetration with subsequent aspiration with all liquid consistencies trialed (thin liquids, mildly-thick liquids, moderately-thick liquids). Pt was sensate to aspiration of thin liquids, but was not sensate to mildly-thick liquids and moderately-thick liquids aspirate. Difficult to determine if there was proximal escape due to presence of piriform sinus pooling and residuals.      Suspect pt's current swallow dysfunction is the direct result of patient's multiple infarcts now further exacerbated by recent intubation s/p code blue (with notable intubation-related trauma in the larynx/pharynx consistent with emergent intubation), COVID, and pt's overall neurologic status. At this juncture, recommend pt continue small amounts of ice chips/teaspoons of water after oral care and pureed snacks. Suspect pt swallow function to mirror neurologic recovery. However, pt will almost certainly require repeat objective imaging prior to diet advancement due to the silent nature of his aspiration with thicker consistencies. Will continue to follow closely.  
Anxiety     Chronic kidney disease     PLAGUE L RENAL ARTERY    Chronic obstructive pulmonary disease (HCC)     Chronic pain     Constipation     Diabetes (HCC)     Type II    Erectile dysfunction 2015    GERD (gastroesophageal reflux disease)     Hepatitis C     Hypertension     Liver disease     HEP C TOOK HARVONI    Pancreatitis     Psychiatric disorder     PANIC ATTACKS    Smoker     Spondylitis (HCC)     Type 2 diabetes mellitus without complication (HCC) 2005     Past Surgical History:   Procedure Laterality Date    APPENDECTOMY      COLONOSCOPY N/A 12/19/2019    COLONOSCOPY performed by Lazarus De Leon MD at [facility] ENDOSCOPY    FRACTURE SURGERY  '[id number]    Back surgery    ORTHOPEDIC SURGERY      3 back surgeries    ORTHOPEDIC SURGERY      KNEE SURGERY    TONSILLECTOMY      UPPER GASTROINTESTINAL ENDOSCOPY N/A 3/4/2024    ESOPHAGOGASTRODUODENOSCOPY PERCUTANEOUS ENDOSCOPIC GASTROSTOMY TUBE PLACEMENT performed by David Gray MD at [facility] ENDOSCOPY     Baseline Assessment:  Current Diet: NPO s/p PEG (TF on hold due to bleeding around PEG)    Cognitive and Communication Status:  Neurologic State: Alert  Orientation Level: Oriented x4  Cognition: Follows commands    Dysphagia:  P.O. Trials:  Assessment Method: Observation  Patient Position: Upright in bed  Vocal Quality: WFL   Consistency Presented: Thin liquids, Ice Chips  How Presented: SLP-fed/presented via spoon, straw  Bolus Acceptance: No impairment  Bolus Formation/Control: Impaired   Type of Impairment: Anterior spillage  Propulsion: No impairment  Oral Residue: None  Swallow Initiation: Present  Laryngeal Elevation: Present  Aspiration Signs/Symptoms: Cough x1 with thin liquids via straw at end of session    Respiratory Status/Airway:  Room air    After treatment:   Patient left in no apparent distress in bed, Call bell left within reach, and Nursing notified    COMMUNICATION/EDUCATION:   The patient's plan of care including recommendations, 
Hypertension     Liver disease     HEP C TOOK HARVONI    Pancreatitis     Psychiatric disorder     PANIC ATTACKS    Smoker     Spondylitis (HCC)     Type 2 diabetes mellitus without complication (HCC) 2005     Past Surgical History:   Procedure Laterality Date    APPENDECTOMY      COLONOSCOPY N/A 12/19/2019    COLONOSCOPY performed by Lazarus De Leon MD at [facility] ENDOSCOPY    FRACTURE SURGERY  '[id number]    Back surgery    ORTHOPEDIC SURGERY      3 back surgeries    ORTHOPEDIC SURGERY      KNEE SURGERY    TONSILLECTOMY       Prior Level of Function/Home Situation:   Social/Functional History  Lives With: Other (comment) (2 elderly uncles)  Type of Home: House  Home Layout: Multi-level  Home Access: Stairs to enter with rails  Entrance Stairs - Number of Steps: 6  Entrance Stairs - Rails: Both  Bathroom Shower/Tub: Tub/Shower unit  Bathroom Equipment: Shower chair  Home Equipment: Walker, rolling, Cane  Has the patient had two or more falls in the past year or any fall with injury in the past year?: Yes (9 falls in past 3 days - mother reports he was not using DME when he fell and would just \"fall down\")  Receives Help From: Family  ADL Assistance: Needs assistance  Toileting: Needs assistance  Ambulation Assistance: Independent  Transfer Assistance: Independent  Active : No    Cognitive and Communication Status:  Neurologic State: Alert  Orientation Level: Oriented to person and Oriented to time  Cognition: Decreased attention/concentration, Decreased command following, and Impaired decision making    Dysphagia:  Oral Assessment:  Oral Motor   Labial: No impairment  Dentition: Limited;Poor  Oral Hygiene: Moist;Clean  Lingual: Decreased rate;Decreased strength  Velum: Unable to visualize  Mandible: No impairment  P.O. Trials:  PO Trials  Neuromuscular Estim Used: No  Assessment Method(s): Observation  Vocal Quality: Low volume  Consistency Presented: Regular;Soft & Bite Sized;Pureed;Thin  How Presented: 
during functional activities with verbal cues within 7 day(s).  Outcome: Not Progressing      OCCUPATIONAL THERAPY TREATMENT  Patient: Alcon Zamora (64 y.o. male)  Date: 2/26/2024  Primary Diagnosis: Gait abnormality [R26.9]  Weakness [R53.1]  Frequent falls [R29.6]  Acute cerebrovascular accident (CVA) (HCC) [I63.9]       Precautions: Fall Risk                Chart, occupational therapy assessment, plan of care, and goals were reviewed.    ASSESSMENT  Patient continues to benefit from skilled OT services and is not progressing towards goals. RN cleared Pt for therapy, Pt agreeable. Pt transferred to sitting EOB MaxA x2 with poor righting responses. Pt able to assist with correcting balance when given direct cues. Pt requiring ModA x2 for stand to sit with posterior leaning and frequent cues for upright posture. Pt doffed/donned hospital gown with MaxA for sequencing task with poor attention to L side. Pt perseverative on looking around room for his glasses. Unable to locate his glasses, told primary RN. Pt returned to supported sitting in bed with assist of 2. Pt demonstrating poor sequencing, impaired initiation of tasks, slow processing speed, impaired balance, and overall requiring up to totalA with BADLs which is below his baseline of independent. Recommend intensive rehab to maximize rehab potential to PLOF.       PLAN :  Patient continues to benefit from skilled intervention to address the above impairments.  Continue treatment per established plan of care to address goals.    Recommend with staff: BSC with two person assist for toileting, OOB 3x/day    Recommendation for discharge: (in order for the patient to meet his/her long term goals): Therapy 3 hours/day 5-7 days/week    Other factors to consider for discharge: patient's current support system is unable to meet their requirements for physical assistance, impaired cognition, high risk for falls, and not safe to be alone    IF patient discharges home 
improved or at baseline  Description: INTERVENTIONS:  1. Assess for possible contributors to thought disturbance, including medications, impaired vision or hearing, underlying metabolic abnormalities, dehydration, psychiatric diagnoses, and notify attending LIP  2. [place] high risk fall precautions, as indicated  3. Provide frequent short contacts to provide reality reorientation, refocusing and direction  4. Decrease environmental stimuli, including noise as appropriate  5. Monitor and intervene to maintain adequate nutrition, hydration, elimination, sleep and activity  6. If unable to ensure safety without constant attention obtain sitter and review sitter guidelines with assigned personnel  7. Initiate Psychosocial CNS and Spiritual Care consult, as indicated  Outcome: Progressing  Flowsheets (Taken 3/3/2024 0800)  Effect of thought disturbance (confusion, delirium, dementia, or psychosis) are managed with adequate functional status: Assess for contributors to thought disturbance, including medications, impaired vision or hearing, underlying metabolic abnormalities, dehydration, psychiatric diagnoses, notify LIP     Problem: ABCDS Injury Assessment  Goal: Absence of physical injury  Outcome: Progressing     Problem: Nutrition Deficit:  Goal: Optimize nutritional status  Outcome: Progressing     
Assistance: Minimum assistance;Assist X2  Distance (ft): 20 Feet (10 ft x 2, seated break on toilet)  Assistive Device: Gait belt;Walker, rolling  Base of Support: Narrowed  Speed/[place]om[place]: Slow;Shuffled  Step Length: Left shortened;Right shortened  Swing Pattern: Left asymmetrical  Stance: Left decreased;Right increased  Gait Abnormalities: Shuffling gait;Decreased step clearance;Trunk sway increased (leaning to the L)  Neuro Re-Education:                    Pain Ratin/10   Pain Intervention(s):       Activity Tolerance:   Good    After treatment:   Patient left in no apparent distress sitting up in chair, Call bell within reach, Bed/ chair alarm activated, Caregiver / family present, and Side rails x3      COMMUNICATION/EDUCATION:   The patient's plan of care was discussed with: occupational therapist and registered nurse    Patient Education  Education Given To: Patient  Education Provided: Role of Therapy;Plan of Care  Education Method: Verbal  Barriers to Learning: Cognition  Education Outcome: Verbalized understanding;Continued education needed      Jacqueline oRmo PT, DPT  Minutes: 28    
Status:  Neurologic State: Alert  Orientation Level: Oriented to person, Oriented to place, and Oriented to time  Cognition: Follows commands, Slowed processing speed, Reduced initiation, Impaired functional recall    History/indication for procedure: CVA, Prolonged NPO  Lidocaine used: No  Nostril used: left  Scope Used: Ambu disposable scope  Feeding Tube Present in Nare: No  Adverse Reaction: No  Respiratory status: Room air    Part I:  Anatomy:  General Comments:    Palate:   WFL Base of tongue:   Impaired: Reduced retraction   Valleculae:   Impaired: Residuals with solids Epiglottis:   Impaired: Abbreviated inversion   Arytenoids:   WFL False vocal folds:   Impaired: Slightly edematous / ?Compensating for TVF gap   Vocal folds:   Impaired: ?Posterior gap Pyriform sinus:   Impaired: Residuals with liquids     Part II:  Laryngeal Function:  Adduction:   Incomplete Abduction:   Full   Arytenoid movement:   Symmetric Vocal quality:   WFL     Part III:  Secretions:  [place] Secretion Rating (0-7): 0     Location:  0 - Nil significant pooled secretions in pyriform fossae or laryngeal vestibule Amount:   0 - Nil significant pooled secretions in pyriform fossae (0-20%) Response*:  0 - Secretions in pyriform fossae or laryngeal vestibule effectively cleared   Comments (e.g., quality/texture/color): Scant, white and moderately thick secretions in valleculae, pyriforms   *Normal airway responses in the pharynx or laryngeal vestibule may include spontaneous coughing, throat clearing, and/or swallowing    Part IV:  Swallow Trials:  Consistency: Thin liquid  Position of Bolus Pre-Swallow: Pyriform sinuses  [place] Pharyngeal Residue Severity Rating Scale: Valleculae residue: 1 - none; 0%, no residue and Pyriform sinus residue: 2 - trace; 1-5%, trace coating of the mucosa  Penetration: Yes  Aspiration: No  Response: No response  [place] Penetration-Aspiration Scale: 5 - Material enters the airway, contacts the vocal folds, 
exercise, visual/perceptual training, endurance activities, cognitive retraining, patient education, home safety training, and family training/education    Frequency/Duration: OT Plan of Care: 5 times/week    Recommendation for discharge: (in order for the patient to meet his/her long term goals): Therapy 3 hours/day 5-7 days/week    Other factors to consider for discharge: available support system works or is unable to provide adequate supervision and the patient would be alone, patient's current support system is unable to meet their requirements for physical assistance, poor safety awareness, impaired cognition, high risk for falls, not safe to be alone, and concern for safely navigating or managing the home environment    IF patient discharges home will need the following DME: continuing to assess with progress       SUBJECTIVE:   Patient stated, “I don't know.” re: OT asking if pt feels more weak in recent days  OBJECTIVE DATA SUMMARY:     Past Medical History:   Diagnosis Date    Alcohol abuse, in remission     Anxiety     Chronic kidney disease     PLAGUE L RENAL ARTERY    Chronic obstructive pulmonary disease (HCC)     Chronic pain     Constipation     Diabetes (HCC)     Type II    Erectile dysfunction 2015    GERD (gastroesophageal reflux disease)     Hepatitis C     Hypertension     Liver disease     HEP C TOOK HARVONI    Pancreatitis     Psychiatric disorder     PANIC ATTACKS    Smoker     Spondylitis (HCC)     Type 2 diabetes mellitus without complication (HCC) 2005     Past Surgical History:   Procedure Laterality Date    APPENDECTOMY      COLONOSCOPY N/A 12/19/2019    COLONOSCOPY performed by Lazarus De Leon MD at [facility] ENDOSCOPY    FRACTURE SURGERY  '[id number]    Back surgery    ORTHOPEDIC SURGERY      3 back surgeries    ORTHOPEDIC SURGERY      KNEE SURGERY    TONSILLECTOMY            Expanded or extensive additional review of patient history:   Social/Functional History  Lives With: Other 
for all functional tasks, decreased insight to deficits, decreased basic problem solving    Functional Mobility Training:  Bed Mobility:  Bed Mobility Training  Bed Mobility Training: Yes  Interventions: Safety awareness training;Weight shifting training/pressure relief;Verbal cues;Manual cues;Visual cues  Supine to Sit: Moderate assistance;Assist X1 (lean left and max cues to use UE's to scoot to edge of bed)  Scooting: Minimum assistance;Additional time;Assist X1 (max multi-modal cues)  Transfers:  Transfer Training  Transfer Training: Yes  Interventions: Safety awareness training;Verbal cues;Manual cues;Tactile cues;Visual cues  Sit to Stand: Minimum assistance;Moderate assistance  Stand to Sit: Minimum assistance (cues for safety and problem solving)  Stand Pivot Transfers: Minimum assistance;Adaptive equipment;Assist X1  Bed to Chair: Minimum assistance;Assist X1;Additional time  Balance:  Balance  Sitting: Impaired  Sitting - Static: Fair (occasional) (initially poor, progressed to good)  Sitting - Dynamic: Poor (constant support);Fair (occasional)  Standing: Impaired  Standing - Static: Constant support;Fair  Standing - Dynamic: Poor   Ambulation/Gait Training:     Gait  Overall Level of Assistance: Minimum assistance;Adaptive equipment;Moderate assistance  Distance (ft): 80 Feet  Assistive Device: Walker, rolling;Gait belt  Interventions: Safety awareness training;Tactile cues;Verbal cues;Weight shifting training/pressure relief (heavy cues for RW management, step length and increasing clearance, sequencing)  Base of Support: Narrowed  Speed/Xiomara: Slow  Step Length: Left shortened;Right shortened  Swing Pattern: Left asymmetrical  Gait Abnormalities: Shuffling gait;Decreased step clearance (B knee flexion)  Neuro Re-Education:                    Pain Rating:  none     Activity Tolerance:   Fair  and requires rest breaks    After treatment:   Patient left in no apparent distress sitting up in chair, Call 
restraint application   Inform patient/family regarding the reason for restraint   Every 2 hours: Monitor safety, psychosocial status, comfort, nutrition and hydration  Taken 2/26/2024 2000 by Joycelyn Ag RN  Remains free of injury from restraints (restraint for interference with medical device):   Determine that other, less restrictive measures have been tried or would not be effective before applying the restraint   Evaluate the patient's condition at the time of restraint application   Every 2 hours: Monitor safety, psychosocial status, comfort, nutrition and hydration   Inform patient/family regarding the reason for restraint     Problem: Confusion  Goal: Confusion, delirium, dementia, or psychosis is improved or at baseline  Description: INTERVENTIONS:  1. Assess for possible contributors to thought disturbance, including medications, impaired vision or hearing, underlying metabolic abnormalities, dehydration, psychiatric diagnoses, and notify attending LIP  2. [place] high risk fall precautions, as indicated  3. Provide frequent short contacts to provide reality reorientation, refocusing and direction  4. Decrease environmental stimuli, including noise as appropriate  5. Monitor and intervene to maintain adequate nutrition, hydration, elimination, sleep and activity  6. If unable to ensure safety without constant attention obtain sitter and review sitter guidelines with assigned personnel  7. Initiate Psychosocial CNS and Spiritual Care consult, as indicated  2/27/2024 1151 by Lolly Jackson  Outcome: Progressing  Flowsheets (Taken 2/27/2024 0800)  Effect of thought disturbance (confusion, delirium, dementia, or psychosis) are managed with adequate functional status:   Monitor and intervene to maintain adequate nutrition, hydration, elimination, sleep and activity   [place] high risk fall precautions, as indicated  2/27/2024 0335 by Yadira Foster RN  Outcome: Progressing     Problem: 
safe to be alone    IF patient discharges home will need the following DME: continuing to assess with progress       SUBJECTIVE:   Patient stated “I play the guitar.” Re: Pt asked what he likes to do    OBJECTIVE DATA SUMMARY:   Cognitive/Behavioral Status:  Orientation  Orientation Level: Oriented to person;Disoriented to time (Pt reporting year is 2092. Reporting location is [place].)  Cognition  Overall Cognitive Status: Exceptions  Arousal/Alertness: Delayed responses to stimuli  Following Commands: Inconsistently follows commands;Follows one step commands with repetition  Attention Span: Difficulty attending to directions;Difficulty dividing attention;Unable to maintain attention  Safety Judgement: Decreased awareness of need for assistance;Decreased awareness of need for safety  Problem Solving: Decreased awareness of errors;Assistance required to correct errors made;Assistance required to generate solutions  Insights: Decreased awareness of deficits  Initiation: Requires cues for some  Sequencing: Requires cues for some    Functional Mobility and Transfers for ADLs:  Bed Mobility:  Bed Mobility Training  Bed Mobility Training: Yes  Supine to Sit: Minimum assistance;Additional time  Scooting: Maximum assistance;Assist X2     Transfers:   Transfer Training  Transfer Training: Yes  Interventions: Safety awareness training;Verbal cues;Manual cues;Tactile cues;Visual cues  Sit to Stand: Minimum assistance;Assist X2;Adaptive equipment  Stand to Sit: Minimum assistance;Assist X2;Adaptive equipment  Bed to Chair: Minimum assistance;Assist X2;Adaptive equipment           Balance:  Standing: Impaired  Balance  Sitting: Impaired  Sitting - Static: Good (unsupported)  Sitting - Dynamic: Fair (occasional)  Standing: Impaired  Standing - Static: Constant support;Fair  Standing - Dynamic: Constant support;Poor;Fair    Functional Mobility: Minimal assistance  Functional Mobility Skilled Clinical Factors: x2 RW      Pain 
with transferring infant if caregiver noted to have fall risk factors     Problem: Skin/Tissue Integrity  Goal: Absence of new skin breakdown  Description: 1.  Monitor for areas of redness and/or skin breakdown  2.  Assess vascular access sites hourly  3.  Every 4-6 hours minimum:  Change oxygen saturation probe site  4.  Every 4-6 hours:  If on nasal continuous positive airway pressure, respiratory therapy assess nares and determine need for appliance change or resting period.  Outcome: Progressing     Problem: Chronic Conditions and Co-morbidities  Goal: Patient's chronic conditions and co-morbidity symptoms are monitored and maintained or improved  Outcome: Progressing     Problem: Discharge Planning  Goal: Discharge to home or other facility with appropriate resources  Outcome: Progressing     Problem: Safety - Medical Restraint  Goal: Remains free of injury from restraints (Restraint for Interference with Medical Device)  Description: INTERVENTIONS:  1. Determine that other, less restrictive measures have been tried or would not be effective before applying the restraint  2. Evaluate the patient's condition at the time of restraint application  3. Inform patient/family regarding the reason for restraint  4. Q2H: Monitor safety, psychosocial status, comfort, nutrition and hydration  Outcome: Progressing     Problem: Confusion  Goal: Confusion, delirium, dementia, or psychosis is improved or at baseline  Description: INTERVENTIONS:  1. Assess for possible contributors to thought disturbance, including medications, impaired vision or hearing, underlying metabolic abnormalities, dehydration, psychiatric diagnoses, and notify attending LIP  2. [place] high risk fall precautions, as indicated  3. Provide frequent short contacts to provide reality reorientation, refocusing and direction  4. Decrease environmental stimuli, including noise as appropriate  5. Monitor and intervene to maintain adequate nutrition, 
year or any fall with injury in the past year?: Yes (9 falls in past 3 days - mother reports he was not using DME when he fell and would just \"fall down\")  Receives Help From: Family  ADL Assistance: Needs assistance  Toileting: Needs assistance  Ambulation Assistance: Independent  Transfer Assistance: Independent  Active : No    Baseline Assessment:                Cognitive and Communication Status:  Neurologic State: Alert  Orientation Level: Oriented to person and Oriented to place  Cognition: Follows commands    Dysphagia:  Oral Assessment:  Oral Motor   Labial: Decreased rate;Decreased seal  Dentition: Limited;Natural  Lingual: Decreased rate;Decreased strength  P.O. Trials:  PO Trials  Neuromuscular Estim Used: No  Assessment Method(s): Observation  Patient Position: up in chair  Vocal Quality: Low volume  Consistency Presented: Pureed;Ice Chips  How Presented: SLP-fed/Presented;Spoon  Bolus Acceptance: No impairment  Bolus Formation/Control: Impaired  Type of Impairment: Delayed  Propulsion: Delayed (# of seconds)  Initiation of Swallow: Delayed (# of seconds)  Laryngeal Elevation: Weak  Aspiration Signs/Symptoms: Strong cough              Respiratory Status/Airway:  Room air            Functional Oral Intake Scale (FOIS): 1--Nothing by mouth (NPO)    After treatment:   Patient left in no apparent distress sitting up in chair, Nursing notified, and Caregiver present    COMMUNICATION/EDUCATION:     The patient's plan of care including recommendations, planned interventions, and recommended diet changes were discussed with: Registered nurse    Patient/family agree to work toward stated goals and plan of care    Thank you,  Jacqueline Hernandez, SLP  Minutes: 18   
prevent exacerbation or deterioration   Update acute care plan with appropriate goals if chronic or comorbid symptoms are exacerbated and prevent overall improvement and discharge     Problem: SLP Adult - Impaired Swallowing  Goal: By Discharge: Advance to least restrictive diet without signs or symptoms of aspiration for planned discharge setting.  See evaluation for individualized goals.  Description: Speech Therapy Goals  Initiated 2/25/24; re-eval 2/29/2024     1. Patient will tolerate baseline diet without adverse effects within 7 days. Continued 2/29/2024   2. Added 2/29/2024 patient will participate in therapeutic PO trials utilizing effortful swallow with SLP  2/29/2024 1330 by Jacqueline Hernandez, SLP  Outcome: Progressing     Problem: Discharge Planning  Goal: Discharge to home or other facility with appropriate resources  3/1/2024 0129 by Dee Solano RN  Outcome: Progressing  2/29/2024 1231 by Sandra Hood  Outcome: Progressing  Flowsheets (Taken 2/29/2024 0800)  Discharge to home or other facility with appropriate resources:   Identify barriers to discharge with patient and caregiver   Arrange for needed discharge resources and transportation as appropriate   Identify discharge learning needs (meds, wound care, etc)   Arrange for interpreters to assist at discharge as needed   Refer to discharge planning if patient needs post-hospital services based on physician order or complex needs related to functional status, cognitive ability or social support system     Problem: Safety - Medical Restraint  Goal: Remains free of injury from restraints (Restraint for Interference with Medical Device)  Description: INTERVENTIONS:  1. Determine that other, less restrictive measures have been tried or would not be effective before applying the restraint  2. Evaluate the patient's condition at the time of restraint application  3. Inform patient/family regarding the reason for restraint  4. Q2H: Monitor 
reports pt lives in a multi-level home with two elderly uncles. Pt was IND with ADLs and IADLs prior to admission, however, mother reports that pt has not been able to take care of himself recently and has had 9 falls in the past 3 days. Pt owns a RW and SPC.     HOME SUPPORT: Patient lived with elderly uncles.    Occupational Therapy Goals:  Initiated 2/16/2024  1.  Patient will perform grooming task seated with Moderate Assist within 7 day(s).  2.  Patient will perform upper body dressing seated with Moderate Assist within 7 day(s).  3.  Patient will perform lower body bathing seated with Maximal Assist within 7 day(s).  4.  Patient will perform toilet transfers with Minimal Assist  within 7 day(s).  5.  Patient will perform all aspects of toileting with Maximal Assist within 7 day(s).  6.  Patient will participate in upper extremity therapeutic exercise/activities with Stand by Assist for 5 minutes within 7 day(s).    7.  Patient will utilize energy conservation techniques during functional activities with verbal cues within 7 day(s).    Re-Evaluation 2/23/2024  1.  Patient will perform grooming task standing with Spv Assist within 7 day(s).  2.  Patient will perform upper body dressing seated with Moderate Assist within 7 day(s).  3.  Patient will perform lower body bathing seated with ModA within 7 day(s).  4.  Patient will perform toilet transfers with Minimal Assist within 7 day(s).  5.  Patient will perform all aspects of toileting with Licha Assist within 7 day(s).  6.  Patient will participate in upper extremity therapeutic exercise/activities with Stand by Assist for 10 minutes within 7 day(s).    7.  Patient will utilize energy conservation techniques during functional activities with verbal cues within 7 day(s).  2/26/2024 1555 by Kyung Jade OT  Outcome: Not Progressing     Problem: SLP Adult - Impaired Swallowing  Goal: By Discharge: Advance to least restrictive diet without signs or symptoms of 
therapist and registered nurse    Patient Education  Education Given To: Patient;Family  Education Provided: Role of Therapy;Plan of Care  Education Method: Demonstration;Verbal  Barriers to Learning: Cognition  Education Outcome: Verbalized understanding;Demonstrated understanding;Continued education needed    Thank you for this referral.  Oumou Douglas OT  Minutes: 34    
Support: Narrowed  Speed/Xiomara: Slow  Step Length: Left shortened;Right shortened  Stance: Left decreased;Right increased  Gait Abnormalities: Shuffling gait;Decreased step clearance  Distance (ft): 3 Feet                             Pain Ratin/10   Pain Intervention(s):       Activity Tolerance:   Good, requires frequent rest breaks, observed shortness of breath on exertion, and desaturates with activity and requires oxygen    After treatment:   Patient left in no apparent distress in bed, Call bell within reach, Bed/ chair alarm activated, Caregiver / family present, and Side rails x3    COMMUNICATION/EDUCATION:   The patient's plan of care was discussed with: occupational therapist and registered nurse    Patient Education  Education Given To: Patient  Education Provided: Role of Therapy;Plan of Care;Equipment;Transfer Training;Fall Prevention Strategies  Education Method: Verbal;Demonstration  Barriers to Learning: None  Education Outcome: Verbalized understanding    Thank you for this referral.  Maida Lyles, PT, DPT  Minutes: 25      
decreased (< 100 feet/min)  Step Length: Left shortened;Right shortened  Gait Abnormalities: Decreased step clearance;Shuffling gait;Trunk sway increased (L lean)                Pain Ratin/10   Pain Intervention(s):       Activity Tolerance:   Fair     After treatment:   Patient left in no apparent distress sitting up in chair, Call bell within reach, and Bed/ chair alarm activated    COMMUNICATION/EDUCATION:   The patient's plan of care was discussed with: occupational therapist and registered nurse    Patient Education  Education Given To: Patient  Education Provided: Role of Therapy;Plan of Care  Education Provided Comments: Importance of mobilizing to bathroom and chair  Education Method: Verbal  Barriers to Learning: Cognition  Education Outcome: Continued education needed    Thank you for this referral.  Markus Ray, PT  Minutes: 30     
rights reserved     Score: 15     Interpretation of Tool:  Represents clinically-significant functional categories (i.e. Activities of daily living).    Cutoff score 39.4 (19) correlates to a good likelihood of discharging home versus a facility  Benita Samuels, Lydia Baker, Earl Mancilla, Venessa Lawrence, J Carlos Jean-Baptiste, Carmine Samuels, -PAC “6-Clicks” Functional Assessment Scores Predict Acute Care Hospital Discharge Destination, Physical Therapy, Volume 94, Issue 9, 2014, Pages 0204-9702, [email or web address]    Pain Ratin/10   Pain Intervention(s):   pain is at a level acceptable to the patient    Activity Tolerance:   Good      After treatment:   Patient left in no apparent distress sitting up in chair, Call bell within reach, Bed/ chair alarm activated, and Caregiver / family present    COMMUNICATION/EDUCATION:   The patient's plan of care was discussed with: physical therapist and registered nurse    Patient Education  Education Given To: Patient;Family  Education Provided: Role of Therapy;Precautions;Plan of Care;ADL Adaptive Strategies;Transfer Training;Fall Prevention Strategies  Education Provided Comments: Pt able to accurately point to call bell button, re-educated on calling for assistance for getting up  Education Method: Verbal  Barriers to Learning: Cognition  Education Outcome: Verbalized understanding;Demonstrated understanding;Continued education needed    Thank you for this referral.  Kyung Jade OT  Minutes: 29

## 2024-03-06 NOTE — DISCHARGE SUMMARY
Discharge Summary       PATIENT ID: Alcon Zamora  MRN: [id number]   YOB: 1960    DATE OF ADMISSION: 2/16/2024 12:14 PM    DATE OF DISCHARGE: 3/6/2024   PRIMARY CARE PROVIDER: No primary care provider on file.     ATTENDING PHYSICIAN: Bennie Meredith  DISCHARGING PROVIDER: Bennie Meredith MD      CONSULTATIONS: IP CONSULT TO CASE MANAGEMENT  IP CONSULT TO PHYSICAL MEDICINE REHAB  IP CONSULT TO NEUROLOGY  IP CONSULT TO CARDIOLOGY  IP CONSULT TO NEUROLOGY  IP CONSULT TO TELE-NEUROLOGY  IP CONSULT TO PHARMACY  IP WOUND CARE NURSE CONSULT TO EVAL  IP WOUND CARE NURSE CONSULT TO EVAL  IP CONSULT TO GI  IP CONSULT TO DIETITIAN    PROCEDURES/SURGERIES: Procedure(s):  ESOPHAGOGASTRODUODENOSCOPY PERCUTANEOUS ENDOSCOPIC GASTROSTOMY TUBE PLACEMENT    ADMISSION SUMMARY AND HOSPITAL COURSE:     Alcon Zamora is a 64 y.o. male with alcohol and opioid abuse (reportedly in remission), anxiety/panic attacks, HLD, COPD, CKD, chronic pain, T2DM, erectile dysfunction, GERD, HCV s/p Harvoni, HTN, and spondylitis who was initially admitted on 2/16/2024 by [facility] for dizziness, generalized weakness, hyponatremia. Pt was seen by PT/OT and recommended IPR. PM&R and Neurology were consulted. MRI brain wo contrast 2/18 showed small acute infarcts in the left medial basal ganglia and R superior alannah/cerebral peduncle, multiple small chronic infarcts in bilateral BG, R thalamus and L cerebral peduncle and moderate-severe chronic small vessel ischemic disease. TTE showed probable PFO and Cardiology was consulted. JOSEMANUEL on 2/21 showed EF 60-65%, diastolic dysfunction, + bubble study with stretched PFO with a large bidirectional interatrial shunt. Cardiology and Neurology were in agreement re: deferring PFO closure and treating medically with antiplatelet therapy, high-intensity statin and management of vascular risk factors. 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

## 2024-03-06 NOTE — CARE COORDINATION
Transition of Care Plan:    IPR - [facility]; DC today  RN to call report to [phone]    Transport: Hospital to Home - 1830    RUR: 18%  Prior Level of Functioning: Independent   Disposition: IPR  If SNF or IPR: Date FOC offered:   Date FOC received:   Accepting facility: [facility]  Date authorization started with reference number:   Date authorization received and expires: Auth received/  Follow up appointments: PCP, Neuro  DME needed: defer to IPR  Transportation at discharge: [facility]   Caregiver Contact: Rena Llamas: [phone]   Discharge Caregiver contacted prior to discharge?   Care Conference needed? No  Barriers to discharge: PEG placed 3/4, TF to be initiated later today    Plan for Pt to be discharged today if tolerating TF.    [facility] does have bed available today. Hospital to home transport scheduled for 1600; hospital to cover cost - $298.00.    1100: [facility] has requested transport to be pushed to 1830. Hospital to home updated on new time.    CM met with Pt's mother, Rena Llamas, she is in agreement with discharge plan.    MAGNUS Orozco (Ally).

## 2024-03-06 NOTE — PROGRESS NOTES
Neurology Consult Note     NAME: Alcon Zamora   :  1960   MRN:  [id number]   DATE:  2024    Assessment and Plan:     63 yo M h/o HTN, HLD, DM presenting with ~4 months of falls and unsteadiness, which started suddenly but unable to remember specific event. One week ago, patient noted acute on chronic worsening of falls and unsteadiness. Denies significant lightheadedness during falls. CTH showed no acute findings. MRI brain showed acute strokes in R alannah/midbrain and L medial basal ganglia as well as chronic infarcts in bilateral BG, R thalamus, and L cerebral peduncle. Exam notable for L>R sided weakness as well as mild intention tremor in BUE. Presentation consistent with acute on chronic strokes. Does not take ASA or statin at home. Smokes ~0.5 PPD.    - Start ASA 81 mg daily  - Start atorvastatin 80 mg daily  - TTE pending; if no evidence of thrombus, recommend outpatient cardiac monitoring  - CTA head/neck pending  - A1c, lipid panel pending  - PT/OT as tolerated  - Discussed 6 month driving restriction with patient, patient verbalized understanding  - Follow up in neurology clinic in 6-8 weeks    Subjective   Interval events:  MRI brain obtained which shows acute strokes in R alannah/midbrain and L medial basal ganglia as well as chronic infarcts in bilateral BG, R thalamus, and L cerebral peduncle. Per discussion with patient, he notes ~4 months of falls and unsteadiness. He denies significant dizziness when he occurs and simply feels unsteady. He feels that decline was sudden but not able to remember specific event. One week ago, he noticed acute on chronic worsening of falls, weakness throughout but particularly left leg, and unsteadiness.     ROS:  A comprehensive ROS was negative except as noted in the HPI above.    PMH:  Past Medical History: 
                                                                                                Hospitalist Progress Note  Bennie Meredith MD  Answering service: [phone]        Date of Service:  2024  NAME:  Alcon Zamora  :  1960  MRN:  [id number]      Admission Summary:     Patient admitted with generalized weakness, dizziness and multiple falls.    Interval history / Subjective:     Patient still feeling weak.     Assessment & Plan:     Acute CVA  -Patient reports dizziness with change in position, probable component of vertigo  -MRI of the brain shows small acute infarcts in the left medial basal ganglia and right superior alannah/cerebral peduncle, generalized parenchymal volume loss and moderate to severe chronic microvascular ischemic disease, multiple small chronic infarcts  -Echo shows normal EF, LDL 89.4, hemoglobin A1c 4.8  -Neurology evaluating, on aspirin and statin, PT OT following    Hypertension  -Continue amlodipine, metoprolol and lisinopril, monitor blood pressure    Type 2 diabetes  -Continue insulin sliding scale coverage  -Metformin on hold     Code status: Full  Prophylaxis: Lovenox  Care Plan discussed with: Patient  Anticipated Disposition: 24 to 48 hours, plan for IPR.  Central Line:   None             Review of Systems:   Pertinent items are noted in HPI.         Vital Signs:    Last 24hrs VS reviewed since prior progress note. Most recent are:  Vitals:    24 0921   BP: (!) 171/80   Pulse: 65   Resp:    Temp:    SpO2:        No intake or output data in the 24 hours ending 24 1324       Physical Examination:     I had a face to face encounter with this patient and independently examined them on 2024 as outlined below:          General : alert x 3, awake, no acute distress,   HEENT: PEERL, EOMI, moist mucus membrane, TM clear  Neck: supple, no JVD, no meningeal signs  Chest: Clear to auscultation bilaterally   CVS: S1 S2 heard, Capillary refill less than 2 
                                                                                                Hospitalist Progress Note  Bennie Meredith MD  Answering service: [phone]        Date of Service:  2024  NAME:  Alcon Zamora  :  1960  MRN:  [id number]      Admission Summary:     Patient admitted with generalized weakness, dizziness and multiple falls.    Interval history / Subjective:     Patient still feeling weak.     Assessment & Plan:     Dizziness  -Patient reports dizziness with change in position, probable component of vertigo  -Neurology evaluating, MRI of the brain pending    Generalized weakness  -Patient with generalized weakness resulting in multiple falls  -Recent head CT in the ER a week ago was negative for acute pathology  -PT following, neurology following, PMNR has been consulted for further evaluation    Hypertension  -Continue amlodipine, metoprolol and lisinopril, monitor blood pressure    Type 2 diabetes  -Continue insulin sliding scale coverage  -Metformin on hold     Code status: Full  Prophylaxis: Lovenox  Care Plan discussed with: Patient  Anticipated Disposition: 24 to 48 hours  Central Line:   None             Review of Systems:   Pertinent items are noted in HPI.         Vital Signs:    Last 24hrs VS reviewed since prior progress note. Most recent are:  Vitals:    24 0720   BP: (!) 176/86   Pulse: 53   Resp: 19   Temp: 98.3 °F (36.8 °C)   SpO2: 97%       No intake or output data in the 24 hours ending 24 1147       Physical Examination:     I had a face to face encounter with this patient and independently examined them on 2024 as outlined below:          General : alert x 3, awake, no acute distress,   HEENT: PEERL, EOMI, moist mucus membrane, TM clear  Neck: supple, no JVD, no meningeal signs  Chest: Clear to auscultation bilaterally   CVS: S1 S2 heard, Capillary refill less than 2 seconds  Abd: soft/ non tender, non distended, BS physiological,   Ext: no 
                                                                                                Hospitalist Progress Note  Bennie Meredith MD  Answering service: [phone]        Date of Service:  2024  NAME:  Alcon Zamora  :  1960  MRN:  [id number]      Admission Summary:     Patient admitted with generalized weakness, dizziness and multiple falls.    Interval history / Subjective:     Patient still feeling weak.     Assessment & Plan:     Dizziness  -Patient reports dizziness with change in position, probable component of vertigo  -Recent MRI at [facility] shows mild chronic microvascular changes  -Will trial meclizine, neurology has been consulted for further evaluation    Generalized weakness  -Patient with generalized weakness resulting in multiple falls  -Recent head CT in the ER a week ago was negative for acute pathology  -PT following, neurology and PMNR has been consulted for further evaluation    Hypertension  -Continue amlodipine, metoprolol and lisinopril, monitor blood pressure    Type 2 diabetes  -Continue insulin sliding scale coverage  -Metformin on hold     Code status: Full  Prophylaxis: Lovenox  Care Plan discussed with: Patient  Anticipated Disposition: 24 to 48 hours  Central Line:   None             Review of Systems:   Pertinent items are noted in HPI.         Vital Signs:    Last 24hrs VS reviewed since prior progress note. Most recent are:  Vitals:    24 0710   BP: (!) 176/83   Pulse: 67   Resp: 18   Temp: 98.2 °F (36.8 °C)   SpO2: 95%         Intake/Output Summary (Last 24 hours) at 2024 0810  Last data filed at 2024 0700  Gross per 24 hour   Intake --   Output 450 ml   Net -450 ml        Physical Examination:     I had a face to face encounter with this patient and independently examined them on 2024 as outlined below:          General : alert x 3, awake, no acute distress,   HEENT: PEERL, EOMI, moist mucus membrane, TM clear  Neck: supple, no JVD, no meningeal 
                                                                                                Hospitalist Progress Note  Bennie Meredith MD  Answering service: [phone]        Date of Service:  2024  NAME:  Alcon Zamora  :  1960  MRN:  [id number]      Admission Summary:     Patient admitted with generalized weakness, dizziness and multiple falls.    Interval history / Subjective:     Patient still feeling weak.  Nursing reports patient had projectile vomiting.     Assessment & Plan:     Acute CVA  -Patient reports dizziness with change in position, probable component of vertigo  -MRI of the brain shows small acute infarcts in the left medial basal ganglia and right superior alannah/cerebral peduncle, generalized parenchymal volume loss and moderate to severe chronic microvascular ischemic disease, multiple small chronic infarcts  -LDL 89.4, hemoglobin A1c 4.8  -Echo shows PFO, cardiology evaluating, plan for JOSEMANUEL today  -Neurology on board, on aspirin and statin, PT OT and PMNR following  -Insurance denied IPR, pending P2P with PMNR    Vomiting  -KUB shows moderate gaseous distention of the colon and small bowel, no other acute pathology  -P.o. intake as tolerated, continue monitor    Hypertension  -Continue amlodipine, metoprolol and lisinopril, monitor blood pressure    Type 2 diabetes  -Continue insulin sliding scale coverage  -Metformin on hold     Code status: Full  Prophylaxis: Lovenox  Care Plan discussed with: Patient  Anticipated Disposition: 24 to 48 hours, plan for IPR.  CM informed me that IPR was denied, pending P2P.  Central Line:   None             Review of Systems:   Pertinent items are noted in HPI.         Vital Signs:    Last 24hrs VS reviewed since prior progress note. Most recent are:  Vitals:    24 0841   BP: (!) 168/84   Pulse: 71   Resp: 20   Temp: 98.2 °F (36.8 °C)   SpO2: 93%         Intake/Output Summary (Last 24 hours) at 2024 1237  Last data filed at 2024 
        [facility]                                                                                          Hospitalist Progress Note  Ravi Choudhury MD  Office Phone: [phone]        Date of Service:  2024  NAME:  Alcon Zamora  :  1960  MRN:  [id number]       Admission Summary:   Alcon Zamora is a 64 y.o. male with a PMHx of HTN, HLD, Hep C, T2DM, COPD and CKD and EtOH and Opioid abuse (in remission)     He presents to the ED from home with a chief complaint of generalized weakness and falls. He reports feeling a sense of dizziness when he changes position to standing which makes him dizzy he does not report feeling as though his legs gave out. He speaks very slowly but is AAOx3. He denies headache, blurry or double vision, changes or sensation changes. I reviewed his imaging including a brain MRI at [facility] which demonstrated few scattered focal hyper intensities consistent with mild chronic microvascular changes. CT from  demonstrated  no acute findings.     Notably he was recently admitted at [facility] from  24 to 24 for hypoglycemia. He was also seen in the ED on 24 for fall, but was able to be discharged     ED workup:   Na: 132  Hgb: 12.0     ED Rx  - PT/OT/ CM consults       Interval history / Subjective:     Two rapid responses were called for This patient.  First  rapid response was called in the morning for altered mental status and new oxygen requirements CT scan and was ordered.  COVID-19 was ordered.  I assessed the patient at bedside along with nursing and nursing supervisor      Second rapid response was called for seizure-like activity.  And then code stroke was called per nursing.  Patient was taken to CT scan.  When I got to the floor patient was already being taken to CT scan.  Neuro OSKAR was accompanied the patient to CT scan.  CODE BLUE was called in the  CT scan.  Upon arrival to CT scan patient was getting evaluated for 
        [facility]                                                                                          Hospitalist Progress Note  Sushma Madala, MD  Office Phone: [phone]        Date of Service:  2024  NAME:  Alcon Zamora  :  1960  MRN:  [id number]       Admission Summary:   Alcon Zamora is a 64 y.o. male with alcohol and opioid abuse (reportedly in remission), anxiety/panic attacks, HLD, COPD, CKD, chronic pain, T2DM, erectile dysfunction, GERD, HCV s/p Harvoni, HTN, and spondylitis who was initially admitted on 2024 by [facility] for dizziness, generalized weakness, hyponatremia. Pt was seen by PT/OT and recommended IPR. PM&R and Neurology were consulted. MRI brain wo contrast  showed small acute infarcts in the left medial basal ganglia and R superior alannah/cerebral peduncle, multiple small chronic infarcts in bilateral BG, R thalamus and L cerebral peduncle and moderate-severe chronic small vessel ischemic disease. TTE showed probable PFO and Cardiology was consulted. JOSEMANUEL on  showed EF 60-65%, diastolic dysfunction, + bubble study with stretched PFO with a large bidirectional interatrial shunt. Cardiology and Neurology were in agreement re: deferring PFO closure and treating medically with antiplatelet therapy, high-intensity statin and management of vascular risk factors. 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 30-day ambulatory cardiac monitor was ordered for discharge and Cardiology signed off on . BLE venous duplex was negative for DVT.     On am patient was noted to be hypoxic, bradycardic, vomiting, possibly aspirating, then mentally altered. ABG showed pO2 59 and COVID swab was positive. Quantiferon TB gold was ordered. He was started on methylprednisolone. Code Stroke was called and he was found to be postictal with eyes deviated upward. In the CT 
        [facility]                                                                                          Hospitalist Progress Note  Sushma Madala, MD  Office Phone: [phone]        Date of Service:  2024  NAME:  Alcon Zamora  :  1960  MRN:  [id number]       Admission Summary:   Alcon Zamora is a 64 y.o. male with alcohol and opioid abuse (reportedly in remission), anxiety/panic attacks, HLD, COPD, CKD, chronic pain, T2DM, erectile dysfunction, GERD, HCV s/p Harvoni, HTN, and spondylitis who was initially admitted on 2024 by [facility] for dizziness, generalized weakness, hyponatremia. Pt was seen by PT/OT and recommended IPR. PM&R and Neurology were consulted. MRI brain wo contrast  showed small acute infarcts in the left medial basal ganglia and R superior alannah/cerebral peduncle, multiple small chronic infarcts in bilateral BG, R thalamus and L cerebral peduncle and moderate-severe chronic small vessel ischemic disease. TTE showed probable PFO and Cardiology was consulted. JOSEMANUEL on  showed EF 60-65%, diastolic dysfunction, + bubble study with stretched PFO with a large bidirectional interatrial shunt. Cardiology and Neurology were in agreement re: deferring PFO closure and treating medically with antiplatelet therapy, high-intensity statin and management of vascular risk factors. 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 30-day ambulatory cardiac monitor was ordered for discharge and Cardiology signed off on . BLE venous duplex was negative for DVT.     On am patient was noted to be hypoxic, bradycardic, vomiting, possibly aspirating, then mentally altered. ABG showed pO2 59 and COVID swab was positive. Quantiferon TB gold was ordered. He was started on methylprednisolone. Code Stroke was called and he was found to be postictal with eyes deviated upward. In the CT 
        [facility]                                                                                          Hospitalist Progress Note  Sushma Madala, MD  Office Phone: [phone]        Date of Service:  2024  NAME:  Alcon Zamora  :  1960  MRN:  [id number]       Admission Summary:   Alcon Zamora is a 64 y.o. male with alcohol and opioid abuse (reportedly in remission), anxiety/panic attacks, HLD, COPD, CKD, chronic pain, T2DM, erectile dysfunction, GERD, HCV s/p Harvoni, HTN, and spondylitis who was initially admitted on 2024 by [facility] for dizziness, generalized weakness, hyponatremia. Pt was seen by PT/OT and recommended IPR. PM&R and Neurology were consulted. MRI brain wo contrast  showed small acute infarcts in the left medial basal ganglia and R superior alannah/cerebral peduncle, multiple small chronic infarcts in bilateral BG, R thalamus and L cerebral peduncle and moderate-severe chronic small vessel ischemic disease. TTE showed probable PFO and Cardiology was consulted. JOSEMANUEL on  showed EF 60-65%, diastolic dysfunction, + bubble study with stretched PFO with a large bidirectional interatrial shunt. Cardiology and Neurology were in agreement re: deferring PFO closure and treating medically with antiplatelet therapy, high-intensity statin and management of vascular risk factors. 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 30-day ambulatory cardiac monitor was ordered for discharge and Cardiology signed off on . BLE venous duplex was negative for DVT.     On  patient was noted to be hypoxic, bradycardic, vomiting, possibly aspirating, then mentally altered. ABG showed pO2 59 and COVID swab was positive. Quantiferon TB gold was ordered. He was started on methylprednisolone. Code Stroke was called and he was found to be postictal with eyes deviated upward. In the CT 
        [facility]                                                                                          Hospitalist Progress Note  Rose Mary MD  Office Phone: [phone]        Date of Service:  3/2/2024  NAME:  Alcon Zamora  :  1960  MRN:  [id number]       Admission Summary:   Alcon Zamora is a 64 y.o. male with alcohol and opioid abuse (reportedly in remission), anxiety/panic attacks, HLD, COPD, CKD, chronic pain, T2DM, erectile dysfunction, GERD, HCV s/p Harvoni, HTN, and spondylitis who was initially admitted on 2024 by [facility] for dizziness, generalized weakness, hyponatremia. Pt was seen by PT/OT and recommended IPR. PM&R and Neurology were consulted. MRI brain wo contrast  showed small acute infarcts in the left medial basal ganglia and R superior alannah/cerebral peduncle, multiple small chronic infarcts in bilateral BG, R thalamus and L cerebral peduncle and moderate-severe chronic small vessel ischemic disease. TTE showed probable PFO and Cardiology was consulted. JOSEMANUEL on  showed EF 60-65%, diastolic dysfunction, + bubble study with stretched PFO with a large bidirectional interatrial shunt. Cardiology and Neurology were in agreement re: deferring PFO closure and treating medically with antiplatelet therapy, high-intensity statin and management of vascular risk factors. 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 30-day ambulatory cardiac monitor was ordered for discharge and Cardiology signed off on . BLE venous duplex was negative for DVT.     On  patient was noted to be hypoxic, bradycardic, vomiting, possibly aspirating, then mentally altered. ABG showed pO2 59 and COVID swab was positive. Quantiferon TB gold was ordered. He was started on methylprednisolone. Code Stroke was called and he was found to be postictal with eyes deviated upward. In the CT scanner, 
        [facility]                                                                                          Hospitalist Progress Note  Rose Mary MD  Office Phone: [phone]        Date of Service:  3/1/2024  NAME:  Alcon Zamora  :  1960  MRN:  [id number]       Admission Summary:   Alcon Zamora is a 64 y.o. male with alcohol and opioid abuse (reportedly in remission), anxiety/panic attacks, HLD, COPD, CKD, chronic pain, T2DM, erectile dysfunction, GERD, HCV s/p Harvoni, HTN, and spondylitis who was initially admitted on 2024 by [facility] for dizziness, generalized weakness, hyponatremia. Pt was seen by PT/OT and recommended IPR. PM&R and Neurology were consulted. MRI brain wo contrast  showed small acute infarcts in the left medial basal ganglia and R superior alannah/cerebral peduncle, multiple small chronic infarcts in bilateral BG, R thalamus and L cerebral peduncle and moderate-severe chronic small vessel ischemic disease. TTE showed probable PFO and Cardiology was consulted. JOSEMANUEL on  showed EF 60-65%, diastolic dysfunction, + bubble study with stretched PFO with a large bidirectional interatrial shunt. Cardiology and Neurology were in agreement re: deferring PFO closure and treating medically with antiplatelet therapy, high-intensity statin and management of vascular risk factors. 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 30-day ambulatory cardiac monitor was ordered for discharge and Cardiology signed off on . BLE venous duplex was negative for DVT.     On  patient was noted to be hypoxic, bradycardic, vomiting, possibly aspirating, then mentally altered. ABG showed pO2 59 and COVID swab was positive. Quantiferon TB gold was ordered. He was started on methylprednisolone. Code Stroke was called and he was found to be postictal with eyes deviated upward. In the CT scanner, 
        [facility]ospitalist [facility]                                                                                          Hospitalist Progress Note  Rose Mary MD  Office Phone: [phone]        Date of Service:  3/3/2024  NAME:  Alcon Zamora  :  1960  MRN:  [id number]       Admission Summary:   Alcon Zamora is a 64 y.o. male with alcohol and opioid abuse (reportedly in remission), anxiety/panic attacks, HLD, COPD, CKD, chronic pain, T2DM, erectile dysfunction, GERD, HCV s/p Harvoni, HTN, and spondylitis who was initially admitted on 2024 by [facility] for dizziness, generalized weakness, hyponatremia. Pt was seen by PT/OT and recommended IPR. PM&R and Neurology were consulted. MRI brain wo contrast  showed small acute infarcts in the left medial basal ganglia and R superior alannah/cerebral peduncle, multiple small chronic infarcts in bilateral BG, R thalamus and L cerebral peduncle and moderate-severe chronic small vessel ischemic disease. TTE showed probable PFO and Cardiology was consulted. JOSEMANUEL on  showed EF 60-65%, diastolic dysfunction, + bubble study with stretched PFO with a large bidirectional interatrial shunt. Cardiology and Neurology were in agreement re: deferring PFO closure and treating medically with antiplatelet therapy, high-intensity statin and management of vascular risk factors. 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 30-day ambulatory cardiac monitor was ordered for discharge and Cardiology signed off on . BLE venous duplex was negative for DVT.     On  patient was noted to be hypoxic, bradycardic, vomiting, possibly aspirating, then mentally altered. ABG showed pO2 59 and COVID swab was positive. Quantiferon TB gold was ordered. He was started on methylprednisolone. Code Stroke was called and he was found to be postictal with eyes deviated upward. In the CT scanner, 
     CRITICAL CARE PROGRESS NOTE    Name: Alcon Zamora   : 1960   MRN: [id number]   Date: 2024      REASON FOR ICU ADMISSION:  acute hypoxemic respiratory failure, seizures     PRINCIPAL ICU DIAGNOSIS   New onset seizures with status epilepticus     BRIEF PATIENT SUMMARY   Alcon Zamora is a 64 y.o. male with PMHx of COPD, Hep C s/p Harvonii, DM2 on insulin who presented to [place] on 2024 with generalized weakness, dizziness, and multiple falls. MRI of the brain showed small acute infarcts in the left medial basal ganglia and right superior alannah/cerebral peduncle as well as generalized parenchymal volume loss and moderate to severe chronic microvascular ischemic disease and multiple small chronic infarcts. Echocardiogram revealed PFO with high risk features, EF 55-60%. On  around 1000, patient was hypoxic, bradycardic, vomiting w/ concern for aspiration. CXR was done revealing mild interstitial opacities possibly mild pulmonary edema w/ left lower lobe volume loss and small left effusion. ABG significant for PaO2 59. COVID+. He then became altered and a code stroke was called. Neuro NP at bedside found him postictal with eyes deviated upward. Keppra dosed. Seized again during CT. A code blue was called for status epilepticus requiring emergent intubation. EEG reveals moderate to severe encephalopathy which can have many etiologies including toxic, metabolic, or medication effect. Extubated to NC on .       COMPREHENSIVE ASSESSMENT & PLAN:SYSTEM BASED     24 HOUR EVENTS: Right sided gaze preference noted overnight. Neuro NP evaluated pt and offered no further reccs. Neuro exam unchanged otherwise. Will continue to monitor.     NEUROLOGICAL:  Acute CVA by MRI -R alannah/midbrain and L medial basal ganglia, R thalamus, L cerebral peduncle  Frequent GLFs since 2023  Seizures, new onset   -continue keppra q12 hours per neuro  -EEG done 24--diffuse slowing with no epileptiform or 
     CRITICAL CARE PROGRESS NOTE    Name: Alcon Zamora   : 1960   MRN: [id number]   Date: 2024      REASON FOR ICU ADMISSION:  acute hypoxemic respiratory failure, seizures     PRINCIPAL ICU DIAGNOSIS   New onset seizures with status epilepticus     BRIEF PATIENT SUMMARY   Alcon Zamora is a 64 y.o. male PMH COPD, Hep C s/p Harvonii, DM2 on insulin who presented to [facility]  on 2024 with generalized weakness, dizziness and multiple falls. MRI of the brain showed small acute infarcts in the left medial basal ganglia and right superior alannah/cerebral peduncle as well as generalized parenchymal volume loss and moderate to severe chronic microvascular ischemic disease and multiple small chronic infarcts. Echocardiogram revealed \"PFO with high risk features\", EF 55-60%.   On  around 10 AM, patient was hypoxic, bradycardic; vomiting, possibly aspirating. A cxr was done; abg with paO2 59. The covid eval was POSITIVE. He then became altered AMS, and a code stroke was called. Neuro NP at bedside found him post-ictal with eyes deviated upward. Keppra dosed.     He was taken to CT scanner where he seized again. A code blue was called for status epilepticus requiring emergent intubation. EEG done \"Finding is consistent with a moderate to severe encephalopathy which can have many etiologies including toxic, metabolic, or medication effect.\"      COMPREHENSIVE ASSESSMENT & PLAN:SYSTEM BASED     24 HOUR EVENTS: as above    NEUROLOGICAL:  Acute CVA by MRI -R alannah/midbrain and L medial basal ganglia, R thalamus, L cerebral peduncle  Frequent GLFs since 2023  Seizures, new onset   -Keppra loaded, continue keppra q12 hours per neuro  -EEG done 24--diffuse slowing with no epileptiform or localizing findings  -seizure possibly from COVID per neuro  -of note, patient's mother states his symptoms are like his father (now ) who had progressive supranuclear palsy  -working with PT/OT  -ativan for 
     Nutrition Note    Discussed in IDRs - tube feeding orders resumed at continuous rate yesterday evening. Adjusted order to reflect recommendations made yesterday. Pt d/c to IPR this evening.    Transition to bolus feedings of Glucerna 1.5:  -Start Day 1 Glucerna 1.5 @ 150 ml x 5 feedings/day, flush with 120 ml free water with feedings (or 60 ml before and after feedings if manual/syringe feeding).  -Day 2 GOAL: Glucerna 1.5 @ 300 ml x 5 feedings/day,  flush with 120 ml free water with feedings (or 60 ml before and after feedings if manual/syringe feeding).  This is 1500 ml total tube feeding volume and 600 ml total water flush volume per day     Bolus feedings to provide 2250 kcals, 124 g protein, 200 g CHO, with 1740 ml free fluid, and 2100 ml total volume.     Estimated Nutrition Needs:   Energy Requirements Based On: Kcal/kg  Weight Used for Energy Requirements: Current (95 kg)  Energy (kcal/day): 0693-7968 (23-25 kcal/kg)  Weight Used for Protein Requirements: Current  Protein (g/day):  (1-1.1 g/kg)  Method Used for Fluid Requirements: 1 ml/kcal  Fluid (ml/day): 2200    Electronically signed by Briana Warren RD on 3/6/24 at 11:34 AM EST    Contact via Perfect Serve or ext [phone]    
     Procedure Note - Intubation:   Performed by Ej Hardin MD .     Diagnosis: Acute resp failure   Insertion Date: 2/22 Time: 12:12  Obtained Consent? no; emergent   Procedure Location:  ICU.      Immediately prior to the procedure, the patient was reevaluated and found suitable for the planned procedure and any planned medications.  Immediately prior to the procedure a time out was called to verify the correct patient, procedure, equipment, staff, and marking as appropriate.    Preoxygenation applied via Ampu bag  Medications given were etomidate and rocuronium (Zemuron).   Grade 1 view obtained with mac 4 blade  A number 7.5 cuffed   ETT was placed to 25 cm at the teeth.   Placement was evaluated by noting bilateral, symmetric breath sounds, good end-tidal CO2 detector color change , no breath sounds over stomach, and chest x-ray visualization.    Attempts required: 1.    Complications: none.    RSI was used..  The procedure was tolerated well.  A follow-up chest x-ray was ordered post procedure.        Ej Hardin MD  Critical Care Medicine  [facility]     
    ICU NIGHT CHECKLIST     Night round completed with bedside nurse.   Plan of care for patient reviewed.   Medication weaning / changes discussed.  Necessity of any lines, drains, and/or tubes addressed.   Respiratory status / modalities discussed.   If applicable, will coordinate morning SAT/SBT with respiratory therapist.    Soft B/P with Bradycardia on propofol down to 32 BPM. Wean down Propofol and add fentanyl gtt for vent synchrony. Plan to SBT in am if no seizure activity.       Belkys Clinton, NP-C    Critical Care Medicine  [facility]      
   02/23/24 1130   Weaning Parameters   Spontaneous Breathing Trial Complete Yes   Respiratory Rate Observed 11   Ve 7.32      RSBI 27   PEF 25       
  Discharge plan for 630 pickup.  PIV to be removed prior.  Report called to Radha Suresh taking report.        Stroke Education provided to patient and parent and the following topics were discussed    1. Patients personal risk factors for stroke are family history, hyperlipidemia, hypertension, and smoking    2. Warning signs of Stroke:        * Sudden numbness or weakness of the face, arm or leg, especially on one side of          The body            * Sudden confusion, trouble speaking or understanding        * Sudden trouble seeing in one or both eyes        * Sudden trouble walking, dizziness, loss of balance or coordination        * Sudden severe headache with no known cause      3. Importance of activation Emergency Medical Services ( 9-1-1 ) immediately if experience any warning signs of stroke.    4. Be sure and schedule a follow-up appointment with your primary care doctor or any specialists as instructed.     5. You must take medicine every day to treat your risk factors for stroke.  Be sure to take your medicines exactly as your doctor tells you: no more, no less.  Know what your medicines are for , what they do.  Anti-thrombotics /anticoagulants can help prevent strokes.  You are taking the following medicine(s)  Amodapine, Lipitor, Hydralazine, Lisinopril       6.  Smoking and second-hand smoke greatly increase your risk of stroke, cardiovascular disease and death. Smoking history Pior HX      7. Information provided was Stroke Handouts or Verbal Education    8. Documentation of teaching completed in Patient Education Activity and on Care Plan with teaching response noted?  yes    
  Neurocritical Care Code Stroke Documentation      Symptoms:   Altered mental status, tonic-clonic seizure witnessed by family member  Patient was a rapid response earlier this morning around 10 AM for AMS and bradycardia. He was found to be hypoxic and on 6L NC with possible aspiration. He was noted to have low grade fever of 99.9. ABG and CXR done. CXR showed no acute process. ABG showed pO2 59.3. COVID test done and results showing patient is positive. RN reports he was garbling his words earlier and never returned to baseline. Code stroke was called at 1214 PM today due to witnessed generalized seizure witnessed by family member. Patient was postictal when I arrived with eyes deviated upward. SBP in the 200s. Keppra load and maintenance ordered by Hospitalist. RN reported patient was noted to have left-sided weakness on exam, but this was noted before per review of chart. It appears patient had generalized weakness but L was > R per review of notes.   Last Known Well: 0745 AM today per RN report   Medical hx: Principal Problem:    Weakness  Active Problems:    Acute cerebrovascular accident (CVA) (HCC)    Patient admitted 2/16/2024 for dizziness, frequent falls, and unsteady gait. MRI of Brain showed acute strokes in R alannah/midbrain and L medial basal ganglia as well as chronic infarcts in bilat basal ganglia, R thalamus, and L cerebral peduncle. Neurology is following, but signed off yesterday.   Past Medical History:   Diagnosis Date    Alcohol abuse, in remission     Anxiety     Chronic kidney disease     PLAGUE L RENAL ARTERY    Chronic obstructive pulmonary disease (HCC)     Chronic pain     Constipation     Diabetes (HCC)     Type II    Erectile dysfunction 2015    GERD (gastroesophageal reflux disease)     Hepatitis C     Hypertension     Liver disease     HEP C TOOK HARVONI    Pancreatitis     Psychiatric disorder     PANIC ATTACKS    Smoker     Spondylitis (HCC)     Type 2 diabetes mellitus without 
  Neurology Progress Note  Park WOOD - CNP  Neurocritical Care NP    Admit Date: 2/16/2024   LOS: 2 days      Daily Progress Note: 2/21/2024    Assessment:     Patient is a 64 year-old male with history of HTN, HLD, and DM who presented 2/16/24 with about 4 months of falls and unsteadiness, which started suddenly but unable to remember specific event. One week ago, patient noted acute on chronic worsening of falls and unsteadiness. Denies significant lightheadedness during falls. CT head showed no acute findings. MRI brain showed acute strokes in R alannah/midbrain and L medial basal ganglia as well as chronic infarcts in bilat basal ganglia, R thalamus, and L cerebral peduncle. Exam notable for L>R sided weakness as well as mild intention tremor in BUE. Presentation consistent with acute on chronic strokes. Patient does not take ASA or statin at home. Smokes 1/2 PPD. TTE EF 55-60%, DD present, LA normal. No bubble study done. CTA head/neck with no significant stenosis or occlusion.   LDL 89.4, A1c 4.8    Multifocal Acute Infarcts    Plan:     - Continue aspirin 81 mg daily  - Continue atorvastatin 80 mg daily for goal LDL <70 (current LDL is 89.4)  - Hemoglobin A1c is 4.8 and can be managed by patient's attending and primary care team.  - TTE revealed a PFO and in agreement with Dr. Mckeon, medical management will be provided going forward.  - Cardiology will arrange 30 day cardiac monitoring upon discharge, a 30 day MCOT is to be picked up by his mother from the [facility] 1st floor location for cardiology.   - PT/OT as tolerated  - Patient and family have verbalized understanding in regards to driving restriction, but it is recommended that [facility] guidelines on driving restriction post stroke be provided upon discharge.  - Follow up in Neurology clinic in 6-8 weeks, message has been sent to clinic to set this up after patient's discharge.  - Neurology will sign off at this point, but please reach out to 
  Physician Progress Note      PATIENT:               PACHECO SLOAN  CSN #:                  [id number]  :                       1960  ADMIT DATE:       2024 12:14 PM  DISCH DATE:  RESPONDING  PROVIDER #:        Bennie Meredith MD          QUERY TEXT:    Pt admitted with weakness and vertigo. Pt noted to have CVA on  MRI . If   possible, please document in progress notes and discharge summary the   relationship, if any, between syncope/weakness  and CVA.      The medical record reflects the following:  Risk Factors: abnormal MRI  Clinical Indicators:   MRI  FINDINGS:  Small acute infarcts in the left medial basal ganglia and right superior alannah  extending into the right cerebral peduncle  Treatment: neurology consult  Thank you  Alyse Toth RN, CDI, CCDS, CRCR  Certified  Clinical Documentation   O: [phone]  [email or web address]  I can also be reached by perfect serve  Options provided:  -- syncope and weakness due to CVA POA  -- Other - I will add my own diagnosis  -- Disagree - Not applicable / Not valid  -- Disagree - Clinically unable to determine / Unknown  -- Refer to Clinical Documentation Reviewer    PROVIDER RESPONSE TEXT:    This patient has syncope and weakness due to CVA POA.    Query created by: Alyse Toth on 2024 2:56 PM      Electronically signed by:  Bennei Meredith MD 2024 8:01 AM          
  Speech Therapy:  Chart review completed and spoke with RN.  Pt is currently NPO and scheduled for a PEG procedure this afternoon.  SLP will continue to follow as medically appropriate.    Thank you,   Shirlene Pierre MS CCC-SLP  
  TRANSFER - IN REPORT:    Verbal report received from LISA Henson(name) on Alcon Zamora  being received from 602(unit) for ordered procedure      Report consisted of patient’s Situation, Background, Assessment and   Recommendations(SBAR).     Information from the following report(s) Nurse Handoff Report, MAR, and Neuro Assessment was reviewed with the receiving nurse.    Opportunity for questions and clarification was provided.      Assessment completed upon patient’s arrival to unit and care assume        
 attended rounds in 6E Oncology as part of the Interdisciplinary Team when the patient's ongoing care was discussed. I reviewed the medical record as part of this encounter.    Outcome: Interdisciplinary team are aware of  availability and were encouraged to request support as needed.     Advised nurse to contact [facility] for any further referrals. The on-call  can be reached at [phone]).    Tamar Soria MS.  Intern.   
0740: Bedside and Verbal shift change report given to Li Mcdaniel RN (oncoming nurse) by Ceci Hernandez, RN (offgoing nurse). Report included the following information Nurse Handoff Report, Index, Adult Overview, Surgery Report, Intake/Output, MAR, Med Rec Status, Cardiac Rhythm NSR-SB, Alarm Parameters, Quality Measures, and Neuro Assessment.      0805: Assessment completed. Pt repositioned in bed.     1000: Pt up to chair with PT/OT.     1205: Reassessment completed. Pt repositioned in chair.     1400: Pt transferred from chair to bed.     1520: Pt transported in stable condition on stretcher for PEG placement. Telemetry notified.     1635: Received report from Endoscopy RN. Notified Rose Mary MD that PEG tube placement is completed and can give meds through PEG. Tube feeds will resume tomorrow. Rose Mary MD will modify the medications.     1656: Pt transported in stable condition from Endoscopy to 6S. Pt denies pain. Reassessment completed. Pt repositioned in bed.     1800: Pt repositioned in bed.     1940: Bedside and Verbal shift change report given to Ceci Hernandez, LISA (oncoming nurse) by Li Mcdaniel RN (offgoing nurse). Report included the following information Nurse Handoff Report, Index, Adult Overview, Surgery Report, Intake/Output, MAR, Recent Results, Cardiac Rhythm NSR-SB, Alarm Parameters, Quality Measures, and Neuro Assessment.         
1300: RT at bedside removing ETT. Pt tolerated removal and strong cough, clearing tan/white secretions.    1345: Bedside swallow screen performed; pt cough x2; swallow screen failed. SBPs elevated to 160s for last half hour, HR continues to be 40s; Peggy, NP notified of BPs and swallow screen; RN to keep pt NPO and keep hydralazine parameters for SPB >180.    1509: PT/OT at bedside    1545: RN found pt's NG tube out at 35 cm; per pt, he did not pull tube out. Teaching reinforced with pt to not touch face, NC, or NGT, pt verbalized understanding. NP notified.    1615: NG tube advanced to 78cm; KUB pending    1845: NG tube out at 40 cm. NP notified; RN at bedside attempting to replace NG tube x2; pt not tolerating procedure. Pt refuses to let this RN attempt again; other RN called to bedside. NP notified, orders for restraints received.     1917: Restraints applied. NGT placed, taped at 65 cm, placement confirmed with auscultation. KUB pending.  
1500 Pt's HR sustaining in the 40s. Peggy NP notified, no new orders received.     1600 Orders received to advance pt's ETT by 2cm. This RN and RT at bedside, ETT advanced.  
1600: Bedside shift change report given to Shabnam WYLIE RN (oncoming nurse) by Harley GONZALEZ (offgoing nurse). Report included the following information Nurse Handoff Report, MAR, Recent Results, and Cardiac Rhythm NSR .     2000: Bedside shift change report given to Dawn NAIK RN (oncoming nurse) by Shabnam WYLIE RN (offgoing nurse). Report included the following information Nurse Handoff Report, Intake/Output, MAR, Recent Results, Cardiac Rhythm NSR, and Alarm Parameters.       
2230: Pts HR in the 30's with stable BP. YAKOV Rizvi made aware.    0000: NGT advanced by 3 per KUB results. NGT okay to use per YAKOV Rizvi.    0015: YAKOV Rizvi notified of pts low U.O. of 70 ml since start of shift.    0145 Pt asychronise with the vent, attempting to pull out ETT, and extremely restless. PRN Fentanyl given. Pt able to follow commands and nod appropriately at this time.    0220: Pt continues to be restless and asycnhronise with the vent despite PRN fentanyl. YAKOV Rizvi made aware. Orders received for a one time dose of 50 mcg of Fentanyl and a Fentanyl gtt.   
4 Eyes Skin Assessment     NAME:  Alcon Zamora  YOB: 1960  MEDICAL RECORD NUMBER:  [id number]    The patient is being assessed for  Shift Handoff    I agree that at least one RN has performed a thorough Head to Toe Skin Assessment on the patient. ALL assessment sites listed below have been assessed.      Areas assessed by both nurses:    Head, Face, Ears, Shoulders, Back, Chest, Arms, Elbows, Hands, Sacrum. Buttock, Coccyx, Ischium, Legs. Feet and Heels, and Under Medical Devices         Does the Patient have a Wound? Yes wound(s) were present on assessment. LDA wound assessment was Initiated and completed by RN       Lei Prevention initiated by RN: Yes  Wound Care Orders initiated by RN: Yes    Pressure Injury (Stage 3,4, Unstageable, DTI, NWPT, and Complex wounds) if present, place Wound referral order by RN under : Yes    New Ostomies, if present place, Ostomy referral order under : No     Nurse 1 eSignature: Electronically signed by Ama Townsend RN on 2/23/24 at 6:44 AM EST    **SHARE this note so that the co-signing nurse can place an eSignature**    Nurse 2 eSignature: {Esignature:[id number]}      
8366 - received call from lab. Patient COVID POSITIVE  
BEE DREW   [address] [address]       GI PROGRESS NOTE  Laura Waters PA-C  [phone] office  NP/PA in-hospital M-F until 4:30PM  After 5PM or on weekends, please call  for physician on call      NAME: Alcon Zamora   :  1960   MRN:  [id number]       Subjective:     Patient resting in bed. S/p PEG tube placement 3/. Bleeding has resolved, there is dried blood on the gauze at the site of PEG tube. Tube feeds running well, no difficulties per nursing staff. Patient denies abdominal pain or PEG site pain.      Objective:     VITALS:   Last 24hrs VS reviewed since prior progress note. Most recent are:  Vitals:    24 1000   BP:    Pulse: 74   Resp:    Temp:    SpO2:        PHYSICAL EXAM:  General: Cooperative, no acute distress    Neurologic:  Alert and oriented X 3.  HEENT: EOMI, no scleral icterus   Lungs:  CTA bilaterally. No wheezing  Heart:  S1 S2, regular rhythm  Abdomen: Soft, non-distended, no tenderness. +Bowel sounds PEG tube present   Extremities: No edema  Psych:   Good insight. Not anxious or agitated.    Lab Data Reviewed:     Recent Results (from the past 24 hour(s))   POCT Glucose    Collection Time: 24 11:56 AM   Result Value Ref Range    POC Glucose 205 (H) 65 - 117 mg/dL    Performed by: Juana Fields (CON)    POCT Glucose    Collection Time: 24  6:52 PM   Result Value Ref Range    POC Glucose 157 (H) 65 - 117 mg/dL    Performed by: AYESHA You    POCT Glucose    Collection Time: 24 11:43 PM   Result Value Ref Range    POC Glucose 183 (H) 65 - 117 mg/dL    Performed by: SADIA Carter    POCT Glucose    Collection Time: 24  5:57 AM   Result Value Ref Range    POC Glucose 221 (H) 65 - 117 mg/dL    Performed by: SADIA Carter             Assessment:     Dysphagia: s/p PEG placement 3/, bleeding has resolved. Recommend holding lovenox/blood thinners today, can restart tomorrow.   New onset seizures status post 
BEE DREW   [address] [address]       GI PROGRESS NOTE  Laura Waters PA-C  [phone] office  NP/PA in-hospital M-F until 4:30PM  After 5PM or on weekends, please call  for physician on call      NAME: Alcon Zamora   :  1960   MRN:  [id number]       Subjective:     Patient resting in bed. He reports the NG tube fell out over the weekend. No abdominal pain. Discussed PEG tube procedure with the patient who agrees to proceed.     Objective:     VITALS:   Last 24hrs VS reviewed since prior progress note. Most recent are:  Vitals:    24 0555   BP:    Pulse: 52   Resp:    Temp:    SpO2:        PHYSICAL EXAM:  General: Cooperative, no acute distress    Neurologic:  Alert and oriented X 3.  HEENT: EOMI, no scleral icterus   Lungs:  CTA bilaterally. No wheezing  Heart:  S1 S2, regular rhythm  Abdomen: Soft, non-distended, no tenderness. +Bowel sounds  Extremities: No edema  Psych:   Good insight. Not anxious or agitated.    Lab Data Reviewed:     Recent Results (from the past 24 hour(s))   POCT Glucose    Collection Time: 24 11:35 AM   Result Value Ref Range    POC Glucose 186 (H) 65 - 117 mg/dL    Performed by: Juana Fields (CON)    POCT Glucose    Collection Time: 24  4:06 PM   Result Value Ref Range    POC Glucose 124 (H) 65 - 117 mg/dL    Performed by: MONICO Singer   PCT    POCT Glucose    Collection Time: 24  9:00 PM   Result Value Ref Range    POC Glucose 115 65 - 117 mg/dL    Performed by: ANICETO Dumont  PCT    CBC    Collection Time: 24 12:27 AM   Result Value Ref Range    WBC 10.3 4.1 - 11.1 K/uL    RBC 3.71 (L) 4.10 - 5.70 M/uL    Hemoglobin 11.4 (L) 12.1 - 17.0 g/dL    Hematocrit 33.1 (L) 36.6 - 50.3 %    MCV 89.2 80.0 - 99.0 FL    MCH 30.7 26.0 - 34.0 PG    MCHC 34.4 30.0 - 36.5 g/dL    RDW 12.8 11.5 - 14.5 %    Platelets 370 150 - 400 K/uL    MPV 9.4 8.9 - 12.9 FL    Nucleated RBCs 0.0 0  WBC    nRBC 0.00 0.00 - 
BEE DREW   [address] [address]       GI PROGRESS NOTE  Laura Waters PA-C  [phone] office  NP/PA in-hospital M-F until 4:30PM  After 5PM or on weekends, please call  for physician on call      NAME: Alcon Zamora   :  1960   MRN:  [id number]       Subjective:     Patient resting in bed. S/p PEG tube placement 3/4, per chart review patient had a significant amount of bleeding overnight. Patient reports some pain at the incision site, otherwise is feeling ok.     PEG site includes bloody gauze. No active bleeding when manipulating gauze or PEG tube. Some clots present.      Objective:     VITALS:   Last 24hrs VS reviewed since prior progress note. Most recent are:  Vitals:    24 0555   BP: 134/69   Pulse: 82   Resp: 13   Temp: 98.5 °F (36.9 °C)   SpO2: 97%       PHYSICAL EXAM:  General: Cooperative, no acute distress    Neurologic:  Alert and oriented X 3.  HEENT: EOMI, no scleral icterus   Lungs:  CTA bilaterally. No wheezing  Heart:  S1 S2, regular rhythm  Abdomen: Soft, non-distended, no tenderness. +Bowel sounds PEG tube present with some blood clots at PEG site  Extremities: No edema  Psych:   Good insight. Not anxious or agitated.    Lab Data Reviewed:     Recent Results (from the past 24 hour(s))   POCT Glucose    Collection Time: 24  1:20 PM   Result Value Ref Range    POC Glucose 159 (H) 65 - 117 mg/dL    Performed by: Nellie PALMER    POCT Glucose    Collection Time: 24  5:20 PM   Result Value Ref Range    POC Glucose 171 (H) 65 - 117 mg/dL    Performed by: Jonas Jefferson RN    POCT Glucose    Collection Time: 24  6:11 PM   Result Value Ref Range    POC Glucose 187 (H) 65 - 117 mg/dL    Performed by: Kristian Olivarez (SPENCER)    CBC    Collection Time: 24 12:35 AM   Result Value Ref Range    WBC 12.2 (H) 4.1 - 11.1 K/uL    RBC 3.82 (L) 4.10 - 5.70 M/uL    Hemoglobin 11.4 (L) 12.1 - 17.0 g/dL    Hematocrit 34.7 (L) 36.6 - 
[facility] [address]       GI PROGRESS NOTE  Will TAYLOR Khan  [phone] office      NAME: Alcon Zamora   :  1960   MRN:  [id number]       Subjective:   Denies nausea, vomiting, or abdominal pain.  SLP is following.  Patient is receiving tube feeds.     Objective:     VITALS:   Last 24hrs VS reviewed since prior progress note. Most recent are:  Vitals:    24 1314   BP: (!) 164/81   Pulse:    Resp:    Temp:    SpO2:      PHYSICAL EXAM:              CONST:          Pleasant male sitting upright in bed, no acute distress              NEURO:          Alert              HEENT:           EOMI, no scleral icterus, NG tube in place              LUNGS:           No acute respiratory distress              CARD:             Regular rate              ABD:                Soft, non distended, no tenderness, no rebound, no guarding.              EXT:                Warm              PSYCH:           Not anxious or agitated    Lab Data Reviewed:     Recent Results (from the past 24 hour(s))   POCT Glucose    Collection Time: 24  6:23 PM   Result Value Ref Range    POC Glucose 349 (H) 65 - 117 mg/dL    Performed by: ALMA DELIA PALMER    POCT Glucose    Collection Time: 24 11:56 PM   Result Value Ref Range    POC Glucose 310 (H) 65 - 117 mg/dL    Performed by: JENELLE Price    POCT Glucose    Collection Time: 24  5:40 AM   Result Value Ref Range    POC Glucose 316 (H) 65 - 117 mg/dL    Performed by: JENELLE Price    POCT Glucose    Collection Time: 24 11:48 AM   Result Value Ref Range    POC Glucose 238 (H) 65 - 117 mg/dL    Performed by: Bora Parikh EDT      Assessment:     Dysphagia: NG tube in place, receiving tube feeds. SLP following. On Lovenox SQ.  New onset seizures status post status epilepticus  Acute embolic strokes  COVID  Acute respiratory failure  PFO with bi-direction interatrial shunt  Hypertension  Diabetes 
Clinical Pharmacy Note: IV to PO Automatic Conversion  Please note: Alcon Zamora’s medication(s) (famotidine) has/have been changed from IV to PO based on the following critiera:    Patient is tolerating oral medications  Patient is tolerating a diet more advanced than clear liquids  Patient is not requiring vasopressors    This IV to PO conversion is based on the P&T approved automatic conversion policy for eligible patients.  Please call with questions.   
Clinical Pharmacy Note: IV to PO Automatic Conversion  Please note: Alcon Zamora’s medications (levetiracetam, famotidine) have been changed from IV to PO based on the following critiera:    Patient is tolerating oral medications  Patient is tolerating a diet more advanced than clear liquids  Patient is not requiring vasopressors    This IV to PO conversion is based on the P&T approved automatic conversion policy for eligible patients.  Please call with questions.    
Comprehensive Nutrition Assessment    Type and Reason for Visit: Initial, Consult    Nutrition Recommendations/Plan:     -Modify EN: Glucerna 1.5 @ 70 ml/hr with 100 ml water flush q 4 hr      -Once sodium WNL increase FWF to 200 ml q 4 hr         Malnutrition Assessment:  Malnutrition Status:  Insufficient data (02/26/24 1347)    Context:  Acute Illness          Nutrition Assessment:    Pt admitted with Weakness. PMHx: HTN, CPD, DM 2, GERD, CKD, Pancreatitis, 4 months falls/unsteadiness PTA. MRI on admission showed acute strokes in R alannah/midbrain and L medial basal ganglia as well as chronic infarcts in bilateral BG, R thalamus, and L cerebral pedunc-suspected 2/2 PFO. PFO being managed medically. COVID + and  new onset seizures 2/22-coded in CT scan and was intubated. Extubated the next day. SLP following-question FEES today?     Significant change in weight from admission to today's weight (22#). Question d/t bed change? Source of weight not recorded for first 3 days. Weight trend in EHR also differ from day to day. Unable to determine malnutrition risk.     Patient did not pass swallow eval 2/24; EN ordered and SLP consulted. Will change formula to Glucerna 1.5 d/t elevated BG (DM and steroid). A1c however is WNL. Vit D deficiency-supplementation ordered.    Recommended feeding to meet estimated needs: Glucerna 1.5 @ 70 ml/hr with 200 ml water flush q 4 hr. This will provide 1540 ml, 2300 calories, 205 gm CHO, 127 gm protein and 2370 ml free water (tube feeding/flush) per day.      Nutritionally Significant Medications:  Lipitor, Pepcid, Humalog, Solumedrol, Glycolax, Vit D      Estimated Daily Nutrient Needs:  Energy Requirements Based On: Kcal/kg  Weight Used for Energy Requirements: Current (95 kg)  Energy (kcal/day): 2375 (25 kcal/kg)  Weight Used for Protein Requirements: Current  Protein (g/day): 114 (1.2g/kg)  Method Used for Fluid Requirements: 1 ml/kcal  Fluid (ml/day):      Nutrition Related Findings: 
Comprehensive Nutrition Assessment    Type and Reason for Visit: Reassess    Nutrition Recommendations/Plan:   Continue tube feeding as ordered - Glucerna 1.5 @ 70 ml/hr + 100 ml free water Q 6 hrs.  Once PEG placed, transition to bolus feedings of Glucerna 1.5:       -Start Day 1 Glucerna 1.5 @ 150 ml x 5 feedings/day, flush with 150 ml free water with feedings (or 75 ml before and after feedings if manual/syringe feeding).       -Day 2 GOAL: Glucerna 1.5 @ 300 ml x 5 feedings/day,  flush with 120 ml free water with feedings (or 60 ml before and after feedings if manual/syringe feeding).  This is 1500 ml total tube feeding volume and 600 ml total water flush volume per day         Malnutrition Assessment:  Malnutrition Status:  At risk for malnutrition (Comment) (03/01/24 1115)    Context:  Acute Illness     Findings of the 6 clinical characteristics of malnutrition:  Energy Intake:  Mild decrease in energy intake (Comment)  Weight Loss:  1% to 2% over 1 week     Body Fat Loss:  No significant body fat loss     Muscle Mass Loss:  No significant muscle mass loss    Fluid Accumulation:  No significant fluid accumulation Generalized   Strength:  Not Performed       Nutrition Assessment:    Pt admitted with Weakness. PMHx: HTN, CPD, DM 2, GERD, CKD, Pancreatitis, 4 months falls/unsteadiness PTA. MRI on admission showed acute strokes in R alannah/midbrain and L medial basal ganglia as well as chronic infarcts in bilateral BG, R thalamus, and L cerebral pedunc-suspected 2/2 PFO. PFO being managed medically. COVID + and  new onset seizures 2/22-coded in CT scan and was intubated. Extubated the next day.    3/1: Reviewed for re-assessment. GI consulted for PEG placement with continued severe pharyngeal dysphagia - SLP following. Plans for PEG placement early next week. Will make recommendations for bolus feedings as plans for discharge to [facility] (allow time off pump).    Sodium 135 again on AM labs 2/29- will keep free water 
I have read and agree with Dee Solano RN documentation  
I have reviewed, edited, and agree with the documentation of Sandra Hood, Student RN as her preceptor.   
Lovenox Monitoring  Indication: DVT Prophylaxis  Recent Labs     02/16/24  1240   HGB 12.0*        Current Weight: 105 kg  Est. CrCl = 98 ml/min  Current Dose: 40 mg subcutaneously every 24 hours.  Plan: Change to 30 mg SQ BID with respect to weight 101-150.9 kg and CrCl > 30 mL/min        
Lovenox Monitoring / Dosing Update  Indication: DVT Prophylaxis  Recent Labs     02/22/24  1007 02/22/24  1549 02/22/24  2159 02/23/24  0155   HGB 11.4* 10.0*  --  10.1*    255  --  250   INR  --   --  1.3*  --      Current Weight: 96.2 kg  Est. CrCl = ~ ml/min  Current Dose: 30 mg subcutaneously every 12 hours.  Plan: Change to 40 mg every 24 hours per [facility]-approved Lovenox Dosing, Rounding & Dispensing Guidelines.      
Maintenance Methadone Confirmation Note:    [facility] phone #:[phone]    Current dose: 95 mg  Last date methadone dosed: 1/19 (in clinic monthly, should have ended 2/14)    Take home doses: Yes (27 doses)- should have returned to clinic 2/15; but has been hospitalized 2/11 to 2/13 and here since 2/15    Patient able to return to the clinic: Yes     Time/date information confirmed: 2/20 @ 07:17      Johanna Behrens, RPH    
NUTRITION brief    Recommendations:   Reduced free water flush to 100 ml Q 6 hrs - trend sodium.  Continue to follow for long-term enteral needs.       Diet: NPO  Nutrition Support: Glucerna 1.5 @ 70 ml/hr + 100 ml free water Q 4 hrs  Nutrition-related meds: Pepcid, Glycolax, Vitamin D, Humalog    Discussed with RN, SLP, MD. Anticipate giving patient until Friday for potential swallow recovery. Will need repeat objective imaging, s/p FEES 2/26 showing penetraton with subsequent aspiration with all liquid consistencies. RN reports feedings were running at 50 ml/hr, advanced to 70 ml/hr as per tube feeding order.    Sodium 135 on AM labs, will adjust free water flush from 100 ml Q 4 hrs to Q 6 hrs.    Feedings to provide 2310 calories, 205 gm CHO, 127 gm protein and 1570 ml free water (tube feeding/flush) per day.     See full RD assessment from 2/26 for additional details, goals, and monitoring/evaluation.   Estimated Nutrition Needs:   Energy Requirements Based On: Kcal/kg  Weight Used for Energy Requirements: Current (95 kg)  Energy (kcal/day): 2375 (25 kcal/kg)  Weight Used for Protein Requirements: Current  Protein (g/day): 114 (1.2g/kg)  Method Used for Fluid Requirements: 1 ml/kcal  Fluid (ml/day):      Briana Warren RD   Contact via [employer] or ext [phone]  
Neurology Staff Addendum:  I have personally seen and examined the patient. I have personally reviewed the chart and images. Elements of my examination included history of present illness, review of systems, review of past medical and surgical history, review of medications, and physical and neurological examination. I have personally reviewed the findings and impressions with the nurse practitioner and am in agreement with their note with changes below.    Pt is a 63yo RH male with HTN, HLD, DM, +smoking, not on APT or statin, with 6 month h/o falls, worsened in last week admitted 2/16/24. Dysarthria x  6 months. MRI brain with small acute CVAs in left medial BG, right superior alannah/cerebral peduncle, mod to severe CIWM changes, multiple chronic infarcts.  CTA H/N no stenosis, no LVO. HgbA1C 5.3, LDL 89.4. 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  Family h/o PSP in father. Pt had episode of AMS, garbled speech 2/22/24, had low grade fever and hypoxia found to have COVID, with witnessed episodes of seizure activity. Started on Keppra.  EEG with diffuse slowing.  Pt now extubated. He denies h/o sz, no family h/o sz, no CNS infection, no h/o head injury.      Blood pressure (!) 163/64, pulse (!) 46, temperature 98.6 °F (37 °C), temperature source Oral, resp. rate 12, height 1.981 m (6' 6\"), weight 96.2 kg (212 lb 1.3 oz), SpO2 93 %.    Physical Exam:  General: Well developed well nourished patient in no apparent distress.   Cardiac: Regular rate and rhythm with no murmurs.   Carotids: 2+ symmetric, no bruits  Extremities: 2+ Radial pulses, no cyanosis or edema    Neurological Exam:  Mental Status: Oriented to person, believes with are in a dorm at [facility], month - May, year 2024.  Speech and language intact. Attention and fund of knowledge appropriate. Impaired memory.    Cranial Nerves:   VFF, PERRL, EOMI, no nystagmus, no ptosis. Facial 
Ng tube attempted to be advanced per order - tube does not advanced in stomach  tube coils in mouth.  KUB indicates not movement of   
Occupational Therapy  Chart reviewed for tx and discussed with RN, patient RR this morning and not medically stable at this time, pending further testing and Cardiology consult. Will continue to follow.   Thania Wiley, OTR/L    
Physical Therapy Note  02/23/2024    Chart reviewed and recent events noted; pt now intubated and sedated. Note possible plans for extubation later today; will hold and follow back up as able/medically appropriate.    Thank you,  Maida Lyles, PT, DPT  
Physical Therapy:  02/22/24     Chart reviewed in preparation for PT treatment. Noted patient had RRT called earlier this morning for bradycardia, now pending EKG and cardiology consult. Will defer and continue to follow as able pending recommendations.     Thank you,  Tamika Huffman, PT, DPT    
Physical therapy:    Attempted PT session. Pt sleeping soundly upon arrival to room and politely declined therapy due to fatigue. Pt with an eventful night with bleeding from peg tube. Will defer and continue to follow. Thank you.    Jacqueline Romo, PT, DPT    
Review and agreement with student charting of Lolly Jackson.  - Ailyn GONZALEZ  
Routine EEG completed.  
Speech Pathology Note    Chart reviewed and patient with significant medical change in status (seizures, code blue called) and now intubated.  Will plan to sign off at this time.  Please re-consult when patient is extubated.    Thank you,  Briana Gatica, SLP    
Spiritual Care Assessment/Progress Note  [facility]    Name: Alcon Zamora MRN: [id number]    Age: 64 y.o.     Sex: male   Language: English     Date: 2/22/2024            Total Time Calculated: 37 min              Spiritual Assessment begun in [facility] 6E MED ONCOLOGY  Service Provided For:: Family  Referral/Consult From:: Nurse  Encounter Overview/Reason : Crisis, Family Care    Spiritual beliefs:      [x] Involved in a frank tradition/spiritual practice: Voodoo     [] Supported by a frank community:      [] Claims no spiritual orientation:      [] Seeking spiritual identity:           [] Adheres to an individual form of spirituality:      [] Not able to assess:                Identified resources for coping and support system:   Support System: Friends/neighbors       [] Prayer                  [] Devotional reading               [] Music                  [] Guided Imagery     [] Pet visits                                        [] Other: (COMMENT)     Specific area/focus of visit   Encounter:    Crisis: Type: Code Blue  Spiritual/Emotional needs: Type: Emotional Distress  Ritual, Rites and Sacraments:    Grief, Loss, and Adjustments:    Ethics/Mediation:    Behavioral Health:    Palliative Care:    Advance Care Planning:      Plan/Referrals: Continue Support (comment)    Narrative:     Responded to code blue event in the CT scanner. Provided spiritual presence during the med team's intervention. I was then requested to offer support to pt's mother, Rena who was waiting in the pt's room. When I arrived Pat was in the company of a good friend. She expressed her frank in God, as well as her fears and hopes about Alcon's outcome. I acknowledged her fears, and affirmed her strong frank in God. I offered words of encouragement and strength.     We were informed that Alcon would be transferred to ICU-4. I escorted Pat to the ICU waiting area. At this time, she is leaning on her friend's support. Pat is aware of 
TRANSFER - IN REPORT:    Verbal report received from Li GONZALEZ on Alcon Zamora  being received from  for ordered procedure      Report consisted of patient's Situation, Background, Assessment and   Recommendations(SBAR).     Information from the following report(s) Nurse Handoff Report was reviewed with the receiving nurse.    Opportunity for questions and clarification was provided.      Initial RN admission and assessment performed and documented in Endoscopy navigator.     Patient evaluated by anesthesia in pre-procedure holding.     All procedural vital signs, airway assessment, and level of consciousness information monitored and recorded by anesthesia staff on the anesthesia record.     Report received from CRNA post procedure.  Patient transported to recovery area by RN.    Endoscope was pre-cleaned at bedside immediately following procedure by Long.     TRANSFER - OUT REPORT:    Verbal report given to Li GONZALEZ on Alcon Zamora  being transferred to  for routine progression of patient care       Report consisted of patient's Situation, Background, Assessment and   Recommendations(SBAR).     Information from the following report(s) Nurse Handoff Report was reviewed with the receiving nurse.           Lines:   Peripheral IV 02/22/24 Left;Posterior Forearm (Active)   Site Assessment Clean, dry & intact 03/04/24 1205   Line Status Flushed;Blood return noted;Infusing 03/04/24 1205   Line Care Cap changed;Connections checked and tightened;Ports disinfected;Line pulled back 03/04/24 1205   Phlebitis Assessment No symptoms 03/04/24 1205   Infiltration Assessment 0 03/04/24 1205   Alcohol Cap Used Yes 03/04/24 1205   Dressing Status Clean, dry & intact 03/04/24 1205   Dressing Type Transparent 03/04/24 1205        Opportunity for questions and clarification was provided.      Patient transported with:  Tech        
TRANSFER - OUT REPORT:    Verbal report given to LISA Henson(name) on Alcon Zamora being transferred to [facility](unit) for routine progression of patient care       Report consisted of patient's Situation, Background, Assessment and   Recommendations(SBAR).     Information from the following report(s) Nurse Handoff Report, MAR, and Cardiac Rhythm sinus lino  was reviewed with the receiving nurse.    Opportunity for questions and clarification was provided.      Patient transported with:   Tech    
  HEENT: PEERL, EOMI, moist mucus membrane, TM clear  Neck: supple, no JVD, no meningeal signs  Chest: Clear to auscultation bilaterally   CVS: S1 S2 heard, Capillary refill less than 2 seconds  Abd: soft/ non tender, non distended, BS physiological,   Ext: no clubbing, no cyanosis, no edema, brisk 2+ DP pulses  Neuro/Psych: pleasant mood and affect, CN 2-12 grossly intact, sensory grossly within normal limit, Strength 5/5 in all extremities, DTR 1+ x 4  Skin: warm            Data Review:    Review and/or order of clinical lab test    I have independently reviewed and interpreted patient's lab and all other diagnostic data    Notes reviewed from all clinical/nonclinical/nursing services involved in patient's clinical care. Care coordination discussions were held with appropriate clinical/nonclinical/ nursing providers based on care coordination needs.     Labs:     No results for input(s): \"WBC\", \"HGB\", \"HCT\", \"PLT\" in the last 72 hours.    Recent Labs     02/18/24  0101   *   K 3.7      CO2 29   BUN 11     No results for input(s): \"ALT\", \"TP\", \"ALB\", \"GLOB\", \"GGT\", \"AML\" in the last 72 hours.    Invalid input(s): \"SGOT\", \"GPT\", \"AP\", \"TBIL\", \"TBILI\", \"AMYP\", \"LPSE\", \"HLPSE\"    No results for input(s): \"INR\", \"APTT\" in the last 72 hours.    Invalid input(s): \"PTP\"   No results for input(s): \"TIBC\", \"FERR\" in the last 72 hours.    Invalid input(s): \"FE\", \"PSAT\"   No results found for: \"FOL\", \"RBCF\"   No results for input(s): \"PH\", \"PCO2\", \"PO2\" in the last 72 hours.  No results for input(s): \"CPK\" in the last 72 hours.    Invalid input(s): \"CPKMB\", \"CKNDX\", \"TROIQ\"  Lab Results   Component Value Date/Time    CHOL 161 02/18/2024 01:01 AM    HDL 37 02/18/2024 01:01 AM     No results found for: \"GLUCPOC\"  [unfilled]      Medications Reviewed:     Current Facility-Administered Medications   Medication Dose Route Frequency    methadone (DOLOPHINE) tablet 95 mg  95 mg Oral Daily    aspirin chewable tablet 81 mg  
aspiration). Intubation (only one day).  COVID+  Post-Prandial Aspiration Risk Factors: DM, GERD.     Pertinent Imaging and Labs:  Head imaging findings as above in prandial aspiration risk factors.     Previously seen by SLP: Yes.   Reason for consult: Failed swallow screen, high risk for silent aspiration.    Cognitive and Communication Status:  Neurologic State: Alert  Orientation Level: Oriented to person and Disoriented to situation  Cognition: Follows commands and Poor safety awareness    Dysphagia:  Oral Assessment:  Oral Motor   Labial: No impairment  Oral Hygiene: Xerostomia  P.O. Trials:  PO Trials  Assessment Method(s): Observation  Vocal Quality: Low volume  Consistency Presented: Thin;Ice Chips  How Presented: Self-fed/presented;Spoon  Bolus Acceptance: No impairment  Bolus Formation/Control: Impaired            Respiratory Status/Airway:  Nasal cannula                               After treatment:   Call bell left within reach and Nursing notified    COMMUNICATION/EDUCATION:       The patient's plan of care including recommendations, planned interventions, and recommended diet changes were discussed with: Registered nurse    Patient/family have participated as able in goal setting and plan of care    Thank you,  SEVERIANO Allred       Problem: SLP Adult - Impaired Swallowing  Goal: By Discharge: Advance to least restrictive diet without signs or symptoms of aspiration for planned discharge setting.  See evaluation for individualized goals.  Description: Speech Therapy Goals  Initiated 2/25/24    1. Patient will tolerate baseline diet without adverse effects within 7 days.   2/25/2024 1605 by Lolly Garnica, SLP  Outcome: Progressing  2/25/2024 1605 by Lolly Garnica, SLP  Outcome: Progressing      
breath sounds and air entry.   Abdominal:      General: Bowel sounds are decreased.      Palpations: Abdomen is soft.      Tenderness: There is no abdominal tenderness.   Musculoskeletal:      Right lower leg: No edema.      Left lower leg: No edema.   Skin:     General: Skin is warm and dry.      Capillary Refill: Capillary refill takes less than 2 seconds.   Neurological:      General: No focal deficit present.      Mental Status: He is alert. He is disoriented.      Motor: Weakness present.      Comments: Delayed responses   Psychiatric:         Mood and Affect: Mood is not anxious or depressed. Affect is flat.         Behavior: Behavior normal. Behavior is cooperative.         Cognition and Memory: Memory is impaired.         Judgment: Judgment is not impulsive or inappropriate.         Labs and Data: Reviewed 24  Medications: Reviewed 24  Imaging: Reviewed 24    BP (!) 172/74   Pulse 60   Temp 97.9 °F (36.6 °C) (Oral)   Resp 16   Ht 1.981 m (6' 6\")   Wt 97.6 kg (215 lb 2.7 oz)   SpO2 97%   BMI 24.87 kg/m²  PreHospital Care  Analgesics Taken or Received PTA?: No   Temp (24hrs), Av.4 °F (36.9 °C), Min:97.9 °F (36.6 °C), Max:98.6 °F (37 °C)           Intake/Output:     Intake/Output Summary (Last 24 hours) at 2024 0918  Last data filed at 2024 0700  Gross per 24 hour   Intake 1874 ml   Output 645 ml   Net 1229 ml         Imaging    EXAM: XR CHEST PORTABLE     INDICATION: eval pna; intubated; aspiration? +covid     COMPARISON: 2024     FINDINGS: A portable AP radiograph of the chest was obtained at 504 hours. Tubes  and lines are stable.. Left lower lobe volume loss and small left pleural  effusion. Mild interstitial opacities possible mild pulmonary edema.. The  cardiac and mediastinal contours and pulmonary vascularity are normal.  The  bones and soft tissues are grossly within normal limits.     IMPRESSION:  Mild interstitial opacities possibly mild pulmonary edema. 
g/m2    MV E/A 0.65     E/E' Ratio (Averaged) 7.95     E/E' Lateral 5.30     LA Volume Index A/L 48 16 - 34 mL/m2    LVOT Area 4.5 cm2    LA Volume Index A-L A2C 52 (A) 16 - 34 mL/m2    LA Volume Index A-L A4C 44 (A) 16 - 34 mL/m2    LA Volume Index MOD A2C 50 (A) 16 - 34 ml/m2    LA Volume Index MOD A4C 40 (A) 16 - 34 ml/m2    LA Size Index 1.46 cm/m2    LA/AO Root Ratio 0.90     Ao Root Index 1.63 cm/m2    AV Velocity Ratio 0.75     SAJAN/BSA Peak Velocity 1.4 cm2/m2       Imaging Review   MRI Result (most recent):  MRI BRAIN WO CONTRAST 02/18/2024    Narrative  EXAM: MRI BRAIN WO CONTRAST    INDICATION: frequent falls, progressive weakness    COMPARISON: CT head 2/11/2024.    CONTRAST: None.    TECHNIQUE:  Multiplanar multisequence acquisition without contrast of the brain.    FINDINGS:  Small acute infarcts in the left medial basal ganglia and right superior alannah  extending into the right cerebral peduncle.    Generalized parenchymal volume loss with commensurate dilation of the sulci and  ventricular system. Scattered confluent areas of periventricular and deep white  matter T2/FLAIR hyperintensity, and patchy T2/FLAIR hyperintensity throughout  the alannah, consistent with moderate to severe chronic microvascular ischemic  disease. Small chronic infarcts noted in the bilateral basal ganglia, right  thalamus, and left cerebral peduncle with associated Wallerian degeneration  within the left medullary pyramid. There is no acute hemorrhage, extra-axial  fluid collection, or mass effect. There is no cerebellar tonsillar herniation.  Expected arterial flow-voids are present.    Mild mucosal thickening in the bilateral ethmoidal air cells and right sphenoid  sinus. The mastoid air cells and middle ears are clear. The orbital contents are  within normal limits. No significant osseous or scalp lesions are identified.    Impression  1. Small acute infarcts in the left medial basal ganglia and right superior  alannah/cerebral 
this patient and independently examined them on 3/4/2024 as outlined below:          General :  awake, no acute distress,   HEENT: PEERL, EOMI, moist mucus membrane  Neck: supple, no JVD, no meningeal signs  Chest: Clear to auscultation bilaterally   CVS: S1 S2 heard, Capillary refill less than 2 seconds  Abd: soft/ non tender, non distended, BS physiological,   Ext: no clubbing, no cyanosis, no edema, brisk 2+ DP pulses  Neuro/Psych:  alert x 3, slow to respond, following simple commands, left side neglect   Skin: warm     Data Review:    I personally reviewed  Image      I have personally and independently reviewed all pertinent labs, diagnostic studies, imaging, and have provided independent interpretation of the same.     Labs:     Recent Labs     03/02/24 0218 03/04/24 0027   WBC 9.3 10.3   HGB 12.8 11.4*   HCT 38.3 33.1*    370       Recent Labs     03/02/24 0227 03/04/24  0027   * 137   K 4.5 3.8   CL 99 105   CO2 33* 29   BUN 34* 25*       Recent Labs     03/02/24 0227 03/04/24  0027   ALT 48 35   GLOB 3.4 3.4       No results for input(s): \"INR\", \"APTT\" in the last 72 hours.    Invalid input(s): \"PTP\"   No results for input(s): \"TIBC\", \"FERR\" in the last 72 hours.    Invalid input(s): \"FE\", \"PSAT\"   No results found for: \"FOL\", \"RBCF\"   No results for input(s): \"PH\", \"PCO2\", \"PO2\" in the last 72 hours.  No results for input(s): \"CPK\" in the last 72 hours.    Invalid input(s): \"CPKMB\", \"CKNDX\", \"TROIQ\"  Lab Results   Component Value Date/Time    CHOL 161 02/18/2024 01:01 AM    HDL 37 02/18/2024 01:01 AM     No results found for: \"GLUCPOC\"  [unfilled]    Notes reviewed from all clinical/nonclinical/nursing services involved in patient's clinical care. Care coordination discussions were held with appropriate clinical/nonclinical/ nursing providers based on care coordination needs.         Patients current active Medications were reviewed, considered, added and adjusted based on the clinical 
found for: \"GLUCPOC\"  [unfilled]    Notes reviewed from all clinical/nonclinical/nursing services involved in patient's clinical care. Care coordination discussions were held with appropriate clinical/nonclinical/ nursing providers based on care coordination needs.         Patients current active Medications were reviewed, considered, added and adjusted based on the clinical condition today.      Home Medications were reconciled to the best of my ability given all available resources at the time of admission. Route is PO if not otherwise noted.      Admission Status:[id number]:::1}      Medications Reviewed:     Current Facility-Administered Medications   Medication Dose Route Frequency    famotidine (PEPCID) tablet 20 mg  20 mg PEG Tube BID    levETIRAcetam (KEPPRA) 100 MG/ML oral solution 1,000 mg  1,000 mg PEG Tube BID    morphine (PF) injection 4 mg  4 mg IntraVENous Q4H PRN    0.9 % sodium chloride infusion   IntraVENous Continuous    hydrALAZINE (APRESOLINE) tablet 100 mg  100 mg Per NG tube 3 times per day    0.9 % sodium chloride infusion   IntraVENous Continuous    guaiFENesin (MUCINEX) extended release tablet 600 mg  600 mg Oral BID    hydrALAZINE (APRESOLINE) injection 10 mg  10 mg IntraVENous Q6H PRN    amLODIPine (NORVASC) tablet 10 mg  10 mg Oral Daily    ipratropium 0.5 mg-albuterol 2.5 mg (DUONEB) nebulizer solution 1 Dose  1 Dose Inhalation Q4H PRN    balsum peru-castor oil (VENELEX) ointment   Topical Q12H    [Held by provider] enoxaparin (LOVENOX) injection 40 mg  40 mg SubCUTAneous Daily    polyethylene glycol (GLYCOLAX) packet 17 g  17 g Per NG tube BID    Vitamin D (CHOLECALCIFEROL) tablet 2,000 Units  2,000 Units Per NG tube Daily    methadone solution 1 mg/mL 95 mg  95 mg Per NG tube Daily    sodium chloride flush 0.9 % injection 5-40 mL  5-40 mL IntraVENous 2 times per day    sodium chloride flush 0.9 % injection 5-40 mL  5-40 mL IntraVENous PRN    0.9 % sodium chloride infusion   IntraVENous PRN 
injection 5-40 mL  5-40 mL IntraVENous PRN    0.9 % sodium chloride infusion   IntraVENous PRN    potassium chloride (KLOR-CON) extended release tablet 40 mEq  40 mEq Oral PRN    Or    potassium bicarb-citric acid (EFFER-K) effervescent tablet 40 mEq  40 mEq Oral PRN    Or    potassium chloride 10 mEq/100 mL IVPB (Peripheral Line)  10 mEq IntraVENous PRN    magnesium sulfate 2000 mg in 50 mL IVPB premix  2,000 mg IntraVENous PRN    ondansetron (ZOFRAN-ODT) disintegrating tablet 4 mg  4 mg Oral Q8H PRN    Or    ondansetron (ZOFRAN) injection 4 mg  4 mg IntraVENous Q6H PRN    polyethylene glycol (GLYCOLAX) packet 17 g  17 g Oral Daily PRN     ______________________________________________________________________  EXPECTED LENGTH OF STAY: 18  ACTUAL LENGTH OF STAY:          10                 Rose Mary MD   
Time: 02/22/24 11:24 AM    Specimen: Nasopharyngeal   Result Value Ref Range    Source Nasopharyngeal      SARS-CoV-2, Rapid Detected (A) NOTD     POCT Glucose    Collection Time: 02/22/24 12:14 PM   Result Value Ref Range    POC Glucose 188 (H) 65 - 117 mg/dL    Performed by: HERB Darden RN    Hemoglobin A1C    Collection Time: 02/22/24  3:48 PM   Result Value Ref Range    Hemoglobin A1C 5.3 4.0 - 5.6 %    Estimated Avg Glucose 105 mg/dL   Arterial Blood Gas, POC    Collection Time: 02/22/24  3:48 PM   Result Value Ref Range    FIO2 100 %    POC pH 7.45 7.35 - 7.45      POC pCO2 36.4 35.0 - 45.0 MMHG    POC PO2 269 (H) 80 - 100 MMHG    POC HCO3 25.0 22 - 26 MMOL/L    POC O2 SAT 99.9 (H) 92 - 97 %    Base Excess 1.1 mmol/L    Site RIGHT BRACHIAL      DEVICE ADULT VENT      Mode ASSIST CONTROL      POC TIDAL VOLUME 580 ml    Set Rate 14 bpm    POC PEEP 5 cmH2O    IPAP/PIP/High PEEP 22      POC Kian's Test NOT APPLICABLE      Specimen type: ARTERIAL     CBC with Auto Differential    Collection Time: 02/22/24  3:49 PM   Result Value Ref Range    WBC 6.5 4.1 - 11.1 K/uL    RBC 3.28 (L) 4.10 - 5.70 M/uL    Hemoglobin 10.0 (L) 12.1 - 17.0 g/dL    Hematocrit 29.3 (L) 36.6 - 50.3 %    MCV 89.3 80.0 - 99.0 FL    MCH 30.5 26.0 - 34.0 PG    MCHC 34.1 30.0 - 36.5 g/dL    RDW 12.8 11.5 - 14.5 %    Platelets 255 150 - 400 K/uL    MPV 9.0 8.9 - 12.9 FL    Nucleated RBCs 0.0 0  WBC    nRBC 0.00 0.00 - 0.01 K/uL    Neutrophils % PENDING %    Lymphocytes % PENDING %    Monocytes % PENDING %    Eosinophils % PENDING %    Basophils % PENDING %    Immature Granulocytes PENDING %    Neutrophils Absolute PENDING K/UL    Lymphocytes Absolute PENDING K/UL    Monocytes Absolute PENDING K/UL    Eosinophils Absolute PENDING K/UL    Basophils Absolute PENDING K/UL    Absolute Immature Granulocyte PENDING K/UL    Differential Type PENDING    Comprehensive Metabolic Panel w/ Reflex to MG    Collection Time: 02/22/24  3:49 PM   Result

## 2024-03-06 NOTE — WOUND CARE
Wound Care Note:     Patient is on Droplet Plus Precautions for COVID-19 positive  PPE:  N95, face shield, gown and gloves    New consult placed by nurse request for eval and treat    Chart shows:  Admitted for weakness  Past Medical History:   Diagnosis Date    Alcohol abuse, in remission     Anxiety     Chronic kidney disease     PLAGUE L RENAL ARTERY    Chronic obstructive pulmonary disease (HCC)     Chronic pain     Constipation     Diabetes (HCC)     Type II    Erectile dysfunction 2015    GERD (gastroesophageal reflux disease)     Hepatitis C     Hypertension     Liver disease     HEP C TOOK HARVONI    Pancreatitis     Psychiatric disorder     PANIC ATTACKS    Smoker     Spondylitis (HCC)     Type 2 diabetes mellitus without complication (HCC) 2005     WBC = 7.4 on 2/23/24  Admitted from home    Assessment:   Patient is intubated, hopefully extubated later this morning, nods head appropriately, continent with some assistance needed in repositioning.    Bed: Low air loss- Isolibrium  Patient has a euceda.  Diet: NPO  Patient reports no pain.      Bilateral heels, buttocks, and sacral skin intact and without erythema.    1. POA sacrum and bilateral buttocks near gluteal cleft with hyperpigmentation that appears to be natural pigmentation.  Venelex ointment to be ordered.    Spoke with YAKOV Woods, wound care orders obtained.    Patient repositioned on right side.  Heels offloaded on pillow.     Recommendations:    Sacrum and bilateral heels- Every 12 hours liberally apply Venelex ointment.    Skin Care & Pressure Prevention:  Minimize layers of linen/pads under patient to optimize support surface.    Turn/reposition approximately every 2 hours and offload heels.  Manage incontinence / promote continence   Nourishing Skin Cream to dry skin, minimize use of briefs when able    Discussed above plan with patient & LISA Thomas    Transition of Care: Wound care 
Care: Patient is being discharged to [facility] today.  Wound care will sign off.          Akosua \"Etta\" JANUARY Nava, RN, CWON  Certified Wound and Ostomy Nurse  office [phone]  Best way to contact me is through [facility]  
incontinence / promote continence   Nourishing Skin Cream to dry skin, minimize use of briefs when able    Discussed above plan with patient & LISA Tom    Transition of Care: Wound care will follow while admitted to hospital.        Akosua \"Etta\" JANUARY Nava, RN, [facility]  Certified Wound and Ostomy Nurse  office [phone]  Best way to contact me is through [facility]

## 2024-03-11 NOTE — TELEPHONE ENCOUNTER
Tried to call the patient to get a hosp f/u apt scheduled. He did not  so I left a message along with our number for a callback

## 2024-03-14 NOTE — TELEPHONE ENCOUNTER
Called patient's mother this time around.  Patients mother stated that he is currently at rehab ([facility] He is unable to walk and won't be getting out until around the 20th of this month. Patient's mother was very distressed because she states she is an older women taking care of her son and she can barely walk herself.     I offered a virtual apt with Laila and she was more than happy to take the apt. Patient has been scheduled for an apt and reminder sheet has been mailed out.

## 2024-04-09 ENCOUNTER — APPOINTMENT (OUTPATIENT)
Facility: HOSPITAL | Age: 64
DRG: 247 | End: 2024-04-09
Payer: COMMERCIAL

## 2024-04-09 ENCOUNTER — HOSPITAL ENCOUNTER (INPATIENT)
Facility: HOSPITAL | Age: 64
LOS: 5 days | Discharge: HOME HEALTH | DRG: 247 | End: 2024-04-14
Attending: EMERGENCY MEDICINE | Admitting: FAMILY MEDICINE
Payer: COMMERCIAL

## 2024-04-09 DIAGNOSIS — K59.00 CONSTIPATION, UNSPECIFIED CONSTIPATION TYPE: ICD-10-CM

## 2024-04-09 DIAGNOSIS — K52.9 COLITIS: Primary | ICD-10-CM

## 2024-04-09 LAB
ALBUMIN SERPL-MCNC: 3.8 G/DL (ref 3.5–5)
ALBUMIN/GLOB SERPL: 0.8 (ref 1.1–2.2)
ALP SERPL-CCNC: 90 U/L (ref 45–117)
ALT SERPL-CCNC: 20 U/L (ref 12–78)
ANION GAP SERPL CALC-SCNC: 8 MMOL/L (ref 5–15)
APPEARANCE UR: CLEAR
AST SERPL-CCNC: 13 U/L (ref 15–37)
BACTERIA URNS QL MICRO: NEGATIVE /HPF
BASOPHILS # BLD: 0.1 K/UL (ref 0–0.1)
BASOPHILS NFR BLD: 1 % (ref 0–1)
BILIRUB SERPL-MCNC: 0.5 MG/DL (ref 0.2–1)
BILIRUB UR QL: NEGATIVE
BUN SERPL-MCNC: 12 MG/DL (ref 6–20)
BUN/CREAT SERPL: 10 (ref 12–20)
CALCIUM SERPL-MCNC: 10.3 MG/DL (ref 8.5–10.1)
CHLORIDE SERPL-SCNC: 102 MMOL/L (ref 97–108)
CO2 SERPL-SCNC: 30 MMOL/L (ref 21–32)
COLOR UR: ABNORMAL
COMMENT:: NORMAL
CREAT SERPL-MCNC: 1.15 MG/DL (ref 0.7–1.3)
DIFFERENTIAL METHOD BLD: NORMAL
EOSINOPHIL # BLD: 0.1 K/UL (ref 0–0.4)
EOSINOPHIL NFR BLD: 2 % (ref 0–7)
EPITH CASTS URNS QL MICRO: ABNORMAL /LPF
ERYTHROCYTE [DISTWIDTH] IN BLOOD BY AUTOMATED COUNT: 13.2 % (ref 11.5–14.5)
GLOBULIN SER CALC-MCNC: 4.7 G/DL (ref 2–4)
GLUCOSE BLD STRIP.AUTO-MCNC: 142 MG/DL (ref 65–117)
GLUCOSE SERPL-MCNC: 129 MG/DL (ref 65–100)
GLUCOSE UR STRIP.AUTO-MCNC: NEGATIVE MG/DL
HCT VFR BLD AUTO: 37.2 % (ref 36.6–50.3)
HGB BLD-MCNC: 12.3 G/DL (ref 12.1–17)
HGB UR QL STRIP: NEGATIVE
IMM GRANULOCYTES # BLD AUTO: 0 K/UL (ref 0–0.04)
IMM GRANULOCYTES NFR BLD AUTO: 0 % (ref 0–0.5)
KETONES UR QL STRIP.AUTO: NEGATIVE MG/DL
LEUKOCYTE ESTERASE UR QL STRIP.AUTO: NEGATIVE
LYMPHOCYTES # BLD: 1.5 K/UL (ref 0.8–3.5)
LYMPHOCYTES NFR BLD: 19 % (ref 12–49)
MCH RBC QN AUTO: 28.9 PG (ref 26–34)
MCHC RBC AUTO-ENTMCNC: 33.1 G/DL (ref 30–36.5)
MCV RBC AUTO: 87.3 FL (ref 80–99)
MONOCYTES # BLD: 0.4 K/UL (ref 0–1)
MONOCYTES NFR BLD: 5 % (ref 5–13)
NEUTS SEG # BLD: 6 K/UL (ref 1.8–8)
NEUTS SEG NFR BLD: 73 % (ref 32–75)
NITRITE UR QL STRIP.AUTO: NEGATIVE
NRBC # BLD: 0 K/UL (ref 0–0.01)
NRBC BLD-RTO: 0 PER 100 WBC
PH UR STRIP: 5.5 (ref 5–8)
PLATELET # BLD AUTO: 384 K/UL (ref 150–400)
PMV BLD AUTO: 8.9 FL (ref 8.9–12.9)
POTASSIUM SERPL-SCNC: 3.6 MMOL/L (ref 3.5–5.1)
PROT SERPL-MCNC: 8.5 G/DL (ref 6.4–8.2)
PROT UR STRIP-MCNC: 100 MG/DL
RBC # BLD AUTO: 4.26 M/UL (ref 4.1–5.7)
RBC #/AREA URNS HPF: ABNORMAL /HPF (ref 0–5)
SERVICE CMNT-IMP: ABNORMAL
SODIUM SERPL-SCNC: 140 MMOL/L (ref 136–145)
SP GR UR REFRACTOMETRY: 1.02 (ref 1–1.03)
SPECIMEN HOLD: NORMAL
SPERM URNS QL MICRO: PRESENT
URINE CULTURE IF INDICATED: ABNORMAL
UROBILINOGEN UR QL STRIP.AUTO: 1 EU/DL (ref 0.2–1)
WBC # BLD AUTO: 8.1 K/UL (ref 4.1–11.1)
WBC URNS QL MICRO: ABNORMAL /HPF (ref 0–4)

## 2024-04-09 PROCEDURE — 6360000004 HC RX CONTRAST MEDICATION: Performed by: EMERGENCY MEDICINE

## 2024-04-09 PROCEDURE — 96361 HYDRATE IV INFUSION ADD-ON: CPT

## 2024-04-09 PROCEDURE — 6370000000 HC RX 637 (ALT 250 FOR IP): Performed by: PHYSICIAN ASSISTANT

## 2024-04-09 PROCEDURE — 71046 X-RAY EXAM CHEST 2 VIEWS: CPT

## 2024-04-09 PROCEDURE — 74018 RADEX ABDOMEN 1 VIEW: CPT

## 2024-04-09 PROCEDURE — 1200000000 HC SEMI PRIVATE

## 2024-04-09 PROCEDURE — 74177 CT ABD & PELVIS W/CONTRAST: CPT

## 2024-04-09 PROCEDURE — G0378 HOSPITAL OBSERVATION PER HR: HCPCS

## 2024-04-09 PROCEDURE — 82962 GLUCOSE BLOOD TEST: CPT

## 2024-04-09 PROCEDURE — 99285 EMERGENCY DEPT VISIT HI MDM: CPT

## 2024-04-09 PROCEDURE — 36415 COLL VENOUS BLD VENIPUNCTURE: CPT

## 2024-04-09 PROCEDURE — 80053 COMPREHEN METABOLIC PANEL: CPT

## 2024-04-09 PROCEDURE — 81001 URINALYSIS AUTO W/SCOPE: CPT

## 2024-04-09 PROCEDURE — 2580000003 HC RX 258: Performed by: FAMILY MEDICINE

## 2024-04-09 PROCEDURE — 85025 COMPLETE CBC W/AUTO DIFF WBC: CPT

## 2024-04-09 PROCEDURE — 2580000003 HC RX 258: Performed by: EMERGENCY MEDICINE

## 2024-04-09 RX ORDER — LEVETIRACETAM 100 MG/ML
1000 SOLUTION ORAL 2 TIMES DAILY
Status: DISCONTINUED | OUTPATIENT
Start: 2024-04-10 | End: 2024-04-14 | Stop reason: HOSPADM

## 2024-04-09 RX ORDER — CHOLECALCIFEROL (VITAMIN D3) 1250 MCG
5000 CAPSULE ORAL DAILY
COMMUNITY

## 2024-04-09 RX ORDER — AMLODIPINE BESYLATE 5 MG/1
10 TABLET ORAL DAILY
Status: DISCONTINUED | OUTPATIENT
Start: 2024-04-10 | End: 2024-04-14 | Stop reason: HOSPADM

## 2024-04-09 RX ORDER — ONDANSETRON 4 MG/1
4 TABLET, ORALLY DISINTEGRATING ORAL EVERY 8 HOURS PRN
Status: DISCONTINUED | OUTPATIENT
Start: 2024-04-09 | End: 2024-04-14 | Stop reason: HOSPADM

## 2024-04-09 RX ORDER — SODIUM CHLORIDE 9 MG/ML
INJECTION, SOLUTION INTRAVENOUS CONTINUOUS
Status: DISCONTINUED | OUTPATIENT
Start: 2024-04-09 | End: 2024-04-10

## 2024-04-09 RX ORDER — ERGOCALCIFEROL 1.25 MG/1
50000 CAPSULE ORAL DAILY
Status: DISCONTINUED | OUTPATIENT
Start: 2024-04-10 | End: 2024-04-14 | Stop reason: HOSPADM

## 2024-04-09 RX ORDER — SODIUM CHLORIDE 9 MG/ML
INJECTION, SOLUTION INTRAVENOUS PRN
Status: DISCONTINUED | OUTPATIENT
Start: 2024-04-09 | End: 2024-04-14 | Stop reason: HOSPADM

## 2024-04-09 RX ORDER — LISINOPRIL 20 MG/1
40 TABLET ORAL DAILY
Status: DISCONTINUED | OUTPATIENT
Start: 2024-04-10 | End: 2024-04-14 | Stop reason: HOSPADM

## 2024-04-09 RX ORDER — SODIUM CHLORIDE 0.9 % (FLUSH) 0.9 %
5-40 SYRINGE (ML) INJECTION PRN
Status: DISCONTINUED | OUTPATIENT
Start: 2024-04-09 | End: 2024-04-14 | Stop reason: HOSPADM

## 2024-04-09 RX ORDER — ASPIRIN 81 MG/1
81 TABLET, CHEWABLE ORAL DAILY
Status: DISCONTINUED | OUTPATIENT
Start: 2024-04-10 | End: 2024-04-14 | Stop reason: HOSPADM

## 2024-04-09 RX ORDER — ACETAMINOPHEN 650 MG/1
650 SUPPOSITORY RECTAL EVERY 6 HOURS PRN
Status: DISCONTINUED | OUTPATIENT
Start: 2024-04-09 | End: 2024-04-14 | Stop reason: HOSPADM

## 2024-04-09 RX ORDER — ENEMA 19; 7 G/133ML; G/133ML
1 ENEMA RECTAL
Status: COMPLETED | OUTPATIENT
Start: 2024-04-09 | End: 2024-04-09

## 2024-04-09 RX ORDER — ATORVASTATIN CALCIUM 40 MG/1
80 TABLET, FILM COATED ORAL NIGHTLY
Status: DISCONTINUED | OUTPATIENT
Start: 2024-04-10 | End: 2024-04-14 | Stop reason: HOSPADM

## 2024-04-09 RX ORDER — 0.9 % SODIUM CHLORIDE 0.9 %
1000 INTRAVENOUS SOLUTION INTRAVENOUS ONCE
Status: COMPLETED | OUTPATIENT
Start: 2024-04-09 | End: 2024-04-09

## 2024-04-09 RX ORDER — ONDANSETRON 2 MG/ML
4 INJECTION INTRAMUSCULAR; INTRAVENOUS EVERY 6 HOURS PRN
Status: DISCONTINUED | OUTPATIENT
Start: 2024-04-09 | End: 2024-04-14 | Stop reason: HOSPADM

## 2024-04-09 RX ORDER — FAMOTIDINE 20 MG/1
20 TABLET, FILM COATED ORAL 2 TIMES DAILY
Status: DISCONTINUED | OUTPATIENT
Start: 2024-04-10 | End: 2024-04-14 | Stop reason: HOSPADM

## 2024-04-09 RX ORDER — INSULIN LISPRO 100 [IU]/ML
0-4 INJECTION, SOLUTION INTRAVENOUS; SUBCUTANEOUS NIGHTLY
Status: DISCONTINUED | OUTPATIENT
Start: 2024-04-10 | End: 2024-04-14 | Stop reason: HOSPADM

## 2024-04-09 RX ORDER — POLYETHYLENE GLYCOL 3350 17 G/17G
17 POWDER, FOR SOLUTION ORAL DAILY PRN
Status: DISCONTINUED | OUTPATIENT
Start: 2024-04-09 | End: 2024-04-14 | Stop reason: HOSPADM

## 2024-04-09 RX ORDER — ACETAMINOPHEN 325 MG/1
650 TABLET ORAL EVERY 6 HOURS PRN
Status: DISCONTINUED | OUTPATIENT
Start: 2024-04-09 | End: 2024-04-14 | Stop reason: HOSPADM

## 2024-04-09 RX ORDER — INSULIN LISPRO 100 [IU]/ML
0-4 INJECTION, SOLUTION INTRAVENOUS; SUBCUTANEOUS
Status: DISCONTINUED | OUTPATIENT
Start: 2024-04-10 | End: 2024-04-14 | Stop reason: HOSPADM

## 2024-04-09 RX ORDER — DEXTROSE MONOHYDRATE 100 MG/ML
INJECTION, SOLUTION INTRAVENOUS CONTINUOUS PRN
Status: DISCONTINUED | OUTPATIENT
Start: 2024-04-09 | End: 2024-04-14 | Stop reason: HOSPADM

## 2024-04-09 RX ORDER — SODIUM CHLORIDE 0.9 % (FLUSH) 0.9 %
5-40 SYRINGE (ML) INJECTION EVERY 12 HOURS SCHEDULED
Status: DISCONTINUED | OUTPATIENT
Start: 2024-04-09 | End: 2024-04-14 | Stop reason: HOSPADM

## 2024-04-09 RX ADMIN — SODIUM PHOSPHATE, DIBASIC AND SODIUM PHOSPHATE, MONOBASIC 1 ENEMA: 7; 19 ENEMA RECTAL at 18:17

## 2024-04-09 RX ADMIN — SODIUM CHLORIDE: 9 INJECTION, SOLUTION INTRAVENOUS at 23:54

## 2024-04-09 RX ADMIN — SODIUM CHLORIDE, PRESERVATIVE FREE 10 ML: 5 INJECTION INTRAVENOUS at 23:45

## 2024-04-09 RX ADMIN — SODIUM CHLORIDE 1000 ML: 9 INJECTION, SOLUTION INTRAVENOUS at 19:32

## 2024-04-09 RX ADMIN — IOPAMIDOL 100 ML: 755 INJECTION, SOLUTION INTRAVENOUS at 20:00

## 2024-04-09 ASSESSMENT — PAIN - FUNCTIONAL ASSESSMENT: PAIN_FUNCTIONAL_ASSESSMENT: NONE - DENIES PAIN

## 2024-04-09 NOTE — ED TRIAGE NOTES
Pt here for constipation, last bm a week ago Saturday, +n/v, denies abd pain, pt with PEG tube in place , +cough x 2 days , sent for possible UTI also, +dysuria, denies fever , denies back pain , denies any pain at this time , denies sob

## 2024-04-09 NOTE — ED PROVIDER NOTES
04/09/24 2215 04/09/24 2341   BP: (!) 132/91 (!) 145/66 (!) 153/76   Pulse: 95 59 (!) 49   Resp: 16 18 16   Temp: 98.3 °F (36.8 °C) 97.9 °F (36.6 °C) 97.9 °F (36.6 °C)   TempSrc: Oral Oral Oral   SpO2: 97% 97% 96%           Medical Decision Making  DDx: Constipation, dehydration, small bowel obstruction, GI malignancy    Plan:  - Labs: CBC, BMP  - Imaging: CT abdomen pelvis  - Medications: fleet enema    Reassessment: Found on CT to have colon wall thickening throughout the descending colon for which radiology recommends colonoscopy to look for malignancy.  May also be reactive to large stool burden there.  Would like to admit for cleanout and management of colitis.         [facility] Consult for Admission  9:28 PM    ED Room Number: R32/R32  Patient Name and age:  Alcon Zamora 64 y.o.  male  Working Diagnosis: Constipation, colitis    COVID-19 Suspicion: No  Sepsis present:  No  Reassessment needed: No  Code Status:  Full Code  Readmission: No  Isolation Requirements: no  Recommended Level of Care: med/surg  Department: [facility] Adult ED - [phone]    Other:  64 y.o. male with anxiety/panic attacks, HLD, COPD, CKD, chronic pain, T2DM, HCV s/p Harvoni, HTN, CVA and epilepsy status post recent prolonged hospital stay at [facility] (2/16-3/06) during which PEG tube was placed presents to the emergency department because he has not had a bowel movement x 2 weeks.  Patient reports that he has tried MiraLAX for the last 7 days, and has had his mother performing manual disimpaction's at home for the last couple of days.  He also complains of nausea, vomiting, cough intermittently for the past 2 days.  Found on CT to have colon wall thickening throughout the descending colon for which radiology recommends colonoscopy to look for malignancy.  May also be reactive to large stool burden there.  Would like to admit for cleanout and management of colitis.      Amount and/or Complexity of Data Reviewed  Independent Historian:

## 2024-04-10 ENCOUNTER — TELEPHONE (OUTPATIENT)
Age: 64
End: 2024-04-10

## 2024-04-10 ENCOUNTER — APPOINTMENT (OUTPATIENT)
Facility: HOSPITAL | Age: 64
DRG: 247 | End: 2024-04-10
Payer: COMMERCIAL

## 2024-04-10 LAB
ALBUMIN SERPL-MCNC: 2.9 G/DL (ref 3.5–5)
ALBUMIN/GLOB SERPL: 0.9 (ref 1.1–2.2)
ALP SERPL-CCNC: 70 U/L (ref 45–117)
ALT SERPL-CCNC: 14 U/L (ref 12–78)
ANION GAP SERPL CALC-SCNC: 7 MMOL/L (ref 5–15)
APTT PPP: 24.2 SEC (ref 22.1–31)
AST SERPL-CCNC: 9 U/L (ref 15–37)
BASOPHILS # BLD: 0.1 K/UL (ref 0–0.1)
BASOPHILS NFR BLD: 1 % (ref 0–1)
BILIRUB SERPL-MCNC: 0.5 MG/DL (ref 0.2–1)
BUN SERPL-MCNC: 12 MG/DL (ref 6–20)
BUN/CREAT SERPL: 14 (ref 12–20)
CALCIUM SERPL-MCNC: 8.8 MG/DL (ref 8.5–10.1)
CHLORIDE SERPL-SCNC: 104 MMOL/L (ref 97–108)
CO2 SERPL-SCNC: 27 MMOL/L (ref 21–32)
COMMENT:: NORMAL
CREAT SERPL-MCNC: 0.83 MG/DL (ref 0.7–1.3)
DIFFERENTIAL METHOD BLD: ABNORMAL
EOSINOPHIL # BLD: 0.2 K/UL (ref 0–0.4)
EOSINOPHIL NFR BLD: 3 % (ref 0–7)
ERYTHROCYTE [DISTWIDTH] IN BLOOD BY AUTOMATED COUNT: 12.9 % (ref 11.5–14.5)
EST. AVERAGE GLUCOSE BLD GHB EST-MCNC: 123 MG/DL
GLOBULIN SER CALC-MCNC: 3.2 G/DL (ref 2–4)
GLUCOSE BLD STRIP.AUTO-MCNC: 71 MG/DL (ref 65–117)
GLUCOSE BLD STRIP.AUTO-MCNC: 84 MG/DL (ref 65–117)
GLUCOSE BLD STRIP.AUTO-MCNC: 86 MG/DL (ref 65–117)
GLUCOSE BLD STRIP.AUTO-MCNC: 99 MG/DL (ref 65–117)
GLUCOSE SERPL-MCNC: 94 MG/DL (ref 65–100)
HBA1C MFR BLD: 5.9 % (ref 4–5.6)
HCT VFR BLD AUTO: 27.7 % (ref 36.6–50.3)
HGB BLD-MCNC: 8.9 G/DL (ref 12.1–17)
IMM GRANULOCYTES # BLD AUTO: 0 K/UL (ref 0–0.04)
IMM GRANULOCYTES NFR BLD AUTO: 0 % (ref 0–0.5)
INR PPP: 1.1 (ref 0.9–1.1)
LACTATE SERPL-SCNC: 0.7 MMOL/L (ref 0.4–2)
LYMPHOCYTES # BLD: 1.7 K/UL (ref 0.8–3.5)
LYMPHOCYTES NFR BLD: 25 % (ref 12–49)
MAGNESIUM SERPL-MCNC: 1.6 MG/DL (ref 1.6–2.4)
MCH RBC QN AUTO: 28.2 PG (ref 26–34)
MCHC RBC AUTO-ENTMCNC: 32.1 G/DL (ref 30–36.5)
MCV RBC AUTO: 87.7 FL (ref 80–99)
MONOCYTES # BLD: 0.5 K/UL (ref 0–1)
MONOCYTES NFR BLD: 7 % (ref 5–13)
NEUTS SEG # BLD: 4.3 K/UL (ref 1.8–8)
NEUTS SEG NFR BLD: 64 % (ref 32–75)
NRBC # BLD: 0 K/UL (ref 0–0.01)
NRBC BLD-RTO: 0 PER 100 WBC
PHOSPHATE SERPL-MCNC: 4 MG/DL (ref 2.6–4.7)
PLATELET # BLD AUTO: 288 K/UL (ref 150–400)
PMV BLD AUTO: 9.6 FL (ref 8.9–12.9)
POTASSIUM SERPL-SCNC: 3.3 MMOL/L (ref 3.5–5.1)
PROT SERPL-MCNC: 6.1 G/DL (ref 6.4–8.2)
PROTHROMBIN TIME: 11.4 SEC (ref 9–11.1)
RBC # BLD AUTO: 3.16 M/UL (ref 4.1–5.7)
SERVICE CMNT-IMP: NORMAL
SODIUM SERPL-SCNC: 138 MMOL/L (ref 136–145)
SPECIMEN HOLD: NORMAL
THERAPEUTIC RANGE: NORMAL SECS (ref 58–77)
WBC # BLD AUTO: 6.7 K/UL (ref 4.1–11.1)

## 2024-04-10 PROCEDURE — 80053 COMPREHEN METABOLIC PANEL: CPT

## 2024-04-10 PROCEDURE — 1200000000 HC SEMI PRIVATE

## 2024-04-10 PROCEDURE — 85610 PROTHROMBIN TIME: CPT

## 2024-04-10 PROCEDURE — 83605 ASSAY OF LACTIC ACID: CPT

## 2024-04-10 PROCEDURE — 2580000003 HC RX 258: Performed by: FAMILY MEDICINE

## 2024-04-10 PROCEDURE — 87040 BLOOD CULTURE FOR BACTERIA: CPT

## 2024-04-10 PROCEDURE — 83036 HEMOGLOBIN GLYCOSYLATED A1C: CPT

## 2024-04-10 PROCEDURE — 6360000002 HC RX W HCPCS: Performed by: FAMILY MEDICINE

## 2024-04-10 PROCEDURE — 96368 THER/DIAG CONCURRENT INF: CPT

## 2024-04-10 PROCEDURE — 70450 CT HEAD/BRAIN W/O DYE: CPT

## 2024-04-10 PROCEDURE — 6370000000 HC RX 637 (ALT 250 FOR IP): Performed by: FAMILY MEDICINE

## 2024-04-10 PROCEDURE — 85730 THROMBOPLASTIN TIME PARTIAL: CPT

## 2024-04-10 PROCEDURE — G0378 HOSPITAL OBSERVATION PER HR: HCPCS

## 2024-04-10 PROCEDURE — 6360000002 HC RX W HCPCS: Performed by: NURSE PRACTITIONER

## 2024-04-10 PROCEDURE — 85025 COMPLETE CBC W/AUTO DIFF WBC: CPT

## 2024-04-10 PROCEDURE — 84100 ASSAY OF PHOSPHORUS: CPT

## 2024-04-10 PROCEDURE — 96365 THER/PROPH/DIAG IV INF INIT: CPT

## 2024-04-10 PROCEDURE — 82962 GLUCOSE BLOOD TEST: CPT

## 2024-04-10 PROCEDURE — 83735 ASSAY OF MAGNESIUM: CPT

## 2024-04-10 PROCEDURE — 36415 COLL VENOUS BLD VENIPUNCTURE: CPT

## 2024-04-10 PROCEDURE — 6370000000 HC RX 637 (ALT 250 FOR IP): Performed by: PHYSICIAN ASSISTANT

## 2024-04-10 PROCEDURE — 96366 THER/PROPH/DIAG IV INF ADDON: CPT

## 2024-04-10 RX ORDER — SODIUM CHLORIDE AND POTASSIUM CHLORIDE 150; 900 MG/100ML; MG/100ML
INJECTION, SOLUTION INTRAVENOUS CONTINUOUS
Status: DISCONTINUED | OUTPATIENT
Start: 2024-04-10 | End: 2024-04-14 | Stop reason: HOSPADM

## 2024-04-10 RX ADMIN — AMLODIPINE BESYLATE 10 MG: 5 TABLET ORAL at 11:15

## 2024-04-10 RX ADMIN — POLYETHYLENE GLYCOL-3350 AND ELECTROLYTES 4000 ML: 236; 6.74; 5.86; 2.97; 22.74 POWDER, FOR SOLUTION ORAL at 14:06

## 2024-04-10 RX ADMIN — SODIUM CHLORIDE, PRESERVATIVE FREE 10 ML: 5 INJECTION INTRAVENOUS at 21:20

## 2024-04-10 RX ADMIN — LISINOPRIL 40 MG: 20 TABLET ORAL at 11:15

## 2024-04-10 RX ADMIN — ATORVASTATIN CALCIUM 80 MG: 40 TABLET, FILM COATED ORAL at 21:26

## 2024-04-10 RX ADMIN — FAMOTIDINE 20 MG: 20 TABLET ORAL at 11:15

## 2024-04-10 RX ADMIN — PIPERACILLIN SODIUM AND TAZOBACTAM SODIUM 4500 MG: 4; .5 INJECTION, POWDER, LYOPHILIZED, FOR SOLUTION INTRAVENOUS at 03:01

## 2024-04-10 RX ADMIN — FAMOTIDINE 20 MG: 20 TABLET ORAL at 21:20

## 2024-04-10 RX ADMIN — POTASSIUM CHLORIDE AND SODIUM CHLORIDE: 900; 150 INJECTION, SOLUTION INTRAVENOUS at 15:54

## 2024-04-10 RX ADMIN — PIPERACILLIN AND TAZOBACTAM 3375 MG: 3; .375 INJECTION, POWDER, LYOPHILIZED, FOR SOLUTION INTRAVENOUS at 21:23

## 2024-04-10 RX ADMIN — PIPERACILLIN AND TAZOBACTAM 3375 MG: 3; .375 INJECTION, POWDER, LYOPHILIZED, FOR SOLUTION INTRAVENOUS; PARENTERAL at 12:04

## 2024-04-10 RX ADMIN — POTASSIUM CHLORIDE AND SODIUM CHLORIDE: 900; 150 INJECTION, SOLUTION INTRAVENOUS at 08:03

## 2024-04-10 RX ADMIN — ERGOCALCIFEROL 50000 UNITS: 1.25 CAPSULE ORAL at 11:19

## 2024-04-10 RX ADMIN — LEVETIRACETAM 1000 MG: 100 SOLUTION ORAL at 13:55

## 2024-04-10 RX ADMIN — LEVETIRACETAM 1000 MG: 100 SOLUTION ORAL at 22:33

## 2024-04-10 RX ADMIN — ASPIRIN 81 MG CHEWABLE TABLET 81 MG: 81 TABLET CHEWABLE at 11:15

## 2024-04-10 NOTE — CARE COORDINATION
Care Management Initial Assessment       RUR: 25%  Readmission? No  1st IM letter given? No n/a  1st  letter given: No n/a    Transition of Care: hopefully home with YVON with home health PT/OT/speech with [facility]    Transport Plan: likely WC van or in car with mother    Main contact is jeanette Llamas- [phone]    Discharge pending:  -pending medical progress and care recommendations  -patient continues on IV antibiotics     1230: this CM met with patient and his motherJohn Llamas at bedside; patient is alert and oriented x3; patient lives at stated address with one of his uncles; his mother lives 6 houses away and is his main support; patient recently at [facility] from 2/16-3/6/24; discharged to [facility] and then discharged home with [facility] on 3/22/24 with [facility]/OT/speech/SN; he has continued with [facility] since then; patient has a Peg tube from his previous admission; patient uses a walker to ambulate and is not using the peg tube but eating solid foods and drinking thin liquids;   Insurance confirmed- medicaid managed     04/10/24 1235   Service Assessment   Patient Orientation Alert and Oriented   Cognition Alert   History Provided By Patient;Other (see comment)  (parent)   Primary Caregiver Family   Accompanied By/Relationship jeanette Llamas   Support Systems Family Members;Parent   Patient's Healthcare Decision Maker is: Legal Next of Kin   PCP Verified by CM Yes   Last Visit to PCP Within last 3 months   Prior Functional Level Assistance with the following:;Mobility;Toileting;Dressing;Bathing   Current Functional Level Assistance with the following:;Bathing;Dressing;Toileting;Mobility   Can patient return to prior living arrangement Yes   Ability to make needs known: Good   Family able to assist with home care needs: Yes   Financial Resources Medicaid   Social/Functional History   Lives With Family  (1 uncle; patients mother lives 6 houses away)   Type of Home House   Home

## 2024-04-10 NOTE — H&P
History and Physical    Date of Service:  4/9/2024  Primary Care Provider: No primary care provider on file.  Source of information: The patient and Chart review    Chief Complaint: Constipation      History of Presenting Illness:   Alcon Zamora is a 64 y.o. male with past medical history of hypertension, hyperlipidemia, type 2 diabetes mellitus, CVA, epilepsy, dysphagia, PEG tube placement, COPD, CKD, HCV, s/p Harvoni treatment, prior alcohol/opiate abuse in remission, GERD, pancreatitis, chronic pain, constipation, anxiety, panic attacks presented to the emergency department from home with reported constipation, nausea, vomiting.  Patient has history of constipation but notably worsened with last bowel movement on 4/6/2024.  Symptoms notably associated with nausea and vomiting with nonbloody nonbilious emesis but no abdominal pain per initial triage note.  He also fully had dysuria and cough for the past 2 days.  Of note, he was recently hospitalized from 2/16/2024 to 3/6/2024 with new onset seizures, acute embolic stroke (MRI brain 2/18/2024 showing small infarcts in the left medial basal ganglia and right superior alannah status cerebral peduncle), COVID-19 virus infection, acute hypoxemic respiratory failure (intubated 2/22/2024 next dated 2/23/2024), PFO with bidirectional intra-atrial shunt (JOSEMANUEL 2/21/2024: EF 60% to 65% diastolic dysfunction, positive bubble study with PFO), dysphagia (EGD/PEG tube placement 3/4/2024).  On arrival in ED today, initial recorded vital signs were temperature 98.3 °F, /91, heart rate 95, respiratory rate 16, O2 saturation 97% on room air.  Abnormal labs included blood glucose 129, serum calcium 10.3.  UA showed urine protein 100 otherwise unremarkable UA.  Chest x-ray 2 view showed no acute cardiopulmonary process.  KUB showed nonobstructive bowel gas pattern with normal retained fecal material.  CT abdomen pelvis with IV contrast showed long segment colonic wall

## 2024-04-10 NOTE — TELEPHONE ENCOUNTER
Patient's mother stated that son is currently at [facility] after having issues of going to the bathroom. Brenda also stated patient had an episode on Saturday, 4/6/24 where son experience tingling on the left side of face and may have experience a mini TIA.  Patient has an appointment with YAKOV Ulloa today, 4/10/24 at 1:00pm.    Please contact Brenda at [phone]

## 2024-04-10 NOTE — TELEPHONE ENCOUNTER
Called patient's mother. She stated that her son is currently in the hospital due to not having a bowel movement in the past 2 weeks. Last Saturday she stated that he was reporting left sided facial numbness and thought he was having a stroke. She wanted to make sure the hospital was aware of this. Informed her that when she goes to the hospital today she can notify the nurse and the nurse and doctor will determine the plan of care. Patient's mother verbalizes understanding. She had to cancel today's appointment as he is still in the hospital. She will call once everything is okay. She will schedule with Faviola Ulloa NP when ready.

## 2024-04-10 NOTE — CONSULTS
[facility] [address] [address]        GASTROENTEROLOGY CONSULTATION NOTE  Will TAYLOR Khan  [phone] office      NAME:  Alcon Zamora   :   1960   MRN:   [id number]       Referring Physician: Dr. Amilcar Jamil    Consult Date: 4/10/2024 9:41 AM    Chief Complaint: constipation     History of Present Illness:  Patient is a 64 y.o. who is seen in consultation at the request of Dr. Jamil for acute colitis/rule out neoplasm; constipation/fecal impaction.  Patient has a past medical history significant for CVA, hypertension, hyperlipidemia, diabetes mellitus, epilepsy, dysphagia status post PEG tube, COPD, chronic kidney disease, and HCV status post Harvoni.  He presented to the ED with nausea, vomiting, and constipation.  Patient was admitted to the hospital on 24 for acute colitis, constipation, nausea/vomiting, dysuria, and cough.    Per admission note, patient was recently hospitalized from 2024 to 3/6/2024 with new onset seizures, acute embolic stroke (MRI brain 2024 showing small infarcts in the left medial basal ganglia and right superior alannah status cerebral peduncle), COVID-19 virus infection, acute hypoxemic respiratory failure, PFO with bidirectional intra-atrial shunt (JOSEMANUEL 2024: EF 60% to 65% diastolic dysfunction, positive bubble study with PFO), dysphagia (EGD/PEG tube placement 3/4/2024).    History of constipation with a bowel movement every 3 days per patient.  Patient reports not having had a bowel movement for 15 days.  Discussed with RN.  Patient reportedly received a Fleet's enema in the ED and last night without results.  There was associated nausea and vomiting prior to admission.  No hematemesis.  No abdominal pain.  No fevers, chills, or unexplained weight loss.  PEG tube in place, although patient reports that he is not using the tube.  He reports taking his medications by mouth and eating/drinking.      Denies NSAID use.

## 2024-04-10 NOTE — ED NOTES
ED TO INPATIENT SBAR HANDOFF    Patient Name: Alcon Zamora   :  1960  64 y.o.   MRN:  [id number]  ED Room #:  R32/R32  Family/Caregiver Present yes   Restraints no   Sitter no   Sepsis Risk Score Sepsis Risk Score: 0.88    Situation  Code Status: Prior     Allergies: Dust mite extract, Mixed ragweed, Senna, and Sulfa antibiotics  Weight: No data found.  Arrived from: home  Chief Complaint:   Chief Complaint   Patient presents with    Constipation     Hospital Problem/Diagnosis:  Principal Problem:    Acute colitis  Resolved Problems:    * No resolved hospital problems. *    Imaging:   CT ABDOMEN PELVIS W IV CONTRAST Additional Contrast? None   Final Result      1. Long segment colonic wall thickening from the left colon to the rectosigmoid   junction, suggesting or consistent with colitis, but correlation with   colonoscopy is also recommended to exclude neoplasm.   2. Large retained fecal material throughout the colon proximal to the distal   thickened colonic segment.   3. Gas bubbles in the urinary bladder. Correlate for recent catheterization.   4. Gallbladder distention without biliary ductal dilatation.   5. Splenomegaly.   6. Pancreatic atrophy and other incidental findings..      XR CHEST (2 VW)   Final Result   1. No acute cardiopulmonary disease         XR ABDOMEN (KUB) (SINGLE AP VIEW)   Final Result   Nonobstructive bowel gas pattern. Normal retained fecal material.           Abnormal labs:   Abnormal Labs Reviewed   URINALYSIS WITH REFLEX TO CULTURE - Abnormal; Notable for the following components:       Result Value    Protein,  (*)     All other components within normal limits   COMPREHENSIVE METABOLIC PANEL - Abnormal; Notable for the following components:    Glucose 129 (*)     Bun/Cre Ratio 10 (*)     Calcium 10.3 (*)     AST 13 (*)     Total Protein 8.5 (*)     Globulin 4.7 (*)     Albumin/Globulin Ratio 0.8 (*)     All other components within normal limits   POCT GLUCOSE -

## 2024-04-11 LAB
ANION GAP SERPL CALC-SCNC: 7 MMOL/L (ref 5–15)
BUN SERPL-MCNC: 5 MG/DL (ref 6–20)
BUN/CREAT SERPL: 7 (ref 12–20)
CALCIUM SERPL-MCNC: 8.8 MG/DL (ref 8.5–10.1)
CHLORIDE SERPL-SCNC: 106 MMOL/L (ref 97–108)
CO2 SERPL-SCNC: 25 MMOL/L (ref 21–32)
CREAT SERPL-MCNC: 0.74 MG/DL (ref 0.7–1.3)
GLUCOSE BLD STRIP.AUTO-MCNC: 100 MG/DL (ref 65–117)
GLUCOSE BLD STRIP.AUTO-MCNC: 74 MG/DL (ref 65–117)
GLUCOSE BLD STRIP.AUTO-MCNC: 78 MG/DL (ref 65–117)
GLUCOSE BLD STRIP.AUTO-MCNC: 85 MG/DL (ref 65–117)
GLUCOSE SERPL-MCNC: 67 MG/DL (ref 65–100)
HCT VFR BLD AUTO: 25.6 % (ref 36.6–50.3)
HGB BLD-MCNC: 8.3 G/DL (ref 12.1–17)
POTASSIUM SERPL-SCNC: 4 MMOL/L (ref 3.5–5.1)
SERVICE CMNT-IMP: NORMAL
SODIUM SERPL-SCNC: 138 MMOL/L (ref 136–145)

## 2024-04-11 PROCEDURE — 6360000002 HC RX W HCPCS: Performed by: NURSE PRACTITIONER

## 2024-04-11 PROCEDURE — 82962 GLUCOSE BLOOD TEST: CPT

## 2024-04-11 PROCEDURE — G0378 HOSPITAL OBSERVATION PER HR: HCPCS

## 2024-04-11 PROCEDURE — 36415 COLL VENOUS BLD VENIPUNCTURE: CPT

## 2024-04-11 PROCEDURE — 6360000002 HC RX W HCPCS: Performed by: FAMILY MEDICINE

## 2024-04-11 PROCEDURE — 80048 BASIC METABOLIC PNL TOTAL CA: CPT

## 2024-04-11 PROCEDURE — 96368 THER/DIAG CONCURRENT INF: CPT

## 2024-04-11 PROCEDURE — 6370000000 HC RX 637 (ALT 250 FOR IP): Performed by: NURSE PRACTITIONER

## 2024-04-11 PROCEDURE — 1200000000 HC SEMI PRIVATE

## 2024-04-11 PROCEDURE — 92610 EVALUATE SWALLOWING FUNCTION: CPT | Performed by: SPEECH-LANGUAGE PATHOLOGIST

## 2024-04-11 PROCEDURE — 2580000003 HC RX 258: Performed by: FAMILY MEDICINE

## 2024-04-11 PROCEDURE — 96366 THER/PROPH/DIAG IV INF ADDON: CPT

## 2024-04-11 PROCEDURE — 87522 HEPATITIS C REVRS TRNSCRPJ: CPT

## 2024-04-11 PROCEDURE — 6370000000 HC RX 637 (ALT 250 FOR IP): Performed by: FAMILY MEDICINE

## 2024-04-11 PROCEDURE — 85014 HEMATOCRIT: CPT

## 2024-04-11 PROCEDURE — 85018 HEMOGLOBIN: CPT

## 2024-04-11 RX ORDER — METHADONE HYDROCHLORIDE 5 MG/5ML
95 SOLUTION ORAL DAILY
Status: DISCONTINUED | OUTPATIENT
Start: 2024-04-11 | End: 2024-04-14 | Stop reason: HOSPADM

## 2024-04-11 RX ADMIN — FAMOTIDINE 20 MG: 20 TABLET ORAL at 21:26

## 2024-04-11 RX ADMIN — ASPIRIN 81 MG CHEWABLE TABLET 81 MG: 81 TABLET CHEWABLE at 09:51

## 2024-04-11 RX ADMIN — Medication 95 MG: at 09:48

## 2024-04-11 RX ADMIN — POTASSIUM CHLORIDE AND SODIUM CHLORIDE: 900; 150 INJECTION, SOLUTION INTRAVENOUS at 14:58

## 2024-04-11 RX ADMIN — PIPERACILLIN AND TAZOBACTAM 3375 MG: 3; .375 INJECTION, POWDER, LYOPHILIZED, FOR SOLUTION INTRAVENOUS at 03:53

## 2024-04-11 RX ADMIN — POTASSIUM CHLORIDE AND SODIUM CHLORIDE: 900; 150 INJECTION, SOLUTION INTRAVENOUS at 00:01

## 2024-04-11 RX ADMIN — LISINOPRIL 40 MG: 20 TABLET ORAL at 09:51

## 2024-04-11 RX ADMIN — ERGOCALCIFEROL 50000 UNITS: 1.25 CAPSULE ORAL at 09:51

## 2024-04-11 RX ADMIN — SODIUM CHLORIDE, PRESERVATIVE FREE 10 ML: 5 INJECTION INTRAVENOUS at 09:51

## 2024-04-11 RX ADMIN — DEXTROSE MONOHYDRATE 125 ML: 100 INJECTION, SOLUTION INTRAVENOUS at 06:00

## 2024-04-11 RX ADMIN — AMLODIPINE BESYLATE 10 MG: 5 TABLET ORAL at 09:51

## 2024-04-11 RX ADMIN — PIPERACILLIN AND TAZOBACTAM 3375 MG: 3; .375 INJECTION, POWDER, LYOPHILIZED, FOR SOLUTION INTRAVENOUS at 12:39

## 2024-04-11 RX ADMIN — POTASSIUM CHLORIDE AND SODIUM CHLORIDE: 900; 150 INJECTION, SOLUTION INTRAVENOUS at 07:30

## 2024-04-11 RX ADMIN — LEVETIRACETAM 1000 MG: 100 SOLUTION ORAL at 11:04

## 2024-04-11 RX ADMIN — FAMOTIDINE 20 MG: 20 TABLET ORAL at 09:51

## 2024-04-11 RX ADMIN — LEVETIRACETAM 1000 MG: 100 SOLUTION ORAL at 23:23

## 2024-04-11 RX ADMIN — PIPERACILLIN AND TAZOBACTAM 3375 MG: 3; .375 INJECTION, POWDER, LYOPHILIZED, FOR SOLUTION INTRAVENOUS at 21:25

## 2024-04-11 RX ADMIN — POTASSIUM CHLORIDE AND SODIUM CHLORIDE: 900; 150 INJECTION, SOLUTION INTRAVENOUS at 23:22

## 2024-04-11 RX ADMIN — ATORVASTATIN CALCIUM 80 MG: 40 TABLET, FILM COATED ORAL at 21:26

## 2024-04-11 ASSESSMENT — PAIN SCALES - GENERAL: PAINLEVEL_OUTOF10: 3

## 2024-04-12 ENCOUNTER — ANESTHESIA (OUTPATIENT)
Facility: HOSPITAL | Age: 64
DRG: 247 | End: 2024-04-12
Payer: COMMERCIAL

## 2024-04-12 ENCOUNTER — ANESTHESIA EVENT (OUTPATIENT)
Facility: HOSPITAL | Age: 64
DRG: 247 | End: 2024-04-12
Payer: COMMERCIAL

## 2024-04-12 LAB
ANION GAP SERPL CALC-SCNC: 7 MMOL/L (ref 5–15)
BUN SERPL-MCNC: 4 MG/DL (ref 6–20)
BUN/CREAT SERPL: 6 (ref 12–20)
CALCIUM SERPL-MCNC: 9.2 MG/DL (ref 8.5–10.1)
CHLORIDE SERPL-SCNC: 105 MMOL/L (ref 97–108)
CO2 SERPL-SCNC: 26 MMOL/L (ref 21–32)
CREAT SERPL-MCNC: 0.72 MG/DL (ref 0.7–1.3)
ERYTHROCYTE [DISTWIDTH] IN BLOOD BY AUTOMATED COUNT: 13 % (ref 11.5–14.5)
GLUCOSE BLD STRIP.AUTO-MCNC: 76 MG/DL (ref 65–117)
GLUCOSE BLD STRIP.AUTO-MCNC: 78 MG/DL (ref 65–117)
GLUCOSE BLD STRIP.AUTO-MCNC: 84 MG/DL (ref 65–117)
GLUCOSE BLD STRIP.AUTO-MCNC: 96 MG/DL (ref 65–117)
GLUCOSE SERPL-MCNC: 80 MG/DL (ref 65–100)
HCT VFR BLD AUTO: 30.1 % (ref 36.6–50.3)
HGB BLD-MCNC: 10.3 G/DL (ref 12.1–17)
MCH RBC QN AUTO: 28.9 PG (ref 26–34)
MCHC RBC AUTO-ENTMCNC: 34.2 G/DL (ref 30–36.5)
MCV RBC AUTO: 84.3 FL (ref 80–99)
NRBC # BLD: 0 K/UL (ref 0–0.01)
NRBC BLD-RTO: 0 PER 100 WBC
PLATELET # BLD AUTO: 285 K/UL (ref 150–400)
PMV BLD AUTO: 8.8 FL (ref 8.9–12.9)
POTASSIUM SERPL-SCNC: 4 MMOL/L (ref 3.5–5.1)
RBC # BLD AUTO: 3.57 M/UL (ref 4.1–5.7)
SERVICE CMNT-IMP: NORMAL
SODIUM SERPL-SCNC: 138 MMOL/L (ref 136–145)
WBC # BLD AUTO: 6.2 K/UL (ref 4.1–11.1)

## 2024-04-12 PROCEDURE — 3700000001 HC ADD 15 MINUTES (ANESTHESIA): Performed by: INTERNAL MEDICINE

## 2024-04-12 PROCEDURE — 82962 GLUCOSE BLOOD TEST: CPT

## 2024-04-12 PROCEDURE — 1200000000 HC SEMI PRIVATE

## 2024-04-12 PROCEDURE — 7100000010 HC PHASE II RECOVERY - FIRST 15 MIN: Performed by: INTERNAL MEDICINE

## 2024-04-12 PROCEDURE — 80048 BASIC METABOLIC PNL TOTAL CA: CPT

## 2024-04-12 PROCEDURE — 97165 OT EVAL LOW COMPLEX 30 MIN: CPT

## 2024-04-12 PROCEDURE — 6370000000 HC RX 637 (ALT 250 FOR IP): Performed by: NURSE PRACTITIONER

## 2024-04-12 PROCEDURE — 97535 SELF CARE MNGMENT TRAINING: CPT

## 2024-04-12 PROCEDURE — 2500000003 HC RX 250 WO HCPCS: Performed by: NURSE ANESTHETIST, CERTIFIED REGISTERED

## 2024-04-12 PROCEDURE — 6360000002 HC RX W HCPCS: Performed by: FAMILY MEDICINE

## 2024-04-12 PROCEDURE — 6370000000 HC RX 637 (ALT 250 FOR IP): Performed by: FAMILY MEDICINE

## 2024-04-12 PROCEDURE — 2580000003 HC RX 258: Performed by: FAMILY MEDICINE

## 2024-04-12 PROCEDURE — 36415 COLL VENOUS BLD VENIPUNCTURE: CPT

## 2024-04-12 PROCEDURE — G0378 HOSPITAL OBSERVATION PER HR: HCPCS

## 2024-04-12 PROCEDURE — 6360000002 HC RX W HCPCS: Performed by: NURSE ANESTHETIST, CERTIFIED REGISTERED

## 2024-04-12 PROCEDURE — 97161 PT EVAL LOW COMPLEX 20 MIN: CPT

## 2024-04-12 PROCEDURE — 7100000011 HC PHASE II RECOVERY - ADDTL 15 MIN: Performed by: INTERNAL MEDICINE

## 2024-04-12 PROCEDURE — 3600007502: Performed by: INTERNAL MEDICINE

## 2024-04-12 PROCEDURE — 3600007512: Performed by: INTERNAL MEDICINE

## 2024-04-12 PROCEDURE — 2709999900 HC NON-CHARGEABLE SUPPLY: Performed by: INTERNAL MEDICINE

## 2024-04-12 PROCEDURE — 96368 THER/DIAG CONCURRENT INF: CPT

## 2024-04-12 PROCEDURE — 85027 COMPLETE CBC AUTOMATED: CPT

## 2024-04-12 PROCEDURE — 3700000000 HC ANESTHESIA ATTENDED CARE: Performed by: INTERNAL MEDICINE

## 2024-04-12 PROCEDURE — 0DJD8ZZ INSPECTION OF LOWER INTESTINAL TRACT, VIA NATURAL OR ARTIFICIAL OPENING ENDOSCOPIC: ICD-10-PCS | Performed by: INTERNAL MEDICINE

## 2024-04-12 PROCEDURE — 6360000002 HC RX W HCPCS: Performed by: NURSE PRACTITIONER

## 2024-04-12 PROCEDURE — 97530 THERAPEUTIC ACTIVITIES: CPT

## 2024-04-12 PROCEDURE — 96366 THER/PROPH/DIAG IV INF ADDON: CPT

## 2024-04-12 RX ORDER — SODIUM CHLORIDE 0.9 % (FLUSH) 0.9 %
5-40 SYRINGE (ML) INJECTION PRN
Status: DISCONTINUED | OUTPATIENT
Start: 2024-04-12 | End: 2024-04-12 | Stop reason: HOSPADM

## 2024-04-12 RX ORDER — GLYCOPYRROLATE 0.2 MG/ML
INJECTION INTRAMUSCULAR; INTRAVENOUS PRN
Status: DISCONTINUED | OUTPATIENT
Start: 2024-04-12 | End: 2024-04-12 | Stop reason: SDUPTHER

## 2024-04-12 RX ORDER — SODIUM CHLORIDE 0.9 % (FLUSH) 0.9 %
5-40 SYRINGE (ML) INJECTION EVERY 12 HOURS SCHEDULED
Status: DISCONTINUED | OUTPATIENT
Start: 2024-04-12 | End: 2024-04-12 | Stop reason: HOSPADM

## 2024-04-12 RX ORDER — LIDOCAINE HYDROCHLORIDE 20 MG/ML
INJECTION, SOLUTION EPIDURAL; INFILTRATION; INTRACAUDAL; PERINEURAL PRN
Status: DISCONTINUED | OUTPATIENT
Start: 2024-04-12 | End: 2024-04-12 | Stop reason: SDUPTHER

## 2024-04-12 RX ORDER — HYDRALAZINE HYDROCHLORIDE 20 MG/ML
10 INJECTION INTRAMUSCULAR; INTRAVENOUS EVERY 6 HOURS PRN
Status: DISCONTINUED | OUTPATIENT
Start: 2024-04-12 | End: 2024-04-14 | Stop reason: HOSPADM

## 2024-04-12 RX ORDER — SODIUM CHLORIDE 9 MG/ML
INJECTION, SOLUTION INTRAVENOUS CONTINUOUS
Status: DISCONTINUED | OUTPATIENT
Start: 2024-04-12 | End: 2024-04-12 | Stop reason: HOSPADM

## 2024-04-12 RX ORDER — SODIUM CHLORIDE 9 MG/ML
25 INJECTION, SOLUTION INTRAVENOUS PRN
Status: DISCONTINUED | OUTPATIENT
Start: 2024-04-12 | End: 2024-04-12 | Stop reason: HOSPADM

## 2024-04-12 RX ADMIN — PROPOFOL 20 MG: 10 INJECTION, EMULSION INTRAVENOUS at 15:58

## 2024-04-12 RX ADMIN — PROPOFOL 40 MG: 10 INJECTION, EMULSION INTRAVENOUS at 16:01

## 2024-04-12 RX ADMIN — LEVETIRACETAM 1000 MG: 100 SOLUTION ORAL at 21:30

## 2024-04-12 RX ADMIN — LIDOCAINE HYDROCHLORIDE 100 MG: 20 INJECTION, SOLUTION EPIDURAL; INFILTRATION; INTRACAUDAL; PERINEURAL at 15:43

## 2024-04-12 RX ADMIN — GLYCOPYRROLATE 0.2 MG: 0.2 INJECTION INTRAMUSCULAR; INTRAVENOUS at 15:45

## 2024-04-12 RX ADMIN — PROPOFOL 30 MG: 10 INJECTION, EMULSION INTRAVENOUS at 15:53

## 2024-04-12 RX ADMIN — PIPERACILLIN AND TAZOBACTAM 3375 MG: 3; .375 INJECTION, POWDER, LYOPHILIZED, FOR SOLUTION INTRAVENOUS at 19:02

## 2024-04-12 RX ADMIN — PIPERACILLIN AND TAZOBACTAM 3375 MG: 3; .375 INJECTION, POWDER, LYOPHILIZED, FOR SOLUTION INTRAVENOUS at 05:27

## 2024-04-12 RX ADMIN — FAMOTIDINE 20 MG: 20 TABLET ORAL at 21:30

## 2024-04-12 RX ADMIN — PROPOFOL 30 MG: 10 INJECTION, EMULSION INTRAVENOUS at 15:51

## 2024-04-12 RX ADMIN — ATORVASTATIN CALCIUM 80 MG: 40 TABLET, FILM COATED ORAL at 21:30

## 2024-04-12 RX ADMIN — ERGOCALCIFEROL 50000 UNITS: 1.25 CAPSULE ORAL at 09:15

## 2024-04-12 RX ADMIN — PROPOFOL 20 MG: 10 INJECTION, EMULSION INTRAVENOUS at 15:48

## 2024-04-12 RX ADMIN — FAMOTIDINE 20 MG: 20 TABLET ORAL at 09:15

## 2024-04-12 RX ADMIN — AMLODIPINE BESYLATE 10 MG: 5 TABLET ORAL at 09:15

## 2024-04-12 RX ADMIN — LISINOPRIL 40 MG: 20 TABLET ORAL at 09:15

## 2024-04-12 RX ADMIN — SODIUM CHLORIDE: 9 INJECTION, SOLUTION INTRAVENOUS at 14:37

## 2024-04-12 RX ADMIN — LEVETIRACETAM 1000 MG: 100 SOLUTION ORAL at 09:15

## 2024-04-12 RX ADMIN — PROPOFOL 30 MG: 10 INJECTION, EMULSION INTRAVENOUS at 15:46

## 2024-04-12 RX ADMIN — POTASSIUM CHLORIDE AND SODIUM CHLORIDE: 900; 150 INJECTION, SOLUTION INTRAVENOUS at 10:27

## 2024-04-12 RX ADMIN — Medication 95 MG: at 09:16

## 2024-04-12 RX ADMIN — PROPOFOL 70 MG: 10 INJECTION, EMULSION INTRAVENOUS at 15:43

## 2024-04-12 RX ADMIN — SODIUM CHLORIDE, PRESERVATIVE FREE 10 ML: 5 INJECTION INTRAVENOUS at 09:45

## 2024-04-12 RX ADMIN — PIPERACILLIN AND TAZOBACTAM 3375 MG: 3; .375 INJECTION, POWDER, LYOPHILIZED, FOR SOLUTION INTRAVENOUS at 12:19

## 2024-04-12 NOTE — OP NOTE
[facility]EE [facility]  Tommie Kovacs M.D.  [address] [id number] [phone]               Colonoscopy Procedure Note    NAME: Alcon Zamora  :  1960  MRN:  [id number]    Indications:  Abnormal CT scan of the colon      : Tommie Kovacs MD    Referring Provider:  No primary care provider on file.    Staff: Circulator: Alley Olson RN  Endoscopy Technician: Long Hung    Prosthetic devices, grafts, tissues, transplant, or devices implanted: none    Medicines:  MAC anesthesia      Procedure Details:  After informed consent was obtained with all risks and benefits of the procedure explained and preprocedure exam completed, the patient was placed in the left lateral decubitus position.  Universal protocol for patient identification was performed and documented in the nursing notes.  Throughout the procedure, the patient's blood pressure was monitored at least every five minutes; pulse, and oxygen saturations were monitored continuously.  All vital signs were documented in the nursing notes.  A digital rectal exam was performed and was normal.  The Olympus videocolonoscope  was inserted in the rectum and carefully advanced to the cecum, which was identified by the ileocecal valve and appendiceal orifice. The colonoscope was slowly withdrawn with careful evaluation between folds. Retroflexion in the rectum and second examination of the right colon was performed; findings and interventions are described below. Procedure start time, extent reached time/cecum time, and procedure end time are documented in the nursing notes.  The quality of preparation was poor.       Findings:   Poor prep but adequate for the indication  Normal mucosa without signs of colitis or mass    Interventions:    none    Specimens:   * No specimens in log *    EBL:  None.    Complications:   No immediate complications     Impression:  -See post-procedure diagnoses.     Recommendations:

## 2024-04-12 NOTE — H&P
BEE [address] [address]          Pre-procedure History and Physical       NAME:  Alcon Zamora   :   1960   MRN:   [id number]     CHIEF COMPLAINT/HPI: abnormal ct of the colon    PMH:  Past Medical History:   Diagnosis Date    Alcohol abuse, in remission     Anxiety     Chronic kidney disease     PLAGUE L RENAL ARTERY    Chronic obstructive pulmonary disease (HCC)     Chronic pain     Constipation     Diabetes (HCC)     Type II    Erectile dysfunction     GERD (gastroesophageal reflux disease)     Hepatitis C     Hypertension     Liver disease     HEP C TOOK HARVONI    Pancreatitis     Psychiatric disorder     PANIC ATTACKS    Smoker     Spondylitis (HCC)     Type 2 diabetes mellitus without complication (HCC)        PSH:  Past Surgical History:   Procedure Laterality Date    APPENDECTOMY      COLONOSCOPY N/A 2019    COLONOSCOPY performed by Lazarus De Leon MD at [facility]    FRACTURE SURGERY  -    Back surgery    ORTHOPEDIC SURGERY      3 back surgeries    ORTHOPEDIC SURGERY      KNEE SURGERY    TONSILLECTOMY      UPPER GASTROINTESTINAL ENDOSCOPY N/A 3/4/2024    ESOPHAGOGASTRODUODENOSCOPY PERCUTANEOUS ENDOSCOPIC GASTROSTOMY TUBE PLACEMENT performed by David Gray MD at [facility]       Allergies:  Allergies   Allergen Reactions    Dust Mite Extract Other (See Comments)    Mixed Ragweed Other (See Comments)    Senna Other (See Comments)     cramps    Sulfa Antibiotics        Home Medications:  Prior to Admission Medications   Prescriptions Last Dose Informant Patient Reported? Taking?   Continuous Blood Gluc  (DEXCOM G7 ) TERESA 2024  No No   Sig: Utilize to obtain your blood sugar readings.   Continuous Blood Gluc Sensor (DEXCOM G7 SENSOR) MISC 2024  No No   Sig: Utilize to monitor blood sugar   albuterol sulfate HFA (VENTOLIN HFA) 108 (90 Base) MCG/ACT inhaler Not Taking  No No   Sig:

## 2024-04-12 NOTE — ANESTHESIA PRE PROCEDURE
Department of Anesthesiology  Preprocedure Note       Name:  Alcon Zamora   Age:  64 y.o.  :  1960                                          MRN:  [id number]         Date:  2024      Surgeon: Surgeon(s):  Tommie Kovacs MD    Procedure: Procedure(s):  COLONOSCOPY DIAGNOSTIC    Medications prior to admission:   Prior to Admission medications    Medication Sig Start Date End Date Taking? Authorizing Provider   vitamin D (VITAMIN D3) 87009 UNIT CAPS Take 5,000 Units by mouth daily   Yes ProviderRuchi MD   metFORMIN (GLUCOPHAGE) 500 MG tablet Take 1 tablet by mouth 2 times daily (with meals)   Yes Provider, MD Ruchi   aspirin 81 MG chewable tablet Take 1 tablet by mouth daily 3/7/24   Bennie Meredith MD   levETIRAcetam (KEPPRA) 100 MG/ML oral solution 10 mLs by PEG Tube route 2 times daily 3/6/24   Bennie Meredith MD   atorvastatin (LIPITOR) 80 MG tablet Take 1 tablet by mouth nightly 3/6/24   Bennie Meredith MD   famotidine (PEPCID) 20 MG tablet 1 tablet by PEG Tube route 2 times daily 3/6/24   Bennie Meredith MD   Continuous Blood Gluc Sensor (DEXCOM G7 SENSOR) MISC Utilize to monitor blood sugar 24   Alyse Rich APRN - CNS   Continuous Blood Gluc  (DEXCOM G7 ) TERESA Utilize to obtain your blood sugar readings. 24   Alyse Rich APRN - CNS   amLODIPine (NORVASC) 10 MG tablet Take 1 tablet by mouth daily 24   Enoch Jones APRN - NP   albuterol sulfate HFA (VENTOLIN HFA) 108 (90 Base) MCG/ACT inhaler Inhale 2 puffs into the lungs 4 times daily as needed for Wheezing  Patient not taking: Reported on 2024   Nasir Marin APRN - NP   lisinopril (PRINIVIL;ZESTRIL) 40 MG tablet TAKE ONE TABLET BY MOUTH DAILY 23   Donaldo Escalera Jr, MD       Current medications:    Current Facility-Administered Medications   Medication Dose Route Frequency Provider Last Rate Last Admin    0.9 % sodium chloride infusion   IntraVENous Continuous

## 2024-04-12 NOTE — CARE COORDINATION
Transition of Care: hopefully home with YVON with home health PT/OT/speech with [facility] (the it [facility] stopped by to see patient on 4/12)      Transport Plan: likely WC van or in car with mother     Main contact is jeanette Llamas- [phone]    RUR: 25%     Discharge pending:  -pending colonoscopy (to be done on 4/12)  -pending medical progress and care recommendations  -patient continues on IV antibiotics       Prior Level of Functioning: lives in a home with 1 uncle; supportive mother lives 6 houses away; was being followed by [facility] prior to this admission; has a peg tube  Disposition: hopefully home with YVON with [facility]  If SNF or IPR: Date FOC offered: not yet  Date FOC received: not yet  Accepting facility: n/a  Date authorization started with reference number: n/a  Date authorization received and expires: n/a  Follow up appointments: specialist/pcp  DME needed: none ordered yet  Transportation at discharge: in WC van or in car with mother  IM/IMM Medicare/ letter given: n/a  Is patient a  and connected with VA? no   If yes, was  transfer form completed and VA notified? N/a  Caregiver Contact: jeanette Llamas- [phone]  Discharge Caregiver contacted prior to discharge? Not yet  Care Conference needed? no  Barriers to discharge:  medical progress    CM following   Julieta Cooper RN     NOTE: per previous CM note: this CM met with patient and his mother- Rena Llamas at bedside; patient is alert and oriented x3; patient lives at stated address with one of his uncles; his mother lives 6 houses away and is his main support; patient recently at [facility] from 2/16-3/6/24; discharged to [facility] IPR and then discharged home with [facility] on 3/22/24 with [facility] PT/OT/speech/SN; he has continued with it since then; patient has a Peg tube from his previous admission; patient uses a walker to ambulate and is not using the peg tube but eating solid foods and drinking thin liquids;

## 2024-04-13 LAB
GLUCOSE BLD STRIP.AUTO-MCNC: 138 MG/DL (ref 65–117)
GLUCOSE BLD STRIP.AUTO-MCNC: 73 MG/DL (ref 65–117)
GLUCOSE BLD STRIP.AUTO-MCNC: 75 MG/DL (ref 65–117)
GLUCOSE BLD STRIP.AUTO-MCNC: 82 MG/DL (ref 65–117)
HCV GENTYP SERPL NAA+PROBE: NORMAL
HCV RNA SERPL NAA+PROBE-ACNC: NORMAL IU/ML
HCV RNA SERPL NAA+PROBE-LOG IU: NORMAL LOG10 IU/ML
LABORATORY COMMENT REPORT: NORMAL
SERVICE CMNT-IMP: ABNORMAL
SERVICE CMNT-IMP: NORMAL

## 2024-04-13 PROCEDURE — 6370000000 HC RX 637 (ALT 250 FOR IP): Performed by: NURSE PRACTITIONER

## 2024-04-13 PROCEDURE — 82962 GLUCOSE BLOOD TEST: CPT

## 2024-04-13 PROCEDURE — 96366 THER/PROPH/DIAG IV INF ADDON: CPT

## 2024-04-13 PROCEDURE — 6360000002 HC RX W HCPCS: Performed by: NURSE PRACTITIONER

## 2024-04-13 PROCEDURE — 2580000003 HC RX 258: Performed by: FAMILY MEDICINE

## 2024-04-13 PROCEDURE — 96368 THER/DIAG CONCURRENT INF: CPT

## 2024-04-13 PROCEDURE — 6360000002 HC RX W HCPCS: Performed by: FAMILY MEDICINE

## 2024-04-13 PROCEDURE — 6370000000 HC RX 637 (ALT 250 FOR IP)

## 2024-04-13 PROCEDURE — 6370000000 HC RX 637 (ALT 250 FOR IP): Performed by: FAMILY MEDICINE

## 2024-04-13 PROCEDURE — G0378 HOSPITAL OBSERVATION PER HR: HCPCS

## 2024-04-13 PROCEDURE — 1200000000 HC SEMI PRIVATE

## 2024-04-13 RX ORDER — POLYETHYLENE GLYCOL 3350 17 G/17G
17 POWDER, FOR SOLUTION ORAL DAILY
Status: DISCONTINUED | OUTPATIENT
Start: 2024-04-13 | End: 2024-04-14 | Stop reason: HOSPADM

## 2024-04-13 RX ADMIN — POTASSIUM CHLORIDE AND SODIUM CHLORIDE: 900; 150 INJECTION, SOLUTION INTRAVENOUS at 16:23

## 2024-04-13 RX ADMIN — POTASSIUM CHLORIDE AND SODIUM CHLORIDE: 900; 150 INJECTION, SOLUTION INTRAVENOUS at 08:26

## 2024-04-13 RX ADMIN — ATORVASTATIN CALCIUM 80 MG: 40 TABLET, FILM COATED ORAL at 21:44

## 2024-04-13 RX ADMIN — LISINOPRIL 40 MG: 20 TABLET ORAL at 08:28

## 2024-04-13 RX ADMIN — POLYETHYLENE GLYCOL 3350 17 G: 17 POWDER, FOR SOLUTION ORAL at 11:51

## 2024-04-13 RX ADMIN — PIPERACILLIN AND TAZOBACTAM 3375 MG: 3; .375 INJECTION, POWDER, LYOPHILIZED, FOR SOLUTION INTRAVENOUS at 04:05

## 2024-04-13 RX ADMIN — FAMOTIDINE 20 MG: 20 TABLET ORAL at 21:44

## 2024-04-13 RX ADMIN — LEVETIRACETAM 1000 MG: 100 SOLUTION ORAL at 21:44

## 2024-04-13 RX ADMIN — PIPERACILLIN AND TAZOBACTAM 3375 MG: 3; .375 INJECTION, POWDER, LYOPHILIZED, FOR SOLUTION INTRAVENOUS at 11:50

## 2024-04-13 RX ADMIN — POTASSIUM CHLORIDE AND SODIUM CHLORIDE: 900; 150 INJECTION, SOLUTION INTRAVENOUS at 00:47

## 2024-04-13 RX ADMIN — ASPIRIN 81 MG CHEWABLE TABLET 81 MG: 81 TABLET CHEWABLE at 08:29

## 2024-04-13 RX ADMIN — PIPERACILLIN AND TAZOBACTAM 3375 MG: 3; .375 INJECTION, POWDER, LYOPHILIZED, FOR SOLUTION INTRAVENOUS at 19:01

## 2024-04-13 RX ADMIN — Medication 95 MG: at 08:27

## 2024-04-13 RX ADMIN — LEVETIRACETAM 1000 MG: 100 SOLUTION ORAL at 08:29

## 2024-04-13 RX ADMIN — FAMOTIDINE 20 MG: 20 TABLET ORAL at 08:28

## 2024-04-13 RX ADMIN — AMLODIPINE BESYLATE 10 MG: 5 TABLET ORAL at 08:28

## 2024-04-13 RX ADMIN — ERGOCALCIFEROL 50000 UNITS: 1.25 CAPSULE ORAL at 08:28

## 2024-04-13 RX ADMIN — SODIUM CHLORIDE, PRESERVATIVE FREE 10 ML: 5 INJECTION INTRAVENOUS at 08:48

## 2024-04-13 RX ADMIN — POTASSIUM CHLORIDE AND SODIUM CHLORIDE: 900; 150 INJECTION, SOLUTION INTRAVENOUS at 23:20

## 2024-04-13 NOTE — CARE COORDINATION
Cm noted that this pt may be discharged to home tomorrow. Cm obtained resumption of care home health orders and sent them to [facility] via [facility]. Cm spoke with pt's mother and she would like him to be transported home tomorrow after 2pm via wheelchair van. CM called [employer] ([phone]) to set this up for tomorrow at 2pm. [place] [id number]. KAREEN Ledbetter,AC-SW

## 2024-04-13 NOTE — ED NOTES
10:09 AM  The patient is a 64-year-old male, he has a significant past medical history for several strokes, but he is here today due to constipation.  His last bowel movement was 1 week ago, it was very hard, he is also vomited 3-4 times last week, but none today or yesterday.  He is taking MiraLAX, but it is not helping.       He has also had cough for 2 days, no wheeze, short of breath, feel like mucous want to come up but is not.  The patient has a trach, he also has a PEG, but is not using it at this time, this is residual from the stroke, and he just recently was able to take oral intake, and has plans to remove the PEG tube in the near future.     The nurses that care that care for him, are concerned of UTI, as he is having meatal discomfort, dysuria, no fever, no back pain, no abdominal pain, no distension     The history is provided by the patient.     I have evaluated the patient as the Provider in Rapid Medical Evaluation (RME). I have reviewed his vital signs and the triage nurse assessment. I have talked with the patient and any available family and advised that I am the provider in triage and have ordered the appropriate study to initiate their work up based on the clinical presentation during my assessment. I have advised that the patient will be accommodated in the Main ED as soon as possible. I have also requested to contact the triage nurse or myself immediately if the patient experiences any changes in their condition during this brief waiting period.      TAYLOR Mendoza Heidi J, PA-C  04/13/24 1012

## 2024-04-13 NOTE — PROGRESS NOTES
Hospitalist Progress Note  NINA Richter NP  Answering service: [phone]        Date of Service:  4/10/2024  NAME:  Alcon Zamora  :  1960  MRN:  [id number]    Admission Summary:      Mr. Zamora  is a 64 year old male with a pmhx of hypertension, hyperlipidemia, type 2 diabetes, CVA, epilepsy, dysphagia, PEG tube placement, COPD, CKD, HCV s/p Harvoni treatment, prior alcohol/opiate abuse in remission, GERD, pancreatitis, chronic pain, constipation, anxiety, panic attacks who presented to the ED from home with reported constipation, N/V (nonbloody/nonbilious emesis), no abdominal pain. Also reports dysuria and cough for past 2 days.  Chest x-ray: no acute cardiopulmonary process.  KUB: nonobstructive bowel gas pattern with normal retained fecal material.  CT of the abdomen and pelvis: long segment colonic wall thickening from left colon to rectosigmoid junction suggesting are consistent with colitis but correction with colonoscopy recommended to exclude neoplasm.  Large retained fecal material throughout the colon proximal to the distal thickened colonic segment, gas bubbles in the urinary bladder suggest recent catheterization.  Gallbladder distention without biliary ductal dilatation, splenomegaly, diffuse hepatocellular process, possible steatosis, pancreatic atrophy, extensive hardware in the lumbar sacral spine status post posterior fixation.  Patient had normal saline bolus in the ED and a fleets rectal enema not.    Patient was recently hospitalized from  - 3/6 with new onset seizures, acute embolic stroke, COVID-19 virus infection, acute hypoxic respiratory failure (intubated  and extubated ), PFO with bidirectional intra-atrial shunt.    Interval history / Subjective:        Patient was seen and examined this afternoon, he was lying in bed in no acute distress.  Cooperative and 
                                                                                                Hospitalist Progress Note  Ying Britt PA-C  Answering service: [phone]        Date of Service:  2024  NAME:  Alcon Zamora  :  1960  MRN:  [id number]    Admission Summary:      Mr. Zamora  is a 64 year old male with a pmhx of hypertension, hyperlipidemia, type 2 diabetes, CVA, epilepsy, dysphagia, PEG tube placement, COPD, CKD, HCV s/p Harvoni treatment, prior alcohol/opiate abuse in remission, GERD, pancreatitis, chronic pain, constipation, anxiety, panic attacks who presented to the ED from home with reported constipation, N/V (nonbloody/nonbilious emesis), no abdominal pain. Also reports dysuria and cough for past 2 days.  Chest x-ray: no acute cardiopulmonary process.  KUB: nonobstructive bowel gas pattern with normal retained fecal material.  CT of the abdomen and pelvis: long segment colonic wall thickening from left colon to rectosigmoid junction suggesting are consistent with colitis but correction with colonoscopy recommended to exclude neoplasm.  Large retained fecal material throughout the colon proximal to the distal thickened colonic segment, gas bubbles in the urinary bladder suggest recent catheterization.  Gallbladder distention without biliary ductal dilatation, splenomegaly, diffuse hepatocellular process, possible steatosis, pancreatic atrophy, extensive hardware in the lumbar sacral spine status post posterior fixation.  Patient had normal saline bolus in the ED and a fleets rectal enema not.    Patient was recently hospitalized from  - 3/6 with new onset seizures, acute embolic stroke, COVID-19 virus infection, acute hypoxic respiratory failure (intubated  and extubated ), PFO with bidirectional intra-atrial shunt.    Interval history / Subjective:        Patient was seen and examined this morning, he was lying in bed no acute distress.  Cooperative and interactive 
                                                                                                Hospitalist Progress Note  Ying Britt PA-C  Answering service: [phone]        Date of Service:  2024  NAME:  Alcon Zamora  :  1960  MRN:  [id number]    Admission Summary:      Mr. Zamora  is a 64 year old male with a pmhx of hypertension, hyperlipidemia, type 2 diabetes, CVA, epilepsy, dysphagia, PEG tube placement, COPD, CKD, HCV s/p Harvoni treatment, prior alcohol/opiate abuse in remission, GERD, pancreatitis, chronic pain, constipation, anxiety, panic attacks who presented to the ED from home with reported constipation, N/V (nonbloody/nonbilious emesis), no abdominal pain. Also reports dysuria and cough for past 2 days.  Chest x-ray: no acute cardiopulmonary process.  KUB: nonobstructive bowel gas pattern with normal retained fecal material.  CT of the abdomen and pelvis: long segment colonic wall thickening from left colon to rectosigmoid junction suggesting are consistent with colitis but correction with colonoscopy recommended to exclude neoplasm.  Large retained fecal material throughout the colon proximal to the distal thickened colonic segment, gas bubbles in the urinary bladder suggest recent catheterization.  Gallbladder distention without biliary ductal dilatation, splenomegaly, diffuse hepatocellular process, possible steatosis, pancreatic atrophy, extensive hardware in the lumbar sacral spine status post posterior fixation.  Patient had normal saline bolus in the ED and a fleets rectal enema not.    Patient was recently hospitalized from  - 3/6 with new onset seizures, acute embolic stroke, COVID-19 virus infection, acute hypoxic respiratory failure (intubated  and extubated ), PFO with bidirectional intra-atrial shunt.    Interval history / Subjective:        Patient was seen and examined this morning, he was lying in bed no acute distress.+BM yesterday. Colonoscopy 
1635: TRANSFER - OUT REPORT:    Verbal report given to Willow GONZALEZ on Alcon Zamora  being transferred to [facility] for routine progression of patient care       Report consisted of patient's Situation, Background, Assessment and   Recommendations(SBAR).     Information from the following report(s) Nurse Handoff Report, Adult Overview, Surgery Report, Intake/Output, MAR, and Recent Results was reviewed with the receiving nurse.           Lines:   Peripheral IV 04/09/24 Distal;Left Cephalic (Active)   Site Assessment Clean, dry & intact 04/12/24 0809   Line Status Infusing;Flushed 04/12/24 0809   Line Care Connections checked and tightened;Ports disinfected 04/12/24 0809   Phlebitis Assessment No symptoms 04/12/24 0809   Infiltration Assessment 0 04/12/24 0809   Alcohol Cap Used Yes 04/12/24 0809   Dressing Status Clean, dry & intact 04/12/24 0809   Dressing Type Transparent 04/12/24 0809   Dressing Intervention New 04/09/24 1520        Opportunity for questions and clarification was provided.      Patient transported with:  Tech       
[facility] [address] [address]       GI PROGRESS NOTE  Will TAYLOR Khan  [phone] office      NAME: Alcon Zamora   :  1960   MRN:  [id number]       Subjective:   Patient has received approximately half of the bowel prep through his PEG tube.  No bowel movement or flatus.  No nausea, vomiting, or abdominal pain.    Objective:     VITALS:   Last 24hrs VS reviewed since prior progress note. Most recent are:  Vitals:    24 0734   BP: (!) 166/72   Pulse: (!) 48   Resp: 16   Temp: 98.1 °F (36.7 °C)   SpO2: 93%     PHYSICAL EXAM:  General: Cooperative, no acute distress    Neurologic:  Alert and oriented X 3  HEENT: EOMI, no scleral icterus   Lungs:  No acute respiratory distress  Heart:  S1 S2  Abdomen: Soft, non-distended, no tenderness, no guarding, no rebound. Hypoactive bowel sounds.   Extremities: Warm  Psych:   Good insight. Not anxious or agitated.    Lab Data Reviewed:     Recent Results (from the past 24 hour(s))   POCT Glucose    Collection Time: 04/10/24 11:27 AM   Result Value Ref Range    POC Glucose 99 65 - 117 mg/dL    Performed by: ADAM Doewll    POCT Glucose    Collection Time: 04/10/24  4:07 PM   Result Value Ref Range    POC Glucose 86 65 - 117 mg/dL    Performed by: ADAM Dwoell    POCT Glucose    Collection Time: 04/10/24  9:28 PM   Result Value Ref Range    POC Glucose 71 65 - 117 mg/dL    Performed by: Pooja Sanchez RN (Tier II)    POCT Glucose    Collection Time: 04/10/24 10:31 PM   Result Value Ref Range    POC Glucose 84 65 - 117 mg/dL    Performed by: Pooja Sanchez RN (Tier II)    Basic Metabolic Panel    Collection Time: 24  3:52 AM   Result Value Ref Range    Sodium 138 136 - 145 mmol/L    Potassium 4.0 3.5 - 5.1 mmol/L    Chloride 106 97 - 108 mmol/L    CO2 25 21 - 32 mmol/L    Anion Gap 7 5 - 15 mmol/L    Glucose 67 65 - 100 mg/dL    BUN 5 (L) 6 - 20 MG/DL    Creatinine 0.74 0.70 - 1.30 MG/DL    Bun/Cre Ratio 7 (L) 
[facility]   [address] [address]       GI PROGRESS NOTE  Will TAYLOR Khan  [phone] office      NAME: Alcon Zamora   :  1960   MRN:  [id number]       Subjective:   Patient nearly completed the bowel prep.  He has had 1 large, watery, brown bowel movement and is currently being cleaned.  Denies nausea, vomiting, or abdominal pain.  RN is at the bedside.     Objective:     VITALS:   Last 24hrs VS reviewed since prior progress note. Most recent are:  Vitals:    24 0809   BP: (!) 154/82   Pulse: 61   Resp: 18   Temp: 98.1 °F (36.7 °C)   SpO2: 99%     PHYSICAL EXAM:  General: Cooperative, no acute distress    Neurologic:  Alert and oriented X 3  HEENT: EOMI, no scleral icterus   Lungs:  No acute respiratory distress  Abdomen: Patient is currently being cleaned up following a bowel movement  Extremities: Warm  Psych:   Not anxious or agitated    Lab Data Reviewed:     Recent Results (from the past 24 hour(s))   POCT Glucose    Collection Time: 24 11:52 AM   Result Value Ref Range    POC Glucose 100 65 - 117 mg/dL    Performed by: CHANTEL Crane    POCT Glucose    Collection Time: 24  5:08 PM   Result Value Ref Range    POC Glucose 85 65 - 117 mg/dL    Performed by: Jewels Ma RN    POCT Glucose    Collection Time: 24 11:32 PM   Result Value Ref Range    POC Glucose 78 65 - 117 mg/dL    Performed by: ANAHY ALICIA    CBC    Collection Time: 24  5:25 AM   Result Value Ref Range    WBC 6.2 4.1 - 11.1 K/uL    RBC 3.57 (L) 4.10 - 5.70 M/uL    Hemoglobin 10.3 (L) 12.1 - 17.0 g/dL    Hematocrit 30.1 (L) 36.6 - 50.3 %    MCV 84.3 80.0 - 99.0 FL    MCH 28.9 26.0 - 34.0 PG    MCHC 34.2 30.0 - 36.5 g/dL    RDW 13.0 11.5 - 14.5 %    Platelets 285 150 - 400 K/uL    MPV 8.8 (L) 8.9 - 12.9 FL    Nucleated RBCs 0.0 0  WBC    nRBC 0.00 0.00 - 0.01 K/uL   Basic Metabolic Panel    Collection Time: 24  5:25 AM   Result Value Ref Range    
Golytely infusing via peg tube at 50cc/h.  
Sips of H20 tested  on patient orally. Patient noted coughing each time. Hospitalist VERN villanueva made aware. A.M meds given via peg tube.  
Speech Pathology Note    Chart reviewed and patient currently NPO for colonoscopy.  Will defer SLP treatment at this time and follow up as able.  Thank you.    Briana Gatica, SLP    
Speech Pathology:   Chart reviewed and discussed with RN.  Patient NPO for possible procedure this date therefore PO trials for dysphagia evaluation deferred.  Per chart review patient received FEES 3/6/24 with minced and moist diet, mildly thickened liquids recommended.  Patient reports he received this diet for approximately 1 week and then advanced to regular diet, thin liquids.  Per chart, patient demonstrated coughing recently and via swallow screen with RN.  Chest x-ray on 4/9/24 reveals no acute process.  SLP to continue to follow for dysphagia evaluation as medically appropriate.  Thank you.    Adriana Hernandez, SLP    
Spiritual Care Assessment/Progress Note  [facility]    Name: Alcon Zamora MRN: [id number]    Age: 64 y.o.     Sex: male   Language: English     Date: 4/12/2024            Total Time Calculated: 20 min              Spiritual Assessment begun in [place] [address] [facility]  Service Provided For:: Patient and family together     Encounter Overview/Reason : Initial Encounter    Spiritual beliefs:      [x] Involved in a frank tradition/spiritual practice: Yarsanism     [] Supported by a frank community:      [] Claims no spiritual orientation:      [] Seeking spiritual identity:           [] Adheres to an individual form of spirituality:      [] Not able to assess:                Identified resources for coping and support system:   Support System: Parent       [x] Prayer                  [] Devotional reading               [] Music                  [] Guided Imagery     [] Pet visits                                        [] Other: (COMMENT)     Specific area/focus of visit   Encounter:    Crisis:    Spiritual/Emotional needs: Type: Spiritual Support  Ritual, Rites and Sacraments:    Grief, Loss, and Adjustments:    Ethics/Mediation:    Behavioral Health:    Palliative Care:    Advance Care Planning:      Plan/Referrals: Developed Care Plan (see consult note)    Narrative:  initiated visit to Alcon Zamora due to length of stay in [place] [address] [facility]. Reviewed the patient's medical record prior to this encounter. Mother Rena was present.     Assessment: Family expressed spiritual concern due to patient's medical circumstances in the last months as well as his lack of sleep last night.  Provided active listening and emotional support.  Prayer is a resource for patient and family.  Provided prayer for patient's recovery.       Outcome: Patient expressed gratitude for the spiritual care received during visit.     Plan of Care: No other spiritual care needs were identified. Patient is aware of 's 
Spoke with IRINEO Khan about how fast to run Golytely via peg. \"O.K to run at 50cc/h\".   
TRANSFER - IN REPORT:    Verbal report received from Willow GONZALEZ on Alcon Zamora  being received from 503 for ordered procedure      Report consisted of patient's Situation, Background, Assessment and   Recommendations(SBAR).     Information from the following report(s) Nurse Handoff Report, Intake/Output, MAR, and Recent Results was reviewed with the receiving nurse.    Opportunity for questions and clarification was provided.      Assessment completed upon patient's arrival to unit and care assumed.    
interactive with assessment.  Denies any new complaints overnight, still has not had a BM despite by having GoLytely infusing through his PEG since yesterday.    Assessment & Plan:     Acute colitis  - CT abdomen pelvis with IV contrast 4/9/2024: long segment colonic wall thickening from the left colon to the rectosigmoid junction, suggesting or consistent with colitis but correlation with colonoscopy recommended to exclude neoplasm  - consider for possible infectious colitis, continue with IV Zosyn  - paired blood cultures with no growth x1 day  - GI following  - possible colonoscopy to rule out neoplasm.  Undetermined yet whether or not this is going to be inpatient or outpatient    Constipation  Fecal impaction  - Fleet's enema ordered  - GI has ordered bowel prep through PEG tube    Nausea and vomiting  History of dysphagia with worsened swalowing over last several days - mother indicates pt had complained of facial numbess as well PTA  - PEG tube in March but apparently has not used and eats food (placed March 2024)  - no N/V today  - SLP consulted - awaiting advancement of diet for eval  - CT head with no acute findings    Anemia  - Hgb down to 8.3 this am, could be dilutional drop however, appears baseline Hgb >10 in previous admits  - GI aware  - occult stool ordered    Mild Hypokalemia  - change fluids to include potassium  - resolved     Dysuria  - UA negative for UTI     Cough  - chest xray 2 views: no acute process     History of epilepsy  - continue Keppra 1000 mg bid     Essential hypertension  - resume amlodipine and lisinopril  - BP running high, resuming home methadone and waiting for constipation to resolve.       Type 2 diabetes mellitus  - hemoglobin A1c 5.9  - glucose:      GERD  - continue Pepcid     History of CVA  Hyperlipidemia   - continue Aspirin and atorvastatin    COPD  - Duoneb nebulizer q 6 hour prn    History of substance abuse  - Patient with long history of back pain,

## 2024-04-14 VITALS
HEART RATE: 47 BPM | TEMPERATURE: 98.1 F | OXYGEN SATURATION: 96 % | BODY MASS INDEX: 23.49 KG/M2 | DIASTOLIC BLOOD PRESSURE: 74 MMHG | WEIGHT: 203.26 LBS | RESPIRATION RATE: 16 BRPM | SYSTOLIC BLOOD PRESSURE: 161 MMHG

## 2024-04-14 PROBLEM — K52.9 ACUTE COLITIS: Status: RESOLVED | Noted: 2024-04-09 | Resolved: 2024-04-14

## 2024-04-14 LAB
GLUCOSE BLD STRIP.AUTO-MCNC: 127 MG/DL (ref 65–117)
GLUCOSE BLD STRIP.AUTO-MCNC: 89 MG/DL (ref 65–117)
SERVICE CMNT-IMP: ABNORMAL
SERVICE CMNT-IMP: NORMAL

## 2024-04-14 PROCEDURE — 6370000000 HC RX 637 (ALT 250 FOR IP)

## 2024-04-14 PROCEDURE — 6360000002 HC RX W HCPCS: Performed by: FAMILY MEDICINE

## 2024-04-14 PROCEDURE — 6360000002 HC RX W HCPCS: Performed by: NURSE PRACTITIONER

## 2024-04-14 PROCEDURE — G0378 HOSPITAL OBSERVATION PER HR: HCPCS

## 2024-04-14 PROCEDURE — 92612 ENDOSCOPY SWALLOW (FEES) VID: CPT

## 2024-04-14 PROCEDURE — 6370000000 HC RX 637 (ALT 250 FOR IP): Performed by: FAMILY MEDICINE

## 2024-04-14 PROCEDURE — 96376 TX/PRO/DX INJ SAME DRUG ADON: CPT

## 2024-04-14 PROCEDURE — 92526 ORAL FUNCTION THERAPY: CPT

## 2024-04-14 PROCEDURE — 82962 GLUCOSE BLOOD TEST: CPT

## 2024-04-14 PROCEDURE — 2580000003 HC RX 258: Performed by: FAMILY MEDICINE

## 2024-04-14 PROCEDURE — 96366 THER/PROPH/DIAG IV INF ADDON: CPT

## 2024-04-14 PROCEDURE — 96368 THER/DIAG CONCURRENT INF: CPT

## 2024-04-14 PROCEDURE — 6370000000 HC RX 637 (ALT 250 FOR IP): Performed by: NURSE PRACTITIONER

## 2024-04-14 RX ORDER — METRONIDAZOLE 500 MG/1
500 TABLET ORAL 3 TIMES DAILY
Qty: 9 TABLET | Refills: 0 | Status: SHIPPED | OUTPATIENT
Start: 2024-04-14 | End: 2024-04-17

## 2024-04-14 RX ORDER — CIPROFLOXACIN 500 MG/1
500 TABLET, FILM COATED ORAL 2 TIMES DAILY
Qty: 6 TABLET | Refills: 0 | Status: SHIPPED | OUTPATIENT
Start: 2024-04-14 | End: 2024-04-17

## 2024-04-14 RX ADMIN — FAMOTIDINE 20 MG: 20 TABLET ORAL at 08:15

## 2024-04-14 RX ADMIN — LISINOPRIL 40 MG: 20 TABLET ORAL at 08:15

## 2024-04-14 RX ADMIN — Medication 95 MG: at 08:17

## 2024-04-14 RX ADMIN — LEVETIRACETAM 1000 MG: 100 SOLUTION ORAL at 08:16

## 2024-04-14 RX ADMIN — ASPIRIN 81 MG CHEWABLE TABLET 81 MG: 81 TABLET CHEWABLE at 08:16

## 2024-04-14 RX ADMIN — ERGOCALCIFEROL 50000 UNITS: 1.25 CAPSULE ORAL at 08:16

## 2024-04-14 RX ADMIN — POLYETHYLENE GLYCOL 3350 17 G: 17 POWDER, FOR SOLUTION ORAL at 08:17

## 2024-04-14 RX ADMIN — PIPERACILLIN AND TAZOBACTAM 3375 MG: 3; .375 INJECTION, POWDER, LYOPHILIZED, FOR SOLUTION INTRAVENOUS at 11:35

## 2024-04-14 RX ADMIN — SODIUM CHLORIDE, PRESERVATIVE FREE 10 ML: 5 INJECTION INTRAVENOUS at 08:18

## 2024-04-14 RX ADMIN — PIPERACILLIN AND TAZOBACTAM 3375 MG: 3; .375 INJECTION, POWDER, LYOPHILIZED, FOR SOLUTION INTRAVENOUS at 03:50

## 2024-04-14 RX ADMIN — POTASSIUM CHLORIDE AND SODIUM CHLORIDE: 900; 150 INJECTION, SOLUTION INTRAVENOUS at 06:29

## 2024-04-14 RX ADMIN — AMLODIPINE BESYLATE 10 MG: 5 TABLET ORAL at 08:16

## 2024-04-14 NOTE — CARE COORDINATION
This pt is being discharged today. CM obtained the AVS and sent this to [facility] via [email or web address],[email or web address]

## 2024-04-14 NOTE — PLAN OF CARE
Problem: Discharge Planning  Goal: Discharge to home or other facility with appropriate resources  Outcome: Progressing     Problem: Skin/Tissue Integrity  Goal: Absence of new skin breakdown  Description: 1.  Monitor for areas of redness and/or skin breakdown  2.  Assess vascular access sites hourly  3.  Every 4-6 hours minimum:  Change oxygen saturation probe site  4.  Every 4-6 hours:  If on nasal continuous positive airway pressure, respiratory therapy assess nares and determine need for appliance change or resting period.  Outcome: Progressing     Problem: Safety - Adult  Goal: Free from fall injury  Outcome: Progressing     Problem: Chronic Conditions and Co-morbidities  Goal: Patient's chronic conditions and co-morbidity symptoms are monitored and maintained or improved  Outcome: Progressing     Problem: SLP Adult - Impaired Swallowing  Goal: By Discharge: Advance to least restrictive diet without signs or symptoms of aspiration for planned discharge setting.  See evaluation for individualized goals.  Description: Speech Pathology Goals Initiated 4/11/24    1.  Patient will tolerate sips of mildly thickened liquids without overt s/s aspiration within 7 days.  4/11/2024 1235 by Adriana Hernandez, SLP  Outcome: Not Progressing     Problem: SLP Adult - Impaired Swallowing  Goal: By Discharge: Advance to least restrictive diet without signs or symptoms of aspiration for planned discharge setting.  See evaluation for individualized goals.  Description: Speech Pathology Goals Initiated 4/11/24    1.  Patient will tolerate sips of mildly thickened liquids without overt s/s aspiration within 7 days.  4/11/2024 1235 by Adriana Hernandez, SLP  Outcome: Not Progressing     
  Problem: Safety - Adult  Goal: Free from fall injury  4/11/2024 1113 by Francesca Hester RN  Outcome: Progressing  Flowsheets (Taken 4/11/2024 1113)  Free From Fall Injury: Instruct family/caregiver on patient safety     
  Problem: Safety - Adult  Goal: Free from fall injury  Outcome: Progressing  Note: Bed alarm is on, the bed is in the lowest position and wheels are locked, call bell is within reach, bathroom light is on during evening hours, gripper socks are on and patient has been instructed to call out for assistance if needed.     As of now, patient is free from falls and will continue to be monitored.       
  Problem: Safety - Adult  Goal: Free from fall injury  Outcome: Progressing  Note: Bed is in the lowest position and wheels are locked, call bell is within reach, bathroom light is on during evening hours, gripper socks are on and patient has been instructed to call out for assistance if needed.     As of now, patient is free from falls and will continue to be monitored.        
Speech LAnguage Pathology EVALUATION    Patient: Alcon Zamora (64 y.o. male)  Date: 4/11/2024  Primary Diagnosis: Colitis [K52.9]  Acute colitis [K52.9]  Constipation, unspecified constipation type [K59.00]       Precautions: Aspiration                    ASSESSMENT :  Patient tolerated ice chips with throat clear noted x 1.  With thin liquids patient demonstrated strong coughing after all trials.  No overt s/s with mildly thickened liquids.  Purees and solids not trialed given clear liquid diet per GI.  Given bedside presentation feel mildly thickened clear liquids is safest.  SLP to follow.    Patient will benefit from skilled intervention to address the above impairments.     PLAN :  Recommendations and Planned Interventions:  Diet: Clear liquid and mildly thick liquids  Sit up for all PO  Small sips     Recommend next SLP session: Reassess swallow function with thin liquids.  Consider objective imaging if s/s aspiration persist.    Acute SLP Services: Yes, patient will be followed by speech-language pathology 3x/week to address goals. Patient's rehabilitation potential is considered to be Good.    Discharge Recommendations: Yes, recommend SLP treatment at next level of care     SUBJECTIVE:   Patient stated, “I was going to ask you something but I forgot.”  Patient's mother at bedside.  RN reports patient with immediate, strong coughing with thin liquid trial earlier this morning.  FEES completed on previous admission 3/2024 with minced & moist diet, mildly thickened liquids recommended.    Patient and family report quick advance to regular diet, thin liquids which he tolerated without difficulty until 4/6   when he began to demonstrate coughing with liquids.    OBJECTIVE:     Past Medical History:   Diagnosis Date    Alcohol abuse, in remission     Anxiety     Chronic kidney disease     PLAGUE L RENAL ARTERY    Chronic obstructive pulmonary disease (HCC)     Chronic pain     Constipation     Diabetes (HCC)     
Speech Language Pathology  Flexible Endoscopic Evaluation of Swallowing-FEES  Patient: Alcon Zamora (64 y.o. male)  Date: 4/14/2024  Primary Diagnosis: Colitis [K52.9]  Acute colitis [K52.9]  Constipation, unspecified constipation type [K59.00]  Procedure(s) (LRB):  COLONOSCOPY DIAGNOSTIC (N/A) 2 Days Post-Op     Precautions: Aspiration, Fall Risk    ASSESSMENT:  Patient participated in FEES. Patient's mother reports patient's swallowing has declined over last week. For today's study, swallow assessed with thin liquids via cup and straw, mildly thick liquids via cup and straw, and solids. Patient self-fed with set-up assistance. Oral phase deficits present for bolus control resulting in premature spillage to level of pyriform sinuses for liquids as well as masticatory speed and bolus propulsion of solids resulting in need for additional time/liquid wash. Pharyngeal dysphagia characterized by gross aspiration of thin liquids before the swallow with immediate cough response which was ineffective at clearing aspirated material from tracheal wall. Transient penetration of mildly thick liquids to level of vocal folds occurred but no aspiration was visualized. With mildly thick liquid penetration episodes, penetrated material generally cleared but did observe one instance of trace residue on TVF. Trialed swallow strategies of chin tuck and three second bolus hold with patient to improve swallow safety; however, patient with same degree of penetration with mildly thick liquids with either swallow strategy. No significant vallecular residue present post-swallow. Did not appreciate proximal escape from below PES. Recommend PO diet of easy to chew with mildly thick liquids via small, single sips only. Provided written swallow strategies/aspiration precautions and discussed findings/recommendations with patient's mother via telephone. Provided patient with Simply Thick mildly thick liquid starter kit in anticipation of 
next level of care     SUBJECTIVE:   Patient stated, “I wasn't having any trouble with the regular liquids at rehab.”    OBJECTIVE:     Past Medical History:   Diagnosis Date    Alcohol abuse, in remission     Anxiety     Chronic kidney disease     PLAGUE L RENAL ARTERY    Chronic obstructive pulmonary disease (HCC)     Chronic pain     Constipation     Diabetes (HCC)     Type II    Erectile dysfunction 2015    GERD (gastroesophageal reflux disease)     Hepatitis C     Hypertension     Liver disease     HEP C TOOK HARVONI    Pancreatitis     Psychiatric disorder     PANIC ATTACKS    Smoker     Spondylitis (HCC)     Type 2 diabetes mellitus without complication (HCC) 2005     Past Surgical History:   Procedure Laterality Date    APPENDECTOMY      COLONOSCOPY N/A 12/19/2019    COLONOSCOPY performed by Lazarus De Leon MD at [facility] ENDOSCOPY    COLONOSCOPY N/A 4/12/2024    COLONOSCOPY DIAGNOSTIC performed by Tommie Kovacs MD at [facility] ENDOSCOPY    FRACTURE SURGERY  '[id number]    Back surgery    ORTHOPEDIC SURGERY      3 back surgeries    ORTHOPEDIC SURGERY      KNEE SURGERY    TONSILLECTOMY      UPPER GASTROINTESTINAL ENDOSCOPY N/A 3/4/2024    ESOPHAGOGASTRODUODENOSCOPY PERCUTANEOUS ENDOSCOPIC GASTROSTOMY TUBE PLACEMENT performed by David Gray MD at [facility] ENDOSCOPY     Prior Level of Function/Home Situation:   Baseline Diet: Regular texture with thin liquids (Per patient report, verified with his mother via telephone) + PEG    Baseline Assessment:  Current Diet: Regular/GI Sieper with thin liquids + PEG    Cognitive and Communication Status:  Neurologic State: Alert  Orientation Level: Oriented to person, Oriented to place, and Oriented to time  Cognition: Follows commands, Adequate basic attention, Slowed processing speed    Dysphagia:  P.O. Trials:  Assessment Method: Observation  Patient Position: Upright in bed  Vocal Quality: WFL   Consistency Presented: Thin liquids, Mildly thick liquids, Solids  How 
Decreased step clearance;Trunk sway increased                                                                                                                                                                                                                                       [facility]-PAC®      Basic Mobility Inpatient Short Form (6-Clicks) Version 2    How much help is needed turning from your back to your side while in a flat bed without using bedrails?: A Little  How much help is needed moving from lying on your back to sitting on the side of a flat bed without using bedrails?: A Lot  How much help is needed moving to and from a bed to a chair?: A Lot  How much help is needed standing up from a chair using your arms?: A Little  How much help is needed walking in hospital room?: A Lot  How much help is needed climbing 3-5 steps with a railing?: A Lot    -PAC Inpatient Mobility Raw Score : 14  -PAC Inpatient T-Scale Score : 38.1     Cutoff score ?171,2,3 had higher odds of discharging home with home health or need of SNF/IPR.    1. Benita Samuels, Lydia Baker, Earl Mancilla, Venessa Lawrence, J Carlos Jean-Baptiste, Carmine Samuels.  Validity of the -PAC “6-Clicks” Inpatient Daily Activity and Basic Mobility Short Forms. Physical Therapy Mar 2014, [phone]) 379-391; DOI: 10.2522/ptj.83748507  2. Bill KAUFFMAN, Terry J, Ari J, Sam J. Association of AM-PAC \"6-Clicks\" Basic Mobility and Daily Activity Scores With Discharge Destination. Phys Ther. 2021 Apr 4;101(4):ntqr528. doi: 10.1093/ptj/zfsy231. PMID: 18287545.  3. Mitchell BLANCAS, Alcon KAUR, Stephanie S, Jayy K, Agustín S. Activity Measure for Post-Acute Care \"6-Clicks\" Basic Mobility Scores Predict Discharge Destination After Acute Care Hospitalization in Select Patient Groups: A Retrospective, Observational Study. Arch Rehabil Res Clin Transl. 2022 Jul 16;4(3):145445. doi: 10.1016/j.arrct.2022.677389. PMID: 56293827; PMCID: [id number].  4. 
from another person does the patient currently need... Total; A Lot A Little None   1.  Putting on and taking off regular lower body clothing? []  1 [x]  2 []  3 []  4   2.  Bathing (including washing, rinsing, drying)? []  1 [x]  2 []  3 []  4   3.  Toileting, which includes using toilet, bedpan or urinal? [] 1 [x]  2 []  3 []  4   4.  Putting on and taking off regular upper body clothing? []  1 [x]  2 []  3 []  4   5.  Taking care of personal grooming such as brushing teeth? []  1 [x]  2 []  3 []  4   6.  Eating meals? []  1 []  2 [x]  3 []  4   © , Trustees of [facility], under license to [employer]. All rights reserved     Score:      Interpretation of Tool:  Represents clinically-significant functional categories (i.e. Activities of daily living).    Cutoff score 39.4 (19) correlates to a good likelihood of discharging home versus a facility  Benita Samuels, Lydia Baker, aErl Mancilla, Venessa Lawrence, J Carlos Jean-Baptiste, Carmine Samuels, -PAC “6-Clicks” Functional Assessment Scores Predict Acute Care Hospital Discharge Destination, Physical Therapy, Volume 94, Issue 9, 2014, Pages 5806-2853, [email or web address]    Pain Ratin/10       Activity Tolerance:   Fair , Poor, requires rest breaks, and signs and symptoms of orthostatic hypotension    After treatment:   Patient left in no apparent distress in bed, Call bell within reach, Bed/ chair alarm activated, Side rails x3, and Heels elevated for pressure relief    COMMUNICATION/EDUCATION:   The patient's plan of care was discussed with: physical therapist and registered nurse         Thank you for this referral.  Sandra Arce OT  Minutes: 28    Occupational Therapy Evaluation Charge Determination   History Examination Decision-Making   LOW Complexity : Brief history review  LOW Complexity: 1-3 Performance deficits relating to physical, cognitive, or psychosocial skills that result in activity

## 2024-04-14 NOTE — DISCHARGE SUMMARY
Discharge Summary       PATIENT ID: Alcon Zamora  MRN: [id number]   YOB: 1960    DATE OF ADMISSION: 4/9/2024  5:46 PM    DATE OF DISCHARGE: 4/14/24   PRIMARY CARE PROVIDER: No primary care provider on file.     ATTENDING PHYSICIAN: Dr. Watts  DISCHARGING PROVIDER: Ying Britt PA-C    To contact this individual call [phone] and ask the  to page.  If unavailable ask to be transferred the Adult Hospitalist Department.    CONSULTATIONS: IP CONSULT TO GI  IP CONSULT TO CASE MANAGEMENT    PROCEDURES/SURGERIES: Procedure(s):  COLONOSCOPY DIAGNOSTIC     ADMITTING DIAGNOSES & HOSPITAL COURSE:     Mr. Zamora  is a 64 year old male with a pmhx of hypertension, hyperlipidemia, type 2 diabetes, CVA, epilepsy, dysphagia, PEG tube placement, COPD, CKD, HCV s/p Harvoni treatment, prior alcohol/opiate abuse in remission, GERD, pancreatitis, chronic pain, constipation, anxiety, panic attacks who presented to the ED from home with reported constipation, N/V (nonbloody/nonbilious emesis), no abdominal pain. Also reports dysuria and cough for past 2 days.  Chest x-ray: no acute cardiopulmonary process.  KUB: nonobstructive bowel gas pattern with normal retained fecal material.  CT of the abdomen and pelvis: long segment colonic wall thickening from left colon to rectosigmoid junction suggesting are consistent with colitis but correction with colonoscopy recommended to exclude neoplasm.  Large retained fecal material throughout the colon proximal to the distal thickened colonic segment, gas bubbles in the urinary bladder suggest recent catheterization.  Gallbladder distention without biliary ductal dilatation, splenomegaly, diffuse hepatocellular process, possible steatosis, pancreatic atrophy, extensive hardware in the lumbar sacral spine status post posterior fixation.  Patient had normal saline bolus in the ED and a fleets rectal enema not.     Patient was recently hospitalized from 2/16 - 3/6 with

## 2024-04-14 NOTE — DISCHARGE INSTRUCTIONS
Discharge Instructions       PATIENT ID: Alcon Zamora  MRN: [id number]   YOB: 1960    DATE OF ADMISSION: 4/9/2024   DATE OF DISCHARGE: 4/14/2024    PRIMARY CARE PROVIDER: No primary care provider on file.     ATTENDING PHYSICIAN: Zaki Watts MD   DISCHARGING PROVIDER: Ying Britt PA-C    To contact this individual call [phone] and ask the  to page.   If unavailable ask to be transferred the Adult Hospitalist Department.    DISCHARGE DIAGNOSES     Acute colitis  - CT abdomen pelvis with IV contrast 4/9/2024: long segment colonic wall thickening from the left colon to the rectosigmoid junction, suggesting or consistent with colitis but correlation with colonoscopy recommended to exclude neoplasm  - consider for possible infectious colitis, continue with IV Zosyn  - paired blood cultures with no growth x4 days  - colonoscopy 4/12 with normal mucosa without signs of colitis or mass  - GI signed off, recommending outpatient follow up for nonurgent repeat colonoscopy      Constipation, resolved  Fecal impaction  - +BM 4/12, 4/14     Nausea and vomiting  History of dysphagia with worsened swalowing over last several days   - PEG tube in March but apparently has not used and eats food (placed March 2024)  - no N/V today  - SLP following  - CT head with no acute findings     Anemia  - Hgb 10.3 this am, appears baseline Hgb >10 in previous admits  - GI aware     Mild Hypokalemia  - change fluids to include potassium  - resolved     Dysuria  - UA negative for UTI     Cough  - chest xray 2 views: no acute process    Essential hypertension  - resume amlodipine and lisinopril  - BP running high, resuming home methadone and waiting for constipation to resolve.           CONSULTATIONS: IP CONSULT TO GI  IP CONSULT TO CASE MANAGEMENT    PROCEDURES/SURGERIES: Procedure(s):  COLONOSCOPY DIAGNOSTIC    PENDING TEST RESULTS:   At the time of discharge the following test results are still pending:

## 2024-04-15 LAB
BACTERIA SPEC CULT: NORMAL
BACTERIA SPEC CULT: NORMAL
SERVICE CMNT-IMP: NORMAL
SERVICE CMNT-IMP: NORMAL

## 2024-04-15 NOTE — ANESTHESIA POSTPROCEDURE EVALUATION
Department of Anesthesiology  Postprocedure Note    Patient: Alcon Zamora  MRN: [id number]  YOB: 1960  Date of evaluation: 4/15/2024    Procedure Summary       Date: 04/12/24 Room / Location: [facility] ENDO 04 / [facility] ENDOSCOPY    Anesthesia Start: 1538 Anesthesia Stop: 1604    Procedure: COLONOSCOPY DIAGNOSTIC (Lower GI Region) Diagnosis:       Abnormal computed tomography of gastrointestinal tract      (Abnormal computed tomography of gastrointestinal tract [R93.3])    Surgeons: Tommie Kovacs MD Responsible Provider: Luis A Melendez MD    Anesthesia Type: MAC ASA Status: 3            Anesthesia Type: No value filed.    Maren Phase I: Maren Score: 10    Maren Phase II: Maren Score: 10    Anesthesia Post Evaluation    Patient location during evaluation: PACU  Patient participation: complete - patient participated  Level of consciousness: responsive to verbal stimuli and sleepy but conscious  Pain score: 2  Airway patency: patent  Cardiovascular status: blood pressure returned to baseline  Respiratory status: acceptable  Hydration status: stable  Comments: +Post-Anesthesia Evaluation and Assessment    Patient: Alcon Zamora MRN: [id number]  SSN: [id number]   YOB: 1960  Age: 64 y.o.  Sex: male          Cardiovascular Function/Vital Signs    BP (!) 161/74   Pulse (!) 47   Temp 98.1 °F (36.7 °C) (Oral)   Resp 16   Wt 92.2 kg (203 lb 4.2 oz)   SpO2 96%   BMI 23.49 kg/m²     Patient is status post Procedure(s):  COLONOSCOPY DIAGNOSTIC.    Nausea/Vomiting: Controlled.    Postoperative hydration reviewed and adequate.    Pain:      Managed.    Neurological Status:       At baseline.    Mental Status and Level of Consciousness: Arousable.    Pulmonary Status:       Adequate oxygenation and airway patent.    Complications related to anesthesia: None    Post-anesthesia assessment completed. No concerns.    I have evaluated the patient and the patient is stable and ready to be discharged

## 2024-08-06 NOTE — TELEPHONE ENCOUNTER
Please clarify why albuterol refill is requested. It does not appear to be a chronic medication, only prescribed once 12/2023 when patient had bronchitis. Will need acute appt if having wheeze/shortness of breath.

## 2024-08-06 NOTE — TELEPHONE ENCOUNTER
Pt's mother and POA, is going to have him est with Dr Finch,  Pt has has 4 strokes since Feb and has been rehab.

## 2024-08-08 NOTE — TELEPHONE ENCOUNTER
Per pt mother, POA, the home health company that is seeing the pt since his dischg from rehab was supposed to be handling this.  She stated to not worry about the request.

## 2024-08-09 RX ORDER — ALBUTEROL SULFATE 90 UG/1
INHALANT RESPIRATORY (INHALATION)
Qty: 18 G | Refills: 0 | OUTPATIENT
Start: 2024-08-09

## 2024-08-30 ENCOUNTER — OFFICE VISIT (OUTPATIENT)
Age: 64
End: 2024-08-30
Payer: COMMERCIAL

## 2024-08-30 VITALS
DIASTOLIC BLOOD PRESSURE: 62 MMHG | SYSTOLIC BLOOD PRESSURE: 122 MMHG | HEIGHT: 78 IN | OXYGEN SATURATION: 97 % | RESPIRATION RATE: 16 BRPM | WEIGHT: 200 LBS | HEART RATE: 61 BPM | BODY MASS INDEX: 23.14 KG/M2

## 2024-08-30 DIAGNOSIS — R56.9 SEIZURE (HCC): ICD-10-CM

## 2024-08-30 DIAGNOSIS — Z98.890 STATUS POST LUMBAR SPINE SURGERY FOR DECOMPRESSION OF SPINAL CORD: ICD-10-CM

## 2024-08-30 DIAGNOSIS — E11.40 TYPE 2 DIABETES MELLITUS WITH DIABETIC NEUROPATHY, WITHOUT LONG-TERM CURRENT USE OF INSULIN (HCC): ICD-10-CM

## 2024-08-30 DIAGNOSIS — I69.993 CVA, OLD, ATAXIA: Primary | ICD-10-CM

## 2024-08-30 PROCEDURE — 99215 OFFICE O/P EST HI 40 MIN: CPT | Performed by: PSYCHIATRY & NEUROLOGY

## 2024-08-30 PROCEDURE — 3044F HG A1C LEVEL LT 7.0%: CPT | Performed by: PSYCHIATRY & NEUROLOGY

## 2024-08-30 PROCEDURE — 3078F DIAST BP <80 MM HG: CPT | Performed by: PSYCHIATRY & NEUROLOGY

## 2024-08-30 PROCEDURE — 3074F SYST BP LT 130 MM HG: CPT | Performed by: PSYCHIATRY & NEUROLOGY

## 2024-08-30 RX ORDER — ATORVASTATIN CALCIUM 40 MG/1
80 TABLET, FILM COATED ORAL NIGHTLY
Qty: 90 TABLET | Refills: 1 | Status: SHIPPED | OUTPATIENT
Start: 2024-08-30

## 2024-08-30 ASSESSMENT — PATIENT HEALTH QUESTIONNAIRE - PHQ9
SUM OF ALL RESPONSES TO PHQ QUESTIONS 1-9: 0
SUM OF ALL RESPONSES TO PHQ QUESTIONS 1-9: 0
2. FEELING DOWN, DEPRESSED OR HOPELESS: NOT AT ALL
SUM OF ALL RESPONSES TO PHQ QUESTIONS 1-9: 0
SUM OF ALL RESPONSES TO PHQ9 QUESTIONS 1 & 2: 0
SUM OF ALL RESPONSES TO PHQ QUESTIONS 1-9: 0
1. LITTLE INTEREST OR PLEASURE IN DOING THINGS: NOT AT ALL

## 2024-08-30 NOTE — PROGRESS NOTES
Chief Complaint   Patient presents with    Neurologic Problem     Hospital follow up, seizures, stroke.         HISTORY OF PRESENT ILLNESS  Alcon Zamora is a 64 y.o. male who was hospitalized at [facility] in February of this year for rule out frequent falls and unsteadiness.  He had MRI of the brain done which showed acute strokes in the right alannah, midbrain and left medial basal ganglia.  There were chronic infarcts seen in bilateral basal ganglia, right thalamus and left cerebral peduncle.  CTA of the head and neck was negative.  Echocardiogram showed a PFO which was managed medically.  Underlying vascular risk factors included hypertension, dyslipidemia and diabetes.  He was put on aspirin and high-dose statin which she has been taking.    While he was in the hospital he tested positive for COVID-19.  He had an episode where he had altered mental status, speech was garbled, found to be hypoxic with possible aspiration.  There was a witnessed generalized seizure-like episode in the room and then another 1 while he was in the CT scanner lasting less than a minute.  He was put on Keppra which she has been taking.  There has been no reoccurrence.  Seizure was attributed to metabolic disturbances, stroke and underlying COVID-19 infection.  There is no prior history of seizures.    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  His gait has never been stable ever since his last surgery.      Past Medical History:   Diagnosis Date    Alcohol abuse, in remission     Anxiety     Chronic kidney disease     PLAGUE L RENAL ARTERY    Chronic obstructive pulmonary disease (HCC)     Chronic pain     Constipation     Diabetes (HCC)     Type II    Erectile dysfunction 2015    GERD (gastroesophageal reflux disease)     Hepatitis C     Hypertension     Liver disease     HEP  Agitated saline study was positive without provocation. Severe bidirectional shunt was noted. PFO present with a bidirectional shunt visible by color Doppler and saline contrast.  The PFO is stretched, with a 4 mm static gap.    Right Atrium: Not well visualized. Right atrium is mildly dilated.    Image quality is excellent.    Signed by: Oz Mckeon MD on 2/21/2024  3:02 PM  Lab Results   Component Value Date    CHOL 161 02/18/2024    CHOL 214 (H) 01/19/2023    CHOL 199 01/17/2022     Lab Results   Component Value Date    TRIG 173 (H) 02/18/2024    TRIG 211 (H) 01/19/2023    TRIG 269 (H) 01/17/2022     Lab Results   Component Value Date    HDL 37 02/18/2024    HDL 43 01/19/2023    HDL 38 01/17/2022     Lab Results   Component Value Date    LDL 89.4 02/18/2024    .8 (H) 01/19/2023    .2 (H) 01/17/2022     Lab Results   Component Value Date    VLDL 34.6 02/18/2024    VLDL 42.2 01/19/2023    VLDL 53.8 01/17/2022     Lab Results   Component Value Date    CHOLHDLRATIO 4.4 02/18/2024    CHOLHDLRATIO 5.0 01/19/2023    CHOLHDLRATIO 5.2 (H) 01/17/2022     Hemoglobin A1C   Date Value Ref Range Status   04/10/2024 5.9 (H) 4.0 - 5.6 % Final     Comment:     (NOTE)  HbA1C Interpretive Ranges  <5.7              Normal  5.7 - 6.4         Consider Prediabetes  >6.5              Consider Diabetes       EEG showed nonspecific slowing without any epileptiform features    ASSESSMENT   Diagnosis Orders   1. CVA, old, ataxia  Levetiracetam Level    Lipid Panel    CBC with Auto Differential    Comprehensive Metabolic Panel    atorvastatin (LIPITOR) 40 MG tablet      2. Status post lumbar spine surgery for decompression of spinal cord        3. Type 2 diabetes mellitus with diabetic neuropathy, without long-term current use of insulin (HCC)  atorvastatin (LIPITOR) 40 MG tablet      4. Seizure (HCC)  Levetiracetam Level    Lipid Panel    CBC with Auto Differential    Comprehensive Metabolic Panel    NEURO EEG 24 HR

## 2024-08-31 LAB
ALBUMIN SERPL-MCNC: 4.3 G/DL (ref 3.9–4.9)
ALP SERPL-CCNC: 63 IU/L (ref 44–121)
ALT SERPL-CCNC: 14 IU/L (ref 0–44)
AST SERPL-CCNC: 19 IU/L (ref 0–40)
BASOPHILS # BLD AUTO: 0.1 X10E3/UL (ref 0–0.2)
BASOPHILS NFR BLD AUTO: 1 %
BILIRUB SERPL-MCNC: 0.3 MG/DL (ref 0–1.2)
BUN SERPL-MCNC: 20 MG/DL (ref 8–27)
BUN/CREAT SERPL: 17 (ref 10–24)
CALCIUM SERPL-MCNC: 9.4 MG/DL (ref 8.6–10.2)
CHLORIDE SERPL-SCNC: 100 MMOL/L (ref 96–106)
CHOLEST SERPL-MCNC: 133 MG/DL (ref 100–199)
CO2 SERPL-SCNC: 20 MMOL/L (ref 20–29)
CREAT SERPL-MCNC: 1.17 MG/DL (ref 0.76–1.27)
EGFRCR SERPLBLD CKD-EPI 2021: 70 ML/MIN/1.73
EOSINOPHIL # BLD AUTO: 0.2 X10E3/UL (ref 0–0.4)
EOSINOPHIL NFR BLD AUTO: 4 %
ERYTHROCYTE [DISTWIDTH] IN BLOOD BY AUTOMATED COUNT: 14.4 % (ref 11.6–15.4)
GLOBULIN SER CALC-MCNC: 3 G/DL (ref 1.5–4.5)
GLUCOSE SERPL-MCNC: 172 MG/DL (ref 70–99)
HCT VFR BLD AUTO: 33.5 % (ref 37.5–51)
HDLC SERPL-MCNC: 63 MG/DL
HGB BLD-MCNC: 10.7 G/DL (ref 13–17.7)
IMM GRANULOCYTES # BLD AUTO: 0 X10E3/UL (ref 0–0.1)
IMM GRANULOCYTES NFR BLD AUTO: 0 %
LDLC SERPL CALC-MCNC: 57 MG/DL (ref 0–99)
LYMPHOCYTES # BLD AUTO: 1.5 X10E3/UL (ref 0.7–3.1)
LYMPHOCYTES NFR BLD AUTO: 25 %
MCH RBC QN AUTO: 28.3 PG (ref 26.6–33)
MCHC RBC AUTO-ENTMCNC: 31.9 G/DL (ref 31.5–35.7)
MCV RBC AUTO: 89 FL (ref 79–97)
MONOCYTES # BLD AUTO: 0.4 X10E3/UL (ref 0.1–0.9)
MONOCYTES NFR BLD AUTO: 7 %
NEUTROPHILS # BLD AUTO: 3.9 X10E3/UL (ref 1.4–7)
NEUTROPHILS NFR BLD AUTO: 63 %
PLATELET # BLD AUTO: 327 X10E3/UL (ref 150–450)
POTASSIUM SERPL-SCNC: 4.5 MMOL/L (ref 3.5–5.2)
PROT SERPL-MCNC: 7.3 G/DL (ref 6–8.5)
RBC # BLD AUTO: 3.78 X10E6/UL (ref 4.14–5.8)
SODIUM SERPL-SCNC: 138 MMOL/L (ref 134–144)
TRIGL SERPL-MCNC: 62 MG/DL (ref 0–149)
VLDLC SERPL CALC-MCNC: 13 MG/DL (ref 5–40)
WBC # BLD AUTO: 6.2 X10E3/UL (ref 3.4–10.8)

## 2024-09-04 LAB
ALBUMIN SERPL-MCNC: 4.3 G/DL (ref 3.9–4.9)
ALP SERPL-CCNC: 63 IU/L (ref 44–121)
ALT SERPL-CCNC: 14 IU/L (ref 0–44)
AST SERPL-CCNC: 19 IU/L (ref 0–40)
BASOPHILS # BLD AUTO: 0.1 X10E3/UL (ref 0–0.2)
BASOPHILS NFR BLD AUTO: 1 %
BILIRUB SERPL-MCNC: 0.3 MG/DL (ref 0–1.2)
BUN SERPL-MCNC: 20 MG/DL (ref 8–27)
BUN/CREAT SERPL: 17 (ref 10–24)
CALCIUM SERPL-MCNC: 9.4 MG/DL (ref 8.6–10.2)
CHLORIDE SERPL-SCNC: 100 MMOL/L (ref 96–106)
CHOLEST SERPL-MCNC: 133 MG/DL (ref 100–199)
CO2 SERPL-SCNC: 20 MMOL/L (ref 20–29)
CREAT SERPL-MCNC: 1.17 MG/DL (ref 0.76–1.27)
EGFRCR SERPLBLD CKD-EPI 2021: 70 ML/MIN/1.73
EOSINOPHIL # BLD AUTO: 0.2 X10E3/UL (ref 0–0.4)
EOSINOPHIL NFR BLD AUTO: 4 %
ERYTHROCYTE [DISTWIDTH] IN BLOOD BY AUTOMATED COUNT: 14.4 % (ref 11.6–15.4)
GLOBULIN SER CALC-MCNC: 3 G/DL (ref 1.5–4.5)
GLUCOSE SERPL-MCNC: 172 MG/DL (ref 70–99)
HCT VFR BLD AUTO: 33.5 % (ref 37.5–51)
HDLC SERPL-MCNC: 63 MG/DL
HGB BLD-MCNC: 10.7 G/DL (ref 13–17.7)
IMM GRANULOCYTES # BLD AUTO: 0 X10E3/UL (ref 0–0.1)
IMM GRANULOCYTES NFR BLD AUTO: 0 %
LDLC SERPL CALC-MCNC: 57 MG/DL (ref 0–99)
LEVETIRACETAM SERPL-MCNC: 28.7 UG/ML (ref 10–40)
LYMPHOCYTES # BLD AUTO: 1.5 X10E3/UL (ref 0.7–3.1)
LYMPHOCYTES NFR BLD AUTO: 25 %
MCH RBC QN AUTO: 28.3 PG (ref 26.6–33)
MCHC RBC AUTO-ENTMCNC: 31.9 G/DL (ref 31.5–35.7)
MCV RBC AUTO: 89 FL (ref 79–97)
MONOCYTES # BLD AUTO: 0.4 X10E3/UL (ref 0.1–0.9)
MONOCYTES NFR BLD AUTO: 7 %
NEUTROPHILS # BLD AUTO: 3.9 X10E3/UL (ref 1.4–7)
NEUTROPHILS NFR BLD AUTO: 63 %
PLATELET # BLD AUTO: 327 X10E3/UL (ref 150–450)
POTASSIUM SERPL-SCNC: 4.5 MMOL/L (ref 3.5–5.2)
PROT SERPL-MCNC: 7.3 G/DL (ref 6–8.5)
RBC # BLD AUTO: 3.78 X10E6/UL (ref 4.14–5.8)
SODIUM SERPL-SCNC: 138 MMOL/L (ref 134–144)
TRIGL SERPL-MCNC: 62 MG/DL (ref 0–149)
VLDLC SERPL CALC-MCNC: 13 MG/DL (ref 5–40)
WBC # BLD AUTO: 6.2 X10E3/UL (ref 3.4–10.8)

## 2024-09-17 ENCOUNTER — HOSPITAL ENCOUNTER (OUTPATIENT)
Facility: HOSPITAL | Age: 64
Discharge: HOME | End: 2024-09-20
Attending: PSYCHIATRY & NEUROLOGY
Payer: COMMERCIAL

## 2024-09-17 DIAGNOSIS — R56.9 SEIZURE (HCC): ICD-10-CM

## 2024-09-17 PROCEDURE — 95708 EEG WO VID EA 12-26HR UNMNTR: CPT

## 2024-09-24 ENCOUNTER — OFFICE VISIT (OUTPATIENT)
Age: 64
End: 2024-09-24
Payer: COMMERCIAL

## 2024-09-24 VITALS
WEIGHT: 208.6 LBS | SYSTOLIC BLOOD PRESSURE: 119 MMHG | OXYGEN SATURATION: 95 % | DIASTOLIC BLOOD PRESSURE: 83 MMHG | RESPIRATION RATE: 15 BRPM | HEIGHT: 78 IN | TEMPERATURE: 98.3 F | BODY MASS INDEX: 24.14 KG/M2 | HEART RATE: 84 BPM

## 2024-09-24 DIAGNOSIS — Z86.19 HEPATITIS C VIRUS INFECTION CURED AFTER ANTIVIRAL DRUG THERAPY: ICD-10-CM

## 2024-09-24 DIAGNOSIS — Z87.891 PERSONAL HISTORY OF TOBACCO USE: ICD-10-CM

## 2024-09-24 DIAGNOSIS — R56.9 SEIZURES (HCC): ICD-10-CM

## 2024-09-24 DIAGNOSIS — K21.9 GASTROESOPHAGEAL REFLUX DISEASE WITHOUT ESOPHAGITIS: ICD-10-CM

## 2024-09-24 DIAGNOSIS — E11.21 TYPE 2 DIABETES WITH NEPHROPATHY (HCC): ICD-10-CM

## 2024-09-24 DIAGNOSIS — Q21.12 PFO (PATENT FORAMEN OVALE): ICD-10-CM

## 2024-09-24 DIAGNOSIS — I63.9 ACUTE CEREBROVASCULAR ACCIDENT (CVA) (HCC): ICD-10-CM

## 2024-09-24 DIAGNOSIS — Z23 NEED FOR INFLUENZA VACCINATION: ICD-10-CM

## 2024-09-24 DIAGNOSIS — F19.20 POLYSUBSTANCE DEPENDENCE (HCC): ICD-10-CM

## 2024-09-24 DIAGNOSIS — D64.9 ANEMIA, UNSPECIFIED TYPE: ICD-10-CM

## 2024-09-24 DIAGNOSIS — E11.21 TYPE 2 DIABETES WITH NEPHROPATHY (HCC): Primary | ICD-10-CM

## 2024-09-24 DIAGNOSIS — I10 ESSENTIAL HYPERTENSION: ICD-10-CM

## 2024-09-24 DIAGNOSIS — R13.19 OTHER DYSPHAGIA: Chronic | ICD-10-CM

## 2024-09-24 PROBLEM — U07.1 COVID-19: Status: RESOLVED | Noted: 2024-02-23 | Resolved: 2024-09-24

## 2024-09-24 PROBLEM — R30.0 DYSURIA: Status: RESOLVED | Noted: 2018-04-25 | Resolved: 2024-09-24

## 2024-09-24 PROBLEM — E16.2 HYPOGLYCEMIA: Status: RESOLVED | Noted: 2024-02-11 | Resolved: 2024-09-24

## 2024-09-24 PROBLEM — R53.1 WEAKNESS: Status: RESOLVED | Noted: 2024-02-16 | Resolved: 2024-09-24

## 2024-09-24 LAB
CREAT UR-MCNC: 75.1 MG/DL
EST. AVERAGE GLUCOSE BLD GHB EST-MCNC: 126 MG/DL
FERRITIN SERPL-MCNC: 10 NG/ML (ref 26–388)
FOLATE SERPL-MCNC: 15.7 NG/ML (ref 5–21)
HBA1C MFR BLD: 6 % (ref 4–5.6)
IRON SATN MFR SERPL: 18 % (ref 20–50)
IRON SERPL-MCNC: 55 UG/DL (ref 35–150)
MICROALBUMIN UR-MCNC: 30.1 MG/DL
MICROALBUMIN/CREAT UR-RTO: 401 MG/G (ref 0–30)
TIBC SERPL-MCNC: 310 UG/DL (ref 250–450)
VIT B12 SERPL-MCNC: 297 PG/ML (ref 193–986)

## 2024-09-24 PROCEDURE — G0296 VISIT TO DETERM LDCT ELIG: HCPCS

## 2024-09-24 PROCEDURE — 99214 OFFICE O/P EST MOD 30 MIN: CPT

## 2024-09-24 PROCEDURE — 90661 CCIIV3 VAC ABX FR 0.5 ML IM: CPT

## 2024-09-24 RX ORDER — CETIRIZINE HYDROCHLORIDE 10 MG/1
10 TABLET ORAL DAILY
COMMUNITY

## 2024-09-24 RX ORDER — METHADONE HYDROCHLORIDE 10 MG/5ML
95 SOLUTION ORAL EVERY 4 HOURS PRN
COMMUNITY

## 2024-09-24 RX ORDER — ACETAMINOPHEN 500 MG
TABLET ORAL
COMMUNITY
Start: 2024-09-03

## 2024-09-24 RX ORDER — DOCUSATE SODIUM 100 MG/1
100 CAPSULE, LIQUID FILLED ORAL 2 TIMES DAILY
COMMUNITY

## 2024-09-24 ASSESSMENT — ENCOUNTER SYMPTOMS
RHINORRHEA: 0
SHORTNESS OF BREATH: 0
SORE THROAT: 0
VOMITING: 0
CONSTIPATION: 0
WHEEZING: 0
COUGH: 0
BACK PAIN: 0
DIARRHEA: 0
ABDOMINAL PAIN: 0
NAUSEA: 0

## 2024-09-25 RX ORDER — FERROUS SULFATE 325(65) MG
325 TABLET ORAL EVERY OTHER DAY
Qty: 90 TABLET | Refills: 3 | Status: SHIPPED | OUTPATIENT
Start: 2024-09-25

## 2024-10-02 ENCOUNTER — HOSPITAL ENCOUNTER (OUTPATIENT)
Age: 64
Discharge: HOME | End: 2024-10-05
Payer: COMMERCIAL

## 2024-10-02 DIAGNOSIS — Z87.891 PERSONAL HISTORY OF TOBACCO USE: ICD-10-CM

## 2024-10-02 PROCEDURE — 71271 CT THORAX LUNG CANCER SCR C-: CPT

## 2024-10-07 DIAGNOSIS — R91.1 LUNG NODULE SEEN ON IMAGING STUDY: Primary | ICD-10-CM

## 2024-10-14 ENCOUNTER — TELEPHONE (OUTPATIENT)
Age: 64
End: 2024-10-14

## 2024-10-14 DIAGNOSIS — E11.65 TYPE 2 DIABETES MELLITUS WITH HYPERGLYCEMIA, WITH LONG-TERM CURRENT USE OF INSULIN (HCC): Primary | ICD-10-CM

## 2024-10-14 DIAGNOSIS — Z79.4 TYPE 2 DIABETES MELLITUS WITH HYPERGLYCEMIA, WITH LONG-TERM CURRENT USE OF INSULIN (HCC): Primary | ICD-10-CM

## 2024-10-14 RX ORDER — LANCETS 30 GAUGE
EACH MISCELLANEOUS
Qty: 100 EACH | Refills: 3 | Status: SHIPPED | OUTPATIENT
Start: 2024-10-14

## 2024-10-14 NOTE — TELEPHONE ENCOUNTER
Patient's mother called to request a refill of the following:    Needles for   One Touch DelicaPlus  100 lancets    [facility] [address]

## 2024-11-14 ENCOUNTER — HOSPITAL ENCOUNTER (EMERGENCY)
Facility: HOSPITAL | Age: 64
Discharge: HOME | End: 2024-11-14
Attending: EMERGENCY MEDICINE
Payer: COMMERCIAL

## 2024-11-14 ENCOUNTER — APPOINTMENT (OUTPATIENT)
Facility: HOSPITAL | Age: 64
End: 2024-11-14
Payer: COMMERCIAL

## 2024-11-14 VITALS
HEIGHT: 78 IN | RESPIRATION RATE: 15 BRPM | TEMPERATURE: 98 F | HEART RATE: 80 BPM | BODY MASS INDEX: 25.71 KG/M2 | OXYGEN SATURATION: 98 % | WEIGHT: 222.22 LBS | SYSTOLIC BLOOD PRESSURE: 161 MMHG | DIASTOLIC BLOOD PRESSURE: 89 MMHG

## 2024-11-14 DIAGNOSIS — K59.00 CONSTIPATION, UNSPECIFIED CONSTIPATION TYPE: Primary | ICD-10-CM

## 2024-11-14 LAB
ALBUMIN SERPL-MCNC: 3.6 G/DL (ref 3.5–5)
ALBUMIN/GLOB SERPL: 0.9 (ref 1.1–2.2)
ALP SERPL-CCNC: 64 U/L (ref 45–117)
ALT SERPL-CCNC: 16 U/L (ref 12–78)
ANION GAP SERPL CALC-SCNC: 5 MMOL/L (ref 2–12)
AST SERPL-CCNC: 14 U/L (ref 15–37)
BASOPHILS # BLD: 0.1 K/UL (ref 0–0.1)
BASOPHILS NFR BLD: 1 % (ref 0–1)
BILIRUB SERPL-MCNC: 0.4 MG/DL (ref 0.2–1)
BUN SERPL-MCNC: 17 MG/DL (ref 6–20)
BUN/CREAT SERPL: 12 (ref 12–20)
CALCIUM SERPL-MCNC: 9.2 MG/DL (ref 8.5–10.1)
CHLORIDE SERPL-SCNC: 103 MMOL/L (ref 97–108)
CO2 SERPL-SCNC: 28 MMOL/L (ref 21–32)
CREAT SERPL-MCNC: 1.44 MG/DL (ref 0.7–1.3)
DIFFERENTIAL METHOD BLD: ABNORMAL
EOSINOPHIL # BLD: 0.2 K/UL (ref 0–0.4)
EOSINOPHIL NFR BLD: 4 % (ref 0–7)
ERYTHROCYTE [DISTWIDTH] IN BLOOD BY AUTOMATED COUNT: 12.5 % (ref 11.5–14.5)
GLOBULIN SER CALC-MCNC: 4 G/DL (ref 2–4)
GLUCOSE SERPL-MCNC: 155 MG/DL (ref 65–100)
HCT VFR BLD AUTO: 31.6 % (ref 36.6–50.3)
HGB BLD-MCNC: 10.5 G/DL (ref 12.1–17)
IMM GRANULOCYTES # BLD AUTO: 0 K/UL (ref 0–0.04)
IMM GRANULOCYTES NFR BLD AUTO: 0 % (ref 0–0.5)
LYMPHOCYTES # BLD: 1.5 K/UL (ref 0.8–3.5)
LYMPHOCYTES NFR BLD: 33 % (ref 12–49)
MCH RBC QN AUTO: 28.4 PG (ref 26–34)
MCHC RBC AUTO-ENTMCNC: 33.2 G/DL (ref 30–36.5)
MCV RBC AUTO: 85.4 FL (ref 80–99)
MONOCYTES # BLD: 0.4 K/UL (ref 0–1)
MONOCYTES NFR BLD: 8 % (ref 5–13)
NEUTS SEG # BLD: 2.5 K/UL (ref 1.8–8)
NEUTS SEG NFR BLD: 54 % (ref 32–75)
NRBC # BLD: 0 K/UL (ref 0–0.01)
NRBC BLD-RTO: 0 PER 100 WBC
PLATELET # BLD AUTO: 268 K/UL (ref 150–400)
PMV BLD AUTO: 8.9 FL (ref 8.9–12.9)
POTASSIUM SERPL-SCNC: 3.9 MMOL/L (ref 3.5–5.1)
PROT SERPL-MCNC: 7.6 G/DL (ref 6.4–8.2)
RBC # BLD AUTO: 3.7 M/UL (ref 4.1–5.7)
SODIUM SERPL-SCNC: 136 MMOL/L (ref 136–145)
WBC # BLD AUTO: 4.7 K/UL (ref 4.1–11.1)

## 2024-11-14 PROCEDURE — 80053 COMPREHEN METABOLIC PANEL: CPT

## 2024-11-14 PROCEDURE — 99284 EMERGENCY DEPT VISIT MOD MDM: CPT

## 2024-11-14 PROCEDURE — 74176 CT ABD & PELVIS W/O CONTRAST: CPT

## 2024-11-14 PROCEDURE — 36415 COLL VENOUS BLD VENIPUNCTURE: CPT

## 2024-11-14 PROCEDURE — 85025 COMPLETE CBC W/AUTO DIFF WBC: CPT

## 2024-11-14 RX ORDER — POLYETHYLENE GLYCOL 3350 17 G/17G
17 POWDER, FOR SOLUTION ORAL DAILY
Qty: 238 G | Refills: 1 | Status: SHIPPED | OUTPATIENT
Start: 2024-11-14

## 2024-11-14 RX ORDER — DOCUSATE SODIUM 100 MG/1
100 CAPSULE, LIQUID FILLED ORAL 2 TIMES DAILY
Qty: 30 CAPSULE | Refills: 0 | Status: SHIPPED | OUTPATIENT
Start: 2024-11-14

## 2024-11-14 ASSESSMENT — PAIN - FUNCTIONAL ASSESSMENT: PAIN_FUNCTIONAL_ASSESSMENT: NONE - DENIES PAIN

## 2024-11-14 NOTE — DISCHARGE INSTRUCTIONS
Thank you for allowing us to provide you with medical care today.  We realize that you have many choices for your emergency care needs.  We thank you for choosing [facility].  Please choose us in the future for any continued health care needs.     We hope we addressed all of your medical concerns. We strive to provide excellent quality care in the Emergency Department.  Anything less than excellent does not meet our expectations.     The exam and treatment you received in the Emergency Department were for an emergent problem and are not intended as complete care. It is important that you follow up with a doctor, nurse practitioner, or physician’s assistant for ongoing care. If your symptoms worsen or you do not improve as expected and you are unable to reach your usual health care provider, you should return to the Emergency Department. We are available 24 hours a day.     Take this sheet with you when you go to your follow-up visit.     If you have any problem arranging the follow-up visit, contact the Emergency Department immediately.     Make an appointment your family doctor for follow up of this visit. Return to the ER if you are unable to be seen in a timely manner.     Below is a summary of your results.    Labs  Recent Results (from the past 12 hour(s))   CBC with Auto Differential    Collection Time: 11/14/24  2:26 PM   Result Value Ref Range    WBC 4.7 4.1 - 11.1 K/uL    RBC 3.70 (L) 4.10 - 5.70 M/uL    Hemoglobin 10.5 (L) 12.1 - 17.0 g/dL    Hematocrit 31.6 (L) 36.6 - 50.3 %    MCV 85.4 80.0 - 99.0 FL    MCH 28.4 26.0 - 34.0 PG    MCHC 33.2 30.0 - 36.5 g/dL    RDW 12.5 11.5 - 14.5 %    Platelets 268 150 - 400 K/uL    MPV 8.9 8.9 - 12.9 FL    Nucleated RBCs 0.0 0  WBC    nRBC 0.00 0.00 - 0.01 K/uL    Neutrophils % 54 32 - 75 %    Lymphocytes % 33 12 - 49 %    Monocytes % 8 5 - 13 %    Eosinophils % 4 0 - 7 %    Basophils % 1 0 - 1 %    Immature Granulocytes % 0 0.0 - 0.5 %

## 2024-11-14 NOTE — ED PROVIDER NOTES
[facility] EMERGENCY DEP  EMERGENCY DEPARTMENT ENCOUNTER      Date: (Not on file)  Patient Name: Alcon Zamora  MRN: [id number]  Birthdate 1960  Date of evaluation: 11/14/2024  Provider: NINA Newell NP   Note Started: 1:32 PM EST 11/14/24    CHIEF COMPLAINT     Chief Complaint   Patient presents with    Constipation       HISTORY OF PRESENT ILLNESS  (Onset, Location, Duration, Character, Alleviating/Aggravating, Radiation, Timing, Severity)   Note limiting factors.   History Provided By: Patient     HPI: Alcon Zamora is a 64 y.o. male with a history as reviewed below presents with constipation.  States ongoing for 9 days. Has taken \"a laxative\" for three days. States he was supposed to have a colonoscopy 10/24/2024 but didn't go. Was being evaluated for constipation. Has not followed up with PCP. Denies fevers, N/V/D, cp, sob, swelling, abd pain, blood in stool, testicular pain/swelling. States blood sugars have been normal.       Nursing Notes and triage vitals were reviewed.  PCP: Oz Finch MD      PAST MEDICAL HISTORY   Past Medical History:  Past Medical History:   Diagnosis Date    Alcohol abuse, in remission     Anxiety     Chronic kidney disease     PLAGUE L RENAL ARTERY    Chronic obstructive pulmonary disease (HCC)     Chronic pain     Constipation     CVA (cerebral vascular accident) (HCC)     Diabetes (HCC)     Type II    Dysphagia due to recent cerebrovascular accident (CVA)     Erectile dysfunction 2015    GERD (gastroesophageal reflux disease)     Hepatitis C     SVR s/p Harvoni, Negative PCR 04/24    Hypertension     Liver disease     HEP C TOOK HARVONI    Pancreatitis     PFO (patent foramen ovale)     Psychiatric disorder     PANIC ATTACKS    Seizure (HCC)     Smoker     Spondylitis (HCC)     Type 2 diabetes mellitus without complication (HCC) 2005       Past Surgical History:  Past Surgical History:   Procedure Laterality Date    APPENDECTOMY      COLONOSCOPY N/A

## 2024-11-14 NOTE — ED NOTES
Assumed care of patient. Pt resting in position of comfort. Call bell within reach.    Pt reports he for constipation x 9 days and before that 20 days. Pt has tried laxatives and stool softeners. Pt reports \"somewhat\" hx of constipation. Passing gas, good appetite and po intake

## 2024-11-19 ENCOUNTER — OFFICE VISIT (OUTPATIENT)
Age: 64
End: 2024-11-19
Payer: COMMERCIAL

## 2024-11-19 ENCOUNTER — TELEPHONE (OUTPATIENT)
Age: 64
End: 2024-11-19

## 2024-11-19 VITALS
WEIGHT: 216 LBS | HEART RATE: 92 BPM | SYSTOLIC BLOOD PRESSURE: 118 MMHG | DIASTOLIC BLOOD PRESSURE: 76 MMHG | BODY MASS INDEX: 24.99 KG/M2 | HEIGHT: 78 IN | OXYGEN SATURATION: 98 %

## 2024-11-19 DIAGNOSIS — I63.9 ACUTE CEREBROVASCULAR ACCIDENT (CVA) (HCC): ICD-10-CM

## 2024-11-19 DIAGNOSIS — R13.19 OTHER DYSPHAGIA: Chronic | ICD-10-CM

## 2024-11-19 DIAGNOSIS — K21.9 GASTROESOPHAGEAL REFLUX DISEASE WITHOUT ESOPHAGITIS: ICD-10-CM

## 2024-11-19 DIAGNOSIS — Q21.12 PFO (PATENT FORAMEN OVALE): ICD-10-CM

## 2024-11-19 DIAGNOSIS — I10 ESSENTIAL HYPERTENSION: Primary | ICD-10-CM

## 2024-11-19 PROCEDURE — 93010 ELECTROCARDIOGRAM REPORT: CPT | Performed by: SPECIALIST

## 2024-11-19 PROCEDURE — 3074F SYST BP LT 130 MM HG: CPT | Performed by: SPECIALIST

## 2024-11-19 PROCEDURE — 93005 ELECTROCARDIOGRAM TRACING: CPT | Performed by: SPECIALIST

## 2024-11-19 PROCEDURE — 3078F DIAST BP <80 MM HG: CPT | Performed by: SPECIALIST

## 2024-11-19 PROCEDURE — 99204 OFFICE O/P NEW MOD 45 MIN: CPT | Performed by: SPECIALIST

## 2024-11-19 RX ORDER — LACTULOSE 10 G/15ML
SOLUTION ORAL
COMMUNITY
Start: 2024-11-18

## 2024-11-19 ASSESSMENT — PATIENT HEALTH QUESTIONNAIRE - PHQ9
2. FEELING DOWN, DEPRESSED OR HOPELESS: NOT AT ALL
1. LITTLE INTEREST OR PLEASURE IN DOING THINGS: NOT AT ALL
SUM OF ALL RESPONSES TO PHQ QUESTIONS 1-9: 0
SUM OF ALL RESPONSES TO PHQ9 QUESTIONS 1 & 2: 0

## 2024-11-19 NOTE — PROGRESS NOTES
[facility]                                     DIVISION OF CARDIOLOGY                                                                             OFFICE NOTE                  GROVER WILSON M.D. , TERI            PACHECO SLOAN   1960  [id number]    Date/Time:  11/19/202412:18 PM      There were no vitals taken for this visit.       Wt Readings from Last 3 Encounters:   11/14/24 100.8 kg (222 lb 3.6 oz)   09/24/24 94.6 kg (208 lb 9.6 oz)   08/30/24 90.7 kg (200 lb)          Lab Results   Component Value Date    CHOL 133 08/30/2024    TRIG 62 08/30/2024    HDL 63 08/30/2024    VLDL 13 08/30/2024    CHOLHDLRATIO 4.4 02/18/2024              SUBJECTIVE:  He denies any chest pain or chest discomfort or shortness of breath or palpitations.  He does have some cognitive dysfunction following his strokes.  He is currently not working any longer used to be a .    Not very active at all       Assessment/Plan    1.  CVA: Patient CVA of unclear etiology at this point clearly PFO is a consideration.    Need that he is a smoker and diabetic and hypertension old that could lead to some issues.    And on the other hand given his age we need to consider possibly a PFO closure which apparently also has a large bidirectional shunt.    I do not think atrial fibrillation is the culprit but he is never done the monitor therefore we will proceed with 2 weeks Holter monitor.    Proceed with echocardiogram again to reevaluate PFO.    Refer to structural heart disease interventionalists for evaluation.    He noted that while in the hospital it was felt that the likelihood of stroke was mostly related to uncontrolled vascular risk factors.    He did have some she seizure for which has been seen by neurology.    Asking to stop smoking.    Today's EKG shows a normal sinus rhythm with LVH 1 PACs no acute ischemic changes.    Blood pressure seems to be well-controlled today.    2.  Diabetes: As per

## 2024-11-19 NOTE — TELEPHONE ENCOUNTER
Enrolled with [employer] - Ordered and being shipped to patient's home address on file.  ETA within 5-7 business days.        Message  Received: Today  Julia Ring, Mirian Arias  Please send 2 week monitor

## 2024-11-19 NOTE — PATIENT INSTRUCTIONS
Please have an echocardiogram    A monitor has been ordered for you.  It should arrive in about 7-10 business days. Please call our office if you do not receive it after two weeks. This will be mailed to the address given to us.    Please make an appointment with Dr. Julito Machado about PFO    Follow up in about 4-6 weeks

## 2024-11-19 NOTE — PROGRESS NOTES
1. Have you been to the ER, urgent care clinic since your last visit?  Hospitalized since your last visit?Yes 11/14/2024 for constipation     2. Have you seen or consulted any other health care providers outside of the [facility] since your last visit?  Include any pap smears or colon screening. No

## 2024-11-22 ENCOUNTER — HOSPITAL ENCOUNTER (OUTPATIENT)
Age: 64
Discharge: HOME | End: 2024-11-22
Payer: COMMERCIAL

## 2024-11-22 DIAGNOSIS — R91.8 PULMONARY NODULES: ICD-10-CM

## 2024-11-22 PROCEDURE — 71250 CT THORAX DX C-: CPT

## 2024-12-09 DIAGNOSIS — I69.993 CVA, OLD, ATAXIA: ICD-10-CM

## 2024-12-09 DIAGNOSIS — E11.40 TYPE 2 DIABETES MELLITUS WITH DIABETIC NEUROPATHY, WITHOUT LONG-TERM CURRENT USE OF INSULIN (HCC): ICD-10-CM

## 2024-12-09 RX ORDER — ATORVASTATIN CALCIUM 40 MG/1
TABLET, FILM COATED ORAL
Qty: 180 TABLET | Refills: 0 | Status: SHIPPED | OUTPATIENT
Start: 2024-12-09

## 2024-12-31 ENCOUNTER — ANCILLARY PROCEDURE (OUTPATIENT)
Age: 64
End: 2024-12-31
Payer: COMMERCIAL

## 2024-12-31 VITALS
DIASTOLIC BLOOD PRESSURE: 78 MMHG | SYSTOLIC BLOOD PRESSURE: 140 MMHG | HEIGHT: 78 IN | WEIGHT: 216 LBS | BODY MASS INDEX: 24.99 KG/M2

## 2024-12-31 DIAGNOSIS — Q21.12 PFO (PATENT FORAMEN OVALE): ICD-10-CM

## 2024-12-31 LAB
ECHO AO ASC DIAM: 3.8 CM
ECHO AO ASCENDING AORTA INDEX: 1.63 CM/M2
ECHO AO ROOT DIAM: 4.1 CM
ECHO AO ROOT INDEX: 1.76 CM/M2
ECHO AV AREA PEAK VELOCITY: 2.9 CM2
ECHO AV AREA VTI: 3.5 CM2
ECHO AV AREA/BSA PEAK VELOCITY: 1.2 CM2/M2
ECHO AV AREA/BSA VTI: 1.5 CM2/M2
ECHO AV MEAN GRADIENT: 4 MMHG
ECHO AV MEAN VELOCITY: 1 M/S
ECHO AV PEAK GRADIENT: 10 MMHG
ECHO AV PEAK VELOCITY: 1.6 M/S
ECHO AV VELOCITY RATIO: 0.69
ECHO AV VTI: 28.2 CM
ECHO BSA: 2.32 M2
ECHO LA DIAMETER INDEX: 1.55 CM/M2
ECHO LA DIAMETER: 3.6 CM
ECHO LA TO AORTIC ROOT RATIO: 0.88
ECHO LA VOL A-L A2C: 91 ML (ref 18–58)
ECHO LA VOL A-L A4C: 101 ML (ref 18–58)
ECHO LA VOL MOD A2C: 88 ML (ref 18–58)
ECHO LA VOL MOD A4C: 94 ML (ref 18–58)
ECHO LA VOLUME AREA LENGTH: 99 ML
ECHO LA VOLUME INDEX A-L A2C: 39 ML/M2 (ref 16–34)
ECHO LA VOLUME INDEX A-L A4C: 43 ML/M2 (ref 16–34)
ECHO LA VOLUME INDEX AREA LENGTH: 42 ML/M2 (ref 16–34)
ECHO LA VOLUME INDEX MOD A2C: 38 ML/M2 (ref 16–34)
ECHO LA VOLUME INDEX MOD A4C: 40 ML/M2 (ref 16–34)
ECHO LV E' LATERAL VELOCITY: 9.5 CM/S
ECHO LV E' SEPTAL VELOCITY: 8.48 CM/S
ECHO LV EDV A2C: 91 ML
ECHO LV EDV A4C: 103 ML
ECHO LV EDV BP: 97 ML (ref 67–155)
ECHO LV EDV INDEX A4C: 44 ML/M2
ECHO LV EDV INDEX BP: 42 ML/M2
ECHO LV EDV NDEX A2C: 39 ML/M2
ECHO LV EJECTION FRACTION A2C: 60 %
ECHO LV EJECTION FRACTION A4C: 55 %
ECHO LV EJECTION FRACTION BIPLANE: 57 % (ref 55–100)
ECHO LV ESV A2C: 36 ML
ECHO LV ESV A4C: 46 ML
ECHO LV ESV BP: 41 ML (ref 22–58)
ECHO LV ESV INDEX A2C: 15 ML/M2
ECHO LV ESV INDEX A4C: 20 ML/M2
ECHO LV ESV INDEX BP: 18 ML/M2
ECHO LV FRACTIONAL SHORTENING: 33 % (ref 28–44)
ECHO LV INTERNAL DIMENSION DIASTOLE INDEX: 2.23 CM/M2
ECHO LV INTERNAL DIMENSION DIASTOLIC: 5.2 CM (ref 4.2–5.9)
ECHO LV INTERNAL DIMENSION SYSTOLIC INDEX: 1.5 CM/M2
ECHO LV INTERNAL DIMENSION SYSTOLIC: 3.5 CM
ECHO LV IVSD: 1.3 CM (ref 0.6–1)
ECHO LV MASS 2D: 248.8 G (ref 88–224)
ECHO LV MASS INDEX 2D: 106.8 G/M2 (ref 49–115)
ECHO LV POSTERIOR WALL DIASTOLIC: 1.1 CM (ref 0.6–1)
ECHO LV RELATIVE WALL THICKNESS RATIO: 0.42
ECHO LVOT AREA: 4.2 CM2
ECHO LVOT AV VTI INDEX: 0.84
ECHO LVOT DIAM: 2.3 CM
ECHO LVOT MEAN GRADIENT: 2 MMHG
ECHO LVOT PEAK GRADIENT: 5 MMHG
ECHO LVOT PEAK VELOCITY: 1.1 M/S
ECHO LVOT STROKE VOLUME INDEX: 42.4 ML/M2
ECHO LVOT SV: 98.8 ML
ECHO LVOT VTI: 23.8 CM
ECHO MV A VELOCITY: 0.65 M/S
ECHO MV AREA PHT: 1.6 CM2
ECHO MV AREA VTI: 4.4 CM2
ECHO MV E DECELERATION TIME (DT): 470.9 MS
ECHO MV E VELOCITY: 0.44 M/S
ECHO MV E/A RATIO: 0.68
ECHO MV E/E' LATERAL: 4.63
ECHO MV E/E' RATIO (AVERAGED): 4.91
ECHO MV E/E' SEPTAL: 5.19
ECHO MV LVOT VTI INDEX: 0.94
ECHO MV MAX VELOCITY: 0.7 M/S
ECHO MV MEAN GRADIENT: 1 MMHG
ECHO MV MEAN VELOCITY: 0.3 M/S
ECHO MV PEAK GRADIENT: 2 MMHG
ECHO MV PRESSURE HALF TIME (PHT): 136.6 MS
ECHO MV VTI: 22.3 CM
ECHO PV MAX VELOCITY: 1 M/S
ECHO PV PEAK GRADIENT: 4 MMHG
ECHO RV FREE WALL PEAK S': 14.5 CM/S
ECHO RV INTERNAL DIMENSION: 4.2 CM
ECHO RV TAPSE: 2.9 CM (ref 1.7–?)

## 2024-12-31 PROCEDURE — 93306 TTE W/DOPPLER COMPLETE: CPT | Performed by: INTERNAL MEDICINE

## 2025-01-06 ENCOUNTER — CLINICAL DOCUMENTATION (OUTPATIENT)
Age: 65
End: 2025-01-06

## 2025-01-06 NOTE — PROGRESS NOTES
Received medical records request from [employer] for insurance coverage for Holter monitor. Faxed office visit from 11/19/2024 with Dr. Tai to [phone].

## 2025-01-07 RX ORDER — BLOOD SUGAR DIAGNOSTIC
STRIP MISCELLANEOUS
COMMUNITY
Start: 2024-12-09

## 2025-01-09 ENCOUNTER — OFFICE VISIT (OUTPATIENT)
Age: 65
End: 2025-01-09
Payer: COMMERCIAL

## 2025-01-09 ENCOUNTER — TELEPHONE (OUTPATIENT)
Age: 65
End: 2025-01-09

## 2025-01-09 VITALS
HEART RATE: 67 BPM | SYSTOLIC BLOOD PRESSURE: 126 MMHG | BODY MASS INDEX: 25.5 KG/M2 | TEMPERATURE: 97.8 F | RESPIRATION RATE: 18 BRPM | OXYGEN SATURATION: 96 % | WEIGHT: 220.4 LBS | DIASTOLIC BLOOD PRESSURE: 77 MMHG | HEIGHT: 78 IN

## 2025-01-09 DIAGNOSIS — I10 ESSENTIAL HYPERTENSION: ICD-10-CM

## 2025-01-09 DIAGNOSIS — R56.9 SEIZURE (HCC): ICD-10-CM

## 2025-01-09 DIAGNOSIS — R91.1 LUNG NODULE: ICD-10-CM

## 2025-01-09 DIAGNOSIS — E11.21 TYPE 2 DIABETES WITH NEPHROPATHY (HCC): Primary | ICD-10-CM

## 2025-01-09 DIAGNOSIS — D50.9 IRON DEFICIENCY ANEMIA, UNSPECIFIED IRON DEFICIENCY ANEMIA TYPE: ICD-10-CM

## 2025-01-09 DIAGNOSIS — Q21.12 PFO (PATENT FORAMEN OVALE): ICD-10-CM

## 2025-01-09 PROCEDURE — 3074F SYST BP LT 130 MM HG: CPT

## 2025-01-09 PROCEDURE — 99214 OFFICE O/P EST MOD 30 MIN: CPT

## 2025-01-09 PROCEDURE — 3078F DIAST BP <80 MM HG: CPT

## 2025-01-09 SDOH — ECONOMIC STABILITY: FOOD INSECURITY: WITHIN THE PAST 12 MONTHS, THE FOOD YOU BOUGHT JUST DIDN'T LAST AND YOU DIDN'T HAVE MONEY TO GET MORE.: NEVER TRUE

## 2025-01-09 SDOH — ECONOMIC STABILITY: FOOD INSECURITY: WITHIN THE PAST 12 MONTHS, YOU WORRIED THAT YOUR FOOD WOULD RUN OUT BEFORE YOU GOT MONEY TO BUY MORE.: NEVER TRUE

## 2025-01-09 ASSESSMENT — PATIENT HEALTH QUESTIONNAIRE - PHQ9
SUM OF ALL RESPONSES TO PHQ QUESTIONS 1-9: 0
SUM OF ALL RESPONSES TO PHQ QUESTIONS 1-9: 0
1. LITTLE INTEREST OR PLEASURE IN DOING THINGS: NOT AT ALL
SUM OF ALL RESPONSES TO PHQ QUESTIONS 1-9: 0
SUM OF ALL RESPONSES TO PHQ9 QUESTIONS 1 & 2: 0
SUM OF ALL RESPONSES TO PHQ QUESTIONS 1-9: 0
2. FEELING DOWN, DEPRESSED OR HOPELESS: NOT AT ALL

## 2025-01-09 ASSESSMENT — ENCOUNTER SYMPTOMS
COUGH: 0
CONSTIPATION: 0
SORE THROAT: 0
RHINORRHEA: 0
SHORTNESS OF BREATH: 0
NAUSEA: 0
DIARRHEA: 0
ABDOMINAL PAIN: 0
WHEEZING: 0
BACK PAIN: 0
VOMITING: 0

## 2025-01-09 NOTE — ASSESSMENT & PLAN NOTE
Chronic, at goal (stable), continue current treatment plan    Orders:    metFORMIN (GLUCOPHAGE) 500 MG tablet; Take 1 tablet by mouth 2 times daily (with meals)

## 2025-01-09 NOTE — TELEPHONE ENCOUNTER
----- Message from Dr. Mckay Rivera MD sent at 12/31/2024  2:21 PM EST -----  Echo on pt of yours with clear PFO, bubbles cross after 2 beats. Will let you decide how to proceed. thx

## 2025-01-09 NOTE — PATIENT INSTRUCTIONS
Your A1c is very well-controlled with Metformin, no need to keep checking your blood sugars with fingersticks. You really only tamir t check blood sugars at home if you are on insulin.    Please call your GI provider about rescheduling the colonoscopy that was cancelled in October.

## 2025-01-09 NOTE — ASSESSMENT & PLAN NOTE
Chronic, at goal (stable), Advised him to ensure to reschedule colonoscopy that was cancelled 10/2024. Continue iron supplement in interim.

## 2025-01-09 NOTE — TELEPHONE ENCOUNTER
Per Dr. Tai.  Inform patient of echo results and need to keep upcoming appt with Dr. Machado.    Attempted to reach patient by telephone. A message was left for return call.     [employer] message sent.

## 2025-01-09 NOTE — PROGRESS NOTES
he was admitted for 02/24, I referred him to Cardiology for concerns of JOSEMANUEL on 2/21/24 with EF 60-65%, diastolic dysfunction, + bubble study with stretched PFO with a large bidirectional interatrial shunt. He then saw Dr. Tai 11/19/24, where he was referred to Interventional Cardiology for consideration of PFO closure. He also had a Holter monitor to evaluate for Afib which was negative. Repeat TTE 12/31 again demonstrated PFO.    MARIAMA: 09/2024 labs c.w iron deficiency, sent rx for Fe sulfate and advised pt to follow-up with GI. Follow-up colonoscopy in 10/24 appears to have been cancelled, he is unsure if it was rescheduled. This was initially scheduled as a follow-up to one performed 04/2024 during an admission for colitis. Denies melena/hematochezia today.    Abnormal CT imaging: Baseline LDCT with stable nodule, did note some nonspecific inflammatory changes, which he was referred to Pulm for. He saw Dr. Nolasco with resolution of these findings in 11/2024. He will see Pulm annually from here on out.    Health Maintenance  Immunizations:   Covid: up to date, will plan on getting this fall  Influenza: up to date   Tetanus: up to date    Gardasil: n/a  Pneumonia: guidelines reviewed - will consider  Cancer screening:   Reviewed testicular, prostate, and colon cancer screening guidelines.    The following sections were reviewed & updated as appropriate: Problem List, Allergies, PMH, PSH, FH, and SH.    Prior to Admission medications    Medication Sig Start Date End Date Taking? Authorizing Provider   ONETOUCH ULTRA TEST strip  12/9/24  Yes Ruchi Sandra MD   atorvastatin (LIPITOR) 40 MG tablet TAKE TWO TABLETS BY MOUTH EVERY EVENING 12/9/24   Amauri Mahmood MD   lactulose (CHRONULAC) 10 GM/15ML solution  11/18/24   ProviderRuchi MD   polyethylene glycol (GLYCOLAX) 17 GM/SCOOP powder Take 17 g by mouth daily  Patient not taking: Reported on 11/19/2024 11/14/24   [place], NINA - NP

## 2025-01-22 ENCOUNTER — OFFICE VISIT (OUTPATIENT)
Age: 65
End: 2025-01-22
Payer: COMMERCIAL

## 2025-01-22 VITALS
SYSTOLIC BLOOD PRESSURE: 130 MMHG | BODY MASS INDEX: 25.34 KG/M2 | RESPIRATION RATE: 16 BRPM | OXYGEN SATURATION: 96 % | WEIGHT: 219 LBS | HEART RATE: 92 BPM | HEIGHT: 78 IN | DIASTOLIC BLOOD PRESSURE: 78 MMHG

## 2025-01-22 DIAGNOSIS — E11.40 TYPE 2 DIABETES MELLITUS WITH DIABETIC NEUROPATHY, WITHOUT LONG-TERM CURRENT USE OF INSULIN (HCC): ICD-10-CM

## 2025-01-22 DIAGNOSIS — I69.993 CVA, OLD, ATAXIA: ICD-10-CM

## 2025-01-22 PROCEDURE — 3078F DIAST BP <80 MM HG: CPT | Performed by: INTERNAL MEDICINE

## 2025-01-22 PROCEDURE — 3075F SYST BP GE 130 - 139MM HG: CPT | Performed by: INTERNAL MEDICINE

## 2025-01-22 PROCEDURE — 99205 OFFICE O/P NEW HI 60 MIN: CPT | Performed by: INTERNAL MEDICINE

## 2025-01-22 PROCEDURE — 99215 OFFICE O/P EST HI 40 MIN: CPT | Performed by: INTERNAL MEDICINE

## 2025-01-22 RX ORDER — FAMOTIDINE 20 MG/1
20 TABLET, FILM COATED ORAL DAILY
COMMUNITY

## 2025-01-22 RX ORDER — ATORVASTATIN CALCIUM 80 MG/1
80 TABLET, FILM COATED ORAL DAILY
Qty: 1 TABLET | Refills: 0 | Status: SHIPPED
Start: 2025-01-22

## 2025-01-22 ASSESSMENT — PATIENT HEALTH QUESTIONNAIRE - PHQ9
SUM OF ALL RESPONSES TO PHQ QUESTIONS 1-9: 0
2. FEELING DOWN, DEPRESSED OR HOPELESS: NOT AT ALL
SUM OF ALL RESPONSES TO PHQ QUESTIONS 1-9: 0
SUM OF ALL RESPONSES TO PHQ QUESTIONS 1-9: 0
1. LITTLE INTEREST OR PLEASURE IN DOING THINGS: NOT AT ALL
SUM OF ALL RESPONSES TO PHQ9 QUESTIONS 1 & 2: 0
SUM OF ALL RESPONSES TO PHQ QUESTIONS 1-9: 0

## 2025-01-22 NOTE — PROGRESS NOTES
Dr. Carter Lawton [facility] Cardiology.  [phone]            Cardiology structural heart consult/Progress Note      Requesting/referring provider: Oz Finch MD Dr. Giusti    Reason for Consult: PFO    Assessment/Plan:  1.  History of multiple strokes multifocal etiology, suspected to be possibly embolic  2.  Last stroke in February 2024 involving basal ganglia and posterior circulation region  3. Small vessel disease causing microvascular changes in the brain  4.  History of seizures on Keppra  5.  History of diabetes and hypertension  6.  Large stretched PFO with bidirectional shunt and history of hypoxemia  7.  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  He had 10 poorly distributed angiographically distributed strokes concerning for possible embolic event.  Nonetheless he has other risk factors for stroke including diabetes and hypertension.  He had elevated blood pressures at the time of stroke but uncertain if it was secondary to stroke or high blood pressures were the cause of stroke.  He did not have any hemorrhage noted on the imaging assessment by me.  While his rope score is low, probability of embolic events is not low.  He has a large shunt through his PFO and probability of paradoxical embolism cannot be completely excluded.    If neurology agrees that he has a high probability of his prior strokes being cardio embolic in etiology, reasonable to proceed with PFO closure.  However if the consensus is that his strokes are probably related to hypertensive and diabetic small vessel disease, consider deferral of PFO closure.    Investigations ordered    []    High complexity decision making was performed  []    Patient is at high-risk of decompensation with multiple organ involvement  []    Complex/difficult social determinants of health outcomes  Total of 62 minutes were spent on reviewing the records,

## 2025-01-22 NOTE — PATIENT INSTRUCTIONS
Take amlodipine 10mg daily     Take atorvastatin 80mg daily    When you see neurology please ask them to communicate with Dr. Machado (cardiology) regarding PFO closure if we have not already called you

## 2025-02-07 ENCOUNTER — TELEPHONE (OUTPATIENT)
Age: 65
End: 2025-02-07

## 2025-02-07 DIAGNOSIS — I63.9 ACUTE CEREBROVASCULAR ACCIDENT (CVA) (HCC): ICD-10-CM

## 2025-02-07 DIAGNOSIS — Q21.12 PFO (PATENT FORAMEN OVALE): Primary | ICD-10-CM

## 2025-02-07 NOTE — TELEPHONE ENCOUNTER
Telephone call made to patient's mother (POA) on PHI. Two patient identifiers verified. They wish to proceed with PFO closure.

## 2025-02-07 NOTE — TELEPHONE ENCOUNTER
----- Message from Dr. Julito Machado MD sent at 2/7/2025 11:10 AM EST -----  That is very helpful Dr. Mahmood.  Technically his PFO is high risk given large shunt through it.    Erma, you can inform the patient that I had a discussion with neurology and they feel that it may be reasonable to consider PFO closure.  I will addend my note to add information that Dr. Mahmood has provided.  ----- Message -----  From: Amauri Mahmood MD  Sent: 1/27/2025   1:16 PM EST  To: MD Donnell Ortega  I looked at his images again and most of his strokes are very deep/subcortical lower in the brainstem which are typically due to small vessel disease.  I do see some old changes near the cortex which could potentially be embolic.  Difficult to say but in this case a higher risk PFO should be considered for closure.  ----- Message -----  From: Julito Machado MD  Sent: 1/22/2025   1:49 PM EST  To: MD Donnell Cedeño, is there a way for you to comment whether this gentleman's MRI/clinical presentation is consistent with embolic strokes or is it likely related to underlying diabetes and hypertension.  He does have fairly large PFO and while he does have microvascular disease likely from a combination of L alcohol, diabetes, hypertension, I need to give her stents whether PFO needs to be closed.  If his February 2024 event was likely embolic then we will consider closure.

## 2025-02-11 PROBLEM — Q21.12 PATENT FORAMEN OVALE: Status: ACTIVE | Noted: 2025-02-07

## 2025-02-11 PROBLEM — I63.9 CVA (CEREBRAL VASCULAR ACCIDENT) (HCC): Status: ACTIVE | Noted: 2025-02-07

## 2025-02-21 NOTE — PROGRESS NOTES
Neurology Clinic Follow up Note    Patient ID:   Alcon Zamora  1960  65 y.o. male  [id number]      Chief Complaint   Patient presents with    Follow-up     Hx of CVA  Stable seizure       Last Appointment With Me:    None, last seen by Dr Mahmood 8/30/24  \" Mr. Alcon Zamora was found to have multiple acute on chronic deep subcortical infarcts in the basal ganglia, cerebral peduncle and brainstem back in February 2024.  I concur that these were likely related to underlying small vessel atherosclerotic vascular disease.  His risk factors include diabetes, hypertension, dyslipidemia.  His last LDL on file was from January 2023 in the 130 range.   Maintain aspirin and atorvastatin at least 40 mg daily.  I have ordered a repeat lipid panel  Regular follow-up with PCP for risk factor control.  Last hemoglobin A1c 5.9  His echocardiogram has shown a PFO but this is being managed medically for now as this was not suspected to be the etiology of the strokes seen on imaging  Fortunately he has recovered well and does not have any deficits except for some gait unsteadiness which is his baseline     He also had 2 episodes of witnessed generalized seizure activity while he was hospitalized which may have been due to underlying COVID-19 infection, metabolic derangements, acute phase of the stroke etc.  Maintain Keppra 1000 mg twice daily for now and check level  Check prolonged 24-hour EEG and if it is negative, we can consider weaning it down  We did review the driving restrictions and precautions with seizure disorder including state law of no driving for 6 months.  Since he is 6 months out, he may resume driving but if the seizure reoccurs, this will have to be suspended again \"    24 h EEG completed 9/17/24  CLINICAL INTERPRETATION: This EEG shows intermittent background slowing which is a nonspecific finding typically due to an underlying deep subcortical structural cause and/or toxic/metabolic abnormality. No clearly

## 2025-02-24 ENCOUNTER — OFFICE VISIT (OUTPATIENT)
Age: 65
End: 2025-02-24
Payer: COMMERCIAL

## 2025-02-24 VITALS
HEART RATE: 70 BPM | WEIGHT: 220 LBS | RESPIRATION RATE: 16 BRPM | DIASTOLIC BLOOD PRESSURE: 62 MMHG | SYSTOLIC BLOOD PRESSURE: 106 MMHG | HEIGHT: 78 IN | OXYGEN SATURATION: 96 % | BODY MASS INDEX: 25.45 KG/M2

## 2025-02-24 DIAGNOSIS — E11.40 TYPE 2 DIABETES MELLITUS WITH DIABETIC NEUROPATHY, WITHOUT LONG-TERM CURRENT USE OF INSULIN (HCC): ICD-10-CM

## 2025-02-24 DIAGNOSIS — I69.993 CVA, OLD, ATAXIA: Primary | ICD-10-CM

## 2025-02-24 DIAGNOSIS — R56.9 SEIZURE (HCC): ICD-10-CM

## 2025-02-24 PROCEDURE — 3074F SYST BP LT 130 MM HG: CPT

## 2025-02-24 PROCEDURE — 3078F DIAST BP <80 MM HG: CPT

## 2025-02-24 PROCEDURE — 99215 OFFICE O/P EST HI 40 MIN: CPT

## 2025-02-24 RX ORDER — LEVETIRACETAM 500 MG/1
1000 TABLET ORAL 2 TIMES DAILY
Qty: 360 TABLET | Refills: 1 | Status: SHIPPED | OUTPATIENT
Start: 2025-02-24

## 2025-02-24 RX ORDER — ASPIRIN 81 MG/1
81 TABLET, CHEWABLE ORAL DAILY
Qty: 30 TABLET | Refills: 0 | Status: SHIPPED | OUTPATIENT
Start: 2025-02-24 | End: 2025-02-24

## 2025-02-24 RX ORDER — ASPIRIN 81 MG/1
81 TABLET, CHEWABLE ORAL DAILY
Qty: 90 TABLET | Refills: 1 | Status: SHIPPED | OUTPATIENT
Start: 2025-02-24

## 2025-02-24 ASSESSMENT — PATIENT HEALTH QUESTIONNAIRE - PHQ9
SUM OF ALL RESPONSES TO PHQ QUESTIONS 1-9: 0
1. LITTLE INTEREST OR PLEASURE IN DOING THINGS: NOT AT ALL
SUM OF ALL RESPONSES TO PHQ QUESTIONS 1-9: 0
SUM OF ALL RESPONSES TO PHQ9 QUESTIONS 1 & 2: 0
2. FEELING DOWN, DEPRESSED OR HOPELESS: NOT AT ALL

## 2025-02-25 DIAGNOSIS — Q21.12 PFO (PATENT FORAMEN OVALE): ICD-10-CM

## 2025-02-25 DIAGNOSIS — I63.9 ACUTE CEREBROVASCULAR ACCIDENT (CVA) (HCC): ICD-10-CM

## 2025-02-26 LAB
ALBUMIN SERPL-MCNC: 4.5 G/DL (ref 3.9–4.9)
ALP SERPL-CCNC: 71 IU/L (ref 44–121)
ALT SERPL-CCNC: 10 IU/L (ref 0–44)
AST SERPL-CCNC: 14 IU/L (ref 0–40)
BASOPHILS # BLD AUTO: 0.1 X10E3/UL (ref 0–0.2)
BASOPHILS NFR BLD AUTO: 1 %
BILIRUB SERPL-MCNC: 0.5 MG/DL (ref 0–1.2)
BUN SERPL-MCNC: 13 MG/DL (ref 8–27)
BUN/CREAT SERPL: 12 (ref 10–24)
CALCIUM SERPL-MCNC: 9.8 MG/DL (ref 8.6–10.2)
CHLORIDE SERPL-SCNC: 99 MMOL/L (ref 96–106)
CO2 SERPL-SCNC: 23 MMOL/L (ref 20–29)
CREAT SERPL-MCNC: 1.06 MG/DL (ref 0.76–1.27)
EGFRCR SERPLBLD CKD-EPI 2021: 79 ML/MIN/1.73
EOSINOPHIL # BLD AUTO: 0.2 X10E3/UL (ref 0–0.4)
EOSINOPHIL NFR BLD AUTO: 3 %
ERYTHROCYTE [DISTWIDTH] IN BLOOD BY AUTOMATED COUNT: 14 % (ref 11.6–15.4)
GLOBULIN SER CALC-MCNC: 3 G/DL (ref 1.5–4.5)
GLUCOSE SERPL-MCNC: 122 MG/DL (ref 70–99)
HCT VFR BLD AUTO: 38.5 % (ref 37.5–51)
HGB BLD-MCNC: 12.7 G/DL (ref 13–17.7)
IMM GRANULOCYTES # BLD AUTO: 0 X10E3/UL (ref 0–0.1)
IMM GRANULOCYTES NFR BLD AUTO: 0 %
LYMPHOCYTES # BLD AUTO: 1.9 X10E3/UL (ref 0.7–3.1)
LYMPHOCYTES NFR BLD AUTO: 28 %
MCH RBC QN AUTO: 29.5 PG (ref 26.6–33)
MCHC RBC AUTO-ENTMCNC: 33 G/DL (ref 31.5–35.7)
MCV RBC AUTO: 90 FL (ref 79–97)
MONOCYTES # BLD AUTO: 0.4 X10E3/UL (ref 0.1–0.9)
MONOCYTES NFR BLD AUTO: 7 %
NEUTROPHILS # BLD AUTO: 4 X10E3/UL (ref 1.4–7)
NEUTROPHILS NFR BLD AUTO: 61 %
PLATELET # BLD AUTO: 314 X10E3/UL (ref 150–450)
POTASSIUM SERPL-SCNC: 4.2 MMOL/L (ref 3.5–5.2)
PROT SERPL-MCNC: 7.5 G/DL (ref 6–8.5)
RBC # BLD AUTO: 4.3 X10E6/UL (ref 4.14–5.8)
SODIUM SERPL-SCNC: 141 MMOL/L (ref 134–144)
WBC # BLD AUTO: 6.6 X10E3/UL (ref 3.4–10.8)

## 2025-02-28 ENCOUNTER — PREP FOR PROCEDURE (OUTPATIENT)
Age: 65
End: 2025-02-28

## 2025-02-28 RX ORDER — SODIUM CHLORIDE 0.9 % (FLUSH) 0.9 %
5-40 SYRINGE (ML) INJECTION EVERY 12 HOURS SCHEDULED
Status: CANCELLED | OUTPATIENT
Start: 2025-02-28

## 2025-03-04 ENCOUNTER — HOSPITAL ENCOUNTER (OUTPATIENT)
Facility: HOSPITAL | Age: 65
Discharge: HOME | End: 2025-03-04
Attending: INTERNAL MEDICINE | Admitting: INTERNAL MEDICINE
Payer: COMMERCIAL

## 2025-03-04 VITALS
BODY MASS INDEX: 25.45 KG/M2 | SYSTOLIC BLOOD PRESSURE: 159 MMHG | WEIGHT: 220 LBS | OXYGEN SATURATION: 99 % | RESPIRATION RATE: 12 BRPM | DIASTOLIC BLOOD PRESSURE: 90 MMHG | HEART RATE: 60 BPM | TEMPERATURE: 97.5 F | HEIGHT: 78 IN

## 2025-03-04 DIAGNOSIS — Z87.74 S/P PATENT FORAMEN OVALE CLOSURE: ICD-10-CM

## 2025-03-04 DIAGNOSIS — Q21.12 PATENT FORAMEN OVALE: ICD-10-CM

## 2025-03-04 DIAGNOSIS — Q21.12 PFO (PATENT FORAMEN OVALE): Primary | ICD-10-CM

## 2025-03-04 DIAGNOSIS — I63.9 CVA (CEREBRAL VASCULAR ACCIDENT) (HCC): ICD-10-CM

## 2025-03-04 LAB
ACT BLD: 245 SECS (ref 79–138)
ACT BLD: 291 SECS (ref 79–138)
ECHO BSA: 2.34 M2
GLUCOSE BLD STRIP.AUTO-MCNC: 159 MG/DL (ref 65–117)
INR PPP: 1.1 (ref 0.9–1.1)
PROTHROMBIN TIME: 11.2 SEC (ref 9.2–11.2)
SERVICE CMNT-IMP: ABNORMAL

## 2025-03-04 PROCEDURE — 2500000003 HC RX 250 WO HCPCS: Performed by: INTERNAL MEDICINE

## 2025-03-04 PROCEDURE — 85610 PROTHROMBIN TIME: CPT

## 2025-03-04 PROCEDURE — 2580000003 HC RX 258: Performed by: INTERNAL MEDICINE

## 2025-03-04 PROCEDURE — C1894 INTRO/SHEATH, NON-LASER: HCPCS | Performed by: INTERNAL MEDICINE

## 2025-03-04 PROCEDURE — 6360000004 HC RX CONTRAST MEDICATION: Performed by: INTERNAL MEDICINE

## 2025-03-04 PROCEDURE — 99153 MOD SED SAME PHYS/QHP EA: CPT | Performed by: INTERNAL MEDICINE

## 2025-03-04 PROCEDURE — 82962 GLUCOSE BLOOD TEST: CPT

## 2025-03-04 PROCEDURE — C1725 CATH, TRANSLUMIN NON-LASER: HCPCS | Performed by: INTERNAL MEDICINE

## 2025-03-04 PROCEDURE — 2709999900 HC NON-CHARGEABLE SUPPLY: Performed by: INTERNAL MEDICINE

## 2025-03-04 PROCEDURE — 85347 COAGULATION TIME ACTIVATED: CPT

## 2025-03-04 PROCEDURE — 6370000000 HC RX 637 (ALT 250 FOR IP): Performed by: INTERNAL MEDICINE

## 2025-03-04 PROCEDURE — 93580 TRANSCATH CLOSURE OF ASD: CPT | Performed by: INTERNAL MEDICINE

## 2025-03-04 PROCEDURE — 99152 MOD SED SAME PHYS/QHP 5/>YRS: CPT | Performed by: INTERNAL MEDICINE

## 2025-03-04 PROCEDURE — 93662 INTRACARDIAC ECG (ICE): CPT | Performed by: INTERNAL MEDICINE

## 2025-03-04 PROCEDURE — 76937 US GUIDE VASCULAR ACCESS: CPT | Performed by: INTERNAL MEDICINE

## 2025-03-04 PROCEDURE — C1759 CATH, INTRA ECHOCARDIOGRAPHY: HCPCS | Performed by: INTERNAL MEDICINE

## 2025-03-04 PROCEDURE — C1760 CLOSURE DEV, VASC: HCPCS | Performed by: INTERNAL MEDICINE

## 2025-03-04 PROCEDURE — 6360000002 HC RX W HCPCS: Performed by: INTERNAL MEDICINE

## 2025-03-04 PROCEDURE — C1817 SEPTAL DEFECT IMP SYS: HCPCS | Performed by: INTERNAL MEDICINE

## 2025-03-04 PROCEDURE — C1769 GUIDE WIRE: HCPCS | Performed by: INTERNAL MEDICINE

## 2025-03-04 PROCEDURE — C1713 ANCHOR/SCREW BN/BN,TIS/BN: HCPCS | Performed by: INTERNAL MEDICINE

## 2025-03-04 PROCEDURE — 93451 RIGHT HEART CATH: CPT | Performed by: INTERNAL MEDICINE

## 2025-03-04 DEVICE — CARDIOFORM SEPTAL OCCLUDER 25MM 10FR
Type: IMPLANTABLE DEVICE | Status: FUNCTIONAL
Brand: GORE CARDIOFORM SEPTAL OCCLUDER

## 2025-03-04 RX ORDER — LIDOCAINE HYDROCHLORIDE 10 MG/ML
INJECTION, SOLUTION INFILTRATION; PERINEURAL PRN
Status: DISCONTINUED | OUTPATIENT
Start: 2025-03-04 | End: 2025-03-04 | Stop reason: HOSPADM

## 2025-03-04 RX ORDER — MIDAZOLAM HYDROCHLORIDE 1 MG/ML
INJECTION, SOLUTION INTRAMUSCULAR; INTRAVENOUS PRN
Status: DISCONTINUED | OUTPATIENT
Start: 2025-03-04 | End: 2025-03-04 | Stop reason: HOSPADM

## 2025-03-04 RX ORDER — ACETAMINOPHEN 325 MG/1
650 TABLET ORAL EVERY 4 HOURS PRN
Status: DISCONTINUED | OUTPATIENT
Start: 2025-03-04 | End: 2025-03-04 | Stop reason: HOSPADM

## 2025-03-04 RX ORDER — SODIUM CHLORIDE 9 MG/ML
INJECTION, SOLUTION INTRAVENOUS CONTINUOUS PRN
Status: COMPLETED | OUTPATIENT
Start: 2025-03-04 | End: 2025-03-04

## 2025-03-04 RX ORDER — HEPARIN SODIUM 1000 [USP'U]/ML
INJECTION, SOLUTION INTRAVENOUS; SUBCUTANEOUS PRN
Status: DISCONTINUED | OUTPATIENT
Start: 2025-03-04 | End: 2025-03-04 | Stop reason: HOSPADM

## 2025-03-04 RX ORDER — SODIUM CHLORIDE 9 MG/ML
INJECTION, SOLUTION INTRAVENOUS CONTINUOUS
Status: DISCONTINUED | OUTPATIENT
Start: 2025-03-04 | End: 2025-03-04 | Stop reason: HOSPADM

## 2025-03-04 RX ORDER — SODIUM CHLORIDE 0.9 % (FLUSH) 0.9 %
5-40 SYRINGE (ML) INJECTION EVERY 12 HOURS SCHEDULED
Status: DISCONTINUED | OUTPATIENT
Start: 2025-03-04 | End: 2025-03-04 | Stop reason: HOSPADM

## 2025-03-04 RX ORDER — CLOPIDOGREL BISULFATE 75 MG/1
75 TABLET ORAL DAILY
Qty: 30 TABLET | Refills: 3 | Status: SHIPPED | OUTPATIENT
Start: 2025-03-04

## 2025-03-04 RX ORDER — FENTANYL CITRATE 50 UG/ML
INJECTION, SOLUTION INTRAMUSCULAR; INTRAVENOUS PRN
Status: DISCONTINUED | OUTPATIENT
Start: 2025-03-04 | End: 2025-03-04 | Stop reason: HOSPADM

## 2025-03-04 RX ORDER — SODIUM CHLORIDE 9 MG/ML
INJECTION, SOLUTION INTRAVENOUS PRN
Status: DISCONTINUED | OUTPATIENT
Start: 2025-03-04 | End: 2025-03-04 | Stop reason: HOSPADM

## 2025-03-04 RX ORDER — PROTAMINE SULFATE 10 MG/ML
INJECTION, SOLUTION INTRAVENOUS PRN
Status: DISCONTINUED | OUTPATIENT
Start: 2025-03-04 | End: 2025-03-04 | Stop reason: HOSPADM

## 2025-03-04 RX ORDER — HEPARIN SODIUM 200 [USP'U]/100ML
INJECTION, SOLUTION INTRAVENOUS CONTINUOUS PRN
Status: COMPLETED | OUTPATIENT
Start: 2025-03-04 | End: 2025-03-04

## 2025-03-04 RX ORDER — SODIUM CHLORIDE 0.9 % (FLUSH) 0.9 %
5-40 SYRINGE (ML) INJECTION PRN
Status: DISCONTINUED | OUTPATIENT
Start: 2025-03-04 | End: 2025-03-04 | Stop reason: HOSPADM

## 2025-03-04 RX ORDER — CLOPIDOGREL 300 MG/1
TABLET, FILM COATED ORAL PRN
Status: DISCONTINUED | OUTPATIENT
Start: 2025-03-04 | End: 2025-03-04 | Stop reason: HOSPADM

## 2025-03-04 RX ORDER — IOPAMIDOL 755 MG/ML
INJECTION, SOLUTION INTRAVASCULAR PRN
Status: DISCONTINUED | OUTPATIENT
Start: 2025-03-04 | End: 2025-03-04 | Stop reason: HOSPADM

## 2025-03-04 NOTE — PROGRESS NOTES
TRANSFER - IN REPORT:    Verbal report received from Yue GONZALEZ on Alcon Zamora  being received from procedure area for routine progression of patient care. Report consisted of patient’s Situation, Background, Assessment and Recommendations(SBAR). Information from the following report(s)SBAR, Procedure Summary, MAR, Recent Results, and Cardiac Rhythm SB  was reviewed with the receiving clinician. Opportunity for questions and clarification was provided. Assessment completed upon patient’s arrival to Cardiac Cath Lab RECOVERY AREA and care assumed.       Cardiac Cath Lab Recovery Arrival Note:    Alcon Zamora arrived to CCL recovery area.  Patient procedure= pfo closure. Patient on cardiac monitor, non-invasive blood pressure, SPO2 monitor. On Room air.  IV  of NS on pump at 50 ml/hr. Patient status doing well without problems. Patient is A&Ox 4. Patient reports No Pain.    PROCEDURE SITE CHECK:    Procedure site:without any bleeding and No Hematoma, No pain/discomfort reported at procedure site.     No change in patient status. Continue to monitor patient and status.

## 2025-03-04 NOTE — PROGRESS NOTES
Cardiac Cath Lab Recovery Arrival Note:      Alcon Zamora arrived to Cardiac Cath Lab, Recovery Area. Staff introduced to patient. Patient identifiers verified with NAME and DATE OF BIRTH. Procedure verified with patient. Consent forms reviewed and signed by patient or authorized representative and verified. Allergies verified.     Patient and family oriented to department. Patient and family informed of procedure and plan of care.     Questions answered with review. Patient prepped for procedure, per orders from physician, prior to arrival.    Patient on cardiac monitor, non-invasive blood pressure, SPO2 monitor. On Room Air. Patient is A&Ox 4. Patient reports Chronic back and neck pain but pain ok when sitting.     Patient in stretcher, in low position, with side rails up, call bell within reach, patient instructed to call if assistance as needed.    Patient prep in: [facility] Recovery Area, [address].   Patient family : Pat / Mother [phone]  Family in: Waiting Room .   Prep by: Kathrine Oakley RN

## 2025-03-04 NOTE — PROGRESS NOTES
CATH LAB to RECOVERY ROOM REPORT    Procedure: RHC, PFO Closure    MD: HELDER Machado MD    The procedure was PFO Closure.    Verbal Report given to Recovery Nurse on patient being transferred to Cardiac Cath Lab  for routine post-op. Patient stable upon transfer to .  Vitals, mental status, MAR, procedural summary discussed with recovery RN.    Medication given during procedure:    Contrast: 5 mL                          Heparin: 16251 units     Versed: 3 mg                                                               Fentanyl: 50 mcg                                                           Plavix: 300 mg         Other Meds/Drips given:    Ancef: 2 mg IV    Protamine: 50 mg IV    Sheaths:    Right femoral vein 10 fr sheath pulled at 1114 am with mynx.    Left femoral vein 6 fr sheath pulled at 1109 am with mynx.

## 2025-03-04 NOTE — PROGRESS NOTES
Cardiac Cath Lab Procedure Area Arrival Note:    Alcon Zamora arrived to Cardiac Cath Lab, Procedure Area. Patient identifiers verified with NAME and DATE OF BIRTH. Procedure verified with patient. Consent forms verified. Allergies verified. Patient informed of procedure and plan of care. Questions answered with review. Patient voiced understanding of procedure and plan of care.    Patient on cardiac monitor, non-invasive blood pressure, SPO2 monitor. On O2 @ 2 lpm via NC.  IV of NS on pump at 100 ml/hr. Patient status doing well without problems. Patient is A&O x4. Patient reports no complaints.     Patient medicated during procedure with orders obtained and verified by Dr. Machado.    Refer to patients Cardiac Cath Lab PROCEDURE REPORT for vital signs, assessment, status, and response during procedure, printed at end of case. Printed report on chart or scanned into chart.

## 2025-03-04 NOTE — H&P
Value Date    CHOL 133 08/30/2024    CHOL 161 02/18/2024    CHOL 214 (H) 01/19/2023     Lab Results   Component Value Date    TRIG 62 08/30/2024    TRIG 173 (H) 02/18/2024    TRIG 211 (H) 01/19/2023     Lab Results   Component Value Date    HDL 63 08/30/2024    HDL 37 02/18/2024    HDL 43 01/19/2023     No components found for: \"LDLCHOLESTEROL\", \"LDLCALC\"  Lab Results   Component Value Date    VLDL 13 08/30/2024    VLDL 34.6 02/18/2024    VLDL 42.2 01/19/2023     Lab Results   Component Value Date    CHOLHDLRATIO 4.4 02/18/2024    CHOLHDLRATIO 5.0 01/19/2023    CHOLHDLRATIO 5.2 (H) 01/17/2022             Investigations reviewed  12/31/24    ECHO (TTE) COMPLETE (PRN CONTRAST/BUBBLE/STRAIN/3D) 12/31/2024  2:20 PM (Final)    Interpretation Summary    Interatrial Septum: PFO present. Bubbles cross over with and without valsalva after 2nd beat.    Left Ventricle: Normal left ventricular systolic function with a visually estimated EF of 55 - 60%.  Left ventricle size is normal. Increased wall thickness. Findings consistent with mild concentric hypertrophy. Normal wall motion.    Aorta:  Mildly dilated aortic root. Ao root diameter is 4.1 cm.    Signed by: Mckay Rivera MD on 12/31/2024  2:20 PM    No results found for this or any previous visit.     ATTENTION:   This medical record was transcribed using an electronic medical records/speech recognition system.  Although proofread, it may and can contain electronic, spelling and other errors.  Corrections may be executed at a later time.  Please feel free to contact us for any clarifications as needed.    [facility] heart and Vascular [place]  [facility], [facility],  [place], [place]. [phone]

## 2025-03-04 NOTE — PROGRESS NOTES
Pt's Mother/ Pat updated on Pt post procedure and discharge status. Pt to be discharged 3 hours post procedure per Dr Machado. No echo per Dr Machado.

## 2025-03-05 NOTE — PROGRESS NOTES
Ambulated patient to the bathroom with a steady gait, voided without difficulty. Patient denies chest pain, shortness of breath, or dizziness. Returned to stretcher. Vital signs stable.     Procedural site is clean, dry, and intact. No bleeding, no hematoma.     Assisted patient in dressing/Patient dressed self.     Educated patient about their sedation precautions such as not driving, operating any machinery, or signing legal documents 24 hours post procedure.     Reviewed discharge instructions, including medications and site care using the teach back method.  Answered all questions. Verbalized understanding.     Removed peripheral IV.    Escorted to discharge area in a wheelchair with all of their belongings including cell phone    Patient's Mother/Pat present to take patient home. Reviewed discharge instructions with patients' Mother/Pat, they verbalized understanding.

## 2025-04-03 ENCOUNTER — CLINICAL DOCUMENTATION (OUTPATIENT)
Age: 65
End: 2025-04-03

## 2025-04-03 ENCOUNTER — TELEPHONE (OUTPATIENT)
Age: 65
End: 2025-04-03

## 2025-04-03 DIAGNOSIS — I69.993 CVA, OLD, ATAXIA: ICD-10-CM

## 2025-04-03 DIAGNOSIS — D64.9 ANEMIA, UNSPECIFIED TYPE: ICD-10-CM

## 2025-04-03 DIAGNOSIS — E11.21 TYPE 2 DIABETES WITH NEPHROPATHY (HCC): ICD-10-CM

## 2025-04-03 RX ORDER — LISINOPRIL 40 MG/1
40 TABLET ORAL DAILY
Qty: 90 TABLET | Refills: 1 | Status: SHIPPED | OUTPATIENT
Start: 2025-04-03

## 2025-04-03 RX ORDER — CLOPIDOGREL BISULFATE 75 MG/1
75 TABLET ORAL DAILY
Qty: 30 TABLET | Refills: 3 | Status: SHIPPED | OUTPATIENT
Start: 2025-04-03

## 2025-04-03 RX ORDER — CHOLECALCIFEROL (VITAMIN D3) 1250 MCG
50000 CAPSULE ORAL 2 TIMES DAILY
Qty: 60 CAPSULE | Refills: 3 | Status: SHIPPED | OUTPATIENT
Start: 2025-04-03

## 2025-04-03 NOTE — TELEPHONE ENCOUNTER
Medication Refill Request    Alcon NAIK Zamora is requesting a refill of the following medication(s):   aspirin 81 MG      amLODIPine (NORVASC) 10 MG     lisinopril (PRINIVIL;ZESTRIL) 40 MG       famotidine (PEPCID) 20 MG       vitamin D (VITAMIN D3)       albuterol sulfate HFA (VENTOLIN HFA) 108 (90 Base) MCG       ferrous sulfate (IRON 325) 325 (65 Fe) MG     metFORMIN (GLUCOPHAGE) 500 MG tab     Please send refill to:     [facility] [id number] - [place], [place] - [address] - P [phone] - F [phone]  [address]  [place] [address]  Phone: [phone] Fax: [phone]

## 2025-04-03 NOTE — PROGRESS NOTES
Transfer from front office, patient's mom (PHI) Brenda Llamas  two identifier authentication, reviewed medication refill list, mom stated that all the meds stated was not needed only the following: Vitamin D3, Lisinopril 40 mg, metformin 500 mg and clopidogrel 75 mg. Sent to pharmacy: [facility] on [facility].    Thank you  Bre Kat LPN

## 2025-04-04 ENCOUNTER — TELEPHONE (OUTPATIENT)
Age: 65
End: 2025-04-04

## 2025-04-04 NOTE — TELEPHONE ENCOUNTER
Reason for call:  pharmacist wants to clarify the dosage on Vit D, 50,000 units twice a day is high.  Please call and confirm    Is this a new problem: Yes    Date of last appointment:  1/9/2025     Can we respond via [employer]: No    Best call back number:     [facility] [id number] [place], [place] - [address] - P [phone] - F [phone] (Pharmacy) [phone] (Work) Remove

## 2025-04-07 ENCOUNTER — TELEPHONE (OUTPATIENT)
Age: 65
End: 2025-04-07

## 2025-04-07 NOTE — TELEPHONE ENCOUNTER
Reason for call:  please call and verify the dosage for Vitamin D.  Second call    Is this a new problem: Yes    Date of last appointment:  1/9/2025     Can we respond via MyChart: No    Best call back number:     [facility] [id number] - [place], [place] - [address] - P [phone] - F [phone] (Pharmacy) [phone] (Work)

## 2025-04-24 ENCOUNTER — TELEPHONE (OUTPATIENT)
Age: 65
End: 2025-04-24

## 2025-04-24 NOTE — TELEPHONE ENCOUNTER
Reason for call:  TC from Brenda Llamas, Mother. Mother on most current PHI. Pt id verified by two identifiers. Ms. Llamas states she picked up pt's Vit D, yesterday, and the Pharmacist asked her if she was aware dosage had changed. Ms. Llamas states she needs clarification on change in dosage and why.        Is this a new problem: Yes    Date of last appointment:  1/9/2025     Can we respond via MyChart: No - Ms. Llamas would like to speak with nurse, please.     Best call back number: Brenda Llamas/Mother/[phone]

## 2025-04-25 NOTE — TELEPHONE ENCOUNTER
Spoke with  he wants the patient to take 5,000 iu of vitamin D because the previous dose was to much he sent in 5,000 to patient pharmacy.

## 2025-05-27 ENCOUNTER — OFFICE VISIT (OUTPATIENT)
Age: 65
End: 2025-05-27
Payer: COMMERCIAL

## 2025-05-27 ENCOUNTER — TELEPHONE (OUTPATIENT)
Age: 65
End: 2025-05-27

## 2025-05-27 ENCOUNTER — HOSPITAL ENCOUNTER (OUTPATIENT)
Facility: HOSPITAL | Age: 65
Discharge: HOME | End: 2025-05-30
Payer: COMMERCIAL

## 2025-05-27 VITALS
HEART RATE: 73 BPM | SYSTOLIC BLOOD PRESSURE: 137 MMHG | TEMPERATURE: 97.8 F | RESPIRATION RATE: 16 BRPM | BODY MASS INDEX: 24.25 KG/M2 | DIASTOLIC BLOOD PRESSURE: 86 MMHG | WEIGHT: 209.6 LBS | HEIGHT: 78 IN | OXYGEN SATURATION: 100 %

## 2025-05-27 DIAGNOSIS — E55.9 VITAMIN D DEFICIENCY: ICD-10-CM

## 2025-05-27 DIAGNOSIS — D64.9 ANEMIA, UNSPECIFIED TYPE: ICD-10-CM

## 2025-05-27 DIAGNOSIS — E11.40 TYPE 2 DIABETES MELLITUS WITH DIABETIC NEUROPATHY, WITHOUT LONG-TERM CURRENT USE OF INSULIN (HCC): ICD-10-CM

## 2025-05-27 DIAGNOSIS — R41.89 BRAIN FOG: ICD-10-CM

## 2025-05-27 LAB
25(OH)D3 SERPL-MCNC: 53.3 NG/ML (ref 30–100)
ALBUMIN SERPL-MCNC: 3.9 G/DL (ref 3.5–5)
ALBUMIN/GLOB SERPL: 1 (ref 1.1–2.2)
ALP SERPL-CCNC: 88 U/L (ref 45–117)
ALT SERPL-CCNC: 18 U/L (ref 12–78)
ANION GAP SERPL CALC-SCNC: 9 MMOL/L (ref 2–12)
AST SERPL-CCNC: 14 U/L (ref 15–37)
BASOPHILS # BLD: 0.06 K/UL (ref 0–0.1)
BASOPHILS NFR BLD: 0.9 % (ref 0–1)
BILIRUB SERPL-MCNC: 0.4 MG/DL (ref 0.2–1)
BUN SERPL-MCNC: 15 MG/DL (ref 6–20)
BUN/CREAT SERPL: 13 (ref 12–20)
CALCIUM SERPL-MCNC: 9.6 MG/DL (ref 8.5–10.1)
CHLORIDE SERPL-SCNC: 104 MMOL/L (ref 97–108)
CO2 SERPL-SCNC: 25 MMOL/L (ref 21–32)
CREAT SERPL-MCNC: 1.13 MG/DL (ref 0.7–1.3)
DIFFERENTIAL METHOD BLD: NORMAL
EOSINOPHIL # BLD: 0.18 K/UL (ref 0–0.4)
EOSINOPHIL NFR BLD: 2.8 % (ref 0–7)
ERYTHROCYTE [DISTWIDTH] IN BLOOD BY AUTOMATED COUNT: 12.6 % (ref 11.5–14.5)
EST. AVERAGE GLUCOSE BLD GHB EST-MCNC: 126 MG/DL
FERRITIN SERPL-MCNC: 15 NG/ML (ref 26–388)
FOLATE SERPL-MCNC: 8.1 NG/ML (ref 5–21)
GLOBULIN SER CALC-MCNC: 3.9 G/DL (ref 2–4)
GLUCOSE SERPL-MCNC: 133 MG/DL (ref 65–100)
HBA1C MFR BLD: 6 % (ref 4–5.6)
HCT VFR BLD AUTO: 37.4 % (ref 36.6–50.3)
HGB BLD-MCNC: 12.1 G/DL (ref 12.1–17)
IMM GRANULOCYTES # BLD AUTO: 0.02 K/UL (ref 0–0.04)
IMM GRANULOCYTES NFR BLD AUTO: 0.3 % (ref 0–0.5)
IRON SATN MFR SERPL: 16 % (ref 20–50)
IRON SERPL-MCNC: 42 UG/DL (ref 35–150)
LYMPHOCYTES # BLD: 1.45 K/UL (ref 0.8–3.5)
LYMPHOCYTES NFR BLD: 22.3 % (ref 12–49)
MCH RBC QN AUTO: 29 PG (ref 26–34)
MCHC RBC AUTO-ENTMCNC: 32.4 G/DL (ref 30–36.5)
MCV RBC AUTO: 89.7 FL (ref 80–99)
MONOCYTES # BLD: 0.43 K/UL (ref 0–1)
MONOCYTES NFR BLD: 6.6 % (ref 5–13)
NEUTS SEG # BLD: 4.37 K/UL (ref 1.8–8)
NEUTS SEG NFR BLD: 67.1 % (ref 32–75)
NRBC # BLD: 0 K/UL (ref 0–0.01)
NRBC BLD-RTO: 0 PER 100 WBC
PLATELET # BLD AUTO: 286 K/UL (ref 150–400)
PMV BLD AUTO: 9.4 FL (ref 8.9–12.9)
POTASSIUM SERPL-SCNC: 3.8 MMOL/L (ref 3.5–5.1)
PROT SERPL-MCNC: 7.8 G/DL (ref 6.4–8.2)
RBC # BLD AUTO: 4.17 M/UL (ref 4.1–5.7)
SODIUM SERPL-SCNC: 138 MMOL/L (ref 136–145)
T4 FREE SERPL-MCNC: 1.2 NG/DL (ref 0.8–1.5)
TIBC SERPL-MCNC: 269 UG/DL (ref 250–450)
TSH SERPL DL<=0.05 MIU/L-ACNC: 1.84 UIU/ML (ref 0.36–3.74)
VIT B12 SERPL-MCNC: 381 PG/ML (ref 193–986)
WBC # BLD AUTO: 6.5 K/UL (ref 4.1–11.1)

## 2025-05-27 PROCEDURE — 99214 OFFICE O/P EST MOD 30 MIN: CPT

## 2025-05-27 PROCEDURE — 3075F SYST BP GE 130 - 139MM HG: CPT

## 2025-05-27 PROCEDURE — 3079F DIAST BP 80-89 MM HG: CPT

## 2025-05-27 ASSESSMENT — ENCOUNTER SYMPTOMS
NAUSEA: 0
BLOOD IN STOOL: 0
VOMITING: 0
DIARRHEA: 0
CONSTIPATION: 0
ABDOMINAL PAIN: 0

## 2025-05-27 NOTE — TELEPHONE ENCOUNTER
Patient has a history of strokes and seizures.    Patient's mother would like a call back to discuss her son as he has been dealing with various health concerns since his last appointment.

## 2025-05-27 NOTE — TELEPHONE ENCOUNTER
I returned the patient's mother's call.     Per Brenda, the patient has been in a fog. Patient and his mother were told by the PCP to follow up with Neuro to discuss tapering off the Keppra. Patient's next appointment with us is 7/17/2025 but I was able to reschedule his appointment for 6/18/2025.     Patient had his heart surgery in April 2025.

## 2025-05-27 NOTE — PROGRESS NOTES
Alcon Zamora is a 65 y.o. male who was seen in clinic today (5/27/2025) for an acute visit.      Assessment & Plan:   Below is the assessment and plan developed based on review of pertinent history, physical exam, labs, studies, and medications.    Assessment & Plan  Brain fog   Chronic, not at goal (unstable),  Ongoing for at least one jonny, presumably multifactorial related to potential sedating effects of Keppra vs. Known iron deficiency pending GI follow-up (endoscopy was scheduled, then canceled in 10/2024 - his Mother reports this is stil in process of being rescheduled) vs. Mood-related. Will start with labs below, advised that he shoul make his follow-up with neuro to discuss tapering Keppra to see if it improves his symptoms. If metabolic/medication causes rued out, will need to discuss mood more.           Type 2 diabetes mellitus with diabetic neuropathy, without long-term current use of insulin (HCC)   Chronic, at goal (stable), continue current plan pending work up below    Orders:    Hemoglobin A1C; Future    Comprehensive Metabolic Panel; Future    Vitamin D deficiency   Chronic, at goal (stable), continue current plan pending work up below    Orders:    Vitamin D 25 Hydroxy; Future    Anemia, unspecified type   Chronic, at goal (stable),  Repeat labs below, again advised that he needs to reschedule previously scheduled GI referral.    Orders:    CBC with Auto Differential; Future    TSH; Future    T4, Free; Future    Vitamin B12 & Folate; Future    Iron and TIBC; Future    Ferritin; Future      RTC 3M    Subjective/Objective:   Alcon was seen today for Discuss Labs and Other (The meds are making him feel really tired )    Alcon Zamora is a 65 year old male with a medical history of hypertension, hyperlipidemia, type 2 diabetes, CVA, PFO (s/p closure 3/4/25), epilepsy, dysphagia s/p PEG tube placement (removed 07/24), HCV s/p Harvoni (negative PCR 04/24), prior alcohol/opiate abuse in remission (On

## 2025-05-27 NOTE — ASSESSMENT & PLAN NOTE
Chronic, at goal (stable), continue current plan pending work up below    Orders:    Hemoglobin A1C; Future    Comprehensive Metabolic Panel; Future

## 2025-05-29 ENCOUNTER — RESULTS FOLLOW-UP (OUTPATIENT)
Age: 65
End: 2025-05-29

## 2025-06-04 NOTE — RESULT ENCOUNTER NOTE
Called pt and spoke w/pt mother, Brenda Llamas and she is on HIPAA and given lab results and recommendations. Mother verbalized understanding.

## 2025-06-05 ENCOUNTER — TRANSCRIBE ORDERS (OUTPATIENT)
Facility: HOSPITAL | Age: 65
End: 2025-06-05

## 2025-06-05 DIAGNOSIS — Z87.891 FORMER SMOKER: ICD-10-CM

## 2025-06-05 DIAGNOSIS — Z87.891 PERSONAL HISTORY OF TOBACCO USE: Primary | ICD-10-CM

## 2025-06-17 NOTE — PROGRESS NOTES
extra-axial  fluid collection, or mass effect. There is no cerebellar tonsillar herniation.  Expected arterial flow-voids are present.    Mild mucosal thickening in the bilateral ethmoidal air cells and right sphenoid  sinus. The mastoid air cells and middle ears are clear. The orbital contents are  within normal limits. No significant osseous or scalp lesions are identified.    Impression  1. Small acute infarcts in the left medial basal ganglia and right superior  alannah/cerebral peduncle.  2. Generalized parenchymal volume loss and moderate to severe chronic  microvascular ischemic disease. Multiple small chronic infarcts as above.      MRI BRAIN W WO CONTRAST     Narrative  Reason for Exam:  Vestibular dysfunction, tinnitus, headaches    Report    Ordering Physician:DEWAYNE ROSEN MD    MRI of the IACs May 15, 2017    HISTORY: Vestibular dysfunction, tinnitus, headaches.    TECHNIQUE: Multisequence multiplanar imaging is performed before and after contrast enhancement.    COMPARISON: None    FINDINGS: The internal auditory canals are normal in size and appearance. The labyrinthine structures are normal. 7th and 8th cranial nerve complexes are also normal. There is no abnormal contrast enhancement on either side.    The ventricles are normal in size and position. The cortical sulci and basal cisterns are normal. There are a few scattered focal hyperintensities in the cerebral hemispheres and brainstem which are most consistent with mild microvascular changes. There is no evidence of mass, abnormal contrast enhancement, or extra-axial fluid collections. The vascular flow voids are within normal limits. The paranasal sinuses and mastoids are clear.    IMPRESSION: Normal temporal bones. Mild microvascular changes.    Dictated By: Debbie Grace  Electronically Verified by: Debbie Grace            Assessment and Plan   Alcon was seen today for follow-up.    Diagnoses and all orders for this visit:    CVA, old,

## 2025-06-18 ENCOUNTER — OFFICE VISIT (OUTPATIENT)
Age: 65
End: 2025-06-18
Payer: COMMERCIAL

## 2025-06-18 ENCOUNTER — TELEPHONE (OUTPATIENT)
Age: 65
End: 2025-06-18

## 2025-06-18 VITALS
HEART RATE: 78 BPM | HEIGHT: 78 IN | WEIGHT: 190 LBS | DIASTOLIC BLOOD PRESSURE: 68 MMHG | BODY MASS INDEX: 21.98 KG/M2 | RESPIRATION RATE: 16 BRPM | SYSTOLIC BLOOD PRESSURE: 102 MMHG | OXYGEN SATURATION: 98 %

## 2025-06-18 DIAGNOSIS — R56.9 SEIZURE (HCC): ICD-10-CM

## 2025-06-18 DIAGNOSIS — E11.40 TYPE 2 DIABETES MELLITUS WITH DIABETIC NEUROPATHY, WITHOUT LONG-TERM CURRENT USE OF INSULIN (HCC): ICD-10-CM

## 2025-06-18 DIAGNOSIS — Z87.74 S/P PATENT FORAMEN OVALE CLOSURE: Primary | ICD-10-CM

## 2025-06-18 DIAGNOSIS — I69.993 CVA, OLD, ATAXIA: Primary | ICD-10-CM

## 2025-06-18 PROCEDURE — 3078F DIAST BP <80 MM HG: CPT

## 2025-06-18 PROCEDURE — 3044F HG A1C LEVEL LT 7.0%: CPT

## 2025-06-18 PROCEDURE — 99214 OFFICE O/P EST MOD 30 MIN: CPT

## 2025-06-18 PROCEDURE — 3074F SYST BP LT 130 MM HG: CPT

## 2025-06-18 RX ORDER — LEVETIRACETAM 750 MG/1
750 TABLET ORAL 2 TIMES DAILY
Qty: 60 TABLET | Refills: 3 | Status: SHIPPED | OUTPATIENT
Start: 2025-06-18

## 2025-06-18 ASSESSMENT — PATIENT HEALTH QUESTIONNAIRE - PHQ9
1. LITTLE INTEREST OR PLEASURE IN DOING THINGS: NOT AT ALL
SUM OF ALL RESPONSES TO PHQ QUESTIONS 1-9: 0
2. FEELING DOWN, DEPRESSED OR HOPELESS: NOT AT ALL
SUM OF ALL RESPONSES TO PHQ QUESTIONS 1-9: 0

## 2025-06-18 ASSESSMENT — VISUAL ACUITY: VA_NORMAL: 1

## 2025-06-18 NOTE — TELEPHONE ENCOUNTER
Returned phone call to patient's mother Brenda. Advised pt will continue plavix 6 months post-PFO closure. Follow-up with bubble study made. Mother verbalizes understanding and is appreciative.       Future Appointments   Date Time Provider Department Center   8/27/2025  9:00 AM Oz Finch MD [facility] DEP   9/3/2025 10:00 AM SANTOSH DILL [facility]   9/3/2025 11:00 AM Julito Machado MD [facility] AM[facility]   9/22/2025 11:30 AM Lydia Benedict, APRN - NP [facility]   11/4/2025  9:30 AM [facility]

## 2025-06-18 NOTE — TELEPHONE ENCOUNTER
Patient's mother Brenda is calling to find out if her son needs to come back in to see ?    Patient is on Plavix and they are wondering how long he needs to continue on the medication.    [phone] Brenda (mother)

## 2025-06-19 ENCOUNTER — TELEPHONE (OUTPATIENT)
Age: 65
End: 2025-06-19

## 2025-07-08 ENCOUNTER — TELEPHONE (OUTPATIENT)
Age: 65
End: 2025-07-08

## 2025-07-08 DIAGNOSIS — E11.40 TYPE 2 DIABETES MELLITUS WITH DIABETIC NEUROPATHY, WITHOUT LONG-TERM CURRENT USE OF INSULIN (HCC): ICD-10-CM

## 2025-07-08 DIAGNOSIS — I69.993 CVA, OLD, ATAXIA: ICD-10-CM

## 2025-07-08 RX ORDER — ATORVASTATIN CALCIUM 40 MG/1
TABLET, FILM COATED ORAL
Qty: 180 TABLET | Refills: 0 | OUTPATIENT
Start: 2025-07-08

## 2025-07-08 RX ORDER — AMLODIPINE BESYLATE 10 MG/1
10 TABLET ORAL DAILY
Qty: 30 TABLET | Refills: 3 | Status: SHIPPED | OUTPATIENT
Start: 2025-07-08

## 2025-07-08 NOTE — TELEPHONE ENCOUNTER
Medication Refill Request    Alcon Zamora is requesting a refill of the following medication(s):     amLODIPine (NORVASC) 10 MG tablet     Please send refill to:     [facility] [id number] - [place] - [address] - P [phone] - F [phone]  [address]  [place] [address]  Phone: [phone] Fax: [phone]

## 2025-08-27 ENCOUNTER — OFFICE VISIT (OUTPATIENT)
Age: 65
End: 2025-08-27
Payer: COMMERCIAL

## 2025-08-27 VITALS
RESPIRATION RATE: 15 BRPM | BODY MASS INDEX: 24.2 KG/M2 | SYSTOLIC BLOOD PRESSURE: 133 MMHG | HEART RATE: 68 BPM | HEIGHT: 78 IN | WEIGHT: 209.2 LBS | TEMPERATURE: 97 F | DIASTOLIC BLOOD PRESSURE: 86 MMHG | OXYGEN SATURATION: 96 %

## 2025-08-27 DIAGNOSIS — E11.40 TYPE 2 DIABETES MELLITUS WITH DIABETIC NEUROPATHY, WITHOUT LONG-TERM CURRENT USE OF INSULIN (HCC): ICD-10-CM

## 2025-08-27 DIAGNOSIS — D50.9 IRON DEFICIENCY ANEMIA, UNSPECIFIED IRON DEFICIENCY ANEMIA TYPE: ICD-10-CM

## 2025-08-27 DIAGNOSIS — E78.5 DYSLIPIDEMIA: ICD-10-CM

## 2025-08-27 DIAGNOSIS — E11.40 TYPE 2 DIABETES MELLITUS WITH DIABETIC NEUROPATHY, WITHOUT LONG-TERM CURRENT USE OF INSULIN (HCC): Primary | ICD-10-CM

## 2025-08-27 PROCEDURE — 99214 OFFICE O/P EST MOD 30 MIN: CPT

## 2025-08-27 PROCEDURE — 3044F HG A1C LEVEL LT 7.0%: CPT

## 2025-08-27 PROCEDURE — 3079F DIAST BP 80-89 MM HG: CPT

## 2025-08-27 PROCEDURE — 3075F SYST BP GE 130 - 139MM HG: CPT

## 2025-08-27 RX ORDER — ALBUTEROL SULFATE 90 UG/1
4 INHALANT RESPIRATORY (INHALATION) PRN
OUTPATIENT
Start: 2025-08-27

## 2025-08-27 RX ORDER — EPINEPHRINE 1 MG/ML
0.3 INJECTION, SOLUTION, CONCENTRATE INTRAVENOUS PRN
OUTPATIENT
Start: 2025-08-27

## 2025-08-27 RX ORDER — FAMOTIDINE 10 MG/ML
20 INJECTION, SOLUTION INTRAVENOUS
OUTPATIENT
Start: 2025-08-27

## 2025-08-27 RX ORDER — HEPARIN SODIUM (PORCINE) LOCK FLUSH IV SOLN 100 UNIT/ML 100 UNIT/ML
500 SOLUTION INTRAVENOUS PRN
OUTPATIENT
Start: 2025-08-27

## 2025-08-27 RX ORDER — ONDANSETRON 2 MG/ML
8 INJECTION INTRAMUSCULAR; INTRAVENOUS
OUTPATIENT
Start: 2025-08-27

## 2025-08-27 RX ORDER — ACETAMINOPHEN 325 MG/1
650 TABLET ORAL ONCE
OUTPATIENT
Start: 2025-08-27 | End: 2025-08-27

## 2025-08-27 RX ORDER — SODIUM CHLORIDE 0.9 % (FLUSH) 0.9 %
5-40 SYRINGE (ML) INJECTION PRN
OUTPATIENT
Start: 2025-08-27

## 2025-08-27 RX ORDER — SODIUM CHLORIDE 9 MG/ML
INJECTION, SOLUTION INTRAVENOUS PRN
OUTPATIENT
Start: 2025-08-27

## 2025-08-27 RX ORDER — SODIUM CHLORIDE 9 MG/ML
5-250 INJECTION, SOLUTION INTRAVENOUS PRN
OUTPATIENT
Start: 2025-08-27

## 2025-08-27 RX ORDER — HYDROCORTISONE SODIUM SUCCINATE 100 MG/2ML
100 INJECTION INTRAMUSCULAR; INTRAVENOUS
OUTPATIENT
Start: 2025-08-27

## 2025-08-27 RX ORDER — ACETAMINOPHEN 325 MG/1
650 TABLET ORAL
OUTPATIENT
Start: 2025-08-27

## 2025-08-27 RX ORDER — DIPHENHYDRAMINE HYDROCHLORIDE 50 MG/ML
50 INJECTION, SOLUTION INTRAMUSCULAR; INTRAVENOUS
OUTPATIENT
Start: 2025-08-27

## 2025-08-27 RX ORDER — MEPERIDINE HYDROCHLORIDE 50 MG/ML
12.5 INJECTION INTRAMUSCULAR; INTRAVENOUS; SUBCUTANEOUS PRN
OUTPATIENT
Start: 2025-08-27

## 2025-08-27 ASSESSMENT — ENCOUNTER SYMPTOMS
CONSTIPATION: 1
NAUSEA: 0
ANAL BLEEDING: 0
VOMITING: 0
BLOOD IN STOOL: 0
DIARRHEA: 0
ABDOMINAL PAIN: 0

## 2025-08-29 LAB — SPECIMEN HOLD: NORMAL

## 2025-08-30 LAB
CHOLEST SERPL-MCNC: 127 MG/DL (ref 0–200)
CREAT UR-MCNC: 153 MG/DL (ref 39–259)
EST. AVERAGE GLUCOSE BLD GHB EST-MCNC: 129 MG/DL
HBA1C MFR BLD: 6.1 % (ref 4–5.6)
HDLC SERPL-MCNC: 42 MG/DL (ref 40–60)
HDLC SERPL: 3.1 (ref 0–5)
LDLC SERPL CALC-MCNC: 62 MG/DL (ref 0–100)
MICROALBUMIN UR-MCNC: 46.6 MG/DL
MICROALBUMIN/CREAT UR-RTO: 305 MG/G
TRIGL SERPL-MCNC: 116 MG/DL (ref 0–150)
VLDLC SERPL CALC-MCNC: 23 MG/DL

## 2025-09-03 ENCOUNTER — OFFICE VISIT (OUTPATIENT)
Age: 65
End: 2025-09-03
Payer: COMMERCIAL

## 2025-09-03 ENCOUNTER — ANCILLARY PROCEDURE (OUTPATIENT)
Age: 65
End: 2025-09-03
Payer: COMMERCIAL

## 2025-09-03 VITALS
RESPIRATION RATE: 16 BRPM | WEIGHT: 204 LBS | SYSTOLIC BLOOD PRESSURE: 130 MMHG | OXYGEN SATURATION: 97 % | BODY MASS INDEX: 23.6 KG/M2 | HEIGHT: 78 IN | DIASTOLIC BLOOD PRESSURE: 74 MMHG | TEMPERATURE: 97.8 F | HEART RATE: 86 BPM

## 2025-09-03 VITALS — HEIGHT: 78 IN | WEIGHT: 204 LBS | HEART RATE: 71 BPM | BODY MASS INDEX: 23.6 KG/M2

## 2025-09-03 DIAGNOSIS — Z87.74 S/P PATENT FORAMEN OVALE CLOSURE: ICD-10-CM

## 2025-09-03 DIAGNOSIS — Q21.12 PFO (PATENT FORAMEN OVALE): ICD-10-CM

## 2025-09-03 DIAGNOSIS — I63.9 ACUTE CEREBROVASCULAR ACCIDENT (CVA) (HCC): ICD-10-CM

## 2025-09-03 DIAGNOSIS — Z87.74 S/P PATENT FORAMEN OVALE CLOSURE: Primary | ICD-10-CM

## 2025-09-03 LAB
ECHO AO ASC DIAM: 3.6 CM
ECHO AO ASCENDING AORTA INDEX: 1.58 CM/M2
ECHO AO ROOT DIAM: 3.8 CM
ECHO AO ROOT INDEX: 1.67 CM/M2
ECHO AV AREA PEAK VELOCITY: 2.8 CM2
ECHO AV AREA VTI: 3.1 CM2
ECHO AV AREA/BSA PEAK VELOCITY: 1.2 CM2/M2
ECHO AV AREA/BSA VTI: 1.4 CM2/M2
ECHO AV MEAN GRADIENT: 3 MMHG
ECHO AV MEAN VELOCITY: 0.7 M/S
ECHO AV PEAK GRADIENT: 6 MMHG
ECHO AV PEAK VELOCITY: 1.3 M/S
ECHO AV VELOCITY RATIO: 0.69
ECHO AV VTI: 20.8 CM
ECHO BSA: 2.26 M2
ECHO EST RA PRESSURE: 3 MMHG
ECHO LA DIAMETER INDEX: 1.49 CM/M2
ECHO LA DIAMETER: 3.4 CM
ECHO LA TO AORTIC ROOT RATIO: 0.89
ECHO LA VOL A-L A2C: 77 ML (ref 18–58)
ECHO LA VOL A-L A4C: 74 ML (ref 18–58)
ECHO LA VOL BP: 68 ML (ref 18–58)
ECHO LA VOL MOD A2C: 73 ML (ref 18–58)
ECHO LA VOL MOD A4C: 64 ML (ref 18–58)
ECHO LA VOL/BSA BIPLANE: 30 ML/M2 (ref 16–34)
ECHO LA VOLUME AREA LENGTH: 76 ML
ECHO LA VOLUME INDEX A-L A2C: 34 ML/M2 (ref 16–34)
ECHO LA VOLUME INDEX A-L A4C: 32 ML/M2 (ref 16–34)
ECHO LA VOLUME INDEX AREA LENGTH: 33 ML/M2 (ref 16–34)
ECHO LA VOLUME INDEX MOD A2C: 32 ML/M2 (ref 16–34)
ECHO LA VOLUME INDEX MOD A4C: 28 ML/M2 (ref 16–34)
ECHO LV E' LATERAL VELOCITY: 8.73 CM/S
ECHO LV E' SEPTAL VELOCITY: 8.19 CM/S
ECHO LV EDV A2C: 132 ML
ECHO LV EDV A4C: 149 ML
ECHO LV EDV BP: 141 ML (ref 67–155)
ECHO LV EDV INDEX A4C: 65 ML/M2
ECHO LV EDV INDEX BP: 62 ML/M2
ECHO LV EDV NDEX A2C: 58 ML/M2
ECHO LV EF PHYSICIAN: 58 %
ECHO LV EJECTION FRACTION A2C: 61 %
ECHO LV EJECTION FRACTION A4C: 56 %
ECHO LV EJECTION FRACTION BIPLANE: 58 % (ref 55–100)
ECHO LV ESV A2C: 51 ML
ECHO LV ESV A4C: 65 ML
ECHO LV ESV BP: 59 ML (ref 22–58)
ECHO LV ESV INDEX A2C: 22 ML/M2
ECHO LV ESV INDEX A4C: 29 ML/M2
ECHO LV ESV INDEX BP: 26 ML/M2
ECHO LV FRACTIONAL SHORTENING: 26 % (ref 28–44)
ECHO LV INTERNAL DIMENSION DIASTOLE INDEX: 1.67 CM/M2
ECHO LV INTERNAL DIMENSION DIASTOLIC: 3.8 CM (ref 4.2–5.9)
ECHO LV INTERNAL DIMENSION SYSTOLIC INDEX: 1.23 CM/M2
ECHO LV INTERNAL DIMENSION SYSTOLIC: 2.8 CM
ECHO LV IVSD: 1.3 CM (ref 0.6–1)
ECHO LV MASS 2D: 163 G (ref 88–224)
ECHO LV MASS INDEX 2D: 71.5 G/M2 (ref 49–115)
ECHO LV POSTERIOR WALL DIASTOLIC: 1.2 CM (ref 0.6–1)
ECHO LV RELATIVE WALL THICKNESS RATIO: 0.63
ECHO LVOT AREA: 3.8 CM2
ECHO LVOT AV VTI INDEX: 0.83
ECHO LVOT DIAM: 2.2 CM
ECHO LVOT MEAN GRADIENT: 2 MMHG
ECHO LVOT PEAK GRADIENT: 4 MMHG
ECHO LVOT PEAK VELOCITY: 0.9 M/S
ECHO LVOT STROKE VOLUME INDEX: 28.7 ML/M2
ECHO LVOT SV: 65.3 ML
ECHO LVOT VTI: 17.2 CM
ECHO MV A VELOCITY: 0.76 M/S
ECHO MV AREA PHT: 1.8 CM2
ECHO MV E DECELERATION TIME (DT): 420.1 MS
ECHO MV E VELOCITY: 0.38 M/S
ECHO MV E/A RATIO: 0.5
ECHO MV E/E' LATERAL: 4.35
ECHO MV E/E' RATIO (AVERAGED): 4.5
ECHO MV E/E' SEPTAL: 4.64
ECHO MV PRESSURE HALF TIME (PHT): 121.8 MS
ECHO RV INTERNAL DIMENSION: 3.4 CM
ECHO RV TAPSE: 2 CM (ref 1.7–?)

## 2025-09-03 PROCEDURE — 99214 OFFICE O/P EST MOD 30 MIN: CPT | Performed by: INTERNAL MEDICINE

## 2025-09-03 PROCEDURE — 3075F SYST BP GE 130 - 139MM HG: CPT | Performed by: INTERNAL MEDICINE

## 2025-09-03 PROCEDURE — 93010 ELECTROCARDIOGRAM REPORT: CPT | Performed by: INTERNAL MEDICINE

## 2025-09-03 PROCEDURE — 93005 ELECTROCARDIOGRAM TRACING: CPT | Performed by: INTERNAL MEDICINE

## 2025-09-03 PROCEDURE — 3078F DIAST BP <80 MM HG: CPT | Performed by: INTERNAL MEDICINE

## 2025-09-03 PROCEDURE — 93306 TTE W/DOPPLER COMPLETE: CPT | Performed by: INTERNAL MEDICINE

## (undated) DEVICE — Device

## (undated) DEVICE — PINNACLE INTRODUCER SHEATH: Brand: PINNACLE

## (undated) DEVICE — COVER,TABLE,HEAVY DUTY,60"X90",STRL: Brand: MEDLINE

## (undated) DEVICE — GAUZE SPONGES,12 PLY: Brand: CURITY

## (undated) DEVICE — SUTURE ABSRB BRAID COAT UD OS-6 NO 1 27IN VCRL J535H

## (undated) DEVICE — SUTURE VCRL SZ 3-0 L27IN ABSRB UD CP-2 L26MM 1/2 CIR REV J868H

## (undated) DEVICE — PREP SKN PREVAIL 40ML APPL --

## (undated) DEVICE — BONE WAX WHITE: Brand: BONE WAX WHITE

## (undated) DEVICE — STERILE POLYISOPRENE POWDER-FREE SURGICAL GLOVES WITH EMOLLIENT COATING: Brand: PROTEXIS

## (undated) DEVICE — CATHETER PRESSURE WEDGE BLLN 6FRX110CM

## (undated) DEVICE — CATHETER REPROC ULTRASOUND 8 FRX90 CM ACUSON ACUNAV

## (undated) DEVICE — SC 3W HP RA OFF NB - PG: Brand: NAMIC

## (undated) DEVICE — PRESSURE MONITORING SET: Brand: TRUWAVE

## (undated) DEVICE — ANGIOGRAPHY KIT

## (undated) DEVICE — PEG KIT SAFETY 24 FR PUL W/ ENFIT ENDOVIVE

## (undated) DEVICE — CATHETER ANGIO L100CM DIA5FR MP A CRV COR INSLIDE ADD DBL

## (undated) DEVICE — SOLUTION IRRIG 3000ML 0.9% SOD CHL FLX CONT 0797208] ICU MEDICAL INC]

## (undated) DEVICE — PROVE COVER: Brand: UNBRANDED

## (undated) DEVICE — KIT MFLD ISOLATN NACL CNTRST PRT TBNG SPIK W/ PRSS TRNSDUC

## (undated) DEVICE — X-RAY SPONGES,16 PLY: Brand: DERMACEA

## (undated) DEVICE — PILLOW POS AD L7IN R FOAM HD REST INTUB SLOT DISP

## (undated) DEVICE — STOPCOCK 3 W OFF HNDL NYL

## (undated) DEVICE — HANDPIECE SET WITH BONE CLEANING TIP AND SUCTION TUBE: Brand: INTERPULSE

## (undated) DEVICE — KIT HND CTRL 3 W STPCOCK ROT END 54IN PREM HI PRSS TBNG AT

## (undated) DEVICE — GOWN,SIRUS,NONRNF,SETINSLV,2XL,18/CS: Brand: MEDLINE

## (undated) DEVICE — SURGIFOAM SPNG SZ 100

## (undated) DEVICE — PADPRO DEFIBRILLATION/PACING/CARDIOVERSION/MONITORING ELECTRODES, ADULT/CHILD GREATER THAN 10 KG RADIOTRANSPARENT ELECTRODE, PHYSIO-CONTROL QUIK-COMBO (M) 60" (152 CM): Brand: PADPRO

## (undated) DEVICE — GUIDEWIRE VASC J 3 MM 0.035 INX210 CM FIX COR INQWIRE

## (undated) DEVICE — DEVICE VES SEAL DIA 6-12 FR FEM VEIN FASTEST HEMOSTAS STRL

## (undated) DEVICE — SOLUTION IV 250ML 0.9% SOD CHL CLR INJ FLX BG CONT PRT CLSR

## (undated) DEVICE — GUIDEWIRE VASC L180CM DIA0.035IN TIP L7CM PTFE S STL STR

## (undated) DEVICE — PAD 05IN BASE 3IN PEAK M DENS CONVOLUTED FOAM

## (undated) DEVICE — KIT MED IMAG CNTRST AGNT W/ IOPAMIDOL REUSE

## (undated) DEVICE — LAMINECTOMY RICHMOND-LF: Brand: MEDLINE INDUSTRIES, INC.

## (undated) DEVICE — 4-PORT MANIFOLD: Brand: NEPTUNE 2

## (undated) DEVICE — DEVON™ KNEE AND BODY STRAP 60" X 3" (1.5 M X 7.6 CM): Brand: DEVON

## (undated) DEVICE — SUPPLEMENT DIGESTIVE H2O SOL GI-EASE

## (undated) DEVICE — DRAIN KT WND 10FR RND 400ML --

## (undated) DEVICE — KENDALL SCD EXPRESS SLEEVES, KNEE LENGTH, MEDIUM: Brand: KENDALL SCD

## (undated) DEVICE — SUTURE VCRL 2-0 L27IN ABSRB UD CP-2 L26MM 1/2 CIR REV CUT J869H

## (undated) DEVICE — ADHESIVE SKIN CLOSURE 4X22 CM PREMIERPRO EXOFINFUSION DISP

## (undated) DEVICE — TOOL 14BA50 LEGEND 14CM 5MM BA: Brand: MIDAS REX ™

## (undated) DEVICE — BIPOLAR IRRIGATOR INTEGRATED TUBING AND BIPOLAR CORD SET, DISPOSABLE

## (undated) DEVICE — MEDI-VAC NON-CONDUCTIVE SUCTION TUBING: Brand: CARDINAL HEALTH

## (undated) DEVICE — SPONGE LAP 18X18IN STRL -- 5/PK

## (undated) DEVICE — TRAY CATH 16F URIN MTR LTX -- CONVERT TO ITEM 363111

## (undated) DEVICE — WASTE KIT - ST MARY: Brand: MEDLINE INDUSTRIES, INC.

## (undated) DEVICE — INFECTION CONTROL KIT SYS